# Patient Record
Sex: MALE | Race: WHITE | NOT HISPANIC OR LATINO | Employment: PART TIME | URBAN - METROPOLITAN AREA
[De-identification: names, ages, dates, MRNs, and addresses within clinical notes are randomized per-mention and may not be internally consistent; named-entity substitution may affect disease eponyms.]

---

## 2017-01-05 ENCOUNTER — RADIANT APPOINTMENT (OUTPATIENT)
Dept: GENERAL RADIOLOGY | Facility: CLINIC | Age: 37
End: 2017-01-05
Attending: PODIATRIST
Payer: COMMERCIAL

## 2017-01-05 ENCOUNTER — OFFICE VISIT (OUTPATIENT)
Dept: PODIATRY | Facility: CLINIC | Age: 37
End: 2017-01-05
Payer: COMMERCIAL

## 2017-01-05 VITALS — RESPIRATION RATE: 16 BRPM | WEIGHT: 184 LBS | HEIGHT: 68 IN | BODY MASS INDEX: 27.89 KG/M2

## 2017-01-05 DIAGNOSIS — E11.9 TYPE 2 DIABETES MELLITUS WITHOUT COMPLICATION, WITH LONG-TERM CURRENT USE OF INSULIN (H): ICD-10-CM

## 2017-01-05 DIAGNOSIS — G60.9 HEREDITARY AND IDIOPATHIC PERIPHERAL NEUROPATHY: ICD-10-CM

## 2017-01-05 DIAGNOSIS — Z79.4 TYPE 2 DIABETES MELLITUS WITHOUT COMPLICATION, WITH LONG-TERM CURRENT USE OF INSULIN (H): ICD-10-CM

## 2017-01-05 DIAGNOSIS — S99.921A INJURY OF RIGHT TOE, INITIAL ENCOUNTER: Primary | ICD-10-CM

## 2017-01-05 PROCEDURE — 73630 X-RAY EXAM OF FOOT: CPT | Mod: RT

## 2017-01-05 PROCEDURE — 99213 OFFICE O/P EST LOW 20 MIN: CPT | Performed by: PODIATRIST

## 2017-01-05 NOTE — NURSING NOTE
"Chief Complaint   Patient presents with     Foot Problems     Right hallux injury x 2 weeks, bruised, DM foot check       Initial Ht 5' 8\" (1.727 m)  Wt 184 lb (83.462 kg)  BMI 27.98 kg/m2 Estimated body mass index is 27.98 kg/(m^2) as calculated from the following:    Height as of this encounter: 5' 8\" (1.727 m).    Weight as of this encounter: 184 lb (83.462 kg).    Nancy Miller CMA ([facility])  Podiatry/Foot & Ankle Surgery      "

## 2017-01-05 NOTE — PROGRESS NOTES
ASSESSMENT/PLAN:    Encounter Diagnoses   Name Primary?     Injury of right toe, initial encounter Yes     Type 2 diabetes mellitus without complication, with long-term current use of insulin (H)      Hereditary and idiopathic peripheral neuropathy      We reviewed the x-rays together.      Stiff soled, support shoes recommended.    If the right hallux nail loosens or he has ongoing pain, he is to return.  Nail avulsion would be an option.    Pt is instructed to inspect feet daily, wear a supportive shoe at all times, and avoid walking barefoot.  He is instructed to call if any sores develop.   Importance of good blood sugar control emphasized.   Pt verbalized understanding.      Body mass index is 27.98 kg/(m^2).    Weight management plan: Patient was referred to their PCP to discuss a diet and exercise plan.      Shelton Smallwood DPM, FACFAS, MS    [facility] Department of Podiatry/Foot & Ankle Surgery      ____________________________________________________________________    HPI:         Chief Complaint: heavy ge ran over his right great toe 14 days ago. painful  Onset of problem: 14 days  Pain/ discomfort is described as:  Pressure pain  Ratin/10   Frequency:  daily    The pain is made worse with contact, walking, pressure  Previous treatment: no    He also presented for a foot exam.  He has diabetes.  Last Hgb A1C = 7.2. He reports that the previous interdigital tinea pedis resolved.    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      Liver disease      Seizures (H)      Pain disorder 2015   *  *  Past Surgical History   Procedure Laterality Date     Orthopedic surgery     *  *  Current Outpatient Prescriptions   Medication Sig Dispense Refill     liraglutide (VICTOZA) 18 MG/3ML soln Inject 1.2 mg Subcutaneous daily 6 mL 0     nortriptyline  (PAMELOR) 25 MG capsule Take 2 capsules (50 mg) by mouth At Bedtime 60 capsule 2     gabapentin (NEURONTIN) 600 MG tablet Take 2 tablets, 3x daily 180 tablet 2     insulin glargine (LANTUS SOLOSTAR) 100 UNIT/ML PEN 10 units at bedtime 3 Month 1     metFORMIN (GLUCOPHAGE-XR) 500 MG 24 hr tablet Take 2 tablets (1,000 mg) by mouth 2 times daily (with meals) 120 tablet 5     Buprenorphine HCl-Naloxone HCl (SUBOXONE) 4-1 MG film Place 1 Film under the tongue 3 times daily       insulin lispro (HUMALOG KWIKPEN) 100 UNIT/ML soln To use with scale 3 times per day with meals while on steroids.  Use the scale when your meal BG is > 250  with the pain causing high BG. 500 mL 6     insulin pen needle 31G X 6 MM Use 2 daily or as directed. 100 each 11     blood glucose monitoring (ACCU-CHEK ADARSH PLUS) meter device kit Use to test blood sugar 4-6 times daily or as directed. 1 kit 0     EPINEPHrine (EPIPEN) 0.3 MG/0.3ML injection Inject 0.3 mLs (0.3 mg) into the muscle once as needed for anaphylaxis 1 each 2     ciclopirox 0.77 % GEL Apply to affected area twice daily 30 g 1     losartan (COZAAR) 100 MG tablet Take 1 tablet (100 mg) by mouth daily 90 tablet 3     blood glucose monitoring (ACCU-CHEK ADARSH) test strip Use to test blood sugars 4 times daily or as directed. When on prednisone check 6 times per day. 4 Box 12     sildenafil (VIAGRA) 100 MG tablet Take 0.5-1 tablets ( mg) by mouth daily as needed for erectile dysfunction Take 30 min to 4 hours before intercourse.  Never use with nitroglycerin, terazosin or doxazosin. 5 tablet 5     blood glucose (ACCU-CHEK SMARTVIEW) test strip Use to test blood sugar 2 times daily or as directed. 3 Month 3     diazepam (VALIUM) 5 MG tablet Take 1 tablet (5 mg) 3 times daily as needed. 60 tablet      venlafaxine (EFFEXOR-ER) 150 MG TB24 Take 1 tablet by mouth daily (with breakfast). 30 each 0     ibuprofen (ADVIL,MOTRIN) 200 MG tablet Take 1-2 tablets by mouth every 6 hours as  "needed.       DiphenhydrAMINE HCl (BENADRYL PO) Take 50 mg by mouth At Bedtime.         ROS:    A 10-point review of systems was performed.  It is positive for that noted in the HPI and as seen below.  All other systems found to be negative.     Numbness in feet?  yes   Calf pain with walking? no  Recent foot/ankle injury? See HPI  Weight change  over past 12 months? 10# gain  Self perception as overweight? no  Recent flu-like symptoms? no  Joint pain other than feet ? no    EXAM:    Vitals: Ht 5' 8\" (1.727 m)  Wt 184 lb (83.462 kg)  BMI 27.98 kg/m2  BMI: Body mass index is 27.98 kg/(m^2).  Height: 5' 8\"    Constitutional/ general:  Pt is in no apparent distress, appears well-nourished.  Cooperative with history and physical exam.     Vascular:  Pedal pulses are palpable bilaterally for both the DP and PT arteries.  CFT < 3 sec.  No edema.  Pedal hair growth noted.     Neuro:  Alert and oriented x 3. Coordinated gait.  Light touch sensation is intact to the L4, L5, S1 distributions. No obvious deficits.  No evidence of neurological-based weakness, spasticity, or contracture in the lower extremities.     Protective sensation is intact per [place] Monofilament testing.    Derm: Normal texture and turgor.  No erythema, ecchymosis, or cyanosis.  No open lesions.  Thickening of bilateral hallux nail with darker pigmentation of the right hallux nail plate    Musculoskeletal:    Lower extremity muscle strength is normal.  Patient is ambulatory without an assistive device or brace .  No gross deformities.  Pain on palpation: with pressure of the right hallux nail plate.  No pain with interphalangeal joint ROM or palpation over the proximal phalanx.      Radiographic Exam:  X-Ray Findings:  I personally reviewed the films.  No fractures seen in the right hallux.      Shelton Smallwood DPM, ARACELI, MS [place] Department of Podiatry/Foot & Ankle Surgery              "

## 2017-01-09 DIAGNOSIS — I10 HYPERTENSION GOAL BP (BLOOD PRESSURE) < 140/90: ICD-10-CM

## 2017-01-09 NOTE — TELEPHONE ENCOUNTER
losartan      Last Written Prescription Date: 12/22/15  Last Fill Quantity: 90, # refills: 3  [place] [facility] prescribing provider: 12/12/16       POTASSIUM   Date Value Ref Range Status   07/18/2016 3.9 3.4 - 5.3 mmol/L Final     CREATININE   Date Value Ref Range Status   07/18/2016 0.97 0.66 - 1.25 mg/dL Final     BP Readings from Last 3 Encounters:   12/12/16 110/64   11/28/16 138/90   10/03/16 126/92

## 2017-01-10 DIAGNOSIS — Z79.4 TYPE 2 DIABETES MELLITUS WITHOUT COMPLICATION, WITH LONG-TERM CURRENT USE OF INSULIN (H): Primary | ICD-10-CM

## 2017-01-10 DIAGNOSIS — E11.9 TYPE 2 DIABETES MELLITUS WITHOUT COMPLICATION, WITH LONG-TERM CURRENT USE OF INSULIN (H): Primary | ICD-10-CM

## 2017-01-10 RX ORDER — LOSARTAN POTASSIUM 100 MG/1
100 TABLET ORAL DAILY
Qty: 90 TABLET | Refills: 1 | Status: SHIPPED | OUTPATIENT
Start: 2017-01-10 | End: 2017-06-02

## 2017-01-10 NOTE — TELEPHONE ENCOUNTER
rodneytoza      Last Written Prescription Date:  12/16/16  Last Fill Quantity: 6,   # refills: 0  Last Office Visit with [facility] or [facility] prescribing provider: 12/12/16  Future Office visit:       Routing refill request to provider for review/approval because:  Drug not on the [facility] or [facility] refill protocol or controlled substance

## 2017-01-11 ENCOUNTER — CARE COORDINATION (OUTPATIENT)
Dept: CARE COORDINATION | Facility: CLINIC | Age: 37
End: 2017-01-11

## 2017-01-11 NOTE — PROGRESS NOTES
Clinic Care Coordination Contact  Pt called leaving a VM stating that he is doing well and feeling improved with new medication for pain  RN CC notes that in LOV pt reported that his wife is 7 wks gestation  Pt to f/u with PCP in 3 months  Erna Pineda RN Clinic Care Coordinator  [facility] [phone]

## 2017-01-12 DIAGNOSIS — E11.9 TYPE 2 DIABETES MELLITUS WITHOUT COMPLICATION (H): Primary | ICD-10-CM

## 2017-01-12 NOTE — TELEPHONE ENCOUNTER
Routing refill request to provider for review/approval because:  Lana given x1 and patient did not follow up, please advise

## 2017-01-12 NOTE — TELEPHONE ENCOUNTER
kedar plus strips      Last Written Prescription Date: 12/22/15  Last Fill Quantity: 4,  # refills: 12   Last Office Visit with [facility], [facility] or [facility] prescribing provider: 12/12/16

## 2017-01-13 RX ORDER — LIRAGLUTIDE 6 MG/ML
1.2 INJECTION SUBCUTANEOUS DAILY
Qty: 6 ML | Refills: 0 | Status: SHIPPED | OUTPATIENT
Start: 2017-01-13 | End: 2017-05-03

## 2017-01-16 DIAGNOSIS — G54.5 NEURALGIC AMYOTROPHY: Primary | ICD-10-CM

## 2017-01-16 RX ORDER — GABAPENTIN 600 MG/1
TABLET ORAL
Qty: 180 TABLET | Refills: 2 | Status: SHIPPED | OUTPATIENT
Start: 2017-01-16 | End: 2017-06-24

## 2017-01-22 ENCOUNTER — OFFICE VISIT (OUTPATIENT)
Dept: URGENT CARE | Facility: URGENT CARE | Age: 37
End: 2017-01-22
Payer: COMMERCIAL

## 2017-01-22 VITALS
HEIGHT: 69 IN | OXYGEN SATURATION: 98 % | BODY MASS INDEX: 26.66 KG/M2 | WEIGHT: 180 LBS | SYSTOLIC BLOOD PRESSURE: 122 MMHG | HEART RATE: 91 BPM | TEMPERATURE: 97.4 F | DIASTOLIC BLOOD PRESSURE: 79 MMHG

## 2017-01-22 DIAGNOSIS — R19.7 DIARRHEA, UNSPECIFIED TYPE: Primary | ICD-10-CM

## 2017-01-22 LAB
BASOPHILS # BLD AUTO: 0 10E9/L (ref 0–0.2)
BASOPHILS NFR BLD AUTO: 0.1 %
DIFFERENTIAL METHOD BLD: NORMAL
EOSINOPHIL # BLD AUTO: 0.4 10E9/L (ref 0–0.7)
EOSINOPHIL NFR BLD AUTO: 4.6 %
ERYTHROCYTE [DISTWIDTH] IN BLOOD BY AUTOMATED COUNT: 12.3 % (ref 10–15)
HCT VFR BLD AUTO: 41.5 % (ref 40–53)
HGB BLD-MCNC: 13.9 G/DL (ref 13.3–17.7)
LYMPHOCYTES # BLD AUTO: 1.8 10E9/L (ref 0.8–5.3)
LYMPHOCYTES NFR BLD AUTO: 22.3 %
MCH RBC QN AUTO: 29.5 PG (ref 26.5–33)
MCHC RBC AUTO-ENTMCNC: 33.5 G/DL (ref 31.5–36.5)
MCV RBC AUTO: 88 FL (ref 78–100)
MONOCYTES # BLD AUTO: 0.6 10E9/L (ref 0–1.3)
MONOCYTES NFR BLD AUTO: 6.9 %
NEUTROPHILS # BLD AUTO: 5.3 10E9/L (ref 1.6–8.3)
NEUTROPHILS NFR BLD AUTO: 66.1 %
PLATELET # BLD AUTO: 320 10E9/L (ref 150–450)
RBC # BLD AUTO: 4.71 10E12/L (ref 4.4–5.9)
WBC # BLD AUTO: 8 10E9/L (ref 4–11)

## 2017-01-22 PROCEDURE — 99213 OFFICE O/P EST LOW 20 MIN: CPT | Performed by: FAMILY MEDICINE

## 2017-01-22 PROCEDURE — 85025 COMPLETE CBC W/AUTO DIFF WBC: CPT | Performed by: FAMILY MEDICINE

## 2017-01-22 PROCEDURE — 36415 COLL VENOUS BLD VENIPUNCTURE: CPT | Performed by: FAMILY MEDICINE

## 2017-01-22 PROCEDURE — 80053 COMPREHEN METABOLIC PANEL: CPT | Performed by: FAMILY MEDICINE

## 2017-01-22 NOTE — NURSING NOTE
"Chief Complaint   Patient presents with     Urgent Care     Diarrhea     c/o diarrhea for 3 days       Initial /79 mmHg  Pulse 91  Temp(Src) 97.4  F (36.3  C) (Tympanic)  Ht 5' 9\" (1.753 m)  Wt 180 lb (81.647 kg)  BMI 26.57 kg/m2  SpO2 98% Estimated body mass index is 26.57 kg/(m^2) as calculated from the following:    Height as of this encounter: 5' 9\" (1.753 m).    Weight as of this encounter: 180 lb (81.647 kg).  BP completed using cuff size: regular  Yojana Chatman MA    "

## 2017-01-22 NOTE — MR AVS SNAPSHOT
After Visit Summary   1/22/2017    Phillip Rueda    MRN: [id number]           Patient Information     Date Of Birth          1980        Visit Information        Provider Department      1/22/2017 11:15 AM Jermaine Bellamy MD [facility]        Today's Diagnoses     Diarrhea, unspecified type    -  1       Care Instructions    Okay for imodium to help with diarrhea, caution as too much will cause constipation.  BRAT diet - bananas, rice, applesauce, toast      Viral Gastroenteritis (Adult)    Gastroenteritis is commonly called the stomach flu. It is most often caused by a virus that affects the stomach and intestinal tract and usually lasts from 2 to 7 days. Common viruses causing gastroenteritis include norovirus, rotavirus, and hepatitis A. Non-viral causes of gastroenteritis include bacteria, parasites, and toxins.  The danger from repeated vomiting or diarrhea is dehydration. This is the loss of too much fluid from the body. When this occurs, body fluids must be replaced. Antibiotics do not help with this illness because it is usually viral.Simple home treatment will be helpful.  Symptoms of viral gastroenteritis may include:    Watery, loose stools    Stomach pain or abdominal cramps    Fever and chills    Nausea and vomiting    Loss of bowel control    Headache  Home care  Gastroenteritis is transmitted by contact with the stool or vomit of an infected person. This can occur from person to person or from contact with a contaminated surface.  Follow these guidelines when caring for yourself at home:    If symptoms are severe, rest at home for the next 24 hours or until you are feeling better.    Wash your hands with soap and water or use alcohol-based  to prevent the spread of infection. Wash your hands after touching anyone who is sick.    Wash your hands or use alcohol-based  after using the toilet and before meals. Clean the toilet after each  use.  Remember these tips when preparing food:    People with diarrhea should not prepare or serve food to others. When preparing foods, wash your hands before and after.    Wash your hands after using cutting boards, countertops, knives, or utensils that have been in contact with raw food.    Keep uncooked meats away from cooked and ready-to-eat foods.  Medicine  You may use acetaminophen or NSAID medicines like ibuprofen or naproxen to control fever unless another medicine was given. If you have chronic liver or kidney disease, talk with your healthcare provider before using these medicines. Also talk with your provider if you've had a stomach ulcer or gastrointestinal bleeding. Don't give aspirin to anyone under 18 years of age who is ill with a fever. It may cause severe liver damage. Don't use NSAIDS is you are already taking one for another condition (like arthritis) or are on aspirin (such as for heart disease or after a stroke).  If medicine for vomiting or diarrhea are prescribed, take these only as directed. Do not take over-the-counter medicines for vomiting or diarrhea unless instructed by your healthcare provider.  Diet  Follow these guidelines for food:    Water and liquids are important so you don't get dehydrated. Drink a small amount at a time or suck on ice chips if you are vomiting.    If you eat, avoid fatty, greasy, spicy, or fried foods.    Don't eat dairy if you have diarrhea. This can make diarrhea worse.    Avoid tobacco, alcohol, and caffeine which may worsen symptoms.  During the first 24 hours (the first full day), follow the diet below:    Beverages. Sports drinks, soft drinks without caffeine, ginger ale, mineral water (plain or flavored), decaffeinated tea and coffee. If you are very dehydrated, sports drinks aren't a good choice. They have too much sugar and not enough electrolytes. In this case, commercially available products called oral rehydration solutions, are best.    Soups.  Eat clear broth, consommé, and bouillon.    Desserts. Eat gelatin, popsicles, and fruit juice bars.  During the next 24 hours (the second day), you may add the following to the above:    Hot cereal, plain toast, bread, rolls, and crackers    Plain noodles, rice, mashed potatoes, chicken noodle or rice soup    Unsweetened canned fruit (avoid pineapple), bananas    Limit fat intake to less than 15 grams per day. Do this by avoiding margarine, butter, oils, mayonnaise, sauces, gravies, fried foods, peanut butter, meat, poultry, and fish.    Limit fiber and avoid raw or cooked vegetables, fresh fruits (except bananas), and bran cereals.    Limit caffeine and chocolate. Don't use spices or seasonings other than salt.    Limit dairy products.    Avoid alcohol.  During the next 24 hours:    Gradually resume a normal diet as you feel better and your symptoms improve.    If at any time it starts getting worse again, go back to clear liquids until you feel better.  Follow-up care  Follow up with your healthcare provider, or as advised. Call your provider if you don't get better within 24 hours or if diarrhea lasts more than a week. Also follow up if you are unable to keep down liquids and get dehydrated. If a stool (diarrhea) sample was taken, call as directed for the results.  Call 911  Call 911 if any of these occur:    Trouble breathing    Chest pain    Confused    Severe drowsiness or trouble awakening    Fainting or loss of consciousness    Rapid heart rate    Seizure    Stiff neck  When to seek medical advice  Call your healthcare provider right away if any of these occur:    Abdominal pain that gets worse    Continued vomiting (unable to keep liquids down)    Frequent diarrhea (more than 5 times a day)    Blood in vomit or stool (black or red color)    Dark urine, reduced urine output, or extreme thirst    Weakness or dizziness    Drowsiness    Fever of 100.4 F (38 C) oral or higher that does not get better with fever  "medicine    [facility]    7998-5231 [employer]. [address] [place] [address], [place] [address]. All rights reserved. This information is not intended as a substitute for professional medical care. Always follow your healthcare professional's instructions.              Follow-ups after your visit        Who to contact     If you have questions or need follow up information about today's clinic visit or your schedule please contact [facility] directly at [phone].  Normal or non-critical lab and imaging results will be communicated to you by [place], letter or phone within 4 business days after the clinic has received the results. If you do not hear from us within 7 days, please contact the clinic through Trendlrt or phone. If you have a critical or abnormal lab result, we will notify you by phone as soon as possible.  Submit refill requests through CloudOn or call your pharmacy and they will forward the refill request to us. Please allow 3 business days for your refill to be completed.          Additional Information About Your Visit        CloudOn Information     CloudOn gives you secure access to your electronic health record. If you see a primary care provider, you can also send messages to your care team and make appointments. If you have questions, please call your primary care clinic.  If you do not have a primary care provider, please call [phone] and they will assist you.        Care EveryWhere ID     This is your Care EveryWhere ID. This could be used by other organizations to access your [facility] medical records  [id number]        Your Vitals Were     Pulse Temperature Height BMI (Body Mass Index) Pulse Oximetry       91 97.4  F (36.3  C) (Tympanic) 5' 9\" (1.753 m) 26.57 kg/m2 98%        Blood Pressure from Last 3 Encounters:   01/22/17 122/79   12/12/16 110/64   11/28/16 138/90    Weight from Last 3 Encounters:   01/22/17 180 lb (81.647 kg)   01/05/17 184 lb " (83.462 kg)   12/12/16 189 lb (85.73 kg)              We Performed the Following     CBC with platelets and differential     Comprehensive metabolic panel (BMP + Alb, Alk Phos, ALT, AST, Total. Bili, TP)        Primary Care Provider Office Phone # Fax #    Km Michelle Dos Santos [phone]       [address] [address]        Goals        Patient-Stated    Functional: Patient will revise his diabetic diet to maximize his diabetes with the goal of decreasing his daily blood sugars and improving his HGB A1c     Goal Comments - Note created  3/24/2016 11:25 AM by Erna Pineda RN    As of today's date 3/24/2016 goal is met at 0 - 25%.   Goal Status:  Active        Medical     Goal Comments - Note created  8/25/2015 12:49 PM by Erna Pineda RN    Patient states that he is wanting a diabetes education referral so that he can fine tune his blood sugars or when he is on steroids  Patient states that he will contact RN Clinic Care Coordinator when needing assistance with coordinating cares or has complications with his disease management and or cares      Medical Patient will bring 3 days of blood sugar readings to office visit with Dr Dos Santos .     Goal Comments - Note created  3/24/2016 11:23 AM by Erna Pineda RN    As of today's date 3/24/2016 goal is met at 0 - 25%.   Goal Status:  Active        Medical: Patient will set up podiatry and eye exam for diabetici chiin 6 month.     Goal Comments - Note created  3/24/2016 11:21 AM by Erna Pineda RN    As of today's date 3/24/2016 goal is met at 0 - 25%.   Goal Status:  Active        Medical: Pt will continue to utilize his training in [employer] along with [employer] for pain and life management     Goal Comments - Note created  3/24/2016 11:28 AM by Erna Pineda RN    As of today's date 3/24/2016 goal is met at 51 - 75%.   Goal Status:  Active          Thank you!     Thank you for  choosing [facility]  for your care. Our goal is always to provide you with excellent care. Hearing back from our patients is one way we can continue to improve our services. Please take a few minutes to complete the written survey that you may receive in the mail after your visit with us. Thank you!             Your Updated Medication List - Protect others around you: Learn how to safely use, store and throw away your medicines at www.disposemymeds.org.          This list is accurate as of: 1/22/17 12:52 PM.  Always use your most recent med list.                   Brand Name Dispense Instructions for use    BENADRYL PO      Take 50 mg by mouth At Bedtime.       blood glucose monitoring meter device kit     1 kit    Use to test blood sugar 4-6 times daily or as directed.       * blood glucose monitoring test strip    ACCU-CHEK ADARSH    4 Box    Use to test blood sugars 4 times daily or as directed. When on prednisone check 6 times per day.       * blood glucose monitoring test strip    ACCU-CHEK SMARTVIEW    200 strip    Use to test blood sugar 2 times daily or as directed.       buprenorphine HCl-naloxone HCl 4-1 MG per film    SUBOXONE     Place 1 Film under the tongue 3 times daily       ciclopirox 0.77 % Gel     30 g    Apply to affected area twice daily       EPINEPHrine 0.3 MG/0.3ML injection     1 each    Inject 0.3 mLs (0.3 mg) into the muscle once as needed for anaphylaxis       gabapentin 600 MG tablet    NEURONTIN    180 tablet    Take 2 tablets, 3x daily       ibuprofen 200 MG tablet    ADVIL/MOTRIN     Take 1-2 tablets by mouth every 6 hours as needed.       insulin glargine 100 UNIT/ML injection    LANTUS SOLOSTAR    3 Month    10 units at bedtime       insulin lispro 100 UNIT/ML injection    HumaLOG KWIKpen    500 mL    To use with scale 3 times per day with meals while on steroids.  Use the scale when your meal BG is > 250  with the pain causing high BG.       insulin pen  needle 31G X 6 MM     100 each    Use 2 daily or as directed.       liraglutide 18 MG/3ML soln    VICTOZA    6 mL    Inject 1.2 mg Subcutaneous daily       losartan 100 MG tablet    COZAAR    90 tablet    Take 1 tablet (100 mg) by mouth daily       metFORMIN 500 MG 24 hr tablet    GLUCOPHAGE-XR    120 tablet    Take 2 tablets (1,000 mg) by mouth 2 times daily (with meals)       nortriptyline 25 MG capsule    PAMELOR    60 capsule    Take 2 capsules (50 mg) by mouth At Bedtime       sildenafil 100 MG cap/tab    VIAGRA    5 tablet    Take 0.5-1 tablets ( mg) by mouth daily as needed for erectile dysfunction Take 30 min to 4 hours before intercourse.  Never use with nitroglycerin, terazosin or doxazosin.       VALIUM 5 MG tablet   Generic drug:  diazepam     60 tablet    Take 1 tablet (5 mg) 3 times daily as needed.       venlafaxine 150 MG Tb24 24 hr tablet    EFFEXOR-ER    30 each    Take 1 tablet by mouth daily (with breakfast).       * Notice:  This list has 2 medication(s) that are the same as other medications prescribed for you. Read the directions carefully, and ask your doctor or other care provider to review them with you.

## 2017-01-22 NOTE — PATIENT INSTRUCTIONS
Okay for imodium to help with diarrhea, caution as too much will cause constipation.  BRAT diet - bananas, rice, applesauce, toast      Viral Gastroenteritis (Adult)    Gastroenteritis is commonly called the stomach flu. It is most often caused by a virus that affects the stomach and intestinal tract and usually lasts from 2 to 7 days. Common viruses causing gastroenteritis include norovirus, rotavirus, and hepatitis A. Non-viral causes of gastroenteritis include bacteria, parasites, and toxins.  The danger from repeated vomiting or diarrhea is dehydration. This is the loss of too much fluid from the body. When this occurs, body fluids must be replaced. Antibiotics do not help with this illness because it is usually viral.Simple home treatment will be helpful.  Symptoms of viral gastroenteritis may include:    Watery, loose stools    Stomach pain or abdominal cramps    Fever and chills    Nausea and vomiting    Loss of bowel control    Headache  Home care  Gastroenteritis is transmitted by contact with the stool or vomit of an infected person. This can occur from person to person or from contact with a contaminated surface.  Follow these guidelines when caring for yourself at home:    If symptoms are severe, rest at home for the next 24 hours or until you are feeling better.    Wash your hands with soap and water or use alcohol-based  to prevent the spread of infection. Wash your hands after touching anyone who is sick.    Wash your hands or use alcohol-based  after using the toilet and before meals. Clean the toilet after each use.  Remember these tips when preparing food:    People with diarrhea should not prepare or serve food to others. When preparing foods, wash your hands before and after.    Wash your hands after using cutting boards, countertops, knives, or utensils that have been in contact with raw food.    Keep uncooked meats away from cooked and ready-to-eat foods.  Medicine  You may  use acetaminophen or NSAID medicines like ibuprofen or naproxen to control fever unless another medicine was given. If you have chronic liver or kidney disease, talk with your healthcare provider before using these medicines. Also talk with your provider if you've had a stomach ulcer or gastrointestinal bleeding. Don't give aspirin to anyone under 18 years of age who is ill with a fever. It may cause severe liver damage. Don't use NSAIDS is you are already taking one for another condition (like arthritis) or are on aspirin (such as for heart disease or after a stroke).  If medicine for vomiting or diarrhea are prescribed, take these only as directed. Do not take over-the-counter medicines for vomiting or diarrhea unless instructed by your healthcare provider.  Diet  Follow these guidelines for food:    Water and liquids are important so you don't get dehydrated. Drink a small amount at a time or suck on ice chips if you are vomiting.    If you eat, avoid fatty, greasy, spicy, or fried foods.    Don't eat dairy if you have diarrhea. This can make diarrhea worse.    Avoid tobacco, alcohol, and caffeine which may worsen symptoms.  During the first 24 hours (the first full day), follow the diet below:    Beverages. Sports drinks, soft drinks without caffeine, ginger ale, mineral water (plain or flavored), decaffeinated tea and coffee. If you are very dehydrated, sports drinks aren't a good choice. They have too much sugar and not enough electrolytes. In this case, commercially available products called oral rehydration solutions, are best.    Soups. Eat clear broth, consommé, and bouillon.    Desserts. Eat gelatin, popsicles, and fruit juice bars.  During the next 24 hours (the second day), you may add the following to the above:    Hot cereal, plain toast, bread, rolls, and crackers    Plain noodles, rice, mashed potatoes, chicken noodle or rice soup    Unsweetened canned fruit (avoid pineapple), bananas    Limit fat  intake to less than 15 grams per day. Do this by avoiding margarine, butter, oils, mayonnaise, sauces, gravies, fried foods, peanut butter, meat, poultry, and fish.    Limit fiber and avoid raw or cooked vegetables, fresh fruits (except bananas), and bran cereals.    Limit caffeine and chocolate. Don't use spices or seasonings other than salt.    Limit dairy products.    Avoid alcohol.  During the next 24 hours:    Gradually resume a normal diet as you feel better and your symptoms improve.    If at any time it starts getting worse again, go back to clear liquids until you feel better.  Follow-up care  Follow up with your healthcare provider, or as advised. Call your provider if you don't get better within 24 hours or if diarrhea lasts more than a week. Also follow up if you are unable to keep down liquids and get dehydrated. If a stool (diarrhea) sample was taken, call as directed for the results.  Call 911  Call 911 if any of these occur:    Trouble breathing    Chest pain    Confused    Severe drowsiness or trouble awakening    Fainting or loss of consciousness    Rapid heart rate    Seizure    Stiff neck  When to seek medical advice  Call your healthcare provider right away if any of these occur:    Abdominal pain that gets worse    Continued vomiting (unable to keep liquids down)    Frequent diarrhea (more than 5 times a day)    Blood in vomit or stool (black or red color)    Dark urine, reduced urine output, or extreme thirst    Weakness or dizziness    Drowsiness    Fever of 100.4 F (38 C) oral or higher that does not get better with fever medicine    New rash    8594-7696 [employer]. [address] [place] [address], [place] [address]. All rights reserved. This information is not intended as a substitute for professional medical care. Always follow your healthcare professional's instructions.

## 2017-01-22 NOTE — PROGRESS NOTES
SUBJECTIVE:  Chief Complaint   Patient presents with     Urgent Care     Diarrhea     c/o diarrhea for 3 days     Phillip Rueda is a 36 year old male whose symptoms began 3 days ago and include diarrhea.  No new foods, no blood in stools, no fever.  No N/V, keeping up with fluids.  Symptoms are sudden onset and persistent and moderate.    Aggravating factors: nothing.    Alleviating factors:nothing  Associated symptoms:  Pain:No, had gas pain  Fever: no noted fevers  Diarrhea:  consists of over 10 stools/day and is slight improvement  Stools: loose  Appetite: decreased  Risk factors: none    Past Medical History   Diagnosis Date     Diabetes mellitus (H)      Hypertension      Depressive disorder      Anxiety      Neuralgic amyotrophy      Staph infection      Parsonage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      Liver disease      Seizures (H)      Pain disorder 12/8/2015   .  Current Outpatient Prescriptions   Medication Sig Dispense Refill     gabapentin (NEURONTIN) 600 MG tablet Take 2 tablets, 3x daily 180 tablet 2     losartan (COZAAR) 100 MG tablet Take 1 tablet (100 mg) by mouth daily 90 tablet 1     metFORMIN (GLUCOPHAGE-XR) 500 MG 24 hr tablet Take 2 tablets (1,000 mg) by mouth 2 times daily (with meals) 120 tablet 5     venlafaxine (EFFEXOR-ER) 150 MG TB24 Take 1 tablet by mouth daily (with breakfast). 30 each 0     liraglutide (VICTOZA) 18 MG/3ML soln Inject 1.2 mg Subcutaneous daily 6 mL 0     blood glucose monitoring (ACCU-CHEK SMARTVIEW) test strip Use to test blood sugar 2 times daily or as directed. 200 strip 3     nortriptyline (PAMELOR) 25 MG capsule Take 2 capsules (50 mg) by mouth At Bedtime 60 capsule 2     insulin glargine (LANTUS SOLOSTAR) 100 UNIT/ML PEN 10 units at bedtime 3 Month 1     Buprenorphine HCl-Naloxone HCl (SUBOXONE) 4-1 MG film Place 1 Film under the tongue 3 times daily       insulin lispro (HUMALOG KWIKPEN) 100 UNIT/ML soln To use with scale 3 times per day with meals while on steroids.  Use the  "scale when your meal BG is > 250  with the pain causing high BG. 500 mL 6     insulin pen needle 31G X 6 MM Use 2 daily or as directed. 100 each 11     blood glucose monitoring (ACCU-CHEK ADARSH PLUS) meter device kit Use to test blood sugar 4-6 times daily or as directed. 1 kit 0     EPINEPHrine (EPIPEN) 0.3 MG/0.3ML injection Inject 0.3 mLs (0.3 mg) into the muscle once as needed for anaphylaxis 1 each 2     ciclopirox 0.77 % GEL Apply to affected area twice daily 30 g 1     blood glucose monitoring (ACCU-CHEK ADARSH) test strip Use to test blood sugars 4 times daily or as directed. When on prednisone check 6 times per day. 4 Box 12     sildenafil (VIAGRA) 100 MG tablet Take 0.5-1 tablets ( mg) by mouth daily as needed for erectile dysfunction Take 30 min to 4 hours before intercourse.  Never use with nitroglycerin, terazosin or doxazosin. 5 tablet 5     diazepam (VALIUM) 5 MG tablet Take 1 tablet (5 mg) 3 times daily as needed. 60 tablet      ibuprofen (ADVIL,MOTRIN) 200 MG tablet Take 1-2 tablets by mouth every 6 hours as needed.       DiphenhydrAMINE HCl (BENADRYL PO) Take 50 mg by mouth At Bedtime.       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  Drink 1.75 every 2 days       ROS:  CONSTITUTIONAL:NEGATIVE for fever, chills, change in weight and POSITIVE  for fatigue  ENT/MOUTH: NEGATIVE for ear, mouth and throat problems  RESP:NEGATIVE for significant cough or SOB  CV: NEGATIVE for chest pain, palpitations or peripheral edema  GI: POSITIVE for diarrhea and gas or bloating    OBJECTIVE:  /79 mmHg  Pulse 91  Temp(Src) 97.4  F (36.3  C) (Tympanic)  Ht 5' 9\" (1.753 m)  Wt 180 lb (81.647 kg)  BMI 26.57 kg/m2  SpO2 98%  GENERAL APPEARANCE: " healthy, alert and no distress  EYES: EOMI,  PERRL, conjunctiva clear  HENT: ear canals and TM's normal.  Nose and mouth without ulcers, erythema or lesions  NECK: supple, nontender, no lymphadenopathy  RESP: lungs clear to auscultation - no rales, rhonchi or wheezes  CV: regular rates and rhythm, normal S1 S2, no murmur noted  ABDOMEN:  soft, nontender, no HSM or masses and bowel sounds normal.  No rebound, no guarding  NEURO: Normal strength and tone, sensory exam grossly normal,  normal speech and mentation  SKIN: no suspicious lesions or rashes  PSYCH: mentation appears normal and affect normal/bright    Results for orders placed or performed in visit on 01/22/17   CBC with platelets and differential   Result Value Ref Range    WBC 8.0 4.0 - 11.0 10e9/L    RBC Count 4.71 4.4 - 5.9 10e12/L    Hemoglobin 13.9 13.3 - 17.7 g/dL    Hematocrit 41.5 40.0 - 53.0 %    MCV 88 78 - 100 fl    MCH 29.5 26.5 - 33.0 pg    MCHC 33.5 31.5 - 36.5 g/dL    RDW 12.3 10.0 - 15.0 %    Platelet Count 320 150 - 450 10e9/L    Diff Method Automated Method     % Neutrophils 66.1 %    % Lymphocytes 22.3 %    % Monocytes 6.9 %    % Eosinophils 4.6 %    % Basophils 0.1 %    Absolute Neutrophil 5.3 1.6 - 8.3 10e9/L    Absolute Lymphocytes 1.8 0.8 - 5.3 10e9/L    Absolute Monocytes 0.6 0.0 - 1.3 10e9/L    Absolute Eosinophils 0.4 0.0 - 0.7 10e9/L    Absolute Basophils 0.0 0.0 - 0.2 10e9/L       ASSESSMENT/PLAN:  (R19.7) Diarrhea, unspecified type  (primary encounter diagnosis)  Comment: viral  Plan: CBC with platelets and differential,         Comprehensive metabolic panel (BMP + Alb, Alk         Phos, ALT, AST, Total. Bili, TP)              Diet: BRAT diet, Immodium OTC prn diarrhea and small amounts clear fluids frequently,soups,juices,water,advance diet as tolerated.  Work excuse note given today.    Follow-up with PCP if not improving  Jermaine Bellamy MD  January 22, 2017 1:00 PM

## 2017-01-22 NOTE — Clinical Note
[facility]  [address] [facility] [address] [facility] [address]  [phone]  Dept: [phone]      1/22/2017    Re: Phillip SEWELL Rueda      TO WHOM IT MAY CONCERN:    Phillip Rueda  was seen on 1/22/2017.  Please excuse him from work 1/22 due to illness.      Javi Bellamy MD  [facility]

## 2017-01-23 LAB
ALBUMIN SERPL-MCNC: 3.8 G/DL (ref 3.4–5)
ALP SERPL-CCNC: 127 U/L (ref 40–150)
ALT SERPL W P-5'-P-CCNC: 18 U/L (ref 0–70)
ANION GAP SERPL CALCULATED.3IONS-SCNC: 9 MMOL/L (ref 3–14)
AST SERPL W P-5'-P-CCNC: 19 U/L (ref 0–45)
BILIRUB SERPL-MCNC: 0.3 MG/DL (ref 0.2–1.3)
BUN SERPL-MCNC: 14 MG/DL (ref 7–30)
CALCIUM SERPL-MCNC: 9.3 MG/DL (ref 8.5–10.1)
CHLORIDE SERPL-SCNC: 103 MMOL/L (ref 94–109)
CO2 SERPL-SCNC: 27 MMOL/L (ref 20–32)
CREAT SERPL-MCNC: 0.92 MG/DL (ref 0.66–1.25)
GFR SERPL CREATININE-BSD FRML MDRD: NORMAL ML/MIN/1.7M2
GLUCOSE SERPL-MCNC: 91 MG/DL (ref 70–99)
POTASSIUM SERPL-SCNC: 4.4 MMOL/L (ref 3.4–5.3)
PROT SERPL-MCNC: 6.9 G/DL (ref 6.8–8.8)
SODIUM SERPL-SCNC: 139 MMOL/L (ref 133–144)

## 2017-02-07 ENCOUNTER — TELEPHONE (OUTPATIENT)
Dept: FAMILY MEDICINE | Facility: CLINIC | Age: 37
End: 2017-02-07

## 2017-02-07 NOTE — TELEPHONE ENCOUNTER
Patient states he would like to get a message to the diabetic educator and Dr Dos Santos  1) Patient is starting a course of sterids for a nerve condition and knows this tends to afect his blood sugar  2) Patient's glucometer - the Accu chek kedar plus - is no longer working.  Stopped yesterday so he replaced the batteries and it worked for a little while but is now completely dead again  Would like to speak to the diabetic educator  Will route to provider and Diabetes Educator  Vivien Gifford RN

## 2017-02-07 NOTE — TELEPHONE ENCOUNTER
Phillip will stop by this afternoon and  an Accuchek Connect meter.  He feels confident he knows what to do to keep blood sugar controlled with steroids.  He has a sliding scale he uses with Humalog.  He has been through several steroid bursts.  Advised to call if he has any questions or problems. Brittney Mascorro RN,CDE

## 2017-02-08 ENCOUNTER — TELEPHONE (OUTPATIENT)
Dept: NEUROLOGY | Facility: CLINIC | Age: 37
End: 2017-02-08

## 2017-02-08 NOTE — TELEPHONE ENCOUNTER
Received the following message:    Patient states that he is starting to have a flare up, and would like to discuss a steroid with Dr. Guerrero. Patient would like a call back at: [phone], and it is okay to leave a message.     Per discussion with Dr. Guerrero, he will not prescribe steroids for this patient. See also most recent clinic visit note. Dr. Guerrero has discussed this with patient at length on several occasions. Patient should seek advice from his pain clinic doctor. Left message with patient with this information, and encouraged him to call back with any further questions.

## 2017-02-23 ENCOUNTER — TRANSFERRED RECORDS (OUTPATIENT)
Dept: HEALTH INFORMATION MANAGEMENT | Facility: CLINIC | Age: 37
End: 2017-02-23

## 2017-03-13 ENCOUNTER — TELEPHONE (OUTPATIENT)
Dept: FAMILY MEDICINE | Facility: CLINIC | Age: 37
End: 2017-03-13

## 2017-03-13 DIAGNOSIS — E11.9 TYPE 2 DIABETES MELLITUS WITHOUT COMPLICATION, WITHOUT LONG-TERM CURRENT USE OF INSULIN (H): Primary | ICD-10-CM

## 2017-03-13 NOTE — TELEPHONE ENCOUNTER
Pt called to schedule diabetes education- he is requesting a lab visit for A1C check. Please call him to advise on orders. He would like to get this all done at the same time tomorrow. Please call him at today  [phone] so he knows this can be done. OK to LM. Thank You.    Becky Farris, [facility]  Diabetes and Nutrition Scheduling

## 2017-03-13 NOTE — TELEPHONE ENCOUNTER
A1C and microalbumin pended per health maintenance.    Routing to PCP.    Dr. Dos Santos-Please review and sign if agree.    Thank you!  PARAG Jolly, MARTYN, RN

## 2017-03-14 ENCOUNTER — ALLIED HEALTH/NURSE VISIT (OUTPATIENT)
Dept: EDUCATION SERVICES | Facility: CLINIC | Age: 37
End: 2017-03-14
Payer: COMMERCIAL

## 2017-03-14 DIAGNOSIS — E11.9 TYPE 2 DIABETES MELLITUS WITHOUT COMPLICATION, WITHOUT LONG-TERM CURRENT USE OF INSULIN (H): ICD-10-CM

## 2017-03-14 PROCEDURE — G0108 DIAB MANAGE TRN  PER INDIV: HCPCS

## 2017-03-14 PROCEDURE — 99207 ZZC NO CHARGE LOS: CPT

## 2017-03-14 NOTE — PROGRESS NOTES
Phillip was sent to the lab for blood work.  He had an episode of low blood sugar and had to eat.  He will come in tomorrow around 8am to get his labs done fasting.  Brittney Mascorro RN,CDE

## 2017-03-14 NOTE — MR AVS SNAPSHOT
After Visit Summary   3/14/2017    Phillip Rueda    MRN: [id number]           Patient Information     Date Of Birth          1980        Visit Information        Provider Department      3/14/2017 9:00 AM  DIABETIC ED RESOURCE [facility] Instructions    My Diabetes Care Goals:    Healthy Eating: Eat three meals per day.  Try to at least have a snack midday.      Being Active: Keep walking and hiking.      Monitoring: Keep checking your blood sugar frequently.     Taking Medication: Drop your Tresiba to 12 units daily.    Problem Solving: Get your a1c today.  Keep using your strategies for keeping sweets to a minimum.  Don't bring them in the house.    Follow up:  Follow-up as needed     Bring blood glucose meter and logbook with you to all doctor and follow-up appointments.     [facility] for the [facility]:  For Your Diabetes Education and Nutrition Appointments Call:  [phone]   For Diabetes Education or Nutrition Related Questions:   Phone: [phone]  E-mail: [email or web address]  Fax: [phone]   If you need a medication refill please contact your pharmacy. Please allow 3 business days for your refills to be completed.    Instructions for emailing the Diabetes Educators    If you need to communicate a non-urgent message to a Diabetes Educator via email, please send to [email or web address].    Please follow the following email guidelines:    Subject line: Secure: your clinic name (example: Secure: [facility])  In the email please include: First name, middle initial, last name and date of birth.    We will be in touch with you within one (1) business day.               Follow-ups after your visit        Future tests that were ordered for you today     Open Future Orders        Priority Expected Expires Ordered    Hemoglobin A1c Routine 3/13/2017 3/13/2018 3/13/2017    Albumin Random Urine Quantitative  Routine 3/13/2017 3/13/2018 3/13/2017    Lipid panel reflex to direct LDL Routine  3/13/2018 3/13/2017            Who to contact     If you have questions or need follow up information about today's clinic visit or your schedule please contact [facility] directly at [phone].  Normal or non-critical lab and imaging results will be communicated to you by MyChart, letter or phone within 4 business days after the clinic has received the results. If you do not hear from us within 7 days, please contact the clinic through [facility] or phone. If you have a critical or abnormal lab result, we will notify you by phone as soon as possible.  Submit refill requests through [facility] or call your pharmacy and they will forward the refill request to us. Please allow 3 business days for your refill to be completed.          Additional Information About Your Visit        [employer] Information     [facility] gives you secure access to your electronic health record. If you see a primary care provider, you can also send messages to your care team and make appointments. If you have questions, please call your primary care clinic.  If you do not have a primary care provider, please call [phone] and they will assist you.        Care EveryWhere ID     This is your Care EveryWhere ID. This could be used by other organizations to access your [facility] medical records  [id number]         Blood Pressure from Last 3 Encounters:   01/22/17 122/79   12/12/16 110/64   11/28/16 138/90    Weight from Last 3 Encounters:   01/22/17 81.6 kg (180 lb)   01/05/17 83.5 kg (184 lb)   12/12/16 85.7 kg (189 lb)              Today, you had the following     No orders found for display       Primary Care Provider Office Phone # Fax #    Km Dos Santos [phone]       [address] [address]        Goals        General    Functional: Patient will revise his diabetic diet to  maximize his diabetes with the goal of decreasing his daily blood sugars and improving his HGB A1c (pt-stated)     Notes - Note created  3/24/2016 11:25 AM by Erna Pineda RN    As of today's date 3/24/2016 goal is met at 0 - 25%.   Goal Status:  Active        Medical (pt-stated)     Notes - Note created  8/25/2015 12:49 PM by Erna Pineda RN    Patient states that he is wanting a diabetes education referral so that he can fine tune his blood sugars or when he is on steroids  Patient states that he will contact RN Clinic Care Coordinator when needing assistance with coordinating cares or has complications with his disease management and or cares      Medical Patient will bring 3 days of blood sugar readings to office visit with Dr Dos Santos . (pt-stated)     Notes - Note created  3/24/2016 11:23 AM by Erna Pineda RN    As of today's date 3/24/2016 goal is met at 0 - 25%.   Goal Status:  Active        Medical: Patient will set up podiatry and eye exam for dario ambriz 6 month. (pt-stated)     Notes - Note created  3/24/2016 11:21 AM by Erna Pineda RN    As of today's date 3/24/2016 goal is met at 0 - 25%.   Goal Status:  Active        Medical: Pt will continue to utilize his training in [employer] along with [employer] for pain and life management (pt-stated)     Notes - Note created  3/24/2016 11:28 AM by Erna Pineda RN    As of today's date 3/24/2016 goal is met at 51 - 75%.   Goal Status:  Active          Thank you!     Thank you for choosing [facility]  for your care. Our goal is always to provide you with excellent care. Hearing back from our patients is one way we can continue to improve our services. Please take a few minutes to complete the written survey that you may receive in the mail after your visit with us. Thank you!             Your Updated Medication List - Protect others around you: Learn how to safely use, store and throw away your medicines at  www.disposemymeds.org.          This list is accurate as of: 3/14/17  9:43 AM.  Always use your most recent med list.                   Brand Name Dispense Instructions for use    BENADRYL PO      Take 50 mg by mouth At Bedtime.       blood glucose monitoring meter device kit     1 kit    Use to test blood sugar 4-6 times daily or as directed.       * blood glucose monitoring test strip    ACCU-CHEK ADARSH    4 Box    Use to test blood sugars 4 times daily or as directed. When on prednisone check 6 times per day.       * blood glucose monitoring test strip    ACCU-CHEK SMARTVIEW    200 strip    Use to test blood sugar 2 times daily or as directed.       buprenorphine HCl-naloxone HCl 4-1 MG per film    SUBOXONE     Place 1 Film under the tongue 3 times daily       ciclopirox 0.77 % Gel     30 g    Apply to affected area twice daily       EPINEPHrine 0.3 MG/0.3ML injection     1 each    Inject 0.3 mLs (0.3 mg) into the muscle once as needed for anaphylaxis       gabapentin 600 MG tablet    NEURONTIN    180 tablet    Take 2 tablets, 3x daily       ibuprofen 200 MG tablet    ADVIL/MOTRIN     Take 1-2 tablets by mouth every 6 hours as needed.       insulin degludec 100 UNIT/ML pen    TRESIBA    15 mL    Use 14 units daily       insulin glargine 100 UNIT/ML injection    LANTUS SOLOSTAR    3 Month    10 units at bedtime       insulin lispro 100 UNIT/ML injection    HumaLOG KWIKpen    500 mL    To use with scale 3 times per day with meals while on steroids.  Use the scale when your meal BG is > 250  with the pain causing high BG.       insulin pen needle 31G X 6 MM     100 each    Use 2 daily or as directed.       liraglutide 18 MG/3ML soln    VICTOZA    6 mL    Inject 1.2 mg Subcutaneous daily       losartan 100 MG tablet    COZAAR    90 tablet    Take 1 tablet (100 mg) by mouth daily       metFORMIN 500 MG 24 hr tablet    GLUCOPHAGE-XR    120 tablet    Take 2 tablets (1,000 mg) by mouth 2 times daily (with meals)        nortriptyline 25 MG capsule    PAMELOR    60 capsule    Take 2 capsules (50 mg) by mouth At Bedtime       sildenafil 100 MG cap/tab    VIAGRA    5 tablet    Take 0.5-1 tablets ( mg) by mouth daily as needed for erectile dysfunction Take 30 min to 4 hours before intercourse.  Never use with nitroglycerin, terazosin or doxazosin.       VALIUM 5 MG tablet   Generic drug:  diazepam     60 tablet    Take 1 tablet (5 mg) 3 times daily as needed.       venlafaxine 150 MG Tb24 24 hr tablet    EFFEXOR-ER    30 each    Take 1 tablet by mouth daily (with breakfast).       * Notice:  This list has 2 medication(s) that are the same as other medications prescribed for you. Read the directions carefully, and ask your doctor or other care provider to review them with you.

## 2017-03-14 NOTE — PATIENT INSTRUCTIONS
My Diabetes Care Goals:    Healthy Eating: Eat three meals per day.  Try to at least have a snack midday.      Being Active: Keep walking and hiking.      Monitoring: Keep checking your blood sugar frequently.     Taking Medication: Drop your Tresiba to 12 units daily.    Problem Solving: Get your a1c today.  Keep using your strategies for keeping sweets to a minimum.  Don't bring them in the house.    Follow up:  Follow-up as needed     Bring blood glucose meter and logbook with you to all doctor and follow-up appointments.     [facility] for the [facility]:  For Your Diabetes Education and Nutrition Appointments Call:  [phone]   For Diabetes Education or Nutrition Related Questions:   Phone: [phone]  E-mail: [email or web address]  Fax: [phone]   If you need a medication refill please contact your pharmacy. Please allow 3 business days for your refills to be completed.    Instructions for emailing the Diabetes Educators    If you need to communicate a non-urgent message to a Diabetes Educator via email, please send to [email or web address].    Please follow the following email guidelines:    Subject line: Secure: your clinic name (example: Secure: [facility])  In the email please include: First name, middle initial, last name and date of birth.    We will be in touch with you within one (1) business day.

## 2017-03-14 NOTE — PROGRESS NOTES
Diabetes Self Management Training: Follow-up Visit    Phillip Rueda presents today for education related to Type 2 diabetes.    He is accompanied by self    Patient's diabetes management related comments/concerns: due for an a1c, trying to stay away from sugar    Patient would like this visit to be focused around the following diabetes-related behaviors and goals: Taking Medication    ASSESSMENT:  Patient Problem List reviewed for relevant medical history and current medical status.    Current Diabetes Management per Patient:  Taking diabetes medications?   yes:     Diabetes Medication(s)     Biguanides Sig    metFORMIN (GLUCOPHAGE-XR) 500 MG 24 hr tablet Take 2 tablets (1,000 mg) by mouth 2 times daily (with meals)    Insulin Sig    insulin glargine (LANTUS SOLOSTAR) 100 UNIT/ML PEN 10 units at bedtime    insulin lispro (HUMALOG KWIKPEN) 100 UNIT/ML soln To use with scale 3 times per day with meals while on steroids.  Use the scale when your meal BG is > 250  with the pain causing high BG.    Incretin Mimetic Agents (GLP-1 Receptor Agonists) Sig    liraglutide (VICTOZA) 18 MG/3ML soln Inject 1.2 mg Subcutaneous daily        Patient glucose self monitoring as follows: two to four times a day  BG meter: Accu-Chek Lucía meter  BG results: see blood glucose log attached to this encounter 30 day meter average is 138 mg/dl        BG values are: in and out of goal  Patient's most recent   Lab Results   Component Value Date    A1C 7.2 09/07/2016    is meeting goal of <7.0    Nutrition:  Patient eats 3 meals per day, snacks frequently throughout the day, counts carbohydrates in choices and has an inconsistent intake of carbohydrates, states he has sugar addiction but wife is an RD which helps him keep this controlled    Breakfast - smoothie (spinach, banana, blueberry yogurt) instant oatmeal with peanut butter  Lunch - leftovers, Alex bar, salads with black beans,nuts maltballs or baked good (ice cream cookie)  Dinner -  "vegetable, protein  Snacks - yogurt and frozen berries    Beverages: Coffee several/day and Water 8/day    Cultural/Jainism diet restrictions: No     Biggest Challenge to Healthy Eating: staying away from sugar, mindful eating    Physical Activity:    Type: walking dog 2 miles, hike  Frequency: everyday/week    Diabetes Complications:  Acute Complications: At risk for hypoglycemia? yes  Symptoms of low blood sugar? sweating, shaking and weakness  Frequency of hypoglycemia: daily  Patient carries a carbohydrate source with them regularly: Yes   Type of carbohydrate: Alex bar or juice box or dried fruit or trail mix  Symptoms of hyperglycemia? none    Vitals:    Estimated body mass index is 26.58 kg/(m^2) as calculated from the following:    Height as of 1/22/17: 1.753 m (5' 9\").    Weight as of 1/22/17: 81.6 kg (180 lb).   Last 3 BP:   BP Readings from Last 3 Encounters:   01/22/17 122/79   12/12/16 110/64   11/28/16 138/90       History   Smoking Status     Former Smoker     Packs/day: 0.25     Years: 15.00     Types: Cigarettes, Dip, chew, snus or snuff     Start date: 7/1/1996     Quit date: 7/1/2013   Smokeless Tobacco     Current User       Labs:  Lab Results   Component Value Date    A1C 7.2 09/07/2016     Lab Results   Component Value Date    GLC 91 01/22/2017     Lab Results   Component Value Date    LDL 76 02/11/2016     HDL Cholesterol   Date Value Ref Range Status   02/11/2016 86 >39 mg/dL Final   ]  GFR Estimate   Date Value Ref Range Status   01/22/2017 >90  Non  GFR Calc   >60 mL/min/1.7m2 Final     GFR Estimate If Black   Date Value Ref Range Status   01/22/2017 >90   GFR Calc   >60 mL/min/1.7m2 Final     Lab Results   Component Value Date    CR 0.92 01/22/2017     No results found for: MICROALBUMIN    Health Beliefs and Attitudes:   Patient Activation Measure Survey Score:  No flowsheet data found.    Stage of Change: MAINTENANCE (Working to maintain change, with " risk of relapse)      Diabetes knowledge and skills assessment:     Patient is knowledgeable in diabetes management concepts related to: Healthy Eating, Activity, Taking medication,Healthy Coping    Patient needs further education on the following diabetes management concepts: Problem Solving    Barriers to Learning Assessment: No Barriers identified    Based on learning assessment above, most appropriate setting for further diabetes education would be: Individual setting.    INTERVENTION:    Discussed his current medication regimen.  He has a supply of Tresiba he got from a rep.  He will continue on that until he runs out and then will have to start Lantus due to cost.      Will lower the Tresiba by 2 units today due to almost daily hypoglycemia.      Education provided today on:  AADE Self-Care Behaviors:  Taking Medication: action of prescribed medication and side effects of prescribed medications  Problem Solving: high blood glucose - causes, signs/symptoms, treatment and prevention, low blood glucose - causes, signs/symptoms, treatment and prevention, carrying a carbohydrate source at all times and when to call health care provider  Healthy Coping: benefits of making appropriate lifestyle changes and utilize support systems    Opportunities for ongoing education and support in diabetes-self management were discussed.    Pt verbalized understanding of concepts discussed and recommendations provided today.       Education Materials Provided:  No new materials provided today    PLAN:  See Patient Instructions for co-developed, patient-stated behavior change goals.  Meal Plan Recommendation: eat 3 meals a day and have small snacks between meals, if needed  Exercise / activity plan: Walk the dog.  Hike in park.  Check blood sugars before meals and 2 hours after the start of meals ( as needed)  Recommend decrease to insulin - Tresiba 12 units daily  AVS printed and provided to patient today.    FOLLOW-UP:  Follow-up  as needed.   Chart routed to referring provider.    Ongoing plan for education and support: Follow-up with primary care provider      Time Spent: 30 minutes  Encounter Type: Individual    Any diabetes medication dose changes were made via the CDE Protocol and Collaborative Practice Agreement with the patient's referring provider. A copy of this encounter was shared with the provider.

## 2017-03-15 ENCOUNTER — TELEPHONE (OUTPATIENT)
Dept: NURSING | Facility: CLINIC | Age: 37
End: 2017-03-15

## 2017-03-15 DIAGNOSIS — E11.9 TYPE 2 DIABETES MELLITUS WITHOUT COMPLICATION, WITHOUT LONG-TERM CURRENT USE OF INSULIN (H): ICD-10-CM

## 2017-03-15 LAB
CHOLEST SERPL-MCNC: 151 MG/DL
CREAT UR-MCNC: 61 MG/DL
HBA1C MFR BLD: 6.6 % (ref 4.3–6)
HDLC SERPL-MCNC: 89 MG/DL
LDLC SERPL CALC-MCNC: 55 MG/DL
MICROALBUMIN UR-MCNC: <5 MG/L
MICROALBUMIN/CREAT UR: NORMAL MG/G CR (ref 0–17)
NONHDLC SERPL-MCNC: 62 MG/DL
TRIGL SERPL-MCNC: 37 MG/DL

## 2017-03-15 PROCEDURE — 36415 COLL VENOUS BLD VENIPUNCTURE: CPT | Performed by: FAMILY MEDICINE

## 2017-03-15 PROCEDURE — 83036 HEMOGLOBIN GLYCOSYLATED A1C: CPT | Performed by: FAMILY MEDICINE

## 2017-03-15 PROCEDURE — 82043 UR ALBUMIN QUANTITATIVE: CPT | Performed by: FAMILY MEDICINE

## 2017-03-15 PROCEDURE — 80061 LIPID PANEL: CPT | Performed by: FAMILY MEDICINE

## 2017-03-15 NOTE — TELEPHONE ENCOUNTER
Call Type: Triage Call    Presenting Problem: Patient calling requesting A1C lab results from  today. Reviewed Notes Recorded by Km Dos Santos MD on  3/15/2017 at 8:34 AM  Your A1c is stable which is reassuring. Lab  value 6.6. Patient denies further questions.  Triage Note:  Guideline Title: Information Only Call; No Symptom Triage (Adult)  Recommended Disposition: Provide Information or Advice Only  Original Inclination: Did not know what to do  Override Disposition:  Intended Action: Follow advice given  Physician Contacted: No  Follow-up call to recent contact; no triage required. Information provided from  past call documentation, approved references or experience. ?  YES  Requesting regular office appointment ? NO  Sign(s) or symptom(s) associated with a diagnosed condition or with a new illness  ? NO  Requesting information about provider, services or community resources ? NO  Call back to complete assessment/clarification of information from prior caller to  complete triage ? NO  Requesting information and provider is best resource; no triage required. ? NO  Caller not with patient and is unable to provide clinical information about  patient to facilitate triage. ? NO  Requesting provider information for recently scheduled test, procedure; no triage  required. Needed information not available per approved resources or clinical  experience. ? NO  Requesting information not available per approved reference or clinical  experience; no triage required. ? NO  Requesting information regarding scheduled exam, test or procedure; no triage  required. Information provided from approved resources or clinical experience. ?  NO  General information question; no triage required. Information provided from  approved references or knowledge of organization. ? NO  Health information question; person denies any symptoms, no triage required.  Information provided from approved references or clinical experience. ?  NO  Physician Instructions:  Care Advice:

## 2017-04-16 ENCOUNTER — HOSPITAL ENCOUNTER (EMERGENCY)
Facility: CLINIC | Age: 37
Discharge: HOME | End: 2017-04-16
Attending: INTERNAL MEDICINE | Admitting: INTERNAL MEDICINE
Payer: COMMERCIAL

## 2017-04-16 VITALS
OXYGEN SATURATION: 96 % | WEIGHT: 192.4 LBS | TEMPERATURE: 97.4 F | DIASTOLIC BLOOD PRESSURE: 87 MMHG | RESPIRATION RATE: 18 BRPM | BODY MASS INDEX: 28.41 KG/M2 | SYSTOLIC BLOOD PRESSURE: 126 MMHG

## 2017-04-16 DIAGNOSIS — G60.9 HEREDITARY AND IDIOPATHIC PERIPHERAL NEUROPATHY: ICD-10-CM

## 2017-04-16 DIAGNOSIS — M79.2 NEUROPATHIC PAIN OF UPPER EXTREMITY: ICD-10-CM

## 2017-04-16 LAB
ALBUMIN SERPL-MCNC: 3.5 G/DL (ref 3.4–5)
ALP SERPL-CCNC: 131 U/L (ref 40–150)
ALT SERPL W P-5'-P-CCNC: 23 U/L (ref 0–70)
ANION GAP SERPL CALCULATED.3IONS-SCNC: 8 MMOL/L (ref 3–14)
AST SERPL W P-5'-P-CCNC: 18 U/L (ref 0–45)
BASOPHILS # BLD AUTO: 0 10E9/L (ref 0–0.2)
BASOPHILS NFR BLD AUTO: 0.3 %
BILIRUB SERPL-MCNC: 0.3 MG/DL (ref 0.2–1.3)
BUN SERPL-MCNC: 19 MG/DL (ref 7–30)
CALCIUM SERPL-MCNC: 8.4 MG/DL (ref 8.5–10.1)
CHLORIDE SERPL-SCNC: 101 MMOL/L (ref 94–109)
CO2 SERPL-SCNC: 27 MMOL/L (ref 20–32)
CREAT SERPL-MCNC: 0.98 MG/DL (ref 0.66–1.25)
CRP SERPL-MCNC: <2.9 MG/L (ref 0–8)
DIFFERENTIAL METHOD BLD: ABNORMAL
EOSINOPHIL # BLD AUTO: 0.3 10E9/L (ref 0–0.7)
EOSINOPHIL NFR BLD AUTO: 4.6 %
ERYTHROCYTE [DISTWIDTH] IN BLOOD BY AUTOMATED COUNT: 12.7 % (ref 10–15)
ERYTHROCYTE [SEDIMENTATION RATE] IN BLOOD BY WESTERGREN METHOD: 6 MM/H (ref 0–15)
GFR SERPL CREATININE-BSD FRML MDRD: 85 ML/MIN/1.7M2
GLUCOSE BLDC GLUCOMTR-MCNC: 198 MG/DL (ref 70–99)
GLUCOSE SERPL-MCNC: 258 MG/DL (ref 70–99)
HCT VFR BLD AUTO: 39.4 % (ref 40–53)
HGB BLD-MCNC: 13 G/DL (ref 13.3–17.7)
IMM GRANULOCYTES # BLD: 0 10E9/L (ref 0–0.4)
IMM GRANULOCYTES NFR BLD: 0 %
LYMPHOCYTES # BLD AUTO: 2 10E9/L (ref 0.8–5.3)
LYMPHOCYTES NFR BLD AUTO: 29.2 %
MCH RBC QN AUTO: 28.7 PG (ref 26.5–33)
MCHC RBC AUTO-ENTMCNC: 33 G/DL (ref 31.5–36.5)
MCV RBC AUTO: 87 FL (ref 78–100)
MONOCYTES # BLD AUTO: 0.4 10E9/L (ref 0–1.3)
MONOCYTES NFR BLD AUTO: 5.5 %
NEUTROPHILS # BLD AUTO: 4.2 10E9/L (ref 1.6–8.3)
NEUTROPHILS NFR BLD AUTO: 60.4 %
NRBC # BLD AUTO: 0 10*3/UL
NRBC BLD AUTO-RTO: 0 /100
PLATELET # BLD AUTO: 261 10E9/L (ref 150–450)
POTASSIUM SERPL-SCNC: 4.1 MMOL/L (ref 3.4–5.3)
PROT SERPL-MCNC: 7 G/DL (ref 6.8–8.8)
RBC # BLD AUTO: 4.53 10E12/L (ref 4.4–5.9)
SODIUM SERPL-SCNC: 136 MMOL/L (ref 133–144)
WBC # BLD AUTO: 6.9 10E9/L (ref 4–11)

## 2017-04-16 PROCEDURE — 25000125 ZZHC RX 250: Performed by: INTERNAL MEDICINE

## 2017-04-16 PROCEDURE — 00000146 ZZHCL STATISTIC GLUCOSE BY METER IP

## 2017-04-16 PROCEDURE — 80053 COMPREHEN METABOLIC PANEL: CPT | Performed by: INTERNAL MEDICINE

## 2017-04-16 PROCEDURE — 96374 THER/PROPH/DIAG INJ IV PUSH: CPT | Performed by: INTERNAL MEDICINE

## 2017-04-16 PROCEDURE — 85025 COMPLETE CBC W/AUTO DIFF WBC: CPT | Performed by: INTERNAL MEDICINE

## 2017-04-16 PROCEDURE — 96375 TX/PRO/DX INJ NEW DRUG ADDON: CPT | Performed by: INTERNAL MEDICINE

## 2017-04-16 PROCEDURE — 25000128 H RX IP 250 OP 636: Performed by: INTERNAL MEDICINE

## 2017-04-16 PROCEDURE — 86140 C-REACTIVE PROTEIN: CPT | Performed by: INTERNAL MEDICINE

## 2017-04-16 PROCEDURE — 96376 TX/PRO/DX INJ SAME DRUG ADON: CPT | Performed by: INTERNAL MEDICINE

## 2017-04-16 PROCEDURE — 99285 EMERGENCY DEPT VISIT HI MDM: CPT | Mod: Z6 | Performed by: INTERNAL MEDICINE

## 2017-04-16 PROCEDURE — 99285 EMERGENCY DEPT VISIT HI MDM: CPT | Mod: 25 | Performed by: INTERNAL MEDICINE

## 2017-04-16 PROCEDURE — 85652 RBC SED RATE AUTOMATED: CPT | Performed by: INTERNAL MEDICINE

## 2017-04-16 RX ORDER — KETAMINE HYDROCHLORIDE 10 MG/ML
10 INJECTION, SOLUTION INTRAMUSCULAR; INTRAVENOUS ONCE
Status: COMPLETED | OUTPATIENT
Start: 2017-04-16 | End: 2017-04-16

## 2017-04-16 RX ORDER — METHYLPREDNISOLONE 4 MG
TABLET, DOSE PACK ORAL
Qty: 21 TABLET | Refills: 0 | Status: SHIPPED | OUTPATIENT
Start: 2017-04-16 | End: 2017-06-02

## 2017-04-16 RX ORDER — METHYLPREDNISOLONE SODIUM SUCCINATE 125 MG/2ML
40 INJECTION, POWDER, LYOPHILIZED, FOR SOLUTION INTRAMUSCULAR; INTRAVENOUS ONCE
Status: COMPLETED | OUTPATIENT
Start: 2017-04-16 | End: 2017-04-16

## 2017-04-16 RX ADMIN — Medication 10 MG: at 19:04

## 2017-04-16 RX ADMIN — Medication 10 MG: at 21:24

## 2017-04-16 RX ADMIN — Medication 10 MG: at 20:09

## 2017-04-16 RX ADMIN — METHYLPREDNISOLONE SODIUM SUCCINATE 37.5 MG: 125 INJECTION, POWDER, FOR SOLUTION INTRAMUSCULAR; INTRAVENOUS at 20:45

## 2017-04-16 ASSESSMENT — ENCOUNTER SYMPTOMS
DIFFICULTY URINATING: 0
SHORTNESS OF BREATH: 0
CONFUSION: 0
HEADACHES: 0
COLOR CHANGE: 0
FEVER: 0
NECK STIFFNESS: 0
EYE REDNESS: 0
ARTHRALGIAS: 0
ABDOMINAL PAIN: 0

## 2017-04-16 NOTE — ED PROVIDER NOTES
History     Chief Complaint   Patient presents with     Shoulder Pain     HPI  Phillip Rueda is a 36 year old male with a history of type 2 diabetes mellitus, hypertension, Parsonage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, liver disease, and chronic right shoulder pain who presents for evaluation of shoulder pain. Patient reports he has been having intermittent, quick, stabbing pains in his left upper extremity for the past two weeks, but complains of an acute flare up of severe, constant, left shoulder and left arm pain today around 5 PM. Patient is on suboxone 2 mg daily. Patient does have an appointment scheduled at the [facility] tomorrow morning. He reports his blood sugars have been well controlled last measured around noon at which time it was 124, and his last A1C was 6.6. He denies chest pain, cough, shortness of breath, or other respiratory symptoms. He has been nauseous secondary to his pain, but denies vomiting. He has been compliant with prescribed gabapentin.     I have reviewed the Medications, Allergies, Past Medical and Surgical History, and Social History in the [employer] system.  Past Medical History:   Diagnosis Date     Anxiety      Depressive disorder      Diabetes mellitus (H)      Hypertension      Liver disease      Neuralgic amyotrophy      Pain disorder 12/8/2015     Parsonage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      Seizures (H)      Staph infection        Past Surgical History:   Procedure Laterality Date     ORTHOPEDIC SURGERY         Family History   Problem Relation Age of Onset     Depression Mother      Anxiety Disorder Mother      Substance Abuse Maternal Grandfather      Anxiety Disorder Brother      Glaucoma No family hx of      Macular Degeneration No family hx of      Anxiety Disorder Brother        Social History   Substance Use Topics     Smoking status: Former Smoker     Packs/day: 0.25     Years: 15.00     Types: Cigarettes, Dip, chew, snus or snuff     Start date: 7/1/1996     Quit date: 7/1/2013     Smokeless  tobacco: Current User     Alcohol use No      Comment: 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  Drink 1.75 every 2 days     No current facility-administered medications for this encounter.      Current Outpatient Prescriptions   Medication     methylPREDNISolone (MEDROL DOSEPAK) 4 MG tablet     metFORMIN (GLUCOPHAGE-XR) 500 MG 24 hr tablet     insulin degludec (TRESIBA) 100 UNIT/ML pen     gabapentin (NEURONTIN) 600 MG tablet     liraglutide (VICTOZA) 18 MG/3ML soln     blood glucose monitoring (ACCU-CHEK SMARTVIEW) test strip     losartan (COZAAR) 100 MG tablet     nortriptyline (PAMELOR) 25 MG capsule     insulin glargine (LANTUS SOLOSTAR) 100 UNIT/ML PEN     Buprenorphine HCl-Naloxone HCl (SUBOXONE) 4-1 MG film     insulin lispro (HUMALOG KWIKPEN) 100 UNIT/ML soln     insulin pen needle 31G X 6 MM     blood glucose monitoring (ACCU-CHEK ADARSH PLUS) meter device kit     EPINEPHrine (EPIPEN) 0.3 MG/0.3ML injection     ciclopirox 0.77 % GEL     blood glucose monitoring (ACCU-CHEK ADARSH) test strip     sildenafil (VIAGRA) 100 MG tablet     diazepam (VALIUM) 5 MG tablet     venlafaxine (EFFEXOR-ER) 150 MG TB24     ibuprofen (ADVIL,MOTRIN) 200 MG tablet     DiphenhydrAMINE HCl (BENADRYL PO)        Allergies   Allergen Reactions     Bees Swelling     Bupropion Hcl Other (See Comments)     seizure     Lisinopril Cough     Penicillins Other (See Comments)     Unable to close mouth       Review of Systems   Constitutional: Negative for fever.   HENT: Negative for congestion.    Eyes: Negative for redness.   Respiratory: Negative for shortness of breath.    Cardiovascular: Negative for chest pain.   Gastrointestinal: Negative for abdominal pain.   Genitourinary: Negative for difficulty urinating.   Musculoskeletal: Negative for arthralgias and neck stiffness.        Positive for left upper extremity pain   Skin: Negative for color change.   Neurological: Negative for headaches.   Psychiatric/Behavioral: Negative for confusion.        Physical Exam   BP: (!) 143/92  Heart Rate: 97.4  Temp: 97.4  F (36.3  C)  Resp: 18  Weight: 87.3 kg (192 lb 6.4 oz)  SpO2: 98 %  Physical Exam   Constitutional: He is oriented to person, place, and time. He appears well-developed and well-nourished. He appears distressed.   HENT:   Head: Normocephalic and atraumatic.   Right Ear: External ear normal.   Left Ear: External ear normal.   Nose: Nose normal.   Mouth/Throat: Oropharynx is clear and moist.   Eyes: EOM are normal. Pupils are equal, round, and reactive to light. No scleral icterus.   Neck: Normal range of motion. Neck supple. No JVD present.   Cardiovascular: Normal rate, regular rhythm and normal heart sounds.  Exam reveals no friction rub.    No murmur heard.  Pulmonary/Chest: Effort normal and breath sounds normal. He has no wheezes. He has no rales.   Abdominal: Soft. Bowel sounds are normal. There is no tenderness. There is no rebound and no guarding.   Musculoskeletal: He exhibits tenderness.        Left shoulder: He exhibits decreased range of motion and pain. He exhibits no tenderness, no swelling, no spasm and normal pulse.        Left elbow: Normal.        Left wrist: Normal.        Left upper arm: He exhibits tenderness.        Left forearm: He exhibits tenderness.        Left hand: He exhibits normal range of motion, no tenderness, normal two-point discrimination and normal capillary refill. Normal sensation noted. Normal strength noted.   Lymphadenopathy:     He has no cervical adenopathy.   Neurological: He is alert and oriented to person, place, and time. No cranial nerve deficit. Coordination normal.   Skin: Skin is warm and dry.   Psychiatric: He has a normal mood and affect. His behavior is normal.   Nursing note and vitals reviewed.      ED Course     ED Course     Procedures       6:35 PM  The patient was seen and examined by Dr. Abreu in Room 14.     Labs/Imaging    Results for orders placed or performed during the hospital  encounter of 04/16/17 (from the past 24 hour(s))   CBC with platelets differential   Result Value Ref Range    WBC 6.9 4.0 - 11.0 10e9/L    RBC Count 4.53 4.4 - 5.9 10e12/L    Hemoglobin 13.0 (L) 13.3 - 17.7 g/dL    Hematocrit 39.4 (L) 40.0 - 53.0 %    MCV 87 78 - 100 fl    MCH 28.7 26.5 - 33.0 pg    MCHC 33.0 31.5 - 36.5 g/dL    RDW 12.7 10.0 - 15.0 %    Platelet Count 261 150 - 450 10e9/L    Diff Method Automated Method     % Neutrophils 60.4 %    % Lymphocytes 29.2 %    % Monocytes 5.5 %    % Eosinophils 4.6 %    % Basophils 0.3 %    % Immature Granulocytes 0.0 %    Nucleated RBCs 0 0 /100    Absolute Neutrophil 4.2 1.6 - 8.3 10e9/L    Absolute Lymphocytes 2.0 0.8 - 5.3 10e9/L    Absolute Monocytes 0.4 0.0 - 1.3 10e9/L    Absolute Eosinophils 0.3 0.0 - 0.7 10e9/L    Absolute Basophils 0.0 0.0 - 0.2 10e9/L    Abs Immature Granulocytes 0.0 0 - 0.4 10e9/L    Absolute Nucleated RBC 0.0    Comprehensive metabolic panel   Result Value Ref Range    Sodium 136 133 - 144 mmol/L    Potassium 4.1 3.4 - 5.3 mmol/L    Chloride 101 94 - 109 mmol/L    Carbon Dioxide 27 20 - 32 mmol/L    Anion Gap 8 3 - 14 mmol/L    Glucose 258 (H) 70 - 99 mg/dL    Urea Nitrogen 19 7 - 30 mg/dL    Creatinine 0.98 0.66 - 1.25 mg/dL    GFR Estimate 85 >60 mL/min/1.7m2    GFR Estimate If Black >90   GFR Calc   >60 mL/min/1.7m2    Calcium 8.4 (L) 8.5 - 10.1 mg/dL    Bilirubin Total 0.3 0.2 - 1.3 mg/dL    Albumin 3.5 3.4 - 5.0 g/dL    Protein Total 7.0 6.8 - 8.8 g/dL    Alkaline Phosphatase 131 40 - 150 U/L    ALT 23 0 - 70 U/L    AST 18 0 - 45 U/L   CRP inflammation   Result Value Ref Range    CRP Inflammation <2.9 0.0 - 8.0 mg/L   Erythrocyte sedimentation rate auto   Result Value Ref Range    Sed Rate 6 0 - 15 mm/h   Glucose by meter   Result Value Ref Range    Glucose 198 (H) 70 - 99 mg/dL         Assessments & Plan (with Medical Decision Making)   Impression:  Young male with a chronic, 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 He is followed by a pain clinic at the [facility]. In the past his pain was in the right shoulder and upper extremity. This pain has remitted. In the past he was on high doses of opiates with pain flares. He currently is on suboxone and has tapered gradually to 1 film daily. Over the past 4 months he has started to have episodic pain in the left shoulder and arm. A pain flare on the left did respond well to a short course of steroids earlier this year.. He has a flare of severe pain in the left arm for the past couple of days with severe pain starting at noon today. He is quite uncomfortable. The suboxone complicates management with opiate agonists. He does have an appointment in the morning with his pain clinic. I will try to achieve some pain control with ketamine in the ED and will give a single dose of solumedrol. I will have him hold off on filling any additional steroid taper until he sees his pain clinic.    I have reviewed the nursing notes.    I have reviewed the findings, diagnosis, plan and need for follow up with the patient.    New Prescriptions    METHYLPREDNISOLONE (MEDROL DOSEPAK) 4 MG TABLET    Follow package instructions       Final diagnoses:   Neuropathic pain of upper extremity   I, Yessenia Zhou, am serving as a trained medical scribe to document services personally performed by Blayne Abreu MD, based on the provider's statements to me.   I, Blayne Abreu MD, was physically present and have reviewed and verified the accuracy of this note documented by Yessenia Zhou.      4/16/2017   [facility], [place], EMERGENCY DEPARTMENT     Blayne Abreu MD  04/16/17 [id number]

## 2017-04-16 NOTE — ED AVS SNAPSHOT
[facility], Emergency Department    [address] [address]    Phone:  [phone]                                       Phillip Rueda   MRN: [id number]    Department:  [facility], Emergency Department   Date of Visit:  4/16/2017           Patient Information     Date Of Birth          1980        Your diagnoses for this visit were:     Neuropathic pain of upper extremity        You were seen by Blayne Abreu MD.      Follow-up Information     Follow up with Km Dos Santos MD.    Specialty:  Family Practice    Contact information:    [place] [id number] [place] [id number] [phone]          Discharge Instructions       See your pain doctor in the morning.  Medrol dosepack as directed. (Wait to fill the prescription until you talk with your pain doctor)  Adjust your insulin as needed with the steroids.    24 Hour Appointment Hotline       To make an appointment at [facility], call [phone] ([phone]). If you don't have a family doctor or clinic, we will help you find one. [facility] are conveniently located to serve the needs of you and your family.             Review of your medicines      START taking        Dose / Directions Last dose taken    methylPREDNISolone 4 MG tablet   Commonly known as:  MEDROL DOSEPAK   Quantity:  21 tablet        Follow package instructions   Refills:  0          Our records show that you are taking the medicines listed below. If these are incorrect, please call your family doctor or clinic.        Dose / Directions Last dose taken    BENADRYL PO   Dose:  50 mg        Take 50 mg by mouth At Bedtime.   Refills:  0        blood glucose monitoring meter device kit   Quantity:  1 kit        Use to test blood sugar 4-6 times daily or as directed.   Refills:  0        * blood glucose monitoring test strip   Commonly known as:  ACCU-CHEK ADARSH   Quantity:  4 Box        Use to test blood  sugars 4 times daily or as directed. When on prednisone check 6 times per day.   Refills:  12        * blood glucose monitoring test strip   Commonly known as:  ACCU-CHEK SMARTVIEW   Quantity:  200 strip        Use to test blood sugar 2 times daily or as directed.   Refills:  3        buprenorphine HCl-naloxone HCl 4-1 MG per film   Commonly known as:  SUBOXONE   Dose:  1 Film        Place 1 Film under the tongue 3 times daily   Refills:  0        ciclopirox 0.77 % Gel   Quantity:  30 g        Apply to affected area twice daily   Refills:  1        EPINEPHrine 0.3 MG/0.3ML injection   Dose:  0.3 mg   Quantity:  1 each        Inject 0.3 mLs (0.3 mg) into the muscle once as needed for anaphylaxis   Refills:  2        gabapentin 600 MG tablet   Commonly known as:  NEURONTIN   Quantity:  180 tablet        Take 2 tablets, 3x daily   Refills:  2        ibuprofen 200 MG tablet   Commonly known as:  ADVIL/MOTRIN   Dose:  1-2 tablet        Take 1-2 tablets by mouth every 6 hours as needed.   Refills:  0        insulin degludec 100 UNIT/ML pen   Commonly known as:  TRESIBA   Quantity:  15 mL        Use 12 units daily (will use up current supply and switch back to Lantus)   Refills:  3        insulin glargine 100 UNIT/ML injection   Commonly known as:  LANTUS SOLOSTAR   Quantity:  3 Month        10 units at bedtime   Refills:  1        insulin lispro 100 UNIT/ML injection   Commonly known as:  HumaLOG KWIKpen   Quantity:  500 mL        To use with scale 3 times per day with meals while on steroids.  Use the scale when your meal BG is > 250  with the pain causing high BG.   Refills:  6        insulin pen needle 31G X 6 MM   Quantity:  100 each        Use 2 daily or as directed.   Refills:  11        liraglutide 18 MG/3ML soln   Commonly known as:  VICTOZA   Dose:  1.2 mg   Quantity:  6 mL        Inject 1.2 mg Subcutaneous daily   Refills:  0        losartan 100 MG tablet   Commonly known as:  COZAAR   Dose:  100 mg   Quantity:   90 tablet        Take 1 tablet (100 mg) by mouth daily   Refills:  1        metFORMIN 500 MG 24 hr tablet   Commonly known as:  GLUCOPHAGE-XR   Quantity:  120 tablet        TAKE 2 TABLETS(1000 MG) BY MOUTH TWICE DAILY WITH MEALS   Refills:  0        nortriptyline 25 MG capsule   Commonly known as:  PAMELOR   Dose:  50 mg   Quantity:  60 capsule        Take 2 capsules (50 mg) by mouth At Bedtime   Refills:  2        sildenafil 100 MG cap/tab   Commonly known as:  VIAGRA   Dose:   mg   Quantity:  5 tablet        Take 0.5-1 tablets ( mg) by mouth daily as needed for erectile dysfunction Take 30 min to 4 hours before intercourse.  Never use with nitroglycerin, terazosin or doxazosin.   Refills:  5        VALIUM 5 MG tablet   Quantity:  60 tablet   Generic drug:  diazepam        Take 1 tablet (5 mg) 3 times daily as needed.   Refills:  0        venlafaxine 150 MG Tb24 24 hr tablet   Commonly known as:  EFFEXOR-ER   Dose:  150 mg   Quantity:  30 each        Take 1 tablet by mouth daily (with breakfast).   Refills:  0        * Notice:  This list has 2 medication(s) that are the same as other medications prescribed for you. Read the directions carefully, and ask your doctor or other care provider to review them with you.            Prescriptions were sent or printed at these locations (1 Prescription)                   Other Prescriptions                Printed at Department/Unit printer (1 of 1)         methylPREDNISolone (MEDROL DOSEPAK) 4 MG tablet                Procedures and tests performed during your visit     CBC with platelets differential    CRP inflammation    Comprehensive metabolic panel    Erythrocyte sedimentation rate auto    Glucose by meter      Orders Needing Specimen Collection     None      Pending Results     No orders found from 4/14/2017 to 4/17/2017.            Pending Culture Results     No orders found from 4/14/2017 to 4/17/2017.            Thank you for choosing [facility]       Thank  you for choosing [facility] for your care. Our goal is always to provide you with excellent care. Hearing back from our patients is one way we can continue to improve our services. Please take a few minutes to complete the written survey that you may receive in the mail after you visit with us. Thank you!        [facility] Information     [facility] gives you secure access to your electronic health record. If you see a primary care provider, you can also send messages to your care team and make appointments. If you have questions, please call your primary care clinic.  If you do not have a primary care provider, please call [phone] and they will assist you.        Care EveryWhere ID     This is your Care EveryWhere ID. This could be used by other organizations to access your [facility] medical records  [id number]        After Visit Summary       This is your record. Keep this with you and show to your community pharmacist(s) and doctor(s) at your next visit.

## 2017-04-16 NOTE — ED AVS SNAPSHOT
[facility], [place], Emergency Department    [address] [place] [address]    Phone:  [phone]                                       Phillip Rueda   MRN: [id number]    Department:  [facility], Emergency Department   Date of Visit:  4/16/2017           After Visit Summary Signature Page     I have received my discharge instructions, and my questions have been answered. I have discussed any challenges I see with this plan with the nurse or doctor.    ..........................................................................................................................................  Patient/Patient Representative Signature      ..........................................................................................................................................  Patient Representative Print Name and Relationship to Patient    ..................................................               ................................................  Date                                            Time    ..........................................................................................................................................  Reviewed by Signature/Title    ...................................................              ..............................................  Date                                                            Time

## 2017-04-17 NOTE — DISCHARGE INSTRUCTIONS
See your pain doctor in the morning.  Medrol dosepack as directed. (Wait to fill the prescription until you talk with your pain doctor)  Adjust your insulin as needed with the steroids.

## 2017-04-19 ENCOUNTER — TELEPHONE (OUTPATIENT)
Dept: FAMILY MEDICINE | Facility: CLINIC | Age: 37
End: 2017-04-19

## 2017-04-19 NOTE — TELEPHONE ENCOUNTER
I have attempted to contact this patient by phone: left message to return my call on answering machine.    Please have Phillip Rueda set an appointment with their provider to follow up on diabetes.    Panel management    Tello Gusman MA

## 2017-05-03 DIAGNOSIS — Z79.4 TYPE 2 DIABETES MELLITUS WITHOUT COMPLICATION, WITH LONG-TERM CURRENT USE OF INSULIN (H): ICD-10-CM

## 2017-05-03 DIAGNOSIS — E11.9 TYPE 2 DIABETES MELLITUS WITHOUT COMPLICATION, WITH LONG-TERM CURRENT USE OF INSULIN (H): ICD-10-CM

## 2017-05-03 NOTE — TELEPHONE ENCOUNTER
Medication Detail      Disp Refills Start End NIK   liraglutide (VICTOZA) 18 MG/3ML soln 6 mL 0 1/13/2017  No   Sig: Inject 1.2 mg Subcutaneous daily       Last Office Visit with G, P or [facility] prescribing provider:  12/12/16        BP Readings from Last 3 Encounters:   04/16/17 126/87   01/22/17 122/79   12/12/16 110/64     Lab Results   Component Value Date    MICROL <5 03/15/2017     Lab Results   Component Value Date    UMALCR Unable to calculate due to low value 03/15/2017     Creatinine   Date Value Ref Range Status   04/16/2017 0.98 0.66 - 1.25 mg/dL Final   ]  GFR Estimate   Date Value Ref Range Status   04/16/2017 85 >60 mL/min/1.7m2 Final     Comment:     Non  GFR Calc   01/22/2017 >90  Non  GFR Calc   >60 mL/min/1.7m2 Final   07/18/2016 87 >60 mL/min/1.7m2 Final     Comment:     Non  GFR Calc     GFR Estimate If Black   Date Value Ref Range Status   04/16/2017 >90   GFR Calc   >60 mL/min/1.7m2 Final   01/22/2017 >90   GFR Calc   >60 mL/min/1.7m2 Final   07/18/2016 >90   GFR Calc   >60 mL/min/1.7m2 Final     Lab Results   Component Value Date    CHOL 151 03/15/2017     Lab Results   Component Value Date    HDL 89 03/15/2017     Lab Results   Component Value Date    LDL 55 03/15/2017     Lab Results   Component Value Date    TRIG 37 03/15/2017     Lab Results   Component Value Date    CHOLHDLRATIO 1.8 02/11/2015     Lab Results   Component Value Date    AST 18 04/16/2017     Lab Results   Component Value Date    ALT 23 04/16/2017     Lab Results   Component Value Date    A1C 6.6 03/15/2017    A1C 7.2 09/07/2016    A1C 7.3 06/14/2016    A1C 7.4 02/11/2016    A1C 7.5 12/09/2015     Potassium   Date Value Ref Range Status   04/16/2017 4.1 3.4 - 5.3 mmol/L Final

## 2017-05-04 RX ORDER — LIRAGLUTIDE 6 MG/ML
INJECTION SUBCUTANEOUS
Qty: 6 ML | Refills: 0 | Status: SHIPPED | OUTPATIENT
Start: 2017-05-04 | End: 2017-06-02

## 2017-05-04 NOTE — TELEPHONE ENCOUNTER
"Routing refill request to provider for review/approval because:  --Last order sent 1/13/17 was dewey refill with reminder note.  --Plan in last office visit 12/12/16 - \"Follow up in 3 months for diabetes. \"  --He is scheduled for diabetes education on 5/9/17.  --He did have an A1c on 3/15/17.  --Looking at medication history there seems to be a break.    Lab Results   Component Value Date    CHOL 151 03/15/2017     Lab Results   Component Value Date    HDL 89 03/15/2017     Lab Results   Component Value Date    LDL 55 03/15/2017     Lab Results   Component Value Date    TRIG 37 03/15/2017     Lab Results   Component Value Date    CHOLHDLRATIO 1.8 02/11/2015       Lab Results   Component Value Date    UMALCR Unable to calculate due to low value 03/15/2017         BP Readings from Last 1 Encounters:   04/16/17 126/87       Lab Results   Component Value Date    A1C 6.6 03/15/2017    A1C 7.2 09/07/2016    A1C 7.3 06/14/2016    A1C 7.4 02/11/2016    A1C 7.5 12/09/2015       "

## 2017-05-18 ENCOUNTER — ALLIED HEALTH/NURSE VISIT (OUTPATIENT)
Dept: EDUCATION SERVICES | Facility: CLINIC | Age: 37
End: 2017-05-18
Payer: COMMERCIAL

## 2017-05-18 DIAGNOSIS — E11.9 TYPE 2 DIABETES MELLITUS WITHOUT COMPLICATION, WITHOUT LONG-TERM CURRENT USE OF INSULIN (H): ICD-10-CM

## 2017-05-18 PROCEDURE — G0108 DIAB MANAGE TRN  PER INDIV: HCPCS

## 2017-05-24 NOTE — PROGRESS NOTES
Diabetes Self Management Training: Follow-up Visit    Phillip Rueda presents today for education Type 2 diabetes.    He is accompanied by self    Patient's diabetes management related comments/concerns: Patient has noticed that since switching back to Lantus from Tresiba his numbers have risen. He is asking that the current order for Tresiba be sent to the pharmacy as he would like to switch back.     Patient would like this visit to be focused around the following diabetes-related behaviors and goals: Patient has no other concerns today other than the above    ASSESSMENT:  Patient Problem List reviewed for relevant medical history and current medical status.    Current Diabetes Management per Patient:  Taking diabetes medications?   yes:     Diabetes Medication(s)     Biguanides Sig    metFORMIN (GLUCOPHAGE-XR) 500 MG 24 hr tablet TAKE 2 TABLETS(1000 MG) BY MOUTH TWICE DAILY WITH MEALS    Insulin Sig    insulin degludec (TRESIBA) 100 UNIT/ML pen Use 12 units daily    insulin glargine (LANTUS SOLOSTAR) 100 UNIT/ML PEN 10 units at bedtime    insulin lispro (HUMALOG KWIKPEN) 100 UNIT/ML soln To use with scale 3 times per day with meals while on steroids.  Use the scale when your meal BG is > 250  with the pain causing high BG.    Incretin Mimetic Agents (GLP-1 Receptor Agonists) Sig    VICTOZA PEN 18 MG/3ML soln INJECT 1.2 MG SUBCUTANEOUS EVERY DAY           *Abbreviated insulin dose documentation key: Insulin trade name (gpmtyyshq-wsfiu-omhjdi-bedtime) - i.e. Humalog 5-5-5-0 (Humalog 5 units at breakfast, 5 units at lunch, and 5 units at dinner).    Patient glucose self monitoring as follows: three times daily.   BG results:   Fasting glucose 103-195  After breakfast 217, 249  After lunch 182-315  After dinner 131-245     BG values are: Not in goal  Patient's most recent   Lab Results   Component Value Date    A1C 6.6 03/15/2017    is meeting goal of <7.0    Nutrition:  Patient watches carbohydrate closely and  "uses portion control.  States his wife is a RD and is very helpful.     Vitals:  There were no vitals taken for this visit.  Estimated body mass index is 28.41 kg/(m^2) as calculated from the following:    Height as of 1/22/17: 1.753 m (5' 9\").    Weight as of 4/16/17: 87.3 kg (192 lb 6.4 oz).   Last 3 BP:   BP Readings from Last 3 Encounters:   04/16/17 126/87   01/22/17 122/79   12/12/16 110/64       History   Smoking Status     Former Smoker     Packs/day: 0.25     Years: 15.00     Types: Cigarettes, Dip, chew, snus or snuff     Start date: 7/1/1996     Quit date: 7/1/2013   Smokeless Tobacco     Current User       Labs:  Lab Results   Component Value Date    A1C 6.6 03/15/2017     Lab Results   Component Value Date     04/16/2017     Lab Results   Component Value Date    LDL 55 03/15/2017     HDL Cholesterol   Date Value Ref Range Status   03/15/2017 89 >39 mg/dL Final   ]  GFR Estimate   Date Value Ref Range Status   04/16/2017 85 >60 mL/min/1.7m2 Final     Comment:     Non  GFR Calc     GFR Estimate If Black   Date Value Ref Range Status   04/16/2017 >90   GFR Calc   >60 mL/min/1.7m2 Final     Lab Results   Component Value Date    CR 0.98 04/16/2017     No results found for: MICROALBUMIN    Health Beliefs and Attitudes:   Patient Activation Measure Survey Score:  No flowsheet data found.    Stage of Change: ACTION (Actively working towards change)    Diabetes knowledge and skills assessment:     Barriers to Learning Assessment: No Barriers identified    Based on learning assessment above, most appropriate setting for further diabetes education would be: Individual setting.    INTERVENTION:    Re-ordered Tresiba for patient and did discuss other options as well, should his glucose not improve, such as increasing insulin dose, adding an additional medication such as a GLP-1 or mealtime insulin. Patient is hopeful that switching back to tresiba will get his glucose back to " goal as it was before.    Opportunities for ongoing education and support in diabetes-self management were discussed.    Pt verbalized understanding of concepts discussed and recommendations provided today.       PLAN:  See Patient Instructions for co-developed, patient-stated behavior change goals.  AVS printed and provided to patient today.    FOLLOW-UP:  Follow-up as needed.   Chart routed to referring provider.  Time Spent: 30 minutes  Encounter Type: Individual

## 2017-05-28 DIAGNOSIS — E11.9 TYPE 2 DIABETES MELLITUS WITHOUT COMPLICATION, WITH LONG-TERM CURRENT USE OF INSULIN (H): ICD-10-CM

## 2017-05-28 DIAGNOSIS — Z79.4 TYPE 2 DIABETES MELLITUS WITHOUT COMPLICATION, WITH LONG-TERM CURRENT USE OF INSULIN (H): ICD-10-CM

## 2017-05-31 RX ORDER — LIRAGLUTIDE 6 MG/ML
INJECTION SUBCUTANEOUS
Qty: 6 ML | Refills: 0 | Status: SHIPPED | OUTPATIENT
Start: 2017-05-31 | End: 2017-06-02

## 2017-05-31 NOTE — TELEPHONE ENCOUNTER
VICTOZA PEN 18 MG/3ML soln  Dispensed: 6ml on  Last written: 5/4/ 2017  Refill: 0    Routing refill request to provider for review/approval because:  Patient needs to be seen because:  Needs diabetic follow up visit with PCP.    Thanks! Nina Douglas RN

## 2017-06-02 ENCOUNTER — OFFICE VISIT (OUTPATIENT)
Dept: FAMILY MEDICINE | Facility: CLINIC | Age: 37
End: 2017-06-02
Payer: COMMERCIAL

## 2017-06-02 VITALS
TEMPERATURE: 98 F | HEART RATE: 84 BPM | SYSTOLIC BLOOD PRESSURE: 132 MMHG | BODY MASS INDEX: 27.51 KG/M2 | DIASTOLIC BLOOD PRESSURE: 80 MMHG | WEIGHT: 185.75 LBS | HEIGHT: 69 IN | OXYGEN SATURATION: 97 %

## 2017-06-02 DIAGNOSIS — I10 HYPERTENSION GOAL BP (BLOOD PRESSURE) < 140/90: ICD-10-CM

## 2017-06-02 DIAGNOSIS — Z87.891 FORMER SMOKER: ICD-10-CM

## 2017-06-02 DIAGNOSIS — G60.9 HEREDITARY AND IDIOPATHIC PERIPHERAL NEUROPATHY: ICD-10-CM

## 2017-06-02 DIAGNOSIS — F19.21 DRUG DEPENDENCE, IN REMISSION (H): ICD-10-CM

## 2017-06-02 DIAGNOSIS — E11.9 TYPE 2 DIABETES MELLITUS WITHOUT COMPLICATION, WITH LONG-TERM CURRENT USE OF INSULIN (H): Primary | ICD-10-CM

## 2017-06-02 DIAGNOSIS — Z79.4 TYPE 2 DIABETES MELLITUS WITHOUT COMPLICATION, WITH LONG-TERM CURRENT USE OF INSULIN (H): Primary | ICD-10-CM

## 2017-06-02 LAB — HBA1C MFR BLD: 6.6 % (ref 4.3–6)

## 2017-06-02 PROCEDURE — 99214 OFFICE O/P EST MOD 30 MIN: CPT | Performed by: FAMILY MEDICINE

## 2017-06-02 PROCEDURE — 36415 COLL VENOUS BLD VENIPUNCTURE: CPT | Performed by: FAMILY MEDICINE

## 2017-06-02 PROCEDURE — 83036 HEMOGLOBIN GLYCOSYLATED A1C: CPT | Performed by: FAMILY MEDICINE

## 2017-06-02 RX ORDER — LOSARTAN POTASSIUM 100 MG/1
100 TABLET ORAL DAILY
Qty: 30 TABLET | Refills: 5 | Status: SHIPPED | OUTPATIENT
Start: 2017-06-02 | End: 2017-11-21

## 2017-06-02 RX ORDER — LIRAGLUTIDE 6 MG/ML
INJECTION SUBCUTANEOUS
Qty: 6 ML | Refills: 5 | Status: SHIPPED | OUTPATIENT
Start: 2017-06-02 | End: 2017-11-21

## 2017-06-02 RX ORDER — METFORMIN HCL 500 MG
TABLET, EXTENDED RELEASE 24 HR ORAL
Qty: 120 TABLET | Refills: 5 | Status: SHIPPED | OUTPATIENT
Start: 2017-06-02 | End: 2017-11-21

## 2017-06-02 NOTE — PROGRESS NOTES
SUBJECTIVE:                                                    Phillip Rueda is a 37 year old male who presents to clinic today for the following health issues:      Diabetes Follow-up    Patient is checking blood sugars: twice daily.    Blood sugar testing frequency justification: Patient modifying lifestyle changes (diet, exercise) with blood sugars  Results are as follows:         am -          suppertime - 90    Diabetic concerns: None     Symptoms of hypoglycemia (low blood sugar): shaky, dizzy, sweating      Paresthesias (numbness or burning in feet) or sores: No     Date of last diabetic eye exam: last year or so and would like one      Hypertension Follow-up      Outpatient blood pressures are not being checked.    Low Salt Diet: not monitoring salt       Amount of exercise or physical activity: 6-7 days/week for an average of 2-3 miles     Problems taking medications regularly: No    Medication side effects: none    Diet: regular (no restrictions)    Tooth pulled yesterday.     Back on tresiba. Working well.   victoza - stable dose.  Very infrequent low blood sugar in the past. None for a while.     When has flare from steroid then need to use humalog but not on regular basis.     Quit smoking around 1.5 months ago.   July 2015 started.     Going to be father in August.     Seeing pain specialist for chronic pain.    Seeing neurologist for hereditary peripheral neuropathy    Problem list and histories reviewed & adjusted, as indicated.  Additional history: as documented    Labs reviewed in EPIC    Reviewed and updated as needed this visit by clinical staff    Reviewed and updated as needed this visit by Provider      Social History     Social History     Marital status:      Spouse name: N/A     Number of children: N/A     Years of education: N/A     Social History Main Topics     Smoking status: Former Smoker     Packs/day: 0.25     Years: 15.00     Types: Cigarettes, Dip, chew, snus or  "snuff     Start date: 1996     Quit date: 2013     Smokeless tobacco: Current User     Alcohol use No      Comment: 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  Drink 1.75 every 2 days     Drug use: No     Sexual activity: Yes     Partners: Female     Birth control/ protection: None      Comment: Trying to get my wife pregnant     Other Topics Concern     Parent/Sibling W/ Cabg, Mi Or Angioplasty Before 65f 55m? Yes     My dads dad  of heart issues in his 40's     Social History Narrative     Allergies   Allergen Reactions     Bees Swelling     Bupropion Hcl Other (See Comments)     seizure     Lisinopril Cough     Penicillins Other (See Comments)     Unable to close mouth     Patient Active Problem List   Diagnosis     Neuralgic amyotrophy     Hereditary and idiopathic peripheral neuropathy     Drug dependence, in remission (H)     Type 2 diabetes mellitus without complication (H)     Hypertension goal BP (blood pressure) < 140/90     Anemia     CARDIOVASCULAR SCREENING; LDL GOAL LESS THAN 100     Right shoulder pain     ED (erectile dysfunction)     Pain disorder     Health Care Home     Former smoker     Reviewed medications, social history and  past medical and surgical history.    Review of system: for general, respiratory, CVS, GI and psychiatry negative except for noted above.     EXAM:  /80 (Cuff Size: Adult Large)  Pulse 84  Temp 98  F (36.7  C) (Oral)  Ht 5' 9\" (1.753 m)  Wt 185 lb 12 oz (84.3 kg)  SpO2 97%  BMI 27.43 kg/m2  Constitutional: healthy, alert and no distress   Psychiatric: mentation appears normal and affect normal/bright     ASSESSMENT / PLAN:   (E11.9,  Z79.4) Type 2 diabetes mellitus without complication, with long-term current use of insulin (H)  (primary encounter diagnosis)  Comment: doing well. Patient is tolerating current medication without any major side effects of concerns and current dose seems reasonable too.  Current medication regime is effective. Continue current " treatment without any changes.   Plan: Hemoglobin A1c, metFORMIN (GLUCOPHAGE-XR) 500         MG 24 hr tablet, liraglutide (VICTOZA PEN) 18         MG/3ML soln, insulin lispro (HUMALOG KWIKPEN)         100 UNIT/ML injection             (Z87.891) Former smoker  Comment:  Using nicotine replacement. OK to continue.   Plan: nicotine polacrilex (EQ NICOTINE) 4 MG lozenge             (I10) Hypertension goal BP (blood pressure) < 140/90  Comment:  Patient is tolerating current medication without any major side effects of concerns and current dose seems reasonable too.  Current medication regime is effective. Continue current treatment without any changes.   Plan: losartan (COZAAR) 100 MG tablet             (F19.21) Drug dependence, in remission (H)  Comment:    Plan:  In remission. Seeing pain specialist. Getting suboxone.     (G60.9) Hereditary and idiopathic peripheral neuropathy  Comment:    Plan: seeing neurologist - rx as per them.     Follow up with me in 6 months.

## 2017-06-02 NOTE — NURSING NOTE
"Chief Complaint   Patient presents with     Hypertension     Diabetes       Initial /80 (Cuff Size: Adult Large)  Pulse 84  Temp 98  F (36.7  C) (Oral)  Ht 5' 9\" (1.753 m)  Wt 185 lb 12 oz (84.3 kg)  SpO2 97%  BMI 27.43 kg/m2 Estimated body mass index is 27.43 kg/(m^2) as calculated from the following:    Height as of this encounter: 5' 9\" (1.753 m).    Weight as of this encounter: 185 lb 12 oz (84.3 kg).  Medication Reconciliation: complete     Tea Rojas, CMA      "

## 2017-06-02 NOTE — MR AVS SNAPSHOT
After Visit Summary   6/2/2017    Phillip Rueda    MRN: [id number]           Patient Information     Date Of Birth          1980        Visit Information        Provider Department      6/2/2017 7:40 Km Jimenez MD [facility]        Today's Diagnoses     Type 2 diabetes mellitus without complication, with long-term current use of insulin (H)    -  1    Hypertension goal BP (blood pressure) < 140/90        Drug dependence, in remission (H)        Hereditary and idiopathic peripheral neuropathy        Former smoker           Follow-ups after your visit        Who to contact     If you have questions or need follow up information about today's clinic visit or your schedule please contact [facility] directly at [phone].  Normal or non-critical lab and imaging results will be communicated to you by ASSIAhart, letter or phone within 4 business days after the clinic has received the results. If you do not hear from us within 7 days, please contact the clinic through ASSIAhart or phone. If you have a critical or abnormal lab result, we will notify you by phone as soon as possible.  Submit refill requests through [employer] or call your pharmacy and they will forward the refill request to us. Please allow 3 business days for your refill to be completed.          Additional Information About Your Visit        MyChart Information     [employer] gives you secure access to your electronic health record. If you see a primary care provider, you can also send messages to your care team and make appointments. If you have questions, please call your primary care clinic.  If you do not have a primary care provider, please call [phone] and they will assist you.        Care EveryWhere ID     This is your Care EveryWhere ID. This could be used by other organizations to access your [place] medical records  [id number]        Your Vitals Were     Pulse Temperature Height  "Pulse Oximetry BMI (Body Mass Index)       84 98  F (36.7  C) (Oral) 5' 9\" (1.753 m) 97% 27.43 kg/m2        Blood Pressure from Last 3 Encounters:   06/02/17 132/80   04/16/17 126/87   01/22/17 122/79    Weight from Last 3 Encounters:   06/02/17 185 lb 12 oz (84.3 kg)   04/16/17 192 lb 6.4 oz (87.3 kg)   01/22/17 180 lb (81.6 kg)              We Performed the Following     Hemoglobin A1c          Today's Medication Changes          These changes are accurate as of: 6/2/17 11:19 AM.  If you have any questions, ask your nurse or doctor.               Start taking these medicines.        Dose/Directions    nicotine polacrilex 4 MG lozenge   Commonly known as:  EQ NICOTINE   Used for:  Former smoker   Started by:  Km Dos Santos MD        Dose:  4 mg   Place 1 lozenge (4 mg) inside cheek as needed for smoking cessation Store brand is fine.   Quantity:  360 tablet   Refills:  5         These medicines have changed or have updated prescriptions.        Dose/Directions    liraglutide 18 MG/3ML soln   Commonly known as:  VICTOZA PEN   This may have changed:  See the new instructions.   Used for:  Type 2 diabetes mellitus without complication, with long-term current use of insulin (H)   Changed by:  Km Dos Santos MD        INJECT 1.2 MG SUBCUTANEOUS EVERY DAY   Quantity:  6 mL   Refills:  5       metFORMIN 500 MG 24 hr tablet   Commonly known as:  GLUCOPHAGE-XR   This may have changed:  See the new instructions.   Used for:  Type 2 diabetes mellitus without complication, with long-term current use of insulin (H)   Changed by:  Km Dos Santos MD        TAKE 2 TABLETS(1000 MG) BY MOUTH TWICE DAILY WITH MEALS   Quantity:  120 tablet   Refills:  5         Stop taking these medicines if you haven't already. Please contact your care team if you have questions.     insulin glargine 100 UNIT/ML injection   Commonly known as:  LANTUS SOLOSTAR   Stopped by:  Km Dos Santos MD           " methylPREDNISolone 4 MG tablet   Commonly known as:  MEDROL DOSEPAK   Stopped by:  Km Dos Santos MD                Where to get your medicines      These medications were sent to [facility] [id number] [place] [address] AT [facility] & [address] [place] [id number]    Hours:  24-hours Phone:  [phone]     liraglutide 18 MG/3ML soln    losartan 100 MG tablet    metFORMIN 500 MG 24 hr tablet    nicotine polacrilex 4 MG lozenge                Primary Care Provider Office Phone # Fax #    Km Dos Santos [phone]       [address] [address]        Goals        General    Functional: Patient will revise his diabetic diet to maximize his diabetes with the goal of decreasing his daily blood sugars and improving his HGB A1c (pt-stated)     Notes - Note created  3/24/2016 11:25 AM by Erna Pineda RN    As of today's date 3/24/2016 goal is met at 0 - 25%.   Goal Status:  Active        Medical (pt-stated)     Notes - Note created  8/25/2015 12:49 PM by Erna Pineda RN    Patient states that he is wanting a diabetes education referral so that he can fine tune his blood sugars or when he is on steroids  Patient states that he will contact RN Clinic Care Coordinator when needing assistance with coordinating cares or has complications with his disease management and or cares      Medical Patient will bring 3 days of blood sugar readings to office visit with Dr Dos Santos . (pt-stated)     Notes - Note created  3/24/2016 11:23 AM by Erna Pineda RN    As of today's date 3/24/2016 goal is met at 0 - 25%.   Goal Status:  Active        Medical: Patient will set up podiatry and eye exam for diabetici wtihin 6 month. (pt-stated)     Notes - Note created  3/24/2016 11:21 AM by Erna Pineda RN    As of today's date 3/24/2016 goal is met at 0 - 25%.   Goal Status:  Active         Medical: Pt will continue to utilize his training in [employer] along with Western Medicine for pain and life management (pt-stated)     Notes - Note created  3/24/2016 11:28 AM by Erna Pineda RN    As of today's date 3/24/2016 goal is met at 51 - 75%.   Goal Status:  Active          Thank you!     Thank you for choosing [facility]  for your care. Our goal is always to provide you with excellent care. Hearing back from our patients is one way we can continue to improve our services. Please take a few minutes to complete the written survey that you may receive in the mail after your visit with us. Thank you!             Your Updated Medication List - Protect others around you: Learn how to safely use, store and throw away your medicines at www.disposemymeds.org.          This list is accurate as of: 6/2/17 11:19 AM.  Always use your most recent med list.                   Brand Name Dispense Instructions for use    BENADRYL PO      Take 50 mg by mouth At Bedtime.       blood glucose monitoring meter device kit     1 kit    Use to test blood sugar 4-6 times daily or as directed.       * blood glucose monitoring test strip    ACCU-CHEK ADARSH    4 Box    Use to test blood sugars 4 times daily or as directed. When on prednisone check 6 times per day.       * blood glucose monitoring test strip    ACCU-CHEK SMARTVIEW    200 strip    Use to test blood sugar 2 times daily or as directed.       buprenorphine HCl-naloxone HCl 4-1 MG per film    SUBOXONE     Place 1 Film under the tongue 3 times daily       ciclopirox 0.77 % Gel     30 g    Apply to affected area twice daily       EPINEPHrine 0.3 MG/0.3ML injection     1 each    Inject 0.3 mLs (0.3 mg) into the muscle once as needed for anaphylaxis       gabapentin 600 MG tablet    NEURONTIN    180 tablet    Take 2 tablets, 3x daily       HumaLOG KWIKpen 100 UNIT/ML injection   Generic drug:  insulin lispro      To use with scale 3 times per day with  meals while on steroids.  Use the scale when your meal BG is > 250  with the pain causing high BG.       ibuprofen 200 MG tablet    ADVIL/MOTRIN     Take 1-2 tablets by mouth every 6 hours as needed.       insulin degludec 100 UNIT/ML pen    TRESIBA    15 mL    Use 12 units daily       insulin pen needle 31G X 6 MM     100 each    Use 2 daily or as directed.       liraglutide 18 MG/3ML soln    VICTOZA PEN    6 mL    INJECT 1.2 MG SUBCUTANEOUS EVERY DAY       losartan 100 MG tablet    COZAAR    30 tablet    Take 1 tablet (100 mg) by mouth daily       metFORMIN 500 MG 24 hr tablet    GLUCOPHAGE-XR    120 tablet    TAKE 2 TABLETS(1000 MG) BY MOUTH TWICE DAILY WITH MEALS       nicotine polacrilex 4 MG lozenge    EQ NICOTINE    360 tablet    Place 1 lozenge (4 mg) inside cheek as needed for smoking cessation Store brand is fine.       nortriptyline 25 MG capsule    PAMELOR    60 capsule    Take 2 capsules (50 mg) by mouth At Bedtime       sildenafil 100 MG cap/tab    VIAGRA    5 tablet    Take 0.5-1 tablets ( mg) by mouth daily as needed for erectile dysfunction Take 30 min to 4 hours before intercourse.  Never use with nitroglycerin, terazosin or doxazosin.       VALIUM 5 MG tablet   Generic drug:  diazepam     60 tablet    Take 1 tablet (5 mg) 3 times daily as needed.       venlafaxine 150 MG Tb24 24 hr tablet    EFFEXOR-ER    30 each    Take 1 tablet by mouth daily (with breakfast).       * Notice:  This list has 2 medication(s) that are the same as other medications prescribed for you. Read the directions carefully, and ask your doctor or other care provider to review them with you.

## 2017-06-24 DIAGNOSIS — G54.5 NEURALGIC AMYOTROPHY: ICD-10-CM

## 2017-06-26 RX ORDER — GABAPENTIN 600 MG/1
TABLET ORAL
Qty: 180 TABLET | Refills: 0 | Status: SHIPPED | OUTPATIENT
Start: 2017-06-26 | End: 2018-08-29

## 2017-06-29 ENCOUNTER — OFFICE VISIT (OUTPATIENT)
Dept: FAMILY MEDICINE | Facility: CLINIC | Age: 37
End: 2017-06-29
Payer: COMMERCIAL

## 2017-06-29 ENCOUNTER — TELEPHONE (OUTPATIENT)
Dept: FAMILY MEDICINE | Facility: CLINIC | Age: 37
End: 2017-06-29

## 2017-06-29 VITALS
TEMPERATURE: 98 F | HEART RATE: 88 BPM | SYSTOLIC BLOOD PRESSURE: 118 MMHG | OXYGEN SATURATION: 90 % | RESPIRATION RATE: 16 BRPM | HEIGHT: 69 IN | BODY MASS INDEX: 28.18 KG/M2 | WEIGHT: 190.25 LBS | DIASTOLIC BLOOD PRESSURE: 78 MMHG

## 2017-06-29 DIAGNOSIS — R19.5 LOOSE STOOLS: Primary | ICD-10-CM

## 2017-06-29 DIAGNOSIS — R19.7 DIARRHEA, UNSPECIFIED TYPE: ICD-10-CM

## 2017-06-29 DIAGNOSIS — R19.7 DIARRHEA, UNSPECIFIED TYPE: Primary | ICD-10-CM

## 2017-06-29 DIAGNOSIS — E11.9 TYPE 2 DIABETES MELLITUS WITHOUT COMPLICATION, WITH LONG-TERM CURRENT USE OF INSULIN (H): ICD-10-CM

## 2017-06-29 DIAGNOSIS — Z79.4 TYPE 2 DIABETES MELLITUS WITHOUT COMPLICATION, WITH LONG-TERM CURRENT USE OF INSULIN (H): ICD-10-CM

## 2017-06-29 LAB
BASOPHILS # BLD AUTO: 0 10E9/L (ref 0–0.2)
BASOPHILS NFR BLD AUTO: 0.1 %
C DIFF TOX B STL QL: NORMAL
DIFFERENTIAL METHOD BLD: ABNORMAL
EOSINOPHIL # BLD AUTO: 0.3 10E9/L (ref 0–0.7)
EOSINOPHIL NFR BLD AUTO: 4.2 %
ERYTHROCYTE [DISTWIDTH] IN BLOOD BY AUTOMATED COUNT: 12.8 % (ref 10–15)
HCT VFR BLD AUTO: 37.7 % (ref 40–53)
HGB BLD-MCNC: 12.7 G/DL (ref 13.3–17.7)
LYMPHOCYTES # BLD AUTO: 3 10E9/L (ref 0.8–5.3)
LYMPHOCYTES NFR BLD AUTO: 39.5 %
MCH RBC QN AUTO: 29.3 PG (ref 26.5–33)
MCHC RBC AUTO-ENTMCNC: 33.7 G/DL (ref 31.5–36.5)
MCV RBC AUTO: 87 FL (ref 78–100)
MONOCYTES # BLD AUTO: 0.7 10E9/L (ref 0–1.3)
MONOCYTES NFR BLD AUTO: 9.1 %
NEUTROPHILS # BLD AUTO: 3.6 10E9/L (ref 1.6–8.3)
NEUTROPHILS NFR BLD AUTO: 47.1 %
PLATELET # BLD AUTO: 268 10E9/L (ref 150–450)
RBC # BLD AUTO: 4.34 10E12/L (ref 4.4–5.9)
SPECIMEN SOURCE: NORMAL
WBC # BLD AUTO: 7.6 10E9/L (ref 4–11)

## 2017-06-29 PROCEDURE — 87506 IADNA-DNA/RNA PROBE TQ 6-11: CPT | Performed by: FAMILY MEDICINE

## 2017-06-29 PROCEDURE — 36415 COLL VENOUS BLD VENIPUNCTURE: CPT | Performed by: FAMILY MEDICINE

## 2017-06-29 PROCEDURE — 99214 OFFICE O/P EST MOD 30 MIN: CPT | Performed by: FAMILY MEDICINE

## 2017-06-29 PROCEDURE — 85025 COMPLETE CBC W/AUTO DIFF WBC: CPT | Performed by: FAMILY MEDICINE

## 2017-06-29 PROCEDURE — 87493 C DIFF AMPLIFIED PROBE: CPT | Mod: 59 | Performed by: FAMILY MEDICINE

## 2017-06-29 PROCEDURE — 80053 COMPREHEN METABOLIC PANEL: CPT | Performed by: FAMILY MEDICINE

## 2017-06-29 NOTE — MR AVS SNAPSHOT
After Visit Summary   6/29/2017    Phillip Rueda    MRN: [id number]           Patient Information     Date Of Birth          1980        Visit Information        Provider Department      6/29/2017 11:40 AM Manju Kirkpatrick MD [facility]        Today's Diagnoses     Loose stools    -  1      Care Instructions    Labs today   Will wait for stools   Will call once get results           Follow-ups after your visit        Your next 10 appointments already scheduled     Jul 14, 2017  9:00 AM CDT   New Sleep Patient with WALESKA Wheeler CNP   [facility] [place], Sleep Study ([facility])    [address] [place] [id number] [phone]              Future tests that were ordered for you today     Open Future Orders        Priority Expected Expires Ordered    Clostridium difficile Toxin B PCR Routine  7/29/2017 6/29/2017    Enteric Bacteria and Virus Panel by CHARLA Stool Routine  6/29/2018 6/29/2017            Who to contact     If you have questions or need follow up information about today's clinic visit or your schedule please contact [facility] directly at [phone].  Normal or non-critical lab and imaging results will be communicated to you by [employer], letter or phone within 4 business days after the clinic has received the results. If you do not hear from us within 7 days, please contact the clinic through [employer] or phone. If you have a critical or abnormal lab result, we will notify you by phone as soon as possible.  Submit refill requests through [employer] or call your pharmacy and they will forward the refill request to us. Please allow 3 business days for your refill to be completed.          Additional Information About Your Visit        MyChart Information     [employer] gives you secure access to your electronic health record. If you see a primary care provider, you can also send messages to your care  "team and make appointments. If you have questions, please call your primary care clinic.  If you do not have a primary care provider, please call [phone] and they will assist you.        Care EveryWhere ID     This is your Care EveryWhere ID. This could be used by other organizations to access your [place] medical records  [id number]        Your Vitals Were     Pulse Temperature Respirations Height Pulse Oximetry BMI (Body Mass Index)    88 98  F (36.7  C) (Oral) 16 5' 9\" (1.753 m) 90% 28.1 kg/m2       Blood Pressure from Last 3 Encounters:   06/29/17 118/78   06/02/17 132/80   04/16/17 126/87    Weight from Last 3 Encounters:   06/29/17 190 lb 4 oz (86.3 kg)   06/02/17 185 lb 12 oz (84.3 kg)   04/16/17 192 lb 6.4 oz (87.3 kg)              We Performed the Following     CBC with platelets differential     Comprehensive metabolic panel        Primary Care Provider Office Phone # Fax #    Km Michelle Dos Santos [phone]       [address] [address]        Goals        General    Functional: Patient will revise his diabetic diet to maximize his diabetes with the goal of decreasing his daily blood sugars and improving his HGB A1c (pt-stated)     Notes - Note created  3/24/2016 11:25 AM by Erna Pineda RN    As of today's date 3/24/2016 goal is met at 0 - 25%.   Goal Status:  Active        Medical (pt-stated)     Notes - Note created  8/25/2015 12:49 PM by Erna Pineda RN    Patient states that he is wanting a diabetes education referral so that he can fine tune his blood sugars or when he is on steroids  Patient states that he will contact RN Clinic Care Coordinator when needing assistance with coordinating cares or has complications with his disease management and or cares      Medical Patient will bring 3 days of blood sugar readings to office visit with Dr Dos Santos . (pt-stated)     Notes - Note created  3/24/2016 11:23 AM by Erna Pineda " DOMINGA RN    As of today's date 3/24/2016 goal is met at 0 - 25%.   Goal Status:  Active        Medical: Patient will set up podiatry and eye exam for dario ambriz 6 month. (pt-stated)     Notes - Note created  3/24/2016 11:21 AM by Erna Pineda RN    As of today's date 3/24/2016 goal is met at 0 - 25%.   Goal Status:  Active        Medical: Pt will continue to utilize his training in [employer] along with Western Medicine for pain and life management (pt-stated)     Notes - Note created  3/24/2016 11:28 AM by Erna Pineda RN    As of today's date 3/24/2016 goal is met at 51 - 75%.   Goal Status:  Active          Equal Access to Services     [facility]: Hadii nadege canseco hadasho Soomaali, waaxda luqadaha, qaybta kaalmada adeegyada, caroline burton . So [facility] [phone].    ATENCIÓN: Si habla español, tiene a robins disposición servicios gratuitos de asistencia lingüística. Llame al [phone].    We comply with applicable federal civil rights laws and [place] laws. We do not discriminate on the basis of race, color, national origin, age, disability sex, sexual orientation or gender identity.            Thank you!     Thank you for choosing [facility]  for your care. Our goal is always to provide you with excellent care. Hearing back from our patients is one way we can continue to improve our services. Please take a few minutes to complete the written survey that you may receive in the mail after your visit with us. Thank you!             Your Updated Medication List - Protect others around you: Learn how to safely use, store and throw away your medicines at www.disposemymeds.org.          This list is accurate as of: 6/29/17 12:16 PM.  Always use your most recent med list.                   Brand Name Dispense Instructions for use Diagnosis    BENADRYL PO      Take 50 mg by mouth At Bedtime.        blood glucose monitoring meter device kit     1 kit    Use to  test blood sugar 4-6 times daily or as directed.    Type 2 diabetes mellitus without complication (H)       * blood glucose monitoring test strip    ACCU-CHEK ADARSH    4 Box    Use to test blood sugars 4 times daily or as directed. When on prednisone check 6 times per day.    Type 2 diabetes, HbA1c goal < 7% (H)       * blood glucose monitoring test strip    ACCU-CHEK SMARTVIEW    200 strip    Use to test blood sugar 2 times daily or as directed.    Type 2 diabetes mellitus without complication (H)       buprenorphine HCl-naloxone HCl 4-1 MG per film    SUBOXONE     Place 1 Film under the tongue 3 times daily        ciclopirox 0.77 % Gel     30 g    Apply to affected area twice daily    Tinea pedis of both feet       EPINEPHrine 0.3 MG/0.3ML injection     1 each    Inject 0.3 mLs (0.3 mg) into the muscle once as needed for anaphylaxis    Anaphylactic reaction, sequela       gabapentin 600 MG tablet    NEURONTIN    180 tablet    TAKE 2 TABLETS BY MOUTH THREE TIMES DAILY    Neuralgic amyotrophy       HumaLOG KWIKpen 100 UNIT/ML injection   Generic drug:  insulin lispro      To use with scale 3 times per day with meals while on steroids.  Use the scale when your meal BG is > 250  with the pain causing high BG.    Type 2 diabetes mellitus without complication, with long-term current use of insulin (H)       ibuprofen 200 MG tablet    ADVIL/MOTRIN     Take 1-2 tablets by mouth every 6 hours as needed.        insulin degludec 100 UNIT/ML pen    TRESIBA    15 mL    Use 12 units daily    Type 2 diabetes mellitus without complication, without long-term current use of insulin (H)       insulin pen needle 31G X 6 MM     100 each    Use 2 daily or as directed.    Type 2 diabetes mellitus without complication (H)       liraglutide 18 MG/3ML soln    VICTOZA PEN    6 mL    INJECT 1.2 MG SUBCUTANEOUS EVERY DAY    Type 2 diabetes mellitus without complication, with long-term current use of insulin (H)       losartan 100 MG tablet     COZAAR    30 tablet    Take 1 tablet (100 mg) by mouth daily    Hypertension goal BP (blood pressure) < 140/90       metFORMIN 500 MG 24 hr tablet    GLUCOPHAGE-XR    120 tablet    TAKE 2 TABLETS(1000 MG) BY MOUTH TWICE DAILY WITH MEALS    Type 2 diabetes mellitus without complication, with long-term current use of insulin (H)       nicotine polacrilex 4 MG lozenge    EQ NICOTINE    360 tablet    Place 1 lozenge (4 mg) inside cheek as needed for smoking cessation Store brand is fine.    Former smoker       nortriptyline 25 MG capsule    PAMELOR    60 capsule    Take 2 capsules (50 mg) by mouth At Bedtime    Neuralgic amyotrophy       sildenafil 100 MG cap/tab    VIAGRA    5 tablet    Take 0.5-1 tablets ( mg) by mouth daily as needed for erectile dysfunction Take 30 min to 4 hours before intercourse.  Never use with nitroglycerin, terazosin or doxazosin.    ED (erectile dysfunction)       VALIUM 5 MG tablet   Generic drug:  diazepam     60 tablet    Take 1 tablet (5 mg) 3 times daily as needed.        venlafaxine 150 MG Tb24 24 hr tablet    EFFEXOR-ER    30 each    Take 1 tablet by mouth daily (with breakfast).    Anxiety       * Notice:  This list has 2 medication(s) that are the same as other medications prescribed for you. Read the directions carefully, and ask your doctor or other care provider to review them with you.

## 2017-06-29 NOTE — TELEPHONE ENCOUNTER
PATIENT COMPLAINS OF :  --Watery diarrhea since 6/25 or 6/26.  --Diarrheal stools 10 per day.  --This morning woke up and was incontinent of stool.  --Patient states he recently took clindamycin for about 6 days for a tooth extraction.  --Urine output slightly decreased.  --Mouth is slightly more dry than usual.  --No abdominal pain.  --No blood or black in stool.  --No vomiting or nausea.  --Patient is concerned that he has c-diff.    PLAN :   --Drink clear liquids ( not red)- broth flat ginger ale or 7 up, chicken broth for first 12- 24 hrs.  --Then BRAT diet.  --Appointment today.  --Discussed with  and he will come in for container for stool sample before his appointment.    Protocol used - Diarrhea, Adult.  Lori Ellison Telephone Triage Protocols For Nurses 5th Edition guideline.    Margareth Barlow RN

## 2017-06-29 NOTE — PROGRESS NOTES
SUBJECTIVE:                                                    Phillip Rueda is a 37 year old male who presents to clinic today for the following health issues:    Diarrhea      Duration:  X Sunday or Monday     Description:       Consistency of stool: explosive ; not like water but not formed   Patient states he has had worse episodes but this has not been formed        Blood in stool: no        Number of loose stools past 24 hours: 12 within the last day or so       Accompanying signs and symptoms:       Fever: no        Nausea/vomiting: no        Abdominal pain: no        Weight loss: no     History (recent antibiotics or travel/ill contacts/med changes/testing done): recent antibiotic ; patient has dry mouth but always has dry mouth because the medication he is taking    Patient's wife is 8 months pregnant     Precipitating or alleviating factors: wife had C.diff 6 months ago or maybe later  ; stated it was from antibiotic use    Patient was on a antibiotic     Wife registered dietician     Woke up and saw had soiled self at night  Called triage   Unable to go since there .     Currently no abdominal pain or dizziness  no nausea not throwing up   No black or blood stools  No fever   Some foul smelling     Was on clinda recently . Completed 2 to 3 weeks ago  Drinking a lot O water , keeping up water .    Sugars good  No change in med's  Checks blood sugars at 2 to 3 times a day     Therapies tried and outcome: Imodium Alesia Dhruvan; last night ; not effective     Diabetes Follow-up    Patient is checking blood sugars: three times daily.   Blood sugar testing frequency justification: Risk of hypoglycemia with medication(s)  Results are as follows:    Diabetic concerns: None     Symptoms of hypoglycemia (low blood sugar): none     Paresthesias (numbness or burning in feet) or sores: No     Date of last diabetic eye exam: ?      Problem list and histories reviewed & adjusted, as indicated.  Additional history:  as documented    Patient Active Problem List   Diagnosis     Neuralgic amyotrophy     Hereditary and idiopathic peripheral neuropathy     Drug dependence, in remission (H)     Type 2 diabetes mellitus without complication (H)     Hypertension goal BP (blood pressure) < 140/90     Anemia     CARDIOVASCULAR SCREENING; LDL GOAL LESS THAN 100     Right shoulder pain     ED (erectile dysfunction)     Pain disorder     Health Care Home     Former smoker     Past Surgical History:   Procedure Laterality Date     ORTHOPEDIC SURGERY         Social History   Substance Use Topics     Smoking status: Former Smoker     Packs/day: 0.25     Years: 15.00     Types: Cigarettes, Dip, chew, snus or snuff     Start date: 7/1/1996     Quit date: 7/1/2013     Smokeless tobacco: Current User     Alcohol use No      Comment: 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  Drink 1.75 every 2 days     Family History   Problem Relation Age of Onset     Depression Mother      Anxiety Disorder Mother      Substance Abuse Maternal Grandfather      Anxiety Disorder Brother      Glaucoma No family hx of      Macular Degeneration No family hx of          Current Outpatient Prescriptions   Medication Sig Dispense Refill     gabapentin (NEURONTIN) 600 MG tablet TAKE 2 TABLETS BY MOUTH THREE TIMES DAILY 180 tablet 0     metFORMIN (GLUCOPHAGE-XR) 500 MG 24 hr tablet TAKE 2 TABLETS(1000 MG) BY MOUTH TWICE DAILY WITH MEALS 120 tablet 5     liraglutide (VICTOZA PEN) 18 MG/3ML soln INJECT 1.2 MG SUBCUTANEOUS EVERY DAY 6 mL 5     nicotine polacrilex (EQ NICOTINE) 4 MG lozenge Place 1 lozenge (4 mg) inside cheek as needed for smoking cessation Store brand is fine. 360 tablet 5     losartan (COZAAR) 100 MG tablet Take 1 tablet (100 mg) by mouth daily 30 tablet 5     insulin lispro (HUMALOG KWIKPEN) 100 UNIT/ML injection To use with scale 3 times per day with meals while on steroids.  Use the scale when your meal BG is > 250  with the pain causing high BG.       insulin  degludec (TRESIBA) 100 UNIT/ML pen Use 12 units daily 15 mL 6     blood glucose monitoring (ACCU-CHEK SMARTVIEW) test strip Use to test blood sugar 2 times daily or as directed. 200 strip 3     nortriptyline (PAMELOR) 25 MG capsule Take 2 capsules (50 mg) by mouth At Bedtime 60 capsule 2     Buprenorphine HCl-Naloxone HCl (SUBOXONE) 4-1 MG film Place 1 Film under the tongue 3 times daily       insulin pen needle 31G X 6 MM Use 2 daily or as directed. 100 each 11     blood glucose monitoring (ACCU-CHEK ADARSH PLUS) meter device kit Use to test blood sugar 4-6 times daily or as directed. 1 kit 0     EPINEPHrine (EPIPEN) 0.3 MG/0.3ML injection Inject 0.3 mLs (0.3 mg) into the muscle once as needed for anaphylaxis 1 each 2     ciclopirox 0.77 % GEL Apply to affected area twice daily 30 g 1     blood glucose monitoring (ACCU-CHEK ADARSH) test strip Use to test blood sugars 4 times daily or as directed. When on prednisone check 6 times per day. 4 Box 12     sildenafil (VIAGRA) 100 MG tablet Take 0.5-1 tablets ( mg) by mouth daily as needed for erectile dysfunction Take 30 min to 4 hours before intercourse.  Never use with nitroglycerin, terazosin or doxazosin. 5 tablet 5     diazepam (VALIUM) 5 MG tablet Take 1 tablet (5 mg) 3 times daily as needed. 60 tablet      venlafaxine (EFFEXOR-ER) 150 MG TB24 Take 1 tablet by mouth daily (with breakfast). 30 each 0     ibuprofen (ADVIL,MOTRIN) 200 MG tablet Take 1-2 tablets by mouth every 6 hours as needed.       DiphenhydrAMINE HCl (BENADRYL PO) Take 50 mg by mouth At Bedtime.       Allergies   Allergen Reactions     Bees Swelling     Bupropion Hcl Other (See Comments)     seizure     Lisinopril Cough     Penicillins Other (See Comments)     Unable to close mouth     Recent Labs   Lab Test  06/02/17   0741  04/16/17   1859  03/15/17   0752  01/22/17   1257  09/07/16   0842   05/31/16   1102   02/11/16   0815  01/22/16   1713   02/11/15   0854   A1C  6.6*   --   6.6*   --    "7.2*   < >   --    --   7.4*   --    < >   --    LDL   --    --   55   --    --    --    --    --   76   --    --   46   HDL   --    --   89   --    --    --    --    --   86   --    --   73   TRIG   --    --   37   --    --    --    --    --   46   --    --   48   ALT   --   23   --   18   --    --   22   < >  19  18   < >  24   CR   --   0.98   --   0.92   --    < >  0.94   < >   --   0.96   < >  0.98   GFRESTIMATED   --   85   --   >90  Non  GFR Calc     --    < >  89   < >   --   87   < >  86   GFRESTBLACK   --   >90   GFR Calc     --   >90   GFR Calc     --    < >  >90   GFR Calc     < >   --   >90   GFR Calc     < >  >90   GFR Calc     POTASSIUM   --   4.1   --   4.4   --    < >  4.2   < >   --   4.5   < >  4.2   TSH   --    --    --    --    --    --    --    --   1.74  0.33*   < >  0.39*    < > = values in this interval not displayed.      BP Readings from Last 3 Encounters:   06/29/17 118/78   06/02/17 132/80   04/16/17 126/87    Wt Readings from Last 3 Encounters:   06/29/17 190 lb 4 oz (86.3 kg)   06/02/17 185 lb 12 oz (84.3 kg)   04/16/17 192 lb 6.4 oz (87.3 kg)                  Labs reviewed in EPIC    Reviewed and updated as needed this visit by clinical staff  Tobacco  Allergies  Meds  Problems  Med Hx  Surg Hx  Fam Hx  Soc Hx        Reviewed and updated as needed this visit by Provider  Tobacco  Allergies  Meds  Problems  Med Hx  Surg Hx  Fam Hx  Soc Hx          ROS:  Constitutional, HEENT, cardiovascular, pulmonary, GI, , musculoskeletal, neuro, skin, endocrine and psych systems are negative, except as otherwise noted.    OBJECTIVE:     /78 (BP Location: Left arm, Patient Position: Chair, Cuff Size: Adult Large)  Pulse 88  Temp 98  F (36.7  C) (Oral)  Resp 16  Ht 5' 9\" (1.753 m)  Wt 190 lb 4 oz (86.3 kg)  SpO2 90%  BMI 28.1 kg/m2  Body mass index is 28.1 kg/(m^2).  GENERAL: " healthy, alert and no distress  EYES: Eyes grossly normal to inspection, PERRL and conjunctivae and sclerae normal  HENT: ear canals and TM's normal, nose and mouth without ulcers or lesions  NECK: no adenopathy, no asymmetry, masses, or scars and thyroid normal to palpation  RESP: lungs clear to auscultation - no rales, rhonchi or wheezes  CV: regular rate and rhythm, normal S1 S2, no S3 or S4, no murmur, click or rub, no peripheral edema and peripheral pulses strong  ABDOMEN: soft, nontender, without hepatosplenomegaly or masses, no bruits heard, no scars, striae, dilated veins, rashes, or lesions and hyperactive gut sounds   MS: no gross musculoskeletal defects noted, no edema  SKIN: no suspicious lesions or rashes  NEURO: Normal strength and tone, mentation intact and speech normal  PSYCH: mentation appears normal, affect normal/bright    Diagnostic Test Results:  No results found for this or any previous visit (from the past 24 hour(s)).    ASSESSMENT/PLAN:     1. 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 vitals and exam normal benign , no acute abdomen. Not septic.  Soiled himself overnight which is what triggered visit today and duration and worry about possible C diff. Did come in early and picked up kit for stool & C diff but has not had a BM yet so unable to get a sample but lives close by and will bring in later today. Will get labs to make sure kidney function ok. Continue keeping up with fluids, Gatorade, bland foods, until get results no imodium or lomotil. Clean everything with bleach especially toilet seat and faucet and handles after use. If should have high fever, abdominal pain, low or very high blood sugars, bloody stool or vomiting to go to the er. Continue care with PCP dr wegener, endocrine, pain & neuro as before.   - CBC with platelets differential  - Comprehensive metabolic panel  -C diff, stool culture ordered earlier this am not done yet .    2. Diabetes; so far stable despite loose stools. keeping up with fluids and checking sugars  3 to 4 times and has had no lows     See Patient Instructions  Patient Instructions   Labs today   Will wait for stools   Will call once get results       Manju Kirkpatrick MD  [facility] [place][note 3]

## 2017-06-29 NOTE — TELEPHONE ENCOUNTER
Patient called and spoke with writer.    Per patient:  1. Picked up stool collection container  2. Does he need to have stool specimen prior to appt today?    Writer informed patient:  1. Collect stool sample when able  2. Keep appt today as provider should assess symptoms    Patient verbalized understanding and in agreement with plan.  MARTY MarreroN, RN

## 2017-06-29 NOTE — PROGRESS NOTES
Ashley Mr. Rueda,  Some of Your results came back showing mild anemia. White cells normal . If you have any further concerns please do not hesitate to contact us by message, phone or making an appointment.  Have a good day   Dr Casimiro MURCIA

## 2017-06-30 ENCOUNTER — TELEPHONE (OUTPATIENT)
Dept: FAMILY MEDICINE | Facility: CLINIC | Age: 37
End: 2017-06-30

## 2017-06-30 LAB
ALBUMIN SERPL-MCNC: 4 G/DL (ref 3.4–5)
ALP SERPL-CCNC: 134 U/L (ref 40–150)
ALT SERPL W P-5'-P-CCNC: 25 U/L (ref 0–70)
ANION GAP SERPL CALCULATED.3IONS-SCNC: 9 MMOL/L (ref 3–14)
AST SERPL W P-5'-P-CCNC: 35 U/L (ref 0–45)
BILIRUB SERPL-MCNC: 0.3 MG/DL (ref 0.2–1.3)
BUN SERPL-MCNC: 19 MG/DL (ref 7–30)
CALCIUM SERPL-MCNC: 8.8 MG/DL (ref 8.5–10.1)
CAMPYLOBACTER GROUP BY NAT: NOT DETECTED
CHLORIDE SERPL-SCNC: 104 MMOL/L (ref 94–109)
CO2 SERPL-SCNC: 25 MMOL/L (ref 20–32)
CREAT SERPL-MCNC: 1.06 MG/DL (ref 0.66–1.25)
ENTERIC PATHOGEN COMMENT: NORMAL
GFR SERPL CREATININE-BSD FRML MDRD: 78 ML/MIN/1.7M2
GLUCOSE SERPL-MCNC: 84 MG/DL (ref 70–99)
NOROVIRUS I AND II BY NAT: NOT DETECTED
POTASSIUM SERPL-SCNC: 3.5 MMOL/L (ref 3.4–5.3)
PROT SERPL-MCNC: 6.7 G/DL (ref 6.8–8.8)
ROTAVIRUS A BY NAT: NOT DETECTED
SALMONELLA SPECIES BY NAT: NOT DETECTED
SHIGA TOXIN 1 GENE BY NAT: NOT DETECTED
SHIGA TOXIN 2 GENE BY NAT: NOT DETECTED
SHIGELLA SP+EIEC IPAH STL QL NAA+PROBE: NOT DETECTED
SODIUM SERPL-SCNC: 138 MMOL/L (ref 133–144)
VIBRIO GROUP BY NAT: NOT DETECTED
YERSINIA ENTEROCOLITICA BY NAT: NOT DETECTED

## 2017-06-30 NOTE — PROGRESS NOTES
Ashley Mr. Rueda,  Your results came back and are within acceptable limits. -Liver and gallbladder tests are normal. (ALT,AST, Alk phos, bilirubin), kidney function is normal (Cr, GFR), Sodium is normal, Potassium is normal, Calcium is normal, Glucose is normal (diabetes screening test). . If you have any further concerns please do not hesitate to contact us by message, phone or making an appointment.  Have a good day   Dr Casimiro MURCIA

## 2017-06-30 NOTE — PROGRESS NOTES
Ashley Mr. Rueda,  Your stool culture also negative for bacteria or virus. If symptoms persist consider GI consult.  If you have any further concerns please do not hesitate to contact us by message, phone or making an appointment.  Have a good day   Dr Casimiro MURCIA

## 2017-06-30 NOTE — TELEPHONE ENCOUNTER
Return call from patient to review lab results - advised negative at this time - no further lab orders - patient states symptoms continue 6/30/2017 no change - denies worsening or new symptoms, fever, abdominal pain, dehydration, blood/black stool,     Blood sugar low overnight but states due to not eating well - reviewed BRAT/low fiber diet, replacing electrolytes, fluid intake    Reviewed provider plan - encourage to try imodium over weekend if worsening to UC or call back 7/3/2017 if symptoms persist - patient verbalized understanding - no further questions at this time    Closing encounter - no further actions needed at this time    Bro Conde RN

## 2017-06-30 NOTE — PROGRESS NOTES
Ashley Mr. Rueda,  Good news !Your results came back negative for c diff. Can use imodium 4 mg x1 then 2 mg after each loose stool but do not exceed more than 8 total 2 mg tabs of imodium in 24 hrs. rest of stool culture and cmp not back yet. . If you have any further concerns please do not hesitate to contact us by message, phone or making an appointment.  Have a good day   Dr Casimiro MURCIA

## 2017-07-01 ENCOUNTER — OFFICE VISIT (OUTPATIENT)
Dept: URGENT CARE | Facility: URGENT CARE | Age: 37
End: 2017-07-01
Payer: COMMERCIAL

## 2017-07-01 VITALS
SYSTOLIC BLOOD PRESSURE: 108 MMHG | WEIGHT: 188 LBS | HEIGHT: 69 IN | TEMPERATURE: 98.3 F | BODY MASS INDEX: 27.85 KG/M2 | OXYGEN SATURATION: 97 % | HEART RATE: 89 BPM | DIASTOLIC BLOOD PRESSURE: 70 MMHG

## 2017-07-01 DIAGNOSIS — R19.7 DIARRHEA, UNSPECIFIED TYPE: Primary | ICD-10-CM

## 2017-07-01 PROCEDURE — 87209 SMEAR COMPLEX STAIN: CPT | Performed by: FAMILY MEDICINE

## 2017-07-01 PROCEDURE — 99214 OFFICE O/P EST MOD 30 MIN: CPT | Performed by: FAMILY MEDICINE

## 2017-07-01 PROCEDURE — 87177 OVA AND PARASITES SMEARS: CPT | Performed by: FAMILY MEDICINE

## 2017-07-01 PROCEDURE — 87329 GIARDIA AG IA: CPT | Performed by: FAMILY MEDICINE

## 2017-07-01 NOTE — PATIENT INSTRUCTIONS
Uncertain Causes of Diarrhea (Adult)    Diarrhea is when stools are loose and watery. This can be caused by:    Viral infections    Bacterial infections    Food poisoning    Parasites    Irritable bowel syndrome (IBS)    Inflammatory bowel diseases such as ulcerative colitis, Crohn's disease, and celiac disease    Food intolerance, such as to lactose, the sugar found in milk and milk products    Reaction to medicines like antibiotics, laxatives, cancer drugs, and antacids  Along with diarrhea, you may also have:    Abdominal pain and cramping    Nausea and vomiting    Loss of bowel control    Fever and chills    Bloody stools  In some cases, antibiotics may help to treat diarrhea. You may have a stool sample test. This is done to see what is causing your diarrhea, and if antibiotics will help treat it. The results of a stool sample test may take up to 2 days. The healthcare provider may not give you antibiotics until he or she has the stool test results.  Diarrhea can cause dehydration. This is the loss of too much water and other fluids from the body. When this occurs, body fluid must be replaced. This can be done with oral rehydration solutions. Oral rehydration solutions are available at drugstores and grocery stores without a prescription.  Home care  Follow all instructions given by your healthcare provider. Rest at home for the next 24 hours, or until you feel better. Avoid caffeine, tobacco, and alcohol. These can make diarrhea, cramping, and pain worse.  If taking medicines:    Don t take over-the-counter diarrhea or nausea medicines unless your healthcare provider tells you to.    You may use acetaminophen or NSAID medicines like ibuprofen or naproxen to reduce pain and fever. Don t use these if you have chronic liver or kidney disease, or ever had a stomach ulcer or gastrointestinal bleeding. Don't use NSAID medicines if you are already taking one for another condition (like arthritis) or are on daily  aspirin therapy (such as for heart disease or after a stroke). Talk with your healthcare provider first.    If antibiotics were prescribed, be sure you take them until they are finished. Don t stop taking them even when you feel better. Antibiotics must be taken as a full course.  To prevent the spread of illness:    Remember that washing with soap and water and using alcohol-based  is the best way to prevent the spread of infection.    Clean the toilet after each use.    Wash your hands before eating.    Wash your hands before and after preparing food. Keep in mind that people with diarrhea or vomiting should not prepare food for others.    Wash your hands after using cutting boards, countertops, and knives that have been in contact with raw foods.    Wash and then peel fruits and vegetables.    Keep uncooked meats away from cooked and ready-to-eat foods.    Use a food thermometer when cooking. Cook poultry to at least 165 F (74 C). Cook ground meat (beef, veal, pork, lamb) to at least 160 F (71 C). Cook fresh beef, veal, lamb, and pork to at least 145 F (63 C).    Don t eat raw or undercooked eggs (poached or valeria side up), poultry, meat, or unpasteurized milk and juices.  Food and drinks  The main goal while treating vomiting or diarrhea is to prevent dehydration. This is done by taking small amounts of liquids often.    Keep in mind that liquids are more important than food right now.    Drink only small amounts of liquids at a time.    Don t force yourself to eat, especially if you are having cramping, vomiting, or diarrhea. Don t eat large amounts at a time, even if you are hungry.    If you eat, avoid fatty, greasy, spicy, or fried foods.    Don t eat dairy foods or drink milk if you have diarrhea. These can make diarrhea worse.  During the first 24 hours you can try:    Oral rehydration solutions. Do not use sports drinks. They have too much sugar and not enough electrolytes.    Soft drinks without  caffeine    Ginger ale    Water (plain or flavored)    Decaf tea or coffee    Clear broth, consommé, or bouillon    Gelatin, popsicles, or frozen fruit juice bars  The second 24 hours, if you are feeling better, you can add:    Hot cereal, plain toast, bread, rolls, or crackers    Plain noodles, rice, mashed potatoes, chicken noodle soup, or rice soup    Unsweetened canned fruit (no pineapple)    Bananas  As you recover:    Limit fat intake to less than 15 grams per day. Don t eat margarine, butter, oils, mayonnaise, sauces, gravies, fried foods, peanut butter, meat, poultry, or fish.    Limit fiber. Don t eat raw or cooked vegetables, fresh fruits except bananas, or bran cereals.    Limit caffeine and chocolate.    Limit dairy.    Don t use spices or seasonings except salt.    Go back to your normal diet over time, as you feel better and your symptoms improve.    If the symptoms come back, go back to a simple diet or clear liquids.  Follow-up care  Follow up with your healthcare provider, or as advised. If a stool sample was taken or cultures were done, call the healthcare provider for the results as instructed.  Call 911  Call 911 if you have any of these symptoms:    Trouble breathing    Confusion    Extreme drowsiness or trouble walking    Loss of consciousness    Rapid heart rate    Chest pain    Stiff neck    Seizure  When to seek medical advice  Call your healthcare provider right away if any of these occur:    Abdominal pain that gets worse    Constant lower right abdominal pain    Continued vomiting and inability to keep liquids down    Diarrhea more than 5 times a day    Blood in vomit or stool    Dark urine or no urine for 8 hours, dry mouth and tongue, tiredness, weakness, or dizziness    Drowsiness    New rash    You don t get better in 2 to 3 days    Fever of 100.4 F (38 C) or higher that doesn t get lower with medicine  Date Last Reviewed: 1/3/2016    9269-0988 [employer]. [address] [place] [address]  [place] [address]. All rights reserved. This information is not intended as a substitute for professional medical care. Always follow your healthcare professional's instructions.        Treating Diarrhea    Diarrhea happens when you have loose, watery, or frequent bowel movements. It is a common problem with many causes. Most cases of diarrhea clear up on their own. But certain cases may need treatment. Be sure to see your healthcare provider if your symptoms do not improve within a few days.  Getting relief  Treatment of diarrhea depends on its cause. Diarrhea caused by bacterial or parasite infection is often treated with antibiotics. Diarrhea caused by other factors, such as a stomach virus, often improves with simple home treatment. The tips below may also help relieve your symptoms.    Drink plenty of fluids. This helps prevent too much fluid loss (dehydration). Water, clear soups, and electrolyte solutions are good choices. Avoid alcohol, coffee, tea, and milk. These can irritate your intestines and make symptoms worse.    Suck on ice chips if drinking makes you queasy.    Return to your normal diet slowly. You may want to eat bland foods at first, such as rice and toast. Also, you may need to avoid certain foods for a while, such as dairy products. These can make symptoms worse. Ask your healthcare provider if there are any other foods you should avoid.    If you were prescribed antibiotics, take them as directed.    Do not take anti-diarrhea medicines without asking your healthcare provider first.  Call your healthcare provider   Call your healthcare provider if you have any of the following:     A fever of 100.4 F (38.0 C) or higher, or as directed by your healthcare provider    Severe pain    Worsening diarrhea or diarrhea for more than 2 days    Bloody vomit or stool    Signs of dehydration (dizziness, dry mouth and tongue, rapid pulse, dark urine)  Date Last Reviewed: 7/1/2016 2000-2017 [employer]  [employer]. [address] [place] [address], [place] [address]. All rights reserved. This information is not intended as a substitute for professional medical care. Always follow your healthcare professional's instructions.

## 2017-07-01 NOTE — PROGRESS NOTES
"(S) Phillip Rueda is a 37 year old male with complaint of continued gastrointestinal symptoms of diarrhea for 6 days. No blood in stool.  No nausea or vomiting.  No out of country travels.  Woke up feeling better today but then, had yogurt today and then, the diarrhea restarted.  Previous to starting Imodium had been having more than 20 episodes of diarrhea per day.  Started the Imodium yesterday and has gone 4 times so far. He is also taking probiotics.  All of this started with taking Clindamycin.  Had recent negative C.diff studies and stool cultures.  No fevers.  Denies alcohol use.  Does take Metformin but has not had diarrhea with it in the past.  No h/o abdominal surgeries.  No exposure to lake or river water but does take his dog to the dog park an is concerned about giardia.      (O) /70  Pulse 89  Temp 98.3  F (36.8  C) (Oral)  Ht 5' 9\" (1.753 m)  Wt 188 lb (85.3 kg)  SpO2 97%  BMI 27.76 kg/m2   Physical exam reveals the patient appears well.    HEENT: Hydration status: mildly dehydrated.  Neck: supple, no adenopathy  Lungs: CTA B  CV: RRR, normal S1, S2, no murmurs  Abdomen: +BS, abdomen is soft without significant tenderness, masses, organomegaly or guarding, no rebound tenderness, no guarding or rigidity, no CVA tenderness.    (A)  Diarrhea    (P) Giardia and O x P studies collected. Recommend avoid dairy products for the next 2 weeks.  I have recommended small amounts clear fluids frequently, soups, juices, water, BRAT diet, advance diet as tolerated and Imodium or Pepto-Bismol OTC prn diarrhea. Return office visit if symptoms persist or worsen; I have alerted the patient to call if high fever, dehydration, marked weakness, fainting, increased abdominal pain, blood in stool or vomit.  Olya Khanna MD   "

## 2017-07-01 NOTE — LETTER
[facility]   [address] [place] [address]  [id number]  [phone]      July 1, 2017        To Whom It May Concern,    Phillip Rueda was seen today at [facility].  He is not able to attend work from 7/1/2017 through 7/3/2017 secondary to a medical illness.  Thanks.     Sincerely,        Olya Khanna MD

## 2017-07-01 NOTE — MR AVS SNAPSHOT
After Visit Summary   7/1/2017    Phillip Rueda    MRN: [id number]           Patient Information     Date Of Birth          1980        Visit Information        Provider Department      7/1/2017 3:40 PM Olya Bansal MD [facility]        Today's Diagnoses     Diarrhea, unspecified type    -  1      Care Instructions      Uncertain Causes of Diarrhea (Adult)    Diarrhea is when stools are loose and watery. This can be caused by:    Viral infections    Bacterial infections    Food poisoning    Parasites    Irritable bowel syndrome (IBS)    Inflammatory bowel diseases such as ulcerative colitis, Crohn's disease, and celiac disease    Food intolerance, such as to lactose, the sugar found in milk and milk products    Reaction to medicines like antibiotics, laxatives, cancer drugs, and antacids  Along with diarrhea, you may also have:    Abdominal pain and cramping    Nausea and vomiting    Loss of bowel control    Fever and chills    Bloody stools  In some cases, antibiotics may help to treat diarrhea. You may have a stool sample test. This is done to see what is causing your diarrhea, and if antibiotics will help treat it. The results of a stool sample test may take up to 2 days. The healthcare provider may not give you antibiotics until he or she has the stool test results.  Diarrhea can cause dehydration. This is the loss of too much water and other fluids from the body. When this occurs, body fluid must be replaced. This can be done with oral rehydration solutions. Oral rehydration solutions are available at drugstores and grocery stores without a prescription.  Home care  Follow all instructions given by your healthcare provider. Rest at home for the next 24 hours, or until you feel better. Avoid caffeine, tobacco, and alcohol. These can make diarrhea, cramping, and pain worse.  If taking medicines:    Don t take over-the-counter diarrhea or nausea  medicines unless your healthcare provider tells you to.    You may use acetaminophen or NSAID medicines like ibuprofen or naproxen to reduce pain and fever. Don t use these if you have chronic liver or kidney disease, or ever had a stomach ulcer or gastrointestinal bleeding. Don't use NSAID medicines if you are already taking one for another condition (like arthritis) or are on daily aspirin therapy (such as for heart disease or after a stroke). Talk with your healthcare provider first.    If antibiotics were prescribed, be sure you take them until they are finished. Don t stop taking them even when you feel better. Antibiotics must be taken as a full course.  To prevent the spread of illness:    Remember that washing with soap and water and using alcohol-based  is the best way to prevent the spread of infection.    Clean the toilet after each use.    Wash your hands before eating.    Wash your hands before and after preparing food. Keep in mind that people with diarrhea or vomiting should not prepare food for others.    Wash your hands after using cutting boards, countertops, and knives that have been in contact with raw foods.    Wash and then peel fruits and vegetables.    Keep uncooked meats away from cooked and ready-to-eat foods.    Use a food thermometer when cooking. Cook poultry to at least 165 F (74 C). Cook ground meat (beef, veal, pork, lamb) to at least 160 F (71 C). Cook fresh beef, veal, lamb, and pork to at least 145 F (63 C).    Don t eat raw or undercooked eggs (poached or valeria side up), poultry, meat, or unpasteurized milk and juices.  Food and drinks  The main goal while treating vomiting or diarrhea is to prevent dehydration. This is done by taking small amounts of liquids often.    Keep in mind that liquids are more important than food right now.    Drink only small amounts of liquids at a time.    Don t force yourself to eat, especially if you are having cramping, vomiting, or  diarrhea. Don t eat large amounts at a time, even if you are hungry.    If you eat, avoid fatty, greasy, spicy, or fried foods.    Don t eat dairy foods or drink milk if you have diarrhea. These can make diarrhea worse.  During the first 24 hours you can try:    Oral rehydration solutions. Do not use sports drinks. They have too much sugar and not enough electrolytes.    Soft drinks without caffeine    Ginger ale    Water (plain or flavored)    Decaf tea or coffee    Clear broth, consommé, or bouillon    Gelatin, popsicles, or frozen fruit juice bars  The second 24 hours, if you are feeling better, you can add:    Hot cereal, plain toast, bread, rolls, or crackers    Plain noodles, rice, mashed potatoes, chicken noodle soup, or rice soup    Unsweetened canned fruit (no pineapple)    Bananas  As you recover:    Limit fat intake to less than 15 grams per day. Don t eat margarine, butter, oils, mayonnaise, sauces, gravies, fried foods, peanut butter, meat, poultry, or fish.    Limit fiber. Don t eat raw or cooked vegetables, fresh fruits except bananas, or bran cereals.    Limit caffeine and chocolate.    Limit dairy.    Don t use spices or seasonings except salt.    Go back to your normal diet over time, as you feel better and your symptoms improve.    If the symptoms come back, go back to a simple diet or clear liquids.  Follow-up care  Follow up with your healthcare provider, or as advised. If a stool sample was taken or cultures were done, call the healthcare provider for the results as instructed.  Call 911  Call 911 if you have any of these symptoms:    Trouble breathing    Confusion    Extreme drowsiness or trouble walking    Loss of consciousness    Rapid heart rate    Chest pain    Stiff neck    Seizure  When to seek medical advice  Call your healthcare provider right away if any of these occur:    Abdominal pain that gets worse    Constant lower right abdominal pain    Continued vomiting and inability to keep  liquids down    Diarrhea more than 5 times a day    Blood in vomit or stool    Dark urine or no urine for 8 hours, dry mouth and tongue, tiredness, weakness, or dizziness    Drowsiness    New rash    You don t get better in 2 to 3 days    Fever of 100.4 F (38 C) or higher that doesn t get lower with medicine  Date Last Reviewed: 1/3/2016    3743-4085 [employer]. [address] [place] [address], [place] [address]. All rights reserved. This information is not intended as a substitute for professional medical care. Always follow your healthcare professional's instructions.        Treating Diarrhea    Diarrhea happens when you have loose, watery, or frequent bowel movements. It is a common problem with many causes. Most cases of diarrhea clear up on their own. But certain cases may need treatment. Be sure to see your healthcare provider if your symptoms do not improve within a few days.  Getting relief  Treatment of diarrhea depends on its cause. Diarrhea caused by bacterial or parasite infection is often treated with antibiotics. Diarrhea caused by other factors, such as a stomach virus, often improves with simple home treatment. The tips below may also help relieve your symptoms.    Drink plenty of fluids. This helps prevent too much fluid loss (dehydration). Water, clear soups, and electrolyte solutions are good choices. Avoid alcohol, coffee, tea, and milk. These can irritate your intestines and make symptoms worse.    Suck on ice chips if drinking makes you queasy.    Return to your normal diet slowly. You may want to eat bland foods at first, such as rice and toast. Also, you may need to avoid certain foods for a while, such as dairy products. These can make symptoms worse. Ask your healthcare provider if there are any other foods you should avoid.    If you were prescribed antibiotics, take them as directed.    Do not take anti-diarrhea medicines without asking your healthcare provider first.  Call your  healthcare provider   Call your healthcare provider if you have any of the following:     A fever of 100.4 F (38.0 C) or higher, or as directed by your healthcare provider    Severe pain    Worsening diarrhea or diarrhea for more than 2 days    Bloody vomit or stool    Signs of dehydration (dizziness, dry mouth and tongue, rapid pulse, dark urine)  Date Last Reviewed: 7/1/2016 2000-2017 [employer]. [address] [place] [address], [place] [address]. All rights reserved. This information is not intended as a substitute for professional medical care. Always follow your healthcare professional's instructions.                Follow-ups after your visit        Follow-up notes from your care team     Return in about 5 days (around 7/6/2017).      Your next 10 appointments already scheduled     Jul 14, 2017  9:00 AM CDT   New Sleep Patient with WALESKA Wheeler CNP   [facility], [place], Sleep Study ([facility])    [address] [place] [id number] [phone]              Future tests that were ordered for you today     Open Future Orders        Priority Expected Expires Ordered    Giardia antigen Routine  7/1/2018 7/1/2017    Ova and Parasite Exam Routine Routine  7/1/2018 7/1/2017            Who to contact     If you have questions or need follow up information about today's clinic visit or your schedule please contact [facility] directly at [phone].  Normal or non-critical lab and imaging results will be communicated to you by MyChart, letter or phone within 4 business days after the clinic has received the results. If you do not hear from us within 7 days, please contact the clinic through MyChart or phone. If you have a critical or abnormal lab result, we will notify you by phone as soon as possible.  Submit refill requests through [employer] or call your pharmacy and they will forward the refill request to us. Please  "allow 3 business days for your refill to be completed.          Additional Information About Your Visit        MyChart Information     [employer] gives you secure access to your electronic health record. If you see a primary care provider, you can also send messages to your care team and make appointments. If you have questions, please call your primary care clinic.  If you do not have a primary care provider, please call [phone] and they will assist you.        Care EveryWhere ID     This is your Care EveryWhere ID. This could be used by other organizations to access your [place] medical records  [id number]        Your Vitals Were     Pulse Temperature Height Pulse Oximetry BMI (Body Mass Index)       89 98.3  F (36.8  C) (Oral) 5' 9\" (1.753 m) 97% 27.76 kg/m2        Blood Pressure from Last 3 Encounters:   07/01/17 108/70   06/29/17 118/78   06/02/17 132/80    Weight from Last 3 Encounters:   07/01/17 188 lb (85.3 kg)   06/29/17 190 lb 4 oz (86.3 kg)   06/02/17 185 lb 12 oz (84.3 kg)               Primary Care Provider Office Phone # Fax #    Km Michelle Dos Santos [phone]       [address] [address]        Goals        General    Functional: Patient will revise his diabetic diet to maximize his diabetes with the goal of decreasing his daily blood sugars and improving his HGB A1c (pt-stated)     Notes - Note created  3/24/2016 11:25 AM by Erna Pineda RN    As of today's date 3/24/2016 goal is met at 0 - 25%.   Goal Status:  Active        Medical (pt-stated)     Notes - Note created  8/25/2015 12:49 PM by Erna Pineda RN    Patient states that he is wanting a diabetes education referral so that he can fine tune his blood sugars or when he is on steroids  Patient states that he will contact RN Clinic Care Coordinator when needing assistance with coordinating cares or has complications with his disease management and or cares      Medical " Patient will bring 3 days of blood sugar readings to office visit with Dr Dos Santos . (pt-stated)     Notes - Note created  3/24/2016 11:23 AM by Erna Pineda RN    As of today's date 3/24/2016 goal is met at 0 - 25%.   Goal Status:  Active        Medical: Patient will set up podiatry and eye exam for diabetici chiihin 6 month. (pt-stated)     Notes - Note created  3/24/2016 11:21 AM by Erna Pineda RN    As of today's date 3/24/2016 goal is met at 0 - 25%.   Goal Status:  Active        Medical: Pt will continue to utilize his training in [employer] along with [employer] Medicine for pain and life management (pt-stated)     Notes - Note created  3/24/2016 11:28 AM by Erna Pineda RN    As of today's date 3/24/2016 goal is met at 51 - 75%.   Goal Status:  Active          Equal Access to Services     [facility] AH: Hadii nadege canseco hadasho Soomaali, waaxda luqadaha, qaybta kaalmada adeegyada, caroline bonner hayisabel burton . So [facility] [phone].    ATENCIÓN: Si chnela sloan, tiene a robins disposición servicios gratuitos de asistencia lingüística. Llame al [phone].    We comply with applicable federal civil rights laws and [place] laws. We do not discriminate on the basis of race, color, national origin, age, disability sex, sexual orientation or gender identity.            Thank you!     Thank you for choosing [facility]  for your care. Our goal is always to provide you with excellent care. Hearing back from our patients is one way we can continue to improve our services. Please take a few minutes to complete the written survey that you may receive in the mail after your visit with us. Thank you!             Your Updated Medication List - Protect others around you: Learn how to safely use, store and throw away your medicines at www.disposemymeds.org.          This list is accurate as of: 7/1/17  4:30 PM.  Always use your most recent med list.                   Brand Name  Dispense Instructions for use Diagnosis    BENADRYL PO      Take 50 mg by mouth At Bedtime.        blood glucose monitoring meter device kit     1 kit    Use to test blood sugar 4-6 times daily or as directed.    Type 2 diabetes mellitus without complication (H)       * blood glucose monitoring test strip    ACCU-CHEK ADARSH    4 Box    Use to test blood sugars 4 times daily or as directed. When on prednisone check 6 times per day.    Type 2 diabetes, HbA1c goal < 7% (H)       * blood glucose monitoring test strip    ACCU-CHEK SMARTVIEW    200 strip    Use to test blood sugar 2 times daily or as directed.    Type 2 diabetes mellitus without complication (H)       buprenorphine HCl-naloxone HCl 4-1 MG per film    SUBOXONE     Place 1 Film under the tongue 3 times daily        ciclopirox 0.77 % Gel     30 g    Apply to affected area twice daily    Tinea pedis of both feet       EPINEPHrine 0.3 MG/0.3ML injection     1 each    Inject 0.3 mLs (0.3 mg) into the muscle once as needed for anaphylaxis    Anaphylactic reaction, sequela       gabapentin 600 MG tablet    NEURONTIN    180 tablet    TAKE 2 TABLETS BY MOUTH THREE TIMES DAILY    Neuralgic amyotrophy       HumaLOG KWIKpen 100 UNIT/ML injection   Generic drug:  insulin lispro      To use with scale 3 times per day with meals while on steroids.  Use the scale when your meal BG is > 250  with the pain causing high BG.    Type 2 diabetes mellitus without complication, with long-term current use of insulin (H)       ibuprofen 200 MG tablet    ADVIL/MOTRIN     Take 1-2 tablets by mouth every 6 hours as needed.        insulin degludec 100 UNIT/ML pen    TRESIBA    15 mL    Use 12 units daily    Type 2 diabetes mellitus without complication, without long-term current use of insulin (H)       insulin pen needle 31G X 6 MM     100 each    Use 2 daily or as directed.    Type 2 diabetes mellitus without complication (H)       liraglutide 18 MG/3ML soln    VICTOZA PEN    6 mL     INJECT 1.2 MG SUBCUTANEOUS EVERY DAY    Type 2 diabetes mellitus without complication, with long-term current use of insulin (H)       losartan 100 MG tablet    COZAAR    30 tablet    Take 1 tablet (100 mg) by mouth daily    Hypertension goal BP (blood pressure) < 140/90       metFORMIN 500 MG 24 hr tablet    GLUCOPHAGE-XR    120 tablet    TAKE 2 TABLETS(1000 MG) BY MOUTH TWICE DAILY WITH MEALS    Type 2 diabetes mellitus without complication, with long-term current use of insulin (H)       nicotine polacrilex 4 MG lozenge    EQ NICOTINE    360 tablet    Place 1 lozenge (4 mg) inside cheek as needed for smoking cessation Store brand is fine.    Former smoker       nortriptyline 25 MG capsule    PAMELOR    60 capsule    Take 2 capsules (50 mg) by mouth At Bedtime    Neuralgic amyotrophy       sildenafil 100 MG cap/tab    VIAGRA    5 tablet    Take 0.5-1 tablets ( mg) by mouth daily as needed for erectile dysfunction Take 30 min to 4 hours before intercourse.  Never use with nitroglycerin, terazosin or doxazosin.    ED (erectile dysfunction)       VALIUM 5 MG tablet   Generic drug:  diazepam     60 tablet    Take 1 tablet (5 mg) 3 times daily as needed.        venlafaxine 150 MG Tb24 24 hr tablet    EFFEXOR-ER    30 each    Take 1 tablet by mouth daily (with breakfast).    Anxiety       * Notice:  This list has 2 medication(s) that are the same as other medications prescribed for you. Read the directions carefully, and ask your doctor or other care provider to review them with you.

## 2017-07-03 LAB
G LAMBLIA AG STL QL IA: NORMAL
MICRO REPORT STATUS: NORMAL
MICRO REPORT STATUS: NORMAL
O+P STL MICRO: NORMAL
SPECIMEN SOURCE: NORMAL
SPECIMEN SOURCE: NORMAL

## 2017-07-10 ENCOUNTER — OFFICE VISIT (OUTPATIENT)
Dept: FAMILY MEDICINE | Facility: CLINIC | Age: 37
End: 2017-07-10
Payer: COMMERCIAL

## 2017-07-10 VITALS
TEMPERATURE: 98.4 F | HEART RATE: 84 BPM | HEIGHT: 69 IN | RESPIRATION RATE: 20 BRPM | WEIGHT: 181.5 LBS | BODY MASS INDEX: 26.88 KG/M2 | SYSTOLIC BLOOD PRESSURE: 114 MMHG | DIASTOLIC BLOOD PRESSURE: 84 MMHG

## 2017-07-10 DIAGNOSIS — R19.7 INTERMITTENT DIARRHEA: Primary | ICD-10-CM

## 2017-07-10 PROCEDURE — 99213 OFFICE O/P EST LOW 20 MIN: CPT | Performed by: FAMILY MEDICINE

## 2017-07-10 NOTE — MR AVS SNAPSHOT
After Visit Summary   7/10/2017    Phillip Rueda    MRN: [id number]           Patient Information     Date Of Birth          1980        Visit Information        Provider Department      7/10/2017 8:00 AM Km Dos Santos MD [facility]        Today's Diagnoses     Intermittent diarrhea    -  1       Follow-ups after your visit        Additional Services     GASTROENTEROLOGY ADULT REF CONSULT ONLY       Preferred Location: [facility] [phone]  [facility]: [phone]   [facility] [phone]      Please be aware that coverage of these services is subject to the terms and limitations of your health insurance plan.  Call member services at your health plan with any benefit or coverage questions.  Any procedures must be performed at a [facility] facility OR coordinated by your clinic's referral office.    Please bring the following with you to your appointment:    (1) Any X-Rays, CTs or MRIs which have been performed.  Contact the facility where they were done to arrange for  prior to your scheduled appointment.    (2) List of current medications   (3) This referral request   (4) Any documents/labs given to you for this referral                  Your next 10 appointments already scheduled     Jul 14, 2017  9:00 AM CDT   New Sleep Patient with WALESKA Wheeler CNP   [facility] ([facility])    [address] [place] [id number] [phone]              Who to contact     If you have questions or need follow up information about today's clinic visit or your schedule please contact [facility] directly at [phone].  Normal or non-critical lab and imaging results will be communicated to you by MyChart, letter or phone within 4 business days after the clinic has received the results. If you do not hear from us within 7 days, please contact the  "clinic through [employer] or phone. If you have a critical or abnormal lab result, we will notify you by phone as soon as possible.  Submit refill requests through [employer] or call your pharmacy and they will forward the refill request to us. Please allow 3 business days for your refill to be completed.          Additional Information About Your Visit        [employer] Information     [employer] gives you secure access to your electronic health record. If you see a primary care provider, you can also send messages to your care team and make appointments. If you have questions, please call your primary care clinic.  If you do not have a primary care provider, please call [phone] and they will assist you.        Care EveryWhere ID     This is your Care EveryWhere ID. This could be used by other organizations to access your [facility] medical records  [id number]        Your Vitals Were     Pulse Temperature Respirations Height BMI (Body Mass Index)       84 98.4  F (36.9  C) (Oral) 20 5' 9\" (1.753 m) 26.8 kg/m2        Blood Pressure from Last 3 Encounters:   07/10/17 114/84   07/01/17 108/70   06/29/17 118/78    Weight from Last 3 Encounters:   07/10/17 181 lb 8 oz (82.3 kg)   07/01/17 188 lb (85.3 kg)   06/29/17 190 lb 4 oz (86.3 kg)              We Performed the Following     GASTROENTEROLOGY ADULT REF CONSULT ONLY        Primary Care Provider Office Phone # Fax #    Km Michelle Dos Santos [phone]       [address] [address]        Goals        General    Functional: Patient will revise his diabetic diet to maximize his diabetes with the goal of decreasing his daily blood sugars and improving his HGB A1c (pt-stated)     Notes - Note created  3/24/2016 11:25 AM by Erna Pineda RN    As of today's date 3/24/2016 goal is met at 0 - 25%.   Goal Status:  Active        Medical (pt-stated)     Notes - Note created  8/25/2015 12:49 PM by Erna Pineda RN    " Patient states that he is wanting a diabetes education referral so that he can fine tune his blood sugars or when he is on steroids  Patient states that he will contact RN Clinic Care Coordinator when needing assistance with coordinating cares or has complications with his disease management and or cares      Medical Patient will bring 3 days of blood sugar readings to office visit with Dr Dos Santos . (pt-stated)     Notes - Note created  3/24/2016 11:23 AM by Erna Pineda RN    As of today's date 3/24/2016 goal is met at 0 - 25%.   Goal Status:  Active        Medical: Patient will set up podiatry and eye exam for dario ambriz 6 month. (pt-stated)     Notes - Note created  3/24/2016 11:21 AM by Erna Pineda RN    As of today's date 3/24/2016 goal is met at 0 - 25%.   Goal Status:  Active        Medical: Pt will continue to utilize his training in [employer] along with [employer] for pain and life management (pt-stated)     Notes - Note created  3/24/2016 11:28 AM by Erna Pineda RN    As of today's date 3/24/2016 goal is met at 51 - 75%.   Goal Status:  Active          Equal Access to Services     [facility]: Hadii aad ku hadasho Soomaali, waaxda luqadaha, qaybta kaalmada adeegyada, waxay rocin haytonyan delroy burton . So [facility] [place] [facility] [phone].    ATENCIÓN: Si habla español, tiene a robins disposición servicios gratuitos de asistencia lingüística. Llame al [phone].    We comply with applicable federal civil rights laws and [place] laws. We do not discriminate on the basis of race, color, national origin, age, disability sex, sexual orientation or gender identity.            Thank you!     Thank you for choosing [facility]  for your care. Our goal is always to provide you with excellent care. Hearing back from our patients is one way we can continue to improve our services. Please take a few minutes to complete the written survey that you may receive in the mail after  your visit with us. Thank you!             Your Updated Medication List - Protect others around you: Learn how to safely use, store and throw away your medicines at www.disposemymeds.org.          This list is accurate as of: 7/10/17  8:23 AM.  Always use your most recent med list.                   Brand Name Dispense Instructions for use Diagnosis    BENADRYL PO      Take 50 mg by mouth At Bedtime.        blood glucose monitoring meter device kit     1 kit    Use to test blood sugar 4-6 times daily or as directed.    Type 2 diabetes mellitus without complication (H)       * blood glucose monitoring test strip    ACCU-CHEK ADARSH    4 Box    Use to test blood sugars 4 times daily or as directed. When on prednisone check 6 times per day.    Type 2 diabetes, HbA1c goal < 7% (H)       * blood glucose monitoring test strip    ACCU-CHEK SMARTVIEW    200 strip    Use to test blood sugar 2 times daily or as directed.    Type 2 diabetes mellitus without complication (H)       buprenorphine HCl-naloxone HCl 4-1 MG per film    SUBOXONE     Place 1 Film under the tongue 3 times daily        ciclopirox 0.77 % Gel     30 g    Apply to affected area twice daily    Tinea pedis of both feet       EPINEPHrine 0.3 MG/0.3ML injection     1 each    Inject 0.3 mLs (0.3 mg) into the muscle once as needed for anaphylaxis    Anaphylactic reaction, sequela       gabapentin 600 MG tablet    NEURONTIN    180 tablet    TAKE 2 TABLETS BY MOUTH THREE TIMES DAILY    Neuralgic amyotrophy       HumaLOG KWIKpen 100 UNIT/ML injection   Generic drug:  insulin lispro      To use with scale 3 times per day with meals while on steroids.  Use the scale when your meal BG is > 250  with the pain causing high BG.    Type 2 diabetes mellitus without complication, with long-term current use of insulin (H)       ibuprofen 200 MG tablet    ADVIL/MOTRIN     Take 1-2 tablets by mouth every 6 hours as needed.        insulin degludec 100 UNIT/ML pen    TRESIBA    15  mL    Use 12 units daily    Type 2 diabetes mellitus without complication, without long-term current use of insulin (H)       insulin pen needle 31G X 6 MM     100 each    Use 2 daily or as directed.    Type 2 diabetes mellitus without complication (H)       liraglutide 18 MG/3ML soln    VICTOZA PEN    6 mL    INJECT 1.2 MG SUBCUTANEOUS EVERY DAY    Type 2 diabetes mellitus without complication, with long-term current use of insulin (H)       losartan 100 MG tablet    COZAAR    30 tablet    Take 1 tablet (100 mg) by mouth daily    Hypertension goal BP (blood pressure) < 140/90       metFORMIN 500 MG 24 hr tablet    GLUCOPHAGE-XR    120 tablet    TAKE 2 TABLETS(1000 MG) BY MOUTH TWICE DAILY WITH MEALS    Type 2 diabetes mellitus without complication, with long-term current use of insulin (H)       nicotine polacrilex 4 MG lozenge    EQ NICOTINE    360 tablet    Place 1 lozenge (4 mg) inside cheek as needed for smoking cessation Store brand is fine.    Former smoker       nortriptyline 25 MG capsule    PAMELOR    60 capsule    Take 2 capsules (50 mg) by mouth At Bedtime    Neuralgic amyotrophy       sildenafil 100 MG cap/tab    VIAGRA    5 tablet    Take 0.5-1 tablets ( mg) by mouth daily as needed for erectile dysfunction Take 30 min to 4 hours before intercourse.  Never use with nitroglycerin, terazosin or doxazosin.    ED (erectile dysfunction)       VALIUM 5 MG tablet   Generic drug:  diazepam     60 tablet    Take 1 tablet (5 mg) 3 times daily as needed.        venlafaxine 150 MG Tb24 24 hr tablet    EFFEXOR-ER    30 each    Take 1 tablet by mouth daily (with breakfast).    Anxiety       * Notice:  This list has 2 medication(s) that are the same as other medications prescribed for you. Read the directions carefully, and ask your doctor or other care provider to review them with you.

## 2017-07-10 NOTE — NURSING NOTE
"Chief Complaint   Patient presents with     Gastrointestinal Problem       Initial /84 (Cuff Size: Adult Regular)  Pulse 84  Temp 98.4  F (36.9  C) (Oral)  Resp 20  Ht 5' 9\" (1.753 m)  Wt 181 lb 8 oz (82.3 kg)  BMI 26.8 kg/m2 Estimated body mass index is 26.8 kg/(m^2) as calculated from the following:    Height as of this encounter: 5' 9\" (1.753 m).    Weight as of this encounter: 181 lb 8 oz (82.3 kg).  Medication Reconciliation: complete     Tea Rojas, TRICIA      "

## 2017-07-10 NOTE — PROGRESS NOTES
SUBJECTIVE:                                                    Phillip Rueda is a 37 year old male who presents to clinic today for the following health issues:      Diarrhea      Duration: couple weeks ago lasting 1 week     Description:       Consistency of stool: watery and loose       Blood in stool: no        Number of loose stools past 24 hours: 0    Intensity:  mild    Accompanying signs and symptoms:       Fever: no        Nausea/vomitting: no        Abdominal pain: no        Weight loss: YES- 6.5lbs     History (recent antibiotics or travel/ill contacts/med changes/testing done): was taking antibiotics from dentist 1 month ago    Precipitating or alleviating factors: None    Therapies tried and outcome: none  Additional: pt had tests done and everything was negative he stated. Had 35 loose stools when had diarrhea. Would like referral for GI     A week long severe loose stool. Some accidents in bed. Never before symptoms. Lasted around a week.   Normal bowel movement for last 3-4 days.   No change in meds.   Now symptoms are improved completely but wishes to see GI as every couple of months similar but less intense diarrhea.     Wife is due - less than a month.     Problem list and histories reviewed & adjusted, as indicated.  Additional history: as documented    Labs reviewed in EPIC    Reviewed and updated as needed this visit by clinical staff       Reviewed and updated as needed this visit by Provider           Social History     Social History     Marital status:      Spouse name: N/A     Number of children: N/A     Years of education: N/A     Social History Main Topics     Smoking status: Former Smoker     Packs/day: 0.25     Years: 15.00     Types: Cigarettes, Dip, chew, snus or snuff     Start date: 7/1/1996     Quit date: 7/1/2013     Smokeless tobacco: Current User     Alcohol use No      Comment: 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  Drink 1.75 every 2 days     Drug use: No     Sexual  "activity: Yes     Partners: Female     Birth control/ protection: None      Comment: Trying to get my wife pregnant     Other Topics Concern     Parent/Sibling W/ Cabg, Mi Or Angioplasty Before 65f 55m? Yes     My dads dad  of heart issues in his 40's     Social History Narrative     Allergies   Allergen Reactions     Bees Swelling     Bupropion Hcl Other (See Comments)     seizure     Lisinopril Cough     Penicillins Other (See Comments)     Unable to close mouth     Patient Active Problem List   Diagnosis     Neuralgic amyotrophy     Hereditary and idiopathic peripheral neuropathy     Drug dependence, in remission (H)     Type 2 diabetes mellitus without complication (H)     Hypertension goal BP (blood pressure) < 140/90     Anemia     CARDIOVASCULAR SCREENING; LDL GOAL LESS THAN 100     Right shoulder pain     ED (erectile dysfunction)     Pain disorder     Health Care Home     Former smoker     Reviewed medications, social history and  past medical and surgical history.    Review of system: for general, respiratory, CVS, GI and psychiatry negative except for noted above.     EXAM:  /84 (Cuff Size: Adult Regular)  Pulse 84  Temp 98.4  F (36.9  C) (Oral)  Resp 20  Ht 5' 9\" (1.753 m)  Wt 181 lb 8 oz (82.3 kg)  BMI 26.8 kg/m2  Constitutional: healthy, alert and no distress   Psychiatric: mentation appears normal and affect normal/bright  Abdomen: Abdomen soft, non-tender. BS normal. No masses, organomegaly  : Deferred       ASSESSMENT / PLAN:  (R19.7) Intermittent diarrhea  (primary encounter diagnosis)  Comment: now resolved but would like to have GI referral handy. Signed.   Plan: GASTROENTEROLOGY ADULT REF CONSULT ONLY                    "

## 2017-07-14 ENCOUNTER — OFFICE VISIT (OUTPATIENT)
Dept: SLEEP MEDICINE | Facility: CLINIC | Age: 37
End: 2017-07-14
Attending: NURSE PRACTITIONER
Payer: COMMERCIAL

## 2017-07-14 VITALS
RESPIRATION RATE: 16 BRPM | WEIGHT: 186 LBS | DIASTOLIC BLOOD PRESSURE: 84 MMHG | SYSTOLIC BLOOD PRESSURE: 134 MMHG | TEMPERATURE: 98.6 F | BODY MASS INDEX: 27.55 KG/M2 | OXYGEN SATURATION: 100 % | HEIGHT: 69 IN | HEART RATE: 90 BPM

## 2017-07-14 DIAGNOSIS — E66.3 OVERWEIGHT (BMI 25.0-29.9): ICD-10-CM

## 2017-07-14 DIAGNOSIS — G47.21 CIRCADIAN RHYTHM SLEEP DISORDER, DELAYED SLEEP PHASE TYPE: ICD-10-CM

## 2017-07-14 DIAGNOSIS — R52 PAIN DISORDER: ICD-10-CM

## 2017-07-14 DIAGNOSIS — R06.83 SNORING: Primary | ICD-10-CM

## 2017-07-14 PROCEDURE — 99211 OFF/OP EST MAY X REQ PHY/QHP: CPT | Mod: ZF

## 2017-07-14 RX ORDER — ZOLPIDEM TARTRATE 10 MG/1
TABLET ORAL
Qty: 1 TABLET | Refills: 0 | Status: SHIPPED | OUTPATIENT
Start: 2017-07-14 | End: 2017-11-09

## 2017-07-14 NOTE — PATIENT INSTRUCTIONS
"MY TREATMENT INFORMATION FOR SLEEP APNEA-  Phillip Rueda    NP : Shelli Marshall [place]  SLEEP CENTER :    [place]  MY CONTACT NUMBER: [phone]  If you need to cancel your sleep study, please call as soon as possible.  If you were prescribed a sleep aid, bring that to the sleep lab.  Please remember: do not drive if sleepy  AVOID NAP DAY OF SLEEP STUDY      Follow up with me after study    Am I having a sleep study at a sleep center?  Make sure you have an appointment for the study before you leave!    Am I having a home sleep study?  Watch this video:  [email or web address]    Frequently asked questions:  1. What is Obstructive Sleep Apnea (UZAIR)? UZAIR is the most common type of sleep apnea. Apnea means, \"without breath.\"  Apnea is most often caused by narrowing or collapse of the upper airway as muscles relax during sleep.   Almost everyone has occasional apneas. Most people with sleep apnea have had brief interruptions at night frequently for many years.  The severity of sleep apnea is related to how frequent and severe the events are.   2. What are the consequences of UZAIR? Symptoms include: feeling sleepy during the day, snoring loudly, gasping or stopping of breathing, trouble sleeping, and occasionally morning headaches or heartburn at night.  Sleepiness can be serious and even increase the risk of falling asleep while driving. Other health consequences may include development of high blood pressure and other cardiovascular disease in persons who are susceptible. Untreated UZAIR  can contribute to heart disease, stroke and diabetes.   3. What are the treatment options? In most situations, sleep apnea is a lifelong disease that must be managed with daily therapy. Medications are not effective for sleep apnea and surgery is generally not considered until other therapies have been tried. Your treatment is your choice . Continuous Positive Airway " (CPAP) works right away and is the therapy that is effective in nearly everyone. An oral device to hold your jaw forward is usually the next most reliable option. Other options include postioning devices (to keep you off your back), weight loss, and surgery including a tongue pacing device. There is more detail about some of these options below.    Important tips for using CPAP and similar devices   Know your equipment:  CPAP is continuous positive airway pressure that prevents obstructive sleep apnea by keeping the throat from collapsing while you are sleeping. In most cases, the device is  smart  and can slowly self-adjusts if your throat collapses and keeps a record every day of how well you are treated-this information is available to you and your care team.  BPAP is bilevel positive airway pressure that keeps your throat open and also assists each breath with a pressure boost to maintain adequate breathing.  Special kinds of BPAP are used in patients who have inadequate breathing from lung or heart disease. In most cases, the device is  smart  and can slowly self-adjusts to assist breathing. Like CPAP, the device keeps a record of how well you are treated.  Your mask is your connection to the device. You get to choose what feels most comfortable and the staff will help to make sure if fits. Here: are some examples of the different masks that are available:       Key points to remember on your journey with sleep apnea:  1. Sleep study.  PAP devices often need to be adjusted during a sleep study to show that they are effective and adjusted right.  2. Good tips to remember: Try wearing just the mask during a quiet time during the day so your body adapts to wearing it. A humidifier is recommended for comfort in most cases to prevent drying of your nose and throat. Allergy medication from your provider may help you if you are having nasal congestion.  3. Getting settled-in. It takes more than one night for most of  us to get used to wearing a mask. Try wearing just the mask during a quiet time during the day so your body adapts to wearing it. A humidifier is recommended for comfort in most cases. Our team will work with you carefully on the first day and will be in contact within 4 days and again at 2 and 4 weeks for advice and remote device adjustments. Your therapy is evaluated by the device each day.   4. Use it every night. The more you are able to sleep naturally for 7-8 hours, the more likely you will have good sleep and to prevent health risks or symptoms from sleep apnea. Even if you use it 4 hours it helps. Occasionally all of us are unable to use a medical therapy, in sleep apnea, it is not dangerous to miss one night.   5. Communicate. Call our skilled team on the number provided on the first day if your visit for problems that make it difficult to wear the device. Over 2 out of 3 patients can learn to wear the device long-term with help from our team. Remember to call our team or your sleep providers if you are unable to wear the device as we may have other solutions for those who cannot adapt to mask CPAP therapy. It is recommended that you sleep your sleep provider within the first 3 months and yearly after that if you are not having problems.   Take care of your equipment. Make sure you clean your mask and tubing using directions every day and that your filter and mask are replaced as recommended or if they are not working.     BESIDES CPAP, WHAT OTHER THERAPIES ARE THERE?    Positioning Device  Positioning devices are generally used when sleep apnea is mild and only occurs on your back.This example shows a pillow that straps around the waist. It may be appropriate for those whose sleep study shows milder sleep apnea that occurs primarily when lying flat on one's back. Preliminary studies have shown benefit but effectiveness at home may need to be verified by a home sleep test. These devices are generally not  covered by medical insurance.    Oral Appliance  What is oral appliance therapy?  An oral appliance device fits on your teeth at night like a retainer used after having braces. The device is made by a specialized dentist and requires several visits over 1-2 months before a manufactured device is made to fit your teeth and is adjusted to prevent your sleep apnea. Once an oral device is working properly, snoring should be improved. A home sleep test may be recommended at that time if to determine whether the sleep apnea is adequately treated.       Some things to remember:  -Oral devices are often, but not always, covered by your medical insurance. Be sure to check with your insurance provider.   -If you are referred for oral therapy, you will be given a list of specialized dentists to consider or you may choose to visit the Web site of the American Academy of Dental Sleep Medicine  -Oral devices are less likely to work if you have severe sleep apnea or are extremely overweight.     More detailed information  An oral appliance is a small acrylic device that fits over the upper and lower teeth  (similar to a retainer or a mouth guard). This device slightly moves jaw forward, which moves the base of the tongue forward, opens the airway, improves breathing for effective treat snoring and obstructive sleep apnea in perhaps 7 out of 10 people .  The best working devices are custom-made by a dental device  after a mold is made of the teeth 1, 2, 3.  When is an oral appliance indicated?  Oral appliance therapy is recommended as a first-line treatment for patients with primary snoring, mild sleep apnea, and for patients with moderate sleep apnea who prefer appliance therapy to use of CPAP4, 5. Severity of sleep apnea is determined by sleep testing and is based on the number of respiratory events per hour of sleep.   How successful is oral appliance therapy?  The success rate of oral appliance therapy in patients  with mild sleep apnea is 75-80% while in patients with moderate sleep apnea it is 50-70%. The chance of success in patients with severe sleep apnea is 40-50%. The research also shows that oral appliances have a beneficial effect on the cardiovascular health of UZAIR patients at the same magnitude as CPAP therapy7.  Oral appliances should be a second-line treatment in cases of severe sleep apnea, but if not completely successful then a combination therapy utilizing CPAP plus oral appliance therapy may be effective. Oral appliances tend to be effective in a broad range of patients although studies show that the patients who have the highest success are females, younger patients, those with milder disease, and less severe obesity. 3, 6.   Finding a dentist that practices dental sleep medicine  Specific training is available through the American Academy of Dental Sleep Medicine for dentists interested in working in the field of sleep. To find a dentist who is educated in the field of sleep and the use of oral appliances, near you, visit the Web site of the American Academy of Dental Sleep Medicine.    References  1. Mehrdad et al. Objectively measured vs self-reported compliance during oral appliance therapy for sleep-disordered breathing. Chest 2013; 144(5): 6011-6549.  2. Willem, et al. Objective measurement of compliance during oral appliance therapy for sleep-disordered breathing. Thorax 2013; 68(1): 91-96.  3. Ruth et al. Mandibular advancement devices in 620 men and women with UZAIR and snoring: tolerability and predictors of treatment success. Chest 2004; 125: 2216-4215.  4. Cisco, et al. Oral appliances for snoring and UZAIR: a review. Sleep 2006; 29: 244-262.  5. Gris et al. Oral appliance treatment for UZAIR: an update. J Clin Sleep Med 2014; 10(2): 215-227.  6. Joann, et al. Predictors of OSAH treatment outcome. J Dent Res 2007; 86: 6753-4041.      Weight Loss:    Weight loss is a long-term  strategy that may improve sleep apnea in some patients.    Weight management is a personal decision.  If you are interested in exploring weight loss strategies, the following discussion covers the impact on weight loss on sleep apnea and the approaches that may be successful.    Weight loss decreases severity of sleep apnea in most people with obesity. For those with mild obesity who have developed snoring with weight gain, even 15-30 pound weight loss can improve and occasionally eliminate sleep apnea.  Structured and life-long dietary and health habits are necessary to lose weight and keep healthier weight levels.     Though there may be significant health benefits from weight loss, long-term weight loss is very difficult to achieve- studies show success with dietary management in less than 10% of people. In addition, substantial weight loss may require years of dietary control and may be difficult if patients have severe obesity. In these cases, surgical management may be considered.  Finally, older individuals who have tolerated obesity without health complications may be less likely to benefit from weight loss strategies.      [unfilled]    Surgery:    Surgery for obstructive sleep apnea is considered generally only when other therapies fail to work. Surgery may be discussed with you if you are having a difficult time tolerating CPAP and or when there is an abnormal structure that requires surgical correction.  Nose and throat surgeries often enlarge the airway to prevent collapse.  Most of these surgeries create pain for 1-2 weeks and up to half of the most common surgeries are not effective throughout life.  You should carefully discuss the benefits and drawbacks to surgery with your sleep provider and surgeon to determine if it is the best solution for you.   More information  Surgery for UZAIR is directed at areas that are responsible for narrowing or complete obstruction of the airway during sleep.  There  are a wide range of procedures available to enlarge and/or stabilize the airway to prevent blockage of breathing in the three major areas where it can occur: the palate, tongue, and nasal regions.  Successful surgical treatment depends on the accurate identification of the factors responsible for obstructive sleep apnea in each person.  A personalized approach is required because there is no single treatment that works well for everyone.  Because of anatomic variation, consultation with an examination by a sleep surgeon is a critical first step in determining what surgical options are best for each patient.  In some cases, examination during sedation may be recommended in order to guide the selection of procedures.  Patients will be counseled about risks and benefits as well as the typical recovery course after surgery. Surgery is typically not a cure for a person s UZAIR.  However, surgery will often significantly improve one s UZAIR severity (termed  success rate ).  Even in the absence of a cure, surgery will decrease the cardiovascular risk associated with OSA7; improve overall quality of life8 (sleepiness, functionality, sleep quality, etc).      Palate Procedures:  Patients with UZAIR often have narrowing of their airway in the region of their tonsils and uvula.  The goals of palate procedures are to widen the airway in this region as well as to help the tissues resist collapse.  Modern palate procedure techniques focus on tissue conservation and soft tissue rearrangement, rather than tissue removal.  Often the uvula is preserved in this procedure. Residual sleep apnea is common in patient after pharyngoplasty with an average reduction in sleep apnea events of 33%2.      Tongue Procedures:  ExamWhile patients are awake, the muscles that surround the throat are active and keep this region open for breathing. These muscles relax during sleep, allowing the tongue and other structures to collapse and block breathing.   There are several different tongue procedures available.  Selection of a tongue base procedure depends on characteristics seen on physical exam.  Generally, procedures are aimed at removing bulky tissues in this area or preventing the back of the tongue from falling back during sleep.  Success rates for tongue surgery range from 50-62%3.    Hypoglossal Nerve Stimulation:  Hypoglossal nerve stimulation has recently received approval from the [place] Food and Drug Administration for the treatment of obstructive sleep apnea.  This is based on research showing that the system was safe and effective in treating sleep apnea6.  Results showed that the median AHI score decreased 68%, from 29.3 to 9.0. This therapy uses an implant system that senses breathing patterns and delivers mild stimulation to airway muscles, which keeps the airway open during sleep.  The system consists of three fully implanted components: a small generator (similar in size to a pacemaker), a breathing sensor, and a stimulation lead.  Using a small handheld remote, a patient turns the therapy on before bed and off upon awakening.    Candidates for this device must be greater than 22 years of age, have moderate to severe UZAIR (AHI between 20-65), BMI less than 32, have tried CPAP/oral appliance without success, and have appropriate upper airway anatomy (determined by a sleep endoscopy performed by Dr. Blount).    Hypoglossal Nerve Stimulation Pathway:    The sleep surgeon s office will work with the patient through the insurance prior-authorization process (including communications and appeals).    Nasal Procedures:  Nasal obstruction can interfere with nasal breathing during the day and night.  Studies have shown that relief of nasal obstruction can improve the ability of some patients to tolerate positive airway pressure therapy for obstructive sleep apnea1.  Treatment options include medications such as nasal saline, topical corticosteroid and  antihistamine sprays, and oral medications such as antihistamines or decongestants. Non-surgical treatments can include external nasal dilators for selected patients. If these are not successful by themselves, surgery can improve the nasal airway either alone or in combination with these other options.      Combination Procedures:  Combination of surgical procedures and other treatments may be recommended, particularly if patients have more than one area of narrowing or persistent positional disease.  The success rate of combination surgery ranges from 66-80%2,3.    References  1. Herrera BRAY. The Role of the Nose in Snoring and Obstructive Sleep Apnoea: An Update.  Eur Arch Otorhinolaryngol. 2011; 268: 1365-73.  2.  Alexandre SM; Natasha JA; Tj JR; Pallanch JF; Huber MB; Stefanie SG; Maria Isabel ARMENDARIZ. Surgical modifications of the upper airway for obstructive sleep apnea in adults: a systematic review and meta-analysis. SLEEP 2010;33(10):3516-4521. Nicole OCONNOR. Hypopharyngeal surgery in obstructive sleep apnea: an evidence-based medicine review.  Arch Otolaryngol Head Neck Surg. 2006 Feb;132(2):206-13.  3. Kojo YH1, Brandon Y, Vamshi ANNA. The efficacy of anatomically based multilevel surgery for obstructive sleep apnea. Otolaryngol Head Neck Surg. 2003 Oct;129(4):327-35.  4. Kezirian E, Goldberg A. Hypopharyngeal Surgery in Obstructive Sleep Apnea: An Evidence-Based Medicine Review. Arch Otolaryngol Head Neck Surg. 2006 Feb;132(2):206-13.  5. Norman PAYNE et al. Upper-Airway Stimulation for Obstructive Sleep Apnea.  N Engl J Med. 2014 Jan 9;370(2):139-49.  6. John Y et al. Increased Incidence of Cardiovascular Disease in Middle-aged Men with Obstructive Sleep Apnea. Am J Respir Crit Care Med; 2002 166: 159-165  7. Dewayne CHILDERS et al. Studying Life Effects and Effectiveness of Palatopharyngoplasty (SLEEP) study: Subjective Outcomes of Isolated Uvulopalatopharyngoplasty. Otolaryngol Head Neck Surg. 2011; 144:  215-243.              Your BMI is Body mass index is 27.47 kg/(m^2).  Weight management is a personal decision.  If you are interested in exploring weight loss strategies, the following discussion covers the approaches that may be successful. Body mass index (BMI) is one way to tell whether you are at a healthy weight, overweight, or obese. It measures your weight in relation to your height.  A BMI of 18.5 to 24.9 is in the healthy range. A person with a BMI of 25 to 29.9 is considered overweight, and someone with a BMI of 30 or greater is considered obese. More than two-thirds of American adults are considered overweight or obese.  Being overweight or obese increases the risk for further weight gain. Excess weight may lead to heart disease and diabetes.  Creating and following plans for healthy eating and physical activity may help you improve your health.  Weight control is part of healthy lifestyle and includes exercise, emotional health, and healthy eating habits. Careful eating habits lifelong are the mainstay of weight control. Though there are significant health benefits from weight loss, long-term weight loss with diet alone may be very difficult to achieve- studies show long-term success with dietary management in less than 10% of people. Attaining a healthy weight may be especially difficult to achieve in those with severe obesity. In some cases, medications, devices and surgical management might be considered.  What can you do?  If you are overweight or obese and are interested in methods for weight loss, you should discuss this with your provider.     Consider reducing daily calorie intake by 500 calories.     Keep a food journal.     Avoiding skipping meals, consider cutting portions instead.    Diet combined with exercise helps maintain muscle while optimizing fat loss. Strength training is particularly important for building and maintaining muscle mass. Exercise helps reduce stress, increase energy,  and improves fitness. Increasing exercise without diet control, however, may not burn enough calories to loose weight.       Start walking three days a week 10-20 minutes at a time    Work towards walking thirty minutes five days a week     Eventually, increase the speed of your walking for 1-2 minutes at time    In addition, we recommend that you review healthy lifestyles and methods for weight loss available through the National Institutes of Health patient information sites:  http://win.niddk.nih.gov/publications/index.htm    And look into health and wellness programs that may be available through your health insurance provider, employer, local community center, or edith club.    Weight management plan: Patient was referred to their PCP to discuss a diet and exercise plan.

## 2017-07-14 NOTE — NURSING NOTE
"Chief Complaint   Patient presents with     Consult For     Extreme Snoring very very loudly        Initial /84  Pulse 90  Temp 98.6  F (37  C) (Tympanic)  Resp 16  Ht 1.753 m (5' 9\")  Wt 84.4 kg (186 lb)  SpO2 100%  BMI 27.47 kg/m2 Estimated body mass index is 27.47 kg/(m^2) as calculated from the following:    Height as of this encounter: 1.753 m (5' 9\").    Weight as of this encounter: 84.4 kg (186 lb).  Medication Reconciliation: complete     Neck circumference: 16.5 inches. 42cm          "

## 2017-07-14 NOTE — PROGRESS NOTES
Chief complaint: I have been asked to see Mr. Rueda in consultation by Dr. Dos Santos for difficulties with snoring and possible obstructive sleep apnea    History of present illness: Mr. Rueda is a 37-year-old male with a history of Parsonage Avendaño's syndrome resulting in frequent flares of pain and on occasion decrease movement of the scapula likely due to splinting possibly resulting in decrease in respiratory with flares.Is on Suboxone with these pain flares.  He reports a long history of extreme snoring which is exceptionally loud going on for about 15 years. He reports his snoring does occur 7 nights per week bed partner reports.  It's able to be heard outside the bedroom and he does awaken with a dry throat, may also have some teeth grinding at night. Notes that his snoring is worse if he is on his back; also sleeps laterally. He used to have gasping or snorting arousals with naps, these are not present currently that he can recall and bed partner's not seen any witnessed apneas or observed pausing in his breathing. No signs of orexin deficiency. Does report a rare nightmare, he has a history of sleepwalking as a candidate.    Sleep schedule into his bed somewhere around 945, 10 PM to as late as midnight on work days, and as late as 1 or 2 in the morning perhaps 2-3 out of 8 weekend nights. Does exit bed at around 7 AM on workdays, between 9:51 AM on weekends. Does admit to being a nightowl, also is a Musician. Lights out after watching TV or reading occurs usually somewhere between 10 PM and midnight. Sleep latency can be 2 minutes to an hour. Wakes up 1-2 times a night for nocturia or on a rare occasion to let the dog out. Total sleep time on work nights and weekends somewhere between 6-8 hours. Naps 2-4 times per week for 30 minutes to 90 minutes and can usually occur between the hours of 1 or 2 PM, or may occur as late as 4-5 PM.     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 Sleep environment is conducive to good sleep with exception of occasional wake up due to dog. Family history of snoring and sleepwalking, no one with a diagnosis of obstructive sleep apnea. Alcohol and substances that affect sleep: No alcohol or marijuana quit drinking as of 4/2013. Does have 3 or more servings of caffeine a day, can occur within 6 hours of bedtime probably half of the days of the week. Is using a nicotine lozenge 4 tobacco cessation, he has a history of 1 pack a day per 15 years as well as marijuana use for that period of time [place] sleepiness score today is 4/24 however this is augmented by caffeine use as well as daily napping. Denies sleepiness while employed or with driving. 2-3 days per week for the past month is tired and fatigued, 5 out of 30 days reports a slight awareness of continued difficulty with anxiety he is on Effexor for that.    14 point comprehensive review of systems is completed and negative with exception of the following: Cardiovascular on occasion blood pressure is elevated he does take losartan for that, circumstances are usually surrounding times of extreme pain. Pulmonary shortness of breath with activity does deny can do 2 flights of stairs without stopping digestive: Constipation endocrine: Diabetes mental health: Anxiety see past medical history from medical record: For pain he is on gabapentin ibuprofen p.r.n. Ativan for anxiety and occasionally will take Benadryl for allergies    Allergies:    Allergies   Allergen Reactions     Bees Swelling     Bupropion Hcl Other (See Comments)     seizure     Lisinopril Cough     Penicillins Other (See Comments)     Unable to close mouth       Medications:    Current Outpatient Prescriptions   Medication Sig Dispense Refill     zolpidem (AMBIEN) 10 MG tablet Take tablet by mouth 15 minutes prior to sleep, for Sleep Study 1 tablet 0      gabapentin (NEURONTIN) 600 MG tablet TAKE 2 TABLETS BY MOUTH THREE TIMES DAILY 180 tablet 0     metFORMIN (GLUCOPHAGE-XR) 500 MG 24 hr tablet TAKE 2 TABLETS(1000 MG) BY MOUTH TWICE DAILY WITH MEALS 120 tablet 5     liraglutide (VICTOZA PEN) 18 MG/3ML soln INJECT 1.2 MG SUBCUTANEOUS EVERY DAY 6 mL 5     nicotine polacrilex (EQ NICOTINE) 4 MG lozenge Place 1 lozenge (4 mg) inside cheek as needed for smoking cessation Store brand is fine. 360 tablet 5     losartan (COZAAR) 100 MG tablet Take 1 tablet (100 mg) by mouth daily 30 tablet 5     insulin degludec (TRESIBA) 100 UNIT/ML pen Use 12 units daily 15 mL 6     blood glucose monitoring (ACCU-CHEK SMARTVIEW) test strip Use to test blood sugar 2 times daily or as directed. 200 strip 3     nortriptyline (PAMELOR) 25 MG capsule Take 2 capsules (50 mg) by mouth At Bedtime 60 capsule 2     Buprenorphine HCl-Naloxone HCl (SUBOXONE) 4-1 MG film Place 1 Film under the tongue 3 times daily       insulin pen needle 31G X 6 MM Use 2 daily or as directed. 100 each 11     blood glucose monitoring (ACCU-CHEK ADARSH PLUS) meter device kit Use to test blood sugar 4-6 times daily or as directed. 1 kit 0     EPINEPHrine (EPIPEN) 0.3 MG/0.3ML injection Inject 0.3 mLs (0.3 mg) into the muscle once as needed for anaphylaxis 1 each 2     blood glucose monitoring (ACCU-CHEK ADARSH) test strip Use to test blood sugars 4 times daily or as directed. When on prednisone check 6 times per day. 4 Box 12     sildenafil (VIAGRA) 100 MG tablet Take 0.5-1 tablets ( mg) by mouth daily as needed for erectile dysfunction Take 30 min to 4 hours before intercourse.  Never use with nitroglycerin, terazosin or doxazosin. 5 tablet 5     diazepam (VALIUM) 5 MG tablet Take 1 tablet (5 mg) 3 times daily as needed. 60 tablet      venlafaxine (EFFEXOR-ER) 150 MG TB24 Take 1 tablet by mouth daily (with breakfast). 30 each 0     ibuprofen (ADVIL,MOTRIN) 200 MG tablet Take 1-2 tablets by mouth every 6 hours as  "needed.       DiphenhydrAMINE HCl (BENADRYL PO) Take 50 mg by mouth At Bedtime.         Problem List:  Patient Active Problem List    Diagnosis Date Noted     Former smoker 06/02/2017     Priority: Medium     Health Care Home 12/07/2016     Priority: Medium     Pain disorder 12/08/2015     Priority: Medium     ED (erectile dysfunction) 08/11/2015     Priority: Medium     CARDIOVASCULAR SCREENING; LDL GOAL LESS THAN 100 02/11/2015     Priority: Medium     Right shoulder pain 05/11/2015     Type 2 diabetes mellitus without complication (H) 02/04/2015     Hypertension goal BP (blood pressure) < 140/90 02/04/2015     Anemia 02/04/2015     Drug dependence, in remission (H) 04/08/2013     Problem list name updated by automated process. 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 Provider to review          Past Medical/Surgical History:  Past Medical History:   Diagnosis Date     Anxiety      Depressive disorder      Diabetes mellitus (H)      Hypertension      Liver disease      Neuralgic amyotrophy      Pain disorder 12/8/2015     Parsonage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      Seizures (H)      Staph infection      Past Surgical History:   Procedure Laterality Date     ORTHOPEDIC SURGERY             Physical Examination:  Vitals: /84  Pulse 90  Temp 98.6  F (37  C) (Tympanic)  Resp 16  Ht 1.753 m (5' 9\")  Wt 84.4 kg (186 lb)  SpO2 100%  BMI 27.47 kg/m2  BMI= Body mass index is 27.47 kg/(m^2).    Neck Cir (cm): 42 cm    [place] Total Score 7/14/2017   Total score - [place] 4     General appearance: alert, oriented, cooperative  Eyes: normal in appearance  HENT: oropharynx crowded, uvula elongated, tongue base enlarged, narrow nose with NARES WITH DECREASE FLOW ON R,   Pulmonary: without scapular flaring, lung sounds clear  CV: S1S2, without s3s4, RRR  Extremities are without clubbing, " edema  Musculoskeletal:Moves without difficulty  Mallampati Class: IV, fasiculations of tongue present  Tonsillar Stage: 1  hidden by pillars.  Dental: Dentition in good condition.  Good advancement of lower jaw without tmd  Skin: without facial rash    Assessment plan: It's my impression that Mr. Rueda who has a complicated medical history related to P[place] Avendaño's syndrome resulting primarily in episodes of pain, rarely in episodes of new onset weakness and is currently without scapular flaring, but is on Suboxone for pain control may be at increased risk for hypoventilation syndrome. With the stop bang score of 4-5/8 in the setting of delayed sleep phase disorder I have selected the in lab polysomnogram (with it's improved sensitivity) with TCM monitoring to improve diagnostic abilities in mild to moderate pretest probability for UZAIR.  ABGs will be done in the morning should he have a significant rise in CO2 levels throughout the night.     The following plan of care has been developed.  I'll see him back in clinic after his polysomnogram.  1. Snoring: with napping most days, and significant use of caffeine.  See above. TCM monitoring with ABGs in the AM if there is significant rise.  Pt maybe a candidate for bipap with tcm rise or significant hypoxemia or possibly ASV if there is significant centrals  #2: Overweight he does admit that he has approximately 15 pounds of weight to lose and this may be adding to his exceptionally loud snoring.   #3: Pain syndrome requiring treatment with Suboxone possibly enhancing risk for central sleep apnea.  #4:delayed sleep phase disorder I have provided him with Ambien for his night of his sleep study and recommended that he avoid a nap the day of his study. 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 We'll discuss at a later time.    Thank you for allowing me to participate in this kind man's  care time spent with patient 60 minutes of which greater than 50% was spent in counseling and education and coordination of care.

## 2017-07-14 NOTE — MR AVS SNAPSHOT
"              After Visit Summary   7/14/2017    Phillip Rueda    MRN: [id number]           Patient Information     Date Of Birth          1980        Visit Information        Provider Department      7/14/2017 9:00 AM Shelli Londono APRN [facility], [place], Sleep Study        Today's Diagnoses     Snoring    -  1    Pain disorder        Circadian rhythm sleep disorder, delayed sleep phase type        Overweight (BMI 25.0-29.9)          Care Instructions    MY TREATMENT INFORMATION FOR SLEEP APNEA-  Phillip MELODIE Rueda    NP : Shelli Londono  SLEEP CENTER :    [place]  MY CONTACT NUMBER: [phone]  If you need to cancel your sleep study, please call as soon as possible.  If you were prescribed a sleep aid, bring that to the sleep lab.  Please remember: do not drive if sleepy  AVOID NAP DAY OF SLEEP STUDY      Follow up with me after study    Am I having a sleep study at a sleep center?  Make sure you have an appointment for the study before you leave!    Am I having a home sleep study?  Watch this video:  [email or web address]/watch?v=CteI_GhyP9g&[email or web address]    Frequently asked questions:  1. What is Obstructive Sleep Apnea (UZAIR)? UZAIR is the most common type of sleep apnea. Apnea means, \"without breath.\"  Apnea is most often caused by narrowing or collapse of the upper airway as muscles relax during sleep.   Almost everyone has occasional apneas. Most people with sleep apnea have had brief interruptions at night frequently for many years.  The severity of sleep apnea is related to how frequent and severe the events are.   2. What are the consequences of UZAIR? Symptoms include: feeling sleepy during the day, snoring loudly, gasping or stopping of breathing, trouble sleeping, and occasionally morning headaches or heartburn at night.  Sleepiness can be serious and even increase the risk of falling asleep while driving. Other health consequences may include " development of high blood pressure and other cardiovascular disease in persons who are susceptible. Untreated UZAIR  can contribute to heart disease, stroke and diabetes.   3. What are the treatment options? In most situations, sleep apnea is a lifelong disease that must be managed with daily therapy. Medications are not effective for sleep apnea and surgery is generally not considered until other therapies have been tried. Your treatment is your choice . Continuous Positive Airway (CPAP) works right away and is the therapy that is effective in nearly everyone. An oral device to hold your jaw forward is usually the next most reliable option. Other options include postioning devices (to keep you off your back), weight loss, and surgery including a tongue pacing device. There is more detail about some of these options below.    Important tips for using CPAP and similar devices   Know your equipment:  CPAP is continuous positive airway pressure that prevents obstructive sleep apnea by keeping the throat from collapsing while you are sleeping. In most cases, the device is  smart  and can slowly self-adjusts if your throat collapses and keeps a record every day of how well you are treated-this information is available to you and your care team.  BPAP is bilevel positive airway pressure that keeps your throat open and also assists each breath with a pressure boost to maintain adequate breathing.  Special kinds of BPAP are used in patients who have inadequate breathing from lung or heart disease. In most cases, the device is  smart  and can slowly self-adjusts to assist breathing. Like CPAP, the device keeps a record of how well you are treated.  Your mask is your connection to the device. You get to choose what feels most comfortable and the staff will help to make sure if fits. Here: are some examples of the different masks that are available:       Key points to remember on your journey with sleep apnea:  1. Sleep study.   PAP devices often need to be adjusted during a sleep study to show that they are effective and adjusted right.  2. Good tips to remember: Try wearing just the mask during a quiet time during the day so your body adapts to wearing it. A humidifier is recommended for comfort in most cases to prevent drying of your nose and throat. Allergy medication from your provider may help you if you are having nasal congestion.  3. Getting settled-in. It takes more than one night for most of us to get used to wearing a mask. Try wearing just the mask during a quiet time during the day so your body adapts to wearing it. A humidifier is recommended for comfort in most cases. Our team will work with you carefully on the first day and will be in contact within 4 days and again at 2 and 4 weeks for advice and remote device adjustments. Your therapy is evaluated by the device each day.   4. Use it every night. The more you are able to sleep naturally for 7-8 hours, the more likely you will have good sleep and to prevent health risks or symptoms from sleep apnea. Even if you use it 4 hours it helps. Occasionally all of us are unable to use a medical therapy, in sleep apnea, it is not dangerous to miss one night.   5. Communicate. Call our skilled team on the number provided on the first day if your visit for problems that make it difficult to wear the device. Over 2 out of 3 patients can learn to wear the device long-term with help from our team. Remember to call our team or your sleep providers if you are unable to wear the device as we may have other solutions for those who cannot adapt to mask CPAP therapy. It is recommended that you sleep your sleep provider within the first 3 months and yearly after that if you are not having problems.   Take care of your equipment. Make sure you clean your mask and tubing using directions every day and that your filter and mask are replaced as recommended or if they are not working.     BESIDES  CPAP, WHAT OTHER THERAPIES ARE THERE?    Positioning Device  Positioning devices are generally used when sleep apnea is mild and only occurs on your back.This example shows a pillow that straps around the waist. It may be appropriate for those whose sleep study shows milder sleep apnea that occurs primarily when lying flat on one's back. Preliminary studies have shown benefit but effectiveness at home may need to be verified by a home sleep test. These devices are generally not covered by medical insurance.    Oral Appliance  What is oral appliance therapy?  An oral appliance device fits on your teeth at night like a retainer used after having braces. The device is made by a specialized dentist and requires several visits over 1-2 months before a manufactured device is made to fit your teeth and is adjusted to prevent your sleep apnea. Once an oral device is working properly, snoring should be improved. A home sleep test may be recommended at that time if to determine whether the sleep apnea is adequately treated.       Some things to remember:  -Oral devices are often, but not always, covered by your medical insurance. Be sure to check with your insurance provider.   -If you are referred for oral therapy, you will be given a list of specialized dentists to consider or you may choose to visit the Web site of the American Academy of Dental Sleep Medicine  -Oral devices are less likely to work if you have severe sleep apnea or are extremely overweight.     More detailed information  An oral appliance is a small acrylic device that fits over the upper and lower teeth  (similar to a retainer or a mouth guard). This device slightly moves jaw forward, which moves the base of the tongue forward, opens the airway, improves breathing for effective treat snoring and obstructive sleep apnea in perhaps 7 out of 10 people .  The best working devices are custom-made by a dental device  after a mold is made of the  teeth 1, 2, 3.  When is an oral appliance indicated?  Oral appliance therapy is recommended as a first-line treatment for patients with primary snoring, mild sleep apnea, and for patients with moderate sleep apnea who prefer appliance therapy to use of CPAP4, 5. Severity of sleep apnea is determined by sleep testing and is based on the number of respiratory events per hour of sleep.   How successful is oral appliance therapy?  The success rate of oral appliance therapy in patients with mild sleep apnea is 75-80% while in patients with moderate sleep apnea it is 50-70%. The chance of success in patients with severe sleep apnea is 40-50%. The research also shows that oral appliances have a beneficial effect on the cardiovascular health of UZAIR patients at the same magnitude as CPAP therapy7.  Oral appliances should be a second-line treatment in cases of severe sleep apnea, but if not completely successful then a combination therapy utilizing CPAP plus oral appliance therapy may be effective. Oral appliances tend to be effective in a broad range of patients although studies show that the patients who have the highest success are females, younger patients, those with milder disease, and less severe obesity. 3, 6.   Finding a dentist that practices dental sleep medicine  Specific training is available through the American Academy of Dental Sleep Medicine for dentists interested in working in the field of sleep. To find a dentist who is educated in the field of sleep and the use of oral appliances, near you, visit the Web site of the American Academy of Dental Sleep Medicine.    References  1. Mehrdad, et al. Objectively measured vs self-reported compliance during oral appliance therapy for sleep-disordered breathing. Chest 2013; 144(5): 3560-3314.  2. Willem et al. Objective measurement of compliance during oral appliance therapy for sleep-disordered breathing. Thorax 2013; 68(1): 91-96.  3. Ruth et al.  Mandibular advancement devices in 620 men and women with UZAIR and snoring: tolerability and predictors of treatment success. Chest 2004; 125: 0960-9839.  4. Cisco et al. Oral appliances for snoring and UZAIR: a review. Sleep 2006; 29: 244-262.  5. Gris et al. Oral appliance treatment for UZAIR: an update. J Clin Sleep Med 2014; 10(2): 215-227.  6. Joann et al. Predictors of OSAH treatment outcome. J Dent Res 2007; 86: 5879-0360.      Weight Loss:    Weight loss is a long-term strategy that may improve sleep apnea in some patients.    Weight management is a personal decision.  If you are interested in exploring weight loss strategies, the following discussion covers the impact on weight loss on sleep apnea and the approaches that may be successful.    Weight loss decreases severity of sleep apnea in most people with obesity. For those with mild obesity who have developed snoring with weight gain, even 15-30 pound weight loss can improve and occasionally eliminate sleep apnea.  Structured and life-long dietary and health habits are necessary to lose weight and keep healthier weight levels.     Though there may be significant health benefits from weight loss, long-term weight loss is very difficult to achieve- studies show success with dietary management in less than 10% of people. In addition, substantial weight loss may require years of dietary control and may be difficult if patients have severe obesity. In these cases, surgical management may be considered.  Finally, older individuals who have tolerated obesity without health complications may be less likely to benefit from weight loss strategies.      [unfilled]    Surgery:    Surgery for obstructive sleep apnea is considered generally only when other therapies fail to work. Surgery may be discussed with you if you are having a difficult time tolerating CPAP and or when there is an abnormal structure that requires surgical correction.  Nose and throat  surgeries often enlarge the airway to prevent collapse.  Most of these surgeries create pain for 1-2 weeks and up to half of the most common surgeries are not effective throughout life.  You should carefully discuss the benefits and drawbacks to surgery with your sleep provider and surgeon to determine if it is the best solution for you.   More information  Surgery for UZAIR is directed at areas that are responsible for narrowing or complete obstruction of the airway during sleep.  There are a wide range of procedures available to enlarge and/or stabilize the airway to prevent blockage of breathing in the three major areas where it can occur: the palate, tongue, and nasal regions.  Successful surgical treatment depends on the accurate identification of the factors responsible for obstructive sleep apnea in each person.  A personalized approach is required because there is no single treatment that works well for everyone.  Because of anatomic variation, consultation with an examination by a sleep surgeon is a critical first step in determining what surgical options are best for each patient.  In some cases, examination during sedation may be recommended in order to guide the selection of procedures.  Patients will be counseled about risks and benefits as well as the typical recovery course after surgery. Surgery is typically not a cure for a person s UZAIR.  However, surgery will often significantly improve one s UZAIR severity (termed  success rate ).  Even in the absence of a cure, surgery will decrease the cardiovascular risk associated with OSA7; improve overall quality of life8 (sleepiness, functionality, sleep quality, etc).      Palate Procedures:  Patients with UZAIR often have narrowing of their airway in the region of their tonsils and uvula.  The goals of palate procedures are to widen the airway in this region as well as to help the tissues resist collapse.  Modern palate procedure techniques focus on tissue  conservation and soft tissue rearrangement, rather than tissue removal.  Often the uvula is preserved in this procedure. Residual sleep apnea is common in patient after pharyngoplasty with an average reduction in sleep apnea events of 33%2.      Tongue Procedures:  ExamWhile patients are awake, the muscles that surround the throat are active and keep this region open for breathing. These muscles relax during sleep, allowing the tongue and other structures to collapse and block breathing.  There are several different tongue procedures available.  Selection of a tongue base procedure depends on characteristics seen on physical exam.  Generally, procedures are aimed at removing bulky tissues in this area or preventing the back of the tongue from falling back during sleep.  Success rates for tongue surgery range from 50-62%3.    Hypoglossal Nerve Stimulation:  Hypoglossal nerve stimulation has recently received approval from the [place] Food and Drug Administration for the treatment of obstructive sleep apnea.  This is based on research showing that the system was safe and effective in treating sleep apnea6.  Results showed that the median AHI score decreased 68%, from 29.3 to 9.0. This therapy uses an implant system that senses breathing patterns and delivers mild stimulation to airway muscles, which keeps the airway open during sleep.  The system consists of three fully implanted components: a small generator (similar in size to a pacemaker), a breathing sensor, and a stimulation lead.  Using a small handheld remote, a patient turns the therapy on before bed and off upon awakening.    Candidates for this device must be greater than 22 years of age, have moderate to severe UZAIR (AHI between 20-65), BMI less than 32, have tried CPAP/oral appliance without success, and have appropriate upper airway anatomy (determined by a sleep endoscopy performed by Dr. Blount).    Hypoglossal Nerve Stimulation Pathway:    The sleep  surgeon s office will work with the patient through the insurance prior-authorization process (including communications and appeals).    Nasal Procedures:  Nasal obstruction can interfere with nasal breathing during the day and night.  Studies have shown that relief of nasal obstruction can improve the ability of some patients to tolerate positive airway pressure therapy for obstructive sleep apnea1.  Treatment options include medications such as nasal saline, topical corticosteroid and antihistamine sprays, and oral medications such as antihistamines or decongestants. Non-surgical treatments can include external nasal dilators for selected patients. If these are not successful by themselves, surgery can improve the nasal airway either alone or in combination with these other options.      Combination Procedures:  Combination of surgical procedures and other treatments may be recommended, particularly if patients have more than one area of narrowing or persistent positional disease.  The success rate of combination surgery ranges from 66-80%2,3.    References  1. Herrera BRAY. The Role of the Nose in Snoring and Obstructive Sleep Apnoea: An Update.  Eur Arch Otorhinolaryngol. 2011; 268: 1365-73.  2.  Alexandre SM; Natasha JA; Tj JR; Pallanch JF; Huber MB; Stefanie SG; Maria Isabel ARMENDARIZ. Surgical modifications of the upper airway for obstructive sleep apnea in adults: a systematic review and meta-analysis. SLEEP 2010;33(10):7932-8147. Nicole OCONNOR. Hypopharyngeal surgery in obstructive sleep apnea: an evidence-based medicine review.  Arch Otolaryngol Head Neck Surg. 2006 Feb;132(2):206-13.  3. Kojo YH1, Brandon Y, Vamshi ANNA. The efficacy of anatomically based multilevel surgery for obstructive sleep apnea. Otolaryngol Head Neck Surg. 2003 Oct;129(4):327-35.  4. Nicole OCONNOR, Goldberg A. Hypopharyngeal Surgery in Obstructive Sleep Apnea: An Evidence-Based Medicine Review. Arch Otolaryngol Head Neck Surg. 2006  Feb;132(2):206-13.  5. Norman PAYNE et al. Upper-Airway Stimulation for Obstructive Sleep Apnea.  N Engl J Med. 2014 Jan 9;370(2):139-49.  6. John Y et al. Increased Incidence of Cardiovascular Disease in Middle-aged Men with Obstructive Sleep Apnea. Am J Respir Crit Care Med; 2002 166: 159-165  7. Dewayne EM et al. Studying Life Effects and Effectiveness of Palatopharyngoplasty (SLEEP) study: Subjective Outcomes of Isolated Uvulopalatopharyngoplasty. Otolaryngol Head Neck Surg. 2011; 144: 623-631.              Your BMI is Body mass index is 27.47 kg/(m^2).  Weight management is a personal decision.  If you are interested in exploring weight loss strategies, the following discussion covers the approaches that may be successful. Body mass index (BMI) is one way to tell whether you are at a healthy weight, overweight, or obese. It measures your weight in relation to your height.  A BMI of 18.5 to 24.9 is in the healthy range. A person with a BMI of 25 to 29.9 is considered overweight, and someone with a BMI of 30 or greater is considered obese. More than two-thirds of American adults are considered overweight or obese.  Being overweight or obese increases the risk for further weight gain. Excess weight may lead to heart disease and diabetes.  Creating and following plans for healthy eating and physical activity may help you improve your health.  Weight control is part of healthy lifestyle and includes exercise, emotional health, and healthy eating habits. Careful eating habits lifelong are the mainstay of weight control. Though there are significant health benefits from weight loss, long-term weight loss with diet alone may be very difficult to achieve- studies show long-term success with dietary management in less than 10% of people. Attaining a healthy weight may be especially difficult to achieve in those with severe obesity. In some cases, medications, devices and surgical management might be considered.  What can  you do?  If you are overweight or obese and are interested in methods for weight loss, you should discuss this with your provider.     Consider reducing daily calorie intake by 500 calories.     Keep a food journal.     Avoiding skipping meals, consider cutting portions instead.    Diet combined with exercise helps maintain muscle while optimizing fat loss. Strength training is particularly important for building and maintaining muscle mass. Exercise helps reduce stress, increase energy, and improves fitness. Increasing exercise without diet control, however, may not burn enough calories to loose weight.       Start walking three days a week 10-20 minutes at a time    Work towards walking thirty minutes five days a week     Eventually, increase the speed of your walking for 1-2 minutes at time    In addition, we recommend that you review healthy lifestyles and methods for weight loss available through the National Institutes of Health patient information sites:  http://win.niddk.nih.gov/publications/index.htm    And look into health and wellness programs that may be available through your health insurance provider, employer, local community center, or edith club.    Weight management plan: Patient was referred to their PCP to discuss a diet and exercise plan.              Follow-ups after your visit        Follow-up notes from your care team     Return for , psg-with tcm monitoring, 11 PM SLEEP START .      Your next 10 appointments already scheduled     Nov 01, 2017  8:30 AM CDT   (Arrive by 8:15 AM)   New Patient Visit with Nehemiah Fry PA-C   [facility] ([facility])    [address] [place] [id number] [phone]              Future tests that were ordered for you today     Open Future Orders        Priority Expected Expires Ordered    Comprehensive Sleep Study Routine  1/10/2018 7/14/2017    ABG-Blood Gas Arterial  "([facility]) Routine  9/12/2017 7/14/2017            Who to contact     If you have questions or need follow up information about today's clinic visit or your schedule please [facility] directly at [phone].  Normal or non-critical lab and imaging results will be communicated to you by MyChart, letter or phone within 4 business days after the clinic has received the results. If you do not hear from us within 7 days, please contact the clinic through [facility] or phone. If you have a critical or abnormal lab result, we will notify you by phone as soon as possible.  Submit refill requests through [facility] or call your pharmacy and they will forward the refill request to us. Please allow 3 business days for your refill to be completed.          Additional Information About Your Visit        MyChart Information     [facility] gives you secure access to your electronic health record. If you see a primary care provider, you can also send messages to your care team and make appointments. If you have questions, please call your primary care clinic.  If you do not have a primary care provider, please call [phone] and they will assist you.        Care EveryWhere ID     This is your Care EveryWhere ID. This could be used by other organizations to access your [facility] medical records  [id number]        Your Vitals Were     Pulse Temperature Respirations Height Pulse Oximetry BMI (Body Mass Index)    90 98.6  F (37  C) (Tympanic) 16 1.753 m (5' 9\") 100% 27.47 kg/m2       Blood Pressure from Last 3 Encounters:   07/14/17 134/84   07/10/17 114/84   07/01/17 108/70    Weight from Last 3 Encounters:   07/14/17 84.4 kg (186 lb)   07/10/17 82.3 kg (181 lb 8 oz)   07/01/17 85.3 kg (188 lb)                 Today's Medication Changes          These changes are accurate as of: 7/14/17 11:12 AM.  If you have any questions, ask your nurse or doctor.               Start taking " these medicines.        Dose/Directions    zolpidem 10 MG tablet   Commonly known as:  AMBIEN   Used for:  Pain disorder, Snoring        Take tablet by mouth 15 minutes prior to sleep, for Sleep Study   Quantity:  1 tablet   Refills:  0            Where to get your medicines      Some of these will need a paper prescription and others can be bought over the counter.  Ask your nurse if you have questions.     Bring a paper prescription for each of these medications     zolpidem 10 MG tablet                Primary Care Provider Office Phone # Fax #    Km Michelle Dos Santos [phone]       [address] [address]        Goals        General    Functional: Patient will revise his diabetic diet to maximize his diabetes with the goal of decreasing his daily blood sugars and improving his HGB A1c (pt-stated)     Notes - Note created  3/24/2016 11:25 AM by Erna Pineda RN    As of today's date 3/24/2016 goal is met at 0 - 25%.   Goal Status:  Active        Medical (pt-stated)     Notes - Note created  8/25/2015 12:49 PM by Erna Pineda RN    Patient states that he is wanting a diabetes education referral so that he can fine tune his blood sugars or when he is on steroids  Patient states that he will contact RN Clinic Care Coordinator when needing assistance with coordinating cares or has complications with his disease management and or cares      Medical Patient will bring 3 days of blood sugar readings to office visit with Dr Dos Santos . (pt-stated)     Notes - Note created  3/24/2016 11:23 AM by Erna Pineda RN    As of today's date 3/24/2016 goal is met at 0 - 25%.   Goal Status:  Active        Medical: Patient will set up podiatry and eye exam for diabetici wtihin 6 month. (pt-stated)     Notes - Note created  3/24/2016 11:21 AM by Erna Pineda RN    As of today's date 3/24/2016 goal is met at 0 - 25%.   Goal Status:  Active        Medical: Pt  will continue to utilize his training in [employer] The[employer]ies along with Western Medicine for pain and life management (pt-stated)     Notes - Note created  3/24/2016 11:28 AM by Erna Pineda RN    As of today's date 3/24/2016 goal is met at 51 - 75%.   Goal Status:  Active          Equal Access to Services     JAMARCUS DELANEY : Hadii aad ku hadasho Soomaali, waaxda luqadaha, qaybta kaalmada adeegyada, waxay ha garimarico cooktracey becerra lajoserico . So [facility] [phone].    ATENCIÓN: Si habla español, tiene a robins disposición servicios gratuitos de asistencia lingüística. Llame al [phone].    We comply with applicable federal civil rights laws and [place] laws. We do not discriminate on the basis of race, color, national origin, age, disability sex, sexual orientation or gender identity.            Thank you!     Thank you for choosing [facility], SLEEP STUDY  for your care. Our goal is always to provide you with excellent care. Hearing back from our patients is one way we can continue to improve our services. Please take a few minutes to complete the written survey that you may receive in the mail after your visit with us. Thank you!             Your Updated Medication List - Protect others around you: Learn how to safely use, store and throw away your medicines at www.disposemymeds.org.          This list is accurate as of: 7/14/17 11:12 AM.  Always use your most recent med list.                   Brand Name Dispense Instructions for use Diagnosis    BENADRYL PO      Take 50 mg by mouth At Bedtime.        blood glucose monitoring meter device kit     1 kit    Use to test blood sugar 4-6 times daily or as directed.    Type 2 diabetes mellitus without complication (H)       * blood glucose monitoring test strip    ACCU-CHEK ADARSH    4 Box    Use to test blood sugars 4 times daily or as directed. When on prednisone check 6 times per day.    Type 2 diabetes, HbA1c goal < 7% (H)       * blood glucose monitoring test  strip    ACCU-CHEK SMARTVIEW    200 strip    Use to test blood sugar 2 times daily or as directed.    Type 2 diabetes mellitus without complication (H)       buprenorphine HCl-naloxone HCl 4-1 MG per film    SUBOXONE     Place 1 Film under the tongue 3 times daily        EPINEPHrine 0.3 MG/0.3ML injection 2-pack    EPIPEN/ADRENACLICK/or ANY BX GENERIC EQUIV    1 each    Inject 0.3 mLs (0.3 mg) into the muscle once as needed for anaphylaxis    Anaphylactic reaction, sequela       gabapentin 600 MG tablet    NEURONTIN    180 tablet    TAKE 2 TABLETS BY MOUTH THREE TIMES DAILY    Neuralgic amyotrophy       ibuprofen 200 MG tablet    ADVIL/MOTRIN     Take 1-2 tablets by mouth every 6 hours as needed.        insulin degludec 100 UNIT/ML pen    TRESIBA    15 mL    Use 12 units daily    Type 2 diabetes mellitus without complication, without long-term current use of insulin (H)       insulin pen needle 31G X 6 MM     100 each    Use 2 daily or as directed.    Type 2 diabetes mellitus without complication (H)       liraglutide 18 MG/3ML soln    VICTOZA PEN    6 mL    INJECT 1.2 MG SUBCUTANEOUS EVERY DAY    Type 2 diabetes mellitus without complication, with long-term current use of insulin (H)       losartan 100 MG tablet    COZAAR    30 tablet    Take 1 tablet (100 mg) by mouth daily    Hypertension goal BP (blood pressure) < 140/90       metFORMIN 500 MG 24 hr tablet    GLUCOPHAGE-XR    120 tablet    TAKE 2 TABLETS(1000 MG) BY MOUTH TWICE DAILY WITH MEALS    Type 2 diabetes mellitus without complication, with long-term current use of insulin (H)       nicotine polacrilex 4 MG lozenge    EQ NICOTINE    360 tablet    Place 1 lozenge (4 mg) inside cheek as needed for smoking cessation Store brand is fine.    Former smoker       nortriptyline 25 MG capsule    PAMELOR    60 capsule    Take 2 capsules (50 mg) by mouth At Bedtime    Neuralgic amyotrophy       sildenafil 100 MG cap/tab    VIAGRA    5 tablet    Take 0.5-1 tablets  ( mg) by mouth daily as needed for erectile dysfunction Take 30 min to 4 hours before intercourse.  Never use with nitroglycerin, terazosin or doxazosin.    ED (erectile dysfunction)       VALIUM 5 MG tablet   Generic drug:  diazepam     60 tablet    Take 1 tablet (5 mg) 3 times daily as needed.        venlafaxine 150 MG Tb24 24 hr tablet    EFFEXOR-ER    30 each    Take 1 tablet by mouth daily (with breakfast).    Anxiety       zolpidem 10 MG tablet    AMBIEN    1 tablet    Take tablet by mouth 15 minutes prior to sleep, for Sleep Study    Pain disorder, Snoring       * Notice:  This list has 2 medication(s) that are the same as other medications prescribed for you. Read the directions carefully, and ask your doctor or other care provider to review them with you.

## 2017-07-19 DIAGNOSIS — G54.5 NEURALGIC AMYOTROPHY: ICD-10-CM

## 2017-07-20 RX ORDER — GABAPENTIN 600 MG/1
TABLET ORAL
Qty: 180 TABLET | Refills: 2 | Status: SHIPPED | OUTPATIENT
Start: 2017-07-20 | End: 2018-08-29

## 2017-07-29 NOTE — PROGRESS NOTES
Clinic Care Coordination Contact      RN CC reviewed EMR and Care Coordination is no longer needed at this time  Pt closed to RN CC at this time  Erna Pineda RN Clinic Care Coordinator  [facility] [phone]

## 2017-08-07 DIAGNOSIS — G54.5 NEURALGIC AMYOTROPHY: ICD-10-CM

## 2017-08-07 RX ORDER — NORTRIPTYLINE HCL 25 MG
50 CAPSULE ORAL AT BEDTIME
Qty: 60 CAPSULE | Refills: 2 | Status: SHIPPED | OUTPATIENT
Start: 2017-08-07 | End: 2017-12-29

## 2017-08-23 ENCOUNTER — TRANSFERRED RECORDS (OUTPATIENT)
Dept: HEALTH INFORMATION MANAGEMENT | Facility: CLINIC | Age: 37
End: 2017-08-23

## 2017-09-15 ENCOUNTER — TELEPHONE (OUTPATIENT)
Dept: SLEEP MEDICINE | Facility: CLINIC | Age: 37
End: 2017-09-15

## 2017-09-20 ENCOUNTER — TELEPHONE (OUTPATIENT)
Dept: SLEEP MEDICINE | Facility: CLINIC | Age: 37
End: 2017-09-20

## 2017-09-20 NOTE — TELEPHONE ENCOUNTER
Left message to call on 9/20/2017  at 4:12 PM and informed pt to call back to  [phone] and ask for Nina with [facility] in [place].

## 2017-10-02 ENCOUNTER — TELEPHONE (OUTPATIENT)
Dept: FAMILY MEDICINE | Facility: CLINIC | Age: 37
End: 2017-10-02

## 2017-10-02 NOTE — TELEPHONE ENCOUNTER
Patient left voicemail on triage line statin. Colonoscopy scheduled on 10/5/17  2. Has Type II DM   A. Instructed to go on clear liquid diet a day before procedure  3. What should he/can he take, so sugars do not drop?    Dr. Dos Santos-Please advise.    Thank you!  PARAG Jolly, MARTYN, RN

## 2017-10-02 NOTE — TELEPHONE ENCOUNTER
Left detailed message on identified voicemail relaying message per below from Dr. Dos Santos.    PARAG Jolly, BSN, RN

## 2017-10-02 NOTE — TELEPHONE ENCOUNTER
Called patient and reviewed message per below from Dr. Dos Santos.    Patient verbalized understanding and in agreement with plan.    Discussed clear liquid diet:  1. Jello  2. Broths  3. Sprite or 7UP or similar    Patient stated:  1. Plans to take Tresiba 7 units day before procedure  2. Will not take Tresiba nor Victoza morning of, will resume after home from procedure    Dr. Dos Santos-Patient is wondering if he needs to adjust Victoza dose day prior to procedure?    Thank you!  PARAG Jolly, MARTYN, RN

## 2017-10-05 ENCOUNTER — TRANSFERRED RECORDS (OUTPATIENT)
Dept: HEALTH INFORMATION MANAGEMENT | Facility: CLINIC | Age: 37
End: 2017-10-05

## 2017-10-12 DIAGNOSIS — Z87.891 FORMER SMOKER: ICD-10-CM

## 2017-10-12 NOTE — TELEPHONE ENCOUNTER
Nicotine lozenger      Last Written Prescription Date: 6/2/17  Last Fill Quantity: 360,  # refills: 5   Last Office Visit with G, [facility] or [facility] prescribing provider: 7/10/17  nilay lazaro

## 2017-10-19 ENCOUNTER — TELEPHONE (OUTPATIENT)
Dept: SLEEP MEDICINE | Facility: CLINIC | Age: 37
End: 2017-10-19

## 2017-10-20 NOTE — TELEPHONE ENCOUNTER
Patient was given a hard copy of the ambien prescription back in July. He was told by the nurse not to lose it. He and his wife just had a baby in August. He lost the script and can't find it anywhere in his house. Is it possible for him to get another copy of the script? He can pick it up in clinic. Please call to discuss. His sleep study is on 10/25/17.    Gianna HOBSON  Central Scheduler

## 2017-10-20 NOTE — TELEPHONE ENCOUNTER
verified RX has not been filled yet. Called patient to let him know we can call this into his pharmacy so he does not need to come to the clinic. VM left informing him of this. Stated in VM the RX will be called intot he [facility] off [address].     Pharmacy was called and given verbal ok to fill prescription.     KENNETH Granger  Sleep Clinic-Specialist,    Registered Medical Assistant   [facility]

## 2017-10-25 ENCOUNTER — THERAPY VISIT (OUTPATIENT)
Dept: SLEEP MEDICINE | Facility: CLINIC | Age: 37
End: 2017-10-25
Attending: NURSE PRACTITIONER
Payer: COMMERCIAL

## 2017-10-25 DIAGNOSIS — R52 PAIN DISORDER: ICD-10-CM

## 2017-10-25 DIAGNOSIS — R06.83 SNORING: ICD-10-CM

## 2017-10-25 PROCEDURE — 95810 POLYSOM 6/> YRS 4/> PARAM: CPT | Mod: ZF | Performed by: NURSE PRACTITIONER

## 2017-10-25 NOTE — MR AVS SNAPSHOT
After Visit Summary   10/25/2017    Phillip Rueda    MRN: [id number]           Patient Information     Date Of Birth          1980        Visit Information        Provider Department      10/25/2017 8:00 PM SLEEP STUDY [facility]        Today's Diagnoses     Pain disorder        Snoring          Care Instructions    [facility] [place]    1. Your sleep study will be reviewed by a sleep physician within the next few days.     2. Please follow up in the sleep clinic as scheduled, or, make an appointment with your sleep provider to be seen within two weeks to discuss the results of the sleep study.    3. If you have any questions or problems with your treatment plan, please contact your sleep clinic provider at [phone] to further manage your condition.    4. Please review your attached medication list, and, at your follow-up appointment advise your sleep clinic provider about any changes.    5. Go to [email or web address] for more information about your sleep problems.    MERLIN Crowder  October 25, 2017                Follow-ups after your visit        Your next 10 appointments already scheduled     Nov 01, 2017  8:30 AM CDT   (Arrive by 8:15 AM)   New Patient Visit with Nehemiah Fry PA-C   [facility] ([facility])    [address] [address] [phone]            Nov 09, 2017  2:30 PM CST   Return Sleep Patient with WALESKA Wheeler CNP   [facility] ([facility])    [address] [place] [address] [phone]              Who to contact     If you have questions or need follow up information about today's clinic visit or your schedule please contact [facility] directly at [phone].  Normal or non-critical lab and imaging results will be  communicated to you by OwlTing ???hart, letter or phone within 4 business days after the clinic has received the results. If you do not hear from us within 7 days, please contact the clinic through [employer] or phone. If you have a critical or abnormal lab result, we will notify you by phone as soon as possible.  Submit refill requests through [employer] or call your pharmacy and they will forward the refill request to us. Please allow 3 business days for your refill to be completed.          Additional Information About Your Visit        [employer] Information     [employer] gives you secure access to your electronic health record. If you see a primary care provider, you can also send messages to your care team and make appointments. If you have questions, please call your primary care clinic.  If you do not have a primary care provider, please call [phone] and they will assist you.        Care EveryWhere ID     This is your Care EveryWhere ID. This could be used by other organizations to access your [place] medical records  [id number]         Blood Pressure from Last 3 Encounters:   07/14/17 134/84   07/10/17 114/84   07/01/17 108/70    Weight from Last 3 Encounters:   07/14/17 84.4 kg (186 lb)   07/10/17 82.3 kg (181 lb 8 oz)   07/01/17 85.3 kg (188 lb)              We Performed the Following     Comprehensive Sleep Study        Primary Care Provider Office Phone # Fax #    Km Michelle Dos Santos [phone]       [facility] [address] [place] [id number]        Goals        General    Functional: Patient will revise his diabetic diet to maximize his diabetes with the goal of decreasing his daily blood sugars and improving his HGB A1c (pt-stated)     Notes - Note created  3/24/2016 11:25 AM by Erna Pineda, RN    As of today's date 3/24/2016 goal is met at 0 - 25%.   Goal Status:  Active        Medical (pt-stated)     Notes - Note created  8/25/2015 12:49 PM by Erna Pineda, RN    Patient states that  he is wanting a diabetes education referral so that he can fine tune his blood sugars or when he is on steroids  Patient states that he will contact RN Clinic Care Coordinator when needing assistance with coordinating cares or has complications with his disease management and or cares      Medical Patient will bring 3 days of blood sugar readings to office visit with Dr Dos Santos . (pt-stated)     Notes - Note created  3/24/2016 11:23 AM by Erna Pineda RN    As of today's date 3/24/2016 goal is met at 0 - 25%.   Goal Status:  Active        Medical: Patient will set up podiatry and eye exam for dario ambriz 6 month. (pt-stated)     Notes - Note created  3/24/2016 11:21 AM by Erna Pineda RN    As of today's date 3/24/2016 goal is met at 0 - 25%.   Goal Status:  Active        Medical: Pt will continue to utilize his training in [employer] Therapies along with Western Medicine for pain and life management (pt-stated)     Notes - Note created  3/24/2016 11:28 AM by Erna Pineda RN    As of today's date 3/24/2016 goal is met at 51 - 75%.   Goal Status:  Active          Equal Access to Services     [facility]: Hadii aad ku hadasho Soomaali, waaxda luqadaha, qaybta kaalmada adeegyada, waxay idiin haytonyan delroy burton . So [facility] [phone].    ATENCIÓN: Si habla español, tiene a robins disposición servicios gratuitos de asistencia lingüística. Llame al [phone].    We comply with applicable federal civil rights laws and [place] laws. We do not discriminate on the basis of race, color, national origin, age, disability, sex, sexual orientation, or gender identity.            Thank you!     Thank you for choosing [facility], SLEEP STUDY  for your care. Our goal is always to provide you with excellent care. Hearing back from our patients is one way we can continue to improve our services. Please take a few minutes to complete the written survey that you may receive in the mail after your visit with  us. Thank you!             Your Updated Medication List - Protect others around you: Learn how to safely use, store and throw away your medicines at www.disposemymeds.org.          This list is accurate as of: 10/25/17  8:31 PM.  Always use your most recent med list.                   Brand Name Dispense Instructions for use Diagnosis    BENADRYL PO      Take 50 mg by mouth At Bedtime.        blood glucose monitoring meter device kit     1 kit    Use to test blood sugar 4-6 times daily or as directed.    Type 2 diabetes mellitus without complication (H)       * blood glucose monitoring test strip    ACCU-CHEK ADARSH    4 Box    Use to test blood sugars 4 times daily or as directed. When on prednisone check 6 times per day.    Type 2 diabetes, HbA1c goal < 7% (H)       * blood glucose monitoring test strip    ACCU-CHEK SMARTVIEW    200 strip    Use to test blood sugar 2 times daily or as directed.    Type 2 diabetes mellitus without complication (H)       buprenorphine HCl-naloxone HCl 4-1 MG per film    SUBOXONE     Place 1 Film under the tongue 3 times daily        EPINEPHrine 0.3 MG/0.3ML injection 2-pack    EPIPEN/ADRENACLICK/or ANY BX GENERIC EQUIV    1 each    Inject 0.3 mLs (0.3 mg) into the muscle once as needed for anaphylaxis    Anaphylactic reaction, sequela       * gabapentin 600 MG tablet    NEURONTIN    180 tablet    TAKE 2 TABLETS BY MOUTH THREE TIMES DAILY    Neuralgic amyotrophy       * gabapentin 600 MG tablet    NEURONTIN    180 tablet    TAKE 2 TABLETS BY MOUTH THREE TIMES DAILY    Neuralgic amyotrophy       ibuprofen 200 MG tablet    ADVIL/MOTRIN     Take 1-2 tablets by mouth every 6 hours as needed.        insulin degludec 100 UNIT/ML pen    TRESIBA    15 mL    Use 12 units daily    Type 2 diabetes mellitus without complication, without long-term current use of insulin (H)       insulin pen needle 31G X 6 MM     100 each    Use 2 daily or as directed.    Type 2 diabetes mellitus without  complication (H)       liraglutide 18 MG/3ML soln    VICTOZA PEN    6 mL    INJECT 1.2 MG SUBCUTANEOUS EVERY DAY    Type 2 diabetes mellitus without complication, with long-term current use of insulin (H)       losartan 100 MG tablet    COZAAR    30 tablet    Take 1 tablet (100 mg) by mouth daily    Hypertension goal BP (blood pressure) < 140/90       metFORMIN 500 MG 24 hr tablet    GLUCOPHAGE-XR    120 tablet    TAKE 2 TABLETS(1000 MG) BY MOUTH TWICE DAILY WITH MEALS    Type 2 diabetes mellitus without complication, with long-term current use of insulin (H)       nicotine polacrilex 4 MG lozenge     108 lozenge    PLACE 1 LOZENGE INSIDE CHEEK AS NEEDED FOR SMOKING CESSATION.    Former smoker       nortriptyline 25 MG capsule    PAMELOR    60 capsule    Take 2 capsules (50 mg) by mouth At Bedtime    Neuralgic amyotrophy       sildenafil 100 MG tablet    VIAGRA    5 tablet    Take 0.5-1 tablets ( mg) by mouth daily as needed for erectile dysfunction Take 30 min to 4 hours before intercourse.  Never use with nitroglycerin, terazosin or doxazosin.    ED (erectile dysfunction)       VALIUM 5 MG tablet   Generic drug:  diazepam     60 tablet    Take 1 tablet (5 mg) 3 times daily as needed.        venlafaxine 150 MG Tb24 24 hr tablet    EFFEXOR-ER    30 each    Take 1 tablet by mouth daily (with breakfast).    Anxiety       zolpidem 10 MG tablet    AMBIEN    1 tablet    Take tablet by mouth 15 minutes prior to sleep, for Sleep Study    Pain disorder, Snoring       * Notice:  This list has 4 medication(s) that are the same as other medications prescribed for you. Read the directions carefully, and ask your doctor or other care provider to review them with you.

## 2017-10-26 NOTE — PATIENT INSTRUCTIONS
[facility]    1. Your sleep study will be reviewed by a sleep physician within the next few days.     2. Please follow up in the sleep clinic as scheduled, or, make an appointment with your sleep provider to be seen within two weeks to discuss the results of the sleep study.    3. If you have any questions or problems with your treatment plan, please contact your sleep clinic provider at [phone] to further manage your condition.    4. Please review your attached medication list, and, at your follow-up appointment advise your sleep clinic provider about any changes.    5. Go to [email or web address] for more information about your sleep problems.    Jeannette Teague, RPSGT  October 25, 2017

## 2017-10-31 NOTE — PROCEDURES
" SLEEP STUDY INTERPRETATION  POLYSOMNOGRAPHY REPORT      Patient: Phillip Rueda  YOB: 1980  Study Date: 10/25/2017  MRN: [id number]  Referring Provider: Dr Dos Santos  Ordering Provider: WALESKA Eastman CNP    Indications for Polysomnography: The patient is a 37 y old Male who is 5' 9\" and weighs 186.0 lbs.  His BMI is 27.5, [place] sleepiness scale 4.0 and neck size is 42.0.  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 A diagnostic polysomnogram was performed to evaluate for sleep apnea.    Polysomnogram Data:  A full night polysomnogram recorded the standard physiologic parameters including EEG, EOG, EMG, ECG, nasal and oral airflow.  Respiratory parameters of chest and abdominal movements were recorded with respiratory inductance plethysmography.  Oxygen saturation was recorded by pulse oximetry.      Sleep Architecture: Normal sleep efficiency, Stage N3 not seen.   The total recording time of the polysomnogram was 416.3 minutes.  The total sleep time was 364.5 minutes.  Sleep latency was increased at 16.8 minutes with the use of a sleep aid.  REM latency was 164.5 minutes.  Arousal index was normal at 6.3 arousals per hour.  Sleep efficiency was normal at 87.6%.  Wake after sleep onset was 34.0 minutes.  The patient spent 11.7% of total sleep time in Stage N1, 74.9% in Stage N2, 0.0% in Stages N3, and 13.4% in REM.  Time in REM supine was 10.0 minutes.    Respiration: No obstructive sleep apnea, moderately loud snoring noted continuously throughout the study.    Events - The polysomnogram revealed a presence of 1 obstructive, 19 central, and 0 mixed apneas resulting in an apnea index of 3.3 events per hour.  There were 1 hypopneas resulting in a hypopnea index of 0.2 events per hour.  The combined apnea/hypopnea index was 3.5 events per hour.  The REM AHI was 6.1 events per hour.  The supine AHI was 3.8 events per hour.  The RERA index was 0.2 events per hour.   The RDI was 3.7 " events per hour.    Snoring - was reported as moderately loud and present throughout the study.    Respiratory rate and pattern - was notable for normal respiratory rate and pattern.    Sustained Sleep Associated Hypoventilation - Transcutaneous carbon dioxide monitoring was used, however significant hypoventilation was not present with a maximum change of 6 mmHg. ABG was not performed.     Sleep Associated Hypoxemia - (Greater than 5 minutes O2 sat below 89%) was not present.  Baseline oxygen saturation was 94.5%. Lowest oxygen saturation was 86.2%.  Time spent less than or equal to 88% was 0.1 minutes.  Time spent less than or equal to 89% was 0.5 minutes.  3.7 0.2 3.5     Movement Activity: Excessive transient muscle activity in most REM epochs due to persistent limb movements noted. Chin EMG was appropriately suppressed. No dream enactment behavior was noted.     Periodic Limb Activity - There were 57 PLMs during the entire study. The PLM index was 9.4 movements per hour.  The PLM Arousal Index was 0.2 per hour.    REM EMG Activity - Excessive transient muscle activity was present with presence of limb movements. Chin EMG was suppressed.    Nocturnal Behavior - Abnormal sleep related behaviors were not noted during / arising out of NREM / REM sleep.      Bruxism - None apparent.    Cardiac Summary: No cardiac abnormalities noted.   The average pulse rate was 72.2 bpm.  The minimum pulse rate was 56.9 bpm while the maximum pulse rate was 88.5 bpm. The rhythm is normal sinus. Arrhythmias were not noted.      Assessment:     No obstructive sleep apnea noted.    Moderately loud snoring noted throughout the study.     No sleep associated hypoxemia or hypoventilation noted.     Excessive transient muscle activity in most REM epochs due to persistent limb movements noted. Chin EMG was appropriately suppressed. No dream enactment behavior was noted.    No cardiac abnormalities noted.     Sleep architecture was overall  normal with minimal sleep disruption but no stage N3 seen.    Recommendations:    Patient may be a candidate for dental appliance through referral to Sleep Dentistry for the treatment of socially disruptive snoring.    Advise regarding the risks of drowsy driving.    Suggest optimizing sleep schedule and avoiding sleep deprivation.    Pharmacologic therapy should be used for management of restless legs syndrome only if present and clinically indicated and not based on the presence of periodic limb movements alone.    Diagnostic Codes:     Unspecified Sleep Disturbance G47.9             Electronically signed by   Mark Wagner MD (10/31/17)             Range(%) Time in range (min) Time in range (%) Time in or below range (min) Time in or below range (%)   0.0 - 89.0 0.5 0.1% 0.5 0.1%   0.0 - 88.0 0.1 0.0% 0.1 0.0%      3.5 3.8 6.1

## 2017-11-01 ENCOUNTER — PRE VISIT (OUTPATIENT)
Dept: GASTROENTEROLOGY | Facility: CLINIC | Age: 37
End: 2017-11-01

## 2017-11-09 ENCOUNTER — OFFICE VISIT (OUTPATIENT)
Dept: SLEEP MEDICINE | Facility: CLINIC | Age: 37
End: 2017-11-09
Attending: NURSE PRACTITIONER
Payer: COMMERCIAL

## 2017-11-09 VITALS
DIASTOLIC BLOOD PRESSURE: 78 MMHG | BODY MASS INDEX: 27.85 KG/M2 | RESPIRATION RATE: 16 BRPM | OXYGEN SATURATION: 98 % | WEIGHT: 188 LBS | SYSTOLIC BLOOD PRESSURE: 135 MMHG | HEART RATE: 101 BPM | HEIGHT: 69 IN

## 2017-11-09 DIAGNOSIS — E66.3 OVERWEIGHT (BMI 25.0-29.9): ICD-10-CM

## 2017-11-09 DIAGNOSIS — R06.83 SNORING: Primary | ICD-10-CM

## 2017-11-09 DIAGNOSIS — G47.69 SLEEP-RELATED MOVEMENT DISORDER: ICD-10-CM

## 2017-11-09 NOTE — MR AVS SNAPSHOT
After Visit Summary   11/9/2017    Phillip Rueda    MRN: [id number]           Patient Information     Date Of Birth          1980        Visit Information        Provider Department      11/9/2017 2:30 PM Shelli Londono APRN CNP [facility] [place], Sleep Study        Care Instructions    For: snoring    Raymundo Mateuscallie Richard-    Use saline spray product (ocean complete or arm and hammer are easy to use) in shower where nose naturally opens up. Use another time of day with continue congestion over the sink.    Breathe rites    Positioning Device  Positioning devices are generally used when sleep apnea is mild and only occurs on your back.This example shows a pillow that straps around the waist. It may be appropriate for those whose sleep study shows milder sleep apnea that occurs primarily when lying flat on one's back. Preliminary studies have shown benefit but effectiveness at home may need to be verified by a home sleep test. These devices are generally not covered by medical insurance.    amazon  Or  ebay snore bumper pillow; or backpack w/ chest strap stuffed w/ pillow:  or t shirt 2 / pockets sew in tennis balls    Keep weight the same    Watch for release quietly or soundly.     Over the counter: dental appliance    With movement: if there is dream enactment and you hit someone, fall out of bed or stand on the bed-let me know.          Follow-ups after your visit        Who to contact     If you have questions or need follow up information about today's clinic visit or your schedule please contact [facility] directly at [phone].  Normal or non-critical lab and imaging results will be communicated to you by MyChart, letter or phone within 4 business days after the clinic has received the results. If you do not hear from us within 7 days, please contact the clinic through MyChart or phone. If you have a critical or abnormal lab result, we will notify you by  "phone as soon as possible.  Submit refill requests through [employer] or call your pharmacy and they will forward the refill request to us. Please allow 3 business days for your refill to be completed.          Additional Information About Your Visit        [employer] Information     [employer] gives you secure access to your electronic health record. If you see a primary care provider, you can also send messages to your care team and make appointments. If you have questions, please call your primary care clinic.  If you do not have a primary care provider, please call [phone] and they will assist you.        Care EveryWhere ID     This is your Care EveryWhere ID. This could be used by other organizations to access your [facility] medical records  [id number]        Your Vitals Were     Pulse Respirations Height Pulse Oximetry BMI (Body Mass Index)       101 16 1.753 m (5' 9\") 98% 27.76 kg/m2        Blood Pressure from Last 3 Encounters:   11/09/17 135/78   07/14/17 134/84   07/10/17 114/84    Weight from Last 3 Encounters:   11/09/17 85.3 kg (188 lb)   07/14/17 84.4 kg (186 lb)   07/10/17 82.3 kg (181 lb 8 oz)              Today, you had the following     No orders found for display         Today's Medication Changes          These changes are accurate as of: 11/9/17  3:12 PM.  If you have any questions, ask your nurse or doctor.               Stop taking these medicines if you haven't already. Please contact your care team if you have questions.     zolpidem 10 MG tablet   Commonly known as:  AMBIEN                    Primary Care Provider Office Phone # Fax #    Km Michelle Dos Santos [phone] [address] [place] [id number]        Goals        General    Functional: Patient will revise his diabetic diet to maximize his diabetes with the goal of decreasing his daily blood sugars and improving his HGB A1c (pt-stated)     Notes - Note created  3/24/2016 11:25 AM by Erna Pineda RN "    As of today's date 3/24/2016 goal is met at 0 - 25%.   Goal Status:  Active        Medical (pt-stated)     Notes - Note created  8/25/2015 12:49 PM by Erna Pineda RN    Patient states that he is wanting a diabetes education referral so that he can fine tune his blood sugars or when he is on steroids  Patient states that he will contact RN Clinic Care Coordinator when needing assistance with coordinating cares or has complications with his disease management and or cares      Medical Patient will bring 3 days of blood sugar readings to office visit with Dr Dos Santos . (pt-stated)     Notes - Note created  3/24/2016 11:23 AM by Erna Pineda RN    As of today's date 3/24/2016 goal is met at 0 - 25%.   Goal Status:  Active        Medical: Patient will set up podiatry and eye exam for diabetici wtihin 6 month. (pt-stated)     Notes - Note created  3/24/2016 11:21 AM by Erna Pineda RN    As of today's date 3/24/2016 goal is met at 0 - 25%.   Goal Status:  Active        Medical: Pt will continue to utilize his training in [employer] along with [facility] for pain and life management (pt-stated)     Notes - Note created  3/24/2016 11:28 AM by Erna Pineda RN    As of today's date 3/24/2016 goal is met at 51 - 75%.   Goal Status:  Active          Equal Access to Services     [facility]: Hadii aad ku hadasho Soaliciaali, waaxda luqadaha, qaybta kaalmada adeegyada, caroline markhamisabel . So [facility] [phone].    ATENCIÓN: Si habla español, tiene a robins disposición servicios gratuitos de asistencia lingüística. Llame al [phone].    We comply with applicable federal civil rights laws and [place] laws. We do not discriminate on the basis of race, color, national origin, age, disability, sex, sexual orientation, or gender identity.            Thank you!     Thank you for choosing [facility] [place], SLEEP STUDY  for your care. Our goal is always to provide you with excellent  care. Hearing back from our patients is one way we can continue to improve our services. Please take a few minutes to complete the written survey that you may receive in the mail after your visit with us. Thank you!             Your Updated Medication List - Protect others around you: Learn how to safely use, store and throw away your medicines at www.disposemymeds.org.          This list is accurate as of: 11/9/17  3:12 PM.  Always use your most recent med list.                   Brand Name Dispense Instructions for use Diagnosis    BENADRYL PO      Take 50 mg by mouth At Bedtime.        blood glucose monitoring meter device kit     1 kit    Use to test blood sugar 4-6 times daily or as directed.    Type 2 diabetes mellitus without complication (H)       * blood glucose monitoring test strip    ACCU-CHEK ADARSH    4 Box    Use to test blood sugars 4 times daily or as directed. When on prednisone check 6 times per day.    Type 2 diabetes, HbA1c goal < 7% (H)       * blood glucose monitoring test strip    ACCU-CHEK SMARTVIEW    200 strip    Use to test blood sugar 2 times daily or as directed.    Type 2 diabetes mellitus without complication (H)       buprenorphine HCl-naloxone HCl 4-1 MG per film    SUBOXONE     Place 1 Film under the tongue 3 times daily        EPINEPHrine 0.3 MG/0.3ML injection 2-pack    EPIPEN/ADRENACLICK/or ANY BX GENERIC EQUIV    1 each    Inject 0.3 mLs (0.3 mg) into the muscle once as needed for anaphylaxis    Anaphylactic reaction, sequela       * gabapentin 600 MG tablet    NEURONTIN    180 tablet    TAKE 2 TABLETS BY MOUTH THREE TIMES DAILY    Neuralgic amyotrophy       * gabapentin 600 MG tablet    NEURONTIN    180 tablet    TAKE 2 TABLETS BY MOUTH THREE TIMES DAILY    Neuralgic amyotrophy       ibuprofen 200 MG tablet    ADVIL/MOTRIN     Take 1-2 tablets by mouth every 6 hours as needed.        insulin degludec 100 UNIT/ML pen    TRESIBA    15 mL    Use 12 units daily    Type 2 diabetes  mellitus without complication, without long-term current use of insulin (H)       insulin pen needle 31G X 6 MM     100 each    Use 2 daily or as directed.    Type 2 diabetes mellitus without complication (H)       liraglutide 18 MG/3ML soln    VICTOZA PEN    6 mL    INJECT 1.2 MG SUBCUTANEOUS EVERY DAY    Type 2 diabetes mellitus without complication, with long-term current use of insulin (H)       losartan 100 MG tablet    COZAAR    30 tablet    Take 1 tablet (100 mg) by mouth daily    Hypertension goal BP (blood pressure) < 140/90       metFORMIN 500 MG 24 hr tablet    GLUCOPHAGE-XR    120 tablet    TAKE 2 TABLETS(1000 MG) BY MOUTH TWICE DAILY WITH MEALS    Type 2 diabetes mellitus without complication, with long-term current use of insulin (H)       nicotine polacrilex 4 MG lozenge     108 lozenge    PLACE 1 LOZENGE INSIDE CHEEK AS NEEDED FOR SMOKING CESSATION.    Former smoker       nortriptyline 25 MG capsule    PAMELOR    60 capsule    Take 2 capsules (50 mg) by mouth At Bedtime    Neuralgic amyotrophy       sildenafil 100 MG tablet    VIAGRA    5 tablet    Take 0.5-1 tablets ( mg) by mouth daily as needed for erectile dysfunction Take 30 min to 4 hours before intercourse.  Never use with nitroglycerin, terazosin or doxazosin.    ED (erectile dysfunction)       VALIUM 5 MG tablet   Generic drug:  diazepam     60 tablet    Take 1 tablet (5 mg) 3 times daily as needed.        venlafaxine 150 MG Tb24 24 hr tablet    EFFEXOR-ER    30 each    Take 1 tablet by mouth daily (with breakfast).    Anxiety       * Notice:  This list has 4 medication(s) that are the same as other medications prescribed for you. Read the directions carefully, and ask your doctor or other care provider to review them with you.

## 2017-11-09 NOTE — PROGRESS NOTES
CC:Returns to review results of his PSG    HPI: Mr. Rueda is a 37-year-old male with a history of Parsonage Avendaño's syndrome resulting in frequent flares of pain and on occasion decrease movement of the scapula likely due to splinting possibly resulting in decrease in respiratory excursion.  Suboxone use with pain flares.  History of extreme snoring, irregular sleep schedule (DSPD)  with his profession as a musician  BMI 27.5.    10/25/17 PSG revealed an AHI 3.5, REM AHI 6.1, with lowest 02 sat of 86.2 % without hypoxemia. Excessive transient muscle activity was present with presence of limb movements, chin EMG was suppressed, and no abnormal sleep related behaviors were present. (Effexor related likely)  PLM I 9.4, PLM AI 0.2.    Discussed results of study, especially options for snoring reduction.  Pt will pursue some of these meatures.    Allergies:    Allergies   Allergen Reactions     Bees Swelling     Bupropion Hcl Other (See Comments)     seizure     Lisinopril Cough     Penicillins Other (See Comments)     Unable to close mouth       Medications:    Current Outpatient Prescriptions   Medication Sig Dispense Refill     nicotine polacrilex 4 MG lozenge PLACE 1 LOZENGE INSIDE CHEEK AS NEEDED FOR SMOKING CESSATION. 108 lozenge 0     nortriptyline (PAMELOR) 25 MG capsule Take 2 capsules (50 mg) by mouth At Bedtime 60 capsule 2     gabapentin (NEURONTIN) 600 MG tablet TAKE 2 TABLETS BY MOUTH THREE TIMES DAILY 180 tablet 2     gabapentin (NEURONTIN) 600 MG tablet TAKE 2 TABLETS BY MOUTH THREE TIMES DAILY 180 tablet 0     metFORMIN (GLUCOPHAGE-XR) 500 MG 24 hr tablet TAKE 2 TABLETS(1000 MG) BY MOUTH TWICE DAILY WITH MEALS 120 tablet 5     liraglutide (VICTOZA PEN) 18 MG/3ML soln INJECT 1.2 MG SUBCUTANEOUS EVERY DAY 6 mL 5     losartan (COZAAR) 100 MG tablet Take 1 tablet (100 mg) by mouth daily 30 tablet 5     insulin degludec (TRESIBA) 100 UNIT/ML pen Use 12 units daily 15 mL 6     blood glucose monitoring  (ACCU-CHEK SMARTVIEW) test strip Use to test blood sugar 2 times daily or as directed. 200 strip 3     Buprenorphine HCl-Naloxone HCl (SUBOXONE) 4-1 MG film Place 1 Film under the tongue 3 times daily       insulin pen needle 31G X 6 MM Use 2 daily or as directed. 100 each 11     blood glucose monitoring (ACCU-CHEK ADARSH PLUS) meter device kit Use to test blood sugar 4-6 times daily or as directed. 1 kit 0     EPINEPHrine (EPIPEN) 0.3 MG/0.3ML injection Inject 0.3 mLs (0.3 mg) into the muscle once as needed for anaphylaxis 1 each 2     blood glucose monitoring (ACCU-CHEK ADARSH) test strip Use to test blood sugars 4 times daily or as directed. When on prednisone check 6 times per day. 4 Box 12     sildenafil (VIAGRA) 100 MG tablet Take 0.5-1 tablets ( mg) by mouth daily as needed for erectile dysfunction Take 30 min to 4 hours before intercourse.  Never use with nitroglycerin, terazosin or doxazosin. 5 tablet 5     diazepam (VALIUM) 5 MG tablet Take 1 tablet (5 mg) 3 times daily as needed. 60 tablet      venlafaxine (EFFEXOR-ER) 150 MG TB24 Take 1 tablet by mouth daily (with breakfast). 30 each 0     ibuprofen (ADVIL,MOTRIN) 200 MG tablet Take 1-2 tablets by mouth every 6 hours as needed.       DiphenhydrAMINE HCl (BENADRYL PO) Take 50 mg by mouth At Bedtime.         Problem List:  Patient Active Problem List    Diagnosis Date Noted     Former smoker 06/02/2017     Priority: Medium     Health Care Home 12/07/2016     Priority: Medium     Pain disorder 12/08/2015     Priority: Medium     ED (erectile dysfunction) 08/11/2015     Priority: Medium     Right shoulder pain 05/11/2015     Priority: Medium     CARDIOVASCULAR SCREENING; LDL GOAL LESS THAN 100 02/11/2015     Priority: Medium     Type 2 diabetes mellitus without complication (H) 02/04/2015     Priority: Medium     Hypertension goal BP (blood pressure) < 140/90 02/04/2015     Priority: Medium     Anemia 02/04/2015     Priority: Medium     Drug dependence,  "in remission (H) 04/08/2013     Priority: Medium     Problem list name updated by automated process. 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 Provider to review          Past Medical/Surgical History:  Past Medical History:   Diagnosis Date     Anxiety      Depressive disorder      Diabetes mellitus (H)      Hypertension      Liver disease      Neuralgic amyotrophy      Pain disorder 12/8/2015     Parsonage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      Seizures (H)      Staph infection      Past Surgical History:   Procedure Laterality Date     ORTHOPEDIC SURGERY             Physical Examination:  Vitals: /78  Pulse 101  Resp 16  Ht 1.753 m (5' 9\")  Wt 85.3 kg (188 lb)  SpO2 98%  BMI 27.76 kg/m2  BMI= Body mass index is 27.76 kg/(m^2).         [place] Total Score 11/9/2017   Total score - [place] 2       GENERAL APPEARANCE: healthy, alert and no distress    Assessment/Plan:    1. Snoring, socially disruptive: will loose weight, treat mild rhinitis, consider snoring reduction such as digeridoo or other vocal exercises.   2. Overweight: will make effort at weight loss.  3. Movement at night: (likely effexor related) will return if there is dream enactment.    Time spent with patient is 25 minutes, of which >50% was spent in counseling, education and coordination of care.                                      "

## 2017-11-09 NOTE — PATIENT INSTRUCTIONS
For: snoring    Mateus Fonseca-    Use saline spray product (ocean complete or arm and hammer are easy to use) in shower where nose naturally opens up. Use another time of day with continue congestion over the sink.    Breathe rites    Positioning Device  Positioning devices are generally used when sleep apnea is mild and only occurs on your back.This example shows a pillow that straps around the waist. It may be appropriate for those whose sleep study shows milder sleep apnea that occurs primarily when lying flat on one's back. Preliminary studies have shown benefit but effectiveness at home may need to be verified by a home sleep test. These devices are generally not covered by medical insurance.    amazon  Or  [employer] snore bumper pillow; or backpack w/ chest strap stuffed w/ pillow:  or t shirt 2 / pockets sew in tennis balls    Keep weight the same    Watch for release quietly or soundly.     Over the counter: dental appliance    With movement: if there is dream enactment and you hit someone, fall out of bed or stand on the bed-let me know.

## 2017-11-12 DIAGNOSIS — Z87.891 FORMER SMOKER: ICD-10-CM

## 2017-11-14 NOTE — TELEPHONE ENCOUNTER
Last office visit: 7/10/17.    Prescription approved per [facility] Refill Protocol.  PARAG Jolly, MARTYN, RN

## 2017-11-21 ENCOUNTER — OFFICE VISIT (OUTPATIENT)
Dept: FAMILY MEDICINE | Facility: CLINIC | Age: 37
End: 2017-11-21
Payer: COMMERCIAL

## 2017-11-21 VITALS
DIASTOLIC BLOOD PRESSURE: 83 MMHG | RESPIRATION RATE: 22 BRPM | WEIGHT: 195 LBS | TEMPERATURE: 97.9 F | HEART RATE: 101 BPM | BODY MASS INDEX: 28.8 KG/M2 | SYSTOLIC BLOOD PRESSURE: 126 MMHG | OXYGEN SATURATION: 97 %

## 2017-11-21 DIAGNOSIS — Z87.891 FORMER SMOKER: ICD-10-CM

## 2017-11-21 DIAGNOSIS — E11.9 TYPE 2 DIABETES MELLITUS WITHOUT COMPLICATION, WITH LONG-TERM CURRENT USE OF INSULIN (H): Primary | ICD-10-CM

## 2017-11-21 DIAGNOSIS — I10 HYPERTENSION GOAL BP (BLOOD PRESSURE) < 140/90: ICD-10-CM

## 2017-11-21 DIAGNOSIS — Z79.4 TYPE 2 DIABETES MELLITUS WITHOUT COMPLICATION, WITH LONG-TERM CURRENT USE OF INSULIN (H): Primary | ICD-10-CM

## 2017-11-21 LAB — HBA1C MFR BLD: 6.4 % (ref 4.3–6)

## 2017-11-21 PROCEDURE — 36415 COLL VENOUS BLD VENIPUNCTURE: CPT | Performed by: FAMILY MEDICINE

## 2017-11-21 PROCEDURE — 83036 HEMOGLOBIN GLYCOSYLATED A1C: CPT | Performed by: FAMILY MEDICINE

## 2017-11-21 PROCEDURE — 99214 OFFICE O/P EST MOD 30 MIN: CPT | Performed by: FAMILY MEDICINE

## 2017-11-21 RX ORDER — LIRAGLUTIDE 6 MG/ML
INJECTION SUBCUTANEOUS
Qty: 6 ML | Refills: 5 | Status: SHIPPED | OUTPATIENT
Start: 2017-11-21 | End: 2019-01-09

## 2017-11-21 RX ORDER — LOSARTAN POTASSIUM 100 MG/1
100 TABLET ORAL DAILY
Qty: 30 TABLET | Refills: 5 | Status: SHIPPED | OUTPATIENT
Start: 2017-11-21 | End: 2018-06-10

## 2017-11-21 RX ORDER — METFORMIN HCL 500 MG
TABLET, EXTENDED RELEASE 24 HR ORAL
Qty: 120 TABLET | Refills: 5 | Status: SHIPPED | OUTPATIENT
Start: 2017-11-21 | End: 2018-06-10

## 2017-11-21 NOTE — PROGRESS NOTES
SUBJECTIVE:   Phillip Rueda is a 37 year old male who presents to clinic today for the following health issues:      Diabetes Follow-up    Patient is checking blood sugars: twice daily.    Blood sugar testing frequency justification: none  Results are as follows:       am - 140        Diabetic concerns: None     Symptoms of hypoglycemia (low blood sugar): shaky     Paresthesias (numbness or burning in feet) or sores: No   Date of last diabetic eye exam: 2015      Amount of exercise or physical activity: 4-5 days/week for an average of 45-60 minutes    Problems taking medications regularly: No    Medication side effects: none    Diet: regular (no restrictions)    Sometime uses food as comfort.     Using victoza 1.2 units per day. Using it once per day.   tresiba 12 units per day.  No low blood sugars.     Wife is dietician. Overall doing well.     Still no appt with eye doctor and hoping to schedule an appointment.     Using nortriptyline for his nerve related to pain.   Seeing pain specialist and getting suboxone and it is helping him for pain.   Not needing to go to ER.     Works couple times per week. 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 Has 3 months old child.     Does not smoke. Uses nicotine lozenges.     Problem list and histories reviewed & adjusted, as indicated.  Additional history: as documented    Labs reviewed in EPIC    Reviewed and updated as needed this visit by clinical staff     Reviewed and updated as needed this visit by Provider      Social History     Social History     Marital status:      Spouse name: N/A     Number of children: N/A     Years of education: N/A     Social History Main Topics     Smoking status: Former Smoker     Packs/day: 0.25     Years: 15.00     Types: Cigarettes, Dip, chew, snus or snuff     Start date: 7/1/1996     Quit date: 7/1/2013     Smokeless tobacco: Current User     Alcohol use No      Comment: 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  Drink 1.75 every 2 days     Drug use: No      Sexual activity: Yes     Partners: Female     Birth control/ protection: None      Comment: Trying to get my wife pregnant     Other Topics Concern     Parent/Sibling W/ Cabg, Mi Or Angioplasty Before 65f 55m? Yes     My dads dad  of heart issues in his 40's     Social History Narrative     Allergies   Allergen Reactions     Bees Swelling     Bupropion Hcl Other (See Comments)     seizure     Lisinopril Cough     Penicillins Other (See Comments)     Unable to close mouth     Patient Active Problem List   Diagnosis     Neuralgic amyotrophy     Hereditary and idiopathic peripheral neuropathy     Drug dependence, in remission (H)     Type 2 diabetes mellitus without complication (H)     Hypertension goal BP (blood pressure) < 140/90     Anemia     CARDIOVASCULAR SCREENING; LDL GOAL LESS THAN 100     Right shoulder pain     ED (erectile dysfunction)     Pain disorder     Health Care Home     Former smoker     Reviewed medications, social history and  past medical and surgical history.    Review of system: for general, respiratory, CVS, GI and psychiatry negative except for noted above.     EXAM:  /83  Pulse 101  Temp 97.9  F (36.6  C) (Oral)  Resp 22  Wt 195 lb (88.5 kg)  SpO2 97%  BMI 28.8 kg/m2  Constitutional: healthy, alert and no distress   Psychiatric: mentation appears normal and affect normal/bright  Cardiovascular: RRR. No murmurs,  Respiratory: negative, Lungs clear. No crackles or wheezing. No tachypnea.        ASSESSMENT / PLAN:  (E11.9,  Z79.4) Type 2 diabetes mellitus without complication, with long-term current use of insulin (H)  (primary encounter diagnosis)  Comment: doing well. Patient is tolerating current medication without any major side effects of concerns and current dose seems reasonable too.  Current medication regime is effective. Continue current treatment without any changes.   - we talked about statin but he is not too excited right now. He has family history of premature  cardiac death and wondering about preventative services. Offered preventative cardiology referral. He will think about it and will ask for referral on his next appt.   Plan: Hemoglobin A1c, metFORMIN (GLUCOPHAGE-XR) 500         MG 24 hr tablet, liraglutide (VICTOZA PEN) 18         MG/3ML soln, insulin degludec (TRESIBA) 100         UNIT/ML pen             (Z87.891) Former smoker  Comment:  Uses lozenges as needed.   Plan: nicotine polacrilex 4 MG lozenge              (I10) Hypertension goal BP (blood pressure) < 140/90  Comment: Patient is tolerating current medication without any major side effects of concerns and current dose seems reasonable too.  Current medication regime is effective. Continue current treatment without any changes.   Plan: losartan (COZAAR) 100 MG tablet                   Patient Instructions   Consider statin medications to protect your heart from diabetes as per new heart guidelines.    Call me if you chose to do that.    Follow up in Feb or march 2018     We will do preventative cardiology referral at that time.

## 2017-11-21 NOTE — PATIENT INSTRUCTIONS
Consider statin medications to protect your heart from diabetes as per new heart guidelines.    Call me if you chose to do that.    Follow up in Feb or march 2018     We will do preventative cardiology referral at that time.

## 2017-11-21 NOTE — MR AVS SNAPSHOT
After Visit Summary   11/21/2017    Phillip Rueda    MRN: [id number]           Patient Information     Date Of Birth          1980        Visit Information        Provider Department      11/21/2017 2:40 PM Km Dos Santos MD [facility]        Today's Diagnoses     Type 2 diabetes mellitus without complication, with long-term current use of insulin (H)    -  1    Former smoker        Hypertension goal BP (blood pressure) < 140/90        Type 2 diabetes mellitus without complication, without long-term current use of insulin (H)          Care Instructions    Consider statin medications to protect your heart from diabetes as per new heart guidelines.    Call me if you chose to do that.    Follow up in Feb or march 2018     We will do preventative cardiology referral at that time.           Follow-ups after your visit        Who to contact     If you have questions or need follow up information about today's clinic visit or your schedule please contact [facility] directly at [phone].  Normal or non-critical lab and imaging results will be communicated to you by Digital Foliohart, letter or phone within 4 business days after the clinic has received the results. If you do not hear from us within 7 days, please contact the clinic through BioDermt or phone. If you have a critical or abnormal lab result, we will notify you by phone as soon as possible.  Submit refill requests through Mimetas or call your pharmacy and they will forward the refill request to us. Please allow 3 business days for your refill to be completed.          Additional Information About Your Visit        Digital Foliohart Information     Mimetas gives you secure access to your electronic health record. If you see a primary care provider, you can also send messages to your care team and make appointments. If you have questions, please call your primary care clinic.  If you do not have a primary care provider,  please call [phone] and they will assist you.        Care EveryWhere ID     This is your Care EveryWhere ID. This could be used by other organizations to access your [facility] medical records  [id number]        Your Vitals Were     Pulse Temperature Respirations Pulse Oximetry BMI (Body Mass Index)       101 97.9  F (36.6  C) (Oral) 22 97% 28.8 kg/m2        Blood Pressure from Last 3 Encounters:   11/21/17 126/83   11/09/17 135/78   07/14/17 134/84    Weight from Last 3 Encounters:   11/21/17 195 lb (88.5 kg)   11/09/17 188 lb (85.3 kg)   07/14/17 186 lb (84.4 kg)              We Performed the Following     Hemoglobin A1c          Today's Medication Changes          These changes are accurate as of: 11/21/17  3:34 PM.  If you have any questions, ask your nurse or doctor.               These medicines have changed or have updated prescriptions.        Dose/Directions    nicotine polacrilex 4 MG lozenge   This may have changed:  See the new instructions.   Used for:  Former smoker   Changed by:  Km Dos Santos MD        PLACE 1 LOZENGE INSIDE CHEEK AS NEEDED FOR SMOKING CESSATION.   Quantity:  108 lozenge   Refills:  3            Where to get your medicines      These medications were sent to [facility] [address] [place] [address] AT [facility] & [address] [place] [id number]     Phone:  [phone]     insulin degludec 100 UNIT/ML pen    liraglutide 18 MG/3ML soln    losartan 100 MG tablet    metFORMIN 500 MG 24 hr tablet    nicotine polacrilex 4 MG lozenge                Primary Care Provider Office Phone # Fax #    Km Dos Santos [phone] [address] [place] [id number]        Goals        General    Functional: Patient will revise his diabetic diet to maximize his diabetes with the goal of decreasing his daily blood sugars and improving his HGB A1c (pt-stated)     Notes - Note created  3/24/2016  11:25 AM by Erna Pineda RN    As of today's date 3/24/2016 goal is met at 0 - 25%.   Goal Status:  Active        Medical (pt-stated)     Notes - Note created  8/25/2015 12:49 PM by Erna Pineda RN    Patient states that he is wanting a diabetes education referral so that he can fine tune his blood sugars or when he is on steroids  Patient states that he will contact RN Clinic Care Coordinator when needing assistance with coordinating cares or has complications with his disease management and or cares      Medical Patient will bring 3 days of blood sugar readings to office visit with Dr Dos Santos . (pt-stated)     Notes - Note created  3/24/2016 11:23 AM by Erna Pineda RN    As of today's date 3/24/2016 goal is met at 0 - 25%.   Goal Status:  Active        Medical: Patient will set up podiatry and eye exam for diabetici wtihin 6 month. (pt-stated)     Notes - Note created  3/24/2016 11:21 AM by Erna Pineda RN    As of today's date 3/24/2016 goal is met at 0 - 25%.   Goal Status:  Active        Medical: Pt will continue to utilize his training in [employer] along with [employer] for pain and life management (pt-stated)     Notes - Note created  3/24/2016 11:28 AM by Erna Pineda RN    As of today's date 3/24/2016 goal is met at 51 - 75%.   Goal Status:  Active          Equal Access to Services     [facility]: Hadii nadege ku hadasho Soaliciaali, waaxda luqadaha, qaybta kaalmada adeegyada, caroline burton . So [facility] [phone].    ATENCIÓN: Si habla español, tiene a robins disposición servicios gratuitos de asistencia lingüística. Llame al [phone].    We comply with applicable federal civil rights laws and [place] laws. We do not discriminate on the basis of race, color, national origin, age, disability, sex, sexual orientation, or gender identity.            Thank you!     Thank you for choosing [facility]  for your care. Our goal is always to  provide you with excellent care. Hearing back from our patients is one way we can continue to improve our services. Please take a few minutes to complete the written survey that you may receive in the mail after your visit with us. Thank you!             Your Updated Medication List - Protect others around you: Learn how to safely use, store and throw away your medicines at www.disposemymeds.org.          This list is accurate as of: 11/21/17  3:34 PM.  Always use your most recent med list.                   Brand Name Dispense Instructions for use Diagnosis    BENADRYL PO      Take 50 mg by mouth At Bedtime.        blood glucose monitoring meter device kit     1 kit    Use to test blood sugar 4-6 times daily or as directed.    Type 2 diabetes mellitus without complication (H)       * blood glucose monitoring test strip    ACCU-CHEK ADARSH    4 Box    Use to test blood sugars 4 times daily or as directed. When on prednisone check 6 times per day.    Type 2 diabetes, HbA1c goal < 7% (H)       * blood glucose monitoring test strip    ACCU-CHEK SMARTVIEW    200 strip    Use to test blood sugar 2 times daily or as directed.    Type 2 diabetes mellitus without complication (H)       buprenorphine HCl-naloxone HCl 4-1 MG per film    SUBOXONE     Place 1 Film under the tongue 3 times daily        EPINEPHrine 0.3 MG/0.3ML injection 2-pack    EPIPEN/ADRENACLICK/or ANY BX GENERIC EQUIV    1 each    Inject 0.3 mLs (0.3 mg) into the muscle once as needed for anaphylaxis    Anaphylactic reaction, sequela       * gabapentin 600 MG tablet    NEURONTIN    180 tablet    TAKE 2 TABLETS BY MOUTH THREE TIMES DAILY    Neuralgic amyotrophy       * gabapentin 600 MG tablet    NEURONTIN    180 tablet    TAKE 2 TABLETS BY MOUTH THREE TIMES DAILY    Neuralgic amyotrophy       ibuprofen 200 MG tablet    ADVIL/MOTRIN     Take 1-2 tablets by mouth every 6 hours as needed.        insulin degludec 100 UNIT/ML pen    TRESIBA    15 mL    Use 12  units daily    Type 2 diabetes mellitus without complication, without long-term current use of insulin (H)       insulin pen needle 31G X 6 MM     100 each    Use 2 daily or as directed.    Type 2 diabetes mellitus without complication (H)       liraglutide 18 MG/3ML soln    VICTOZA PEN    6 mL    INJECT 1.2 MG SUBCUTANEOUS EVERY DAY    Type 2 diabetes mellitus without complication, with long-term current use of insulin (H)       losartan 100 MG tablet    COZAAR    30 tablet    Take 1 tablet (100 mg) by mouth daily    Hypertension goal BP (blood pressure) < 140/90       metFORMIN 500 MG 24 hr tablet    GLUCOPHAGE-XR    120 tablet    TAKE 2 TABLETS(1000 MG) BY MOUTH TWICE DAILY WITH MEALS    Type 2 diabetes mellitus without complication, with long-term current use of insulin (H)       nicotine polacrilex 4 MG lozenge     108 lozenge    PLACE 1 LOZENGE INSIDE CHEEK AS NEEDED FOR SMOKING CESSATION.    Former smoker       nortriptyline 25 MG capsule    PAMELOR    60 capsule    Take 2 capsules (50 mg) by mouth At Bedtime    Neuralgic amyotrophy       sildenafil 100 MG tablet    VIAGRA    5 tablet    Take 0.5-1 tablets ( mg) by mouth daily as needed for erectile dysfunction Take 30 min to 4 hours before intercourse.  Never use with nitroglycerin, terazosin or doxazosin.    ED (erectile dysfunction)       VALIUM 5 MG tablet   Generic drug:  diazepam     60 tablet    Take 1 tablet (5 mg) 3 times daily as needed.        venlafaxine 150 MG Tb24 24 hr tablet    EFFEXOR-ER    30 each    Take 1 tablet by mouth daily (with breakfast).    Anxiety       * Notice:  This list has 4 medication(s) that are the same as other medications prescribed for you. Read the directions carefully, and ask your doctor or other care provider to review them with you.

## 2017-12-02 DIAGNOSIS — E11.9 TYPE 2 DIABETES MELLITUS WITHOUT COMPLICATION (H): ICD-10-CM

## 2017-12-04 RX ORDER — BLOOD SUGAR DIAGNOSTIC
STRIP MISCELLANEOUS
Qty: 200 STRIP | Refills: 1 | Status: SHIPPED | OUTPATIENT
Start: 2017-12-04 | End: 2018-11-16

## 2017-12-04 NOTE — TELEPHONE ENCOUNTER
Medication Detail      Disp Refills Start End NIK   blood glucose monitoring (ACCU-CHEK SMARTVIEW) test strip 200 strip 3 1/12/2017  No   Sig: Use to test blood sugar 2 times daily or as directed.     lov 11/21/17

## 2017-12-29 DIAGNOSIS — Z79.4 TYPE 2 DIABETES MELLITUS WITHOUT COMPLICATION, WITH LONG-TERM CURRENT USE OF INSULIN (H): ICD-10-CM

## 2017-12-29 DIAGNOSIS — E11.9 TYPE 2 DIABETES MELLITUS WITHOUT COMPLICATION, WITH LONG-TERM CURRENT USE OF INSULIN (H): ICD-10-CM

## 2017-12-29 DIAGNOSIS — G54.5 NEURALGIC AMYOTROPHY: ICD-10-CM

## 2017-12-29 RX ORDER — NORTRIPTYLINE HCL 25 MG
CAPSULE ORAL
Qty: 60 CAPSULE | Refills: 0 | OUTPATIENT
Start: 2017-12-29

## 2017-12-29 RX ORDER — LIRAGLUTIDE 6 MG/ML
INJECTION SUBCUTANEOUS
Qty: 6 ML | Refills: 0 | OUTPATIENT
Start: 2017-12-29

## 2017-12-29 NOTE — TELEPHONE ENCOUNTER
Requested Prescriptions   Pending Prescriptions Disp Refills     VICTOZA PEN 18 MG/3ML soln [Pharmacy Med Name: VICTOZA 18MG/3ML INJ PEN 2 X 3ML]  Last Written Prescription Date:  11/21/2017  Last Fill Quantity: 6 ml,  # refills: 5   Last Office Visit with G, P or [facility] prescribing provider:  11/21/2017   Future Office Visit:      6 mL 0     Sig: ADMINISTER 1.2 MG UNDER THE SKIN EVERY DAY    GLP-1 Agonists Protocol Passed    12/29/2017  2:30 PM       Passed - Blood pressure less than 140/90 in past 6 months    BP Readings from Last 3 Encounters:   11/21/17 126/83   11/09/17 135/78   07/14/17 134/84                Passed - LDL on file in past 12 months    Recent Labs   Lab Test  03/15/17   0752   LDL  55            Passed - Microalbumin on file in past 12 months    Recent Labs   Lab Test  03/15/17   0752   MICROL  <5   UMALCR  Unable to calculate due to low value            Passed - HgbA1C in past 3 or 6 months    Recent Labs   Lab Test  11/21/17   1456   A1C  6.4*            Passed - Patient is age 18 or older       Passed - A normal serum creatinine on file in past 12 months    Recent Labs   Lab Test  06/29/17   1338   CR  1.06            Passed - Recent visit with authorizing provider's specialty in past 6 months    IV to PO - Antibiotics     None

## 2017-12-29 NOTE — TELEPHONE ENCOUNTER
Over a year since last visit in Clinic. Can't refill without follow up appointment. If Phillip does not feel he needs to come to Clinic he can ask his primary care doctor or Pain Medicine provider to refill this drug. Thanks

## 2018-01-02 RX ORDER — NORTRIPTYLINE HCL 25 MG
CAPSULE ORAL
Qty: 60 CAPSULE | Refills: 0 | Status: SHIPPED | OUTPATIENT
Start: 2018-01-02 | End: 2018-01-29

## 2018-01-02 RX ORDER — GABAPENTIN 600 MG/1
TABLET ORAL
Qty: 180 TABLET | Refills: 0 | Status: SHIPPED | OUTPATIENT
Start: 2018-01-02 | End: 2018-03-05

## 2018-01-29 DIAGNOSIS — G54.5 NEURALGIC AMYOTROPHY: ICD-10-CM

## 2018-01-29 RX ORDER — NORTRIPTYLINE HCL 25 MG
CAPSULE ORAL
Qty: 60 CAPSULE | Refills: 1 | Status: ON HOLD | OUTPATIENT
Start: 2018-01-29 | End: 2022-10-10

## 2018-02-01 DIAGNOSIS — Z87.891 FORMER SMOKER: ICD-10-CM

## 2018-02-01 NOTE — TELEPHONE ENCOUNTER
"Requested Prescriptions   Pending Prescriptions Disp Refills     nicotine polacrilex 4 MG lozenge [Pharmacy Med Name: NICOTINE 4MG LOZENGES CHERRY 108'S]  Last Written Prescription Date:  12/29/17  Last Fill Quantity: 144,  # refills: 3   Last Office Visit: 11/21/2017   Future Office Visit:    108 lozenge 0     Sig: PLACE 1 LOZENGE INSIDE CHEEK AS NEEDED FOR SMOKING CESSATION.    Partial Cholinergic Nicotinic Agonist Agents Passed    2/1/2018  3:30 PM       Passed - Blood pressure less than 140/90    BP Readings from Last 3 Encounters:   11/21/17 126/83   11/09/17 135/78   07/14/17 134/84                Passed - Recent or future visit with authorizing provider's specialty    Patient had office visit in the last year or has a visit in the next 30 days with authorizing provider.  See \"Patient Info\" tab in inbasket, or \"Choose Columns\" in Meds & Orders section of the refill encounter.            Passed - Patient is 18 years of age or older        "

## 2018-02-06 NOTE — TELEPHONE ENCOUNTER
This med was discussed in ov dated 11/21/17.    Lab Results   Component Value Date    ALT 25 06/29/2017     ast =35 from 6/29/17.  Creatinine   Date Value Ref Range Status   06/29/2017 1.06 0.66 - 1.25 mg/dL Final   ]    Gst=712  Hbg=12.7    Prescription approved per [facility] Refill Protocol.

## 2018-03-01 ENCOUNTER — OFFICE VISIT (OUTPATIENT)
Dept: PODIATRY | Facility: CLINIC | Age: 38
End: 2018-03-01
Payer: COMMERCIAL

## 2018-03-01 VITALS — RESPIRATION RATE: 16 BRPM | HEIGHT: 69 IN | WEIGHT: 195 LBS | BODY MASS INDEX: 28.88 KG/M2

## 2018-03-01 DIAGNOSIS — M21.6X1 PRONATION OF BOTH FEET: ICD-10-CM

## 2018-03-01 DIAGNOSIS — Z79.4 TYPE 2 DIABETES MELLITUS WITHOUT COMPLICATION, WITH LONG-TERM CURRENT USE OF INSULIN (H): Primary | ICD-10-CM

## 2018-03-01 DIAGNOSIS — E11.9 TYPE 2 DIABETES MELLITUS WITHOUT COMPLICATION, WITH LONG-TERM CURRENT USE OF INSULIN (H): Primary | ICD-10-CM

## 2018-03-01 DIAGNOSIS — M21.6X2 PRONATION OF BOTH FEET: ICD-10-CM

## 2018-03-01 DIAGNOSIS — L60.8 NAIL DEFORMITY: ICD-10-CM

## 2018-03-01 PROCEDURE — 99213 OFFICE O/P EST LOW 20 MIN: CPT | Performed by: PODIATRIST

## 2018-03-01 NOTE — PROGRESS NOTES
ASSESSMENT/PLAN:  Encounter Diagnoses   Name Primary?     Type 2 diabetes mellitus without complication, with long-term current use of insulin (H) Yes     Pronation of both feet      Nail deformity      Pt is instructed to inspect feet daily, wear a supportive shoe at all times, and avoid walking barefoot.  He is instructed to call if any sores develop.   Importance of good blood sugar control emphasized.   Pt verbalized understanding.    I discussed custom orthoses and diabetic shoes, but his protective sensation is intact. He is fine continuing with current hiking-type shoes.    We discussed reasons for nail changes: repetitive trauma and/or fungal involvement.  The cause and nature of fungal nails was discussed wtih the patient.  I explained that there are no ideal treatment options. Oral therapy rarely results in a cure and often needs to be repeated every few years.   Topical preparations are even less successful.      I explained that for particularly deformed and/or painful toenails, permanent removal is an option.      Many people opt to simply keep the nails trimmed and filed, living with the problem.      His is to continue moisturizing and filing the callused skin, bilateral hallux.    Body mass index is 28.8 kg/(m^2).    Weight management plan: Patient was referred to their PCP to discuss a diet and exercise plan.      Shelton Smallwood DPM, FACFAS, MS    [place] [facility] of Podiatry/Foot & Ankle Surgery      ____________________________________________________________________    HPI:         Chief Complaint: yearly foot exam, diabetes  Onset of problem: years  Pain/ discomfort is described as:  Tingling, shooting  Ratin/10   Frequency:  daily    He is most concerned about changes in his toenails, bilateral hallux  Last Hgb A1C = 6.4%.    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 108 lozenge 2     nortriptyline (PAMELOR) 25 MG capsule TAKE 2 CAPSULES(50 MG) BY MOUTH AT BEDTIME 60 capsule 1     gabapentin (NEURONTIN) 600 MG tablet TAKE 2 TABLETS BY MOUTH THREE TIMES DAILY 180 tablet 0     nicotine polacrilex 4 MG lozenge PLACE 1 LOZENGE INSIDE THE CHEEK AS NEEDED FOR SMOKING CESSATION 144 lozenge 3     ACCU-CHEK ADARSH PLUS test strip USE AS DIRECTED TWICE DAILY 200 strip 1     metFORMIN (GLUCOPHAGE-XR) 500 MG 24 hr tablet TAKE 2 TABLETS(1000 MG) BY MOUTH TWICE DAILY WITH MEALS 120 tablet 5     liraglutide (VICTOZA PEN) 18 MG/3ML soln INJECT 1.2 MG SUBCUTANEOUS EVERY DAY 6 mL 5     losartan (COZAAR) 100 MG tablet Take 1 tablet (100 mg) by mouth daily 30 tablet 5     insulin degludec (TRESIBA) 100 UNIT/ML pen Use 12 units daily 15 mL 6     gabapentin (NEURONTIN) 600 MG tablet TAKE 2 TABLETS BY MOUTH THREE TIMES DAILY 180 tablet 2     gabapentin (NEURONTIN) 600 MG tablet TAKE 2 TABLETS BY MOUTH THREE TIMES DAILY 180 tablet 0     Buprenorphine HCl-Naloxone HCl (SUBOXONE) 4-1 MG film Place 1 Film under the tongue 3 times daily       insulin pen needle 31G X 6 MM Use 2 daily or as directed. 100 each 11     blood glucose monitoring (ACCU-CHEK ADARSH PLUS) meter device kit Use to test blood sugar 4-6 times daily or as directed. 1 kit 0     EPINEPHrine (EPIPEN) 0.3 MG/0.3ML injection Inject 0.3 mLs (0.3 mg) into the muscle once as needed for anaphylaxis 1 each 2     blood glucose monitoring (ACCU-CHEK ADARSH) test strip Use to test blood sugars 4 times daily or as directed. When on prednisone check 6 times per day. 4 Box 12     sildenafil  (VIAGRA) 100 MG tablet Take 0.5-1 tablets ( mg) by mouth daily as needed for erectile dysfunction Take 30 min to 4 hours before intercourse.  Never use with nitroglycerin, terazosin or doxazosin. 5 tablet 5     diazepam (VALIUM) 5 MG tablet Take 1 tablet (5 mg) 3 times daily as needed. 60 tablet      venlafaxine (EFFEXOR-ER) 150 MG TB24 Take 1 tablet by mouth daily (with breakfast). 30 each 0     ibuprofen (ADVIL,MOTRIN) 200 MG tablet Take 1-2 tablets by mouth every 6 hours as needed.       DiphenhydrAMINE HCl (BENADRYL PO) Take 50 mg by mouth At Bedtime.         ROS:    A 10-point review of systems was performed.  It is positive for that noted in the HPI and as seen below.  All other systems found to be negative.     Numbness in feet?  yes   Calf pain with walking? no  Recent foot/ankle injury? no  Weight change  over past 12 months? no  Self perception as overweight? no  Recent flu-like symptoms? no  Joint pain other than feet ? no    EXAM:    Vitals: There were no vitals taken for this visit.  BMI: There is no height or weight on file to calculate BMI.  Height: Data Unavailable    Constitutional/ general:  Pt is in no apparent distress, appears well-nourished.  Cooperative with history and physical exam.     Vascular:  Pedal pulses are palpable bilaterally for both the DP and PT arteries.  CFT < 3 sec.  No edema.  Pedal hair growth noted.     Neuro:  Alert and oriented x 3. Coordinated gait.  Light touch sensation is intact to the L4, L5, S1 distributions. No obvious deficits.  No evidence of neurological-based weakness, spasticity, or contracture in the lower extremities.     Protective sensation is intact biltaerally per [place] Monofilament testing.    Derm: Normal texture and turgor.  No erythema, ecchymosis, or cyanosis.  No open lesions.  Hyperkeratotic skin plantar and medial bilateral hallux. No evidence of epidermal breakdown.     Musculoskeletal:    Lower extremity muscle strength is  normal.  Patient is ambulatory without an assistive device or brace.  No gross deformities.  Borderline abnormal pronation. Mild digital contractures.    Diabetic foot exam: normal DP and PT pulses, no trophic changes or ulcerative lesions and normal sensory exam      Shelton Smallwood DPM, FACFAS, MS [place] [facility] of Podiatry/Foot & Ankle Surgery

## 2018-03-01 NOTE — LETTER
3/1/2018         RE: Phillip Rueda  [address]  [facility] [id number]        Dear Colleague,    Thank you for referring your patient, Phillip Rueda, to the [facility]. Please see a copy of my visit note below.    ASSESSMENT/PLAN:  Encounter Diagnoses   Name Primary?     Type 2 diabetes mellitus without complication, with long-term current use of insulin (H) Yes     Pronation of both feet      Nail deformity      Pt is instructed to inspect feet daily, wear a supportive shoe at all times, and avoid walking barefoot.  He is instructed to call if any sores develop.   Importance of good blood sugar control emphasized.   Pt verbalized understanding.    I discussed custom orthoses and diabetic shoes, but his protective sensation is intact. He is fine continuing with current hiking-type shoes.    We discussed reasons for nail changes: repetitive trauma and/or fungal involvement.  The cause and nature of fungal nails was discussed wtih the patient.  I explained that there are no ideal treatment options. Oral therapy rarely results in a cure and often needs to be repeated every few years.   Topical preparations are even less successful.      I explained that for particularly deformed and/or painful toenails, permanent removal is an option.      Many people opt to simply keep the nails trimmed and filed, living with the problem.      His is to continue moisturizing and filing the callused skin, bilateral hallux.    Body mass index is 28.8 kg/(m^2).    Weight management plan: Patient was referred to their PCP to discuss a diet and exercise plan.      Shelton Smallwood DPM, FACFAS, MS    [place] Department of Podiatry/Foot & Ankle Surgery      ____________________________________________________________________    HPI:         Chief Complaint: yearly foot exam, diabetes  Onset of problem: years  Pain/ discomfort is described as:  Tingling, shooting  Ratin/10   Frequency:  daily    He is most  concerned about changes in his toenails, bilateral hallux  Last Hgb A1C = 6.4%.    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 108 lozenge 2     nortriptyline (PAMELOR) 25 MG capsule TAKE 2 CAPSULES(50 MG) BY MOUTH AT BEDTIME 60 capsule 1     gabapentin (NEURONTIN) 600 MG tablet TAKE 2 TABLETS BY MOUTH THREE TIMES DAILY 180 tablet 0     nicotine polacrilex 4 MG lozenge PLACE 1 LOZENGE INSIDE THE CHEEK AS NEEDED FOR SMOKING CESSATION 144 lozenge 3     ACCU-CHEK ADARSH PLUS test strip USE AS DIRECTED TWICE DAILY 200 strip 1     metFORMIN (GLUCOPHAGE-XR) 500 MG 24 hr tablet TAKE 2 TABLETS(1000 MG) BY MOUTH TWICE DAILY WITH MEALS 120 tablet 5     liraglutide (VICTOZA PEN) 18 MG/3ML soln INJECT 1.2 MG SUBCUTANEOUS EVERY DAY 6 mL 5     losartan (COZAAR) 100 MG tablet Take 1 tablet (100 mg) by mouth daily 30 tablet 5     insulin degludec (TRESIBA) 100 UNIT/ML pen Use 12 units daily 15 mL 6     gabapentin (NEURONTIN) 600 MG tablet TAKE 2 TABLETS BY MOUTH THREE TIMES DAILY 180 tablet 2     gabapentin (NEURONTIN) 600 MG tablet TAKE 2 TABLETS BY MOUTH THREE TIMES DAILY 180 tablet 0     Buprenorphine HCl-Naloxone HCl (SUBOXONE) 4-1 MG film Place 1 Film under the tongue 3 times daily       insulin pen needle 31G X 6 MM Use 2 daily or as directed. 100 each 11     blood glucose monitoring (ACCU-CHEK ADARSH PLUS) meter device kit Use to test blood sugar 4-6 times daily or as directed. 1 kit 0     EPINEPHrine (EPIPEN) 0.3 MG/0.3ML injection Inject 0.3 mLs (0.3  mg) into the muscle once as needed for anaphylaxis 1 each 2     blood glucose monitoring (ACCU-CHEK ADARSH) test strip Use to test blood sugars 4 times daily or as directed. When on prednisone check 6 times per day. 4 Box 12     sildenafil (VIAGRA) 100 MG tablet Take 0.5-1 tablets ( mg) by mouth daily as needed for erectile dysfunction Take 30 min to 4 hours before intercourse.  Never use with nitroglycerin, terazosin or doxazosin. 5 tablet 5     diazepam (VALIUM) 5 MG tablet Take 1 tablet (5 mg) 3 times daily as needed. 60 tablet      venlafaxine (EFFEXOR-ER) 150 MG TB24 Take 1 tablet by mouth daily (with breakfast). 30 each 0     ibuprofen (ADVIL,MOTRIN) 200 MG tablet Take 1-2 tablets by mouth every 6 hours as needed.       DiphenhydrAMINE HCl (BENADRYL PO) Take 50 mg by mouth At Bedtime.         ROS:    A 10-point review of systems was performed.  It is positive for that noted in the HPI and as seen below.  All other systems found to be negative.     Numbness in feet?  yes   Calf pain with walking? no  Recent foot/ankle injury? no  Weight change  over past 12 months? no  Self perception as overweight? no  Recent flu-like symptoms? no  Joint pain other than feet ? no    EXAM:    Vitals: There were no vitals taken for this visit.  BMI: There is no height or weight on file to calculate BMI.  Height: Data Unavailable    Constitutional/ general:  Pt is in no apparent distress, appears well-nourished.  Cooperative with history and physical exam.     Vascular:  Pedal pulses are palpable bilaterally for both the DP and PT arteries.  CFT < 3 sec.  No edema.  Pedal hair growth noted.     Neuro:  Alert and oriented x 3. Coordinated gait.  Light touch sensation is intact to the L4, L5, S1 distributions. No obvious deficits.  No evidence of neurological-based weakness, spasticity, or contracture in the lower extremities.     Protective sensation is intact biltaerally per [place] Monofilament testing.    Derm:  Normal texture and turgor.  No erythema, ecchymosis, or cyanosis.  No open lesions.  Hyperkeratotic skin plantar and medial bilateral hallux. No evidence of epidermal breakdown.     Musculoskeletal:    Lower extremity muscle strength is normal.  Patient is ambulatory without an assistive device or brace.  No gross deformities.  Borderline abnormal pronation. Mild digital contractures.    Diabetic foot exam: normal DP and PT pulses, no trophic changes or ulcerative lesions and normal sensory exam      Shelton Smallwood DPM, FACFAS, MS [place] Department of Podiatry/Foot & Ankle Surgery              Again, thank you for allowing me to participate in the care of your patient.        Sincerely,        Shelton Smallwood DPM

## 2018-03-01 NOTE — NURSING NOTE
"Chief Complaint   Patient presents with     RECHECK     DM foot check up       Initial Resp 16  Ht 5' 9\" (1.753 m)  Wt 195 lb (88.5 kg)  BMI 28.8 kg/m2 Estimated body mass index is 28.8 kg/(m^2) as calculated from the following:    Height as of this encounter: 5' 9\" (1.753 m).    Weight as of this encounter: 195 lb (88.5 kg).  Medication Reconciliation: complete    Nancy Garcia CMA (AAMA)  Podiatry / Foot & Ankle Surgery  [facility]    "

## 2018-03-01 NOTE — PATIENT INSTRUCTIONS
Thank you for choosing [facility]!    DR. [facility] LOCATIONS     MONDAY - [place] WEDNESDAY - [place]   [address] [place] [id number] [address] [address]   [phone] / [phone] / [phone]       THURSDAY - [place] SCHEDULE SURGERY: [phone]   [address] APPOINTMENTS: [phone]   [place] [address] BILLING QUESTIONS: [phone] / [phone]       NAIL FUNGUS / ONYCHOMYCOSIS   Nail fungus is not a hygiene problem and will not likely lead to significant medical   problems. The nails may get thick causing pain and possibly local skin infection.   Treatments include debridement (trimming), oral antifungals, topical antifungals and complete removal of the nail. Most fungal nails are not treated.   Topicals such as tea tree oil can be helpful for surface fungus and may, at best, limit   progression. Over the counter creams (such as Lamisil) can also be used however, their effectiveness is also quite low.  Topical treatment with Penlac is expensive and often not covered by insurance. Penlac has an approximate 8% success rate. Topical therapy recommendations is to apply twice a day for at least 3-4 months as it takes 9 months for new nail to grow out. Antifungal cream/powder (Zeasorb) can be used as well which is applied daily to feet and shoes x 2 months.     Experts suggest soaking your feet for 15 to 20 minutes in a mixture of 1 cup vinegar to 4 cups warm water. Be sure to rinse well and pat your feet dry when you're done. You can soak your feet like this daily. But if your skin becomes irritated, try soaking only two to three times a week. Vicks VapoRub as with vinegar, there have been no controlled clinical trials to assess the effectiveness of Vicks VapoRub on nail fungus, but there have been numerous anecdotal reports that it works. There's no consensus on how often to apply this  product, so check with your doctor before using it on your nails.     Clean your shoes with Lysol or in a washing machine every few weeks. Rotate shoe gear and give them 24 hours to dry out between days wearing them. Use a clean pair of socks every morning and change them in the afternoon. Wear shower shoes in public showers/pools.    Oral therapies are expensive and not very effective. Side effects such as liver disease are the main concern. Oral therapies include Sporanox and Lamisil. Returning of fungus is common even if the treatment worked.           CALLUS / CORN / IPK CARE  When there is excessive friction or pressure on the skin, the body responds by making the skin thicker to protect the deeper structures from becoming exposed. While this works well to protect the deeper structures, the thickened skin can cause increased pressure and pain.    Callus: flat, diffuse thickened areas are simple calluses and they are usually caused by friction. Often these are the result of rubbing on a shoe or from going barefoot.    Corns: calluses with a central core on or between the toes are called corns. These result from prominent joints on toes rubbing together. Theses are a symptom of bone malalignment or illfitting shoes and will always recur unless the underlying bones are addressed surgically.    IPK: calluses with a central core on the ball of the foot are usually IPKs (intractable plantar keratosis). These are caused by excessive pressure from the metatarsals, the bones that make up the ball of the foot. Often one of these bones is too long or too prominent. Again, these will always recur unless the underlying bone issue is addressed. There is no cure for these. They will either go away by themselves, recur, or more could develop.    TREATMENT RECOMENDATIONS  - File: Trim them down with a pumice stone or callus file a couple times a week to remove callus tissue that builds up. An electric callus removing device.  "Amope Pedi Perfect Electronic Pedicure Foot File and Callus Remover can be a good option.   - Moisturize: Lotion can be applied to soften the callus. A lactic acid or urea based cream such as Carmol, Kersal or Vanicream or thicker cream with shea butter are good options.   - Foot Gear: Good supportive shoes and minimizing barefoot walking can slow down callus formation and can decrease pain levels. Gel inserts can also provide padding to the bottom of the foot to prevent pain and slow recurrence. Toe spacers, toe covers, can custom orthotic inserts can be beneficial as well.  - Surgery: If there is a surgical pathology noted, such as a prominent bone, often this needs to be addressed surgically to minimize recurrence. However, sometimes the lesion simply migrates to another spot after surgery, so it is not a guaranteed cure.     DIABETES AND YOUR FEET  Diabetes can result in several problems in the feet including ulcers (open sores) and amputations. Two of the most important reasons why people develop foot problems when they have diabetes is : 1. Neuropathy (loss of feeling)  2. Vascular disease (loss or decrease of blood flow).    Neuropathy is a term used to describe a loss of nerve function.  Patients with diabetes are at risk of developing neuropathy if their sugars continue to run high and are above the normal value. One theory for neuropathy is that the \"extra\" sugar in the body enters the nerves and is broken down. These by-products build up in the nerve causing it to swell and impairing nerve function. Often times, this can be prevented by controlling your sugars, dieting and exercise.    When a person develops neuropathy, they usually begin to feel numbness or tingling in their feet and sometime in their legs.  Other symptoms may include painful burning or hot feet, tingling or feeling like insects or ants are crawling on your feet or legs.  If the diabetes is sever and the sugars run high for long periods " of time, neuropathy can also occur in the hands.    Vascular disease  is a term used to describe a loss or decrease in circulation (blood flow). There is a problem in getting blood and oxygen to areas that need it. Similar to neuropathy, sugars can build up in the walls of the arteries (blood vessels) and cause them to become swollen, thickened and hardened. This decreases the amount of blood that can go to an area that needs it. Though this is common in the legs of diabetic patients, it can also affect other arteries (blood vessels) in the body such as in the heart and eyes.    In the legs, vascular disease usually results in cramping. Patients who develop leg cramps after walking the same distance every time (i.e. One block, half a mile, ect.) need to let their doctors know so that their circulation may be checked. Cramps causing severe pain in the feet and/or legs while sleeping and the cramps go away when you stand or hang your legs off the side of the bed, may also be a sign of poor blood circulation.  Occasional cramping in cold weather or on rare occasions with activity may not be due to poor circulation, but you should inform your doctor.    PREVENTION OF THESE DISEASES  The key to prevention is good blood sugar control. Poor blood sugar control is a big reason many of these problems start. Physical activity (exercise) is a very good way to help decrease your blood sugars. Exercise can lower your blood sugar, blood pressure, and cholesterol. It also reduces your risk for heart disease and stroke, relieves stress, and strengthens your heart, muscles and bones.  In addition, regular activity helps insulin work better, improves your blood circulation, and keeps your joints flexible. If you're trying to lose weight, a combination of exercise and wise food choices can help you reach your target weight and maintain it.      PAIN MANAGEMENT  1.Blood Sugar Control - Most important  2. Medications such as:   Amytriptylline, duloxetine, gabapentin, lyrica, tramadol  3. Nutritional therapy:  Vitamin B6 (100mg daily), Vitamin B12 (75mcg daily), Vitamin D 2000 IU daily), Alpha-Lipoic Acid (600-1800mg daily), Acetyl-L-Carnitine (500-1000mg TID, L-methyl folate (1500mcg daily)    ** Metformin can block Vitamin B6 and B12 so it is important to supplement**    FOOT CARE RECOMMENDATIONS   1. Wash your feet with lukewarm water and a mild soap and then dry them thoroughly, especially between the toes.     2. Examine your feet daily looking for cuts, corns, blisters, cracks, ect, especially after wearing new shoes. Make sure to look between your toes. If you cannot see the bottom of your feet, set a mirror on the floor and hold your foot over it, or ask a spouse, friend or family member to examine your feet for you. Contact your doctor immediately if new problems are noted or if sores are not healing.     3. Immediately apply moisturizer to the tops and bottoms of your feet, avoiding areas between the toes. Hand lotion (Intesive Care, Tiara, Eucerin, Neutrogena, Curel, ect) is sufficient unless your doctor prescribes a medicated lotion. Apply sunscreen to your feet when going swimming outside.     4. Use clean comfortable shoes, wear white socks (if you have any bleeding or drainage, you will see it on white socks). Socks should not have thick seams or cut off the circulation around the leg. Break in new shoes slowly and rotate with older shoes until broken in. Check the inside of your shoes with your hand to look for areas of irritation or objects that may have fallen into your shoes.       5. Keep slippers by the side of your bed for use during the night.     6.  Shoes should be fitted by a professional and should not cause areas of irritation.  Check your feet regularly when wearing a new pair of shoes and replace them as needed.     7.  Talk to your doctor about proper exercise. Exercise and stretching stimulate blood flow to  your feet and maintain proper glucose levels.     8.  Monitor your blood glucose level as instructed by your doctor. Notify your doctor immediately if your blood sugar is abnormally high or low.    9. Cut your nails straight across, but then gently round any sharp edges with a cardboard nail file. If you have neuropathy, peripheral vascular disease or cannot see that well to trim your own toenails contact [facility] ([phone]) or [facility] ([phone]).      THINGS TO AVOID DOING   1.  Do not soak your feet if you have an open sore. Use only lukewarm water and always check the temperature with your hand as hot water can easily burn your feet.       2.  Never use a hot water bottle or heating pad on your feet. Also do not apply cold compresses to your feet. With decreased sensation, you could burn or freeze your feet.       3.  Do not apply any of these to your feet:    -  Over the counter medicine for corns or warts    -  Harsh chemicals like boric acid    -  Do not self-treat corns, cuts, blisters or infections. Always consult your doctor.       4.  Do not wear sandals, slippers or walk barefoot, especially on hot sand or concrete or other harsh surfaces.     5.  If you smoke, stop!!!              BODY MASS INDEX (BMI)  Many things can cause foot and ankle problems. Foot structure, activity level, foot mechanics and injuries are common causes of pain.  One very important issue that often goes unmentioned, is body weight.  Extra weight can cause increased stress on muscles, ligaments, bones and tendons.  Sometimes just a few extra pounds is all it takes to put one over her/his threshold. Without reducing that stress, it can be difficult to alleviate pain. Some people are uncomfortable addressing this issue, but we feel it is important for you to think about it. As [facility], our job is addressing the lower extremity problem and possible causes. Regarding extra body weight, we encourage  patients to discuss diet and weight management plans with their primary care doctors. It is this team approach that gives you the best opportunity for pain relief and getting you back on your feet.

## 2018-03-01 NOTE — MR AVS SNAPSHOT
After Visit Summary   3/1/2018    Phillip Rueda    MRN: [id number]           Patient Information     Date Of Birth          1980        Visit Information        Provider Department      3/1/2018 8:30 AM Shelton Gavin DPM [facility]        Care Instructions    Thank you for choosing [facility]!    DR. GAVIN'S CLINIC LOCATIONS     MONDAY - [place] WEDNESDAY - [place]   [address] [address] [place] [address]   [phone] / [phone] / [phone]       [facility] [phone]   [address] APPOINTMENTS: [phone]   [place] [address] BILLING QUESTIONS: [phone] / [phone]       NAIL FUNGUS / ONYCHOMYCOSIS   Nail fungus is not a hygiene problem and will not likely lead to significant medical   problems. The nails may get thick causing pain and possibly local skin infection.   Treatments include debridement (trimming), oral antifungals, topical antifungals and complete removal of the nail. Most fungal nails are not treated.   Topicals such as tea tree oil can be helpful for surface fungus and may, at best, limit   progression. Over the counter creams (such as Lamisil) can also be used however, their effectiveness is also quite low.  Topical treatment with Penlac is expensive and often not covered by insurance. Penlac has an approximate 8% success rate. Topical therapy recommendations is to apply twice a day for at least 3-4 months as it takes 9 months for new nail to grow out. Antifungal cream/powder (Zeasorb) can be used as well which is applied daily to feet and shoes x 2 months.     Experts suggest soaking your feet for 15 to 20 minutes in a mixture of 1 cup vinegar to 4 cups warm water. Be sure to rinse well and pat your feet dry when you're done. You can soak your feet like this daily. But if your skin becomes  irritated, try soaking only two to three times a week. Vicks VapoRub as with vinegar, there have been no controlled clinical trials to assess the effectiveness of Vicks VapoRub on nail fungus, but there have been numerous anecdotal reports that it works. There's no consensus on how often to apply this product, so check with your doctor before using it on your nails.     Clean your shoes with Lysol or in a washing machine every few weeks. Rotate shoe gear and give them 24 hours to dry out between days wearing them. Use a clean pair of socks every morning and change them in the afternoon. Wear shower shoes in public showers/pools.    Oral therapies are expensive and not very effective. Side effects such as liver disease are the main concern. Oral therapies include Sporanox and Lamisil. Returning of fungus is common even if the treatment worked.           CALLUS / CORN / IPK CARE  When there is excessive friction or pressure on the skin, the body responds by making the skin thicker to protect the deeper structures from becoming exposed. While this works well to protect the deeper structures, the thickened skin can cause increased pressure and pain.    Callus: flat, diffuse thickened areas are simple calluses and they are usually caused by friction. Often these are the result of rubbing on a shoe or from going barefoot.    Corns: calluses with a central core on or between the toes are called corns. These result from prominent joints on toes rubbing together. Theses are a symptom of bone malalignment or illfitting shoes and will always recur unless the underlying bones are addressed surgically.    IPK: calluses with a central core on the ball of the foot are usually IPKs (intractable plantar keratosis). These are caused by excessive pressure from the metatarsals, the bones that make up the ball of the foot. Often one of these bones is too long or too prominent. Again, these will always recur unless the underlying bone  "issue is addressed. There is no cure for these. They will either go away by themselves, recur, or more could develop.    TREATMENT RECOMENDATIONS  - File: Trim them down with a pumice stone or callus file a couple times a week to remove callus tissue that builds up. An electric callus removing device. Amope Pedi Perfect Electronic Pedicure Foot File and Callus Remover can be a good option.   - Moisturize: Lotion can be applied to soften the callus. A lactic acid or urea based cream such as Carmol, Kersal or Vanicream or thicker cream with shea butter are good options.   - Foot Gear: Good supportive shoes and minimizing barefoot walking can slow down callus formation and can decrease pain levels. Gel inserts can also provide padding to the bottom of the foot to prevent pain and slow recurrence. Toe spacers, toe covers, can custom orthotic inserts can be beneficial as well.  - Surgery: If there is a surgical pathology noted, such as a prominent bone, often this needs to be addressed surgically to minimize recurrence. However, sometimes the lesion simply migrates to another spot after surgery, so it is not a guaranteed cure.     DIABETES AND YOUR FEET  Diabetes can result in several problems in the feet including ulcers (open sores) and amputations. Two of the most important reasons why people develop foot problems when they have diabetes is : 1. Neuropathy (loss of feeling)  2. Vascular disease (loss or decrease of blood flow).    Neuropathy is a term used to describe a loss of nerve function.  Patients with diabetes are at risk of developing neuropathy if their sugars continue to run high and are above the normal value. One theory for neuropathy is that the \"extra\" sugar in the body enters the nerves and is broken down. These by-products build up in the nerve causing it to swell and impairing nerve function. Often times, this can be prevented by controlling your sugars, dieting and exercise.    When a person develops " neuropathy, they usually begin to feel numbness or tingling in their feet and sometime in their legs.  Other symptoms may include painful burning or hot feet, tingling or feeling like insects or ants are crawling on your feet or legs.  If the diabetes is sever and the sugars run high for long periods of time, neuropathy can also occur in the hands.    Vascular disease  is a term used to describe a loss or decrease in circulation (blood flow). There is a problem in getting blood and oxygen to areas that need it. Similar to neuropathy, sugars can build up in the walls of the arteries (blood vessels) and cause them to become swollen, thickened and hardened. This decreases the amount of blood that can go to an area that needs it. Though this is common in the legs of diabetic patients, it can also affect other arteries (blood vessels) in the body such as in the heart and eyes.    In the legs, vascular disease usually results in cramping. Patients who develop leg cramps after walking the same distance every time (i.e. One block, half a mile, ect.) need to let their doctors know so that their circulation may be checked. Cramps causing severe pain in the feet and/or legs while sleeping and the cramps go away when you stand or hang your legs off the side of the bed, may also be a sign of poor blood circulation.  Occasional cramping in cold weather or on rare occasions with activity may not be due to poor circulation, but you should inform your doctor.    PREVENTION OF THESE DISEASES  The key to prevention is good blood sugar control. Poor blood sugar control is a big reason many of these problems start. Physical activity (exercise) is a very good way to help decrease your blood sugars. Exercise can lower your blood sugar, blood pressure, and cholesterol. It also reduces your risk for heart disease and stroke, relieves stress, and strengthens your heart, muscles and bones.  In addition, regular activity helps insulin work  better, improves your blood circulation, and keeps your joints flexible. If you're trying to lose weight, a combination of exercise and wise food choices can help you reach your target weight and maintain it.      PAIN MANAGEMENT  1.Blood Sugar Control - Most important  2. Medications such as:  Amytriptylline, duloxetine, gabapentin, lyrica, tramadol  3. Nutritional therapy:  Vitamin B6 (100mg daily), Vitamin B12 (75mcg daily), Vitamin D 2000 IU daily), Alpha-Lipoic Acid (600-1800mg daily), Acetyl-L-Carnitine (500-1000mg TID, L-methyl folate (1500mcg daily)    ** Metformin can block Vitamin B6 and B12 so it is important to supplement**    FOOT CARE RECOMMENDATIONS   1. Wash your feet with lukewarm water and a mild soap and then dry them thoroughly, especially between the toes.     2. Examine your feet daily looking for cuts, corns, blisters, cracks, ect, especially after wearing new shoes. Make sure to look between your toes. If you cannot see the bottom of your feet, set a mirror on the floor and hold your foot over it, or ask a spouse, friend or family member to examine your feet for you. Contact your doctor immediately if new problems are noted or if sores are not healing.     3. Immediately apply moisturizer to the tops and bottoms of your feet, avoiding areas between the toes. Hand lotion (Intesive Care, Tiara, Eucerin, Neutrogena, Curel, ect) is sufficient unless your doctor prescribes a medicated lotion. Apply sunscreen to your feet when going swimming outside.     4. Use clean comfortable shoes, wear white socks (if you have any bleeding or drainage, you will see it on white socks). Socks should not have thick seams or cut off the circulation around the leg. Break in new shoes slowly and rotate with older shoes until broken in. Check the inside of your shoes with your hand to look for areas of irritation or objects that may have fallen into your shoes.       5. Keep slippers by the side of your bed for use  during the night.     6.  Shoes should be fitted by a professional and should not cause areas of irritation.  Check your feet regularly when wearing a new pair of shoes and replace them as needed.     7.  Talk to your doctor about proper exercise. Exercise and stretching stimulate blood flow to your feet and maintain proper glucose levels.     8.  Monitor your blood glucose level as instructed by your doctor. Notify your doctor immediately if your blood sugar is abnormally high or low.    9. Cut your nails straight across, but then gently round any sharp edges with a cardboard nail file. If you have neuropathy, peripheral vascular disease or cannot see that well to trim your own toenails contact [facility] ([phone]) or [facility] [employer] ([phone]).      THINGS TO AVOID DOING   1.  Do not soak your feet if you have an open sore. Use only lukewarm water and always check the temperature with your hand as hot water can easily burn your feet.       2.  Never use a hot water bottle or heating pad on your feet. Also do not apply cold compresses to your feet. With decreased sensation, you could burn or freeze your feet.       3.  Do not apply any of these to your feet:    -  Over the counter medicine for corns or warts    -  Harsh chemicals like boric acid    -  Do not self-treat corns, cuts, blisters or infections. Always consult your doctor.       4.  Do not wear sandals, slippers or walk barefoot, especially on hot sand or concrete or other harsh surfaces.     5.  If you smoke, stop!!!              BODY MASS INDEX (BMI)  Many things can cause foot and ankle problems. Foot structure, activity level, foot mechanics and injuries are common causes of pain.  One very important issue that often goes unmentioned, is body weight.  Extra weight can cause increased stress on muscles, ligaments, bones and tendons.  Sometimes just a few extra pounds is all it takes to put one over her/his threshold. Without reducing that stress,  it can be difficult to alleviate pain. Some people are uncomfortable addressing this issue, but we feel it is important for you to think about it. As Foot &  Ankle specialists, our job is addressing the lower extremity problem and possible causes. Regarding extra body weight, we encourage patients to discuss diet and weight management plans with their primary care doctors. It is this team approach that gives you the best opportunity for pain relief and getting you back on your feet.                Follow-ups after your visit        Your next 10 appointments already scheduled     Mar 15, 2018  8:00 AM CDT   RETURN GENERAL with Kelvin Hickman MD   Eye Clinic ([facility])    [place] [address]  [address] Clin [address]  [facility] [address] [phone]              Who to contact     If you have questions or need follow up information about today's clinic visit or your schedule please contact [facility] directly at [phone].  Normal or non-critical lab and imaging results will be communicated to you by [employer], letter or phone within 4 business days after the clinic has received the results. If you do not hear from us within 7 days, please contact the clinic through [employer] or phone. If you have a critical or abnormal lab result, we will notify you by phone as soon as possible.  Submit refill requests through [employer] or call your pharmacy and they will forward the refill request to us. Please allow 3 business days for your refill to be completed.          Additional Information About Your Visit        [employer] Information     [employer] gives you secure access to your electronic health record. If you see a primary care provider, you can also send messages to your care team and make appointments. If you have questions, please call your primary care clinic.  If you do not have a primary care provider, please call [phone] and they will assist you.        Care  "EveryWhere ID     This is your Care EveryWhere ID. This could be used by other organizations to access your [facility] medical records  [id number]        Your Vitals Were     Respirations Height BMI (Body Mass Index)             16 5' 9\" (1.753 m) 28.8 kg/m2          Blood Pressure from Last 3 Encounters:   11/21/17 126/83   11/09/17 135/78   07/14/17 134/84    Weight from Last 3 Encounters:   03/01/18 195 lb (88.5 kg)   11/21/17 195 lb (88.5 kg)   11/09/17 188 lb (85.3 kg)              Today, you had the following     No orders found for display       Primary Care Provider Office Phone # Fax #    Km Michelle Dos Santos [phone] [address] [place] [address]        Goals        General    Functional: Patient will revise his diabetic diet to maximize his diabetes with the goal of decreasing his daily blood sugars and improving his HGB A1c (pt-stated)     Notes - Note created  3/24/2016 11:25 AM by Erna Pineda RN    As of today's date 3/24/2016 goal is met at 0 - 25%.   Goal Status:  Active        Medical (pt-stated)     Notes - Note created  8/25/2015 12:49 PM by Erna Pineda RN    Patient states that he is wanting a diabetes education referral so that he can fine tune his blood sugars or when he is on steroids  Patient states that he will contact RN Clinic Care Coordinator when needing assistance with coordinating cares or has complications with his disease management and or cares      Medical Patient will bring 3 days of blood sugar readings to office visit with Dr Dos Santos . (pt-stated)     Notes - Note created  3/24/2016 11:23 AM by Erna Pineda RN    As of today's date 3/24/2016 goal is met at 0 - 25%.   Goal Status:  Active        Medical: Patient will set up podiatry and eye exam for diabetici wtihin 6 month. (pt-stated)     Notes - Note created  3/24/2016 11:21 AM by Erna Pineda RN    As of today's date 3/24/2016 goal is met at 0 - 25%.   Goal Status:  " Active        Medical: Pt will continue to utilize his training in [employer] along with [employer] [facility] for pain and life management (pt-stated)     Notes - Note created  3/24/2016 11:28 AM by Erna Pineda RN    As of today's date 3/24/2016 goal is met at 51 - 75%.   Goal Status:  Active          Equal Access to Services     : Hadii aad ku hadasho Soomaali, waaxda luqadaha, qaybta kaalmada adeegyada, waxay idiin haytonyan adetracey khkatiesh laSidneyaan . So [facility] [phone].    ATENCIÓN: Si habla español, tiene a robins disposición servicios gratuitos de asistencia lingüística. Llame al [phone].    We comply with applicable federal civil rights laws and [place] laws. We do not discriminate on the basis of race, color, national origin, age, disability, sex, sexual orientation, or gender identity.            Thank you!     Thank you for choosing [facility]  for your care. Our goal is always to provide you with excellent care. Hearing back from our patients is one way we can continue to improve our services. Please take a few minutes to complete the written survey that you may receive in the mail after your visit with us. Thank you!             Your Updated Medication List - Protect others around you: Learn how to safely use, store and throw away your medicines at www.disposemymeds.org.          This list is accurate as of 3/1/18  8:35 AM.  Always use your most recent med list.                   Brand Name Dispense Instructions for use Diagnosis    BENADRYL PO      Take 50 mg by mouth At Bedtime.        blood glucose monitoring meter device kit     1 kit    Use to test blood sugar 4-6 times daily or as directed.    Type 2 diabetes mellitus without complication (H)       * blood glucose monitoring test strip    ACCU-CHEK ADARSH    4 Box    Use to test blood sugars 4 times daily or as directed. When on prednisone check 6 times per day.    Type 2 diabetes, HbA1c goal < 7% (H)       *  ACCU-CHEK ADARSH PLUS test strip   Generic drug:  blood glucose monitoring     200 strip    USE AS DIRECTED TWICE DAILY    Type 2 diabetes mellitus without complication (H)       buprenorphine HCl-naloxone HCl 4-1 MG per film    SUBOXONE     Place 1 Film under the tongue 3 times daily        EPINEPHrine 0.3 MG/0.3ML injection 2-pack    EPIPEN/ADRENACLICK/or ANY BX GENERIC EQUIV    1 each    Inject 0.3 mLs (0.3 mg) into the muscle once as needed for anaphylaxis    Anaphylactic reaction, sequela       * gabapentin 600 MG tablet    NEURONTIN    180 tablet    TAKE 2 TABLETS BY MOUTH THREE TIMES DAILY    Neuralgic amyotrophy       * gabapentin 600 MG tablet    NEURONTIN    180 tablet    TAKE 2 TABLETS BY MOUTH THREE TIMES DAILY    Neuralgic amyotrophy       * gabapentin 600 MG tablet    NEURONTIN    180 tablet    TAKE 2 TABLETS BY MOUTH THREE TIMES DAILY    Neuralgic amyotrophy       ibuprofen 200 MG tablet    ADVIL/MOTRIN     Take 1-2 tablets by mouth every 6 hours as needed.        insulin degludec 100 UNIT/ML pen    TRESIBA    15 mL    Use 12 units daily    Type 2 diabetes mellitus without complication, with long-term current use of insulin (H)       insulin pen needle 31G X 6 MM     100 each    Use 2 daily or as directed.    Type 2 diabetes mellitus without complication (H)       liraglutide 18 MG/3ML soln    VICTOZA PEN    6 mL    INJECT 1.2 MG SUBCUTANEOUS EVERY DAY    Type 2 diabetes mellitus without complication, with long-term current use of insulin (H)       losartan 100 MG tablet    COZAAR    30 tablet    Take 1 tablet (100 mg) by mouth daily    Hypertension goal BP (blood pressure) < 140/90       metFORMIN 500 MG 24 hr tablet    GLUCOPHAGE-XR    120 tablet    TAKE 2 TABLETS(1000 MG) BY MOUTH TWICE DAILY WITH MEALS    Type 2 diabetes mellitus without complication, with long-term current use of insulin (H)       * nicotine polacrilex 4 MG lozenge     144 lozenge    PLACE 1 LOZENGE INSIDE THE CHEEK AS NEEDED FOR  SMOKING CESSATION    Former smoker       * nicotine polacrilex 4 MG lozenge     108 lozenge    PLACE 1 LOZENGE INSIDE CHEEK AS NEEDED FOR SMOKING CESSATION.    Former smoker       nortriptyline 25 MG capsule    PAMELOR    60 capsule    TAKE 2 CAPSULES(50 MG) BY MOUTH AT BEDTIME    Neuralgic amyotrophy       sildenafil 100 MG tablet    VIAGRA    5 tablet    Take 0.5-1 tablets ( mg) by mouth daily as needed for erectile dysfunction Take 30 min to 4 hours before intercourse.  Never use with nitroglycerin, terazosin or doxazosin.    ED (erectile dysfunction)       VALIUM 5 MG tablet   Generic drug:  diazepam     60 tablet    Take 1 tablet (5 mg) 3 times daily as needed.        venlafaxine 150 MG Tb24 24 hr tablet    EFFEXOR-ER    30 each    Take 1 tablet by mouth daily (with breakfast).    Anxiety       * Notice:  This list has 7 medication(s) that are the same as other medications prescribed for you. Read the directions carefully, and ask your doctor or other care provider to review them with you.

## 2018-03-05 ENCOUNTER — OFFICE VISIT (OUTPATIENT)
Dept: NEUROLOGY | Facility: CLINIC | Age: 38
End: 2018-03-05
Payer: MEDICARE

## 2018-03-05 VITALS
DIASTOLIC BLOOD PRESSURE: 84 MMHG | WEIGHT: 190.8 LBS | SYSTOLIC BLOOD PRESSURE: 141 MMHG | HEIGHT: 69 IN | HEART RATE: 108 BPM | BODY MASS INDEX: 28.26 KG/M2

## 2018-03-05 DIAGNOSIS — E11.42 DIABETIC POLYNEUROPATHY ASSOCIATED WITH TYPE 2 DIABETES MELLITUS (H): ICD-10-CM

## 2018-03-05 DIAGNOSIS — G54.5 NEURALGIC AMYOTROPHY: Primary | ICD-10-CM

## 2018-03-05 NOTE — LETTER
3/5/2018       RE: Phillip Rueda  [address]  [facility] [address]     Dear Colleague,    Thank you for referring your patient, Phillip Rueda, to the [facility]UR[facility] at [facility]. Please see a copy of my visit note below.    Service Date: 2018        Km Dos Santos MD   [facility]    [address] [place] [address]      RE: Phillip Rueda   MRN: [id number]     : 1980              Dear Dr. Dos Santos:      I had the pleasure to see Phillip in followup at the [facility] today.  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  He has had a number of attacks of excruciating pain in the right upper extremity in the previous years.  It is not clear that any of those pain flareups was a true recurrence of neuralgic amyotrophy as he really has not developed any new weakness since .  Treatment of the pain was challenging.He is on Suboxone 4 mg t.i.d, nortriptyline 50 mg in the evening, and gabapentin 600 mg 3 times a day.  We have been prescribing the gabapentin and nortriptyline, but it is mainly [facility] (Dr Ulisses Jordan) managing the problem at this point.  He has not received any steroids lately and has not presented to an ED or been  admitted for any of those flares in the past year.  He continues to have weakness of the right index finger flexion, which is unchanged since .  He also reports burning pain in his feet, which he relates to diabetic neuropathy, and is well controlled with nortriptyline.  He was asking me what the optimal dose of his pain medications would be.        His blood pressure is 141/84, pulse 108 and regular, weight 86.5 kg, height is 175, BMI 28.18.  On neuro exam, a brief examination of the upper extremities shows 5 out of 5 strength of bilateral deltoids, biceps and triceps.  Right external rotation of the shoulder is 4+, left  is 4.  Wrist extension and flexion are bilaterally 5/5, as are finger extension, abduction and APB.  His flexor pollicis longus and flexor digitorum profundus to digits 3-5 are bilaterally normal.  Flexor digitorum profundus digit to 2 is 5/5 on the left and 3 to 4-/5 on the right, unchanged from prior.      In summary, Mr. Prajapati has not had any new attack of neuralgic amyotrophy recently although his pain at the right upper extremity continues to wax and wane for very brief (seconds to minutes) periods of time. The situation is much better than a few years ago.  I think at this point the key issue is pain management and since our clinic cannot prescribe Suboxone, I would feel more comfortable if Dr. Jordan's office would take over the prescriptions of nortriptyline and gabapentin, too. I am not sure I have anything more to contribute to Char' care.  I will see him in followup p.r.n.  Of course, he should call me if he believes he has a new flare and I would be happy to see him in that case.       D: 2018   T: 2018   MT: tb      Name:     CHAR PRAJAPATI   MRN:      [id number]        Account:      [id number]   :      1980           Service Date: 2018      Document: [id number]        Again, thank you for allowing me to participate in the care of your patient.      Sincerely,    Ky Guerrero MD

## 2018-03-05 NOTE — MR AVS SNAPSHOT
After Visit Summary   3/5/2018    Phillip Rueda    MRN: [id number]           Patient Information     Date Of Birth          1980        Visit Information        Provider Department      3/5/2018 3:20 PM Ky Guerrero MD [facility] Neurology        Today's Diagnoses     Neuralgic amyotrophy    -  1    Diabetic polyneuropathy associated with type 2 diabetes mellitus (H)           Follow-ups after your visit        Your next 10 appointments already scheduled     Mar 08, 2018  7:45 AM CST   Return Visit with Shelton Smallwood DPM   [facility] ([facility])    [address] [place] [id number] [phone]            Mar 15, 2018  8:00 AM CDT   RETURN GENERAL with Kelvin Hickman MD   [facility] ([facility])    [address] [address] [phone]              Who to contact     Please call your clinic at [phone] to:    Ask questions about your health    Make or cancel appointments    Discuss your medicines    Learn about your test results    Speak to your doctor            Additional Information About Your Visit        MyChart Information     [employer] gives you secure access to your electronic health record. If you see a primary care provider, you can also send messages to your care team and make appointments. If you have questions, please call your primary care clinic.  If you do not have a primary care provider, please call [phone] and they will assist you.      [employer] is an electronic gateway that provides easy, online access to your medical records. With [employer], you can request a clinic appointment, read your test results, renew a prescription or communicate with your care team.     To access your existing account, please contact your [facility] or call [phone] for assistance.        Care EveryWhere ID      "This is your Care EveryWhere ID. This could be used by other organizations to access your [place] medical records  [id number]        Your Vitals Were     Pulse Height BMI (Body Mass Index)             108 1.753 m (5' 9\") 28.18 kg/m2          Blood Pressure from Last 3 Encounters:   03/05/18 141/84   11/21/17 126/83   11/09/17 135/78    Weight from Last 3 Encounters:   03/05/18 86.5 kg (190 lb 12.8 oz)   03/01/18 88.5 kg (195 lb)   11/21/17 88.5 kg (195 lb)              Today, you had the following     No orders found for display       Primary Care Provider Office Phone # Fax #    Km Michelle Dos Santos [phone] [address] [place] [id number]        Goals        General    Functional: Patient will revise his diabetic diet to maximize his diabetes with the goal of decreasing his daily blood sugars and improving his HGB A1c (pt-stated)     Notes - Note created  3/24/2016 11:25 AM by Erna Pineda RN    As of today's date 3/24/2016 goal is met at 0 - 25%.   Goal Status:  Active        Medical (pt-stated)     Notes - Note created  8/25/2015 12:49 PM by Erna Pineda RN    Patient states that he is wanting a diabetes education referral so that he can fine tune his blood sugars or when he is on steroids  Patient states that he will contact RN Clinic Care Coordinator when needing assistance with coordinating cares or has complications with his disease management and or cares      Medical Patient will bring 3 days of blood sugar readings to office visit with Dr Dos Santos . (pt-stated)     Notes - Note created  3/24/2016 11:23 AM by Erna Pineda RN    As of today's date 3/24/2016 goal is met at 0 - 25%.   Goal Status:  Active        Medical: Patient will set up podiatry and eye exam for diabetici wtihin 6 month. (pt-stated)     Notes - Note created  3/24/2016 11:21 AM by Erna Pineda RN    As of today's date 3/24/2016 goal is met at 0 - 25%.   Goal Status:  Active        " Medical: Pt will continue to utilize his training in [employer] along with [employer] for pain and life management (pt-stated)     Notes - Note created  3/24/2016 11:28 AM by Erna Pineda RN    As of today's date 3/24/2016 goal is met at 51 - 75%.   Goal Status:  Active          Equal Access to Services     [facility]: Hadii aad ku hadasho Soomaali, waaxda luqadaha, qaybta kaalmada adeegyada, waxwillam rocin gariman adetracey becerra laSidneytonyan . So [facility] [phone].    ATENCIÓN: Si habla español, tiene a robins disposición servicios gratuitos de asistencia lingüística. Llame al [phone].    We comply with applicable federal civil rights laws and [place] laws. We do not discriminate on the basis of race, color, national origin, age, disability, sex, sexual orientation, or gender identity.            Thank you!     Thank you for choosing [facility]UROLOG[facility]  for your care. Our goal is always to provide you with excellent care. Hearing back from our patients is one way we can continue to improve our services. Please take a few minutes to complete the written survey that you may receive in the mail after your visit with us. Thank you!             Your Updated Medication List - Protect others around you: Learn how to safely use, store and throw away your medicines at www.disposemymeds.org.          This list is accurate as of 3/5/18 11:59 PM.  Always use your most recent med list.                   Brand Name Dispense Instructions for use Diagnosis    BENADRYL PO      Take 50 mg by mouth At Bedtime.        blood glucose monitoring meter device kit     1 kit    Use to test blood sugar 4-6 times daily or as directed.    Type 2 diabetes mellitus without complication (H)       * blood glucose monitoring test strip    ACCU-CHEK ADARSH    4 Box    Use to test blood sugars 4 times daily or as directed. When on prednisone check 6 times per day.    Type 2 diabetes, HbA1c goal < 7% (H)       * ACCU-CHEK ADARSH PLUS test  strip   Generic drug:  blood glucose monitoring     200 strip    USE AS DIRECTED TWICE DAILY    Type 2 diabetes mellitus without complication (H)       buprenorphine HCl-naloxone HCl 4-1 MG per film    SUBOXONE     Place 1 Film under the tongue 3 times daily        EPINEPHrine 0.3 MG/0.3ML injection 2-pack    EPIPEN/ADRENACLICK/or ANY BX GENERIC EQUIV    1 each    Inject 0.3 mLs (0.3 mg) into the muscle once as needed for anaphylaxis    Anaphylactic reaction, sequela       * gabapentin 600 MG tablet    NEURONTIN    180 tablet    TAKE 2 TABLETS BY MOUTH THREE TIMES DAILY    Neuralgic amyotrophy       * gabapentin 600 MG tablet    NEURONTIN    180 tablet    TAKE 2 TABLETS BY MOUTH THREE TIMES DAILY    Neuralgic amyotrophy       ibuprofen 200 MG tablet    ADVIL/MOTRIN     Take 1-2 tablets by mouth every 6 hours as needed.        insulin degludec 100 UNIT/ML pen    TRESIBA    15 mL    Use 12 units daily    Type 2 diabetes mellitus without complication, with long-term current use of insulin (H)       insulin pen needle 31G X 6 MM     100 each    Use 2 daily or as directed.    Type 2 diabetes mellitus without complication (H)       liraglutide 18 MG/3ML soln    VICTOZA PEN    6 mL    INJECT 1.2 MG SUBCUTANEOUS EVERY DAY    Type 2 diabetes mellitus without complication, with long-term current use of insulin (H)       losartan 100 MG tablet    COZAAR    30 tablet    Take 1 tablet (100 mg) by mouth daily    Hypertension goal BP (blood pressure) < 140/90       metFORMIN 500 MG 24 hr tablet    GLUCOPHAGE-XR    120 tablet    TAKE 2 TABLETS(1000 MG) BY MOUTH TWICE DAILY WITH MEALS    Type 2 diabetes mellitus without complication, with long-term current use of insulin (H)       * nicotine polacrilex 4 MG lozenge     144 lozenge    PLACE 1 LOZENGE INSIDE THE CHEEK AS NEEDED FOR SMOKING CESSATION    Former smoker       * nicotine polacrilex 4 MG lozenge     108 lozenge    PLACE 1 LOZENGE INSIDE CHEEK AS NEEDED FOR SMOKING  CESSATION.    Former smoker       nortriptyline 25 MG capsule    PAMELOR    60 capsule    TAKE 2 CAPSULES(50 MG) BY MOUTH AT BEDTIME    Neuralgic amyotrophy       sildenafil 100 MG tablet    VIAGRA    5 tablet    Take 0.5-1 tablets ( mg) by mouth daily as needed for erectile dysfunction Take 30 min to 4 hours before intercourse.  Never use with nitroglycerin, terazosin or doxazosin.    ED (erectile dysfunction)       VALIUM 5 MG tablet   Generic drug:  diazepam     60 tablet    Take 1 tablet (5 mg) 3 times daily as needed.        venlafaxine 150 MG Tb24 24 hr tablet    EFFEXOR-ER    30 each    Take 1 tablet by mouth daily (with breakfast).    Anxiety       * Notice:  This list has 6 medication(s) that are the same as other medications prescribed for you. Read the directions carefully, and ask your doctor or other care provider to review them with you.

## 2018-03-06 NOTE — PROGRESS NOTES
Service Date: 2018        Km Dos Santos MD   [facility]    [address] [place] [address]      RE: Phillip Rueda   MRN: [id number]     : 1980              Dear Dr. Dos Santos:      I had the pleasure to see Phillip in followup at the [facility] today.  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  He has had a number of attacks of excruciating pain in the right upper extremity in the previous years.  It is not clear that any of those pain flareups was a true recurrence of neuralgic amyotrophy as he really has not developed any new weakness since .  Treatment of the pain was challenging.He is on Suboxone 4 mg t.i.d, nortriptyline 50 mg in the evening, and gabapentin 600 mg 3 times a day.  We have been prescribing the gabapentin and nortriptyline, but it is mainly [facility] (Dr Ulisses Jordan) managing the problem at this point.  He has not received any steroids lately and has not presented to an ED or been  admitted for any of those flares in the past year.  He continues to have weakness of the right index finger flexion, which is unchanged since .  He also reports burning pain in his feet, which he relates to diabetic neuropathy, and is well controlled with nortriptyline.  He was asking me what the optimal dose of his pain medications would be.        His blood pressure is 141/84, pulse 108 and regular, weight 86.5 kg, height is 175, BMI 28.18.  On neuro exam, a brief examination of the upper extremities shows 5 out of 5 strength of bilateral deltoids, biceps and triceps.  Right external rotation of the shoulder is 4+, left is 4.  Wrist extension and flexion are bilaterally 5/5, as are finger extension, abduction and APB.  His flexor pollicis longus and flexor digitorum profundus to digits 3-5 are bilaterally normal.  Flexor digitorum profundus digit to 2 is 5/5 on the left and 3 to 4-/5 on the  right, unchanged from prior.      In summary, Mr. Prajapati has not had any new attack of neuralgic amyotrophy recently although his pain at the right upper extremity continues to wax and wane for very brief (seconds to minutes) periods of time. The situation is much better than a few years ago.  I think at this point the key issue is pain management and since our clinic cannot prescribe Suboxone, I would feel more comfortable if Dr. Jordan's office would take over the prescriptions of nortriptyline and gabapentin, too. I am not sure I have anything more to contribute to Char' care.  I will see him in followup p.r.n.  Of course, he should call me if he believes he has a new flare and I would be happy to see him in that case.        Sincerely,        MD ALVARADO Tripathi MD             D: 2018   T: 2018   MT: tb      Name:     CHAR PRAJAPATI   MRN:      [id number]        Account:      [id number]   :      1980           Service Date: 2018      Document: [id number]

## 2018-03-08 ENCOUNTER — OFFICE VISIT (OUTPATIENT)
Dept: PODIATRY | Facility: CLINIC | Age: 38
End: 2018-03-08
Payer: COMMERCIAL

## 2018-03-08 VITALS — HEART RATE: 94 BPM | HEIGHT: 69 IN | BODY MASS INDEX: 28.14 KG/M2 | WEIGHT: 190 LBS

## 2018-03-08 DIAGNOSIS — L60.8 NAIL DEFORMITY: ICD-10-CM

## 2018-03-08 DIAGNOSIS — S99.921A INJURY OF TOE ON RIGHT FOOT, INITIAL ENCOUNTER: Primary | ICD-10-CM

## 2018-03-08 DIAGNOSIS — S91.109A OPEN WOUND OF TOE, INITIAL ENCOUNTER: ICD-10-CM

## 2018-03-08 PROCEDURE — 99213 OFFICE O/P EST LOW 20 MIN: CPT | Performed by: PODIATRIST

## 2018-03-08 ASSESSMENT — PAIN SCALES - GENERAL: PAINLEVEL: SEVERE PAIN (7)

## 2018-03-08 NOTE — MR AVS SNAPSHOT
After Visit Summary   3/8/2018    Phillip Rueda    MRN: [id number]           Patient Information     Date Of Birth          1980        Visit Information        Provider Department      3/8/2018 7:45 AM Shelton Gavin DPM [facility]        Care Instructions    Thank you for choosing [facility]!    DR. GAVIN'S CLINIC LOCATIONS     MONDAY - [place] WEDNES[place] - [place]   [address] [place] [address] [place] [address]   [phone] / [phone] / [phone]       [facility] [phone]   [address] APPOINTMENTS: [phone]   [place] [address] BILLING QUESTIONS: [phone] / [phone]         Follow up in 2 weeks    Thank you for choosing [facility]!    DR. GAVIN'S CLINIC LOCATIONS     MONDAY - [place] WEDN[place] [facility]   [address] [place] [address] [place] [address]   [phone] / [phone] / [phone]       THURSDAY - [place] SCHEDULE SURGERY: [phone]   [address] APPOINTMENTS: [phone]   [place] [address] BILLING QUESTIONS: [phone] / [phone]       [facility]  1. Keep bandage on until that evening or the day after your procedure. If the bandage falls off, start the soaking process.    2. Some bleeding is normal. If bleeding seems excessive to you, place ice on top of your foot for 15-20 minutes and elevate your foot above heart level.    3. Over the counter pain medication, elevating your foot and ice application is all you will need for pain control.    4. If the bandage feels too tight and your toe is throbbing it is ok to remove the bandage and start soaking.     5. For two weeks, soak your foot twice a day in mild skin friendly soap (dish or hand soap) and  warm water for 15 minutes. It is ok to soak your foot for a few minutes to loosen the dressing applied in the clinic. After soaking, blot dry and apply a regular band aid.    6. It is normal to experience some discomfort and redness around the nail for several days following the procedure. Drainage will likely appear a red- yellow. This is normal. If your toe is still draining a red-yellow fluid after 2 weeks continue to soak foot.    7. Initial discomfort might last for 2-3 days. You may resume with regular activity as soon as you are comfortable, as long as you keep the wound clean and dry and follow the soaking instruction. It is recommended that you do not enter public swimming pools/hot tubs while your toe is draining.    8. If you are experiencing worsening pain and redness or notice pus after 2-3 days please contact the clinic. If it is after hours call the clinic number and follow the voice prompter. This will direct you to an on call doctor.      Body Mass Index (BMI)  Many things can cause foot and ankle problems. Foot structure, activity level, foot mechanics and injuries are common causes of pain.  One very important issue that often goes unmentioned, is body weight.  Extra weight can cause increased stress on muscles, ligaments, bones and tendons.  Sometimes just a few extra pounds is all it takes to put one over her/his threshold. Without reducing that stress, it can be difficult to alleviate pain. Some people are uncomfortable addressing this issue, but we feel it is important for you to think about it. As Foot &  Ankle specialists, our job is addressing the lower extremity problem and possible causes. Regarding extra body weight, we encourage patients to discuss diet and weight management plans with their primary care doctors. It is this team approach that gives you the best opportunity for pain relief and getting you back on your feet.        BODY MASS INDEX (BMI)  Many things can cause foot and  ankle problems. Foot structure, activity level, foot mechanics and injuries are common causes of pain.  One very important issue that often goes unmentioned, is body weight.  Extra weight can cause increased stress on muscles, ligaments, bones and tendons.  Sometimes just a few extra pounds is all it takes to put one over her/his threshold. Without reducing that stress, it can be difficult to alleviate pain. Some people are uncomfortable addressing this issue, but we feel it is important for you to think about it. As Foot &  Ankle specialists, our job is addressing the lower extremity problem and possible causes. Regarding extra body weight, we encourage patients to discuss diet and weight management plans with their primary care doctors. It is this team approach that gives you the best opportunity for pain relief and getting you back on your feet.                Follow-ups after your visit        Your next 10 appointments already scheduled     Mar 15, 2018  8:00 AM CDT   RETURN GENERAL with Kelvin Hickman MD   Eye Clinic ([facility])    [address] [id number] [phone]              Who to contact     If you have questions or need follow up information about today's clinic visit or your schedule please contact [facility] directly at [phone].  Normal or non-critical lab and imaging results will be communicated to you by MyChart, letter or phone within 4 business days after the clinic has received the results. If you do not hear from us within 7 days, please contact the clinic through MyChart or phone. If you have a critical or abnormal lab result, we will notify you by phone as soon as possible.  Submit refill requests through "[employer] or call your pharmacy and they will forward the refill request to us. Please allow 3 business days for your refill to be completed.          Additional Information About Your  "Visit        MyChart Information     [employer] gives you secure access to your electronic health record. If you see a primary care provider, you can also send messages to your care team and make appointments. If you have questions, please call your primary care clinic.  If you do not have a primary care provider, please call [phone] and they will assist you.        Care EveryWhere ID     This is your Care EveryWhere ID. This could be used by other organizations to access your [facility] medical records  [id number]        Your Vitals Were     Pulse Height BMI (Body Mass Index)             94 5' 9\" (1.753 m) 28.06 kg/m2          Blood Pressure from Last 3 Encounters:   03/05/18 141/84   11/21/17 126/83   11/09/17 135/78    Weight from Last 3 Encounters:   03/08/18 190 lb (86.2 kg)   03/05/18 190 lb 12.8 oz (86.5 kg)   03/01/18 195 lb (88.5 kg)              Today, you had the following     No orders found for display       Primary Care Provider Office Phone # Fax #    Km Michelle Dos Santos [phone]       [facility] [address] [place] [address]        Goals        General    Functional: Patient will revise his diabetic diet to maximize his diabetes with the goal of decreasing his daily blood sugars and improving his HGB A1c (pt-stated)     Notes - Note created  3/24/2016 11:25 AM by Erna Pineda RN    As of today's date 3/24/2016 goal is met at 0 - 25%.   Goal Status:  Active        Medical (pt-stated)     Notes - Note created  8/25/2015 12:49 PM by Erna Pineda, RN    Patient states that he is wanting a diabetes education referral so that he can fine tune his blood sugars or when he is on steroids  Patient states that he will contact RN Clinic Care Coordinator when needing assistance with coordinating cares or has complications with his disease management and or cares      Medical Patient will bring 3 days of blood sugar readings to office visit with Dr Dos Santos . (pt-stated)     Notes - " Note created  3/24/2016 11:23 AM by Erna Pineda RN    As of today's date 3/24/2016 goal is met at 0 - 25%.   Goal Status:  Active        Medical: Patient will set up podiatry and eye exam for diabetici pb 6 month. (pt-stated)     Notes - Note created  3/24/2016 11:21 AM by Erna Pineda RN    As of today's date 3/24/2016 goal is met at 0 - 25%.   Goal Status:  Active        Medical: Pt will continue to utilize his training in [employer] along with Western Medicine for pain and life management (pt-stated)     Notes - Note created  3/24/2016 11:28 AM by Erna Pineda RN    As of today's date 3/24/2016 goal is met at 51 - 75%.   Goal Status:  Active          Equal Access to Services     : Hadii aad harleen hadasho Soomaali, waaxda luqadaha, qaybta kaalmada adeegyada, caroline burton . So [facility] [phone].    ATENCIÓN: Si habla español, tiene a robins disposición servicios gratuitos de asistencia lingüística. Llame al [phone].    We comply with applicable federal civil rights laws and [place] laws. We do not discriminate on the basis of race, color, national origin, age, disability, sex, sexual orientation, or gender identity.            Thank you!     Thank you for choosing [facility]  for your care. Our goal is always to provide you with excellent care. Hearing back from our patients is one way we can continue to improve our services. Please take a few minutes to complete the written survey that you may receive in the mail after your visit with us. Thank you!             Your Updated Medication List - Protect others around you: Learn how to safely use, store and throw away your medicines at www.disposemymeds.org.          This list is accurate as of 3/8/18  8:05 AM.  Always use your most recent med list.                   Brand Name Dispense Instructions for use Diagnosis    BENADRYL PO      Take 50 mg by mouth At Bedtime.        blood glucose  monitoring meter device kit     1 kit    Use to test blood sugar 4-6 times daily or as directed.    Type 2 diabetes mellitus without complication (H)       * blood glucose monitoring test strip    ACCU-CHEK ADARSH    4 Box    Use to test blood sugars 4 times daily or as directed. When on prednisone check 6 times per day.    Type 2 diabetes, HbA1c goal < 7% (H)       * ACCU-CHEK ADARSH PLUS test strip   Generic drug:  blood glucose monitoring     200 strip    USE AS DIRECTED TWICE DAILY    Type 2 diabetes mellitus without complication (H)       buprenorphine HCl-naloxone HCl 4-1 MG per film    SUBOXONE     Place 1 Film under the tongue 3 times daily        EPINEPHrine 0.3 MG/0.3ML injection 2-pack    EPIPEN/ADRENACLICK/or ANY BX GENERIC EQUIV    1 each    Inject 0.3 mLs (0.3 mg) into the muscle once as needed for anaphylaxis    Anaphylactic reaction, sequela       * gabapentin 600 MG tablet    NEURONTIN    180 tablet    TAKE 2 TABLETS BY MOUTH THREE TIMES DAILY    Neuralgic amyotrophy       * gabapentin 600 MG tablet    NEURONTIN    180 tablet    TAKE 2 TABLETS BY MOUTH THREE TIMES DAILY    Neuralgic amyotrophy       ibuprofen 200 MG tablet    ADVIL/MOTRIN     Take 1-2 tablets by mouth every 6 hours as needed.        insulin degludec 100 UNIT/ML pen    TRESIBA    15 mL    Use 12 units daily    Type 2 diabetes mellitus without complication, with long-term current use of insulin (H)       insulin pen needle 31G X 6 MM     100 each    Use 2 daily or as directed.    Type 2 diabetes mellitus without complication (H)       liraglutide 18 MG/3ML soln    VICTOZA PEN    6 mL    INJECT 1.2 MG SUBCUTANEOUS EVERY DAY    Type 2 diabetes mellitus without complication, with long-term current use of insulin (H)       losartan 100 MG tablet    COZAAR    30 tablet    Take 1 tablet (100 mg) by mouth daily    Hypertension goal BP (blood pressure) < 140/90       metFORMIN 500 MG 24 hr tablet    GLUCOPHAGE-XR    120 tablet    TAKE 2  TABLETS(1000 MG) BY MOUTH TWICE DAILY WITH MEALS    Type 2 diabetes mellitus without complication, with long-term current use of insulin (H)       * nicotine polacrilex 4 MG lozenge     144 lozenge    PLACE 1 LOZENGE INSIDE THE CHEEK AS NEEDED FOR SMOKING CESSATION    Former smoker       * nicotine polacrilex 4 MG lozenge     108 lozenge    PLACE 1 LOZENGE INSIDE CHEEK AS NEEDED FOR SMOKING CESSATION.    Former smoker       nortriptyline 25 MG capsule    PAMELOR    60 capsule    TAKE 2 CAPSULES(50 MG) BY MOUTH AT BEDTIME    Neuralgic amyotrophy       sildenafil 100 MG tablet    VIAGRA    5 tablet    Take 0.5-1 tablets ( mg) by mouth daily as needed for erectile dysfunction Take 30 min to 4 hours before intercourse.  Never use with nitroglycerin, terazosin or doxazosin.    ED (erectile dysfunction)       VALIUM 5 MG tablet   Generic drug:  diazepam     60 tablet    Take 1 tablet (5 mg) 3 times daily as needed.        venlafaxine 150 MG Tb24 24 hr tablet    EFFEXOR-ER    30 each    Take 1 tablet by mouth daily (with breakfast).    Anxiety       * Notice:  This list has 6 medication(s) that are the same as other medications prescribed for you. Read the directions carefully, and ask your doctor or other care provider to review them with you.

## 2018-03-08 NOTE — PATIENT INSTRUCTIONS
Thank you for choosing [facility]!    DR. GAVIN'S CLINIC LOCATIONS     MONDAY - [place] WEDNESDAY - [place]   [address] [place] [address] [place] [address]   [phone]       THURSDAY - [place] SCHEDULE SURGERY: [phone]   [address] APPOINTMENTS: [phone]   [place] [address] BILLING QUESTIONS: [phone] / [phone]         Follow up in 2 weeks    Thank you for choosing [facility]!    DR. GAVIN'S CLINIC LOCATIONS     MONDAY - [place] WEDN[place] - [place]   [address] [place] [address] [place] [address]   [phone] / [phone]       THURSDAY - [place] SCHEDULE SURGERY: [phone]   [address] APPOINTMENTS: [phone]   [place] [address] BILLING QUESTIONS: [phone]       [facility]  1. Keep bandage on until that evening or the day after your procedure. If the bandage falls off, start the soaking process.    2. Some bleeding is normal. If bleeding seems excessive to you, place ice on top of your foot for 15-20 minutes and elevate your foot above heart level.    3. Over the counter pain medication, elevating your foot and ice application is all you will need for pain control.    4. If the bandage feels too tight and your toe is throbbing it is ok to remove the bandage and start soaking.     5. For two weeks, soak your foot twice a day in mild skin friendly soap (dish or hand soap) and warm water for 15 minutes. It is ok to soak your foot for a few minutes to loosen the dressing applied in the clinic. After soaking, blot dry and apply a regular band aid.    6. It is normal to experience some discomfort and redness around the nail for several days following the procedure. Drainage will  likely appear a red- yellow. This is normal. If your toe is still draining a red-yellow fluid after 2 weeks continue to soak foot.    7. Initial discomfort might last for 2-3 days. You may resume with regular activity as soon as you are comfortable, as long as you keep the wound clean and dry and follow the soaking instruction. It is recommended that you do not enter public swimming pools/hot tubs while your toe is draining.    8. If you are experiencing worsening pain and redness or notice pus after 2-3 days please contact the clinic. If it is after hours call the clinic number and follow the voice prompter. This will direct you to an on call doctor.      Body Mass Index (BMI)  Many things can cause foot and ankle problems. Foot structure, activity level, foot mechanics and injuries are common causes of pain.  One very important issue that often goes unmentioned, is body weight.  Extra weight can cause increased stress on muscles, ligaments, bones and tendons.  Sometimes just a few extra pounds is all it takes to put one over her/his threshold. Without reducing that stress, it can be difficult to alleviate pain. Some people are uncomfortable addressing this issue, but we feel it is important for you to think about it. As Foot &  Ankle specialists, our job is addressing the lower extremity problem and possible causes. Regarding extra body weight, we encourage patients to discuss diet and weight management plans with their primary care doctors. It is this team approach that gives you the best opportunity for pain relief and getting you back on your feet.        BODY MASS INDEX (BMI)  Many things can cause foot and ankle problems. Foot structure, activity level, foot mechanics and injuries are common causes of pain.  One very important issue that often goes unmentioned, is body weight.  Extra weight can cause increased stress on muscles, ligaments, bones and tendons.  Sometimes just a few extra pounds is all it takes  to put one over her/his threshold. Without reducing that stress, it can be difficult to alleviate pain. Some people are uncomfortable addressing this issue, but we feel it is important for you to think about it. As Foot &  Ankle specialists, our job is addressing the lower extremity problem and possible causes. Regarding extra body weight, we encourage patients to discuss diet and weight management plans with their primary care doctors. It is this team approach that gives you the best opportunity for pain relief and getting you back on your feet.

## 2018-03-08 NOTE — PROGRESS NOTES
"ASSESSMENT/PLAN:    Encounter Diagnoses   Name Primary?     Injury of toe on right foot, initial encounter Yes     Nail deformity      Open wound of toe, initial encounter      No clinical signs of infection.     II recommended close monitoring for signs of infection. These were reviewed with him:  Increasing redness, increasing pain, increasing swelling, increasing drainage    He is to soak his foot in luke-warm soap water for 20 min BID    Topical antibiotic and band-aid to be applied.    Follow up for a check in one week.    I think that future permanent of both deformed hallux nails is a reasonable option.  I discussed the procedure and post-procedure course.  I told him that I want the right hallux situation resolved, prior to any nail procedure. The exception would be if infection develops. Then nail avulsion will be indicated.     Body mass index is 28.06 kg/(m^2).      Shelton Smallwood DPM, FACFAS, MS    [facility]      ____________________________________________________________________    HPI:         Chief Complaint: \"bad right big toe.\"  He said that he trimmed the nail too short  Onset of problem: 2 days  Pain/ discomfort is described as:  Sharp, burning, aching  Ratin/10   Frequency:  daily    The pain is made worse with walking, contact, pressure    He also inquires about permanent removal of both big toenails, what it involves.  He was just in for a diabetic foot exam, 3/1/18.      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 108 lozenge 2     nortriptyline (PAMELOR) 25 MG capsule TAKE 2 CAPSULES(50 MG) BY MOUTH AT BEDTIME 60 capsule 1     nicotine polacrilex 4 MG lozenge PLACE 1 LOZENGE INSIDE THE CHEEK AS NEEDED FOR SMOKING CESSATION 144 lozenge 3     ACCU-CHEK ADARSH PLUS test strip USE AS DIRECTED TWICE DAILY 200 strip 1     metFORMIN (GLUCOPHAGE-XR) 500 MG 24 hr tablet TAKE 2 TABLETS(1000 MG) BY MOUTH TWICE DAILY WITH MEALS 120 tablet 5     liraglutide (VICTOZA PEN) 18 MG/3ML soln INJECT 1.2 MG SUBCUTANEOUS EVERY DAY 6 mL 5     losartan (COZAAR) 100 MG tablet Take 1 tablet (100 mg) by mouth daily 30 tablet 5     insulin degludec (TRESIBA) 100 UNIT/ML pen Use 12 units daily 15 mL 6     gabapentin (NEURONTIN) 600 MG tablet TAKE 2 TABLETS BY MOUTH THREE TIMES DAILY 180 tablet 2     gabapentin (NEURONTIN) 600 MG tablet TAKE 2 TABLETS BY MOUTH THREE TIMES DAILY 180 tablet 0     Buprenorphine HCl-Naloxone HCl (SUBOXONE) 4-1 MG film Place 1 Film under the tongue 3 times daily       insulin pen needle 31G X 6 MM Use 2 daily or as directed. 100 each 11     blood glucose monitoring (ACCU-CHEK ADARSH PLUS) meter device kit Use to test blood sugar 4-6 times daily or as directed. 1 kit 0     EPINEPHrine (EPIPEN) 0.3 MG/0.3ML injection Inject 0.3 mLs (0.3 mg) into the muscle once as needed for anaphylaxis 1 each 2     blood glucose monitoring (ACCU-CHEK ADARSH) test strip Use to test blood sugars 4 times daily or as directed. When on prednisone check 6 times per day. 4 Box 12     sildenafil (VIAGRA) 100 MG tablet Take 0.5-1 tablets ( mg) by mouth daily as needed for erectile dysfunction Take 30 min to 4 hours before intercourse.  Never use with nitroglycerin, terazosin or doxazosin. 5 tablet 5     diazepam (VALIUM) 5 MG tablet Take 1 tablet (5 mg) 3 times daily as needed. 60 tablet      venlafaxine (EFFEXOR-ER) 150 MG TB24 Take 1 tablet by mouth daily (with breakfast). 30 each 0     ibuprofen  "(ADVIL,MOTRIN) 200 MG tablet Take 1-2 tablets by mouth every 6 hours as needed.       DiphenhydrAMINE HCl (BENADRYL PO) Take 50 mg by mouth At Bedtime.         ROS:    A 10-point review of systems was performed.  It is positive for that noted in the HPI and as seen below.  All other systems found to be negative.     Numbness in feet?  yes   Calf pain with walking? no  Recent foot/ankle injury? no  Weight change  over past 12 months? 15# loss  Self perception as overweight? no  Recent flu-like symptoms? no  Joint pain other than feet ? Chronic diabetic nerve pain    EXAM:    Vitals: Pulse 94  Ht 5' 9\" (1.753 m)  Wt 190 lb (86.2 kg)  BMI 28.06 kg/m2  BMI: Body mass index is 28.06 kg/(m^2).  Height: 5' 9\"    Constitutional/ general:  Pt is in no apparent distress, appears well-nourished.  Cooperative with history and physical exam.     Vascular:  Pedal pulses are palpable bilaterally for both the DP and PT arteries.  CFT < 3 sec.  No edema.  Pedal hair growth noted.     Neuro:  Alert and oriented x 3. Coordinated gait.  Light touch sensation is intact to the L4, L5, S1 distributions. No obvious deficits.  No evidence of neurological-based weakness, spasticity, or contracture in the lower extremities.     Derm: right hallux nail has been trimmed back 50% of length of nail bed.  The nail is thick, deformed, and incurvated. The nail bed has some blood on it. There is a soft tissue injury disatlaly - superficial.  The toe is tender.  No malodor, pus or erythema to suggest infection at this time.     Musculoskeletal:    Lower extremity muscle strength is normal.  Patient is ambulatory without an assistive device or brace .  No gross deformities.       Shelton Smallwood, HUNG, FACFAS, MS    [facility]            "

## 2018-03-08 NOTE — NURSING NOTE
"Chief Complaint   Patient presents with     Toenail     right hallux / DM2       Initial Pulse 94  Ht 5' 9\" (1.753 m)  Wt 190 lb (86.2 kg)  BMI 28.06 kg/m2 Estimated body mass index is 28.06 kg/(m^2) as calculated from the following:    Height as of this encounter: 5' 9\" (1.753 m).    Weight as of this encounter: 190 lb (86.2 kg).  Medication Reconciliation: complete    "

## 2018-03-08 NOTE — LETTER
"    3/8/2018         RE: Phillip Rueda  [address]  [facility] [address]        Dear Colleague,    Thank you for referring your patient, Phillip Rueda, to the [facility]. Please see a copy of my visit note below.    ASSESSMENT/PLAN:    Encounter Diagnoses   Name Primary?     Injury of toe on right foot, initial encounter Yes     Nail deformity      Open wound of toe, initial encounter      No clinical signs of infection.     II recommended close monitoring for signs of infection. These were reviewed with him:  Increasing redness, increasing pain, increasing swelling, increasing drainage    He is to soak his foot in luke-warm soap water for 20 min BID    Topical antibiotic and band-aid to be applied.    Follow up for a check in one week.    I think that future permanent of both deformed hallux nails is a reasonable option.  I discussed the procedure and post-procedure course.  I told him that I want the right hallux situation resolved, prior to any nail procedure. The exception would be if infection develops. Then nail avulsion will be indicated.     Body mass index is 28.06 kg/(m^2).      Shelton Smallwood, HUNG, FACFAS, MS    [place] [facility]      ____________________________________________________________________    HPI:         Chief Complaint: \"bad right big toe.\"  He said that he trimmed the nail too short  Onset of problem: 2 days  Pain/ discomfort is described as:  Sharp, burning, aching  Ratin/10   Frequency:  daily    The pain is made worse with walking, contact, pressure    He also inquires about permanent removal of both big toenails, what it involves.  He was just in for a diabetic foot exam, 3/1/18.      Past Medical History:   Diagnosis Date     Anxiety      Depressive disorder      Diabetes mellitus (H)      Hypertension      Liver disease      Neuralgic amyotrophy      Pain disorder 2015     [place]-Avendaoñ syndrome      Seizures " (H)      Staph infection    *  *  Past Surgical History:   Procedure Laterality Date     ORTHOPEDIC SURGERY     *  *  Current Outpatient Prescriptions   Medication Sig Dispense Refill     nicotine polacrilex 4 MG lozenge PLACE 1 LOZENGE INSIDE CHEEK AS NEEDED FOR SMOKING CESSATION. 108 lozenge 2     nortriptyline (PAMELOR) 25 MG capsule TAKE 2 CAPSULES(50 MG) BY MOUTH AT BEDTIME 60 capsule 1     nicotine polacrilex 4 MG lozenge PLACE 1 LOZENGE INSIDE THE CHEEK AS NEEDED FOR SMOKING CESSATION 144 lozenge 3     ACCU-CHEK ADARSH PLUS test strip USE AS DIRECTED TWICE DAILY 200 strip 1     metFORMIN (GLUCOPHAGE-XR) 500 MG 24 hr tablet TAKE 2 TABLETS(1000 MG) BY MOUTH TWICE DAILY WITH MEALS 120 tablet 5     liraglutide (VICTOZA PEN) 18 MG/3ML soln INJECT 1.2 MG SUBCUTANEOUS EVERY DAY 6 mL 5     losartan (COZAAR) 100 MG tablet Take 1 tablet (100 mg) by mouth daily 30 tablet 5     insulin degludec (TRESIBA) 100 UNIT/ML pen Use 12 units daily 15 mL 6     gabapentin (NEURONTIN) 600 MG tablet TAKE 2 TABLETS BY MOUTH THREE TIMES DAILY 180 tablet 2     gabapentin (NEURONTIN) 600 MG tablet TAKE 2 TABLETS BY MOUTH THREE TIMES DAILY 180 tablet 0     Buprenorphine HCl-Naloxone HCl (SUBOXONE) 4-1 MG film Place 1 Film under the tongue 3 times daily       insulin pen needle 31G X 6 MM Use 2 daily or as directed. 100 each 11     blood glucose monitoring (ACCU-CHEK ADARSH PLUS) meter device kit Use to test blood sugar 4-6 times daily or as directed. 1 kit 0     EPINEPHrine (EPIPEN) 0.3 MG/0.3ML injection Inject 0.3 mLs (0.3 mg) into the muscle once as needed for anaphylaxis 1 each 2     blood glucose monitoring (ACCU-CHEK ADARSH) test strip Use to test blood sugars 4 times daily or as directed. When on prednisone check 6 times per day. 4 Box 12     sildenafil (VIAGRA) 100 MG tablet Take 0.5-1 tablets ( mg) by mouth daily as needed for erectile dysfunction Take 30 min to 4 hours before intercourse.  Never use with nitroglycerin,  "terazosin or doxazosin. 5 tablet 5     diazepam (VALIUM) 5 MG tablet Take 1 tablet (5 mg) 3 times daily as needed. 60 tablet      venlafaxine (EFFEXOR-ER) 150 MG TB24 Take 1 tablet by mouth daily (with breakfast). 30 each 0     ibuprofen (ADVIL,MOTRIN) 200 MG tablet Take 1-2 tablets by mouth every 6 hours as needed.       DiphenhydrAMINE HCl (BENADRYL PO) Take 50 mg by mouth At Bedtime.         ROS:    A 10-point review of systems was performed.  It is positive for that noted in the HPI and as seen below.  All other systems found to be negative.     Numbness in feet?  yes   Calf pain with walking? no  Recent foot/ankle injury? no  Weight change  over past 12 months? 15# loss  Self perception as overweight? no  Recent flu-like symptoms? no  Joint pain other than feet ? Chronic diabetic nerve pain    EXAM:    Vitals: Pulse 94  Ht 5' 9\" (1.753 m)  Wt 190 lb (86.2 kg)  BMI 28.06 kg/m2  BMI: Body mass index is 28.06 kg/(m^2).  Height: 5' 9\"    Constitutional/ general:  Pt is in no apparent distress, appears well-nourished.  Cooperative with history and physical exam.     Vascular:  Pedal pulses are palpable bilaterally for both the DP and PT arteries.  CFT < 3 sec.  No edema.  Pedal hair growth noted.     Neuro:  Alert and oriented x 3. Coordinated gait.  Light touch sensation is intact to the L4, L5, S1 distributions. No obvious deficits.  No evidence of neurological-based weakness, spasticity, or contracture in the lower extremities.     Derm: right hallux nail has been trimmed back 50% of length of nail bed.  The nail is thick, deformed, and incurvated. The nail bed has some blood on it. There is a soft tissue injury disatlaly - superficial.  The toe is tender.  No malodor, pus or erythema to suggest infection at this time.     Musculoskeletal:    Lower extremity muscle strength is normal.  Patient is ambulatory without an assistive device or brace .  No gross deformities.       Shelton Smallwood, HUNG, FACFAS, " MS [place] [facility]le [facility]              Again, thank you for allowing me to participate in the care of your patient.        Sincerely,        Shelton Smallwood DPM

## 2018-07-15 DIAGNOSIS — E11.9 TYPE 2 DIABETES MELLITUS WITHOUT COMPLICATION, WITH LONG-TERM CURRENT USE OF INSULIN (H): ICD-10-CM

## 2018-07-15 DIAGNOSIS — I10 HYPERTENSION GOAL BP (BLOOD PRESSURE) < 140/90: ICD-10-CM

## 2018-07-15 DIAGNOSIS — Z79.4 TYPE 2 DIABETES MELLITUS WITHOUT COMPLICATION, WITH LONG-TERM CURRENT USE OF INSULIN (H): ICD-10-CM

## 2018-07-16 NOTE — TELEPHONE ENCOUNTER
"Requested Prescriptions   Pending Prescriptions Disp Refills     losartan (COZAAR) 100 MG tablet [Pharmacy Med Name: LOSARTAN 100MG TABLETS]  Last Written Prescription Date:  6/13/18  Last Fill Quantity: 30 tablet,  # refills: 0   Last Office Visit: 11/21/2017   Future Office Visit:      30 tablet 0     Sig: TAKE 1 TABLET BY MOUTH DAILY    Angiotensin-II Receptors Failed    7/15/2018  9:27 PM       Failed - Blood pressure under 140/90 in past 12 months    BP Readings from Last 3 Encounters:   03/05/18 141/84   11/21/17 126/83   11/09/17 135/78          Failed - Normal serum creatinine on file in past 12 months    Recent Labs   Lab Test  06/29/17   1338   CR  1.06            Failed - Normal serum potassium on file in past 12 months    Recent Labs   Lab Test  06/29/17   1338   POTASSIUM  3.5             Passed - Recent (12 mo) or future (30 days) visit within the authorizing provider's specialty    Patient had office visit in the last 12 months or has a visit in the next 30 days with authorizing provider or within the authorizing provider's specialty.  See \"Patient Info\" tab in inbasket, or \"Choose Columns\" in Meds & Orders section of the refill encounter.           Passed - Patient is age 18 or older   _________________________________________________________________________________________________________________________       metFORMIN (GLUCOPHAGE-XR) 500 MG 24 hr tablet [Pharmacy Med Name: METFORMIN ER 500MG 24HR TABS]  Last Written Prescription Date:  6/13/18  Last Fill Quantity: 120 tablet,  # refills: 0   Last Office Visit: 11/21/2017   Future Office Visit:      120 tablet 0     Sig: TAKE 2 TABLETS BY MOUTH TWICE DAILY WITH MEALS    Biguanide Agents Failed    7/15/2018  9:27 PM       Failed - Blood pressure less than 140/90 in past 6 months    BP Readings from Last 3 Encounters:   03/05/18 141/84   11/21/17 126/83   11/09/17 135/78            Failed - Patient has documented LDL within the past 12 mos.    Recent " "Labs   Lab Test  03/15/17   0752   LDL  55            Failed - Patient has had a Microalbumin in the past 12 mos.    Recent Labs   Lab Test  03/15/17   0752   MICROL  <5   UMALCR  Unable to calculate due to low value            Failed - Patient has documented A1c within the specified period of time.    If HgbA1C is 8 or greater, it needs to be on file within the past 3 months.  If less than 8, must be on file within the past 6 months.     Recent Labs   Lab Test  11/21/17   1456   A1C  6.4*            Failed - Recent (6 mo) or future (30 days) visit within the authorizing provider's specialty    Patient had office visit in the last 6 months or has a visit in the next 30 days with authorizing provider or within the authorizing provider's specialty.  See \"Patient Info\" tab in inbasket, or \"Choose Columns\" in Meds & Orders section of the refill encounter.           Passed - Patient is age 10 or older       Passed - Patient's CR is NOT>1.4 OR Patient's EGFR is NOT<45 within past 12 mos.    Recent Labs   Lab Test  06/29/17   1338   GFRESTIMATED  78   GFRESTBLACK  >90   GFR Calc         Recent Labs   Lab Test  06/29/17   1338   CR  1.06            Passed - Patient does NOT have a diagnosis of CHF.          "

## 2018-07-19 DIAGNOSIS — I10 HYPERTENSION GOAL BP (BLOOD PRESSURE) < 140/90: ICD-10-CM

## 2018-07-19 RX ORDER — METFORMIN HCL 500 MG
TABLET, EXTENDED RELEASE 24 HR ORAL
Qty: 120 TABLET | Refills: 0 | Status: SHIPPED | OUTPATIENT
Start: 2018-07-19 | End: 2019-01-23

## 2018-07-19 RX ORDER — LOSARTAN POTASSIUM 100 MG/1
TABLET ORAL
Qty: 30 TABLET | Refills: 0 | Status: SHIPPED | OUTPATIENT
Start: 2018-07-19 | End: 2018-08-29

## 2018-07-19 NOTE — TELEPHONE ENCOUNTER
"Requested Prescriptions   Pending Prescriptions Disp Refills     losartan (COZAAR) 100 MG tablet [Pharmacy Med Name: LOSARTAN 100MG TABLETS]  PATIENT NEEDS AN OFFICE VISIT.  Last Written Prescription Date:  6/13/18  Last Fill Quantity: 30 TABLET,  # refills: 0   Last office visit: 11/21/2017 with prescribing provider:  ADIS   Future Office Visit:   Next 5 appointments (look out 90 days)     Aug 20, 2018  4:00 PM CDT   Office Visit with Km Dos Santos MD   [facility] ([facility])    [address] [place] [id number] [phone]                  30 tablet 0     Sig: TAKE 1 TABLET BY MOUTH DAILY    Angiotensin-II Receptors Failed    7/19/2018  9:33 AM       Failed - Blood pressure under 140/90 in past 12 months    BP Readings from Last 3 Encounters:   03/05/18 141/84   11/21/17 126/83   11/09/17 135/78                Failed - Normal serum creatinine on file in past 12 months    Recent Labs   Lab Test  06/29/17   1338   CR  1.06            Failed - Normal serum potassium on file in past 12 months    Recent Labs   Lab Test  06/29/17   1338   POTASSIUM  3.5                   Passed - Recent (12 mo) or future (30 days) visit within the authorizing provider's specialty    Patient had office visit in the last 12 months or has a visit in the next 30 days with authorizing provider or within the authorizing provider's specialty.  See \"Patient Info\" tab in inbasket, or \"Choose Columns\" in Meds & Orders section of the refill encounter.           Passed - Patient is age 18 or older          "

## 2018-07-24 RX ORDER — LOSARTAN POTASSIUM 100 MG/1
TABLET ORAL
Qty: 30 TABLET | Refills: 0 | Status: SHIPPED | OUTPATIENT
Start: 2018-07-24 | End: 2018-08-29

## 2018-07-24 NOTE — TELEPHONE ENCOUNTER
Routing refill request to provider for review/approval because:  Blood pressure above threshold.  Patient does have upcoming appointment scheduled.  1 month pended.  Vivien Gifford RN

## 2018-08-17 DIAGNOSIS — Z79.4 TYPE 2 DIABETES MELLITUS WITHOUT COMPLICATION, WITH LONG-TERM CURRENT USE OF INSULIN (H): ICD-10-CM

## 2018-08-17 DIAGNOSIS — I10 HYPERTENSION GOAL BP (BLOOD PRESSURE) < 140/90: ICD-10-CM

## 2018-08-17 DIAGNOSIS — E11.9 TYPE 2 DIABETES MELLITUS WITHOUT COMPLICATION, WITH LONG-TERM CURRENT USE OF INSULIN (H): ICD-10-CM

## 2018-08-20 NOTE — TELEPHONE ENCOUNTER
"Requested Prescriptions   Pending Prescriptions Disp Refills     losartan (COZAAR) 100 MG tablet [Pharmacy Med Name: LOSARTAN 100MG TABLETS]  Last Written Prescription Date:  7/24/2018  Last Fill Quantity: 30 tablet,  # refills: 0   Last Office Visit: 11/21/2017   Future Office Visit:    Next 5 appointments (look out 90 days)     Aug 20, 2018  4:00 PM CDT   Office Visit with Km Dos Santos MD   [facility] ([facility])    [address] [place] [id number] [phone]                30 tablet 0     Sig: TAKE 1 TABLET BY MOUTH DAILY    Angiotensin-II Receptors Failed    8/17/2018  9:20 PM       Failed - Blood pressure under 140/90 in past 12 months    BP Readings from Last 3 Encounters:   03/05/18 141/84   11/21/17 126/83   11/09/17 135/78          Failed - Normal serum creatinine on file in past 12 months    Recent Labs   Lab Test  06/29/17   1338   CR  1.06          Failed - Normal serum potassium on file in past 12 months    Recent Labs   Lab Test  06/29/17   1338   POTASSIUM  3.5           Passed - Recent (12 mo) or future (30 days) visit within the authorizing provider's specialty    Patient had office visit in the last 12 months or has a visit in the next 30 days with authorizing provider or within the authorizing provider's specialty.  See \"Patient Info\" tab in inbasket, or \"Choose Columns\" in Meds & Orders section of the refill encounter.           Passed - Patient is age 18 or older     _________________________________________________________________________________________________________________________       metFORMIN (GLUCOPHAGE-XR) 500 MG 24 hr tablet [Pharmacy Med Name: METFORMIN ER 500MG 24HR TABS]  Last Written Prescription Date:  7/19/2018  Last Fill Quantity: 120 tablet,  # refills: 0   Last Office Visit: 11/21/2017   Future Office Visit:    Next 5 appointments (look out 90 days)     Aug 20, 2018  4:00 PM CDT   Office Visit with Km" "Michelle Dos Santos MD   [facility] ([facility])    [address] [place] [id number] [phone]                120 tablet 0     Sig: TAKE 2 TABLETS BY MOUTH TWICE DAILY WITH MEALS    Biguanide Agents Failed    8/17/2018  9:20 PM       Failed - Blood pressure less than 140/90 in past 6 months    BP Readings from Last 3 Encounters:   03/05/18 141/84   11/21/17 126/83   11/09/17 135/78          Failed - Patient has documented LDL within the past 12 mos.    Recent Labs   Lab Test  03/15/17   0752   LDL  55          Failed - Patient has had a Microalbumin in the past 12 mos.    Recent Labs   Lab Test  03/15/17   0752   MICROL  <5   UMALCR  Unable to calculate due to low value          Failed - Patient has documented A1c within the specified period of time.    If HgbA1C is 8 or greater, it needs to be on file within the past 3 months.  If less than 8, must be on file within the past 6 months.     Recent Labs   Lab Test  11/21/17   1456   A1C  6.4*          Passed - Patient is age 10 or older       Passed - Patient's CR is NOT>1.4 OR Patient's EGFR is NOT<45 within past 12 mos.    Recent Labs   Lab Test  06/29/17   1338   GFRESTIMATED  78   GFRESTBLACK  >90   GFR Calc       Recent Labs   Lab Test  06/29/17   1338   CR  1.06          Passed - Patient does NOT have a diagnosis of CHF.       Passed - Recent (6 mo) or future (30 days) visit within the authorizing provider's specialty    Patient had office visit in the last 6 months or has a visit in the next 30 days with authorizing provider or within the authorizing provider's specialty.  See \"Patient Info\" tab in inbasket, or \"Choose Columns\" in Meds & Orders section of the refill encounter.              "

## 2018-08-22 DIAGNOSIS — Z79.4 TYPE 2 DIABETES MELLITUS WITHOUT COMPLICATION, WITH LONG-TERM CURRENT USE OF INSULIN (H): ICD-10-CM

## 2018-08-22 DIAGNOSIS — E11.9 TYPE 2 DIABETES MELLITUS WITHOUT COMPLICATION, WITH LONG-TERM CURRENT USE OF INSULIN (H): ICD-10-CM

## 2018-08-22 RX ORDER — LOSARTAN POTASSIUM 100 MG/1
TABLET ORAL
Qty: 30 TABLET | Refills: 0 | OUTPATIENT
Start: 2018-08-22

## 2018-08-22 RX ORDER — METFORMIN HCL 500 MG
TABLET, EXTENDED RELEASE 24 HR ORAL
Qty: 120 TABLET | Refills: 0 | OUTPATIENT
Start: 2018-08-22

## 2018-08-22 NOTE — TELEPHONE ENCOUNTER
Received dewey and no showed to appt.    -  -Please call to schedule appt. Refills will not be given until appt.     Thanks!  Samira Fox, RN, BSN

## 2018-08-22 NOTE — TELEPHONE ENCOUNTER
"Requested Prescriptions   Pending Prescriptions Disp Refills     metFORMIN (GLUCOPHAGE-XR) 500 MG 24 hr tablet  Last Written Prescription Date:  7/19/2018  Last Fill Quantity: 120 tablet,  # refills: 0   Last Office Visit: 8/20/2018   Future Office Visit:      120 tablet 0    Biguanide Agents Failed    8/22/2018  9:30 AM       Failed - Blood pressure less than 140/90 in past 6 months    BP Readings from Last 3 Encounters:   03/05/18 141/84   11/21/17 126/83   11/09/17 135/78          Failed - Patient has documented LDL within the past 12 mos.    Recent Labs   Lab Test  03/15/17   0752   LDL  55          Failed - Patient has had a Microalbumin in the past 12 mos.    Recent Labs   Lab Test  03/15/17   0752   MICROL  <5   UMALCR  Unable to calculate due to low value          Failed - Patient has documented A1c within the specified period of time.    If HgbA1C is 8 or greater, it needs to be on file within the past 3 months.  If less than 8, must be on file within the past 6 months.     Recent Labs   Lab Test  11/21/17   1456   A1C  6.4*          Failed - Recent (6 mo) or future (30 days) visit within the authorizing provider's specialty    Patient had office visit in the last 6 months or has a visit in the next 30 days with authorizing provider or within the authorizing provider's specialty.  See \"Patient Info\" tab in inbasket, or \"Choose Columns\" in Meds & Orders section of the refill encounter.           Passed - Patient is age 10 or older       Passed - Patient's CR is NOT>1.4 OR Patient's EGFR is NOT<45 within past 12 mos.    Recent Labs   Lab Test  06/29/17   1338   GFRESTIMATED  78   GFRESTBLACK  >90   GFR Calc       Recent Labs   Lab Test  06/29/17   1338   CR  1.06          Passed - Patient does NOT have a diagnosis of CHF.          "

## 2018-08-23 NOTE — TELEPHONE ENCOUNTER
"Requested Prescriptions   Pending Prescriptions Disp Refills     metFORMIN (GLUCOPHAGE-XR) 500 MG 24 hr tablet [Pharmacy Med Name: METFORMIN ER 500MG 24HR TABS]  Last Written Prescription Date:  7/19/2018  Last Fill Quantity: 120 tablet,  # refills: 0   Last Office Visit: 8/20/2018  Future Office Visit:      120 tablet 0     Sig: TAKE 2 TABLETS(1000 MG) BY MOUTH TWICE DAILY WITH MEALS    Biguanide Agents Failed    8/22/2018  9:29 PM       Failed - Blood pressure less than 140/90 in past 6 months    BP Readings from Last 3 Encounters:   03/05/18 141/84   11/21/17 126/83   11/09/17 135/78          Failed - Patient has documented LDL within the past 12 mos.    Recent Labs   Lab Test  03/15/17   0752   LDL  55          Failed - Patient has had a Microalbumin in the past 12 mos.    Recent Labs   Lab Test  03/15/17   0752   MICROL  <5   UMALCR  Unable to calculate due to low value          Failed - Patient has documented A1c within the specified period of time.    If HgbA1C is 8 or greater, it needs to be on file within the past 3 months.  If less than 8, must be on file within the past 6 months.     Recent Labs   Lab Test  11/21/17   1456   A1C  6.4*          Failed - Recent (6 mo) or future (30 days) visit within the authorizing provider's specialty    Patient had office visit in the last 6 months or has a visit in the next 30 days with authorizing provider or within the authorizing provider's specialty.  See \"Patient Info\" tab in inbasket, or \"Choose Columns\" in Meds & Orders section of the refill encounter.           Passed - Patient is age 10 or older       Passed - Patient's CR is NOT>1.4 OR Patient's EGFR is NOT<45 within past 12 mos.    Recent Labs   Lab Test  06/29/17   1338   GFRESTIMATED  78   GFRESTBLACK  >90   GFR Calc       Recent Labs   Lab Test  06/29/17   1338   CR  1.06          Passed - Patient does NOT have a diagnosis of CHF.          "

## 2018-08-24 DIAGNOSIS — I10 HYPERTENSION GOAL BP (BLOOD PRESSURE) < 140/90: ICD-10-CM

## 2018-08-24 RX ORDER — METFORMIN HCL 500 MG
TABLET, EXTENDED RELEASE 24 HR ORAL
Qty: 60 TABLET | Refills: 0 | Status: SHIPPED | OUTPATIENT
Start: 2018-08-24 | End: 2018-08-29

## 2018-08-24 RX ORDER — METFORMIN HCL 500 MG
TABLET, EXTENDED RELEASE 24 HR ORAL
Qty: 120 TABLET | Refills: 0 | OUTPATIENT
Start: 2018-08-24

## 2018-08-24 NOTE — TELEPHONE ENCOUNTER
Routing refill request to provider for review/approval because:  Lana given x1 and patient did not follow up, please advise  Labs not current:  LDL, microalbumin, A1C, CR and GFR    Patient was a no show on 8/20/18 for appointment  Rescheduled to 8/29/18.  Last OV was 11/21/17  1 month supply pended.  Vivien Gifford RN

## 2018-08-24 NOTE — TELEPHONE ENCOUNTER
Patient called stating he is currently out and did reschedule his appt for 8/29/18     Patient will check with pharmacy later today

## 2018-08-26 NOTE — TELEPHONE ENCOUNTER
"Requested Prescriptions   Pending Prescriptions Disp Refills     losartan (COZAAR) 100 MG tablet [Pharmacy Med Name: LOSARTAN 100MG TABLETS]  Last Written Prescription Date:  7/24/18  Last Fill Quantity: 30,  # refills: 0   Last office visit: 11/21/2017 with prescribing provider:     Future Office Visit:   Next 5 appointments (look out 90 days)     Aug 29, 2018  4:40 PM CDT   Office Visit with Km Dos Santos MD   [facility] ([facility])    [address] [place] [id number] [phone]                30 tablet 0     Sig: TAKE 1 TABLET(100 MG) BY MOUTH DAILY    Angiotensin-II Receptors Failed    8/24/2018  9:32 PM       Failed - Blood pressure under 140/90 in past 12 months    BP Readings from Last 3 Encounters:   03/05/18 141/84   11/21/17 126/83   11/09/17 135/78          Failed - Normal serum creatinine on file in past 12 months    Recent Labs   Lab Test  06/29/17   1338   CR  1.06          Failed - Normal serum potassium on file in past 12 months    Recent Labs   Lab Test  06/29/17   1338   POTASSIUM  3.5           Passed - Recent (12 mo) or future (30 days) visit within the authorizing provider's specialty    Patient had office visit in the last 12 months or has a visit in the next 30 days with authorizing provider or within the authorizing provider's specialty.  See \"Patient Info\" tab in inbasket, or \"Choose Columns\" in Meds & Orders section of the refill encounter.         Passed - Patient is age 18 or older          "

## 2018-08-27 NOTE — TELEPHONE ENCOUNTER
Routing refill request to provider for review/approval because:  Lana given x1 and patient did not follow up, please advise - he was given duplicate refills in July 2 months total a couple of weeks apart - he no showed appointment - he shouldn't need refill      Routing to  Reception - please encourage patient to schedule office visit and message can be sent to provider to discuss bridge

## 2018-08-28 RX ORDER — LOSARTAN POTASSIUM 100 MG/1
TABLET ORAL
Qty: 15 TABLET | Refills: 0 | Status: SHIPPED | OUTPATIENT
Start: 2018-08-28 | End: 2018-08-29

## 2018-08-29 ENCOUNTER — OFFICE VISIT (OUTPATIENT)
Dept: FAMILY MEDICINE | Facility: CLINIC | Age: 38
End: 2018-08-29
Payer: COMMERCIAL

## 2018-08-29 VITALS
RESPIRATION RATE: 16 BRPM | OXYGEN SATURATION: 99 % | BODY MASS INDEX: 27.32 KG/M2 | WEIGHT: 185 LBS | TEMPERATURE: 98.7 F | DIASTOLIC BLOOD PRESSURE: 78 MMHG | SYSTOLIC BLOOD PRESSURE: 121 MMHG | HEART RATE: 72 BPM

## 2018-08-29 DIAGNOSIS — F19.21 DRUG DEPENDENCE, IN REMISSION (H): ICD-10-CM

## 2018-08-29 DIAGNOSIS — Z11.4 SCREENING FOR HIV (HUMAN IMMUNODEFICIENCY VIRUS): ICD-10-CM

## 2018-08-29 DIAGNOSIS — G54.5 NEURALGIC AMYOTROPHY: ICD-10-CM

## 2018-08-29 DIAGNOSIS — Z79.4 TYPE 2 DIABETES MELLITUS WITHOUT COMPLICATION, WITH LONG-TERM CURRENT USE OF INSULIN (H): Primary | ICD-10-CM

## 2018-08-29 DIAGNOSIS — E11.9 TYPE 2 DIABETES MELLITUS WITHOUT COMPLICATION, WITH LONG-TERM CURRENT USE OF INSULIN (H): Primary | ICD-10-CM

## 2018-08-29 DIAGNOSIS — I10 HYPERTENSION GOAL BP (BLOOD PRESSURE) < 140/90: ICD-10-CM

## 2018-08-29 DIAGNOSIS — Z23 NEED FOR PROPHYLACTIC VACCINATION WITH TETANUS-DIPHTHERIA (TD): ICD-10-CM

## 2018-08-29 LAB — HBA1C MFR BLD: 6.5 % (ref 0–5.6)

## 2018-08-29 PROCEDURE — 82043 UR ALBUMIN QUANTITATIVE: CPT | Performed by: FAMILY MEDICINE

## 2018-08-29 PROCEDURE — 87389 HIV-1 AG W/HIV-1&-2 AB AG IA: CPT | Performed by: FAMILY MEDICINE

## 2018-08-29 PROCEDURE — 99214 OFFICE O/P EST MOD 30 MIN: CPT | Mod: 25 | Performed by: FAMILY MEDICINE

## 2018-08-29 PROCEDURE — 83036 HEMOGLOBIN GLYCOSYLATED A1C: CPT | Performed by: FAMILY MEDICINE

## 2018-08-29 PROCEDURE — 82565 ASSAY OF CREATININE: CPT | Mod: 59 | Performed by: FAMILY MEDICINE

## 2018-08-29 PROCEDURE — 80061 LIPID PANEL: CPT | Performed by: FAMILY MEDICINE

## 2018-08-29 PROCEDURE — 80053 COMPREHEN METABOLIC PANEL: CPT | Performed by: FAMILY MEDICINE

## 2018-08-29 PROCEDURE — 36415 COLL VENOUS BLD VENIPUNCTURE: CPT | Performed by: FAMILY MEDICINE

## 2018-08-29 PROCEDURE — 84443 ASSAY THYROID STIM HORMONE: CPT | Performed by: FAMILY MEDICINE

## 2018-08-29 PROCEDURE — 90715 TDAP VACCINE 7 YRS/> IM: CPT | Performed by: FAMILY MEDICINE

## 2018-08-29 PROCEDURE — 90471 IMMUNIZATION ADMIN: CPT | Performed by: FAMILY MEDICINE

## 2018-08-29 RX ORDER — LOSARTAN POTASSIUM 100 MG/1
TABLET ORAL
Qty: 15 TABLET | Refills: 0 | Status: CANCELLED | OUTPATIENT
Start: 2018-08-29

## 2018-08-29 RX ORDER — METFORMIN HCL 500 MG
TABLET, EXTENDED RELEASE 24 HR ORAL
Qty: 360 TABLET | Refills: 1 | Status: SHIPPED | OUTPATIENT
Start: 2018-08-29 | End: 2019-01-23

## 2018-08-29 RX ORDER — GABAPENTIN 600 MG/1
TABLET ORAL
COMMUNITY
Start: 2018-08-29 | End: 2020-06-16

## 2018-08-29 NOTE — MR AVS SNAPSHOT
After Visit Summary   8/29/2018    Phillip Rueda    MRN: [id number]           Patient Information     Date Of Birth          1980        Visit Information        Provider Department      8/29/2018 4:40 PM Km Dos [facility]        Today's Diagnoses     Type 2 diabetes mellitus without complication, with long-term current use of insulin (H)    -  1    Hypertension goal BP (blood pressure) < 140/90        Drug dependence, in remission (H)        Screening for HIV (human immunodeficiency virus)        Need for prophylactic vaccination with tetanus-diphtheria (TD)        Neuralgic amyotrophy          Care Instructions    BP goal - below 139/89 - both numbers needs to be below the goal.           Follow-ups after your visit        Who to contact     If you have questions or need follow up information about today's clinic visit or your schedule please contact [facility] directly at [phone].  Normal or non-critical lab and imaging results will be communicated to you by MyChart, letter or phone within 4 business days after the clinic has received the results. If you do not hear from us within 7 days, please contact the clinic through [facility] or phone. If you have a critical or abnormal lab result, we will notify you by phone as soon as possible.  Submit refill requests through [facility] or call your pharmacy and they will forward the refill request to us. Please allow 3 business days for your refill to be completed.          Additional Information About Your Visit        MyChart Information     [facility] gives you secure access to your electronic health record. If you see a primary care provider, you can also send messages to your care team and make appointments. If you have questions, please call your primary care clinic.  If you do not have a primary care provider, please call [phone] and they will assist you.        Care EveryWhere ID     This  is your Care EveryWhere ID. This could be used by other organizations to access your [facility] medical records  [id number]        Your Vitals Were     Pulse Temperature Respirations Pulse Oximetry BMI (Body Mass Index)       72 98.7  F (37.1  C) (Oral) 16 99% 27.32 kg/m2        Blood Pressure from Last 3 Encounters:   08/29/18 121/78   03/05/18 141/84   11/21/17 126/83    Weight from Last 3 Encounters:   08/29/18 185 lb (83.9 kg)   03/08/18 190 lb (86.2 kg)   03/05/18 190 lb 12.8 oz (86.5 kg)              We Performed the Following          ADMIN VACCINE, FIRST     Albumin Random Urine Quantitative with Creat Ratio     Comprehensive metabolic panel     CREATININE     HEMOGLOBIN A1C     HIV Screening     Lipid panel reflex to direct LDL Fasting     TDAP VACCINE (ADACEL)     TSH WITH FREE T4 REFLEX          Today's Medication Changes          These changes are accurate as of 8/29/18  5:22 PM.  If you have any questions, ask your nurse or doctor.               These medicines have changed or have updated prescriptions.        Dose/Directions    gabapentin 600 MG tablet   Commonly known as:  NEURONTIN   This may have changed:    - See the new instructions.  - Another medication with the same name was removed. Continue taking this medication, and follow the directions you see here.   Used for:  Neuralgic amyotrophy   Changed by:  Km Dos Snatos MD        TAKE 2 TABLETS BY MOUTH two TIMES DAILY   Refills:  0         Stop taking these medicines if you haven't already. Please contact your care team if you have questions.     losartan 100 MG tablet   Commonly known as:  COZAAR   Stopped by:  Km Dos Santos MD                Where to get your medicines      These medications were sent to [facility] [address] [address] AT [address] [address]    Hours:  24-hours Phone:  [phone]     metFORMIN 500 MG 24 hr tablet                 Primary Care Provider Office Phone # Fax #    Km Michelle Dos Santos [phone] [address] [place] [id number]        Goals        General    Functional: Patient will revise his diabetic diet to maximize his diabetes with the goal of decreasing his daily blood sugars and improving his HGB A1c (pt-stated)     Notes - Note created  3/24/2016 11:25 AM by Erna Pineda RN    As of today's date 3/24/2016 goal is met at 0 - 25%.   Goal Status:  Active        Medical (pt-stated)     Notes - Note created  8/25/2015 12:49 PM by Erna Pineda RN    Patient states that he is wanting a diabetes education referral so that he can fine tune his blood sugars or when he is on steroids  Patient states that he will contact RN Clinic Care Coordinator when needing assistance with coordinating cares or has complications with his disease management and or cares      Medical Patient will bring 3 days of blood sugar readings to office visit with Dr Dos Santos . (pt-stated)     Notes - Note created  3/24/2016 11:23 AM by Erna Pineda RN    As of today's date 3/24/2016 goal is met at 0 - 25%.   Goal Status:  Active        Medical: Patient will set up podiatry and eye exam for diabetic chiin 6 month. (pt-stated)     Notes - Note created  3/24/2016 11:21 AM by Erna Pineda RN    As of today's date 3/24/2016 goal is met at 0 - 25%.   Goal Status:  Active        Medical: Pt will continue to utilize his training in [employer] along with [employer] for pain and life management (pt-stated)     Notes - Note created  3/24/2016 11:28 AM by Erna Pineda RN    As of today's date 3/24/2016 goal is met at 51 - 75%.   Goal Status:  Active          Equal Access to Services     OLEG DELANEY : Hung romero Sonickie, waaxda luqadaha, qaybta kaalmada delroyyasabrina, caroline esquivel. [facility] [phone].    ATENCIÓN: Si habla español, tiene a robins disposición  servicios gratuitos de asistencia lingüística. [facility] [phone].    We comply with applicable federal civil rights laws and [place] laws. We do not discriminate on the basis of race, color, national origin, age, disability, sex, sexual orientation, or gender identity.            Thank you!     Thank you for choosing [facility]  for your care. Our goal is always to provide you with excellent care. Hearing back from our patients is one way we can continue to improve our services. Please take a few minutes to complete the written survey that you may receive in the mail after your visit with us. Thank you!             Your Updated Medication List - Protect others around you: Learn how to safely use, store and throw away your medicines at www.disposemymeds.org.          This list is accurate as of 8/29/18  5:22 PM.  Always use your most recent med list.                   Brand Name Dispense Instructions for use Diagnosis    BENADRYL PO      Take 50 mg by mouth At Bedtime.        blood glucose monitoring meter device kit     1 kit    Use to test blood sugar 4-6 times daily or as directed.    Type 2 diabetes mellitus without complication (H)       * blood glucose monitoring test strip    ACCU-CHEK ADARSH    4 Box    Use to test blood sugars 4 times daily or as directed. When on prednisone check 6 times per day.    Type 2 diabetes, HbA1c goal < 7% (H)       * ACCU-CHEK ADARSH PLUS test strip   Generic drug:  blood glucose monitoring     200 strip    USE AS DIRECTED TWICE DAILY    Type 2 diabetes mellitus without complication (H)       buprenorphine HCl-naloxone HCl 4-1 MG per film    SUBOXONE     Place 1 Film under the tongue 3 times daily        EPINEPHrine 0.3 MG/0.3ML injection 2-pack    EPIPEN/ADRENACLICK/or ANY BX GENERIC EQUIV    1 each    Inject 0.3 mLs (0.3 mg) into the muscle once as needed for anaphylaxis    Anaphylactic reaction, sequela       gabapentin 600 MG tablet    NEURONTIN     TAKE 2  TABLETS BY MOUTH two TIMES DAILY    Neuralgic amyotrophy       ibuprofen 200 MG tablet    ADVIL/MOTRIN     Take 1-2 tablets by mouth every 6 hours as needed.        insulin degludec 100 UNIT/ML pen    TRESIBA    15 mL    Use 12 units daily    Type 2 diabetes mellitus without complication, with long-term current use of insulin (H)       insulin pen needle 31G X 6 MM     100 each    Use 2 daily or as directed.    Type 2 diabetes mellitus without complication (H)       liraglutide 18 MG/3ML soln    VICTOZA PEN    6 mL    INJECT 1.2 MG SUBCUTANEOUS EVERY DAY    Type 2 diabetes mellitus without complication, with long-term current use of insulin (H)       * metFORMIN 500 MG 24 hr tablet    GLUCOPHAGE-XR    120 tablet    TAKE 2 TABLETS BY MOUTH TWICE DAILY WITH MEALS    Type 2 diabetes mellitus without complication, with long-term current use of insulin (H)       * metFORMIN 500 MG 24 hr tablet    GLUCOPHAGE-XR    360 tablet    TAKE 2 TABLETS(1000 MG) BY MOUTH TWICE DAILY WITH MEALS    Type 2 diabetes mellitus without complication, with long-term current use of insulin (H)       * nicotine polacrilex 4 MG lozenge     144 lozenge    PLACE 1 LOZENGE INSIDE THE CHEEK AS NEEDED FOR SMOKING CESSATION    Former smoker       * nicotine polacrilex 4 MG lozenge     108 lozenge    PLACE 1 LOZENGE INSIDE CHEEK AS NEEDED FOR SMOKING CESSATION.    Former smoker       nortriptyline 25 MG capsule    PAMELOR    60 capsule    TAKE 2 CAPSULES(50 MG) BY MOUTH AT BEDTIME    Neuralgic amyotrophy       order for DME     1 Device    Equipment being ordered: surgical shoe    Injury of toe on right foot, initial encounter, Nail deformity, Open wound of toe, initial encounter       sildenafil 100 MG tablet    VIAGRA    5 tablet    Take 0.5-1 tablets ( mg) by mouth daily as needed for erectile dysfunction Take 30 min to 4 hours before intercourse.  Never use with nitroglycerin, terazosin or doxazosin.    ED (erectile dysfunction)       VALIUM  5 MG tablet   Generic drug:  diazepam     60 tablet    Take 1 tablet (5 mg) 3 times daily as needed.        venlafaxine 150 MG Tb24 24 hr tablet    EFFEXOR-ER    30 each    Take 1 tablet by mouth daily (with breakfast).    Anxiety       * Notice:  This list has 6 medication(s) that are the same as other medications prescribed for you. Read the directions carefully, and ask your doctor or other care provider to review them with you.

## 2018-08-29 NOTE — PROGRESS NOTES
SUBJECTIVE:   Phillip Rueda is a 38 year old male who presents to clinic today for the following health issues:      Diabetes Follow-up    Patient is checking blood sugars: three times daily.  107 in AM  Blood sugar testing frequency justification: controlled  Results are as follows:         am - 107    Diabetic concerns: None     Symptoms of hypoglycemia (low blood sugar): none     Paresthesias (numbness or burning in feet) or sores: Yes not more then usual     Date of last diabetic eye exam: Due for one    BP Readings from Last 2 Encounters:   03/05/18 141/84   11/21/17 126/83     Hemoglobin A1C (%)   Date Value   11/21/2017 6.4 (H)   06/02/2017 6.6 (H)     LDL Cholesterol Calculated (mg/dL)   Date Value   03/15/2017 55   02/11/2016 76       Diabetes Management Resources  Hypertension Follow-up      Outpatient blood pressures are being checked at store.  Results are 127/80.    Low Salt Diet: low salt      Amount of exercise or physical activity: 2-3 days/week for an average of 30-45 minutes    Problems taking medications regularly: No    Medication side effects: none    Diet: regular (no restrictions) and low salt        Pain clinic in [facility]. No change in health.   Child going to turn 1 soon.   Using tresiba - 12 units.     Effexor, valium - psycihatrist.     Pain specialist - nortriptyline, suboxone, gabapentin     Problem list and histories reviewed & adjusted, as indicated.  Additional history: as documented    Labs reviewed in EPIC    Reviewed and updated as needed this visit by clinical staff       Reviewed and updated as needed this visit by Provider             Social History     Social History     Marital status:      Spouse name: N/A     Number of children: N/A     Years of education: N/A     Occupational History     Not on file.     Social History Main Topics     Smoking status: Former Smoker     Packs/day: 0.25     Years: 15.00     Types: Cigarettes, Dip, chew, snus or snuff     Start date:  1996     Quit date: 2013     Smokeless tobacco: Current User     Alcohol use No      Comment: 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  Drink 1.75 every 2 days     Drug use: No     Sexual activity: Yes     Partners: Female     Birth control/ protection: None      Comment: Trying to get my wife pregnant     Other Topics Concern     Parent/Sibling W/ Cabg, Mi Or Angioplasty Before 65f 55m? Yes     My dads dad  of heart issues in his 40's     Social History Narrative     Allergies   Allergen Reactions     Bees Swelling     Bupropion Hcl Other (See Comments)     seizure     Lisinopril Cough     Penicillins Other (See Comments)     Unable to close mouth     Patient Active Problem List   Diagnosis     Neuralgic amyotrophy     Hereditary and idiopathic peripheral neuropathy     Drug dependence, in remission (H)     Type 2 diabetes mellitus without complication (H)     Hypertension goal BP (blood pressure) < 140/90     Anemia     CARDIOVASCULAR SCREENING; LDL GOAL LESS THAN 100     Right shoulder pain     ED (erectile dysfunction)     Pain disorder     Health Care Home     Former smoker     Reviewed medications, social history and  past medical and surgical history.    Review of system: for general, respiratory, CVS, GI and psychiatry negative except for noted above.     EXAM:  /78 (BP Location: Right arm, Patient Position: Sitting, Cuff Size: Adult Regular)  Pulse 72  Temp 98.7  F (37.1  C) (Oral)  Resp 16  Wt 185 lb (83.9 kg)  SpO2 99%  BMI 27.32 kg/m2  Constitutional: healthy, alert and no distress   Psychiatric: mentation appears normal and affect normal/bright  Cardiovascular: RRR. No murmurs,  Respiratory: negative, Lungs clear. No crackles or wheezing. No tachypnea.        ASSESSMENT / PLAN:  (E11.9,  Z79.4) Type 2 diabetes mellitus without complication, with long-term current use of insulin (H)  (primary encounter diagnosis)  Comment: Patient is tolerating current medication without any major side  effects of concerns and current dose seems reasonable too.  Current medication regime is effective. Continue current treatment without any changes.   Plan: CREATININE, HEMOGLOBIN A1C, Lipid panel reflex         to direct LDL Fasting, Albumin Random Urine         Quantitative with Creat Ratio, metFORMIN         (GLUCOPHAGE-XR) 500 MG 24 hr tablet,         Comprehensive metabolic panel             (I10) Hypertension goal BP (blood pressure) < 140/90  Comment: His blood pressure is running fine and he has been out of losartan for a while.  He is wondering about stopping the medication which seems appropriate.  He is going to check his blood pressure outside and okay to resume it if needed.  We discussed about nephro protection in diabetes and using a low-dose of ACE/ARB medication may be helpful but we agreed to hold off as per his request.  Plan: Lipid panel reflex to direct LDL Fasting, TSH         WITH FREE T4 REFLEX             (F19.21) Drug dependence, in remission (H)  Comment:    Plan:  Seeing pain specialist. Stable.     (Z11.4) Screening for HIV (human immunodeficiency virus)  Comment:    Plan: HIV Screening             (Z23) Need for prophylactic vaccination with tetanus-diphtheria (TD)  Comment:    Plan: TDAP VACCINE (ADACEL),      ADMIN VACCINE,         FIRST             (G54.5) Neuralgic amyotrophy  Comment:   Seeing pain specialist. rx as per them.   Plan: gabapentin (NEURONTIN) 600 MG tablet             Follow up:  6 months.     Patient Instructions   BP goal - below 139/89 - both numbers needs to be below the goal.

## 2018-08-29 NOTE — NURSING NOTE
Screening Questionnaire for Adult Immunization    Are you sick today?   No   Do you have allergies to medications, food, a vaccine component or latex?   No   Have you ever had a serious reaction after receiving a vaccination?   No   Do you have a long-term health problem with heart disease, lung disease, asthma, kidney disease, metabolic disease (e.g. diabetes), anemia, or other blood disorder?   No   Do you have cancer, leukemia, HIV/AIDS, or any other immune system problem?   No   In the past 3 months, have you taken medications that affect  your immune system, such as prednisone, other steroids, or anticancer drugs; drugs for the treatment of rheumatoid arthritis, Crohn s disease, or psoriasis; or have you had radiation treatments?   No   Have you had a seizure, or a brain or other nervous system problem?   No   During the past year, have you received a transfusion of blood or blood     products, or been given immune (gamma) globulin or antiviral drug?   No   For women: Are you pregnant or is there a chance you could become        pregnant during the next month?   No   Have you received any vaccinations in the past 4 weeks?   No     Immunization questionnaire answers were all negative.        Per orders of Dr. Dos Santos, injection of Tdap given by Amber Gusman. Patient instructed to remain in clinic for 15 minutes afterwards, and to report any adverse reaction to me immediately.    Due to injection administration, patient instructed to remain in clinic for 15 minutes  afterwards, and to report any adverse reaction to me immediately.         Screening performed by Amber Gusman on 8/29/2018 at 5:29 PM.

## 2018-08-30 LAB
ALBUMIN SERPL-MCNC: 3.9 G/DL (ref 3.4–5)
ALP SERPL-CCNC: 118 U/L (ref 40–150)
ALT SERPL W P-5'-P-CCNC: 43 U/L (ref 0–70)
ANION GAP SERPL CALCULATED.3IONS-SCNC: 5 MMOL/L (ref 3–14)
AST SERPL W P-5'-P-CCNC: 32 U/L (ref 0–45)
BILIRUB SERPL-MCNC: 0.4 MG/DL (ref 0.2–1.3)
BUN SERPL-MCNC: 15 MG/DL (ref 7–30)
CALCIUM SERPL-MCNC: 8.7 MG/DL (ref 8.5–10.1)
CHLORIDE SERPL-SCNC: 104 MMOL/L (ref 94–109)
CHOLEST SERPL-MCNC: 165 MG/DL
CO2 SERPL-SCNC: 31 MMOL/L (ref 20–32)
CREAT SERPL-MCNC: 0.9 MG/DL (ref 0.66–1.25)
CREAT SERPL-MCNC: 0.95 MG/DL (ref 0.66–1.25)
GFR SERPL CREATININE-BSD FRML MDRD: 88 ML/MIN/1.7M2
GFR SERPL CREATININE-BSD FRML MDRD: >90 ML/MIN/1.7M2
GLUCOSE SERPL-MCNC: 90 MG/DL (ref 70–99)
HDLC SERPL-MCNC: 62 MG/DL
HIV 1+2 AB+HIV1 P24 AG SERPL QL IA: NONREACTIVE
LDLC SERPL CALC-MCNC: 92 MG/DL
NONHDLC SERPL-MCNC: 103 MG/DL
POTASSIUM SERPL-SCNC: 4.5 MMOL/L (ref 3.4–5.3)
PROT SERPL-MCNC: 6.9 G/DL (ref 6.8–8.8)
SODIUM SERPL-SCNC: 140 MMOL/L (ref 133–144)
TRIGL SERPL-MCNC: 56 MG/DL
TSH SERPL DL<=0.005 MIU/L-ACNC: 1.46 MU/L (ref 0.4–4)

## 2018-09-02 LAB
CREAT UR-MCNC: 22 MG/DL
MICROALBUMIN UR-MCNC: <5 MG/L
MICROALBUMIN/CREAT UR: NORMAL MG/G CR (ref 0–17)

## 2018-11-09 DIAGNOSIS — Z87.891 FORMER SMOKER: ICD-10-CM

## 2018-11-09 NOTE — TELEPHONE ENCOUNTER
"Requested Prescriptions   Pending Prescriptions Disp Refills     nicotine polacrilex 4 MG lozenge [Pharmacy Med Name: NICOTINE 4MG LOZENGES CHERRY 72'S]  Last Written Prescription Date:  12/29/2017  Last Fill Quantity: 144 lozenges,  # refills: 3   Last office visit: 8/29/2018 with prescribing provider:  Raya   Future Office Visit:     144 lozenge 0     Sig: PLACE 1 LOZENGE INSIDE THE CHEEK AS NEEDED FOR SMOKING CESSATION    Partial Cholinergic Nicotinic Agonist Agents Passed    11/9/2018  8:22 AM       Passed - Blood pressure under 140/90 in past 12 months    BP Readings from Last 3 Encounters:   08/29/18 121/78   03/05/18 141/84   11/21/17 126/83                Passed - Recent (12 mo) or future (30 days) visit within the authorizing provider's specialty    Patient had office visit in the last 12 months or has a visit in the next 30 days with authorizing provider or within the authorizing provider's specialty.  See \"Patient Info\" tab in inbasket, or \"Choose Columns\" in Meds & Orders section of the refill encounter.             Passed - Patient is 18 years of age or older          "

## 2018-11-12 NOTE — TELEPHONE ENCOUNTER
Prescription approved per [facility] Refill Protocol.  Margareth aBrlow RN

## 2018-11-16 DIAGNOSIS — E11.9 TYPE 2 DIABETES MELLITUS WITHOUT COMPLICATION (H): ICD-10-CM

## 2018-11-19 RX ORDER — BLOOD SUGAR DIAGNOSTIC
STRIP MISCELLANEOUS
Qty: 200 STRIP | Refills: 0 | Status: SHIPPED | OUTPATIENT
Start: 2018-11-19 | End: 2019-03-18

## 2018-12-05 DIAGNOSIS — Z87.891 FORMER SMOKER: ICD-10-CM

## 2018-12-06 DIAGNOSIS — E11.9 TYPE 2 DIABETES MELLITUS WITHOUT COMPLICATION, WITH LONG-TERM CURRENT USE OF INSULIN (H): ICD-10-CM

## 2018-12-06 DIAGNOSIS — Z79.4 TYPE 2 DIABETES MELLITUS WITHOUT COMPLICATION, WITH LONG-TERM CURRENT USE OF INSULIN (H): ICD-10-CM

## 2018-12-06 NOTE — TELEPHONE ENCOUNTER
"Requested Prescriptions   Pending Prescriptions Disp Refills     TRESIBA FLEXTOUCH 100 UNIT/ML pen [Pharmacy Med Name: TRESIBA FLEXTOUCH PEN (U-100)INJ3ML]  This medication is discontinued    Last Written Prescription Date:  5/18/2017  Last Fill Quantity: 15 ml,  # refills: 6   Last office visit: 8/29/2018 with prescribing provider:  Raya   Future Office Visit:      0     Sig: INJECT 12 UNITS UNDER THE SKIN DAILY    Long Acting Insulin Protocol Passed    12/6/2018 12:34 PM       Passed - Blood pressure less than 140/90 in past 6 months    BP Readings from Last 3 Encounters:   08/29/18 121/78   03/05/18 141/84   11/21/17 126/83                Passed - LDL on file in past 12 months    Recent Labs   Lab Test  08/29/18   1644   LDL  92            Passed - Microalbumin on file in past 12 months    Recent Labs   Lab Test  08/29/18   1645   MICROL  <5   UMALCR  Unable to calculate due to low value            Passed - Serum creatinine on file in past 12 months    Recent Labs   Lab Test  08/29/18   1742   CR  0.90            Passed - HgbA1C in past 3 or 6 months    If HgbA1C is 8 or greater, it needs to be on file within the past 3 months.  If less than 8, must be on file within the past 6 months.     Recent Labs   Lab Test  08/29/18   1644   A1C  6.5*            Passed - Patient is age 18 or older       Passed - Recent (6 mo) or future (30 days) visit within the authorizing provider's specialty    Patient had office visit in the last 6 months or has a visit in the next 30 days with authorizing provider or within the authorizing provider's specialty.  See \"Patient Info\" tab in inbasket, or \"Choose Columns\" in Meds & Orders section of the refill encounter.              "

## 2018-12-06 NOTE — TELEPHONE ENCOUNTER
"Requested Prescriptions   Pending Prescriptions Disp Refills     nicotine (NICORETTE) 4 MG lozenge [Pharmacy Med Name: NICOTINE 4MG LOZENGES CHERRY 108'S]  Last Written Prescription Date:  11/12/2018  Last Fill Quantity: 144 lozenge,  # refills: 1   Last Office Visit: 8/29/2018   Future Office Visit:      108 lozenge 0     Sig: PLACE 1 LOZENGE INSIDE CHEEK AS NEEDED FOR SMOKING CESSATION.    Partial Cholinergic Nicotinic Agonist Agents Passed    12/5/2018  6:37 PM       Passed - Blood pressure under 140/90 in past 12 months    BP Readings from Last 3 Encounters:   08/29/18 121/78   03/05/18 141/84   11/21/17 126/83                Passed - Recent (12 mo) or future (30 days) visit within the authorizing provider's specialty    Patient had office visit in the last 12 months or has a visit in the next 30 days with authorizing provider or within the authorizing provider's specialty.  See \"Patient Info\" tab in inbasket, or \"Choose Columns\" in Meds & Orders section of the refill encounter.             Passed - Patient is 18 years of age or older          "

## 2018-12-07 RX ORDER — INSULIN DEGLUDEC 100 U/ML
INJECTION, SOLUTION SUBCUTANEOUS
Qty: 3 ML | Refills: 1 | Status: SHIPPED | OUTPATIENT
Start: 2018-12-07 | End: 2019-02-14

## 2018-12-21 ENCOUNTER — ALLIED HEALTH/NURSE VISIT (OUTPATIENT)
Dept: NURSING | Facility: CLINIC | Age: 38
End: 2018-12-21
Payer: MEDICARE

## 2018-12-21 DIAGNOSIS — Z23 NEED FOR PROPHYLACTIC VACCINATION AND INOCULATION AGAINST INFLUENZA: Primary | ICD-10-CM

## 2018-12-21 PROCEDURE — G0008 ADMIN INFLUENZA VIRUS VAC: HCPCS

## 2018-12-21 PROCEDURE — 90686 IIV4 VACC NO PRSV 0.5 ML IM: CPT

## 2018-12-21 NOTE — PROGRESS NOTES

## 2019-01-09 DIAGNOSIS — E11.9 TYPE 2 DIABETES MELLITUS WITHOUT COMPLICATION, WITH LONG-TERM CURRENT USE OF INSULIN (H): ICD-10-CM

## 2019-01-09 DIAGNOSIS — Z79.4 TYPE 2 DIABETES MELLITUS WITHOUT COMPLICATION, WITH LONG-TERM CURRENT USE OF INSULIN (H): ICD-10-CM

## 2019-01-09 RX ORDER — LIRAGLUTIDE 6 MG/ML
INJECTION SUBCUTANEOUS
Qty: 6 ML | Refills: 0 | Status: SHIPPED | OUTPATIENT
Start: 2019-01-09 | End: 2019-02-18

## 2019-01-09 NOTE — TELEPHONE ENCOUNTER
"Requested Prescriptions   Pending Prescriptions Disp Refills     liraglutide (VICTOZA PEN) 18 MG/3ML solution [Pharmacy Med Name: VICTOZA 18MG/3ML INJ PEN 3ML]  Last Written Prescription Date:  11/21/2017  Last Fill Quantity: 6ml,  # refills: 5   Last Office Visit: 8/29/2018   Future Office Visit:      6 mL 0     Sig: ADMINISTER 1.2 MG UNDER THE SKIN EVERY DAY    GLP-1 Agonists Protocol Passed - 1/9/2019 10:26 AM       Passed - Blood pressure less than 140/90 in past 6 months    BP Readings from Last 3 Encounters:   08/29/18 121/78   03/05/18 141/84   11/21/17 126/83          Passed - LDL on file in past 12 months    Recent Labs   Lab Test 08/29/18  1644   LDL 92          Passed - Microalbumin on file in past 12 months    Recent Labs   Lab Test 08/29/18  1645   MICROL <5   UMALCR Unable to calculate due to low value          Passed - HgbA1C in past 3 or 6 months    If HgbA1C is 8 or greater, it needs to be on file within the past 3 months.  If less than 8, must be on file within the past 6 months.     Recent Labs   Lab Test 08/29/18  1644   A1C 6.5*          Passed - Medication is active on med list       Passed - Patient is age 18 or older       Passed - Normal serum creatinine on file in past 12 months    Recent Labs   Lab Test 08/29/18  1742   CR 0.90          Passed - Recent (6 mo) or future (30 days) visit within the authorizing provider's specialty    Patient had office visit in the last 6 months or has a visit in the next 30 days with authorizing provider.  See \"Patient Info\" tab in inbasket, or \"Choose Columns\" in Meds & Orders section of the refill encounter.              "

## 2019-01-09 NOTE — TELEPHONE ENCOUNTER
Prescription approved per [facility] Refill Protocol.  PARAG Soliman, BSN, RN

## 2019-01-15 DIAGNOSIS — Z87.891 FORMER SMOKER: ICD-10-CM

## 2019-01-16 NOTE — TELEPHONE ENCOUNTER
"Requested Prescriptions   Pending Prescriptions Disp Refills     nicotine (NICORETTE) 4 MG lozenge [Pharmacy Med Name: NICOTINE 4MG LOZENGES CHERRY 72'S]  Last Written Prescription Date:  12/6/2018  Last Fill Quantity: 108,  # refills: 1   Last Office Visit: 8/29/2018   Future Office Visit:      144 lozenge 0     Sig: PLACE 1 LOZENGE INSIDE THE CHEEK AS NEEDED FOR SMOKING CESSATION    Partial Cholinergic Nicotinic Agonist Agents Passed - 1/15/2019  4:34 PM       Passed - Blood pressure under 140/90 in past 12 months    BP Readings from Last 3 Encounters:   08/29/18 121/78   03/05/18 141/84   11/21/17 126/83          Passed - Recent (12 mo) or future (30 days) visit within the authorizing provider's specialty    Patient had office visit in the last 12 months or has a visit in the next 30 days with authorizing provider or within the authorizing provider's specialty.  See \"Patient Info\" tab in inbasket, or \"Choose Columns\" in Meds & Orders section of the refill encounter.           Passed - Medication is active on med list       Passed - Patient is 18 years of age or older          "

## 2019-01-18 NOTE — TELEPHONE ENCOUNTER
Prescription approved per [facility] Refill Protocol.  PARAG Soliman, BSN, RN

## 2019-01-23 ENCOUNTER — TELEPHONE (OUTPATIENT)
Dept: FAMILY MEDICINE | Facility: CLINIC | Age: 39
End: 2019-01-23

## 2019-01-23 ENCOUNTER — OFFICE VISIT (OUTPATIENT)
Dept: FAMILY MEDICINE | Facility: CLINIC | Age: 39
End: 2019-01-23
Payer: COMMERCIAL

## 2019-01-23 VITALS
SYSTOLIC BLOOD PRESSURE: 116 MMHG | DIASTOLIC BLOOD PRESSURE: 88 MMHG | BODY MASS INDEX: 26.73 KG/M2 | HEART RATE: 86 BPM | OXYGEN SATURATION: 98 % | TEMPERATURE: 98.6 F | WEIGHT: 180.5 LBS | HEIGHT: 69 IN | RESPIRATION RATE: 16 BRPM

## 2019-01-23 DIAGNOSIS — Z79.4 TYPE 2 DIABETES MELLITUS WITHOUT COMPLICATION, WITH LONG-TERM CURRENT USE OF INSULIN (H): Primary | ICD-10-CM

## 2019-01-23 DIAGNOSIS — Z87.891 FORMER SMOKER: ICD-10-CM

## 2019-01-23 DIAGNOSIS — E11.9 TYPE 2 DIABETES MELLITUS WITHOUT COMPLICATION, WITH LONG-TERM CURRENT USE OF INSULIN (H): Primary | ICD-10-CM

## 2019-01-23 DIAGNOSIS — M70.61 TROCHANTERIC BURSITIS OF RIGHT HIP: ICD-10-CM

## 2019-01-23 LAB — HBA1C MFR BLD: 6.3 % (ref 0–5.6)

## 2019-01-23 PROCEDURE — 99214 OFFICE O/P EST MOD 30 MIN: CPT | Performed by: FAMILY MEDICINE

## 2019-01-23 PROCEDURE — 83036 HEMOGLOBIN GLYCOSYLATED A1C: CPT | Performed by: FAMILY MEDICINE

## 2019-01-23 PROCEDURE — 36415 COLL VENOUS BLD VENIPUNCTURE: CPT | Performed by: FAMILY MEDICINE

## 2019-01-23 RX ORDER — NORTRIPTYLINE HCL 25 MG
CAPSULE ORAL
Qty: 60 CAPSULE | Refills: 1 | Status: CANCELLED | OUTPATIENT
Start: 2019-01-23

## 2019-01-23 RX ORDER — METFORMIN HCL 500 MG
TABLET, EXTENDED RELEASE 24 HR ORAL
Qty: 360 TABLET | Refills: 1 | Status: SHIPPED | OUTPATIENT
Start: 2019-01-23 | End: 2019-08-28

## 2019-01-23 RX ORDER — ATORVASTATIN CALCIUM 20 MG/1
20 TABLET, FILM COATED ORAL DAILY
Qty: 90 TABLET | Refills: 3 | Status: SHIPPED | OUTPATIENT
Start: 2019-01-23 | End: 2020-03-06

## 2019-01-23 ASSESSMENT — MIFFLIN-ST. JEOR: SCORE: 1719.12

## 2019-01-23 NOTE — TELEPHONE ENCOUNTER
Patient was seen by me this morning  But I forget to order and give him cardiology referral.  He needed to see preventative cardiologist.  Referral done.  Please notify him and provide him following information.      [facility] CVD Prevention - [place] [phone] [email or web address]

## 2019-01-23 NOTE — TELEPHONE ENCOUNTER
Patient called back and was informed of message per Dr. Dos Santos.    Patient verbalized understanding and in agreement with plan.  MARTY TiwariN, RN

## 2019-01-23 NOTE — TELEPHONE ENCOUNTER
Left message to call back and ask to speak with an available triage nurse.  PARAG Soliman, MARTYN, RN

## 2019-01-23 NOTE — PATIENT INSTRUCTIONS
Patient Education     Understanding Trochanteric Bursitis    A bursa is a thin, slippery, sac-like film. It contains a small amount of fluid. This structure is found between bones and soft tissues in and around joints. A bursa cushions and protects a joint. It keeps parts of a joint from rubbing against each other. If a bursa becomes inflamed and irritated, it is known as bursitis.  The trochanteric bursa is found on the hip joint. It lies on top of the bump at the top of the thighbone called the greater trochanter. Inflammation of this bursa is called trochanteric bursitis.     How to say it  [id number]   Causes of trochanteric bursitis  Causes may include:    Overuse of the hip during running or other sports, dance, or work    Falling on or irritation to the side of the hip  This condition may occur along with other problems, such as osteoarthritis of the hip or knee, or low back problems. In rare cases, it may occur after hip surgery.  Symptoms of trochanteric bursitis    Pain or aching on the side of the hip. The pain may travel down the leg.    Swelling, tenderness, or warmth on the side of the hip at the bony bump at the top of the thigh  Treatment for trochanteric bursitis  These may include:    Resting the hip. This allows the bursa to heal.    Prescription or over-the-counter pain medicines. These help reduce inflammation, swelling, and pain.    Cold packs and heat packs. These help reduce pain and swelling.    Stretching and strengthening exercises. These improve flexibility and strength around the hip.    Physical therapy. This includes exercises or other treatments.    Injections of medicine into the bursa. This may help reduce inflammation and relieve symptoms.  Possible complications  If you don t give your hip time to heal, the problem may not go away, may return, or may get worse. Rest and treat your hip as directed.     When to call your healthcare provider  Call your healthcare provider  right away if you have any of these:    Fever of 100.4 F (38 C) or higher, or as directed    Redness, swelling, or warmth that gets worse    Symptoms that don t get better with prescribed medicines, or get worse    New symptoms   Date Last Reviewed: 3/29/2016    8382-8198 [employer]. [address] [place] [address], [place] [id number]. All rights reserved. This information is not intended as a substitute for professional medical care. Always follow your healthcare professional's instructions.

## 2019-01-23 NOTE — PROGRESS NOTES
SUBJECTIVE:   Phillip Rueda is a 38 year old male who presents to clinic today for the following health issues:      Diabetes Follow-up    Patient is checking blood sugars: three daily.    Blood sugar testing frequency justification: Patient modifying lifestyle changes (diet, exercise) with blood sugars  Results are as follows:         am - 80              postprandial after lunch- 81         bedtime - 70-90    Diabetic concerns: low blood sugar several less than 70 in the past few weeks past few weeks in 60     Symptoms of hypoglycemia (low blood sugar): shaky, dizzy, weak, lethargy     Paresthesias (numbness or burning in feet) or sores: No     Date of last diabetic eye exam: over a year ago     BP Readings from Last 2 Encounters:   08/29/18 121/78   03/05/18 141/84     Hemoglobin A1C (%)   Date Value   01/23/2019 6.3 (H)   08/29/2018 6.5 (H)     LDL Cholesterol Calculated (mg/dL)   Date Value   08/29/2018 92   03/15/2017 55       Diabetes Management Resources  Hyperlipidemia Follow-Up      Rate your low fat/cholesterol diet?: good    Taking statin?  No    Other lipid medications/supplements?:  none      Amount of exercise or physical activity: None    Problems taking medications regularly: No but has cut down on some medications tresiba using 6units instead of 12 units    Medication side effects: none    Diet: regular (no restrictions)    Right hip pain - probably from his nerves issues.  History of left hip symptoms for last 6 yrs due to nerve issue.   For last 3 weeks - right hip pain and worse for last 1 week better.   Did steroid dose pack from pain clinic.     Currently on 6 units of tresiba. Blood sugars are running low.     Wondering about seeing a dietician.     Wife is a dietician and diabetic educator - wondering about statin.     Cough with lisinopril.     Problem list and histories reviewed & adjusted, as indicated.  Additional history: as documented    Labs reviewed in EPIC    Reviewed and updated  as needed this visit by clinical staff  Tobacco  Allergies  Meds  Med Hx  Surg Hx  Fam Hx  Soc Hx      Reviewed and updated as needed this visit by Provider      Social History     Socioeconomic History     Marital status:      Spouse name: Not on file     Number of children: Not on file     Years of education: Not on file     Highest education level: Not on file   Social Needs     Financial resource strain: Not on file     Food insecurity - worry: Not on file     Food insecurity - inability: Not on file     Transportation needs - medical: Not on file     Transportation needs - non-medical: Not on file   Occupational History     Not on file   Tobacco Use     Smoking status: Former Smoker     Packs/day: 0.25     Years: 15.00     Pack years: 3.75     Types: Cigarettes, Dip, chew, snus or snuff     Start date: 1996     Last attempt to quit: 2013     Years since quittin.5     Smokeless tobacco: Current User   Substance and Sexual Activity     Alcohol use: No     Alcohol/week: 0.0 oz     Comment: 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  Drink 1.75 every 2 days     Drug use: No     Sexual activity: Yes     Partners: Female     Birth control/protection: None     Comment: Trying to get my wife pregnant   Other Topics Concern     Parent/sibling w/ CABG, MI or angioplasty before 65F 55M? Yes     Comment: My dads dad  of heart issues in his 40's   Social History Narrative     Not on file     Allergies   Allergen Reactions     Bees Swelling     Bupropion Hcl Other (See Comments)     seizure     Lisinopril Cough     Penicillins Other (See Comments)     Unable to close mouth     Patient Active Problem List   Diagnosis     Neuralgic amyotrophy     Hereditary and idiopathic peripheral neuropathy     Drug dependence, in remission (H)     Type 2 diabetes mellitus without complication (H)     Hypertension goal BP (blood pressure) < 140/90     Anemia     CARDIOVASCULAR SCREENING; LDL GOAL LESS THAN 100     Right  "shoulder pain     ED (erectile dysfunction)     Pain disorder     Health Care Home     Former smoker     Reviewed medications, social history and  past medical and surgical history.    Review of system: for general, respiratory, CVS, GI and psychiatry negative except for noted above.     EXAM:  /88 (BP Location: Right arm, Patient Position: Sitting, Cuff Size: Adult Regular)   Pulse 86   Temp 98.6  F (37  C) (Oral)   Resp 16   Ht 1.753 m (5' 9\")   Wt 81.9 kg (180 lb 8 oz)   SpO2 98%   BMI 26.66 kg/m    Constitutional: healthy, alert and no distress   Psychiatric: mentation appears normal and affect normal/bright  Cardiovascular: RRR. No murmurs,  Respiratory: negative, Lungs clear. No crackles or wheezing. No tachypnea.   Right hip examined trochanteric bursa tenderness.  Hip range of motion is not restricted.    ASSESSMENT / PLAN:  (E11.9,  Z79.4) Type 2 diabetes mellitus without complication, with long-term current use of insulin (H)  (primary encounter diagnosis)  Comment: He is doing excellent with his diabetes.  He is cutting down his insulin.  Once he reaches below 4 units we may need to start thinking about stop it completely.  Plan: Hemoglobin A1c, metFORMIN (GLUCOPHAGE-XR) 500         MG 24 hr tablet, atorvastatin (LIPITOR) 20 MG         tablet             (Z87.891) Former smoker  Comment: Still needing to use including supplement as needed.  Plan: nicotine (NICORETTE) 4 MG lozenge     (M70.61) Trochanteric bursitis of right hip  Comment:    Plan: Most likely.  He has a history of amyotrophic and his symptoms could be related to that but at this point most likely musculoskeletal in nature.  He will start with home exercises and if it fails will be for him to sports medicine.    Follow up: 6 months if doing well    The above note was dictated using voice recognition. Although reviewed after completion, some word and grammatical error may remain .      Patient Instructions       Patient Education "     Understanding Trochanteric Bursitis    A bursa is a thin, slippery, sac-like film. It contains a small amount of fluid. This structure is found between bones and soft tissues in and around joints. A bursa cushions and protects a joint. It keeps parts of a joint from rubbing against each other. If a bursa becomes inflamed and irritated, it is known as bursitis.  The trochanteric bursa is found on the hip joint. It lies on top of the bump at the top of the thighbone called the greater trochanter. Inflammation of this bursa is called trochanteric bursitis.     How to say it  [id number]   Causes of trochanteric bursitis  Causes may include:    Overuse of the hip during running or other sports, dance, or work    Falling on or irritation to the side of the hip  This condition may occur along with other problems, such as osteoarthritis of the hip or knee, or low back problems. In rare cases, it may occur after hip surgery.  Symptoms of trochanteric bursitis    Pain or aching on the side of the hip. The pain may travel down the leg.    Swelling, tenderness, or warmth on the side of the hip at the bony bump at the top of the thigh  Treatment for trochanteric bursitis  These may include:    Resting the hip. This allows the bursa to heal.    Prescription or over-the-counter pain medicines. These help reduce inflammation, swelling, and pain.    Cold packs and heat packs. These help reduce pain and swelling.    Stretching and strengthening exercises. These improve flexibility and strength around the hip.    Physical therapy. This includes exercises or other treatments.    Injections of medicine into the bursa. This may help reduce inflammation and relieve symptoms.  Possible complications  If you don t give your hip time to heal, the problem may not go away, may return, or may get worse. Rest and treat your hip as directed.     When to call your healthcare provider  Call your healthcare provider right away if you have  any of these:    Fever of 100.4 F (38 C) or higher, or as directed    Redness, swelling, or warmth that gets worse    Symptoms that don t get better with prescribed medicines, or get worse    New symptoms   Date Last Reviewed: 3/29/2016    [id number] [employer]. [address] [place] [address]. All rights reserved. This information is not intended as a substitute for professional medical care. Always follow your healthcare professional's instructions.

## 2019-02-12 DIAGNOSIS — Z87.891 FORMER SMOKER: ICD-10-CM

## 2019-02-12 NOTE — TELEPHONE ENCOUNTER
"Requested Prescriptions   Pending Prescriptions Disp Refills     nicotine (NICORETTE) 4 MG lozenge [Pharmacy Med Name: NICOTINE 4MG LOZENGES CHERRY 72'S]  Last Written Prescription Date:  1/23/2019  Last Fill Quantity: 144 lozenge,  # refills: 0   Last Office Visit: 1/23/2019   Future Office Visit:      144 lozenge 0     Sig: PLACE 1 LOZENGE INSIDE THE CHEEK AS NEEDED FOR SMOKING CESSATION    Partial Cholinergic Nicotinic Agonist Agents Passed - 2/12/2019  9:20 AM       Passed - Blood pressure under 140/90 in past 12 months    BP Readings from Last 3 Encounters:   01/23/19 116/88   08/29/18 121/78   03/05/18 141/84          Passed - Recent (12 mo) or future (30 days) visit within the authorizing provider's specialty    Patient had office visit in the last 12 months or has a visit in the next 30 days with authorizing provider or within the authorizing provider's specialty.  See \"Patient Info\" tab in inbasket, or \"Choose Columns\" in Meds & Orders section of the refill encounter.           Passed - Medication is active on med list       Passed - Patient is 18 years of age or older          "

## 2019-02-14 NOTE — TELEPHONE ENCOUNTER
Prescription approved per [facility] [place].  Margareth Barlow RN

## 2019-02-18 DIAGNOSIS — E11.9 TYPE 2 DIABETES MELLITUS WITHOUT COMPLICATION, WITH LONG-TERM CURRENT USE OF INSULIN (H): ICD-10-CM

## 2019-02-18 DIAGNOSIS — Z79.4 TYPE 2 DIABETES MELLITUS WITHOUT COMPLICATION, WITH LONG-TERM CURRENT USE OF INSULIN (H): ICD-10-CM

## 2019-02-20 NOTE — TELEPHONE ENCOUNTER
"Requested Prescriptions   Pending Prescriptions Disp Refills     liraglutide (VICTOZA PEN) 18 MG/3ML solution [Pharmacy Med Name: VICTOZA 18MG/3ML INJ PEN 3ML] 6 mL 0     Sig: ADMINISTER 1.2MG UNDER THE SKIN EVERY DAY    GLP-1 Agonists Protocol Passed - 2/18/2019  4:49 PM       Passed - Blood pressure less than 140/90 in past 6 months    BP Readings from Last 3 Encounters:   01/23/19 116/88   08/29/18 121/78   03/05/18 141/84                Passed - LDL on file in past 12 months    Recent Labs   Lab Test 08/29/18  1644   LDL 92            Passed - Microalbumin on file in past 12 months    Recent Labs   Lab Test 08/29/18  1645   MICROL <5   UMALCR Unable to calculate due to low value            Passed - HgbA1C in past 3 or 6 months    If HgbA1C is 8 or greater, it needs to be on file within the past 3 months.  If less than 8, must be on file within the past 6 months.     Recent Labs   Lab Test 01/23/19  0746   A1C 6.3*            Passed - Medication is active on med list       Passed - Patient is age 18 or older       Passed - Normal serum creatinine on file in past 12 months    Recent Labs   Lab Test 08/29/18  1742   CR 0.90            Passed - Recent (6 mo) or future (30 days) visit within the authorizing provider's specialty    Patient had office visit in the last 6 months or has a visit in the next 30 days with authorizing provider.  See \"Patient Info\" tab in inbasket, or \"Choose Columns\" in Meds & Orders section of the refill encounter.            liraglutide (VICTOZA PEN) 18 MG/3ML solution 6 mL 0 1/9/2019       Last Written Prescription Date:  1/9/2019  Last Fill Quantity: 6,  # refills: 0   Last office visit: 1/23/2019 with prescribing provider:  Dr. Dos Santos   Future Office Visit:  Unknown     "

## 2019-02-21 RX ORDER — LIRAGLUTIDE 6 MG/ML
INJECTION SUBCUTANEOUS
Qty: 6 ML | Refills: 1 | Status: SHIPPED | OUTPATIENT
Start: 2019-02-21 | End: 2019-05-08

## 2019-02-26 DIAGNOSIS — E11.9 TYPE 2 DIABETES MELLITUS WITHOUT COMPLICATION, WITH LONG-TERM CURRENT USE OF INSULIN (H): ICD-10-CM

## 2019-02-26 DIAGNOSIS — Z79.4 TYPE 2 DIABETES MELLITUS WITHOUT COMPLICATION, WITH LONG-TERM CURRENT USE OF INSULIN (H): ICD-10-CM

## 2019-02-26 NOTE — TELEPHONE ENCOUNTER
"Requested Prescriptions   Pending Prescriptions Disp Refills     metFORMIN (GLUCOPHAGE-XR) 500 MG 24 hr tablet [Pharmacy Med Name: METFORMIN ER 500MG 24HR TABS]  Last Written Prescription Date:  1/23/2019  Last Fill Quantity: 360 tablet,  # refills: 1   Last Office Visit: 1/23/2019   Future Office Visit:      360 tablet 0     Sig: TAKE 2 TABLETS(1000 MG) BY MOUTH TWICE DAILY WITH MEALS    Biguanide Agents Passed - 2/26/2019 12:19 PM       Passed - Blood pressure less than 140/90 in past 6 months    BP Readings from Last 3 Encounters:   01/23/19 116/88   08/29/18 121/78   03/05/18 141/84          Passed - Patient has documented LDL within the past 12 mos.    Recent Labs   Lab Test 08/29/18  1644   LDL 92          Passed - Patient has had a Microalbumin in the past 15 mos.    Recent Labs   Lab Test 08/29/18  1645   MICROL <5   UMALCR Unable to calculate due to low value          Passed - Patient is age 10 or older       Passed - Patient has documented A1c within the specified period of time.    If HgbA1C is 8 or greater, it needs to be on file within the past 3 months.  If less than 8, must be on file within the past 6 months.     Recent Labs   Lab Test 01/23/19  0746   A1C 6.3*          Passed - Patient's CR is NOT>1.4 OR Patient's EGFR is NOT<45 within past 12 mos.    Recent Labs   Lab Test 08/29/18  1742   GFRESTIMATED >90   GFRESTBLACK >90     Recent Labs   Lab Test 08/29/18  1742   CR 0.90          Passed - Patient does NOT have a diagnosis of CHF.       Passed - Medication is active on med list       Passed - Recent (6 mo) or future (30 days) visit within the authorizing provider's specialty    Patient had office visit in the last 6 months or has a visit in the next 30 days with authorizing provider or within the authorizing provider's specialty.  See \"Patient Info\" tab in inbasket, or \"Choose Columns\" in Meds & Orders section of the refill encounter.              "

## 2019-02-27 RX ORDER — METFORMIN HCL 500 MG
TABLET, EXTENDED RELEASE 24 HR ORAL
Qty: 360 TABLET | Refills: 0 | Status: SHIPPED | OUTPATIENT
Start: 2019-02-27 | End: 2019-06-06

## 2019-02-27 NOTE — TELEPHONE ENCOUNTER
Refilled per [facility] protocol     Patient advised 6 month f/u with pcp July 2019     Pending Prescriptions:                       Disp   Refills    metFORMIN (GLUCOPHAGE-XR) 500 MG 24 hr ta*360 ta*0            Sig: TAKE 2 TABLETS(1000 MG) BY MOUTH TWICE DAILY WITH           MEALS    Samara Joseph, Registered Nurse   [facility]

## 2019-03-18 DIAGNOSIS — E11.9 TYPE 2 DIABETES MELLITUS WITHOUT COMPLICATION (H): ICD-10-CM

## 2019-03-18 NOTE — TELEPHONE ENCOUNTER
"Requested Prescriptions   Pending Prescriptions Disp Refills     ACCU-CHEK ADARSH PLUS test strip [Pharmacy Med Name: ACCU-CHEK ADARSH PLUS STRIPS 100'S]  Last Written Prescription Date:  12/22/2015  Last Fill Quantity: 4,  # refills: 12   Last Office Visit: 1/23/2019   Future Office Visit:      200 strip 0     Sig: USE AS DIRECTED TWICE DAILY    Diabetic Supplies Protocol Passed - 3/18/2019  9:42 AM       Passed - Medication is active on med list       Passed - Patient is 18 years of age or older       Passed - Recent (6 mo) or future (30 days) visit within the authorizing provider's specialty    Patient had office visit in the last 6 months or has a visit in the next 30 days with authorizing provider.  See \"Patient Info\" tab in inbasket, or \"Choose Columns\" in Meds & Orders section of the refill encounter.              "

## 2019-03-19 RX ORDER — BLOOD SUGAR DIAGNOSTIC
STRIP MISCELLANEOUS
Qty: 200 STRIP | Refills: 0 | Status: SHIPPED | OUTPATIENT
Start: 2019-03-19 | End: 2019-08-31

## 2019-03-19 NOTE — TELEPHONE ENCOUNTER
Prescription approved per [facility] [place] Refill Protocol.  LOV: 01/23/2019    Tere Pollard, RN  Triage Nurse

## 2019-04-22 DIAGNOSIS — Z79.4 TYPE 2 DIABETES MELLITUS WITHOUT COMPLICATION, WITH LONG-TERM CURRENT USE OF INSULIN (H): ICD-10-CM

## 2019-04-22 DIAGNOSIS — E11.9 TYPE 2 DIABETES MELLITUS WITHOUT COMPLICATION, WITH LONG-TERM CURRENT USE OF INSULIN (H): ICD-10-CM

## 2019-04-22 NOTE — TELEPHONE ENCOUNTER
"Requested Prescriptions   Pending Prescriptions Disp Refills     TRESIBA FLEXTOUCH 100 UNIT/ML pen [Pharmacy Med Name: TRESIBA FLEXTOUCH PEN (U-100)INJ3ML]  0     Sig: INJECT 12 UNITS UNDER THE SKIN DAILY  Last Written Prescription Date:  2/18/2019  Last Fill Quantity: 3ml,  # refills: 1   Last Office Visit: 1/23/2019   Future Office Visit:            Long Acting Insulin Protocol Passed - 4/22/2019  9:34 AM        Passed - Blood pressure less than 140/90 in past 6 months     BP Readings from Last 3 Encounters:   01/23/19 116/88   08/29/18 121/78   03/05/18 141/84           Passed - LDL on file in past 12 months     Recent Labs   Lab Test 08/29/18  1644   LDL 92           Passed - Microalbumin on file in past 12 months     Recent Labs   Lab Test 08/29/18  1645   MICROL <5   UMALCR Unable to calculate due to low value           Passed - Serum creatinine on file in past 12 months     Recent Labs   Lab Test 08/29/18  1742   CR 0.90           Passed - HgbA1C in past 3 or 6 months     If HgbA1C is 8 or greater, it needs to be on file within the past 3 months.  If less than 8, must be on file within the past 6 months.     Recent Labs   Lab Test 01/23/19  0746   A1C 6.3*             Passed - Medication is active on med list        Passed - Patient is age 18 or older        Passed - Recent (6 mo) or future (30 days) visit within the authorizing provider's specialty     Patient had office visit in the last 6 months or has a visit in the next 30 days with authorizing provider or within the authorizing provider's specialty.  See \"Patient Info\" tab in inbasket, or \"Choose Columns\" in Meds & Orders section of the refill encounter.              "

## 2019-04-23 RX ORDER — INSULIN DEGLUDEC 100 U/ML
INJECTION, SOLUTION SUBCUTANEOUS
Qty: 3 ML | Refills: 0 | Status: SHIPPED | OUTPATIENT
Start: 2019-04-23 | End: 2019-05-18

## 2019-04-23 NOTE — TELEPHONE ENCOUNTER
Prescription approved per [facility] [place] [place] Refill Protocol.  PARAG Soliman, BSN, RN

## 2019-05-08 DIAGNOSIS — Z79.4 TYPE 2 DIABETES MELLITUS WITHOUT COMPLICATION, WITH LONG-TERM CURRENT USE OF INSULIN (H): ICD-10-CM

## 2019-05-08 DIAGNOSIS — E11.9 TYPE 2 DIABETES MELLITUS WITHOUT COMPLICATION, WITH LONG-TERM CURRENT USE OF INSULIN (H): ICD-10-CM

## 2019-05-09 RX ORDER — LIRAGLUTIDE 6 MG/ML
INJECTION SUBCUTANEOUS
Qty: 6 ML | Refills: 1 | Status: SHIPPED | OUTPATIENT
Start: 2019-05-09 | End: 2019-07-12

## 2019-05-09 NOTE — TELEPHONE ENCOUNTER
"Requested Prescriptions   Pending Prescriptions Disp Refills     liraglutide (VICTOZA PEN) 18 MG/3ML solution [Pharmacy Med Name: VICTOZA 18MG/3ML INJ PEN 3ML] 6 mL 0     Sig: ADMINISTER 1.2 MG UNDER THE SKIN EVERY DAY  Last Written Prescription Date:  2/21/2019  Last Fill Quantity: 6ml,  # refills: 1   Last Office Visit: 1/23/2019   Future Office Visit:            GLP-1 Agonists Protocol Passed - 5/8/2019  8:51 PM        Passed - Blood pressure less than 140/90 in past 6 months     BP Readings from Last 3 Encounters:   01/23/19 116/88   08/29/18 121/78   03/05/18 141/84           Passed - LDL on file in past 12 months     Recent Labs   Lab Test 08/29/18  1644   LDL 92           Passed - Microalbumin on file in past 12 months     Recent Labs   Lab Test 08/29/18  1645   MICROL <5   UMALCR Unable to calculate due to low value           Passed - HgbA1C in past 3 or 6 months     If HgbA1C is 8 or greater, it needs to be on file within the past 3 months.  If less than 8, must be on file within the past 6 months.     Recent Labs   Lab Test 01/23/19  0746   A1C 6.3*           Passed - Medication is active on med list        Passed - Patient is age 18 or older        Passed - Normal serum creatinine on file in past 12 months     Recent Labs   Lab Test 08/29/18  1742   CR 0.90           Passed - Recent (6 mo) or future (30 days) visit within the authorizing provider's specialty     Patient had office visit in the last 6 months or has a visit in the next 30 days with authorizing provider.  See \"Patient Info\" tab in inbasket, or \"Choose Columns\" in Meds & Orders section of the refill encounter.              "

## 2019-05-12 ENCOUNTER — VIRTUAL VISIT (OUTPATIENT)
Dept: FAMILY MEDICINE | Facility: OTHER | Age: 39
End: 2019-05-12

## 2019-05-12 NOTE — PROGRESS NOTES
"Date:   Clinician: Daniela Palomo  Clinician NPI: [id number]  Patient: Phillip Rueda  Patient : 1980  Patient Address: [address] [place] [id number]  Patient Phone: [phone]  Visit Protocol: General skin conditions  Patient Summary:  Phillip is a 38 year old ( : 1980 ) male who initiated a Visit for evaluation of an unspecified skin condition. When asked the question \"Please sign me up to receive news, health information and promotions from [employer].\", Phillip responded \"No\".    Images of his skin condition were uploaded.  His symptoms started 3-4 weeks ago and affect the left side of his body. The skin condition is located on his hands. The skin condition is red and yellow in color.   The affected area has drainage, crusts, dry/flaky skin, scabs, and sores. It feels warm to touch, painful, and tender to touch. The symptoms do not interfere with his sleep.   Symptom details     Redness: The redness has not rapidly increased in size.    Drainage: The color of the drainage is yellow and red.    Pain: The pain is severe (between 7-9 on a 10 point pain scale).     The skin condition has changed since the symptoms started. Description of changes as reported by the patient (free text): It has gone through stages of swelling and pain and tenderness. It seems to be an infection that won't go away. It has been aggravated by work.   Denied symptoms include itchiness, burning, numbness, pimples, hives, and blisters. Phillip does not feel feverish. He does not have a rash in the shape of a bull's-eye.   Treatments or home remedies used to relieve the symptoms as reported by the patient (free text): Soaking my finger in hot and warm water with soap and also at times with epsom salt   Precipitating events   Phillip did not come in contact with any irritants prior to the onset of his symptoms and has not been in close contact with anyone that has similar symptoms. He also did not spend time in a " wooded area, swim, travel, or spend excess time in the sun just before his symptoms started. Phillip did not get bitten or stung by an insect.   Pertinent medical history  Phillip has not experienced this skin condition before.   Phillip has had chickenpox, but has not had shingles in the past.   He has ongoing medical conditions. Ongoing medical conditions as reported by the patient (free text): Diabetes   Phillip does not have a history or a family history of atopia.   Weight: 176 lbs   Phillip does not smoke or use smokeless tobacco.   MEDICATIONS: Victoza 2-Jose subcutaneous, Tresiba FlexTouch U-100 subcutaneous, gabapentin oral, metformin oral, ALLERGIES: Penicillins  Clinician Response:  Dear Phillip,   Medication information  Unless you are allergic to the over-the-counter medication(s) below, I recommend using:     Acetaminophen (Tylenol or store brand) oral tablet. Take 1-2 tablets by mouth every 4-6 hours to control pain.   Over-the-counter medications do not require a prescription. Ask the pharmacist if you have any questions.  you have infection of the margins of the fingernails.  Antibiotic doxycycline has been prescribed for the infection.  no sun tanning while taking doxycycline  also use moisturizing ointment like Vaseline on your fingers so they are not so dry and cracked- because the cracks have allowed infection to enter  Do warm water soaks of your fingers 2 x per day until resolved   Diagnosis: Cellulitis of left finger  Diagnosis ICD: L03.012  Prescription: doxycycline hyclate (Vibramycin) 100 mg oral capsule 20 capsule, 10 days supply. Take 1 capsule by mouth 2 times per day for 10 days. Refills: 0, Refill as needed: no, Allow substitutions: yes  [id number] - [phone] - [address] [place] [address]

## 2019-05-18 DIAGNOSIS — E11.9 TYPE 2 DIABETES MELLITUS WITHOUT COMPLICATION, WITH LONG-TERM CURRENT USE OF INSULIN (H): ICD-10-CM

## 2019-05-18 DIAGNOSIS — Z79.4 TYPE 2 DIABETES MELLITUS WITHOUT COMPLICATION, WITH LONG-TERM CURRENT USE OF INSULIN (H): ICD-10-CM

## 2019-05-20 NOTE — TELEPHONE ENCOUNTER
"Requested Prescriptions   Pending Prescriptions Disp Refills     TRESIBA FLEXTOUCH 100 UNIT/ML pen [Pharmacy Med Name: TRESIBA FLEXTOUCH PEN (U-100)INJ3ML]  0     Sig: INJECT 12 UNITS UNDER THE SKIN EVERY DAY.  Last Written Prescription Date:  4/23/2019  Last Fill Quantity: 3ml,  # refills: 0   Last Office Visit: 1/23/2019   Future Office Visit:            Long Acting Insulin Protocol Passed - 5/18/2019  9:59 AM        Passed - Blood pressure less than 140/90 in past 6 months     BP Readings from Last 3 Encounters:   01/23/19 116/88   08/29/18 121/78   03/05/18 141/84           Passed - LDL on file in past 12 months     Recent Labs   Lab Test 08/29/18  1644   LDL 92           Passed - Microalbumin on file in past 12 months     Recent Labs   Lab Test 08/29/18  1645   MICROL <5   UMALCR Unable to calculate due to low value           Passed - Serum creatinine on file in past 12 months     Recent Labs   Lab Test 08/29/18  1742   CR 0.90           Passed - HgbA1C in past 3 or 6 months     If HgbA1C is 8 or greater, it needs to be on file within the past 3 months.  If less than 8, must be on file within the past 6 months.     Recent Labs   Lab Test 01/23/19  0746   A1C 6.3*             Passed - Medication is active on med list        Passed - Patient is age 18 or older        Passed - Recent (6 mo) or future (30 days) visit within the authorizing provider's specialty     Patient had office visit in the last 6 months or has a visit in the next 30 days with authorizing provider or within the authorizing provider's specialty.  See \"Patient Info\" tab in inbasket, or \"Choose Columns\" in Meds & Orders section of the refill encounter.              "

## 2019-05-21 RX ORDER — INSULIN DEGLUDEC 100 U/ML
INJECTION, SOLUTION SUBCUTANEOUS
Qty: 3 ML | Refills: 0 | Status: SHIPPED | OUTPATIENT
Start: 2019-05-21 | End: 2019-06-06

## 2019-05-22 NOTE — TELEPHONE ENCOUNTER
Prescription approved per [facility] Refill Protocol.  Maritza Chris RN

## 2019-06-06 DIAGNOSIS — Z79.4 TYPE 2 DIABETES MELLITUS WITHOUT COMPLICATION, WITH LONG-TERM CURRENT USE OF INSULIN (H): ICD-10-CM

## 2019-06-06 DIAGNOSIS — E11.9 TYPE 2 DIABETES MELLITUS WITHOUT COMPLICATION, WITH LONG-TERM CURRENT USE OF INSULIN (H): ICD-10-CM

## 2019-06-06 NOTE — TELEPHONE ENCOUNTER
"Requested Prescriptions   Pending Prescriptions Disp Refills     TRESIBA FLEXTOUCH 100 UNIT/ML pen [Pharmacy Med Name: TRESIBA FLEXTOUCH PEN (U-100)INJ3ML]  0     Sig: INJECT 12 UNITS UNDER THE SKIN EVERY DAY.  Last Written Prescription Date:  5/21/2019  Last Fill Quantity: 3ml,  # refills: 0   Last Office Visit: 1/23/2019   Future Office Visit:            Long Acting Insulin Protocol Passed - 6/6/2019 10:53 AM        Passed - Blood pressure less than 140/90 in past 6 months     BP Readings from Last 3 Encounters:   01/23/19 116/88   08/29/18 121/78   03/05/18 141/84           Passed - LDL on file in past 12 months     Recent Labs   Lab Test 08/29/18  1644   LDL 92           Passed - Microalbumin on file in past 12 months     Recent Labs   Lab Test 08/29/18  1645   MICROL <5   UMALCR Unable to calculate due to low value           Passed - Serum creatinine on file in past 12 months     Recent Labs   Lab Test 08/29/18  1742   CR 0.90           Passed - HgbA1C in past 3 or 6 months     If HgbA1C is 8 or greater, it needs to be on file within the past 3 months.  If less than 8, must be on file within the past 6 months.     Recent Labs   Lab Test 01/23/19  0746   A1C 6.3*           Passed - Medication is active on med list        Passed - Patient is age 18 or older        Passed - Recent (6 mo) or future (30 days) visit within the authorizing provider's specialty     Patient had office visit in the last 6 months or has a visit in the next 30 days with authorizing provider or within the authorizing provider's specialty.  See \"Patient Info\" tab in inbasket, or \"Choose Columns\" in Meds & Orders section of the refill encounter.         _________________________________________________________________       metFORMIN (GLUCOPHAGE-XR) 500 MG 24 hr tablet [Pharmacy Med Name: METFORMIN ER 500MG 24HR TABS] 360 tablet 0     Sig: TAKE 2 TABLETS(1000 MG) BY MOUTH TWICE DAILY WITH MEALS  Last Written Prescription Date:  " "2/27/2019  Last Fill Quantity: 360 tablet,  # refills: 0   Last Office Visit: 1/23/2019   Future Office Visit:            Biguanide Agents Passed - 6/6/2019 10:53 AM        Passed - Blood pressure less than 140/90 in past 6 months     BP Readings from Last 3 Encounters:   01/23/19 116/88   08/29/18 121/78   03/05/18 141/84           Passed - Patient has documented LDL within the past 12 mos.     Recent Labs   Lab Test 08/29/18  1644   LDL 92           Passed - Patient has had a Microalbumin in the past 15 mos.     Recent Labs   Lab Test 08/29/18  1645   MICROL <5   UMALCR Unable to calculate due to low value           Passed - Patient is age 10 or older        Passed - Patient has documented A1c within the specified period of time.     If HgbA1C is 8 or greater, it needs to be on file within the past 3 months.  If less than 8, must be on file within the past 6 months.     Recent Labs   Lab Test 01/23/19  0746   A1C 6.3*           Passed - Patient's CR is NOT>1.4 OR Patient's EGFR is NOT<45 within past 12 mos.     Recent Labs   Lab Test 08/29/18  1742   GFRESTIMATED >90   GFRESTBLACK >90     Recent Labs   Lab Test 08/29/18  1742   CR 0.90           Passed - Patient does NOT have a diagnosis of CHF.        Passed - Medication is active on med list        Passed - Recent (6 mo) or future (30 days) visit within the authorizing provider's specialty     Patient had office visit in the last 6 months or has a visit in the next 30 days with authorizing provider or within the authorizing provider's specialty.  See \"Patient Info\" tab in inbasket, or \"Choose Columns\" in Meds & Orders section of the refill encounter.              "

## 2019-06-10 RX ORDER — INSULIN DEGLUDEC 100 U/ML
INJECTION, SOLUTION SUBCUTANEOUS
Qty: 3 ML | Refills: 0 | Status: SHIPPED | OUTPATIENT
Start: 2019-06-10 | End: 2019-08-14

## 2019-06-10 RX ORDER — METFORMIN HCL 500 MG
TABLET, EXTENDED RELEASE 24 HR ORAL
Qty: 360 TABLET | Refills: 0 | Status: SHIPPED | OUTPATIENT
Start: 2019-06-10 | End: 2020-06-16

## 2019-06-10 NOTE — TELEPHONE ENCOUNTER
Prescription approved per [facility] [place] Refill Protocol.  Margareth Barlow RN

## 2019-06-24 ENCOUNTER — OFFICE VISIT (OUTPATIENT)
Dept: URGENT CARE | Facility: URGENT CARE | Age: 39
End: 2019-06-24
Payer: COMMERCIAL

## 2019-06-24 VITALS
TEMPERATURE: 98.1 F | DIASTOLIC BLOOD PRESSURE: 83 MMHG | BODY MASS INDEX: 26.58 KG/M2 | OXYGEN SATURATION: 99 % | SYSTOLIC BLOOD PRESSURE: 137 MMHG | WEIGHT: 180 LBS | HEART RATE: 100 BPM

## 2019-06-24 DIAGNOSIS — R09.89 ABNORMAL LUNG SOUNDS: ICD-10-CM

## 2019-06-24 DIAGNOSIS — R07.0 THROAT PAIN: ICD-10-CM

## 2019-06-24 DIAGNOSIS — J22 LOWER RESPIRATORY INFECTION: Primary | ICD-10-CM

## 2019-06-24 LAB
DEPRECATED S PYO AG THROAT QL EIA: NORMAL
SPECIMEN SOURCE: NORMAL

## 2019-06-24 PROCEDURE — 87081 CULTURE SCREEN ONLY: CPT | Performed by: PHYSICIAN ASSISTANT

## 2019-06-24 PROCEDURE — 87880 STREP A ASSAY W/OPTIC: CPT | Performed by: INTERNAL MEDICINE

## 2019-06-24 PROCEDURE — 99213 OFFICE O/P EST LOW 20 MIN: CPT | Performed by: PHYSICIAN ASSISTANT

## 2019-06-24 RX ORDER — PREDNISONE 20 MG/1
20 TABLET ORAL 2 TIMES DAILY
Qty: 10 TABLET | Refills: 0 | Status: SHIPPED | OUTPATIENT
Start: 2019-06-24 | End: 2019-06-29

## 2019-06-24 RX ORDER — AZITHROMYCIN 250 MG/1
TABLET, FILM COATED ORAL
Qty: 6 TABLET | Refills: 0 | Status: SHIPPED | OUTPATIENT
Start: 2019-06-24 | End: 2019-08-28

## 2019-06-25 LAB
BACTERIA SPEC CULT: NORMAL
SPECIMEN SOURCE: NORMAL

## 2019-06-25 NOTE — PROGRESS NOTES
HPI:  Phillip is a 38 yo male who presents for sore throat x 2 days.  He also has had a cough.  Reports wheezing, and SOB sometimes while coughing but not SOB when not coughing.  Denies fever.  Also reports right ear pain.  No hx of asthma.    ROS:  See HPI      PE:  Vitals & nursing notes reviewed. B/P: 137/83, T: 98.1, P: 100, R: Data Unavailable  Constitutional:  Alert, well nourished, well-developed, persistent cough during exam, NAD.  Head:  Atraumatic, normocephalic  Eyes:  Perrla, EOMI, conjunctiva:  Pink   Sclera:  Anicteric  Ears:  Canals cerumen BL, TM - mild erythema BL  Throat:  (+) erythema, No exudates, or edema to postoropharynx  Neck:  Supple, no cervical LAD  Lungs:  (+) expiratory wheezes all lung fields, (+) rhonchi - upper lobes, or rales, persistent harsh cough throughout exam. Cough induced with inspiration.  CV:  RRR,  no murmur appreciated      ASSESSMENT:  (J22) Lower respiratory infection    Comment: (+) Rhonchi upper lobes, (+) wheezing.  Plan: azithromycin (ZITHROMAX) 250 MG tablet,         predniSONE (DELTASONE) 20 MG tablet       Rest.  Push fluids.  Cool mist humdifier.  Warm liquids and/or honey for cough spasms and suppression.  Tylenol or advil for pain, HA, muscles aches, & fever PRN.   F/U with PCP if sx persist or worsen.    (R07.0) Throat pain  Comment: RST negative  Plan: Warm saline gargle BID-TID. Tylenol or advil for pain & fever PRN.  F/U with PCP in 3-5 days if sx persist or worsen.

## 2019-07-12 DIAGNOSIS — E11.9 TYPE 2 DIABETES MELLITUS WITHOUT COMPLICATION, WITH LONG-TERM CURRENT USE OF INSULIN (H): ICD-10-CM

## 2019-07-12 DIAGNOSIS — Z79.4 TYPE 2 DIABETES MELLITUS WITHOUT COMPLICATION, WITH LONG-TERM CURRENT USE OF INSULIN (H): ICD-10-CM

## 2019-07-12 NOTE — TELEPHONE ENCOUNTER
"Requested Prescriptions   Pending Prescriptions Disp Refills     liraglutide (VICTOZA PEN) 18 MG/3ML solution [Pharmacy Med Name: VICTOZA 18MG/3ML INJ PEN 3ML]  Last Written Prescription Date:  5/9/2019  Last Fill Quantity: 6 ml,  # refills: 1   Last office visit: 1/23/2019 with prescribing provider:  Raya   Future Office Visit:     6 mL 0     Sig: ADMINISTER 1.2 MG UNDER THE SKIN EVERY DAY       GLP-1 Agonists Protocol Passed - 7/12/2019  4:39 PM        Passed - Blood pressure less than 140/90 in past 6 months     BP Readings from Last 3 Encounters:   06/24/19 137/83   01/23/19 116/88   08/29/18 121/78                 Passed - LDL on file in past 12 months     Recent Labs   Lab Test 08/29/18  1644   LDL 92             Passed - Microalbumin on file in past 12 months     Recent Labs   Lab Test 08/29/18  1645   MICROL <5   UMALCR Unable to calculate due to low value             Passed - HgbA1C in past 3 or 6 months     If HgbA1C is 8 or greater, it needs to be on file within the past 3 months.  If less than 8, must be on file within the past 6 months.     Recent Labs   Lab Test 01/23/19  0746   A1C 6.3*             Passed - Medication is active on med list        Passed - Patient is age 18 or older        Passed - Normal serum creatinine on file in past 12 months     Recent Labs   Lab Test 08/29/18  1742   CR 0.90             Passed - Recent (6 mo) or future (30 days) visit within the authorizing provider's specialty     Patient had office visit in the last 6 months or has a visit in the next 30 days with authorizing provider.  See \"Patient Info\" tab in inbasket, or \"Choose Columns\" in Meds & Orders section of the refill encounter.              "

## 2019-07-12 NOTE — LETTER
July 16, 2019      Phillip Rueda  [address]  [facility] [address]        Dear Phillip,     In order to ensure we are providing the best quality care, we have reviewed your chart and see that you are due for:  Diabetes follow up     Please call the clinic at your earliest convenience to schedule an appointment.    We greatly appreciate the opportunity to serve you.  Thank you for trusting us with your health care.    Your care team at [facility]        Sincerely,        Km Dos Santos MD, MD

## 2019-07-15 RX ORDER — LIRAGLUTIDE 6 MG/ML
INJECTION SUBCUTANEOUS
Qty: 6 ML | Refills: 0 | Status: SHIPPED | OUTPATIENT
Start: 2019-07-15 | End: 2019-08-12

## 2019-07-15 NOTE — TELEPHONE ENCOUNTER
Medication is being filled for 1 time refill only due to:  Patient needs to be seen because due for diabetic recheck this month.   Please call to schedule.  Maritza Chris RN

## 2019-08-07 ENCOUNTER — TELEPHONE (OUTPATIENT)
Dept: FAMILY MEDICINE | Facility: CLINIC | Age: 39
End: 2019-08-07

## 2019-08-07 DIAGNOSIS — G54.5 NEURALGIC AMYOTROPHY: ICD-10-CM

## 2019-08-07 NOTE — TELEPHONE ENCOUNTER
Writer notes last Rx sent 01/29/2018 was sent by neurology (Ky Guerrero MD) for #60/1 refill    Called patient, left voicemail to return call to clinic. How has patient been taking this medication? Is he still following up with neurology?    Thank You!  Tere Pollard, CHRISTINA  Triage Nurse

## 2019-08-12 DIAGNOSIS — Z79.4 TYPE 2 DIABETES MELLITUS WITHOUT COMPLICATION, WITH LONG-TERM CURRENT USE OF INSULIN (H): ICD-10-CM

## 2019-08-12 DIAGNOSIS — E11.9 TYPE 2 DIABETES MELLITUS WITHOUT COMPLICATION, WITH LONG-TERM CURRENT USE OF INSULIN (H): ICD-10-CM

## 2019-08-12 RX ORDER — LIRAGLUTIDE 6 MG/ML
INJECTION SUBCUTANEOUS
Qty: 3 ML | Refills: 0 | Status: SHIPPED | OUTPATIENT
Start: 2019-08-12 | End: 2019-08-28

## 2019-08-12 NOTE — TELEPHONE ENCOUNTER
"Requested Prescriptions   Pending Prescriptions Disp Refills     liraglutide (VICTOZA PEN) 18 MG/3ML solution 6 mL 0     Sig: ADMINISTER 1.2 MG UNDER THE SKIN EVERY DAY  Last Written Prescription Date:  7/15/2019  Last Fill Quantity: 6ml,  # refills: 0   Last Office Visit: 1/23/2019   Future Office Visit:            GLP-1 Agonists Protocol Failed - 8/12/2019  9:27 AM        Failed - HgbA1C in past 3 or 6 months     If HgbA1C is 8 or greater, it needs to be on file within the past 3 months.  If less than 8, must be on file within the past 6 months.     Recent Labs   Lab Test 01/23/19  0746   A1C 6.3*           Passed - Blood pressure less than 140/90 in past 6 months     BP Readings from Last 3 Encounters:   06/24/19 137/83   01/23/19 116/88   08/29/18 121/78           Passed - LDL on file in past 12 months     Recent Labs   Lab Test 08/29/18  1644   LDL 92           Passed - Microalbumin on file in past 12 months     Recent Labs   Lab Test 08/29/18  1645   MICROL <5   UMALCR Unable to calculate due to low value           Passed - Medication is active on med list        Passed - Patient is age 18 or older        Passed - Normal serum creatinine on file in past 12 months     Recent Labs   Lab Test 08/29/18  1742   CR 0.90           Passed - Recent (6 mo) or future (30 days) visit within the authorizing provider's specialty     Patient had office visit in the last 6 months or has a visit in the next 30 days with authorizing provider.  See \"Patient Info\" tab in inbasket, or \"Choose Columns\" in Meds & Orders section of the refill encounter.              "

## 2019-08-12 NOTE — TELEPHONE ENCOUNTER
Dr. Dos Santos,  Routing refill request to provider for review/approval because:  Lana given x1 and patient did not follow up, please advise    HW Reception: Please contact patient, he is due for diabetes follow up visit, refill request has been sent to provider for review    Thank You!  Tere Pollard, RN  Triage Nurse

## 2019-08-12 NOTE — LETTER
August 12, 2019      Phillip Rueda  [address] [facility] [address]        Dear Phillip,     In order to ensure we are providing the best quality care, we have reviewed your chart and see   that you are due for :     Diabetes follow up         Please call the clinic at your earliest convenience to schedule an appointment.    We greatly appreciate the opportunity to serve you and thank you for trusting us with your health care.      Your health care team at [facility]         Sincerely,        Km Dos Santos MD, MD

## 2019-08-13 NOTE — TELEPHONE ENCOUNTER
Patient reports he still takes medication - prescribed through outside neurology - says refill request was in error - we may cancel

## 2019-08-14 DIAGNOSIS — E11.9 TYPE 2 DIABETES MELLITUS WITHOUT COMPLICATION, WITH LONG-TERM CURRENT USE OF INSULIN (H): ICD-10-CM

## 2019-08-14 DIAGNOSIS — Z79.4 TYPE 2 DIABETES MELLITUS WITHOUT COMPLICATION, WITH LONG-TERM CURRENT USE OF INSULIN (H): ICD-10-CM

## 2019-08-14 NOTE — TELEPHONE ENCOUNTER
"Requested Prescriptions   Pending Prescriptions Disp Refills     TRESIBA FLEXTOUCH 100 UNIT/ML pen [Pharmacy Med Name: TRESIBA FLEXTOUCH PEN (U-100)INJ3ML]  0     Sig: INJECT 12 UNITS UNDER THE SKIN EVERY DAY  Last Written Prescription Date:  6/10/2019  Last Fill Quantity: 3ml,  # refills: 0   Last Office Visit: 1/23/2019   Future Office Visit:            Long Acting Insulin Protocol Failed - 8/14/2019  9:08 AM        Failed - HgbA1C in past 3 or 6 months     If HgbA1C is 8 or greater, it needs to be on file within the past 3 months.  If less than 8, must be on file within the past 6 months.     Recent Labs   Lab Test 01/23/19  0746   A1C 6.3*           Passed - Blood pressure less than 140/90 in past 6 months     BP Readings from Last 3 Encounters:   06/24/19 137/83   01/23/19 116/88   08/29/18 121/78           Passed - LDL on file in past 12 months     Recent Labs   Lab Test 08/29/18  1644   LDL 92           Passed - Microalbumin on file in past 12 months     Recent Labs   Lab Test 08/29/18  1645   MICROL <5   UMALCR Unable to calculate due to low value           Passed - Serum creatinine on file in past 12 months     Recent Labs   Lab Test 08/29/18  1742   CR 0.90           Passed - Medication is active on med list        Passed - Patient is age 18 or older        Passed - Recent (6 mo) or future (30 days) visit within the authorizing provider's specialty     Patient had office visit in the last 6 months or has a visit in the next 30 days with authorizing provider or within the authorizing provider's specialty.  See \"Patient Info\" tab in inbasket, or \"Choose Columns\" in Meds & Orders section of the refill encounter.              "

## 2019-08-14 NOTE — LETTER
August 15, 2019      Phillip Rueda  [address] [facility] [address]        Dear Phillip,     In order to ensure we are providing the best quality care, we have reviewed your chart and see   that you are due for :      Diabetes follow up      Please call the clinic at your earliest convenience to schedule an appointment.    We greatly appreciate the opportunity to serve you and thank you for trusting us with your health care.      Your health care team at [facility]           Sincerely,        Km Dos Santos MD, MD

## 2019-08-15 RX ORDER — INSULIN DEGLUDEC 100 U/ML
INJECTION, SOLUTION SUBCUTANEOUS
Qty: 3 ML | Refills: 0 | Status: SHIPPED | OUTPATIENT
Start: 2019-08-15 | End: 2019-08-26

## 2019-08-15 NOTE — TELEPHONE ENCOUNTER
LOV: 8/14/2019    TC- please call patient for follow upon diabetes.    Will refill medication 1 x.     Thanks! Nina Douglas RN

## 2019-08-23 DIAGNOSIS — Z79.4 TYPE 2 DIABETES MELLITUS WITHOUT COMPLICATION, WITH LONG-TERM CURRENT USE OF INSULIN (H): ICD-10-CM

## 2019-08-23 DIAGNOSIS — E11.9 TYPE 2 DIABETES MELLITUS WITHOUT COMPLICATION, WITH LONG-TERM CURRENT USE OF INSULIN (H): ICD-10-CM

## 2019-08-23 RX ORDER — LIRAGLUTIDE 6 MG/ML
INJECTION SUBCUTANEOUS
Qty: 2 ML | Refills: 0 | Status: SHIPPED | OUTPATIENT
Start: 2019-08-23 | End: 2020-06-16 | Stop reason: DRUGHIGH

## 2019-08-23 NOTE — TELEPHONE ENCOUNTER
Dr. Dos Santos:   Routing refill request to provider for review/approval because:  Lana given x1 and patient did not follow up, please advise    HW Reception: please call patient, he is due for diabetes follow up visit - refill request has been sent to provider    Thank You!  Tere Pollard, RN  Triage Nurse

## 2019-08-23 NOTE — TELEPHONE ENCOUNTER
"Requested Prescriptions   Pending Prescriptions Disp Refills     liraglutide (VICTOZA PEN) 18 MG/3ML solution [Pharmacy Med Name: VICTOZA 18MG/3ML INJ PEN 3ML]  Last Written Prescription Date:  8/12/2019  Last Fill Quantity: 3ml,  # refills: 0   Last office visit: 1/23/2019 with prescribing provider:  Raya   Future Office Visit:     6 mL 0     Sig: ADMINISTER 1.2 MG UNDER THE SKIN EVERY DAY       GLP-1 Agonists Protocol Failed - 8/23/2019  9:14 AM        Failed - HgbA1C in past 3 or 6 months     If HgbA1C is 8 or greater, it needs to be on file within the past 3 months.  If less than 8, must be on file within the past 6 months.     Recent Labs   Lab Test 01/23/19  0746   A1C 6.3*             Passed - Blood pressure less than 140/90 in past 6 months     BP Readings from Last 3 Encounters:   06/24/19 137/83   01/23/19 116/88   08/29/18 121/78                 Passed - LDL on file in past 12 months     Recent Labs   Lab Test 08/29/18  1644   LDL 92             Passed - Microalbumin on file in past 12 months     Recent Labs   Lab Test 08/29/18  1645   MICROL <5   UMALCR Unable to calculate due to low value             Passed - Medication is active on med list        Passed - Patient is age 18 or older        Passed - Normal serum creatinine on file in past 12 months     Recent Labs   Lab Test 08/29/18  1742   CR 0.90             Passed - Recent (6 mo) or future (30 days) visit within the authorizing provider's specialty     Patient had office visit in the last 6 months or has a visit in the next 30 days with authorizing provider.  See \"Patient Info\" tab in inbasket, or \"Choose Columns\" in Meds & Orders section of the refill encounter.              "

## 2019-08-26 ENCOUNTER — TELEPHONE (OUTPATIENT)
Dept: FAMILY MEDICINE | Facility: CLINIC | Age: 39
End: 2019-08-26

## 2019-08-26 DIAGNOSIS — E11.9 TYPE 2 DIABETES MELLITUS WITHOUT COMPLICATION, WITH LONG-TERM CURRENT USE OF INSULIN (H): ICD-10-CM

## 2019-08-26 DIAGNOSIS — Z79.4 TYPE 2 DIABETES MELLITUS WITHOUT COMPLICATION, WITH LONG-TERM CURRENT USE OF INSULIN (H): ICD-10-CM

## 2019-08-26 RX ORDER — INSULIN DEGLUDEC 100 U/ML
INJECTION, SOLUTION SUBCUTANEOUS
Refills: 0 | OUTPATIENT
Start: 2019-08-26

## 2019-08-26 NOTE — TELEPHONE ENCOUNTER
Refilled rx. Not sure if his insurance pays for the lost rx or not. If not - he needs to pay out of pocket.

## 2019-08-26 NOTE — TELEPHONE ENCOUNTER
Left message on machine to call back  Ask to speak to an RN, let them know it's a return call.    Leave a number and time that you can be reached.   Vivien Gifford RN

## 2019-08-26 NOTE — TELEPHONE ENCOUNTER
Patient called stating he filled Trsiba about 1 week ago but lost it and pharmacy told him to call pcp    Patient is requesting a refill and did schedule a diabetes follow up appt as well

## 2019-08-26 NOTE — TELEPHONE ENCOUNTER
Routing to provider Addison Dos Santos - please review and advise as appropriate    What do we do if insurance denies refill?  Patient has to pay out of pocket?

## 2019-08-28 ENCOUNTER — OFFICE VISIT (OUTPATIENT)
Dept: FAMILY MEDICINE | Facility: CLINIC | Age: 39
End: 2019-08-28
Payer: COMMERCIAL

## 2019-08-28 VITALS
TEMPERATURE: 98 F | RESPIRATION RATE: 16 BRPM | OXYGEN SATURATION: 98 % | HEART RATE: 84 BPM | DIASTOLIC BLOOD PRESSURE: 82 MMHG | HEIGHT: 69 IN | WEIGHT: 177.75 LBS | BODY MASS INDEX: 26.33 KG/M2 | SYSTOLIC BLOOD PRESSURE: 126 MMHG

## 2019-08-28 DIAGNOSIS — I10 HYPERTENSION GOAL BP (BLOOD PRESSURE) < 140/90: ICD-10-CM

## 2019-08-28 DIAGNOSIS — E11.9 TYPE 2 DIABETES MELLITUS WITHOUT COMPLICATION, WITH LONG-TERM CURRENT USE OF INSULIN (H): Primary | ICD-10-CM

## 2019-08-28 DIAGNOSIS — Z79.4 TYPE 2 DIABETES MELLITUS WITHOUT COMPLICATION, WITH LONG-TERM CURRENT USE OF INSULIN (H): Primary | ICD-10-CM

## 2019-08-28 LAB — HBA1C MFR BLD: 6.3 % (ref 0–5.6)

## 2019-08-28 PROCEDURE — 36415 COLL VENOUS BLD VENIPUNCTURE: CPT | Performed by: FAMILY MEDICINE

## 2019-08-28 PROCEDURE — 83036 HEMOGLOBIN GLYCOSYLATED A1C: CPT | Performed by: FAMILY MEDICINE

## 2019-08-28 PROCEDURE — 80053 COMPREHEN METABOLIC PANEL: CPT | Performed by: FAMILY MEDICINE

## 2019-08-28 PROCEDURE — 82043 UR ALBUMIN QUANTITATIVE: CPT | Performed by: FAMILY MEDICINE

## 2019-08-28 PROCEDURE — 80061 LIPID PANEL: CPT | Performed by: FAMILY MEDICINE

## 2019-08-28 PROCEDURE — 99214 OFFICE O/P EST MOD 30 MIN: CPT | Performed by: FAMILY MEDICINE

## 2019-08-28 RX ORDER — LIRAGLUTIDE 6 MG/ML
1.8 INJECTION SUBCUTANEOUS DAILY
Qty: 27 ML | Refills: 0 | Status: SHIPPED | OUTPATIENT
Start: 2019-08-28 | End: 2020-01-20

## 2019-08-28 RX ORDER — FLASH GLUCOSE SCANNING READER
1 EACH MISCELLANEOUS DAILY
Qty: 1 DEVICE | Refills: 0 | Status: SHIPPED | OUTPATIENT
Start: 2019-08-28 | End: 2020-06-16

## 2019-08-28 RX ORDER — FLASH GLUCOSE SENSOR
KIT MISCELLANEOUS
Qty: 3 EACH | Refills: 3 | Status: SHIPPED | OUTPATIENT
Start: 2019-08-28 | End: 2020-06-16

## 2019-08-28 RX ORDER — METFORMIN HCL 500 MG
TABLET, EXTENDED RELEASE 24 HR ORAL
Qty: 360 TABLET | Refills: 1 | Status: SHIPPED | OUTPATIENT
Start: 2019-08-28 | End: 2020-03-24

## 2019-08-28 ASSESSMENT — MIFFLIN-ST. JEOR: SCORE: 1701.65

## 2019-08-28 NOTE — PROGRESS NOTES
Subjective     Phillip Rueda is a 39 year old male who presents to clinic today for the following health issues:    HPI   Diabetes Follow-up      How often are you checking your blood sugar? Two times daily    What time of day are you checking your blood sugars (select all that apply)?  Before meals and At bedtime    Have you had any blood sugars above 200?  No    Have you had any blood sugars below 70?  Yes within last 6 months     What symptoms do you notice when your blood sugar is low?  Shaky and Dizzy    What concerns do you have today about your diabetes? None     Do you have any of these symptoms? (Select all that apply)  No numbness or tingling in feet.  No redness, sores or blisters on feet.  No complaints of excessive thirst.  No reports of blurry vision.  No significant changes to weight. but feet hurt     Have you had a diabetic eye exam in the last 12 months? No    BP Readings from Last 2 Encounters:   06/24/19 137/83   01/23/19 116/88     Hemoglobin A1C (%)   Date Value   08/28/2019 6.3 (H)   01/23/2019 6.3 (H)     LDL Cholesterol Calculated (mg/dL)   Date Value   08/29/2018 92   03/15/2017 55       Diabetes Management Resources  Hypertension Follow-up      Do you check your blood pressure regularly outside of the clinic? No     Are you following a low salt diet? No    Are your blood pressures ever more than 140 on the top number (systolic) OR more   than 90 on the bottom number (diastolic), for example 140/90? No      How many servings of fruits and vegetables do you eat daily?  2-3    On average, how many sweetened beverages do you drink each day (soda, juice, sweet tea, etc)?   0    How many days per week do you miss taking your medication? 0    Additional: pt states loss tresiba    Filled tresiba and accidentally lost it.     havent been taking tresiba due to that. Was using around 8 units when he was using it. He was planning to stop it anyhow.     Taking victoza 1.2 units    Metformin 1000mg  "twice per day.    Wife is diabetic educator.       Social History     Occupational History     Not on file   Tobacco Use     Smoking status: Former Smoker     Packs/day: 0.25     Years: 15.00     Pack years: 3.75     Types: Cigarettes, Dip, chew, snus or snuff     Start date: 1996     Last attempt to quit: 2013     Years since quittin.1     Smokeless tobacco: Former User   Substance and Sexual Activity     Alcohol use: No     Alcohol/week: 0.0 oz     Comment: 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  Drink 1.75 every 2 days     Drug use: No     Sexual activity: Yes     Partners: Female     Birth control/protection: None     Comment: Trying to get my wife pregnant     Allergies   Allergen Reactions     Bees Swelling     Bupropion Hcl Other (See Comments)     seizure     Lisinopril Cough     Penicillins Other (See Comments)     Unable to close mouth     Patient Active Problem List   Diagnosis     Neuralgic amyotrophy     Hereditary and idiopathic peripheral neuropathy     Drug dependence, in remission (H)     Type 2 diabetes mellitus without complication (H)     Hypertension goal BP (blood pressure) < 140/90     Anemia     CARDIOVASCULAR SCREENING; LDL GOAL LESS THAN 100     Right shoulder pain     ED (erectile dysfunction)     Pain disorder     Health Care Home     Former smoker     Reviewed medications, social history and  past medical and surgical history.    Review of system: for general, respiratory, CVS, GI and psychiatry negative except for noted above.     EXAM:  /82 (BP Location: Right arm, Patient Position: Sitting, Cuff Size: Adult Regular)   Pulse 84   Temp 98  F (36.7  C) (Oral)   Resp 16   Ht 1.753 m (5' 9\")   Wt 80.6 kg (177 lb 12 oz)   SpO2 98%   BMI 26.25 kg/m    Constitutional: healthy, alert and no distress   Psychiatric: mentation appears normal and affect normal/bright  Cardiovascular: RRR. No murmurs,  Respiratory: negative, Lungs clear. No crackles or wheezing. No tachypnea.    "     ASSESSMENT / PLAN:  (E11.9,  Z79.4) Type 2 diabetes mellitus without complication, with long-term current use of insulin (H)  (primary encounter diagnosis)  Comment: He is doing very well.  He lost his Tresiba prescription.  Not sure if his insurance will pay for replacement.  His blood sugars are running on lower side and he has some symptoms of hypoglycemia symptoms infrequently.  He is on a very low dose of Tresiba (8 units) and may be argued if he should continue to receive at this point.  He agreed to stop it completely and to compensate we will go up on the dose of Victoza.  We will see if this solitary change would be enough or we need to add another agent.  He agreed.  He is a reliable patient and keeps an eye on his blood sugar and if needed he will let me know and we can go back on Tresiba at that time.  Plan: Hemoglobin A1c, Comprehensive metabolic panel,         Lipid panel reflex to direct LDL Fasting,         Albumin Random Urine Quantitative with Creat         Ratio, insulin degludec (TRESIBA FLEXTOUCH) 100        UNIT/ML pen, liraglutide (VICTOZA PEN) 18         MG/3ML solution, metFORMIN (GLUCOPHAGE-XR) 500         MG 24 hr tablet, Continuous Blood Gluc         (FREESTYLE JHON 14 DAY READER) JESSI,         continuous blood glucose monitoring (FREESTYLE         JHON) sensor             (I10) Hypertension goal BP (blood pressure) < 140/90  Comment:    Plan:  Patient is tolerating current medication without any major side effects of concerns and current dose seems reasonable too.  Current medication regime is effective. Continue current treatment without any changes.     Follow-up in 6 months       The above note was dictated using voice recognition. Although reviewed after completion, some word and grammatical error may remain .

## 2019-08-29 LAB
ALBUMIN SERPL-MCNC: 4 G/DL (ref 3.4–5)
ALP SERPL-CCNC: 113 U/L (ref 40–150)
ALT SERPL W P-5'-P-CCNC: 39 U/L (ref 0–70)
ANION GAP SERPL CALCULATED.3IONS-SCNC: 9 MMOL/L (ref 3–14)
AST SERPL W P-5'-P-CCNC: 28 U/L (ref 0–45)
BILIRUB SERPL-MCNC: 0.5 MG/DL (ref 0.2–1.3)
BUN SERPL-MCNC: 14 MG/DL (ref 7–30)
CALCIUM SERPL-MCNC: 9 MG/DL (ref 8.5–10.1)
CHLORIDE SERPL-SCNC: 106 MMOL/L (ref 94–109)
CHOLEST SERPL-MCNC: 141 MG/DL
CO2 SERPL-SCNC: 27 MMOL/L (ref 20–32)
CREAT SERPL-MCNC: 0.98 MG/DL (ref 0.66–1.25)
CREAT UR-MCNC: 71 MG/DL
GFR SERPL CREATININE-BSD FRML MDRD: >90 ML/MIN/{1.73_M2}
GLUCOSE SERPL-MCNC: 88 MG/DL (ref 70–99)
HDLC SERPL-MCNC: 73 MG/DL
LDLC SERPL CALC-MCNC: 58 MG/DL
MICROALBUMIN UR-MCNC: <5 MG/L
MICROALBUMIN/CREAT UR: NORMAL MG/G CR (ref 0–17)
NONHDLC SERPL-MCNC: 68 MG/DL
POTASSIUM SERPL-SCNC: 4.2 MMOL/L (ref 3.4–5.3)
PROT SERPL-MCNC: 7.1 G/DL (ref 6.8–8.8)
SODIUM SERPL-SCNC: 142 MMOL/L (ref 133–144)
TRIGL SERPL-MCNC: 49 MG/DL

## 2019-08-31 DIAGNOSIS — E11.9 TYPE 2 DIABETES MELLITUS WITHOUT COMPLICATION (H): ICD-10-CM

## 2019-09-01 NOTE — TELEPHONE ENCOUNTER
"Requested Prescriptions   Pending Prescriptions Disp Refills     ACCU-CHEK ADARSH PLUS test strip [Pharmacy Med Name: ACCU-CHEK ADARSH  Last Written Prescription Date:  12/22/15  Last Fill Quantity: 4box,  # refills: 12   Last office visit: 8/28/2019 with prescribing provider:     Future Office Visit:     PLUS STRIPS 100'S] 200 strip 0     Sig: USE AS DIRECTED TWICE DAILY       Diabetic Supplies Protocol Passed - 8/31/2019  7:59 PM        Passed - Medication is active on med list        Passed - Patient is 18 years of age or older        Passed - Recent (6 mo) or future (30 days) visit within the authorizing provider's specialty     Patient had office visit in the last 6 months or has a visit in the next 30 days with authorizing provider.  See \"Patient Info\" tab in inbasket, or \"Choose Columns\" in Meds & Orders section of the refill encounter.              "

## 2019-09-04 RX ORDER — BLOOD SUGAR DIAGNOSTIC
STRIP MISCELLANEOUS
Qty: 200 STRIP | Refills: 3 | Status: SHIPPED | OUTPATIENT
Start: 2019-09-04 | End: 2020-04-02

## 2019-09-25 ENCOUNTER — TELEPHONE (OUTPATIENT)
Dept: FAMILY MEDICINE | Facility: CLINIC | Age: 39
End: 2019-09-25

## 2019-09-25 RX ORDER — FLASH GLUCOSE SENSOR
KIT MISCELLANEOUS
Status: CANCELLED | OUTPATIENT
Start: 2019-09-25

## 2019-09-25 RX ORDER — FLASH GLUCOSE SCANNING READER
EACH MISCELLANEOUS
Status: CANCELLED | OUTPATIENT
Start: 2019-09-25

## 2019-09-25 NOTE — TELEPHONE ENCOUNTER
Patient called back and stated he would like a prior authorization for for Freestyle Darron.    Patient had to pay out of pocket for this device and insurance does not cover Freestyle Darron.    Writer informed patient typically clinic does not perform prior authorizations but writer will attempt prior auth for Freestyle Darron.    Writer was transferred twice to get to Utilization Management, to initiate prior authorization over the phone.    Writer spoke with Iva who stated:  1. Prior authorization not possible under patient's pharmacy benefits  2. Prior authorization can be submitted for medical review-fax order/chart notes/informatin supporting prescription to [phone], Attn: Iva    8/28/19 office visit notes and order for Freestyle Darron 14 Day [place] faxed.    Patient updated regarding information received from Iva.    Patient informed he will be contacted once response received from insurance.    Patient verbalized understanding and in agreement with plan.    PARAG Soliman, MARTYN, RN

## 2019-09-25 NOTE — TELEPHONE ENCOUNTER
Reason for call:  Other   Patient called regarding (reason for call): call back  Additional comments: pt called saying he wants a referral for a lenadira freestyle please advise thank you    Phone number to reach patient:  Home number on file [phone] (home)    Best Time:  any    Can we leave a detailed message on this number?  YES

## 2019-09-25 NOTE — TELEPHONE ENCOUNTER
8/28/19 was last OV with pcp.  This rx was sent to [facility] on 8/28/19.    I left message for pt. What kind of referral are you needing?  The actual rx was escribed on 8/28/19.  Are you needing a diab educ referral or MTM pharmacy referral?    CHRISTINA Lopez

## 2019-09-30 ENCOUNTER — HEALTH MAINTENANCE LETTER (OUTPATIENT)
Age: 39
End: 2019-09-30

## 2019-10-07 NOTE — TELEPHONE ENCOUNTER
Fax received from [employer] stating Freestyle Darron would first need to be purchased through a Durable Medical provider and then a prior authorization submitted through Medical Benefits.    It was writer's understanding prior authorization was supposed to be submitted through Medical Benefits by Iva.    Left message for patient to call back and ask to speak with writer.    PARAG Soliman, MARTYN, RN

## 2019-10-07 NOTE — TELEPHONE ENCOUNTER
Patient called back and writer reviewed faxed response from [employer].    Patient asked writer to also explain fax received with his spouse, Rebecca.    Information from fax reviewed with Rebecca who verbalized understanding and stated she will contact patient's insurance company regarding specifically what is needed for prior authorization submission.    Faxes located at writer's desk.    BRENT Tiwari, RN

## 2019-11-15 ENCOUNTER — TELEPHONE (OUTPATIENT)
Dept: FAMILY MEDICINE | Facility: CLINIC | Age: 39
End: 2019-11-15

## 2019-11-15 DIAGNOSIS — Z79.4 TYPE 2 DIABETES MELLITUS WITHOUT COMPLICATION, WITH LONG-TERM CURRENT USE OF INSULIN (H): ICD-10-CM

## 2019-11-15 DIAGNOSIS — E11.9 TYPE 2 DIABETES MELLITUS WITHOUT COMPLICATION, WITH LONG-TERM CURRENT USE OF INSULIN (H): ICD-10-CM

## 2019-11-15 NOTE — TELEPHONE ENCOUNTER
Reason for Call:  Medication or medication refill:    Do you use a [facility]?  Name of the pharmacy and phone number for the current request:  [facility] [phone]    Name of the medication requested: Tresiba    Other request: patient is calling because at his last office visit it was discussed that he would try not taking his Tresiba and see what happens, patient saids his blood sugars have been pretty high and would like to get back on the insulin Tresiba again    Can we leave a detailed message on this number? YES    Phone number patient can be reached at: Home number on file [phone] (home)    Best Time: any    Call taken on 11/15/2019 at 9:09 AM by Joanna Mar

## 2019-11-15 NOTE — TELEPHONE ENCOUNTER
Dr. Dos Santos-Please review and sign if agree.    Medication listed as historical, triage RN unable to authorize refill.    Thank you!  PARAG Soliman BSN, RN

## 2019-12-06 DIAGNOSIS — Z87.891 FORMER SMOKER: ICD-10-CM

## 2019-12-09 NOTE — TELEPHONE ENCOUNTER
"Requested Prescriptions   Pending Prescriptions Disp Refills     nicotine (NICORETTE) 4 MG lozenge 144 lozenge 0     Sig: PLACE 1 LOZENGE INSIDE THE CHEEK AS NEEDED FOR SMOKING CESSATION  Last Written Prescription Date:  2/14/2019  Last Fill Quantity: 144,  # refills: 0   Last Office Visit: 8/28/2019   Future Office Visit:            Partial Cholinergic Nicotinic Agonist Agents Passed - 12/6/2019 10:56 AM        Passed - Blood pressure under 140/90 in past 12 months     BP Readings from Last 3 Encounters:   08/28/19 126/82   06/24/19 137/83   01/23/19 116/88           Passed - Recent (12 mo) or future (30 days) visit within the authorizing provider's specialty     Patient has had an office visit with the authorizing provider or a provider within the authorizing providers department within the previous 12 mos or has a future within next 30 days. See \"Patient Info\" tab in inbasket, or \"Choose Columns\" in Meds & Orders section of the refill encounter.            Passed - Medication is active on med list        Passed - Patient is 18 years of age or older          "

## 2019-12-10 ENCOUNTER — TELEPHONE (OUTPATIENT)
Dept: FAMILY MEDICINE | Facility: CLINIC | Age: 39
End: 2019-12-10

## 2019-12-10 NOTE — TELEPHONE ENCOUNTER
Prior Authorization Retail Medication Request    Medication/Dose: nicotine (NICORETTE) 4 MG lozenge  ICD code (if different than what is on RX):  Previously Tried and Failed:  Rationale:    Insurance Name: unknown  Insurance ID: unknown    Pharmacy Information (if different than what is on RX)  Name: [facility]  Phone: [phone]    Please include previous medications tried and failed.  Please ask insurance for medications on formulary.

## 2019-12-10 NOTE — TELEPHONE ENCOUNTER
PA Initiation    Medication: nicotine (NICORETTE) 4 MG lozenge  Insurance Company: [employer] - Phone [phone] Fax [phone]  Pharmacy Filling the Rx: [id number] [place], [place] - [facility] AT [address]  Filling Pharmacy Phone: [phone]  Filling Pharmacy Fax:    Start Date: 12/10/2019    Central Prior Authorization Team   Phone: [phone]     Tracking Number is [id number]

## 2019-12-13 NOTE — TELEPHONE ENCOUNTER
PRIOR AUTHORIZATION DENIED    Medication: nicotine (NICORETTE) 4 MG lozenge    Denial Date: 12/12/2019    Denial Rational: Patient's plan only allows up to 180 days per year of smoking cessation medications         Appeal Information: [employer] - Phone [phone] Fax [phone]

## 2019-12-15 ENCOUNTER — HEALTH MAINTENANCE LETTER (OUTPATIENT)
Age: 39
End: 2019-12-15

## 2019-12-17 ENCOUNTER — IMMUNIZATION (OUTPATIENT)
Dept: NURSING | Facility: CLINIC | Age: 39
End: 2019-12-17
Payer: COMMERCIAL

## 2019-12-17 PROCEDURE — 90471 IMMUNIZATION ADMIN: CPT

## 2019-12-17 PROCEDURE — 90686 IIV4 VACC NO PRSV 0.5 ML IM: CPT

## 2020-01-16 DIAGNOSIS — Z79.4 TYPE 2 DIABETES MELLITUS WITHOUT COMPLICATION, WITH LONG-TERM CURRENT USE OF INSULIN (H): ICD-10-CM

## 2020-01-16 DIAGNOSIS — E11.9 TYPE 2 DIABETES MELLITUS WITHOUT COMPLICATION, WITH LONG-TERM CURRENT USE OF INSULIN (H): ICD-10-CM

## 2020-01-16 NOTE — TELEPHONE ENCOUNTER
"Requested Prescriptions   Pending Prescriptions Disp Refills     VICTOZA PEN 18 MG/3ML soln [Pharmacy Med Name: VICTOZA 18MG/3ML SOPN] 27 mL 0     Sig: INJCT 1.8MG UNDER THE SKIN ONCE DAILY  Last Written Prescription Date:  8/28/2019  Last Fill Quantity: 27ml,  # refills: 0   Last Office Visit: 8/28/2019   Future Office Visit:            GLP-1 Agonists Protocol Passed - 1/16/2020 12:48 PM        Passed - Blood pressure less than 140/90 in past 6 months     BP Readings from Last 3 Encounters:   08/28/19 126/82   06/24/19 137/83   01/23/19 116/88           Passed - LDL on file in past 12 months     Recent Labs   Lab Test 08/28/19  1606   LDL 58           Passed - Microalbumin on file in past 12 months     Recent Labs   Lab Test 08/28/19  1606   MICROL <5   UMALCR Unable to calculate due to low value           Passed - HgbA1C in past 3 or 6 months     If HgbA1C is 8 or greater, it needs to be on file within the past 3 months.  If less than 8, must be on file within the past 6 months.     Recent Labs   Lab Test 08/28/19  1606   A1C 6.3*           Passed - Medication is active on med list        Passed - Patient is age 18 or older        Passed - Normal serum creatinine on file in past 12 months     Recent Labs   Lab Test 08/28/19  1606   CR 0.98             Passed - Recent (6 mo) or future (30 days) visit within the authorizing provider's specialty     Patient had office visit in the last 6 months or has a visit in the next 30 days with authorizing provider.  See \"Patient Info\" tab in inbasket, or \"Choose Columns\" in Meds & Orders section of the refill encounter.              "

## 2020-01-20 RX ORDER — LIRAGLUTIDE 6 MG/ML
INJECTION SUBCUTANEOUS
Qty: 27 ML | Refills: 0 | Status: SHIPPED | OUTPATIENT
Start: 2020-01-20 | End: 2020-04-23

## 2020-01-20 NOTE — TELEPHONE ENCOUNTER
Prescription approved per [facility] Refill Protocol.  Margareth Barlow RN

## 2020-03-06 DIAGNOSIS — E11.9 TYPE 2 DIABETES MELLITUS WITHOUT COMPLICATION, WITH LONG-TERM CURRENT USE OF INSULIN (H): ICD-10-CM

## 2020-03-06 DIAGNOSIS — Z79.4 TYPE 2 DIABETES MELLITUS WITHOUT COMPLICATION, WITH LONG-TERM CURRENT USE OF INSULIN (H): ICD-10-CM

## 2020-03-06 RX ORDER — ATORVASTATIN CALCIUM 20 MG/1
TABLET, FILM COATED ORAL
Qty: 90 TABLET | Refills: 1 | Status: SHIPPED | OUTPATIENT
Start: 2020-03-06 | End: 2020-09-25

## 2020-03-06 NOTE — TELEPHONE ENCOUNTER
Signed Prescriptions:                        Disp   Refills    atorvastatin (LIPITOR) 20 MG tablet        90 tab*1        Sig: TAKE 1 TABLET BY MOUTH ONCE DAILY  Authorizing Provider: JC ARCEO  Ordering User: CARLOS SHERIFF

## 2020-03-06 NOTE — TELEPHONE ENCOUNTER
"Requested Prescriptions   Pending Prescriptions Disp Refills     atorvastatin (LIPITOR) 20 MG tablet [Pharmacy Med Name: ATORVASTATIN CALCIUM 20MG TABS]  Last Written Prescription Date:  1/23/2019  Last Fill Quantity: 90 tabs,  # refills: 3   Last office visit: 8/28/2019 with prescribing provider:  Raya   Future Office Visit:   90 tablet 0     Sig: TAKE 1 TABLET BY MOUTH ONCE DAILY       Statins Protocol Passed - 3/6/2020 12:11 PM        Passed - LDL on file in past 12 months     Recent Labs   Lab Test 08/28/19  1606   LDL 58             Passed - No abnormal creatine kinase in past 12 months     Recent Labs   Lab Test 07/18/16  2153   CKT 85                Passed - Recent (12 mo) or future (30 days) visit within the authorizing provider's specialty     Patient has had an office visit with the authorizing provider or a provider within the authorizing providers department within the previous 12 mos or has a future within next 30 days. See \"Patient Info\" tab in inbasket, or \"Choose Columns\" in Meds & Orders section of the refill encounter.              Passed - Medication is active on med list        Passed - Patient is age 18 or older         "

## 2020-03-22 ENCOUNTER — HEALTH MAINTENANCE LETTER (OUTPATIENT)
Age: 40
End: 2020-03-22

## 2020-03-24 DIAGNOSIS — E11.9 TYPE 2 DIABETES MELLITUS WITHOUT COMPLICATION, WITH LONG-TERM CURRENT USE OF INSULIN (H): ICD-10-CM

## 2020-03-24 DIAGNOSIS — E11.9 TYPE 2 DIABETES MELLITUS WITHOUT COMPLICATION (H): ICD-10-CM

## 2020-03-24 DIAGNOSIS — Z79.4 TYPE 2 DIABETES MELLITUS WITHOUT COMPLICATION, WITH LONG-TERM CURRENT USE OF INSULIN (H): ICD-10-CM

## 2020-03-24 RX ORDER — BLOOD SUGAR DIAGNOSTIC
STRIP MISCELLANEOUS
Qty: 0.1 STRIP | Refills: 0 | OUTPATIENT
Start: 2020-03-24

## 2020-03-24 RX ORDER — METFORMIN HCL 500 MG
TABLET, EXTENDED RELEASE 24 HR ORAL
Qty: 360 TABLET | Refills: 0 | Status: SHIPPED | OUTPATIENT
Start: 2020-03-24 | End: 2020-06-16

## 2020-03-24 NOTE — TELEPHONE ENCOUNTER
"Requested Prescriptions   Pending Prescriptions Disp Refills     metFORMIN (GLUCOPHAGE-XR) 500 MG 24 hr tablet [Pharmacy Med Name: METFORMIN HCL ER 500MG TB24] 360 tablet 1     Sig: TAKE TWO TABLETS BY MOUTH TWICE A DAY WITH MEALS  Last Written Prescription Date:  8/28/2019  Last Fill Quantity: 360 tablet,  # refills: 1   Last Office Visit: 8/28/2019   Future Office Visit:            Biguanide Agents Failed - 3/24/2020 10:41 AM        Failed - Blood pressure less than 140/90 in past 6 months     BP Readings from Last 3 Encounters:   08/28/19 126/82   06/24/19 137/83   01/23/19 116/88           Failed - Patient has documented A1c within the specified period of time.     If HgbA1C is 8 or greater, it needs to be on file within the past 3 months.  If less than 8, must be on file within the past 6 months.     Recent Labs   Lab Test 08/28/19  1606   A1C 6.3*           Failed - Recent (6 mo) or future (30 days) visit within the authorizing provider's specialty     Patient had office visit in the last 6 months or has a visit in the next 30 days with authorizing provider or within the authorizing provider's specialty.  See \"Patient Info\" tab in inbasket, or \"Choose Columns\" in Meds & Orders section of the refill encounter.            Passed - Patient has documented LDL within the past 12 mos.     Recent Labs   Lab Test 08/28/19  1606   LDL 58           Passed - Patient has had a Microalbumin in the past 15 mos.     Recent Labs   Lab Test 08/28/19  1606   MICROL <5   UMALCR Unable to calculate due to low value           Passed - Patient is age 10 or older        Passed - Patient's CR is NOT>1.4 OR Patient's EGFR is NOT<45 within past 12 mos.     Recent Labs   Lab Test 08/28/19  1606   GFRESTIMATED >90   GFRESTBLACK >90       Recent Labs   Lab Test 08/28/19  1606   CR 0.98           Passed - Patient does NOT have a diagnosis of CHF.        Passed - Medication is active on med list             "

## 2020-03-24 NOTE — TELEPHONE ENCOUNTER
HW REception Medication is being filled for 1 time refill only due to:  Patient needs to be seen because due for diabetes f/u.     Signed Prescriptions:                        Disp   Refills    metFORMIN (GLUCOPHAGE-XR) 500 MG 24 hr tab*360 ta*0        Sig: TAKE TWO TABLETS BY MOUTH TWICE A DAY WITH MEALS  Authorizing Provider: JC ARCEO  Ordering User: CARLOS SHERIFF

## 2020-03-24 NOTE — TELEPHONE ENCOUNTER
"Requested Prescriptions   Pending Prescriptions Disp Refills     blood glucose (ACCU-CHEK ADARSH PLUS) test strip 200 strip 3     Sig: Use to test blood sugar  times daily or as directed.       Diabetic Supplies Protocol Failed - 3/24/2020 12:47 PM        Failed - Recent (6 mo) or future (30 days) visit within the authorizing provider's specialty     Patient had office visit in the last 6 months or has a visit in the next 30 days with authorizing provider.  See \"Patient Info\" tab in inbasket, or \"Choose Columns\" in Meds & Orders section of the refill encounter.            Passed - Medication is active on med list        Passed - Patient is 18 years of age or older           Transfer request - sent location of refills to requesting pharmacy    Refused Prescriptions:                       Disp   Refills    blood glucose (ACCU-CHEK ADARSH PLUS) test *0.1 st*0        Sig: USE AS DIRECTED TWICE DAILY  Refused By: CARLOS SHERIFF  Reason for Refusal: Duplicate      "

## 2020-06-16 ENCOUNTER — VIRTUAL VISIT (OUTPATIENT)
Dept: FAMILY MEDICINE | Facility: CLINIC | Age: 40
End: 2020-06-16
Payer: COMMERCIAL

## 2020-06-16 VITALS — WEIGHT: 171 LBS | HEIGHT: 69 IN | BODY MASS INDEX: 25.33 KG/M2

## 2020-06-16 DIAGNOSIS — G54.5 NEURALGIC AMYOTROPHY: ICD-10-CM

## 2020-06-16 DIAGNOSIS — N52.9 ERECTILE DYSFUNCTION, UNSPECIFIED ERECTILE DYSFUNCTION TYPE: ICD-10-CM

## 2020-06-16 DIAGNOSIS — E11.9 TYPE 2 DIABETES MELLITUS WITHOUT COMPLICATION, WITH LONG-TERM CURRENT USE OF INSULIN (H): Primary | ICD-10-CM

## 2020-06-16 DIAGNOSIS — Z79.4 TYPE 2 DIABETES MELLITUS WITHOUT COMPLICATION, WITH LONG-TERM CURRENT USE OF INSULIN (H): Primary | ICD-10-CM

## 2020-06-16 PROCEDURE — 99214 OFFICE O/P EST MOD 30 MIN: CPT | Mod: 95 | Performed by: FAMILY MEDICINE

## 2020-06-16 RX ORDER — GABAPENTIN 600 MG/1
600 TABLET ORAL 2 TIMES DAILY
Status: ON HOLD | COMMUNITY
Start: 2020-06-16 | End: 2021-12-15

## 2020-06-16 RX ORDER — METFORMIN HCL 500 MG
TABLET, EXTENDED RELEASE 24 HR ORAL
Qty: 360 TABLET | Refills: 1 | Status: SHIPPED | OUTPATIENT
Start: 2020-06-16 | End: 2020-12-29

## 2020-06-16 RX ORDER — SILDENAFIL 100 MG/1
TABLET, FILM COATED ORAL
Qty: 5 TABLET | Refills: 5 | Status: SHIPPED | OUTPATIENT
Start: 2020-06-16 | End: 2022-03-15

## 2020-06-16 ASSESSMENT — MIFFLIN-ST. JEOR: SCORE: 1666.03

## 2020-06-16 NOTE — PROGRESS NOTES
"Phillip Rueda is a 40 year old male who is being evaluated via a billable video visit.      The patient has been notified of following:     \"This video visit will be conducted via a call between you and your physician/provider. We have found that certain health care needs can be provided without the need for an in-person physical exam.  This service lets us provide the care you need with a video conversation.  If a prescription is necessary we can send it directly to your pharmacy.  If lab work is needed we can place an order for that and you can then stop by our lab to have the test done at a later time.    Video visits are billed at different rates depending on your insurance coverage.  Please reach out to your insurance provider with any questions.    If during the course of the call the physician/provider feels a video visit is not appropriate, you will not be charged for this service.\"    Patient has given verbal consent for Video visit? Yes    Will anyone else be joining your video visit? No     Subjective     Phillip Rueda is a 40 year old male who presents today via video visit for the following health issues:    HPI  Diabetes Follow-up    How often are you checking your blood sugar? Two times daily  Blood sugar testing frequency justification:  Patient modifying lifestyle changes (diet, exercise) with blood sugars  What time of day are you checking your blood sugars (select all that apply)?  Before meals and At bedtime  Have you had any blood sugars above 200?  No  Have you had any blood sugars below 70?  Yes 64 once last week     What symptoms do you notice when your blood sugar is low?  None    What concerns do you have today about your diabetes? None     Do you have any of these symptoms? (Select all that apply)  Numbness in feet    Have you had a diabetic eye exam in the last 12 months? No    BP Readings from Last 2 Encounters:   08/28/19 126/82   06/24/19 137/83     Hemoglobin A1C (%)   Date Value " "  08/28/2019 6.3 (H)   01/23/2019 6.3 (H)     LDL Cholesterol Calculated (mg/dL)   Date Value   08/28/2019 58   08/29/2018 92          Hypertension Follow-up      Do you check your blood pressure regularly outside of the clinic? No     Are you following a low salt diet? No    Are your blood pressures ever more than 140 on the top number (systolic) OR more   than 90 on the bottom number (diastolic), for example 140/90? No      How many servings of fruits and vegetables do you eat daily?  2-3    On average, how many sweetened beverages do you drink each day (Examples: soda, juice, sweet tea, etc.  Do NOT count diet or artificially sweetened beverages)?   0    How many days per week do you exercise enough to make your heart beat faster? 3 or less    How many minutes a day do you exercise enough to make your heart beat faster? 9 or less    How many days per week do you miss taking your medication? 0      Video Start Time: 8:51 AM    25 days old boy at home! 3 yo another boy.   Staying home.   Doing virtual visit with therapist.     Needs to have A1c checked.   One reading in 60s. Other than that - feels fine. No low blood sugar.   alsways checking blood sugar in the morning .   Sometime checking more than once and sometime not.     Not been using tresiba since August 2019. Using victoza 1.8 units daily.   metforin 1000mg bid.     Checking feet at home.       Reviewed and updated as needed this visit by Provider    Constitutional, HEENT, cardiovascular, pulmonary, gi and gu systems are negative, except as otherwise noted.      Objective    Ht 1.753 m (5' 9\")   Wt 77.6 kg (171 lb)   BMI 25.25 kg/m    Estimated body mass index is 25.25 kg/m  as calculated from the following:    Height as of this encounter: 1.753 m (5' 9\").    Weight as of this encounter: 77.6 kg (171 lb).  Physical Exam     GENERAL: Healthy, alert and no distress  EYES: Eyes grossly normal to inspection.  No discharge or erythema, or obvious " scleral/conjunctival abnormalities.  RESP: No audible wheeze, cough, or visible cyanosis.  No visible retractions or increased work of breathing.    SKIN: Visible skin clear. No significant rash, abnormal pigmentation or lesions.  NEURO: Cranial nerves grossly intact.  Mentation and speech appropriate for age.  PSYCH: Mentation appears normal, affect normal/bright, judgement and insight intact, normal speech and appearance well-groomed.        Assessment & Plan     (E11.9,  Z79.4) Type 2 diabetes mellitus without complication, with long-term current use of insulin (H)  (primary encounter diagnosis)  Comment: He is off Tresiba.  Still using metformin and Victoza.  He gives a good log of his blood sugars and I do not suspect any major surprises in his A1c.  He is going to come back for lab only appointment.  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  Cut down the dose of gabapentin to 300 mg twice daily.  Updated his chart.  Plan: gabapentin (NEURONTIN) 600 MG tablet             (N52.9) Erectile dysfunction, unspecified erectile dysfunction type  Comment: Infrequent use.  We will try to get a refill.  Plan: sildenafil (VIAGRA) 100 MG tablet             Follow-up in 6 months if doing well.       Km Dos Santos MD, MD  [facility]      Video-Visit Details    Type of service:  Video Visit    Video End Time:9:05 AM    Originating Location (pt. Location): Home    Distant Location (provider location):  home.    Platform used for Video Visit: David Dos Santos MD, MD

## 2020-08-11 ENCOUNTER — MYC MEDICAL ADVICE (OUTPATIENT)
Dept: FAMILY MEDICINE | Facility: CLINIC | Age: 40
End: 2020-08-11

## 2020-08-11 DIAGNOSIS — Z30.09 SCREENING AND EVALUATION FOR VASECTOMY: Primary | ICD-10-CM

## 2020-08-11 NOTE — TELEPHONE ENCOUNTER
Per Yaw message below, Pt requesting a referral for a vasectomy.    MELODIE [facility] Referral Rep

## 2020-08-12 DIAGNOSIS — E11.9 TYPE 2 DIABETES MELLITUS WITHOUT COMPLICATION, WITH LONG-TERM CURRENT USE OF INSULIN (H): ICD-10-CM

## 2020-08-12 DIAGNOSIS — Z79.4 TYPE 2 DIABETES MELLITUS WITHOUT COMPLICATION, WITH LONG-TERM CURRENT USE OF INSULIN (H): ICD-10-CM

## 2020-08-12 LAB
ALBUMIN SERPL-MCNC: 4 G/DL (ref 3.4–5)
ALP SERPL-CCNC: 123 U/L (ref 40–150)
ALT SERPL W P-5'-P-CCNC: 43 U/L (ref 0–70)
ANION GAP SERPL CALCULATED.3IONS-SCNC: 4 MMOL/L (ref 3–14)
AST SERPL W P-5'-P-CCNC: 34 U/L (ref 0–45)
BILIRUB SERPL-MCNC: 0.4 MG/DL (ref 0.2–1.3)
BUN SERPL-MCNC: 8 MG/DL (ref 7–30)
CALCIUM SERPL-MCNC: 9.3 MG/DL (ref 8.5–10.1)
CHLORIDE SERPL-SCNC: 103 MMOL/L (ref 94–109)
CHOLEST SERPL-MCNC: 145 MG/DL
CO2 SERPL-SCNC: 30 MMOL/L (ref 20–32)
CREAT SERPL-MCNC: 0.93 MG/DL (ref 0.66–1.25)
CREAT UR-MCNC: 198 MG/DL
GFR SERPL CREATININE-BSD FRML MDRD: >90 ML/MIN/{1.73_M2}
GLUCOSE SERPL-MCNC: 120 MG/DL (ref 70–99)
HBA1C MFR BLD: 6.9 % (ref 0–5.6)
HDLC SERPL-MCNC: 78 MG/DL
LDLC SERPL CALC-MCNC: 54 MG/DL
MICROALBUMIN UR-MCNC: 25 MG/L
MICROALBUMIN/CREAT UR: 12.53 MG/G CR (ref 0–17)
NONHDLC SERPL-MCNC: 67 MG/DL
POTASSIUM SERPL-SCNC: 4 MMOL/L (ref 3.4–5.3)
PROT SERPL-MCNC: 7.4 G/DL (ref 6.8–8.8)
SODIUM SERPL-SCNC: 137 MMOL/L (ref 133–144)
TRIGL SERPL-MCNC: 63 MG/DL

## 2020-08-12 PROCEDURE — 36415 COLL VENOUS BLD VENIPUNCTURE: CPT | Performed by: FAMILY MEDICINE

## 2020-08-12 PROCEDURE — 80053 COMPREHEN METABOLIC PANEL: CPT | Performed by: FAMILY MEDICINE

## 2020-08-12 PROCEDURE — 82043 UR ALBUMIN QUANTITATIVE: CPT | Performed by: FAMILY MEDICINE

## 2020-08-12 PROCEDURE — 80061 LIPID PANEL: CPT | Performed by: FAMILY MEDICINE

## 2020-08-12 PROCEDURE — 83036 HEMOGLOBIN GLYCOSYLATED A1C: CPT | Performed by: FAMILY MEDICINE

## 2020-10-08 ENCOUNTER — OFFICE VISIT (OUTPATIENT)
Dept: UROLOGY | Facility: CLINIC | Age: 40
End: 2020-10-08
Attending: FAMILY MEDICINE
Payer: MEDICARE

## 2020-10-08 VITALS
HEART RATE: 70 BPM | WEIGHT: 170 LBS | DIASTOLIC BLOOD PRESSURE: 80 MMHG | SYSTOLIC BLOOD PRESSURE: 132 MMHG | BODY MASS INDEX: 25.18 KG/M2 | OXYGEN SATURATION: 100 % | HEIGHT: 69 IN

## 2020-10-08 DIAGNOSIS — Z30.2 ENCOUNTER FOR STERILIZATION: Primary | ICD-10-CM

## 2020-10-08 PROCEDURE — 99203 OFFICE O/P NEW LOW 30 MIN: CPT | Performed by: UROLOGY

## 2020-10-08 ASSESSMENT — PAIN SCALES - GENERAL: PAINLEVEL: NO PAIN (0)

## 2020-10-08 ASSESSMENT — MIFFLIN-ST. JEOR: SCORE: 1661.49

## 2020-10-08 NOTE — LETTER
10/8/2020       RE: Phillip Rueda  [address]  [facility] [address]     Dear Colleague,    Thank you for referring your patient, Phillip Rueda, to the [facility]OLOG[facility]IC [place] at [facility]. Please see a copy of my visit note below.    VASECTOMY CONSULTATION NOTE  [facility]  [phone]  DATE OF VISIT: 10/8/2020    PATIENT NAME: Phillip Rueda    YOB: 1980      REASON FOR CONSULTATION: Mr. Phillip Rueda is a 40 year old year old gentleman who came to the urology clinic today requesting a vasectomy. He has 2 children and he wishes to have a vasectomy for birth control. He has no prior urologic history and has had no prior surgery on the testicles. He has no symptoms in the testicles.    PAST MEDICAL HISTORY:   Past Medical History:   Diagnosis Date     Anxiety      Depressive disorder      Diabetes mellitus (H)      Hypertension      Liver disease      Neuralgic amyotrophy      Pain disorder 12/8/2015     Parsonage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      Prostate infection      Seizures (H)      Staph infection        PAST SURGICAL HISTORY:   Past Surgical History:   Procedure Laterality Date     ORTHOPEDIC SURGERY         MEDICATIONS:   Current Outpatient Medications:      atorvastatin (LIPITOR) 20 MG tablet, TAKE ONE TABLET BY MOUTH ONCE DAILY, Disp: 90 tablet, Rfl: 3     blood glucose (NO BRAND SPECIFIED) lancets standard, Use to test blood sugar 2 times daily or as directed., Disp: 100 each, Rfl: 1     blood glucose (NO BRAND SPECIFIED) test strip, Use to test blood sugar 2 times daily or as directed., Disp: 100 each, Rfl: 1     blood glucose monitoring (NO BRAND SPECIFIED) meter device kit, Use to test blood sugar 2 times daily or as directed., Disp: 1 kit, Rfl: 0     Buprenorphine HCl-Naloxone HCl (SUBOXONE) 4-1 MG film, Place 1 Film under the tongue 3 times daily Taking 2-3 times a day and weening off, Disp: , Rfl:      diazepam  (VALIUM) 5 MG tablet, Take 1 tablet (5 mg) 3 times daily as needed., Disp: 60 tablet, Rfl:      EPINEPHrine (EPIPEN) 0.3 MG/0.3ML injection, Inject 0.3 mLs (0.3 mg) into the muscle once as needed for anaphylaxis, Disp: 1 each, Rfl: 2     gabapentin (NEURONTIN) 600 MG tablet, Take 0.5 tablets (300 mg) by mouth 2 times daily, Disp: , Rfl:      ibuprofen (ADVIL,MOTRIN) 200 MG tablet, Take 1-2 tablets by mouth every 6 hours as needed., Disp: , Rfl:      insulin pen needle 31G X 6 MM, Use 2 daily or as directed., Disp: 100 each, Rfl: 11     metFORMIN (GLUCOPHAGE-XR) 500 MG 24 hr tablet, TAKE TWO TABLETS BY MOUTH TWICE A DAY WITH MEALS, Disp: 360 tablet, Rfl: 1     nicotine (NICORETTE) 4 MG lozenge, PLACE 1 LOZENGE INSIDE THE CHEEK AS NEEDED FOR SMOKING CESSATION, Disp: 144 lozenge, Rfl: 0     nortriptyline (PAMELOR) 25 MG capsule, TAKE 2 CAPSULES(50 MG) BY MOUTH AT BEDTIME, Disp: 60 capsule, Rfl: 1     sildenafil (VIAGRA) 100 MG tablet, Take 1/2 to 1 pill as needed for erectile dysfunction., Disp: 5 tablet, Rfl: 5     venlafaxine (EFFEXOR-ER) 150 MG TB24, Take 1 tablet by mouth daily (with breakfast)., Disp: 30 each, Rfl: 0     VICTOZA PEN 18 MG/3ML soln, INJCT 1.8MG UNDER THE SKIN ONCE DAILY, Disp: 27 mL, Rfl: 0    ALLERGIES:   Allergies   Allergen Reactions     Bees Swelling     Bupropion Hcl Other (See Comments)     seizure     Lisinopril Cough     Penicillins Other (See Comments)     Unable to close mouth       FAMILY HISTORY:   Family History   Problem Relation Age of Onset     Depression Mother      Anxiety Disorder Mother      Substance Abuse Maternal Grandfather      Anxiety Disorder Brother      Glaucoma No family hx of      Macular Degeneration No family hx of        SOCIAL HISTORY:   Social History     Socioeconomic History     Marital status:      Spouse name: Not on file     Number of children: Not on file     Years of education: Not on file     Highest education level: Not on file   Occupational  History     Not on file   Social Needs     Financial resource strain: Not on file     Food insecurity     Worry: Not on file     Inability: Not on file     Transportation needs     Medical: Not on file     Non-medical: Not on file   Tobacco Use     Smoking status: Former Smoker     Packs/day: 0.25     Years: 15.00     Pack years: 3.75     Types: Cigarettes, Dip, chew, snus or snuff     Start date: 1996     Quit date: 2013     Years since quittin.2     Smokeless tobacco: Former User   Substance and Sexual Activity     Alcohol use: No     Alcohol/week: 0.0 standard drinks     Comment: 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  Drink 1.75 every 2 days     Drug use: No     Sexual activity: Yes     Partners: Female     Birth control/protection: None     Comment: Trying to get my wife pregnant   Lifestyle     Physical activity     Days per week: Not on file     Minutes per session: Not on file     Stress: Not on file   Relationships     Social connections     Talks on phone: Not on file     Gets together: Not on file     Attends Mosque service: Not on file     Active member of club or organization: Not on file     Attends meetings of clubs or organizations: Not on file     Relationship status: Not on file     Intimate partner violence     Fear of current or ex partner: Not on file     Emotionally abused: Not on file     Physically abused: Not on file     Forced sexual activity: Not on file   Other Topics Concern     Parent/sibling w/ CABG, MI or angioplasty before 65F 55M? Yes     Comment: My dads dad  of heart issues in his 40's   Social History Narrative     Not on file       REVIEW OF SYSTEMS:  Skin: No rash, pruritis, or skin pigmentation  Eyes: No changes in vision  Ears/Nose/Throat: No changes in hearing, no nosebleeds  Respiratory: No shortness of breath, dyspnea on exertion, cough, or hemoptysis  Cardiovascular: No chest pain or palpitations  Gastrointestinal: No diarrhea or constipation. No abdominal  "pain. No hematochezia  Genitourinary: see HPI  Musculoskeletal: No pain or swelling of joints, normal range of motion  Neurologic: No weakness or tremors  Psychiatric: No recent changes in memory or mood  Hematologic/Lymphatic/Immunologic: No easy bruising or enlarged lymph nodes  Endocrine: No weight gain or loss      HEIGHT: 5' 9\"[per pt[     WEIGHT: 170 lbs 0 oz   BP: 132/80    PULSE: 70    EXAM: He is alert and oriented and well-appearing.  Examination of the scrotum reveals normal scrotal skin.  The testicles are normal to palpation bilaterally with no intratesticular lesions.  He has normally palpable vasa bilaterally.    DIAGNOSIS: Request for sterilization    PLAN: The risks of the procedure as well as expectations for recovery and outcomes were splint in detail to him.  He was counseled on the risks for bleeding infection and pain after the procedure.  He was instructed to continue to use contraception until he had proven azoospermia on a semen specimen.  This would normally be collected at least 3 months after the procedure.  He was instructed to hold all anticoagulants medications for one week prior to the procedure.  He was also instructed to shave the scrotum prior to procedure.  It was recommended that he have someone else drive him home after his vasectomy.  In light of these risks and expectations he would like to proceed.  We are scheduling a vasectomy in the office in the near future.    Mc Daugherty M.D.      "

## 2020-10-08 NOTE — NURSING NOTE
Chief Complaint   Patient presents with     Vasectomy Cosult     Patient here today Vasectomy Consult with Dr Dat Dukes, A

## 2020-10-08 NOTE — PROGRESS NOTES
VASECTOMY CONSULTATION NOTE  [facility]  [phone]  DATE OF VISIT: 10/8/2020    PATIENT NAME: Phillip Rueda    YOB: 1980      REASON FOR CONSULTATION: Mr. Phillip Rueda is a 40 year old year old gentleman who came to the urology clinic today requesting a vasectomy. He has 2 children and he wishes to have a vasectomy for birth control. He has no prior urologic history and has had no prior surgery on the testicles. He has no symptoms in the testicles.    PAST MEDICAL HISTORY:   Past Medical History:   Diagnosis Date     Anxiety      Depressive disorder      Diabetes mellitus (H)      Hypertension      Liver disease      Neuralgic amyotrophy      Pain disorder 12/8/2015     Parsonage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      Prostate infection      Seizures (H)      Staph infection        PAST SURGICAL HISTORY:   Past Surgical History:   Procedure Laterality Date     ORTHOPEDIC SURGERY         MEDICATIONS:   Current Outpatient Medications:      atorvastatin (LIPITOR) 20 MG tablet, TAKE ONE TABLET BY MOUTH ONCE DAILY, Disp: 90 tablet, Rfl: 3     blood glucose (NO BRAND SPECIFIED) lancets standard, Use to test blood sugar 2 times daily or as directed., Disp: 100 each, Rfl: 1     blood glucose (NO BRAND SPECIFIED) test strip, Use to test blood sugar 2 times daily or as directed., Disp: 100 each, Rfl: 1     blood glucose monitoring (NO BRAND SPECIFIED) meter device kit, Use to test blood sugar 2 times daily or as directed., Disp: 1 kit, Rfl: 0     Buprenorphine HCl-Naloxone HCl (SUBOXONE) 4-1 MG film, Place 1 Film under the tongue 3 times daily Taking 2-3 times a day and weening off, Disp: , Rfl:      diazepam (VALIUM) 5 MG tablet, Take 1 tablet (5 mg) 3 times daily as needed., Disp: 60 tablet, Rfl:      EPINEPHrine (EPIPEN) 0.3 MG/0.3ML injection, Inject 0.3 mLs (0.3 mg) into the muscle once as needed for anaphylaxis, Disp: 1 each, Rfl: 2     gabapentin (NEURONTIN) 600 MG tablet, Take 0.5 tablets (300 mg) by  mouth 2 times daily, Disp: , Rfl:      ibuprofen (ADVIL,MOTRIN) 200 MG tablet, Take 1-2 tablets by mouth every 6 hours as needed., Disp: , Rfl:      insulin pen needle 31G X 6 MM, Use 2 daily or as directed., Disp: 100 each, Rfl: 11     metFORMIN (GLUCOPHAGE-XR) 500 MG 24 hr tablet, TAKE TWO TABLETS BY MOUTH TWICE A DAY WITH MEALS, Disp: 360 tablet, Rfl: 1     nicotine (NICORETTE) 4 MG lozenge, PLACE 1 LOZENGE INSIDE THE CHEEK AS NEEDED FOR SMOKING CESSATION, Disp: 144 lozenge, Rfl: 0     nortriptyline (PAMELOR) 25 MG capsule, TAKE 2 CAPSULES(50 MG) BY MOUTH AT BEDTIME, Disp: 60 capsule, Rfl: 1     sildenafil (VIAGRA) 100 MG tablet, Take 1/2 to 1 pill as needed for erectile dysfunction., Disp: 5 tablet, Rfl: 5     venlafaxine (EFFEXOR-ER) 150 MG TB24, Take 1 tablet by mouth daily (with breakfast)., Disp: 30 each, Rfl: 0     VICTOZA PEN 18 MG/3ML soln, INJCT 1.8MG UNDER THE SKIN ONCE DAILY, Disp: 27 mL, Rfl: 0    ALLERGIES:   Allergies   Allergen Reactions     Bees Swelling     Bupropion Hcl Other (See Comments)     seizure     Lisinopril Cough     Penicillins Other (See Comments)     Unable to close mouth       FAMILY HISTORY:   Family History   Problem Relation Age of Onset     Depression Mother      Anxiety Disorder Mother      Substance Abuse Maternal Grandfather      Anxiety Disorder Brother      Glaucoma No family hx of      Macular Degeneration No family hx of        SOCIAL HISTORY:   Social History     Socioeconomic History     Marital status:      Spouse name: Not on file     Number of children: Not on file     Years of education: Not on file     Highest education level: Not on file   Occupational History     Not on file   Social Needs     Financial resource strain: Not on file     Food insecurity     Worry: Not on file     Inability: Not on file     Transportation needs     Medical: Not on file     Non-medical: Not on file   Tobacco Use     Smoking status: Former Smoker     Packs/day: 0.25     Years:  15.00     Pack years: 3.75     Types: Cigarettes, Dip, chew, snus or snuff     Start date: 1996     Quit date: 2013     Years since quittin.2     Smokeless tobacco: Former User   Substance and Sexual Activity     Alcohol use: No     Alcohol/week: 0.0 standard drinks     Comment: 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  Drink 1.75 every 2 days     Drug use: No     Sexual activity: Yes     Partners: Female     Birth control/protection: None     Comment: Trying to get my wife pregnant   Lifestyle     Physical activity     Days per week: Not on file     Minutes per session: Not on file     Stress: Not on file   Relationships     Social connections     Talks on phone: Not on file     Gets together: Not on file     Attends Jain service: Not on file     Active member of club or organization: Not on file     Attends meetings of clubs or organizations: Not on file     Relationship status: Not on file     Intimate partner violence     Fear of current or ex partner: Not on file     Emotionally abused: Not on file     Physically abused: Not on file     Forced sexual activity: Not on file   Other Topics Concern     Parent/sibling w/ CABG, MI or angioplasty before 65F 55M? Yes     Comment: My dads dad  of heart issues in his 40's   Social History Narrative     Not on file       REVIEW OF SYSTEMS:  Skin: No rash, pruritis, or skin pigmentation  Eyes: No changes in vision  Ears/Nose/Throat: No changes in hearing, no nosebleeds  Respiratory: No shortness of breath, dyspnea on exertion, cough, or hemoptysis  Cardiovascular: No chest pain or palpitations  Gastrointestinal: No diarrhea or constipation. No abdominal pain. No hematochezia  Genitourinary: see HPI  Musculoskeletal: No pain or swelling of joints, normal range of motion  Neurologic: No weakness or tremors  Psychiatric: No recent changes in memory or mood  Hematologic/Lymphatic/Immunologic: No easy bruising or enlarged lymph nodes  Endocrine: No weight gain or  "loss      HEIGHT: 5' 9\"[per pt[     WEIGHT: 170 lbs 0 oz   BP: 132/80    PULSE: 70    EXAM: He is alert and oriented and well-appearing.  Examination of the scrotum reveals normal scrotal skin.  The testicles are normal to palpation bilaterally with no intratesticular lesions.  He has normally palpable vasa bilaterally.    DIAGNOSIS: Request for sterilization    PLAN: The risks of the procedure as well as expectations for recovery and outcomes were splint in detail to him.  He was counseled on the risks for bleeding infection and pain after the procedure.  He was instructed to continue to use contraception until he had proven azoospermia on a semen specimen.  This would normally be collected at least 3 months after the procedure.  He was instructed to hold all anticoagulants medications for one week prior to the procedure.  He was also instructed to shave the scrotum prior to procedure.  It was recommended that he have someone else drive him home after his vasectomy.  In light of these risks and expectations he would like to proceed.  We are scheduling a vasectomy in the office in the near future.    Mc Daugherty M.D.    "

## 2020-10-08 NOTE — PATIENT INSTRUCTIONS
"[facility] UROLOGY  Vasectomy Information  [phone]    Preoperative Instructions:    __x___ You may have breakfast on the morning of your procedure.  If your procedure is          in the afternoon, you may have lunch as well.    __x___ You must have someone drive you home after the procedure if you have been    prescribed an oral sedative (valium).                   _x____ Do not take any aspirin, blood-thinning or anti-inflammatory medication for at    least 7-10 days before the procedure (this includes but is not limited to baby aspirin, aspirin,  Ibuprofen (Advil,Aleve, Motrin),Celebrex, Exedrin,Ecotrin and Bufferin).  If you have ANY  Questions about this, please call.    _x____ Please shave your scrotum (see diagram) the morning of your procedure.  Use              Hibiclens (available at your local drugstore) antibacterial cleanser.        Postoperative Instructions Follow Vasectomy    Under routine circumstances, please note the following:    -No heavy lifting (over 15 lbs) for 48 hour.  -You may shower after 24 hours.  You may have a tub bath or use a swimming pool after one week postoperatively.  Your doctor will advise you if he feels it is helpful to soak in a bathtub postoperatively.  -Do not engage in intercourse for at least ten days and then proceed when comfortable.  -Wear an athletic supporter for 48 hours postoperatively or until any discomfort ceases.  -Your physician will instruct you regarding the use of ice in the recovery room or at home after the procedure, as necessary.  -Please remember as you resume your activity that you may experience some discomfor and/or swelling for the one to two weeks following the procedure.  If this occurs decrease activity and slightly elevate the scrotum (athletic supporter).  -As your stitches dissolve it may appear that the incision is \"gaping.\"  This is normal.    Necessary follow-up:  You do not need a follow up appointment unless you have problems after " your procedure.  Please call our office at [phone] if you do.    At the time of your procedure you will be given the supplies for your post procedure follow up.  The test should be done AT LEAST THREE MONTHS AFTER your vasecomy.  During this time, be certain to maintain birth control measure!    Between the time of your procedure and the time that you have your first sperm count it is very important that you have a minimum of 30 ejaculations.  If you have any questions about this, please consult your physician.    If the sperm count that is done at three months is negative, you will be considered sterile.  Until, you are told that you are free to discontinue birth control you are considered fertile.    If your sperm counts reveal the presence of sperm, you will be advised to repeat the test until a negative result is obtained.     NOTE:  Please call our office one week after dropping off your sample for the result.  Test results will only be given to the patient.

## 2020-10-12 ENCOUNTER — TELEPHONE (OUTPATIENT)
Dept: PODIATRY | Facility: CLINIC | Age: 40
End: 2020-10-12

## 2020-10-14 NOTE — TELEPHONE ENCOUNTER
[employer]      Last Written Prescription Date:NOT ON CURRENT MED LIST.   Last Fill Quantity: 0,   # refills: 0  Last Office Visit: 6-16-20  Future Office visit:    Next 5 appointments (look out 90 days)    Nov 05, 2020  2:00 PM  Vasectomy Procedure with Mc Daugherty MD,  CAUT EQ,  [facility] [address]  [address]  [facility] [address] [phone]           Routing refill request to provider for review/approval because:  Drug not active on patient's medication list

## 2020-10-16 DIAGNOSIS — Z79.4 TYPE 2 DIABETES MELLITUS WITHOUT COMPLICATION, WITH LONG-TERM CURRENT USE OF INSULIN (H): ICD-10-CM

## 2020-10-16 DIAGNOSIS — E11.9 TYPE 2 DIABETES MELLITUS WITHOUT COMPLICATION, WITH LONG-TERM CURRENT USE OF INSULIN (H): ICD-10-CM

## 2020-10-16 NOTE — TELEPHONE ENCOUNTER
"Please send new Rx for Freestyle [employer] Sensors   Rx must be written this way:    Freestyle [employer]ron 14 Day Sensors  Qty: 2  Sig \"CHANGE EVERY 14 DAYS\"    Please call [phone] and speak to one of our Durable Medical Equipment Team members if you have any questions.    "

## 2020-10-16 NOTE — TELEPHONE ENCOUNTER
Routing refill request to provider for review/approval because:  Drug not active on patient's medication list  I am not sure how to get the order in the chart.  Not locating just sensors.  Vivien Gifford RN

## 2020-10-16 NOTE — TELEPHONE ENCOUNTER
im not sure how to order just sensors either  Please have him contact dm ed or [facility] who may have set him up with it in [facility]

## 2020-10-19 RX ORDER — FLASH GLUCOSE SENSOR
KIT MISCELLANEOUS
Qty: 6 EACH | Refills: 3 | Status: SHIPPED | OUTPATIENT
Start: 2020-10-19 | End: 2023-05-31

## 2020-10-19 NOTE — TELEPHONE ENCOUNTER
Just sensor is now loaded.  Please sign if you agree.  System was prescribed by Dr Dos Santos per pharmacy records.  Vivien Gifford RN

## 2020-10-24 ENCOUNTER — HOSPITAL ENCOUNTER (EMERGENCY)
Facility: CLINIC | Age: 40
Discharge: HOME | End: 2020-10-24
Attending: INTERNAL MEDICINE | Admitting: INTERNAL MEDICINE
Payer: COMMERCIAL

## 2020-10-24 VITALS
SYSTOLIC BLOOD PRESSURE: 130 MMHG | WEIGHT: 170 LBS | OXYGEN SATURATION: 96 % | DIASTOLIC BLOOD PRESSURE: 80 MMHG | BODY MASS INDEX: 25.18 KG/M2 | HEIGHT: 69 IN | RESPIRATION RATE: 20 BRPM | TEMPERATURE: 97.6 F | HEART RATE: 78 BPM

## 2020-10-24 DIAGNOSIS — G54.5 NEURALGIC AMYOTROPHY: ICD-10-CM

## 2020-10-24 DIAGNOSIS — G89.4 CHRONIC PAIN SYNDROME: ICD-10-CM

## 2020-10-24 DIAGNOSIS — M79.621 PAIN OF RIGHT UPPER ARM: ICD-10-CM

## 2020-10-24 LAB
ALBUMIN SERPL-MCNC: 4 G/DL (ref 3.4–5)
ALP SERPL-CCNC: 103 U/L (ref 40–150)
ALT SERPL W P-5'-P-CCNC: 21 U/L (ref 0–70)
ANION GAP SERPL CALCULATED.3IONS-SCNC: 5 MMOL/L (ref 3–14)
AST SERPL W P-5'-P-CCNC: 15 U/L (ref 0–45)
BASOPHILS # BLD AUTO: 0 10E9/L (ref 0–0.2)
BASOPHILS NFR BLD AUTO: 0.4 %
BILIRUB SERPL-MCNC: 0.4 MG/DL (ref 0.2–1.3)
BUN SERPL-MCNC: 15 MG/DL (ref 7–30)
CALCIUM SERPL-MCNC: 8.9 MG/DL (ref 8.5–10.1)
CHLORIDE SERPL-SCNC: 105 MMOL/L (ref 94–109)
CO2 SERPL-SCNC: 29 MMOL/L (ref 20–32)
CREAT SERPL-MCNC: 0.87 MG/DL (ref 0.66–1.25)
CRP SERPL-MCNC: <2.9 MG/L (ref 0–8)
DIFFERENTIAL METHOD BLD: NORMAL
EOSINOPHIL # BLD AUTO: 0.2 10E9/L (ref 0–0.7)
EOSINOPHIL NFR BLD AUTO: 3.2 %
ERYTHROCYTE [DISTWIDTH] IN BLOOD BY AUTOMATED COUNT: 12.7 % (ref 10–15)
ERYTHROCYTE [SEDIMENTATION RATE] IN BLOOD BY WESTERGREN METHOD: 4 MM/H (ref 0–15)
GFR SERPL CREATININE-BSD FRML MDRD: >90 ML/MIN/{1.73_M2}
GLUCOSE SERPL-MCNC: 139 MG/DL (ref 70–99)
HCT VFR BLD AUTO: 40.3 % (ref 40–53)
HGB BLD-MCNC: 13.3 G/DL (ref 13.3–17.7)
IMM GRANULOCYTES # BLD: 0 10E9/L (ref 0–0.4)
IMM GRANULOCYTES NFR BLD: 0 %
LYMPHOCYTES # BLD AUTO: 1.6 10E9/L (ref 0.8–5.3)
LYMPHOCYTES NFR BLD AUTO: 29.3 %
MCH RBC QN AUTO: 29.3 PG (ref 26.5–33)
MCHC RBC AUTO-ENTMCNC: 33 G/DL (ref 31.5–36.5)
MCV RBC AUTO: 89 FL (ref 78–100)
MONOCYTES # BLD AUTO: 0.3 10E9/L (ref 0–1.3)
MONOCYTES NFR BLD AUTO: 5.1 %
NEUTROPHILS # BLD AUTO: 3.3 10E9/L (ref 1.6–8.3)
NEUTROPHILS NFR BLD AUTO: 62 %
NRBC # BLD AUTO: 0 10*3/UL
NRBC BLD AUTO-RTO: 0 /100
PLATELET # BLD AUTO: 204 10E9/L (ref 150–450)
POTASSIUM SERPL-SCNC: 4.2 MMOL/L (ref 3.4–5.3)
PROT SERPL-MCNC: 7 G/DL (ref 6.8–8.8)
RBC # BLD AUTO: 4.54 10E12/L (ref 4.4–5.9)
SODIUM SERPL-SCNC: 139 MMOL/L (ref 133–144)
WBC # BLD AUTO: 5.3 10E9/L (ref 4–11)

## 2020-10-24 PROCEDURE — 80053 COMPREHEN METABOLIC PANEL: CPT | Performed by: INTERNAL MEDICINE

## 2020-10-24 PROCEDURE — 85652 RBC SED RATE AUTOMATED: CPT | Performed by: INTERNAL MEDICINE

## 2020-10-24 PROCEDURE — 85025 COMPLETE CBC W/AUTO DIFF WBC: CPT | Performed by: INTERNAL MEDICINE

## 2020-10-24 PROCEDURE — 250N000009 HC RX 250: Performed by: INTERNAL MEDICINE

## 2020-10-24 PROCEDURE — 86140 C-REACTIVE PROTEIN: CPT | Performed by: INTERNAL MEDICINE

## 2020-10-24 PROCEDURE — 96374 THER/PROPH/DIAG INJ IV PUSH: CPT | Performed by: INTERNAL MEDICINE

## 2020-10-24 PROCEDURE — 99285 EMERGENCY DEPT VISIT HI MDM: CPT | Mod: 25 | Performed by: INTERNAL MEDICINE

## 2020-10-24 PROCEDURE — 250N000011 HC RX IP 250 OP 636: Performed by: INTERNAL MEDICINE

## 2020-10-24 PROCEDURE — 96376 TX/PRO/DX INJ SAME DRUG ADON: CPT | Performed by: INTERNAL MEDICINE

## 2020-10-24 PROCEDURE — 96375 TX/PRO/DX INJ NEW DRUG ADDON: CPT | Performed by: INTERNAL MEDICINE

## 2020-10-24 PROCEDURE — 99285 EMERGENCY DEPT VISIT HI MDM: CPT | Performed by: INTERNAL MEDICINE

## 2020-10-24 PROCEDURE — 250N000013 HC RX MED GY IP 250 OP 250 PS 637: Performed by: INTERNAL MEDICINE

## 2020-10-24 RX ORDER — CYCLOBENZAPRINE HCL 5 MG
10 TABLET ORAL ONCE
Status: COMPLETED | OUTPATIENT
Start: 2020-10-24 | End: 2020-10-24

## 2020-10-24 RX ORDER — CYCLOBENZAPRINE HCL 5 MG
10 TABLET ORAL 3 TIMES DAILY PRN
Qty: 20 TABLET | Refills: 0 | Status: SHIPPED | OUTPATIENT
Start: 2020-10-24 | End: 2022-01-12

## 2020-10-24 RX ORDER — DEXAMETHASONE SODIUM PHOSPHATE 10 MG/ML
10 INJECTION, SOLUTION INTRAMUSCULAR; INTRAVENOUS ONCE
Status: COMPLETED | OUTPATIENT
Start: 2020-10-24 | End: 2020-10-24

## 2020-10-24 RX ADMIN — CYCLOBENZAPRINE HYDROCHLORIDE 10 MG: 5 TABLET, FILM COATED ORAL at 10:52

## 2020-10-24 RX ADMIN — Medication 10 MG: at 10:05

## 2020-10-24 RX ADMIN — DEXAMETHASONE SODIUM PHOSPHATE 10 MG: 10 INJECTION, SOLUTION INTRAMUSCULAR; INTRAVENOUS at 10:59

## 2020-10-24 RX ADMIN — Medication 10 MG: at 09:41

## 2020-10-24 RX ADMIN — Medication 10 MG: at 10:53

## 2020-10-24 ASSESSMENT — ENCOUNTER SYMPTOMS
SHORTNESS OF BREATH: 0
EYE REDNESS: 0
ABDOMINAL PAIN: 0
NECK STIFFNESS: 0
ARTHRALGIAS: 0
CONFUSION: 0
DIFFICULTY URINATING: 0
COUGH: 0
COLOR CHANGE: 0
FEVER: 0
HEADACHES: 0

## 2020-10-24 ASSESSMENT — MIFFLIN-ST. JEOR: SCORE: 1661.49

## 2020-10-24 NOTE — DISCHARGE INSTRUCTIONS
Please continue to take medrol dose duke and make an appointment to follow up with Your Pain Doctor, Dr Lawler and Your Primary Care Provider as soon as possible even if entirely better.

## 2020-10-24 NOTE — ED AVS SNAPSHOT
[facility] Emergency Department  [address] [facility] [address]  Phone: [phone]                                    Phillip Rueda   MRN: [id number]    Department: [facility] Emergency Department   Date of Visit: 10/24/2020           After Visit Summary Signature Page    I have received my discharge instructions, and my questions have been answered. I have discussed any challenges I see with this plan with the nurse or doctor.    ..........................................................................................................................................  Patient/Patient Representative Signature      ..........................................................................................................................................  Patient Representative Print Name and Relationship to Patient    ..................................................               ................................................  Date                                   Time    ..........................................................................................................................................  Reviewed by Signature/Title    ...................................................              ..............................................  Date                                               Time          22EPIC Rev 08/18

## 2020-10-24 NOTE — ED PROVIDER NOTES
ED Provider Note  [facility]      History     Chief Complaint   Patient presents with     Arm Pain     into shoulder, neck     The history is provided by the patient and medical records.     Phillip Rueda is a 40 year old male with a medical history significant for Parsonage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, type 2 diabetes mellitus, hypertension, liver disease and chronic pain who presents to the Emergency Department for evaluation of right arm pain that goes into his shoulder and neck which feels like a Parsonage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 flare.  Patient reports that he started a Medrol dose pack this morning, but typically when he has a flare he will need to come to the Emergency Department to get stronger pain medications to get some pain relief and then he can manage the flare at home.  The patient reports that he is on Suboxone daily, which he has been on for years as he has chronic right shoulder pain, but typically it is not severe and can be managed at home.  Patient is also on gabapentin and nortriptyline.  The patient's wife notes that he also took ibuprofen this morning.  Patient denies any recent changes to his medications.    Patient reports that his last flare was a couple of years ago and states that he always comes to this hospital for his flares.  Per review of patient's chart, patient was seen in the Emergency Department here on 4/16/2017 for a flare.    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      Prostate infection      Seizures (H)      Staph infection      Past Surgical History:   Procedure Laterality Date     ORTHOPEDIC SURGERY            cyclobenzaprine (FLEXERIL) 5 MG tablet       insulin aspart (NOVOLOG PEN) 100 UNIT/ML pen       insulin aspart (NOVOLOG VIAL) 100 UNITS/ML vial        atorvastatin (LIPITOR) 20 MG tablet       blood glucose (NO BRAND SPECIFIED) lancets standard       blood glucose (NO BRAND SPECIFIED) test strip       blood glucose monitoring (NO BRAND SPECIFIED) meter device kit       Buprenorphine HCl-Naloxone HCl (SUBOXONE) 4-1 MG film       continuous blood glucose monitoring (FREESTYLE JHON) sensor       diazepam (VALIUM) 5 MG tablet       EPINEPHrine (EPIPEN) 0.3 MG/0.3ML injection       gabapentin (NEURONTIN) 600 MG tablet       ibuprofen (ADVIL,MOTRIN) 200 MG tablet       insulin pen needle 31G X 6 MM       metFORMIN (GLUCOPHAGE-XR) 500 MG 24 hr tablet       nicotine (NICORETTE) 4 MG lozenge       nortriptyline (PAMELOR) 25 MG capsule       sildenafil (VIAGRA) 100 MG tablet       venlafaxine (EFFEXOR-ER) 150 MG TB24       VICTOZA PEN 18 MG/3ML soln      Allergies   Allergen Reactions     Bees Swelling     Bupropion Hcl Other (See Comments)     seizure     Lisinopril Cough     Penicillins Other (See Comments)     Unable to close mouth     Family History  Family History   Problem Relation Age of Onset     Depression Mother      Anxiety Disorder Mother      Substance Abuse Maternal Grandfather      Anxiety Disorder Brother      Glaucoma No family hx of      Macular Degeneration No family hx of      Social History   Social History     Tobacco Use     Smoking status: Former Smoker     Packs/day: 0.25     Years: 15.00     Pack years: 3.75     Types: Cigarettes, Dip, chew, snus or snuff     Start date: 1996     Quit date: 2013     Years since quittin.3     Smokeless tobacco: Former User   Substance Use Topics     Alcohol use: No     Alcohol/week: 0.0 standard drinks     Comment: 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  Drink 1.75 every 2 days     Drug use: No      Past medical history, past surgical history, medications, allergies, family history, and social history were reviewed with the patient. No additional pertinent items.       Review of Systems   Constitutional:  "Negative for fever.   HENT: Negative for congestion.    Eyes: Negative for redness.   Respiratory: Negative for cough and shortness of breath.    Cardiovascular: Negative for chest pain.   Gastrointestinal: Negative for abdominal pain.   Genitourinary: Negative for difficulty urinating.   Musculoskeletal: Negative for arthralgias and neck stiffness.        Positive for right arm pain that radiates into the shoulder and neck   Skin: Negative for color change.   Neurological: Negative for headaches.   Psychiatric/Behavioral: Negative for confusion.   All other systems reviewed and are negative.      Physical Exam   BP: (!) 137/93  Pulse: 97  Temp: 97.6  F (36.4  C)  Resp: 20  Height: 175.3 cm (5' 9\")  Weight: 77.1 kg (170 lb)  SpO2: 100 %  Physical Exam  Constitutional:       General: He is not in acute distress.     Appearance: He is not diaphoretic.   HENT:      Head: Atraumatic.   Eyes:      General: No scleral icterus.     Pupils: Pupils are equal, round, and reactive to light.   Neck:      Musculoskeletal: Neck supple.   Cardiovascular:      Rate and Rhythm: Normal rate and regular rhythm.      Heart sounds: Normal heart sounds. No murmur. No friction rub. No gallop.    Pulmonary:      Effort: Pulmonary effort is normal. No respiratory distress.      Breath sounds: Normal breath sounds. No stridor. No wheezing, rhonchi or rales.   Chest:      Chest wall: No tenderness.   Abdominal:      General: Abdomen is flat. Bowel sounds are normal. There is no distension.      Palpations: Abdomen is soft. There is no mass.      Tenderness: There is no abdominal tenderness. There is no right CVA tenderness, left CVA tenderness, guarding or rebound.      Hernia: No hernia is present.   Musculoskeletal:         General: No tenderness.   Skin:     General: Skin is warm.      Findings: No rash.   Neurological:      General: No focal deficit present.         ED Course      Procedures     9:16 AM  The patient was seen and examined " by Heidi Manzo MD in Room ED11.                Results for orders placed or performed during the hospital encounter of 10/24/20   CBC with platelets differential     Status: None   Result Value Ref Range    WBC 5.3 4.0 - 11.0 10e9/L    RBC Count 4.54 4.4 - 5.9 10e12/L    Hemoglobin 13.3 13.3 - 17.7 g/dL    Hematocrit 40.3 40.0 - 53.0 %    MCV 89 78 - 100 fl    MCH 29.3 26.5 - 33.0 pg    MCHC 33.0 31.5 - 36.5 g/dL    RDW 12.7 10.0 - 15.0 %    Platelet Count 204 150 - 450 10e9/L    Diff Method Automated Method     % Neutrophils 62.0 %    % Lymphocytes 29.3 %    % Monocytes 5.1 %    % Eosinophils 3.2 %    % Basophils 0.4 %    % Immature Granulocytes 0.0 %    Nucleated RBCs 0 0 /100    Absolute Neutrophil 3.3 1.6 - 8.3 10e9/L    Absolute Lymphocytes 1.6 0.8 - 5.3 10e9/L    Absolute Monocytes 0.3 0.0 - 1.3 10e9/L    Absolute Eosinophils 0.2 0.0 - 0.7 10e9/L    Absolute Basophils 0.0 0.0 - 0.2 10e9/L    Abs Immature Granulocytes 0.0 0 - 0.4 10e9/L    Absolute Nucleated RBC 0.0    Comprehensive metabolic panel     Status: Abnormal   Result Value Ref Range    Sodium 139 133 - 144 mmol/L    Potassium 4.2 3.4 - 5.3 mmol/L    Chloride 105 94 - 109 mmol/L    Carbon Dioxide 29 20 - 32 mmol/L    Anion Gap 5 3 - 14 mmol/L    Glucose 139 (H) 70 - 99 mg/dL    Urea Nitrogen 15 7 - 30 mg/dL    Creatinine 0.87 0.66 - 1.25 mg/dL    GFR Estimate >90 >60 mL/min/[1.73_m2]    GFR Estimate If Black >90 >60 mL/min/[1.73_m2]    Calcium 8.9 8.5 - 10.1 mg/dL    Bilirubin Total 0.4 0.2 - 1.3 mg/dL    Albumin 4.0 3.4 - 5.0 g/dL    Protein Total 7.0 6.8 - 8.8 g/dL    Alkaline Phosphatase 103 40 - 150 U/L    ALT 21 0 - 70 U/L    AST 15 0 - 45 U/L   CRP inflammation     Status: None   Result Value Ref Range    CRP Inflammation <2.9 0.0 - 8.0 mg/L   Erythrocyte sedimentation rate auto     Status: None   Result Value Ref Range    Sed Rate 4 0 - 15 mm/h     Medications   ketamine (KETALAR) injection 10 mg (10 mg Intravenous Given 10/24/20 0941)    ketamine (KETALAR) injection 10 mg (10 mg Intravenous Given 10/24/20 1005)   ketamine (KETALAR) injection 10 mg (10 mg Intravenous Given 10/24/20 1053)   dexamethasone PF (DECADRON) injection 10 mg (10 mg Intravenous Given 10/24/20 1059)   cyclobenzaprine (FLEXERIL) tablet 10 mg (10 mg Oral Given 10/24/20 1052)        Assessments & Plan (with Medical Decision Making)  Acute on chronic right shoulder pain thought to be due to neuralgic amyotrophy, on chronic meds per Pain MD, just restarted medrol dose duke first dose without much relief, given ketamine 10 mg iv x 3 with some relief, decadron and flexeril with some sleep, D/W Neuro-discharge with fleceril prn in addition to current regimen, continue medrol dose duke, follow up with Pain MD, Neuro, PMD.       I have reviewed the nursing notes. I have reviewed the findings, diagnosis, plan and need for follow up with the patient.    Discharge Medication List as of 10/24/2020 12:36 PM      START taking these medications    Details   cyclobenzaprine (FLEXERIL) 5 MG tablet Take 2 tablets (10 mg) by mouth 3 times daily as needed for muscle spasms, Disp-20 tablet, R-0, Local Print      insulin aspart (NOVOLOG VIAL) 100 UNITS/ML vial One unit per glu 40 for every glu above 140, Disp-1 vial, R-0, Local Print             Final diagnoses:   Pain of right upper arm   Neuralgic amyotrophy   Chronic pain syndrome       --  I, Nitish New, am serving as a trained medical scribe to document services personally performed by Heidi Manzo MD, based on the provider's statements to me.     I, Heidi Manzo MD, was physically present and have reviewed and verified the accuracy of this note documented by Nitish New.    Heidi Manzo MD  [facility] EMERGENCY DEPARTMENT  10/24/2020     Heidi Manzo MD  10/24/20 [id number]

## 2020-10-28 ENCOUNTER — DOCUMENTATION ONLY (OUTPATIENT)
Dept: CARE COORDINATION | Facility: CLINIC | Age: 40
End: 2020-10-28

## 2020-11-02 ENCOUNTER — HOSPITAL ENCOUNTER (EMERGENCY)
Facility: CLINIC | Age: 40
Discharge: HOME | End: 2020-11-02
Attending: FAMILY MEDICINE | Admitting: FAMILY MEDICINE
Payer: COMMERCIAL

## 2020-11-02 ENCOUNTER — APPOINTMENT (OUTPATIENT)
Dept: CT IMAGING | Facility: CLINIC | Age: 40
End: 2020-11-02
Attending: FAMILY MEDICINE
Payer: COMMERCIAL

## 2020-11-02 ENCOUNTER — VIRTUAL VISIT (OUTPATIENT)
Dept: FAMILY MEDICINE | Facility: OTHER | Age: 40
End: 2020-11-02

## 2020-11-02 ENCOUNTER — APPOINTMENT (OUTPATIENT)
Dept: CARDIOLOGY | Facility: CLINIC | Age: 40
End: 2020-11-02
Attending: FAMILY MEDICINE
Payer: COMMERCIAL

## 2020-11-02 ENCOUNTER — APPOINTMENT (OUTPATIENT)
Dept: GENERAL RADIOLOGY | Facility: CLINIC | Age: 40
End: 2020-11-02
Attending: FAMILY MEDICINE
Payer: COMMERCIAL

## 2020-11-02 VITALS
HEART RATE: 82 BPM | SYSTOLIC BLOOD PRESSURE: 133 MMHG | DIASTOLIC BLOOD PRESSURE: 92 MMHG | TEMPERATURE: 98.2 F | HEIGHT: 69 IN | RESPIRATION RATE: 16 BRPM | BODY MASS INDEX: 25.1 KG/M2 | OXYGEN SATURATION: 98 %

## 2020-11-02 DIAGNOSIS — R55 NEAR SYNCOPE: ICD-10-CM

## 2020-11-02 DIAGNOSIS — Z20.828 EXPOSURE TO SARS-ASSOCIATED CORONAVIRUS: ICD-10-CM

## 2020-11-02 DIAGNOSIS — G54.0 BRACHIAL PLEXUS NEUROPATHY OF RIGHT UPPER EXTREMITY: ICD-10-CM

## 2020-11-02 LAB
ALBUMIN SERPL-MCNC: 3.4 G/DL (ref 3.4–5)
ALP SERPL-CCNC: 103 U/L (ref 40–150)
ALT SERPL W P-5'-P-CCNC: 37 U/L (ref 0–70)
ANION GAP SERPL CALCULATED.3IONS-SCNC: 6 MMOL/L (ref 3–14)
APTT PPP: 28 SEC (ref 22–37)
AST SERPL W P-5'-P-CCNC: 22 U/L (ref 0–45)
BASOPHILS # BLD AUTO: 0 10E9/L (ref 0–0.2)
BASOPHILS NFR BLD AUTO: 0.1 %
BILIRUB SERPL-MCNC: 0.3 MG/DL (ref 0.2–1.3)
BUN SERPL-MCNC: 13 MG/DL (ref 7–30)
CALCIUM SERPL-MCNC: 9 MG/DL (ref 8.5–10.1)
CHLORIDE SERPL-SCNC: 102 MMOL/L (ref 94–109)
CO2 SERPL-SCNC: 30 MMOL/L (ref 20–32)
CREAT SERPL-MCNC: 0.87 MG/DL (ref 0.66–1.25)
DIFFERENTIAL METHOD BLD: ABNORMAL
EOSINOPHIL # BLD AUTO: 0.2 10E9/L (ref 0–0.7)
EOSINOPHIL NFR BLD AUTO: 2.2 %
ERYTHROCYTE [DISTWIDTH] IN BLOOD BY AUTOMATED COUNT: 12.8 % (ref 10–15)
GFR SERPL CREATININE-BSD FRML MDRD: >90 ML/MIN/{1.73_M2}
GLUCOSE BLDC GLUCOMTR-MCNC: 154 MG/DL (ref 70–99)
GLUCOSE SERPL-MCNC: 222 MG/DL (ref 70–99)
HCT VFR BLD AUTO: 38 % (ref 40–53)
HGB BLD-MCNC: 12.4 G/DL (ref 13.3–17.7)
IMM GRANULOCYTES # BLD: 0 10E9/L (ref 0–0.4)
IMM GRANULOCYTES NFR BLD: 0.5 %
INR PPP: 0.88 (ref 0.86–1.14)
INTERPRETATION ECG - MUSE: NORMAL
LYMPHOCYTES # BLD AUTO: 2 10E9/L (ref 0.8–5.3)
LYMPHOCYTES NFR BLD AUTO: 24.5 %
MCH RBC QN AUTO: 29.7 PG (ref 26.5–33)
MCHC RBC AUTO-ENTMCNC: 32.6 G/DL (ref 31.5–36.5)
MCV RBC AUTO: 91 FL (ref 78–100)
MONOCYTES # BLD AUTO: 0.7 10E9/L (ref 0–1.3)
MONOCYTES NFR BLD AUTO: 8 %
NEUTROPHILS # BLD AUTO: 5.4 10E9/L (ref 1.6–8.3)
NEUTROPHILS NFR BLD AUTO: 64.7 %
NRBC # BLD AUTO: 0 10*3/UL
NRBC BLD AUTO-RTO: 0 /100
NT-PROBNP SERPL-MCNC: 68 PG/ML (ref 0–450)
PLATELET # BLD AUTO: 208 10E9/L (ref 150–450)
POTASSIUM SERPL-SCNC: 3.9 MMOL/L (ref 3.4–5.3)
PROT SERPL-MCNC: 6.9 G/DL (ref 6.8–8.8)
RBC # BLD AUTO: 4.18 10E12/L (ref 4.4–5.9)
SARS-COV-2 RNA SPEC QL NAA+PROBE: NORMAL
SODIUM SERPL-SCNC: 138 MMOL/L (ref 133–144)
SPECIMEN SOURCE: NORMAL
TROPONIN I SERPL-MCNC: <0.015 UG/L (ref 0–0.04)
TROPONIN I SERPL-MCNC: <0.015 UG/L (ref 0–0.04)
TSH SERPL DL<=0.005 MIU/L-ACNC: 1.1 MU/L (ref 0.4–4)
WBC # BLD AUTO: 8.3 10E9/L (ref 4–11)

## 2020-11-02 PROCEDURE — 99285 EMERGENCY DEPT VISIT HI MDM: CPT | Mod: 25 | Performed by: FAMILY MEDICINE

## 2020-11-02 PROCEDURE — 250N000013 HC RX MED GY IP 250 OP 250 PS 637: Performed by: FAMILY MEDICINE

## 2020-11-02 PROCEDURE — 93306 TTE W/DOPPLER COMPLETE: CPT

## 2020-11-02 PROCEDURE — U0003 INFECTIOUS AGENT DETECTION BY NUCLEIC ACID (DNA OR RNA); SEVERE ACUTE RESPIRATORY SYNDROME CORONAVIRUS 2 (SARS-COV-2) (CORONAVIRUS DISEASE [COVID-19]), AMPLIFIED PROBE TECHNIQUE, MAKING USE OF HIGH THROUGHPUT TECHNOLOGIES AS DESCRIBED BY CMS-2020-01-R: HCPCS | Performed by: FAMILY MEDICINE

## 2020-11-02 PROCEDURE — C9803 HOPD COVID-19 SPEC COLLECT: HCPCS | Performed by: FAMILY MEDICINE

## 2020-11-02 PROCEDURE — 84443 ASSAY THYROID STIM HORMONE: CPT | Performed by: FAMILY MEDICINE

## 2020-11-02 PROCEDURE — 85730 THROMBOPLASTIN TIME PARTIAL: CPT | Performed by: FAMILY MEDICINE

## 2020-11-02 PROCEDURE — 250N000011 HC RX IP 250 OP 636: Performed by: FAMILY MEDICINE

## 2020-11-02 PROCEDURE — 93005 ELECTROCARDIOGRAM TRACING: CPT | Performed by: FAMILY MEDICINE

## 2020-11-02 PROCEDURE — 70450 CT HEAD/BRAIN W/O DYE: CPT

## 2020-11-02 PROCEDURE — 70450 CT HEAD/BRAIN W/O DYE: CPT | Mod: 26 | Performed by: RADIOLOGY

## 2020-11-02 PROCEDURE — 999N001017 HC STATISTIC GLUCOSE BY METER IP

## 2020-11-02 PROCEDURE — 85610 PROTHROMBIN TIME: CPT | Performed by: FAMILY MEDICINE

## 2020-11-02 PROCEDURE — 80053 COMPREHEN METABOLIC PANEL: CPT | Performed by: FAMILY MEDICINE

## 2020-11-02 PROCEDURE — 85025 COMPLETE CBC W/AUTO DIFF WBC: CPT | Performed by: FAMILY MEDICINE

## 2020-11-02 PROCEDURE — 84484 ASSAY OF TROPONIN QUANT: CPT | Mod: 91 | Performed by: FAMILY MEDICINE

## 2020-11-02 PROCEDURE — 71045 X-RAY EXAM CHEST 1 VIEW: CPT | Mod: 26 | Performed by: RADIOLOGY

## 2020-11-02 PROCEDURE — 93010 ELECTROCARDIOGRAM REPORT: CPT | Performed by: FAMILY MEDICINE

## 2020-11-02 PROCEDURE — 71045 X-RAY EXAM CHEST 1 VIEW: CPT

## 2020-11-02 PROCEDURE — 258N000003 HC RX IP 258 OP 636: Performed by: FAMILY MEDICINE

## 2020-11-02 PROCEDURE — 93306 TTE W/DOPPLER COMPLETE: CPT | Mod: 26 | Performed by: STUDENT IN AN ORGANIZED HEALTH CARE EDUCATION/TRAINING PROGRAM

## 2020-11-02 PROCEDURE — 83880 ASSAY OF NATRIURETIC PEPTIDE: CPT | Performed by: FAMILY MEDICINE

## 2020-11-02 RX ORDER — OXYCODONE HYDROCHLORIDE 5 MG/1
5 TABLET ORAL EVERY 6 HOURS PRN
Qty: 6 TABLET | Refills: 0 | Status: ON HOLD | OUTPATIENT
Start: 2020-11-02 | End: 2021-12-13

## 2020-11-02 RX ORDER — OXYCODONE HYDROCHLORIDE 5 MG/1
10 TABLET ORAL ONCE
Status: COMPLETED | OUTPATIENT
Start: 2020-11-02 | End: 2020-11-02

## 2020-11-02 RX ORDER — HYDROMORPHONE HYDROCHLORIDE 1 MG/ML
0.5 INJECTION, SOLUTION INTRAMUSCULAR; INTRAVENOUS; SUBCUTANEOUS ONCE
Status: COMPLETED | OUTPATIENT
Start: 2020-11-02 | End: 2020-11-02

## 2020-11-02 RX ORDER — SODIUM CHLORIDE 9 MG/ML
INJECTION, SOLUTION INTRAVENOUS CONTINUOUS
Status: DISCONTINUED | OUTPATIENT
Start: 2020-11-02 | End: 2020-11-02 | Stop reason: HOSPADM

## 2020-11-02 RX ORDER — LIDOCAINE 40 MG/G
CREAM TOPICAL
Status: DISCONTINUED | OUTPATIENT
Start: 2020-11-02 | End: 2020-11-02 | Stop reason: HOSPADM

## 2020-11-02 RX ADMIN — OXYCODONE HYDROCHLORIDE 10 MG: 5 TABLET ORAL at 16:41

## 2020-11-02 RX ADMIN — HYDROMORPHONE HYDROCHLORIDE 0.5 MG: 1 INJECTION, SOLUTION INTRAMUSCULAR; INTRAVENOUS; SUBCUTANEOUS at 14:27

## 2020-11-02 RX ADMIN — SODIUM CHLORIDE 1000 ML: 9 INJECTION, SOLUTION INTRAVENOUS at 14:15

## 2020-11-02 ASSESSMENT — ENCOUNTER SYMPTOMS
COUGH: 1
HALLUCINATIONS: 0
ABDOMINAL PAIN: 0
VOICE CHANGE: 0
BRUISES/BLEEDS EASILY: 0
CONFUSION: 0
FATIGUE: 1
PALPITATIONS: 0
VOMITING: 0
SHORTNESS OF BREATH: 0
WEAKNESS: 1
DYSPHORIC MOOD: 0
ARTHRALGIAS: 1
HEADACHES: 1
JOINT SWELLING: 0
FEVER: 0
TROUBLE SWALLOWING: 0
NAUSEA: 0
ACTIVITY CHANGE: 1
WHEEZING: 0
DECREASED CONCENTRATION: 1
APPETITE CHANGE: 1

## 2020-11-02 NOTE — ED TRIAGE NOTES
Pt c/o cough and LOC this morning. LOC was very brief, less than 10 seconds per pt report. States he is wanting to get tested for covid. Pt feels fatigued. Pt reports that he has 2 small kids so he is not sure if he is tired from being awake with them.

## 2020-11-02 NOTE — ED PROVIDER NOTES
ED Provider Note  [facility]      History     Chief Complaint   Patient presents with     Cough     Loss of Consciousness     HPI  Phillip Rueda is a 40 year old male who presents emergency room with near syncopal episode today.  Patient has had a cough for the last several days.  Patient's wife works for [employer] I believe.  She instructed to get a Covid test yesterday he was standing for a brief period of time watching his 3-year-old and suddenly felt lightheaded without chest pain was not coughing with this episode.  Patient had a near syncopal episode but did not totally lose consciousness.  No reports of seizure etc. no palpitations.  Notes his cough is somewhat improved to been ongoing for last few days without wheezing.  Patient has a right shoulder plexopathy which he has been dealing with had steroids he does have diabetes his blood sugars have improved.  Patient has not drank as much today but did drink some Gatorade after this event.  Patient has noted headache over last several days without neurological focal complaints otherwise.  He denies fevers.  Denies being short of breath or true chest pain.  Now presents for evaluation of previous history.    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      Prostate infection      Seizures (H)      Staph infection      Past Surgical History:   Procedure Laterality Date     ORTHOPEDIC SURGERY            oxyCODONE (ROXICODONE) 5 MG tablet       atorvastatin (LIPITOR) 20 MG tablet       blood glucose (NO BRAND SPECIFIED) lancets standard       blood glucose (NO BRAND SPECIFIED) test strip       blood glucose monitoring (NO BRAND SPECIFIED) meter device kit       Buprenorphine HCl-Naloxone HCl (SUBOXONE) 4-1 MG film       continuous blood  glucose monitoring (FREESTYLE JHON) sensor       cyclobenzaprine (FLEXERIL) 5 MG tablet       diazepam (VALIUM) 5 MG tablet       EPINEPHrine (EPIPEN) 0.3 MG/0.3ML injection       gabapentin (NEURONTIN) 600 MG tablet       ibuprofen (ADVIL,MOTRIN) 200 MG tablet       insulin aspart (NOVOLOG PEN) 100 UNIT/ML pen       insulin aspart (NOVOLOG VIAL) 100 UNITS/ML vial       insulin pen needle 31G X 6 MM       metFORMIN (GLUCOPHAGE-XR) 500 MG 24 hr tablet       nicotine (NICORETTE) 4 MG lozenge       nortriptyline (PAMELOR) 25 MG capsule       sildenafil (VIAGRA) 100 MG tablet       venlafaxine (EFFEXOR-ER) 150 MG TB24       VICTOZA PEN 18 MG/3ML soln      Allergies   Allergen Reactions     Bees Swelling     Bupropion Hcl Other (See Comments)     seizure     Lisinopril Cough     Penicillins Other (See Comments)     Unable to close mouth     Family History  Family History   Problem Relation Age of Onset     Depression Mother      Anxiety Disorder Mother      Substance Abuse Maternal Grandfather      Anxiety Disorder Brother      Glaucoma No family hx of      Macular Degeneration No family hx of      Social History   Social History     Tobacco Use     Smoking status: Former Smoker     Packs/day: 0.25     Years: 15.00     Pack years: 3.75     Types: Cigarettes, Dip, chew, snus or snuff     Start date: 1996     Quit date: 2013     Years since quittin.3     Smokeless tobacco: Former User   Substance Use Topics     Alcohol use: No     Alcohol/week: 0.0 standard drinks     Comment: 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  Drink 1.75 every 2 days     Drug use: No      Past medical history, past surgical history, medications, allergies, family history, and social history were reviewed with the patient. No additional pertinent items.       Review of Systems   Constitutional: Positive for activity change, appetite change and fatigue. Negative for fever.   HENT: Negative for trouble swallowing and voice change.    Eyes: Negative  "for visual disturbance.   Respiratory: Positive for cough. Negative for shortness of breath and wheezing.    Cardiovascular: Negative for chest pain, palpitations and leg swelling.   Gastrointestinal: Negative for abdominal pain, nausea and vomiting.   Musculoskeletal: Positive for arthralgias (right shoulder brachial plexopathy chronic with sling and was treated with IV and po steroids recently. off for 3 days now). Negative for gait problem and joint swelling.   Skin: Negative for rash.   Allergic/Immunologic: Negative for immunocompromised state.   Neurological: Positive for syncope (near syncope today), weakness and headaches.   Hematological: Does not bruise/bleed easily.   Psychiatric/Behavioral: Positive for decreased concentration. Negative for confusion, dysphoric mood and hallucinations.   All other systems reviewed and are negative.    A complete review of systems was performed with pertinent positives and negatives noted in the HPI, and all other systems negative.    Physical Exam   BP: 125/74  Pulse: 105  Temp: 98.2  F (36.8  C)  Resp: 16  Height: 175.3 cm (5' 9\")  SpO2: 98 %  Physical Exam  Vitals signs and nursing note reviewed.   Constitutional:       General: He is in acute distress.      Appearance: He is well-developed. He is not toxic-appearing or diaphoretic.   HENT:      Head: Normocephalic and atraumatic.      Mouth/Throat:      Mouth: Mucous membranes are moist.   Eyes:      General: No scleral icterus.     Extraocular Movements: Extraocular movements intact.      Conjunctiva/sclera: Conjunctivae normal.      Pupils: Pupils are equal, round, and reactive to light.   Neck:      Musculoskeletal: Normal range of motion and neck supple. No neck rigidity.   Cardiovascular:      Rate and Rhythm: Normal rate and regular rhythm.   Pulmonary:      Effort: No respiratory distress.      Breath sounds: No stridor. No wheezing.   Abdominal:      General: There is no distension.      Tenderness: There is " no abdominal tenderness.   Musculoskeletal:         General: Tenderness (right shoulder plexopathy) present. No swelling.   Skin:     General: Skin is warm and dry.      Capillary Refill: Capillary refill takes less than 2 seconds.      Findings: No rash.   Neurological:      General: No focal deficit present.      Mental Status: He is alert and oriented to person, place, and time. Mental status is at baseline.   Psychiatric:      Comments: Flat but appropriate           ED Course       Patient valuate here in the ER.  EKG revealed inverted T waves start precordial leads which is new from 5 years ago.  No other changes noted.  IV established labs drawn.  Patient had Covid test done which is pending.  Chest x-ray done was normal because of the headache a head CT was done was negative also.  Labs including troponin times delta of 2 were normal.  EKG x2 revealed no changes otherwise noted.  Formal echo was done revealed normal without any wall motion or valvular changes.  Patient received IV fluids here in the ER orthostatic blood pressure stable.  Patient received 0.5 mg Dilaudid x1 for right shoulder plexopathy pain patient somewhat improved had recurrent symptoms did receive oxycodone also feeling better.    Discussed with patient this point patient comfortable going home unclear as far as near syncopal symptoms.  Patient was discharged with 6 oxycodone to take at night primarily he has an appointment this week to follow-up with his pain clinic and his physician etc. he has any recurrent symptoms should return etc.  Patient comfort this plan.    Procedures             EKG Interpretation:      Interpreted by Hilario Liz MD  Time reviewed: 1150  Symptoms at time of EKG: near syncope   Rhythm: sinus tach   Rate: 102  Axis: normal  Ectopy: none  Conduction: normal  ST Segments/ T Waves: t wave inversion throughout precordial lead  Q Waves: none  Comparison to prior: new from 2015    Clinical Impression: sinus  tach with t wave inversion                      Results for orders placed or performed during the hospital encounter of 11/02/20   XR Chest Port 1 View     Status: None    Narrative    Portable chest    INDICATION: Cough and syncope    COMPARISON: None    FINDINGS: Heart size and shape appear normal. Lungs and pulmonary  vascularity appear normal.      Impression    IMPRESSION: Negative    JACKSON ZHANG MD   CT Head w/o Contrast     Status: None    Narrative    CT HEAD W/O CONTRAST 11/2/2020 2:17 PM    Provided History: headache and near syncope  ICD-10: Headache    Comparison: Brain MRI 2/4/2016.    Technique: Using multidetector thin collimation helical acquisition  technique, axial, coronal and sagittal CT images from the skull base  to the vertex were obtained without intravenous contrast.     Findings:    No intracranial hemorrhage. No mass effect. No midline shift. No  extra-axial fluid collection. The gray to white matter differentiation  of the cerebral hemispheres is preserved. Ventricles are proportionate  to the sulci.. No sulcal effacement..  The basal cisterns are patent.    The visualized paranasal sinuses are clear. The mastoid air cells are  clear. Orbits appear unremarkable. No acute fracture.. Visualized soft  tissues are normal.      Impression    Impression: No acute intracranial pathology.    I have personally reviewed the examination and initial interpretation  and I agree with the findings.    ELICEO CASTRO MD   CBC with platelets differential     Status: Abnormal   Result Value Ref Range    WBC 8.3 4.0 - 11.0 10e9/L    RBC Count 4.18 (L) 4.4 - 5.9 10e12/L    Hemoglobin 12.4 (L) 13.3 - 17.7 g/dL    Hematocrit 38.0 (L) 40.0 - 53.0 %    MCV 91 78 - 100 fl    MCH 29.7 26.5 - 33.0 pg    MCHC 32.6 31.5 - 36.5 g/dL    RDW 12.8 10.0 - 15.0 %    Platelet Count 208 150 - 450 10e9/L    Diff Method Automated Method     % Neutrophils 64.7 %    % Lymphocytes 24.5 %    % Monocytes 8.0 %    %  Eosinophils 2.2 %    % Basophils 0.1 %    % Immature Granulocytes 0.5 %    Nucleated RBCs 0 0 /100    Absolute Neutrophil 5.4 1.6 - 8.3 10e9/L    Absolute Lymphocytes 2.0 0.8 - 5.3 10e9/L    Absolute Monocytes 0.7 0.0 - 1.3 10e9/L    Absolute Eosinophils 0.2 0.0 - 0.7 10e9/L    Absolute Basophils 0.0 0.0 - 0.2 10e9/L    Abs Immature Granulocytes 0.0 0 - 0.4 10e9/L    Absolute Nucleated RBC 0.0    Partial thromboplastin time     Status: None   Result Value Ref Range    PTT 28 22 - 37 sec   INR     Status: None   Result Value Ref Range    INR 0.88 0.86 - 1.14   Comprehensive metabolic panel     Status: Abnormal   Result Value Ref Range    Sodium 138 133 - 144 mmol/L    Potassium 3.9 3.4 - 5.3 mmol/L    Chloride 102 94 - 109 mmol/L    Carbon Dioxide 30 20 - 32 mmol/L    Anion Gap 6 3 - 14 mmol/L    Glucose 222 (H) 70 - 99 mg/dL    Urea Nitrogen 13 7 - 30 mg/dL    Creatinine 0.87 0.66 - 1.25 mg/dL    GFR Estimate >90 >60 mL/min/[1.73_m2]    GFR Estimate If Black >90 >60 mL/min/[1.73_m2]    Calcium 9.0 8.5 - 10.1 mg/dL    Bilirubin Total 0.3 0.2 - 1.3 mg/dL    Albumin 3.4 3.4 - 5.0 g/dL    Protein Total 6.9 6.8 - 8.8 g/dL    Alkaline Phosphatase 103 40 - 150 U/L    ALT 37 0 - 70 U/L    AST 22 0 - 45 U/L   Troponin I     Status: None   Result Value Ref Range    Troponin I ES <0.015 0.000 - 0.045 ug/L   Nt probnp inpatient (BNP)     Status: None   Result Value Ref Range    N-Terminal Pro BNP Inpatient 68 0 - 450 pg/mL   TSH     Status: None   Result Value Ref Range    TSH 1.10 0.40 - 4.00 mU/L   Symptomatic COVID-19 Virus (Coronavirus) by PCR     Status: None    Specimen: Nasopharyngeal   Result Value Ref Range    COVID-19 Virus PCR to [facility] - Source Swab     COVID-19 Virus PCR to [facility] - Result       Test received-See reflex to IDDL test SARS CoV2 (COVID-19) Virus RT-PCR   Glucose by meter     Status: Abnormal   Result Value Ref Range    Glucose 154 (H) 70 - 99 mg/dL   Troponin I     Status: None   Result Value Ref  Range    Troponin I ES <0.015 0.000 - 0.045 ug/L   EKG 12-lead, tracing only     Status: None   Result Value Ref Range    Interpretation ECG Click View Image link to view waveform and result    EKG 12 lead     Status: None (Preliminary result)   Result Value Ref Range    Interpretation ECG Click View Image link to view waveform and result    Echocardiogram Complete     Status: None    Narrative    [id number]  [id number]  [id number]  [id number]^BENITEZ^ANTONIO^SOLIS           [facility]  Echocardiography [facility]  [address] [place] [id number]     Name: CHAR PRAJAPATI  MRN: [id number]  : 1980  Study Date: 2020 04:40 PM  Age: 40 yrs  Gender: Male  Patient Location: [facility]  Reason For Study: Syncope  Ordering Physician: ANTONIO MARQUIS  Performed By: Parveen Haque RDCS     BSA: 1.9 m2  Height: 69 in  Weight: 170 lb  HR: 83  BP: 116/77 mmHg  _____________________________________________________________________________  __        Procedure  Complete Portable Echo Adult.  _____________________________________________________________________________  __        Interpretation Summary  No structural cause identified for syncope.     Global and regional left ventricular function is normal with an EF of 55-60%.  Global right ventricular function is normal. The right ventricle is normal  size.  No significant valvular abnormalities.  There is mild dilation at the level of the sinuses of Valsalva (4.0 cm,  indexed value 2.1 cm/m2).  There is no prior study for direct comparison.  _____________________________________________________________________________  __        Left Ventricle  Global and regional left ventricular function is normal with an EF of 55-60%.  Left ventricular size is normal. Relative wall thickness is increased  consistent with concentric remodeling. Left ventricular diastolic function is  normal.     Right Ventricle  Global right ventricular function is normal.  The right ventricle is normal  size.     Atria  Both atria appear normal.     Mitral Valve  The mitral valve is normal. Trace mitral insufficiency is present.        Aortic Valve  The aortic valve is tricuspid. On Doppler interrogation, there is no  significant stenosis or regurgitation.     Tricuspid Valve  The tricuspid valve is normal. Trace tricuspid insufficiency is present.  Pulmonary artery systolic pressure cannot be assessed.     Pulmonic Valve  The valve leaflets are not well visualized. Trace pulmonic insufficiency is  present.     Vessels  Sinuses of Valsalva 4.0 cm. Ascending aorta 3.0 cm. IVC diameter <2.1 cm  collapsing >50% with sniff suggests a normal RA pressure of 3 mmHg. IVC  diameter and respiratory changes fall into an intermediate range suggesting an  RA pressure of 8 mmHg.     Pericardium  No pericardial effusion is present.        Compared to Previous Study  There is no prior study for direct comparison.  _____________________________________________________________________________  __  MMode/2D Measurements & Calculations     IVSd: 1.2 cm  LVIDd: 4.3 cm  LVIDs: 2.8 cm  LVPWd: 0.99 cm  FS: 34.6 %  LV mass(C)d: 161.9 grams  LV mass(C)dI: 84.0 grams/m2  asc Aorta Diam: 3.0 cm  LVOT diam: 2.7 cm  LVOT area: 5.9 cm2  LA Volume Index (BP): 28.4 ml/m2  RWT: 0.46           Doppler Measurements & Calculations  MV E max ericka: 59.7 cm/sec  MV A max ericka: 55.0 cm/sec  MV E/A: 1.1  MV dec slope: 369.2 cm/sec2  MV dec time: 0.16 sec  PA acc time: 0.09 sec  E/E' av.0  Lateral E/e': 5.5  Medial E/e': 8.4     _____________________________________________________________________________  __           Report approved by: Isabelle Briseno 2020 04:14 PM        Medications   0.9% sodium chloride BOLUS (0 mLs Intravenous Stopped 20 1526)   HYDROmorphone (PF) (DILAUDID) injection 0.5 mg (0.5 mg Intravenous Given 20 1427)   oxyCODONE (ROXICODONE) tablet 10 mg (10 mg Oral Given 20 1641)         Assessments & Plan (with Medical Decision Making)  40-year-old male history of right brachial plexopathy with pain issues states he is had a cough the last several days.  His wife sent him in to get Covid testing.  He was watching his 3-year-old today while standing felt lightheaded had a near syncopal episode but no seizure.  No chest pain or palpitations.  Patient has a headache somewhat for the last few days without fever.  Patient valuated here in the ER EKG did show some inverted T waves throughout precordial leads which is new from 5 years ago repeat EKG later also unchanged.  Troponins x2 were negative.  Other labs stable.  Head CT was negative chest x-ray was negative also.  Patient received IV fluids received Dilaudid for pain control and then oxycodone patient feeling better at this point patient blood sugar was 222 has history of diabetes.  Other labs stable.  BNP troponin negative otherwise also.  Formal echo also done was negative.  Orthostatic unchanged.  Patient received a liter normal saline also.  Patient stable here in the ER is coming discharged with follow-up as noted           I have reviewed the nursing notes. I have reviewed the findings, diagnosis, plan and need for follow up with the patient.    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 EMERGENCY DEPARTMENT  11/2/2020    This note was created at least in part by the use of dragon voice dictation system. Inadvertent typographical errors may still exist.  Hilario Liz MD.    Patient evaluated in the emergency department during the COVID-19 pandemic period. Careful attention to patients  safety was addressed throughout the evaluation. Evaluation and treatment management was initiated with disposition made efficiently and appropriate as possible to minimize any risk of potential exposure to patient during this evaluation.       Hilario Liz MD  11/02/20 [id number]

## 2020-11-02 NOTE — PROGRESS NOTES
"Date: 2020 07:32:37  Clinician: Vernell David  Clinician NPI: [id number]  Patient: Phillip Rueda  Patient : 1980  Patient Address: [address] [place] [address]  Patient Phone: [phone]  Visit Protocol: URI  Patient Summary:  Phillip is a 40 year old ( : 1980 ) male who initiated a [facility] Visit for COVID-19 (Coronavirus) evaluation and screening. When asked the question \"Please sign me up to receive news, health information and promotions. \", Phillip responded \"No\".    Phillip states his symptoms started gradually 3-4 days ago.   His symptoms consist of a headache, wheezing, a cough, and malaise.   Symptom details     Cough: Phillip coughs every 5-10 minutes and his cough is more bothersome at night. Phlegm does not come into his throat when he coughs. He does not believe his cough is caused by post-nasal drip.     Wheezing: Phillip has not ever been diagnosed with asthma. Additional wheezing details as reported by the patient (free text): I wheez after I cough. And if I breath deeply like a full body inhalation I can make myself cough and wheez.    My cough doesn't really create phlegm I wish it did. But it's so deep. I feel like I may have shortness of breath or a hard time breathing if I have my coughing fits.       Headache: He states the headache is mild (1-3 on a 10 point pain scale).      Phillip denies having ear pain, fever, nasal congestion, nausea, facial pain or pressure, myalgias, chills, sore throat, teeth pain, ageusia, diarrhea, anosmia, vomiting, and rhinitis. He also denies having recent facial or sinus surgery in the past 60 days, double sickening (worsening symptoms after initial improvement), and taking antibiotic medication in the past month. He is not experiencing dyspnea.   Precipitating events  He has not recently been exposed to someone with influenza. Phillip has been in close contact with the following high risk individuals: children under the age of 5 and people " with asthma, heart disease or diabetes.   Pertinent COVID-19 (Coronavirus) information  Phillip does not work or volunteer as healthcare worker or a . In the past 14 days, Phillip has not worked or volunteered at a healthcare facility or group living setting.   In the past 14 days, he also has not lived in a congregate living setting.   Phillip has not had a close contact with a laboratory-confirmed COVID-19 patient within 14 days of symptom onset.    Since December 2019, Phillip has not been diagnosed with lab-confirmed COVID-19 test and has not had upper respiratory infection or influenza-like illness.   Pertinent medical history  Phillip does not need a return to work/school note.   Weight: 175 lbs   Phillip does not smoke or use smokeless tobacco.   Weight: 175 lbs  A synchronous phone visit was initiated by the provider for the following reason: wheezing    MEDICATIONS: Tresiba FlexTouch U-100 subcutaneous, Victoza 2-Jose subcutaneous, gabapentin oral, metformin oral, ALLERGIES: Penicillins  Clinician Response:  Dear Phillip,   Your symptoms show that you may have coronavirus (COVID-19). This illness can cause fever, cough and trouble breathing. Many people get a mild case and get better on their own. Some people can get very sick.  Based on the symptoms you have shared, I would like you to be re-checked in 2 to 3 days. Please call your family clinic to set up a video or phone visit.  Will I be tested for COVID-19?  We would like to test you for this virus.   Please call [phone] to schedule your visit. Explain that you were referred by [facility] to have a COVID-19 test. Be ready to share your [facility] visit ID number.    The following will serve as your written order for this COVID Test, ordered by me, for the indication of suspected COVID [Z20.828]: The test will be ordered in [employer], our electronic health record, after you are scheduled. It will show as ordered and authorized by Drake Bonds MD.  Order: COVID-19  "(Coronavirus) PCR for SYMPTOMATIC testing from [facility].   1.When it's time for your COVID test:   Stay at least 6 feet away from others. (If someone will drive you to your test, stay in the backseat, as far away from the  as you can.)   Cover your mouth and nose with a mask, tissue or washcloth.  Go straight to the testing site. Don't make any stops on the way there or back.      2.Starting now: Stay home and away from others (self-isolate) until:   You've had no fever---and no medicine that reduces fever---for one full day (24 hours). And...   Your other symptoms have gotten better. For example, your cough or breathing has improved. And...   At least 10 days have passed since your symptoms started.       During this time, don't leave the house except for testing or medical care.   Stay in your own room, even for meals. Use your own bathroom if you can.   Stay away from others in your home. No hugging, kissing or shaking hands. No visitors.  Don't go to work, school or anywhere else.    Clean \"high touch\" surfaces often (doorknobs, counters, handles, etc.). Use a household cleaning spray or wipes. You'll find a full list of  on the EPA website: www.epa.gov/pesticide-registration/list-n-disinfectants-use-against-sars-cov-2.   Cover your mouth and nose with a mask, tissue or washcloth to avoid spreading germs.  Wash your hands and face often. Use soap and water.  Caregivers in these groups are at risk for severe illness due to COVID-19:  o People 65 years and older  o People who live in a nursing home or long-term care facility  o People with chronic disease (lung, heart, cancer, diabetes, kidney, liver, immunologic)   o People who have a weakened immune system, including those who:   Are in cancer treatment  Take medicine that weakens the immune system, such as corticosteroids  Had a bone marrow or organ transplant  Have an immune deficiency  Have poorly controlled HIV or AIDS  Are obese (body mass index " of 40 or higher)  Smoke regularly   o Caregivers should wear gloves while washing dishes, handling laundry and cleaning bedrooms and bathrooms.  o Use caution when washing and drying laundry: Don't shake dirty laundry, and use the warmest water setting that you can.  o For more tips, go to www.cdc.gov/coronavirus/2019-ncov/downloads/10Things.pdf.      How can I take care of myself?   Get lots of rest. Drink extra fluids (unless a doctor has told you not to)   Take Tylenol (acetaminophen) for fever or pain. If you have liver or kidney problems, ask your family doctor if it's okay to take Tylenol.   Adults can take either:    650 mg (two 325 mg pills) every 4 to 6 hours, or...   1,000 mg (two 500 mg pills) every 8 hours as needed.    Note: Don't take more than 3,000 mg in one day. Acetaminophen is found in many medicines (both prescribed and over-the-counter medicines). Read all labels to be sure you don't take too much.   For children, check the Tylenol bottle for the right dose. The dose is based on the child's age or weight.    If you have other health problems (like cancer, heart failure, an organ transplant or severe kidney disease): Call your specialty clinic if you don't feel better in the next 2 days.       Know when to call 911. Emergency warning signs include:    Trouble breathing or shortness of breath Pain or pressure in the chest that doesn't go away Feeling confused like you haven't felt before, or not being able to wake up Bluish-colored lips or face  Where can I get more information?    [facility] -- About COVID-19: [email or web address]   CDC -- What to Do If You're Sick: www.cdc.gov/coronavirus/2019-ncov/about/steps-when-sick.html   CDC -- Ending Home Isolation: www.cdc.gov/coronavirus/2019-ncov/hcp/disposition-in-home-patients.html   CDC -- Caring for Someone: www.cdc.gov/coronavirus/2019-ncov/if-you-are-sick/care-for-someone.html   [facility] -- Interim Guidance for Hospital Discharge to  Home: www.health.[facility].[place]/diseases/coronavirus/hcp/hospdischarge.pdf   [facility] clinical trials (COVID-19 research studies): clinicalaffairs.[facility]/umn-clinical-trials    Below are the COVID-19 hotlines at the [place] Department of Health ([facility]). Interpreters are available.    For health questions: Call 751-434-0707 or 1-312.925.9464 (7 a.m. to 7 p.m.) For questions about schools and childcare: Call 766-095-4104 or 1-846.535.8540 (7 a.m. to 7 p.m.)       Diagnosis: Contact with and (suspected) exposure to other viral communicable diseases  Diagnosis ICD: Z20.828  Triage Notes: I reviewed the patient's history, verified their identity, and explained the [facility] Visit process.    Per patient shortness of breath is only with coughing and is mild and not worrisome  Patient is refusing in person evaluation. Risks discussed.  Patient feels comfortable watching symptoms at home at this time and prefers observation with close follow up as outpatient.  Patient plans to go to the ER if symptoms worsen or change.   No further questions at this time    Recent dental procedures  Synchronous Triage: phone, status: completed, duration: 368 seconds  Prescription: albuterol sulfate (Proventil HFA) 90 mcg/actuation inhalation HFA aerosol inhaler 1 200 inhalation aerosol with adapter (proventil hfa or equivalent), 0 days supply. Inhale 2 puffs every 4 hours as needed. Refills: 0, Refill as needed: no, Allow substitutions: yes

## 2020-11-02 NOTE — ED AVS SNAPSHOT
[facility]  [address] [address]  Phone: [phone]                                    Phillip Rueda   MRN: [id number]    Department: [facility]   Date of Visit: 11/2/2020           After Visit Summary Signature Page    I have received my discharge instructions, and my questions have been answered. I have discussed any challenges I see with this plan with the nurse or doctor.    ..........................................................................................................................................  Patient/Patient Representative Signature      ..........................................................................................................................................  Patient Representative Print Name and Relationship to Patient    ..................................................               ................................................  Date                                   Time    ..........................................................................................................................................  Reviewed by Signature/Title    ...................................................              ..............................................  Date                                               Time          22EPIC Rev 08/18

## 2020-11-02 NOTE — ED NOTES
Ortho Vitals  Lying: HR 88bpm             /76    Sitting: HR 96              /79    Standing: HR 95                 /77

## 2020-11-03 DIAGNOSIS — Z20.822 SUSPECTED 2019 NOVEL CORONAVIRUS INFECTION: Primary | ICD-10-CM

## 2020-11-03 LAB
INTERPRETATION ECG - MUSE: NORMAL
LABORATORY COMMENT REPORT: NORMAL
SARS-COV-2 RNA SPEC QL NAA+PROBE: NEGATIVE
SPECIMEN SOURCE: NORMAL

## 2020-11-03 NOTE — DISCHARGE INSTRUCTIONS
Home.  Your labs and ekg were done.  Xray did not show any infection.  CT normal of head also.  Normal heart echo also.  Push fluids.  Pain control.  Use the oxycodone only at night for sleep.  See MD this week as planned.  Return if any concerns.    Results for orders placed or performed during the hospital encounter of 11/02/20   XR Chest Port 1 View     Status: None    Narrative    Portable chest    INDICATION: Cough and syncope    COMPARISON: None    FINDINGS: Heart size and shape appear normal. Lungs and pulmonary  vascularity appear normal.      Impression    IMPRESSION: Negative    JACKSON ZHANG MD   CT Head w/o Contrast     Status: None    Narrative    CT HEAD W/O CONTRAST 11/2/2020 2:17 PM    Provided History: headache and near syncope  ICD-10: Headache    Comparison: Brain MRI 2/4/2016.    Technique: Using multidetector thin collimation helical acquisition  technique, axial, coronal and sagittal CT images from the skull base  to the vertex were obtained without intravenous contrast.     Findings:    No intracranial hemorrhage. No mass effect. No midline shift. No  extra-axial fluid collection. The gray to white matter differentiation  of the cerebral hemispheres is preserved. Ventricles are proportionate  to the sulci.. No sulcal effacement..  The basal cisterns are patent.    The visualized paranasal sinuses are clear. The mastoid air cells are  clear. Orbits appear unremarkable. No acute fracture.. Visualized soft  tissues are normal.      Impression    Impression: No acute intracranial pathology.    I have personally reviewed the examination and initial interpretation  and I agree with the findings.    ELICEO CASTRO MD   CBC with platelets differential     Status: Abnormal   Result Value Ref Range    WBC 8.3 4.0 - 11.0 10e9/L    RBC Count 4.18 (L) 4.4 - 5.9 10e12/L    Hemoglobin 12.4 (L) 13.3 - 17.7 g/dL    Hematocrit 38.0 (L) 40.0 - 53.0 %    MCV 91 78 - 100 fl    MCH 29.7 26.5 - 33.0 pg    MCHC  32.6 31.5 - 36.5 g/dL    RDW 12.8 10.0 - 15.0 %    Platelet Count 208 150 - 450 10e9/L    Diff Method Automated Method     % Neutrophils 64.7 %    % Lymphocytes 24.5 %    % Monocytes 8.0 %    % Eosinophils 2.2 %    % Basophils 0.1 %    % Immature Granulocytes 0.5 %    Nucleated RBCs 0 0 /100    Absolute Neutrophil 5.4 1.6 - 8.3 10e9/L    Absolute Lymphocytes 2.0 0.8 - 5.3 10e9/L    Absolute Monocytes 0.7 0.0 - 1.3 10e9/L    Absolute Eosinophils 0.2 0.0 - 0.7 10e9/L    Absolute Basophils 0.0 0.0 - 0.2 10e9/L    Abs Immature Granulocytes 0.0 0 - 0.4 10e9/L    Absolute Nucleated RBC 0.0    Partial thromboplastin time     Status: None   Result Value Ref Range    PTT 28 22 - 37 sec   INR     Status: None   Result Value Ref Range    INR 0.88 0.86 - 1.14   Comprehensive metabolic panel     Status: Abnormal   Result Value Ref Range    Sodium 138 133 - 144 mmol/L    Potassium 3.9 3.4 - 5.3 mmol/L    Chloride 102 94 - 109 mmol/L    Carbon Dioxide 30 20 - 32 mmol/L    Anion Gap 6 3 - 14 mmol/L    Glucose 222 (H) 70 - 99 mg/dL    Urea Nitrogen 13 7 - 30 mg/dL    Creatinine 0.87 0.66 - 1.25 mg/dL    GFR Estimate >90 >60 mL/min/[1.73_m2]    GFR Estimate If Black >90 >60 mL/min/[1.73_m2]    Calcium 9.0 8.5 - 10.1 mg/dL    Bilirubin Total 0.3 0.2 - 1.3 mg/dL    Albumin 3.4 3.4 - 5.0 g/dL    Protein Total 6.9 6.8 - 8.8 g/dL    Alkaline Phosphatase 103 40 - 150 U/L    ALT 37 0 - 70 U/L    AST 22 0 - 45 U/L   Troponin I     Status: None   Result Value Ref Range    Troponin I ES <0.015 0.000 - 0.045 ug/L   Nt probnp inpatient (BNP)     Status: None   Result Value Ref Range    N-Terminal Pro BNP Inpatient 68 0 - 450 pg/mL   TSH     Status: None   Result Value Ref Range    TSH 1.10 0.40 - 4.00 mU/L   Symptomatic COVID-19 Virus (Coronavirus) by PCR     Status: None    Specimen: Nasopharyngeal   Result Value Ref Range    COVID-19 Virus PCR to [facility] - Source Swab     COVID-19 Virus PCR to [facility] - Result       Test received-See reflex to  IDDL test SARS CoV2 (COVID-19) Virus RT-PCR   Glucose by meter     Status: Abnormal   Result Value Ref Range    Glucose 154 (H) 70 - 99 mg/dL   Troponin I     Status: None   Result Value Ref Range    Troponin I ES <0.015 0.000 - 0.045 ug/L   EKG 12-lead, tracing only     Status: None   Result Value Ref Range    Interpretation ECG Click View Image link to view waveform and result    EKG 12 lead     Status: None (Preliminary result)   Result Value Ref Range    Interpretation ECG Click View Image link to view waveform and result    Echocardiogram Complete     Status: None    Narrative    [id number]  [id number]  [id number]  [id number]^BENITEZ^ANTONIO^SOLIS           [facility]  Echocardiography Laboratory  [address] [id number]     Name: CHAR PRAJAPATI  MRN: [id number]  : 1980  Study Date: 2020 04:40 PM  Age: 40 yrs  Gender: Male  Patient Location: [facility]  Reason For Study: Syncope  Ordering Physician: ANTONIO MARQUIS  Performed By: Parveen Haque RDCS     BSA: 1.9 m2  Height: 69 in  Weight: 170 lb  HR: 83  BP: 116/77 mmHg  _____________________________________________________________________________  __        Procedure  Complete Portable Echo Adult.  _____________________________________________________________________________  __        Interpretation Summary  No structural cause identified for syncope.     Global and regional left ventricular function is normal with an EF of 55-60%.  Global right ventricular function is normal. The right ventricle is normal  size.  No significant valvular abnormalities.  There is mild dilation at the level of the sinuses of Valsalva (4.0 cm,  indexed value 2.1 cm/m2).  There is no prior study for direct comparison.  _____________________________________________________________________________  __        Left Ventricle  Global and regional left ventricular function is normal with an EF of 55-60%.  Left ventricular size is normal.  Relative wall thickness is increased  consistent with concentric remodeling. Left ventricular diastolic function is  normal.     Right Ventricle  Global right ventricular function is normal. The right ventricle is normal  size.     Atria  Both atria appear normal.     Mitral Valve  The mitral valve is normal. Trace mitral insufficiency is present.        Aortic Valve  The aortic valve is tricuspid. On Doppler interrogation, there is no  significant stenosis or regurgitation.     Tricuspid Valve  The tricuspid valve is normal. Trace tricuspid insufficiency is present.  Pulmonary artery systolic pressure cannot be assessed.     Pulmonic Valve  The valve leaflets are not well visualized. Trace pulmonic insufficiency is  present.     Vessels  Sinuses of Valsalva 4.0 cm. Ascending aorta 3.0 cm. IVC diameter <2.1 cm  collapsing >50% with sniff suggests a normal RA pressure of 3 mmHg. IVC  diameter and respiratory changes fall into an intermediate range suggesting an  RA pressure of 8 mmHg.     Pericardium  No pericardial effusion is present.        Compared to Previous Study  There is no prior study for direct comparison.  _____________________________________________________________________________  __  MMode/2D Measurements & Calculations     IVSd: 1.2 cm  LVIDd: 4.3 cm  LVIDs: 2.8 cm  LVPWd: 0.99 cm  FS: 34.6 %  LV mass(C)d: 161.9 grams  LV mass(C)dI: 84.0 grams/m2  asc Aorta Diam: 3.0 cm  LVOT diam: 2.7 cm  LVOT area: 5.9 cm2  LA Volume Index (BP): 28.4 ml/m2  RWT: 0.46           Doppler Measurements & Calculations  MV E max ericka: 59.7 cm/sec  MV A max ericka: 55.0 cm/sec  MV E/A: 1.1  MV dec slope: 369.2 cm/sec2  MV dec time: 0.16 sec  PA acc time: 0.09 sec  E/E' av.0  Lateral E/e': 5.5  Medial E/e': 8.4     _____________________________________________________________________________  __           Report approved by: Isabelle Briseno 2020 04:14 PM

## 2020-11-05 ENCOUNTER — OFFICE VISIT (OUTPATIENT)
Dept: UROLOGY | Facility: CLINIC | Age: 40
End: 2020-11-05
Payer: COMMERCIAL

## 2020-11-05 VITALS
HEART RATE: 80 BPM | HEIGHT: 69 IN | BODY MASS INDEX: 25.18 KG/M2 | WEIGHT: 170 LBS | DIASTOLIC BLOOD PRESSURE: 70 MMHG | SYSTOLIC BLOOD PRESSURE: 148 MMHG

## 2020-11-05 DIAGNOSIS — Z30.2 ENCOUNTER FOR STERILIZATION: Primary | ICD-10-CM

## 2020-11-05 PROCEDURE — 55250 REMOVAL OF SPERM DUCT(S): CPT | Performed by: UROLOGY

## 2020-11-05 PROCEDURE — 88302 TISSUE EXAM BY PATHOLOGIST: CPT | Performed by: PATHOLOGY

## 2020-11-05 RX ORDER — LIDOCAINE HYDROCHLORIDE 20 MG/ML
15 INJECTION, SOLUTION INFILTRATION; PERINEURAL ONCE
Status: COMPLETED | OUTPATIENT
Start: 2020-11-05 | End: 2020-11-05

## 2020-11-05 RX ADMIN — LIDOCAINE HYDROCHLORIDE 15 ML: 20 INJECTION, SOLUTION INFILTRATION; PERINEURAL at 14:10

## 2020-11-05 ASSESSMENT — PAIN SCALES - GENERAL: PAINLEVEL: SEVERE PAIN (6)

## 2020-11-05 ASSESSMENT — MIFFLIN-ST. JEOR: SCORE: 1661.49

## 2020-11-05 NOTE — NURSING NOTE
Chief Complaint   Patient presents with     Sterilization     Patient is here for Vasectomy      Prior to the start of the procedure and with procedural staff participation, I verbally confirmed the patient s identity using two indicators, relevant allergies, that the procedure was appropriate and matched the consent or emergent situation, and that the correct equipment/implants were available. Immediately prior to starting the procedure I conducted the Time Out with the procedural staff and re-confirmed the patient s name, procedure, and site/side. (The Joint Commission universal protocol was followed.)  Yes    Sedation (Moderate or Deep): None    Lashawn Weiss LPN

## 2020-11-05 NOTE — PATIENT INSTRUCTIONS
POST VASECTOMY INSTRUCTIONS    1.) If you have any concerns or questions, please contact our office at [phone].     2.) It is okay to take a shower, however, do not soak in water (bath,swimming, hot tub,etc....) until your incision is healed.    3.) You might notice some swelling, mild bruising, and discomfort for several days after your vasectomy. This is to be expected. For at least the next 24 hours, an ice pack should be applied for 20 minutes every hour that you are awake. Ice will help with discomfort and swelling. Do not place directly on the skin.    4.) No intercourse, strenuous activity or exercise for at least 7-10 days, even if you feel fine.    5.) You need to wear good scrotal support while you are healing. We strongly recommend an athletic supporter or a pair of regular briefs that are one size too small. Boxer briefs do not offer enough support.    6.) Tylenol as directed on the bottle is preferred for discomfort. Please avoid any blood thinning products such as ibuprofen and aspirin (Motrin, Advil, Excedrin, Aleve, ect..) for at least the next week.    7.) It is normal to have mild drainage from the incision area for several days. However, please contact our office if you notice: bright red blood that does not stop after three days, increased pain, heat at the incision, red streaks, foul smelling discharge, or if you start to run a fever.     8.) YOU MUST CONTINUE BIRTH CONTROL UNTIL WE CONFIRM YOUR STERILITY.  This process can take up to a year to complete (rare occurrence).     9.) You have been given a form with specimen cup and instructions for your follow up specimen. You will be cleared once we receive ONE negative specimen. If your specimen comes back positive (sperm seen) you will be asked to repeat the test. This does not mean that your vasectomy has failed.

## 2020-11-05 NOTE — LETTER
11/5/2020       RE: Phillip Rueda  [address]  [facility] [address]     Dear Colleague,    Thank you for referring your patient, Phillip Rueda, to the [facility]OLOG[facility] [place] at [facility]. Please see a copy of my visit note below.    OFFICE VASECTOMY OPERATIVE NOTE  [facility]  ([phone]    DATE: 11/05/20  PATIENT: Phillip Rueda    YOB: 1980    Phillip Rueda is a 40 year old male.  He has 2 children and he wishes a vasectomy for birth control.  He has read the brochure and he has shaved himself.  I reviewed the vasectomy procedure with him explaining that it would be done with a local anesthetic given just in the location where the vasectomy would be done.  It would be done through scalpel-less incisions with the removal of segments of the vasa, cauterization of the ends, and burying the ends separate with sutures.      Pt. Understands:  1/1000-1/3000 risk of future pregnancy even with perfectly done vasectomy  -vasectomy is a permanent procedure    -he may cryopreserve sperm if he wishes   -1-5% risk of post-vasectomy pain syndrome   -1-5% risk of complication, primarily infection or bleeding  - he needs to have a semen sample that shows no sperm before getting approval for unprotected intercourse.      Complications such as bleeding, infection, and damage to other tissues in the area were discussed.  I recommended that an ice bag be placed on the scrotum off and on tonight to help reduce pain and swelling.      He was reminded that he was not sterile immediately after the vasectomy that it would take at least 20 ejaculations to empty the vas of any remaining sperm.  He was not to provide a semen sample until after the 20th ejaculation and not before 12 weeks after the vas. He was  to fulfill both of those requirements.   He understands it is his responsibility to find out the results of the vas before proceeding with  intercourse without birth control protection.  Other items discussed were activity afterwards, returning to work, voluntary physical activity,  resuming sexual activity, clothing to wear, bathing, and care of the vas site and expected changes in the site as healing progresses.  After signing the permit, bilateral vasectomy was done as described through scalpel-less incisions.       ANESTHESIA: Local    DETAILS OF PROCEDURE: The risks of the procedure were explained in detail to the patient and informed consent was obtained. The patient was placed supine on the procedure table and the penis and scrotum were prepped and draped in the standard sterile fashion. The right vas deferens was isolated and brought up to the median raphe of the scrotum. 1% lidocaine local anesthesia was used to infiltrate the skin and the spermatic cord. A sharp hemostat was used to make a skin puncture. Adventitial tissues were swept away from the vas. A 1 cm segment of the vas was excised and sent for pathology. The proximal and distal lumina of the vas were cauterized and then each segment was tied off in a knuckling-fashion with a 3-0 chromic suture. Hemostasis was ensured and the segments were released back into the scrotum. Next the left vas was brought up to the same incision and a vasectomy was performed in the similar fashion. At the end of the procedure a single 3-0 chromic suture was placed in the skin.     COMPLICATIONS: None    DISMISSAL INSTRUCTIONS:  - Ice pack to scrotum 15 to 20 minutes each hour awake for 36 to 40 hours.  - No strenuous activity or ejaculation for 14 days.  - No unprotected sexual activity until proven azoospermia on semen samples at 3 months.  - Referred to patient handout for normal postop expectations and indications to contact nurse or physician.    M.D.: Mc Daugherty M.D.

## 2020-11-05 NOTE — PROGRESS NOTES
OFFICE VASECTOMY OPERATIVE NOTE  [facility]  ([phone]    DATE: 11/05/20  PATIENT: Phillip Rueda    YOB: 1980    Phillip Rueda is a 40 year old male.  He has 2 children and he wishes a vasectomy for birth control.  He has read the brochure and he has shaved himself.  I reviewed the vasectomy procedure with him explaining that it would be done with a local anesthetic given just in the location where the vasectomy would be done.  It would be done through scalpel-less incisions with the removal of segments of the vasa, cauterization of the ends, and burying the ends separate with sutures.      Pt. Understands:  1/1000-1/3000 risk of future pregnancy even with perfectly done vasectomy  -vasectomy is a permanent procedure    -he may cryopreserve sperm if he wishes   -1-5% risk of post-vasectomy pain syndrome   -1-5% risk of complication, primarily infection or bleeding  - he needs to have a semen sample that shows no sperm before getting approval for unprotected intercourse.      Complications such as bleeding, infection, and damage to other tissues in the area were discussed.  I recommended that an ice bag be placed on the scrotum off and on tonight to help reduce pain and swelling.      He was reminded that he was not sterile immediately after the vasectomy that it would take at least 20 ejaculations to empty the vas of any remaining sperm.  He was not to provide a semen sample until after the 20th ejaculation and not before 12 weeks after the vas. He was  to fulfill both of those requirements.   He understands it is his responsibility to find out the results of the vas before proceeding with intercourse without birth control protection.  Other items discussed were activity afterwards, returning to work, voluntary physical activity,  resuming sexual activity, clothing to wear, bathing, and care of the vas site and expected changes in the site as healing progresses.  After signing the  permit, bilateral vasectomy was done as described through scalpel-less incisions.       ANESTHESIA: Local    DETAILS OF PROCEDURE: The risks of the procedure were explained in detail to the patient and informed consent was obtained. The patient was placed supine on the procedure table and the penis and scrotum were prepped and draped in the standard sterile fashion. The right vas deferens was isolated and brought up to the median raphe of the scrotum. 1% lidocaine local anesthesia was used to infiltrate the skin and the spermatic cord. A sharp hemostat was used to make a skin puncture. Adventitial tissues were swept away from the vas. A 1 cm segment of the vas was excised and sent for pathology. The proximal and distal lumina of the vas were cauterized and then each segment was tied off in a knuckling-fashion with a 3-0 chromic suture. Hemostasis was ensured and the segments were released back into the scrotum. Next the left vas was brought up to the same incision and a vasectomy was performed in the similar fashion. At the end of the procedure a single 3-0 chromic suture was placed in the skin.     COMPLICATIONS: None    DISMISSAL INSTRUCTIONS:  - Ice pack to scrotum 15 to 20 minutes each hour awake for 36 to 40 hours.  - No strenuous activity or ejaculation for 14 days.  - No unprotected sexual activity until proven azoospermia on semen samples at 3 months.  - Referred to patient handout for normal postop expectations and indications to contact nurse or physician.    M.D.: Mc Daugherty M.D.

## 2020-11-05 NOTE — NURSING NOTE
The following medication was given:     MEDICATION:  Lidocaine 2% Soln  ROUTE: Local Infiltration   SITE: Scrotum  DOSE: 300mg/15ml  LOT #: [id number]  : Yadio  EXPIRATION DATE: 02/24  NDC#: 39996-307-00   Was there drug waste? Yes  Amount of drug waste (mL): 5.  Reason for waste:  As per MD  Multi-dose vial: Yes    Lashawn Weiss LPN  November 5, 2020

## 2020-11-06 ENCOUNTER — VIRTUAL VISIT (OUTPATIENT)
Dept: FAMILY MEDICINE | Facility: CLINIC | Age: 40
End: 2020-11-06
Payer: COMMERCIAL

## 2020-11-06 DIAGNOSIS — E11.42 DIABETIC POLYNEUROPATHY ASSOCIATED WITH TYPE 2 DIABETES MELLITUS (H): Primary | ICD-10-CM

## 2020-11-06 DIAGNOSIS — G54.5 NEURALGIC AMYOTROPHY: ICD-10-CM

## 2020-11-06 PROCEDURE — 99214 OFFICE O/P EST MOD 30 MIN: CPT | Mod: 95 | Performed by: FAMILY MEDICINE

## 2020-11-06 NOTE — PROGRESS NOTES
"Phillip Rueda is a 40 year old male who is being evaluated via a billable video visit.      The patient has been notified of following:     \"This video visit will be conducted via a call between you and your physician/provider. We have found that certain health care needs can be provided without the need for an in-person physical exam.  This service lets us provide the care you need with a video conversation.  If a prescription is necessary we can send it directly to your pharmacy.  If lab work is needed we can place an order for that and you can then stop by our lab to have the test done at a later time.    Video visits are billed at different rates depending on your insurance coverage.  Please reach out to your insurance provider with any questions.    If during the course of the call the physician/provider feels a video visit is not appropriate, you will not be charged for this service.\"    Patient has given verbal consent for Video visit? Yes  How would you like to obtain your AVS? MyChart  If you are dropped from the video visit, the video invite should be resent to: Text to cell phone: [phone]  Will anyone else be joining your video visit? No     Subjective     Phillip Rueda is a 40 year old male who presents today via video visit for the following health issues:    HPI     ED/UC Followup:    Facility:  [facility]  Date of visit: 11/2/2020  Reason for visit: right side neck   Current Status: neck radiating to right arm. Sharp, shooting, ache pain. Deep pain.      Video Start Time: 8:47 AM    13 days ago - sudden episode of right sided neck pain and goes to right arm. Wife brought him to hospital. Had steroid pack on hand. Started taking it and went to hospital. Was given another steroid in hospital and pain medication.   Since then tylenol, ibuprofen and muscle relaxar and it was barely manageable.   Developed cough and covid testing was done. It was negative.   Followed up with pain specialist - gabapentin, " "suboxone and nortriptyline. Was given 2 weeks of oxycodone and will go back on suboxone.     Yesterday vasectomy and it went well.     No flare up of neuropathic pain for a while. Seeing neurologist at [facility]  Dr Waite nohemi on and doing meal correction. Using victoza. Was not needing meal correction overall.     Review of Systems   Constitutional, HEENT, cardiovascular, pulmonary, gi and gu systems are negative, except as otherwise noted.      Objective    Vitals - Patient Reported  Weight (Patient Reported): 81.6 kg (180 lb)  Height (Patient Reported): 175.3 cm (5' 9\")  BMI (Based on Pt Reported Ht/Wt): 26.58        Physical Exam     GENERAL: Healthy, alert and no distress  EYES: Eyes grossly normal to inspection.  No discharge or erythema, or obvious scleral/conjunctival abnormalities.  RESP: No audible wheeze, cough, or visible cyanosis.  No visible retractions or increased work of breathing.    SKIN: Visible skin clear. No significant rash, abnormal pigmentation or lesions.  NEURO: Cranial nerves grossly intact.  Mentation and speech appropriate for age.  PSYCH: Mentation appears normal, affect normal/bright, judgement and insight intact, normal speech and appearance well-groomed.               Assessment & Plan     (E11.42) Diabetic polyneuropathy associated with type 2 diabetes mellitus (H)  (primary encounter diagnosis)  Comment: overall doing well without insulin. Needed to use meal correction with steroid burst. Recheck A1c in 3 months.   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 Seeing neuro for follow up. Seeing pain specialist and reviewed ER notes. Steroid can worsen above. He had no flare for a while and now having another flare.   Plan: rx as per neurology and pain clinic.     Km Dos Santos MD, MD  [facility]      Video-Visit Details    Type of service:  Video Visit    Video End Time:9:01 AM    Originating Location " (pt. Location): Home    Distant Location (provider location):  home    Platform used for Video Visit: Dequan

## 2020-11-09 LAB — COPATH REPORT: NORMAL

## 2020-11-16 ENCOUNTER — OFFICE VISIT (OUTPATIENT)
Dept: PODIATRY | Facility: CLINIC | Age: 40
End: 2020-11-16
Payer: COMMERCIAL

## 2020-11-16 VITALS
BODY MASS INDEX: 25.92 KG/M2 | DIASTOLIC BLOOD PRESSURE: 88 MMHG | WEIGHT: 175 LBS | SYSTOLIC BLOOD PRESSURE: 110 MMHG | HEIGHT: 69 IN

## 2020-11-16 DIAGNOSIS — E11.9 ENCOUNTER FOR DIABETIC FOOT EXAM (H): Primary | ICD-10-CM

## 2020-11-16 DIAGNOSIS — L60.3 ONYCHODYSTROPHY: ICD-10-CM

## 2020-11-16 DIAGNOSIS — B35.1 ONYCHOMYCOSIS: ICD-10-CM

## 2020-11-16 DIAGNOSIS — L85.3 XEROSIS OF SKIN: ICD-10-CM

## 2020-11-16 DIAGNOSIS — E11.9 TYPE 2 DIABETES MELLITUS WITHOUT COMPLICATION, WITHOUT LONG-TERM CURRENT USE OF INSULIN (H): ICD-10-CM

## 2020-11-16 DIAGNOSIS — B35.3 TINEA PEDIS OF BOTH FEET: ICD-10-CM

## 2020-11-16 PROCEDURE — 99213 OFFICE O/P EST LOW 20 MIN: CPT | Performed by: PODIATRIST

## 2020-11-16 RX ORDER — AMMONIUM LACTATE 12 G/100G
CREAM TOPICAL 2 TIMES DAILY PRN
Qty: 385 G | Refills: 3 | Status: SHIPPED | OUTPATIENT
Start: 2020-11-16 | End: 2022-01-12

## 2020-11-16 ASSESSMENT — MIFFLIN-ST. JEOR: SCORE: 1684.17

## 2020-11-16 NOTE — PATIENT INSTRUCTIONS
Thank you for choosing [facility]!    DR. BLUNT'S CLINIC LOCATIONS:     [place] SET UP SURGERY: [phone]   [address]   APPOINTMENTS: [phone]   [place] [address] BILLING Q's: [phone]  [phone] TRIAGE RN:  [phone]       [place]    [address]    [place], [place] [id number] [phone]  [phone]        Follow up: As needed  Diagnosis: Diabetes, Athlete's foot      Please read through the following handouts and if you have any questions, please feel free to call us or send a Fitsistant message!    ATHLETE'S FOOT (TINEA PEDIS)  It is a rash that is caused by a fungus (most commonly Dermatophytes) in the outer layer of skin on the foot or in the nails. It can occur between the toes or on the instep and heel areas of the feet. Fungus will grow in warm and moist environments. The fungus that causes athlete's foot is contagious and you can get it from touching someone who has it of walking around bare foot around swimming pools, gyms, saunas, communal baths and showers, fitness centers or locker rooms.     SYMPTOMS  The symptoms of athlete's foot are numerous and include; itching, burning or stinging between the toes or on the soles of the feet, blisters, cracked and peeling skin, excessive dryness or excessive scaling on the bottom or sides of the feet, redness and softness with breaking down of the skin, especially between the toes, foot odor and thick, crumbly nails. Older males or people who live in warm humid environments are more prone to get it but it can develop at any age.    TREATMENT OPTIONS  Typically it can be treated with antifungal creams, lotions, ointments, sprays, or gels.  Many are available over the counter, some are prescription. It is important that you use them long enough, typically 4 weeks, to clear the rash. If an infection is particularly bad, your provider may prescribe oral  medication. It is important that you follow the directions for any medication carefully to prevent recurrence of the rash.    HOME TREATMENT  1.  Keep feet clean and dry  2.  Dry between your toes any time your feet become wet  3.  Wear socks that are made of cotton, wool, or fibers designed to wick moisture away  from skin. Nylon, lycra, and spandex tend to trap moisture.  4.  If you have blistering, you may soak your feet in Burow's solution (aluminum acetate is active ingredient. Domeboro is a brand sold at [employer]). This is available over the counter.  5.  Treat shows with antifungal powder  6.  Tea Tree oil may help reduce symptoms but does not cure the rash.    PREVENTION  1.  Change socks or stockings regularly.  2.  Use talcum powder on your feet if they sweat a lot.  3.  Do not borrow shoes.  4.  Let shoes dry out for 24 hours before wearing them again.  5.  Treat shoes with fungal powder  6.  Wear shoes that are well ventilated such as leather shoes or sandals.  Avoid shoes  made of synthetic materials such as vinyl or rubber.  7.  Wear water proof sandals when you are in public areas such as locker rooms, pools, communal baths, showers, saunas, and fitness centers.    FYI: Call or seek medical attention IMMEDIATELY if:  - You develop a fever over 100.4F for more than 24 hrs.  - There is a discharge of pus from infected areas.  - Red streaks develop from the affected areas  - Increase in redness, warmth, pain, swelling, or tenderness in the affected areas.    ROUTINE FOOT CARE (NAIL TRIMMING / CALLUSES)    Go to afcna.org ([employer] Nurses Association) and search for providers near you.  Otherwise, this is a list we have gathered of  recommended locations/providers in [place].    [facility]   [phone]   [facility]  [phone]  [email or web address]   [facility]  [phone]  [place] + [place] [address] [place] [facility]  [phone]  michael.   Chaparrita Key, DPM  [address],  [place] [address]  [phone] Amador Matos, DPM  [address] [facility], [place] [address]   [phone]   [facility]  [phone] [address], [place] [address]  [facility]  Dr. Ethan Watkins  [phone]  [place], [place]   [facility]  [phone]  [place] [facility] [place]  (does not take BCBS) FYI:  *Some providers accept insurance while others are out of pocket. Please contact them for details*       NAIL FUNGUS / ONYCHOMYCOSIS   Nail fungus is not a hygiene problem and will likely not lead to significant medical problems. The nails may get thick causing pain and possibly local skin infection. Treatments include debridement (trimming), oral antifungals, topical antifungals and complete removal of the nail. Most fungal nails are not treated.     Topicals such as tea tree oil can be helpful for surface fungus and may, at best, limit progression. Over the counter creams (such as Lamisil) can also be used however, their effectiveness is also quite low.  Topical treatment with Pen lac is expensive and often not covered by insurance. Pen lac has an approximate 8% success rate. Topical therapy recommendations is to apply twice a day for at least 3-4 months as it takes 9 months for new nail to grow out.     Experts suggest soaking your feet for 15 to 20 minutes in a mixture of 1 cup vinegar to 4 cups warm water. Be sure to rinse well and pat your feet dry when you're done. You can soak your feet like this daily. But if your skin becomes irritated, try soaking only two to three times a week. Vicks VapoRub, as with vinegar, there have been no controlled clinical trials to assess the effectiveness of Vicks VapoRub on nail fungus, but there have been numerous anecdotal reports that it works. There's no consensus on how often to apply this product, so check with your doctor before using it on your nails.      Oral therapies include Sporanox and Lamisil. Oral  "therapies are also expensive and not very effective. Side effects such as liver disease are the main concern. Return of fungus is common even if the treatment worked.      Other Tips:  - Penlac nail medication apply daily x 4 months; remove old polish first day of each week  - Antifungal cream/powder (Zeasorb) - apply daily to feet and shoes x 2 months  - Clean shoes with Lysol or in washing machine every few weeks  - Rotate shoe gear; give them 24 hours to dry out between days wearing them  - Clean pair of socks in morning, clean pair in afternoon if your feet sweat  - Shower shoes used in public showers/pools             DIABETES AND YOUR FEET  Diabetes can result in several problems in the feet including ulcers (open sores) and amputations. Two of the most important reasons why people develop foot problems when they have diabetes is : 1. Neuropathy (loss of feeling)  2. Vascular disease (loss or decrease of blood flow).    Neuropathy is a term used to describe a loss of nerve function.  Patients with diabetes are at risk of developing neuropathy if their sugars continue to run high and are above the normal value. One theory for neuropathy is that the \"extra\" sugar in the body enters the nerves and is broken down. These by-products build up in the nerve causing it to swell and impairing nerve function. Often times, this can be prevented by controlling your sugars, dieting and exercise.    When a person develops neuropathy, they usually begin to feel numbness or tingling in their feet and sometime in their legs.  Other symptoms may include painful burning or hot feet, tingling or feeling like insects or ants are crawling on your feet or legs.  If the diabetes is sever and the sugars run high for long periods of time, neuropathy can also occur in the hands.    Vascular disease  is a term used to describe a loss or decrease in circulation (blood flow). There is a problem in getting blood and oxygen to areas that need " it. Similar to neuropathy, sugars can build up in the walls of the arteries (blood vessels) and cause them to become swollen, thickened and hardened. This decreases the amount of blood that can go to an area that needs it. Though this is common in the legs of diabetic patients, it can also affect other arteries (blood vessels) in the body such as in the heart and eyes.    In the legs, vascular disease usually results in cramping. Patients who develop leg cramps after walking the same distance every time (i.e. One block, half a mile, ect.) need to let their doctors know so that their circulation may be checked. Cramps causing severe pain in the feet and/or legs while sleeping and the cramps go away when you stand or hang your legs off the side of the bed, may also be a sign of poor blood circulation.  Occasional cramping in cold weather or on rare occasions with activity may not be due to poor circulation, but you should inform your doctor.    PREVENTION OF THESE DISEASES  The key to prevention is good blood sugar control. Poor blood sugar control is a big reason many of these problems start. Physical activity (exercise) is a very good way to help decrease your blood sugars. Exercise can lower your blood sugar, blood pressure, and cholesterol. It also reduces your risk for heart disease and stroke, relieves stress, and strengthens your heart, muscles and bones.  In addition, regular activity helps insulin work better, improves your blood circulation, and keeps your joints flexible. If you're trying to lose weight, a combination of exercise and wise food choices can help you reach your target weight and maintain it.      PAIN MANAGEMENT  1.Blood Sugar Control - Most important  2. Medications such as:  Amytriptylline, duloxetine, gabapentin, lyrica, tramadol  3. Nutritional therapy:  Vitamin B6 (100mg daily), Vitamin B12 (75mcg daily), Vitamin D 2000 IU daily), Alpha-Lipoic Acid (600-1800mg daily), Acetyl-L-Carnitine  (500-1000mg TID, L-methyl folate (1500mcg daily)    ** Metformin can block Vitamin B6 and B12 so it is important to supplement**    FOOT CARE RECOMMENDATIONS   1. Wash your feet with lukewarm water and a mild soap and then dry them thoroughly, especially between the toes.     2. Examine your feet daily looking for cuts, corns, blisters, cracks, ect, especially after wearing new shoes. Make sure to look between your toes. If you cannot see the bottom of your feet, set a mirror on the floor and hold your foot over it, or ask a spouse, friend or family member to examine your feet for you. Contact your doctor immediately if new problems are noted or if sores are not healing.     3. Immediately apply moisturizer to the tops and bottoms of your feet, avoiding areas between the toes. Hand lotion (Intesive Care, Tiara, Eucerin, Neutrogena, Curel, ect) is sufficient unless your doctor prescribes a medicated lotion. Apply sunscreen to your feet when going swimming outside.     4. Use clean comfortable shoes, wear white socks (if you have any bleeding or drainage, you will see it on white socks). Socks should not have thick seams or cut off the circulation around the leg. Break in new shoes slowly and rotate with older shoes until broken in. Check the inside of your shoes with your hand to look for areas of irritation or objects that may have fallen into your shoes.       5. Keep slippers by the side of your bed for use during the night.     6.  Shoes should be fitted by a professional and should not cause areas of irritation.  Check your feet regularly when wearing a new pair of shoes and replace them as needed.     7.  Talk to your doctor about proper exercise. Exercise and stretching stimulate blood flow to your feet and maintain proper glucose levels.     8.  Monitor your blood glucose level as instructed by your doctor. Notify your doctor immediately if your blood sugar is abnormally high or low.    9. Cut your nails  straight across, but then gently round any sharp edges with a cardboard nail file. If you have neuropathy, peripheral vascular disease or cannot see that well to trim your own toenails contact [facility] ([phone]) or [facility] ([phone]).      THINGS TO AVOID DOING   1.  Do not soak your feet if you have an open sore. Use only lukewarm water and always check the temperature with your hand as hot water can easily burn your feet.       2.  Never use a hot water bottle or heating pad on your feet. Also do not apply cold compresses to your feet. With decreased sensation, you could burn or freeze your feet.       3.  Do not apply any of these to your feet:    -  Over the counter medicine for corns or warts    -  Harsh chemicals like boric acid    -  Do not self-treat corns, cuts, blisters or infections. Always consult your doctor.       4.  Do not wear sandals, slippers or walk barefoot, especially on hot sand or concrete or other harsh surfaces.     5.  If you smoke, stop!!!

## 2020-11-16 NOTE — PROGRESS NOTES
PATIENT HISTORY:  Phillip Rueda is a 40 year old male who presents to clinic for DM foot check.  Issues since 2010.  No pain.  Some medial L arch area numbness per pt.  Hx of neuralgic amyotrophy.  Pt wondering about DM neuropathy.  Reports dry skin, thickened nails.  Reports prior nail injuries.  No recent treatments reported.      Review of Systems:  Patient denies fever, chills, rash, wound, stiffness, limping, weakness, heart burn, blood in stool, chest pain with activity, calf pain when walking, shortness of breath with activity, chronic cough, easy bleeding/bruising, swelling of ankles, excessive thirst, fatigue.  Patient admits to numbness, depression, anxiety.     PAST MEDICAL HISTORY:   Past Medical History:   Diagnosis Date     Anxiety      Depressive disorder      Diabetes mellitus (H)      Hypertension      Liver disease      Neuralgic amyotrophy      Pain disorder 12/8/2015     Parsonage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      Prostate infection      Seizures (H)      Staph infection         PAST SURGICAL HISTORY:   Past Surgical History:   Procedure Laterality Date     ORTHOPEDIC SURGERY          MEDICATIONS:   Current Outpatient Medications:      ammonium lactate (LAC-HYDRIN) 12 % external cream, Apply topically 2 times daily as needed for dry skin, Disp: 385 g, Rfl: 3     atorvastatin (LIPITOR) 20 MG tablet, TAKE ONE TABLET BY MOUTH ONCE DAILY, Disp: 90 tablet, Rfl: 3     blood glucose (NO BRAND SPECIFIED) lancets standard, Use to test blood sugar 2 times daily or as directed., Disp: 100 each, Rfl: 1     blood glucose (NO BRAND SPECIFIED) test strip, Use to test blood sugar 2 times daily or as directed., Disp: 100 each, Rfl: 1     blood glucose monitoring (NO BRAND SPECIFIED) meter device kit, Use to test blood sugar 2 times daily or as directed., Disp: 1 kit, Rfl: 0     Buprenorphine HCl-Naloxone HCl (SUBOXONE) 4-1 MG film, Place 1 Film under the tongue 3 times daily Taking 2-3 times a day and weening off, Disp: ,  Rfl:      continuous blood glucose monitoring (FREESTYLE JHON) sensor, For use with Freestyle Jhon Flash  for continuous monitioring of blood glucose levels. Replace sensor every 14 days., Disp: 6 each, Rfl: 3     cyclobenzaprine (FLEXERIL) 5 MG tablet, Take 2 tablets (10 mg) by mouth 3 times daily as needed for muscle spasms, Disp: 20 tablet, Rfl: 0     diazepam (VALIUM) 5 MG tablet, Take 1 tablet (5 mg) 3 times daily as needed., Disp: 60 tablet, Rfl:      EPINEPHrine (EPIPEN) 0.3 MG/0.3ML injection, Inject 0.3 mLs (0.3 mg) into the muscle once as needed for anaphylaxis, Disp: 1 each, Rfl: 2     gabapentin (NEURONTIN) 600 MG tablet, Take 0.5 tablets (300 mg) by mouth 2 times daily, Disp: , Rfl:      ibuprofen (ADVIL,MOTRIN) 200 MG tablet, Take 1-2 tablets by mouth every 6 hours as needed., Disp: , Rfl:      insulin aspart (NOVOLOG PEN) 100 UNIT/ML pen, 1 unit per every 40 glu above 140, Disp: 3 mL, Rfl: 0     insulin aspart (NOVOLOG VIAL) 100 UNITS/ML vial, One unit per glu 40 for every glu above 140, Disp: 1 vial, Rfl: 0     insulin pen needle 31G X 6 MM, Use 2 daily or as directed., Disp: 100 each, Rfl: 11     metFORMIN (GLUCOPHAGE-XR) 500 MG 24 hr tablet, TAKE TWO TABLETS BY MOUTH TWICE A DAY WITH MEALS, Disp: 360 tablet, Rfl: 1     nicotine (NICORETTE) 4 MG lozenge, PLACE 1 LOZENGE INSIDE THE CHEEK AS NEEDED FOR SMOKING CESSATION, Disp: 144 lozenge, Rfl: 0     nortriptyline (PAMELOR) 25 MG capsule, TAKE 2 CAPSULES(50 MG) BY MOUTH AT BEDTIME, Disp: 60 capsule, Rfl: 1     oxyCODONE (ROXICODONE) 5 MG tablet, Take 1 tablet (5 mg) by mouth every 6 hours as needed for severe pain (sleep), Disp: 6 tablet, Rfl: 0     sildenafil (VIAGRA) 100 MG tablet, Take 1/2 to 1 pill as needed for erectile dysfunction., Disp: 5 tablet, Rfl: 5     venlafaxine (EFFEXOR-ER) 150 MG TB24, Take 1 tablet by mouth daily (with breakfast)., Disp: 30 each, Rfl: 0     VICTOZA PEN 18 MG/3ML soln, INJCT 1.8MG UNDER THE SKIN ONCE DAILY,  Disp: 27 mL, Rfl: 0     ALLERGIES:    Allergies   Allergen Reactions     Bees Swelling     Bupropion Hcl Other (See Comments)     seizure     Lisinopril Cough     Penicillins Other (See Comments)     Unable to close mouth        SOCIAL HISTORY:   Social History     Socioeconomic History     Marital status:      Spouse name: Not on file     Number of children: Not on file     Years of education: Not on file     Highest education level: Not on file   Occupational History     Not on file   Social Needs     Financial resource strain: Not on file     Food insecurity     Worry: Not on file     Inability: Not on file     Transportation needs     Medical: Not on file     Non-medical: Not on file   Tobacco Use     Smoking status: Former Smoker     Packs/day: 0.25     Years: 15.00     Pack years: 3.75     Types: Cigarettes, Dip, chew, snus or snuff     Start date: 1996     Quit date: 2013     Years since quittin.3     Smokeless tobacco: Former User   Substance and Sexual Activity     Alcohol use: No     Alcohol/week: 0.0 standard drinks     Comment: 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  Drink 1.75 every 2 days     Drug use: No     Sexual activity: Yes     Partners: Female     Birth control/protection: None     Comment: Trying to get my wife pregnant   Lifestyle     Physical activity     Days per week: Not on file     Minutes per session: Not on file     Stress: Not on file   Relationships     Social connections     Talks on phone: Not on file     Gets together: Not on file     Attends Taoist service: Not on file     Active member of club or organization: Not on file     Attends meetings of clubs or organizations: Not on file     Relationship status: Not on file     Intimate partner violence     Fear of current or ex partner: Not on file     Emotionally abused: Not on file     Physically abused: Not on file     Forced sexual activity: Not on file   Other Topics Concern     Parent/sibling w/ CABG, MI or  "angioplasty before 65F 55M? Yes     Comment: My dads dad  of heart issues in his 40's   Social History Narrative     Not on file        FAMILY HISTORY:   Family History   Problem Relation Age of Onset     Depression Mother      Anxiety Disorder Mother      Substance Abuse Maternal Grandfather      Anxiety Disorder Brother      Glaucoma No family hx of      Macular Degeneration No family hx of         EXAM:Vitals: /88   Ht 1.753 m (5' 9\")   Wt 79.4 kg (175 lb)   BMI 25.84 kg/m    BMI= Body mass index is 25.84 kg/m .    General appearance: Patient is alert and fully cooperative with history & exam.  No sign of distress is noted during the visit.     Psychiatric: Affect is pleasant & appropriate.  Patient appears motivated to improve health.     Respiratory: Breathing is regular & unlabored while sitting.     HEENT: Hearing is intact to spoken word.  Speech is clear.  No gross evidence of visual impairment that would impact ambulation.     Dermatologic: Xerosis of skin of distal 1st and 2nd toes, plantar forefoot b/l.  B/l toenails are thickened, dystrophic, discolored to varying degrees, moreso hallux nails.  Mild maceration and skin irritation between 4th and 5th toes b/l.  No paronychia or evidence of bacterial soft tissue infection is noted.     Vascular: DP & PT pulses are intact & regular bilaterally.  No significant edema or varicosities noted.  CFT and skin temperature are normal to both lower extremities.     Neurologic: Lower extremity sensation is intact to monofilament exam.  No evidence of weakness or contracture in the lower extremities.      Musculoskeletal: Patient is ambulatory without assistive device or brace.  No gross ankle deformity noted.  No foot or ankle joint effusion is noted.     ASSESSMENT:   Encounter for DM foot exam  DM II  Tinea pedis  Onychodystrophy  Xerosis of skin     PLAN:  Reviewed patient's chart in epic.  Discussed condition and treatment options including pros and " cons.    Discussed condition.  Discussed diabetic foot care and prevention.   Discussed risk of ulceration, infection, amputation related to neuropathy.  I recommended a foot check at least once a year.  No barefoot walking.  Check feet daily.  I encouraged proper diabetes management including diet, blood sugar control.    Tinactin powder advised for tinea pedis.  Dry well between toes.  Monitor for wounds.    Discussed causes of nail fungus.  Discussed treatment options with patient and explained that there isn't one treatment that is 100% effective.  Debridement is an option.  Discussed oral lamisil which is the most effective at about 70% but can have liver effects.  Discussed over the counter antifungal creams.  Explained that these are less effective and need to be applied twice a day for about 6 months.  Also talked about prescription topicals, which have similar efficacy.  Also discussed that if there was damage to the nail and the nail is now dystrophic that none of the above is going to change the nail.  Discussed that if it becomes painful, we can consider nail removal.  Pt will monitor.    Lachydrin prescribed for xerosis of skin.  Advised not to put between toes.    Pt deferred DM shoes and inserts, which were offered.    F/u prn.    Ulisses Triana DPM, FACFAS         room air

## 2020-11-16 NOTE — LETTER
11/16/2020         RE: Phillip Rueda  [address]  [facility] [address]        Dear Colleague,    Thank you for referring your patient, Phillip Rueda, to the [facility]. Please see a copy of my visit note below.    PATIENT HISTORY:  Phillip Rueda is a 40 year old male who presents to clinic for DM foot check.  Issues since 2010.  No pain.  Some medial L arch area numbness per pt.  Hx of neuralgic amyotrophy.  Pt wondering about DM neuropathy.  Reports dry skin, thickened nails.  Reports prior nail injuries.  No recent treatments reported.      Review of Systems:  Patient denies fever, chills, rash, wound, stiffness, limping, weakness, heart burn, blood in stool, chest pain with activity, calf pain when walking, shortness of breath with activity, chronic cough, easy bleeding/bruising, swelling of ankles, excessive thirst, fatigue.  Patient admits to numbness, depression, anxiety.     PAST MEDICAL HISTORY:   Past Medical History:   Diagnosis Date     Anxiety      Depressive disorder      Diabetes mellitus (H)      Hypertension      Liver disease      Neuralgic amyotrophy      Pain disorder 12/8/2015     Parsonage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      Prostate infection      Seizures (H)      Staph infection         PAST SURGICAL HISTORY:   Past Surgical History:   Procedure Laterality Date     ORTHOPEDIC SURGERY          MEDICATIONS:   Current Outpatient Medications:      ammonium lactate (LAC-HYDRIN) 12 % external cream, Apply topically 2 times daily as needed for dry skin, Disp: 385 g, Rfl: 3     atorvastatin (LIPITOR) 20 MG tablet, TAKE ONE TABLET BY MOUTH ONCE DAILY, Disp: 90 tablet, Rfl: 3     blood glucose (NO BRAND SPECIFIED) lancets standard, Use to test blood sugar 2 times daily or as directed., Disp: 100 each, Rfl: 1     blood glucose (NO BRAND SPECIFIED) test strip, Use to test blood sugar 2 times daily or as directed., Disp: 100 each, Rfl: 1     blood glucose monitoring (NO BRAND  SPECIFIED) meter device kit, Use to test blood sugar 2 times daily or as directed., Disp: 1 kit, Rfl: 0     Buprenorphine HCl-Naloxone HCl (SUBOXONE) 4-1 MG film, Place 1 Film under the tongue 3 times daily Taking 2-3 times a day and weening off, Disp: , Rfl:      continuous blood glucose monitoring (FREESTYLE JHON) sensor, For use with Freestyle Jhon Flash  for continuous monitioring of blood glucose levels. Replace sensor every 14 days., Disp: 6 each, Rfl: 3     cyclobenzaprine (FLEXERIL) 5 MG tablet, Take 2 tablets (10 mg) by mouth 3 times daily as needed for muscle spasms, Disp: 20 tablet, Rfl: 0     diazepam (VALIUM) 5 MG tablet, Take 1 tablet (5 mg) 3 times daily as needed., Disp: 60 tablet, Rfl:      EPINEPHrine (EPIPEN) 0.3 MG/0.3ML injection, Inject 0.3 mLs (0.3 mg) into the muscle once as needed for anaphylaxis, Disp: 1 each, Rfl: 2     gabapentin (NEURONTIN) 600 MG tablet, Take 0.5 tablets (300 mg) by mouth 2 times daily, Disp: , Rfl:      ibuprofen (ADVIL,MOTRIN) 200 MG tablet, Take 1-2 tablets by mouth every 6 hours as needed., Disp: , Rfl:      insulin aspart (NOVOLOG PEN) 100 UNIT/ML pen, 1 unit per every 40 glu above 140, Disp: 3 mL, Rfl: 0     insulin aspart (NOVOLOG VIAL) 100 UNITS/ML vial, One unit per glu 40 for every glu above 140, Disp: 1 vial, Rfl: 0     insulin pen needle 31G X 6 MM, Use 2 daily or as directed., Disp: 100 each, Rfl: 11     metFORMIN (GLUCOPHAGE-XR) 500 MG 24 hr tablet, TAKE TWO TABLETS BY MOUTH TWICE A DAY WITH MEALS, Disp: 360 tablet, Rfl: 1     nicotine (NICORETTE) 4 MG lozenge, PLACE 1 LOZENGE INSIDE THE CHEEK AS NEEDED FOR SMOKING CESSATION, Disp: 144 lozenge, Rfl: 0     nortriptyline (PAMELOR) 25 MG capsule, TAKE 2 CAPSULES(50 MG) BY MOUTH AT BEDTIME, Disp: 60 capsule, Rfl: 1     oxyCODONE (ROXICODONE) 5 MG tablet, Take 1 tablet (5 mg) by mouth every 6 hours as needed for severe pain (sleep), Disp: 6 tablet, Rfl: 0     sildenafil (VIAGRA) 100 MG tablet, Take  1/2 to 1 pill as needed for erectile dysfunction., Disp: 5 tablet, Rfl: 5     venlafaxine (EFFEXOR-ER) 150 MG TB24, Take 1 tablet by mouth daily (with breakfast)., Disp: 30 each, Rfl: 0     VICTOZA PEN 18 MG/3ML soln, INJCT 1.8MG UNDER THE SKIN ONCE DAILY, Disp: 27 mL, Rfl: 0     ALLERGIES:    Allergies   Allergen Reactions     Bees Swelling     Bupropion Hcl Other (See Comments)     seizure     Lisinopril Cough     Penicillins Other (See Comments)     Unable to close mouth        SOCIAL HISTORY:   Social History     Socioeconomic History     Marital status:      Spouse name: Not on file     Number of children: Not on file     Years of education: Not on file     Highest education level: Not on file   Occupational History     Not on file   Social Needs     Financial resource strain: Not on file     Food insecurity     Worry: Not on file     Inability: Not on file     Transportation needs     Medical: Not on file     Non-medical: Not on file   Tobacco Use     Smoking status: Former Smoker     Packs/day: 0.25     Years: 15.00     Pack years: 3.75     Types: Cigarettes, Dip, chew, snus or snuff     Start date: 1996     Quit date: 2013     Years since quittin.3     Smokeless tobacco: Former User   Substance and Sexual Activity     Alcohol use: No     Alcohol/week: 0.0 standard drinks     Comment: 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  Drink 1.75 every 2 days     Drug use: No     Sexual activity: Yes     Partners: Female     Birth control/protection: None     Comment: Trying to get my wife pregnant   Lifestyle     Physical activity     Days per week: Not on file     Minutes per session: Not on file     Stress: Not on file   Relationships     Social connections     Talks on phone: Not on file     Gets together: Not on file     Attends Spiritism service: Not on file     Active member of club or organization: Not on file     Attends meetings of clubs or organizations: Not on file     Relationship status: Not on  "file     Intimate partner violence     Fear of current or ex partner: Not on file     Emotionally abused: Not on file     Physically abused: Not on file     Forced sexual activity: Not on file   Other Topics Concern     Parent/sibling w/ CABG, MI or angioplasty before 65F 55M? Yes     Comment: My dads dad  of heart issues in his 40's   Social History Narrative     Not on file        FAMILY HISTORY:   Family History   Problem Relation Age of Onset     Depression Mother      Anxiety Disorder Mother      Substance Abuse Maternal Grandfather      Anxiety Disorder Brother      Glaucoma No family hx of      Macular Degeneration No family hx of         EXAM:Vitals: /88   Ht 1.753 m (5' 9\")   Wt 79.4 kg (175 lb)   BMI 25.84 kg/m    BMI= Body mass index is 25.84 kg/m .    General appearance: Patient is alert and fully cooperative with history & exam.  No sign of distress is noted during the visit.     Psychiatric: Affect is pleasant & appropriate.  Patient appears motivated to improve health.     Respiratory: Breathing is regular & unlabored while sitting.     HEENT: Hearing is intact to spoken word.  Speech is clear.  No gross evidence of visual impairment that would impact ambulation.     Dermatologic: Xerosis of skin of distal 1st and 2nd toes, plantar forefoot b/l.  B/l toenails are thickened, dystrophic, discolored to varying degrees, moreso hallux nails.  Mild maceration and skin irritation between 4th and 5th toes b/l.  No paronychia or evidence of bacterial soft tissue infection is noted.     Vascular: DP & PT pulses are intact & regular bilaterally.  No significant edema or varicosities noted.  CFT and skin temperature are normal to both lower extremities.     Neurologic: Lower extremity sensation is intact to monofilament exam.  No evidence of weakness or contracture in the lower extremities.      Musculoskeletal: Patient is ambulatory without assistive device or brace.  No gross ankle deformity noted. "  No foot or ankle joint effusion is noted.     ASSESSMENT:   Encounter for DM foot exam  DM II  Tinea pedis  Onychodystrophy  Xerosis of skin     PLAN:  Reviewed patient's chart in epic.  Discussed condition and treatment options including pros and cons.    Discussed condition.  Discussed diabetic foot care and prevention.   Discussed risk of ulceration, infection, amputation related to neuropathy.  I recommended a foot check at least once a year.  No barefoot walking.  Check feet daily.  I encouraged proper diabetes management including diet, blood sugar control.    Tinactin powder advised for tinea pedis.  Dry well between toes.  Monitor for wounds.    Discussed causes of nail fungus.  Discussed treatment options with patient and explained that there isn't one treatment that is 100% effective.  Debridement is an option.  Discussed oral lamisil which is the most effective at about 70% but can have liver effects.  Discussed over the counter antifungal creams.  Explained that these are less effective and need to be applied twice a day for about 6 months.  Also talked about prescription topicals, which have similar efficacy.  Also discussed that if there was damage to the nail and the nail is now dystrophic that none of the above is going to change the nail.  Discussed that if it becomes painful, we can consider nail removal.  Pt will monitor.    Lachydrin prescribed for xerosis of skin.  Advised not to put between toes.    Pt deferred DM shoes and inserts, which were offered.    F/u prn.    Ulisses Triana DPM, FACFAS            Again, thank you for allowing me to participate in the care of your patient.        Sincerely,        Ulisses Triana DPM

## 2020-12-28 DIAGNOSIS — E11.9 TYPE 2 DIABETES MELLITUS WITHOUT COMPLICATION, WITH LONG-TERM CURRENT USE OF INSULIN (H): ICD-10-CM

## 2020-12-28 DIAGNOSIS — Z79.4 TYPE 2 DIABETES MELLITUS WITHOUT COMPLICATION, WITH LONG-TERM CURRENT USE OF INSULIN (H): ICD-10-CM

## 2020-12-29 ENCOUNTER — VIRTUAL VISIT (OUTPATIENT)
Dept: FAMILY MEDICINE | Facility: OTHER | Age: 40
End: 2020-12-29

## 2020-12-29 RX ORDER — METFORMIN HCL 500 MG
TABLET, EXTENDED RELEASE 24 HR ORAL
Qty: 120 TABLET | Refills: 0 | Status: SHIPPED | OUTPATIENT
Start: 2020-12-29 | End: 2021-02-18

## 2020-12-30 DIAGNOSIS — Z20.822 SUSPECTED COVID-19 VIRUS INFECTION: ICD-10-CM

## 2020-12-30 DIAGNOSIS — Z20.822 SUSPECTED COVID-19 VIRUS INFECTION: Primary | ICD-10-CM

## 2020-12-30 LAB
SARS-COV-2 RNA SPEC QL NAA+PROBE: NOT DETECTED
SPECIMEN SOURCE: NORMAL

## 2020-12-30 PROCEDURE — U0003 INFECTIOUS AGENT DETECTION BY NUCLEIC ACID (DNA OR RNA); SEVERE ACUTE RESPIRATORY SYNDROME CORONAVIRUS 2 (SARS-COV-2) (CORONAVIRUS DISEASE [COVID-19]), AMPLIFIED PROBE TECHNIQUE, MAKING USE OF HIGH THROUGHPUT TECHNOLOGIES AS DESCRIBED BY CMS-2020-01-R: HCPCS | Performed by: FAMILY MEDICINE

## 2020-12-30 NOTE — TELEPHONE ENCOUNTER
Spoke with pharmacy and left detailed message for Phillip that Victoza pen will be delivered tomorrow by 5PM.    Lili Quick RN

## 2020-12-30 NOTE — PROGRESS NOTES
"Date: 2020 20:44:29  Clinician: Jomar Martinez  Clinician NPI: [id number]  Patient: Phillip Rueda  Patient : 1980  Patient Address: [address] [place] [id number]  Patient Phone: [phone]  Visit Protocol: URI  Patient Summary:  Phillip is a 40 year old ( : 1980 ) male who initiated a [facility] Visit for COVID-19 (Coronavirus) evaluation and screening. When asked the question \"Please sign me up to receive news, health information and promotions. \", Phillip responded \"No\".    Phillip states his symptoms started today.   His symptoms consist of myalgia, nausea, malaise, and vomiting.   Phillip denies having diarrhea, facial pain or pressure, anosmia, ear pain, headache, wheezing, fever, cough, nasal congestion, chills, sore throat, teeth pain, ageusia, and rhinitis. He also denies having recent facial or sinus surgery in the past 60 days and taking antibiotic medication in the past month. He is not experiencing dyspnea.   Precipitating events  He has not recently been exposed to someone with influenza. Phillip has been in close contact with the following high risk individuals: children under the age of 5.   Pertinent COVID-19 (Coronavirus) information  Phillip does not work or volunteer as healthcare worker or a . In the past 14 days, Phillip has not worked or volunteered at a healthcare facility or group living setting.   In the past 14 days, he also has not lived in a congregate living setting.   Phillip has not had a close contact with a laboratory-confirmed COVID-19 patient within 14 days of symptom onset.    Phillip has been tested for COVID-19.      Date(s) of his COVID-19 test as reported by the patient (free text): Can't remember       Result of COVID-19 test as reported by the patient (free text): Negative       Type of test as reported by the patient (free text): Nasal        Pertinent medical history  Phillip has diabetes. He has been told by his provider that his diabetes is in " control.   He has not been told by his provider to avoid NSAIDs.   He denies having immunosuppressive conditions (e.g., chemotherapy, HIV, organ transplant, long-term use of steroids or other immunosuppressive medications, splenectomy). He denies having congestive heart failure and severe COPD. He does not have asthma.   Phillip does not need a return to work/school note.   Phillip does not smoke or use smokeless tobacco.   Weight: 175 lbs    MEDICATIONS: Tresiba FlexTouch U-100 subcutaneous, Victoza 2-Jose subcutaneous, gabapentin oral, metformin oral, ALLERGIES: Penicillins  Clinician Response:  Dear Phillip,   Your symptoms show that you may have coronavirus (COVID-19). This illness can cause fever, cough and trouble breathing. Many people get a mild case and get better on their own. Some people can get very sick.  What should I do?  We would like to test you for this virus.   1. Please call [phone] to schedule your visit. Explain that you were referred by [facility] to have a COVID-19 test. Be ready to [facility] number.  * If you need to schedule in [facility] please call [phone] or for [employer] employees please call [phone].  * If you need to schedule in the [place] area please call [phone]. [employer] employees call [phone].  The following will serve as your written order for this COVID Test, ordered by me, for the indication of suspected COVID [Z20.828]: The test will be ordered in [employer], our electronic health record, after you are scheduled. It will show as ordered and authorized by Drake Bonds MD.  Order: COVID-19 (Coronavirus) PCR for SYMPTOMATIC testing from [facility].   2. When it's time for your COVID test:  Stay at least 6 feet away from others. (If someone will drive you to your test, stay in the backseat, as far away from the  as you can.)   Cover your mouth and nose with a mask, tissue or washcloth.  Go straight to the testing site. Don't make any stops on the way  "there or back.      3.Starting now: Stay home and away from others (self-isolate) until:   You've had no fever---and no medicine that reduces fever---for one full day (24 hours). And...   Your other symptoms have gotten better. For example, your cough or breathing has improved. And...   At least 10 days have passed since your symptoms started.       During this time, don't leave the house except for testing or medical care.   Stay in your own room, even for meals. Use your own bathroom if you can.   Stay away from others in your home. No hugging, kissing or shaking hands. No visitors.  Don't go to work, school or anywhere else.    Clean \"high touch\" surfaces often (doorknobs, counters, handles, etc.). Use a household cleaning spray or wipes. You'll find a full list of  on the EPA website: www.epa.gov/pesticide-registration/list-n-disinfectants-use-against-sars-cov-2.   Cover your mouth and nose with a mask, tissue or washcloth to avoid spreading germs.  Wash your hands and face often. Use soap and water.  Caregivers in these groups are at risk for severe illness due to COVID-19:  o People 65 years and older  o People who live in a nursing home or long-term care facility  o People with chronic disease (lung, heart, cancer, diabetes, kidney, liver, immunologic)  o People who have a weakened immune system, including those who:   Are in cancer treatment  Take medicine that weakens the immune system, such as corticosteroids  Had a bone marrow or organ transplant  Have an immune deficiency  Have poorly controlled HIV or AIDS  Are obese (body mass index of 40 or higher)  Smoke regularly   o Caregivers should wear gloves while washing dishes, handling laundry and cleaning bedrooms and bathrooms.  o Use caution when washing and drying laundry: Don't shake dirty laundry, and use the warmest water setting that you can.  o For more tips, go to www.cdc.gov/coronavirus/2019-ncov/downloads/10Things.pdf.    4.Sign up for " [facility]. We know it's scary to hear that you might have COVID-19. We want to track your symptoms to make sure you're okay over the next 2 weeks. Please look for an email from [facility] Lau---this is a free, online program that we'll use to keep in touch. To sign up, follow the link in the email. Learn more at [email or web address]  How can I take care of myself?   Get lots of rest. Drink extra fluids (unless a doctor has told you not to).   Take Tylenol (acetaminophen) for fever or pain. If you have liver or kidney problems, ask your family doctor if it's okay to take Tylenol.   Adults can take either:    650 mg (two 325 mg pills) every 4 to 6 hours, or...   1,000 mg (two 500 mg pills) every 8 hours as needed.    Note: Don't take more than 3,000 mg in one day. Acetaminophen is found in many medicines (both prescribed and over-the-counter medicines). Read all labels to be sure you don't take too much.   For children, check the Tylenol bottle for the right dose. The dose is based on the child's age or weight.    If you have other health problems (like cancer, heart failure, an organ transplant or severe kidney disease): Call your specialty clinic if you don't feel better in the next 2 days.       Know when to call 911. Emergency warning signs include:    Trouble breathing or shortness of breath Pain or pressure in the chest that doesn't go away Feeling confused like you haven't felt before, or not being able to wake up Bluish-colored lips or face.  Where can I get more information?    [facility] -- About COVID-19: [email or web address]   CDC -- What to Do If You're Sick: www.cdc.gov/coronavirus/2019-ncov/about/steps-when-sick.html   CDC -- Ending Home Isolation: www.cdc.gov/coronavirus/2019-ncov/hcp/disposition-in-home-patients.html   CDC -- Caring for Someone: www.cdc.gov/coronavirus/2019-ncov/if-you-are-sick/care-for-someone.html   [facility] -- Interim Guidance for Hospital Discharge to  Home: www.health.[facility].[place]/diseases/coronavirus/hcp/hospdischarge.pdf   [facility] clinical trials (COVID-19 research studies): [email or web address].[email or web address]/umn-clinical-trials    Below are the COVID-19 hotlines at the [place] Department of Health ([facility]). Interpreters are available.    For health questions: Call 365-255-1086 or 1-552.682.7317 (7 a.m. to 7 p.m.) For questions about schools and childcare: Call 504-629-9332 or 1-388.169.3385 (7 a.m. to 7 p.m.)    Diagnosis: Contact with and (suspected) exposure to other viral communicable diseases  Diagnosis ICD: Z20.828  Additional Clinician Notes:  If your symptoms are not improving or worsen, please go to one of our urgent care locations for evaluation and possible lab work.

## 2021-01-02 ENCOUNTER — ANCILLARY PROCEDURE (OUTPATIENT)
Dept: GENERAL RADIOLOGY | Facility: CLINIC | Age: 41
End: 2021-01-02
Attending: STUDENT IN AN ORGANIZED HEALTH CARE EDUCATION/TRAINING PROGRAM
Payer: COMMERCIAL

## 2021-01-02 ENCOUNTER — VIRTUAL VISIT (OUTPATIENT)
Dept: FAMILY MEDICINE | Facility: OTHER | Age: 41
End: 2021-01-02

## 2021-01-02 ENCOUNTER — NURSE TRIAGE (OUTPATIENT)
Dept: NURSING | Facility: CLINIC | Age: 41
End: 2021-01-02

## 2021-01-02 ENCOUNTER — OFFICE VISIT (OUTPATIENT)
Dept: URGENT CARE | Facility: URGENT CARE | Age: 41
End: 2021-01-02
Payer: COMMERCIAL

## 2021-01-02 VITALS
WEIGHT: 185.6 LBS | OXYGEN SATURATION: 99 % | BODY MASS INDEX: 27.41 KG/M2 | TEMPERATURE: 97.9 F | DIASTOLIC BLOOD PRESSURE: 68 MMHG | HEART RATE: 98 BPM | RESPIRATION RATE: 14 BRPM | SYSTOLIC BLOOD PRESSURE: 125 MMHG

## 2021-01-02 DIAGNOSIS — M79.672 ACUTE FOOT PAIN, LEFT: ICD-10-CM

## 2021-01-02 DIAGNOSIS — M25.572 ACUTE LEFT ANKLE PAIN: Primary | ICD-10-CM

## 2021-01-02 DIAGNOSIS — S93.402A MILD SPRAIN OF LEFT ANKLE, INITIAL ENCOUNTER: ICD-10-CM

## 2021-01-02 DIAGNOSIS — Z20.822 SUSPECTED COVID-19 VIRUS INFECTION: Primary | ICD-10-CM

## 2021-01-02 PROCEDURE — 73610 X-RAY EXAM OF ANKLE: CPT | Mod: LT | Performed by: RADIOLOGY

## 2021-01-02 PROCEDURE — 99214 OFFICE O/P EST MOD 30 MIN: CPT | Performed by: STUDENT IN AN ORGANIZED HEALTH CARE EDUCATION/TRAINING PROGRAM

## 2021-01-02 PROCEDURE — 73630 X-RAY EXAM OF FOOT: CPT | Mod: LT | Performed by: RADIOLOGY

## 2021-01-02 NOTE — PROGRESS NOTES
"Date: 2021 08:27:24  Clinician: Michelle Chisholm  Clinician NPI: [id number]  Patient: Phillip Rueda  Patient : 1980  Patient Address: [address] [place] [id number]  Patient Phone: [phone]  Visit Protocol: URI  Patient Summary:  Phillip is a 40 year old ( : 1980 ) male who initiated a [facility] Visit for cold, sinus infection, or influenza. When asked the question \"Please sign me up to receive news, health information and promotions. \", Phillip responded \"Yes\".    Phillip states his symptoms started gradually 5-6 days ago.   His symptoms consist of myalgia, ear pain, nausea, malaise, and a sore throat.   Symptom details   Sore throat: Phillip reports having mild throat pain (1-3 on a 10 point pain scale), does not have exudate on his tonsils, and can swallow liquids. The lymph nodes in his neck are not enlarged. A rash has not appeared on the skin since the sore throat started.    Phillip denies having diarrhea, facial pain or pressure, anosmia, headache, wheezing, fever, enlarged lymph nodes, cough, nasal congestion, chills, teeth pain, ageusia, vomiting, and rhinitis. He also denies double sickening (worsening symptoms after initial improvement), having recent facial or sinus surgery in the past 60 days, and taking antibiotic medication in the past month. He is not experiencing dyspnea.   Precipitating events  Phillip is not sure if he has been exposed to someone with strep throat. He has not recently been exposed to someone with influenza. Phillip has been in close contact with the following high risk individuals: children under the age of 5 and people with asthma, heart disease or diabetes.   Pertinent COVID-19 (Coronavirus) information  Phillip does not work or volunteer as healthcare worker or a . In the past 14 days, Phillip has not worked or volunteered at a healthcare facility or group living setting.   In the past 14 days, he also has not lived in a congregate living setting.   " Phillip has not had a close contact with a laboratory-confirmed COVID-19 patient within 14 days of symptom onset.    Phillip has been tested for COVID-19.      Date(s) of his COVID-19 test as reported by the patient (free text): 11/03/2020,  12/30/2020       Result of COVID-19 test as reported by the patient (free text): Negative       Type of test as reported by the patient (free text): Nasal stick        Pertinent medical history  Phillip has diabetes. He has been told by his provider that his diabetes is in control.   He has not been told by his provider to avoid NSAIDs.   He denies having immunosuppressive conditions (e.g., chemotherapy, HIV, organ transplant, long-term use of steroids or other immunosuppressive medications, splenectomy). He denies having congestive heart failure and severe COPD. He does not have asthma.   Phillip does not need a return to work/school note.   Phillip does not smoke or use smokeless tobacco.   Additional information as reported by the patient (free text): I got tested for covid  3days ago because I threw up twice 4days ago and have been nauseous since. It was negative. Also my sore throat and ear ache are all on the left side.   Weight: 175 lbs    MEDICATIONS: Tresiba FlexTouch U-100 subcutaneous, Victoza 2-Jose subcutaneous, gabapentin oral, metformin oral, ALLERGIES: Penicillins  Clinician Response:  Dear Phillip,   Your symptoms show that you may have coronavirus (COVID-19). This illness can cause fever, cough and trouble breathing. Many people get a mild case and get better on their own. Some people can get very sick.  What should I do?  We would like to test you for this virus.   1. Please call [phone] to schedule your visit. Explain that you were referred by [facility] to have a COVID-19 test. Be ready to share your [facility] visit ID number.  * If you need to schedule in [facility] please call [phone] or for [employer] employees please call [phone].  * If you need to  "schedule in the [place] area please call [phone]. [facility] employees call [phone].  The following will serve as your written order for this COVID Test, ordered by me, for the indication of suspected COVID [Z20.828]: The test will be ordered in [email or web address], our electronic health record, after you are scheduled. It will show as ordered and authorized by Drake Bonds MD.  Order: COVID-19 (Coronavirus) PCR for SYMPTOMATIC testing from [facility].   2. When it's time for your COVID test:  Stay at least 6 feet away from others. (If someone will drive you to your test, stay in the backseat, as far away from the  as you can.)   Cover your mouth and nose with a mask, tissue or washcloth.  Go straight to the testing site. Don't make any stops on the way there or back.      3.Starting now: Stay home and away from others (self-isolate) until:   You've had no fever---and no medicine that reduces fever---for one full day (24 hours). And...   Your other symptoms have gotten better. For example, your cough or breathing has improved. And...   At least 10 days have passed since your symptoms started.       During this time, don't leave the house except for testing or medical care.   Stay in your own room, even for meals. Use your own bathroom if you can.   Stay away from others in your home. No hugging, kissing or shaking hands. No visitors.  Don't go to work, school or anywhere else.    Clean \"high touch\" surfaces often (doorknobs, counters, handles, etc.). Use a household cleaning spray or wipes. You'll find a full list of  on the EPA website: www.epa.gov/pesticide-registration/list-n-disinfectants-use-against-sars-cov-2.   Cover your mouth and nose with a mask, tissue or washcloth to avoid spreading germs.  Wash your hands and face often. Use soap and water.  Caregivers in these groups are at risk for severe illness due to COVID-19:  o People 65 years and older  o People who live in a nursing home or long-term care facility "  o People with chronic disease (lung, heart, cancer, diabetes, kidney, liver, immunologic)  o People who have a weakened immune system, including those who:   Are in cancer treatment  Take medicine that weakens the immune system, such as corticosteroids  Had a bone marrow or organ transplant  Have an immune deficiency  Have poorly controlled HIV or AIDS  Are obese (body mass index of 40 or higher)  Smoke regularly   o Caregivers should wear gloves while washing dishes, handling laundry and cleaning bedrooms and bathrooms.  o Use caution when washing and drying laundry: Don't shake dirty laundry, and use the warmest water setting that you can.  o For more tips, go to www.cdc.gov/coronavirus/2019-ncov/downloads/10Things.pdf.    4.Sign up for [employer]. We know it's scary to hear that you might have COVID-19. We want to track your symptoms to make sure you're okay over the next 2 weeks. Please look for an email from [employer]---this is a free, online program that we'll use to keep in touch. To sign up, follow the link in the email. Learn more at [email or web address]  How can I take care of myself?   Get lots of rest. Drink extra fluids (unless a doctor has told you not to).   Take Tylenol (acetaminophen) for fever or pain. If you have liver or kidney problems, ask your family doctor if it's okay to take Tylenol.   Adults can take either:    650 mg (two 325 mg pills) every 4 to 6 hours, or...   1,000 mg (two 500 mg pills) every 8 hours as needed.    Note: Don't take more than 3,000 mg in one day. Acetaminophen is found in many medicines (both prescribed and over-the-counter medicines). Read all labels to be sure you don't take too much.   For children, check the Tylenol bottle for the right dose. The dose is based on the child's age or weight.    If you have other health problems (like cancer, heart failure, an organ transplant or severe kidney disease): Call your specialty clinic if you don't feel  better in the next 2 days.       Know when to call 911. Emergency warning signs include:    Trouble breathing or shortness of breath Pain or pressure in the chest that doesn't go away Feeling confused like you haven't felt before, or not being able to wake up Bluish-colored lips or face.  Where can I get more information?   [facility] [place] -- About COVID-19: [email or web address]   CDC -- What to Do If You're Sick: www.cdc.gov/coronavirus/2019-ncov/about/steps-when-sick.html   CDC -- Ending Home Isolation: www.cdc.gov/coronavirus/2019-ncov/hcp/disposition-in-home-patients.html   CDC -- Caring for Someone: www.cdc.gov/coronavirus/2019-ncov/if-you-are-sick/care-for-someone.html   [facility] -- Interim Guidance for Hospital Discharge to Home: www.[facility].[place].us/diseases/coronavirus/hcp/hospdischarge.pdf   [facility] clinical trials (COVID-19 research studies): clinicalaffairs.[facility]-clinical-trials    Below are the COVID-19 hotlines at the [place] [facility] ([facility]). Interpreters are available.    For health questions: Call [phone] or [phone] (7 a.m. to 7 p.m.) For questions about schools and childcare: Call [phone] or [phone] (7 a.m. to 7 p.m.)    Diagnosis: Myalgia, unspecified site  Diagnosis ICD: M79.10

## 2021-01-03 DIAGNOSIS — Z20.822 SUSPECTED COVID-19 VIRUS INFECTION: ICD-10-CM

## 2021-01-03 PROCEDURE — U0005 INFEC AGEN DETEC AMPLI PROBE: HCPCS | Performed by: FAMILY MEDICINE

## 2021-01-03 PROCEDURE — U0003 INFECTIOUS AGENT DETECTION BY NUCLEIC ACID (DNA OR RNA); SEVERE ACUTE RESPIRATORY SYNDROME CORONAVIRUS 2 (SARS-COV-2) (CORONAVIRUS DISEASE [COVID-19]), AMPLIFIED PROBE TECHNIQUE, MAKING USE OF HIGH THROUGHPUT TECHNOLOGIES AS DESCRIBED BY CMS-2020-01-R: HCPCS | Performed by: FAMILY MEDICINE

## 2021-01-03 NOTE — PROGRESS NOTES
SUBJECTIVE:  Chief Complaint   Patient presents with     Urgent Care     Musculoskeletal Problem     swollen left ankle and foot for 2 weeks. Shooting pain from the ankle throughout foot. Pain is increase and can not put weight on left foot.     Phillip Rueda is a 40 year old male who presents with a chief complaint of left ankle and foot pain and swelling  Symptoms began 3 week(s) ago, are moderate and gradual onset  Context:  Injury:No.  Pain exacerbated by walking, weight-bearing and standing Relieved by rest and elevation.  He treated it initially with no therapy. This is the first time this type of injury has occurred to this patient.     His left medial foot is tender behind the malleolus. It hurts to twist his ankle inward. This has been going for a couple weeks, but he doesn't remember a specific injury. He denies drinking or using other drugs that would make it so he would not remember an injury. He has two small children. He runs around a lot with his children and thinks it's possible that he injured while playing with them. He had a high sprain when he was young so he had attributed it to this. It hurts to walk on it. He cannot take more than 3 steps without pain. He hasn't had any known injury. He is diabetic and had a foot exam a few weeks ago before this happened. He has neuropathy. He has a nerve syndrome that causes different types of pain.     Past Medical History:   Diagnosis Date     Anxiety      Depressive disorder      Diabetes mellitus (H)      Hypertension      Liver disease      Neuralgic amyotrophy      Pain disorder 12/8/2015     Parsonage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      Prostate infection      Seizures (H)      Staph infection      Current Outpatient Medications   Medication Sig Dispense Refill     ammonium lactate (LAC-HYDRIN) 12 % external cream Apply topically 2 times daily as needed for dry skin 385 g 3     atorvastatin (LIPITOR) 20 MG tablet TAKE ONE TABLET BY MOUTH ONCE DAILY 90 tablet 3      blood glucose (NO BRAND SPECIFIED) lancets standard Use to test blood sugar 2 times daily or as directed. 100 each 1     blood glucose (NO BRAND SPECIFIED) test strip Use to test blood sugar 2 times daily or as directed. 100 each 1     blood glucose monitoring (NO BRAND SPECIFIED) meter device kit Use to test blood sugar 2 times daily or as directed. 1 kit 0     Buprenorphine HCl-Naloxone HCl (SUBOXONE) 4-1 MG film Place 1 Film under the tongue 3 times daily Taking 2-3 times a day and weening off       continuous blood glucose monitoring (FREESTYLE JHON) sensor For use with Freestyle Jhon Flash  for continuous monitioring of blood glucose levels. Replace sensor every 14 days. 6 each 3     cyclobenzaprine (FLEXERIL) 5 MG tablet Take 2 tablets (10 mg) by mouth 3 times daily as needed for muscle spasms 20 tablet 0     diazepam (VALIUM) 5 MG tablet Take 1 tablet (5 mg) 3 times daily as needed. 60 tablet      EPINEPHrine (EPIPEN) 0.3 MG/0.3ML injection Inject 0.3 mLs (0.3 mg) into the muscle once as needed for anaphylaxis 1 each 2     gabapentin (NEURONTIN) 600 MG tablet Take 0.5 tablets (300 mg) by mouth 2 times daily       ibuprofen (ADVIL,MOTRIN) 200 MG tablet Take 1-2 tablets by mouth every 6 hours as needed.       insulin aspart (NOVOLOG PEN) 100 UNIT/ML pen 1 unit per every 40 glu above 140 3 mL 0     insulin aspart (NOVOLOG VIAL) 100 UNITS/ML vial One unit per glu 40 for every glu above 140 1 vial 0     insulin pen needle 31G X 6 MM Use 2 daily or as directed. 100 each 11     metFORMIN (GLUCOPHAGE-XR) 500 MG 24 hr tablet TAKE TWO TABLETS BY MOUTH TWICE A DAY WITH MEALS 120 tablet 0     nicotine (NICORETTE) 4 MG lozenge PLACE 1 LOZENGE INSIDE THE CHEEK AS NEEDED FOR SMOKING CESSATION 144 lozenge 0     nortriptyline (PAMELOR) 25 MG capsule TAKE 2 CAPSULES(50 MG) BY MOUTH AT BEDTIME 60 capsule 1     oxyCODONE (ROXICODONE) 5 MG tablet Take 1 tablet (5 mg) by mouth every 6 hours as needed for severe pain  (sleep) 6 tablet 0     sildenafil (VIAGRA) 100 MG tablet Take 1/2 to 1 pill as needed for erectile dysfunction. 5 tablet 5     venlafaxine (EFFEXOR-ER) 150 MG TB24 Take 1 tablet by mouth daily (with breakfast). 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  Drink 1.75 every 2 days       ROS:  Review of systems negative except as stated below    EXAM:   /68   Pulse 98   Temp 97.9  F (36.6  C) (Tympanic)   Resp 14   Wt 84.2 kg (185 lb 9.6 oz)   SpO2 99%   BMI 27.41 kg/m    M/S Exam:ankle and foot swelling, tenderness to palpation, and decreased ROM GENERAL APPEARANCE: healthy, alert and no distress  EXTREMITIES: peripheral pulses normal  SKIN: no suspicious lesions or rashes  NEURO: Normal strength and tone, sensory exam grossly normal, mentation intact and speech normal    X-RAY was done.  LEFT FOOT THREE OR MORE VIEWS, LEFT ANKLE THREE OR MORE VIEWS   2021 7:22 PM      HISTORY: Medial foot and ankle pain.     COMPARISON: None.                                                                      IMPRESSION: No evidence of acute fracture or subluxation in the ankle  or foot. Mortise and talar dome are intact.    ASSESSMENT/PLAN:  Phillip was seen today for urgent care and musculoskeletal problem.    Diagnoses and all orders for this visit:    Acute left ankle pain  -     XR Ankle Left G/E 3 Views    Acute foot pain, left  -     XR Foot Left G/E 3 Views    Mild sprain of left ankle, initial encounter: reviewed that his ankle/foot ankle does not have signs of  fracture on x-ray. Discussed that he likely has sprained it and should continue with icing, resting, compressing, and elevating as much as possible. He does not have an ankle brace so will fit him with one from urgent care. Discussed that he should follow up with his PCP and see if he needs either further imaging at that time or if it would be a good time to start PT. Discussed the benefits of PT in someone that has had multiple sprained ankles in his life to prevent recurrence. He was agreeable with the plan and all questions answered at the end of the visit.  -     Ankle/Foot Bracing Supplies Order for DME - ONLY FOR DME      Follow up in 1-2 weeks with PCP or sooner if develops new or worsening symptoms.    Abby Mckeon MD

## 2021-01-03 NOTE — PATIENT INSTRUCTIONS
Patient Education     Foot Sprain    A sprain is a stretching or tearing of the ligaments that hold a joint together. There are usually no broken bones. Sprains generally take from 3 to 6 weeks to heal. A sprain may be treated with a splint, walking cast, or special boot. Mild sprains may not need any additional support.  Home care  The following guidelines will help you care for your injury at home:    Keep your leg elevated when sitting or lying down. This is very important during the first 48 hours to reduce swelling. Stay off the injured foot as much as possible until you can walk on it without pain. If needed, you may use crutches during the first week for this purpose. Crutches can be rented at many pharmacies or surgical/orthopedic supply stores.    You may be given a cast shoe to wear to prevent movement in your foot. If not, you can use a sandal or any shoe that does not put pressure on the injured area until the swelling and pain go away. If using a sandal, be careful not to hit your foot against anything, since another injury could make the sprain worse.    Apply an ice pack over the injured area for 15 to 20 minutes every 3 to 6 hours. You should do this for the first 24 to 48 hours. You can make an ice pack by filling a plastic bag that seals at the top with ice cubes and then wrapping it with a thin towel. Continue to use ice packs for relief of pain and swelling as needed. As the ice melts, try not to get the wrap, splint, or cast wet. After 48 hours, apply heat from a warm shower or bath for 20 minutes several times daily. Alternating ice and heat may also be helpful.    You may use over-the-counter pain medicine to control pain, unless another medicine was prescribed. If you have chronic liver or kidney disease or ever had a stomach ulcer or gastrointestinal bleeding, talk with your healthcare provider before using these medicines.    If you were given a splint or cast, keep it dry. Bathe with  your splint or cast well out of the water, protected with 2 large plastic bags, sealed with tape or rubber-bands at the top end. If a fiberglass splint or cast gets wet, you can dry it with a hair dryer on cool setting.    You may return to sports after healing, when you can run without pain.  Follow-up care  Follow up with your healthcare provider as directed. Sometimes fractures don t show up on the first X-ray. Bruises and sprains can sometimes hurt as much as a fracture. These injuries can take time to heal completely. If your symptoms don t improve or they get worse, talk with your healthcare provider. You may need a repeat X-ray or other tests.  When to seek medical advice  Call your healthcare provider right away if any of these occur:    The plaster cast or splint gets wet or soft    The fiberglass cast or splint gets wet and does not dry for 24 hours    Pain or swelling increases, or redness appears    A bad odor comes from within the cast    Fever of 100.4 F (38 C) or above lasting for 24 to 48 hours, or as advised    Chills    Toes on the injured foot become cold, blue, numb, or tingly  [employer] last reviewed this educational content on 5/1/2018 2000-2020 [employer]. [address] [place] [address], [place] [address]. All rights reserved. This information is not intended as a substitute for professional medical care. Always follow your healthcare professional's instructions.           Patient Education     Treating Ankle Sprains  Treatment will depend on how bad your sprain is. For a severe sprain, healing may take 3 months or more.  Right after your injury: Use R.I.C.E.    BIG: Rest: At first, keep weight off the ankle as much as you can. You may be given crutches to help you walk without putting weight on the ankle.    BIG: Ice: Put an ice pack on the ankle for 20 minutes. Remove the pack and wait at least 30 minutes. Repeat for up to 3 days. This helps reduce swelling.    BIG: Compression: To  reduce swelling and keep the joint stable, you may need to wrap the ankle with an elastic bandage. For more severe sprains, you may need an ankle brace, a boot, or a cast.    BIG: Elevation: To reduce swelling, keep your ankle raised above your heart when you sit or lie down.  Medicine  Your healthcare provider may suggest oral nonsteroidal anti-inflammatory medicine (NSAIDs), such as ibuprofen. This relieves the pain and helps reduce swelling. Be sure to take your medicine as directed.  Exercises    After about 2 to 3 weeks, you may be given exercises to strengthen the ligaments and muscles in the ankle. Doing these exercises will help prevent another ankle sprain. Exercises may include standing on your toes and then on your heels and doing ankle curls.    Sit on the edge of a sturdy table or lie on your back.    Pull your toes toward you. Then point them away from you. Repeat for 2 to 3 minutes.  [employer] last reviewed this educational content on 1/1/2018 2000-2020 [employer]. [address] [place] [address], [place] [address]. All rights reserved. This information is not intended as a substitute for professional medical care. Always follow your healthcare professional's instructions.

## 2021-01-04 LAB
LABORATORY COMMENT REPORT: NORMAL
SARS-COV-2 RNA SPEC QL NAA+PROBE: NEGATIVE
SARS-COV-2 RNA SPEC QL NAA+PROBE: NORMAL
SPECIMEN SOURCE: NORMAL
SPECIMEN SOURCE: NORMAL

## 2021-01-06 ENCOUNTER — VIRTUAL VISIT (OUTPATIENT)
Dept: FAMILY MEDICINE | Facility: OTHER | Age: 41
End: 2021-01-06

## 2021-01-06 NOTE — PROGRESS NOTES
"Date: 2021 09:56:58  Clinician: Jomar Martinez  Clinician NPI: [id number]  Patient: Phillip Rueda  Patient : 1980  Patient Address: [address] [place] [id number]  Patient Phone: [phone]  Visit Protocol: URI  Patient Summary:  Phillip is a 40 year old ( : 1980 ) male who initiated a [facility] Visit for cold, sinus infection, or influenza. When asked the question \"Please sign me up to receive news, health information and promotions. \", Phillip responded \"Yes\".    Phillip states his symptoms started gradually 3-4 days ago.   His symptoms consist of a sore throat, malaise, enlarged lymph nodes, and ear pain.   Symptom details   Sore throat: Phillip reports having moderate throat pain (4-6 on a 10 point pain scale), does not have exudate on his tonsils, and can swallow liquids. The lymph nodes in his neck are enlarged. A rash has not appeared on the skin since the sore throat started.    Phillip denies having teeth pain, ageusia, diarrhea, wheezing, facial pain or pressure, myalgias, rhinitis, fever, cough, nasal congestion, nausea, anosmia, headache, chills, and vomiting. He also denies having recent facial or sinus surgery in the past 60 days, taking antibiotic medication in the past month, and double sickening (worsening symptoms after initial improvement). He is not experiencing dyspnea.   Precipitating events  Within the past week, Phillip has not been exposed to someone with strep throat.   Pertinent COVID-19 (Coronavirus) information  Phillip does not work or volunteer as healthcare worker or a . In the past 14 days, Phillip has not worked or volunteered at a healthcare facility or group living setting.   In the past 14 days, he also has not lived in a congregate living setting.   Phillip has not had a close contact with a laboratory-confirmed COVID-19 patient within 14 days of symptom onset.    Phillip has been tested for COVID-19.      Date(s) of his COVID-19 test as reported by the " patient (free text): 12/29/2020 and 1/3/2021       Result of COVID-19 test as reported by the patient (free text): Negative       Type of test as reported by the patient (free text): Nasal swab both times        Phillip has not received a COVID-19 vaccine.   Pertinent medical history  Phillip has diabetes. He has been told by his provider that his diabetes is in control.   He has not been told by his provider to avoid NSAIDs.   He denies having immunosuppressive conditions (e.g., chemotherapy, HIV, organ transplant, long-term use of steroids or other immunosuppressive medications, splenectomy). He denies having congestive heart failure and severe COPD. He does not have asthma.   Phillip does not need a return to work/school note.   Phillip does not smoke or use smokeless tobacco.   Additional information as reported by the patient (free text): My pain is more on the right side of my face. My right gland, throat,and into my ear. My voice is effected as well.   Weight: 175 lbs    MEDICATIONS: Effexor XR oral, Victoza 2-Jose subcutaneous, gabapentin oral, metformin oral, ALLERGIES: Penicillins  Clinician Response:  Dear Phillip,   Your symptoms show that you may have coronavirus (COVID-19). This illness can cause fever, cough and trouble breathing. Many people get a mild case and get better on their own. Some people can get very sick.  What should I do?  We would like to test you for this virus.   1. Please call [phone] to schedule your visit. Explain that you were referred by [facility] to have a COVID-19 test. Be ready to share your [facility] visit ID number.  * If you need to schedule in [facility] please call [phone] or for [employer] employees please call [phone].  * If you need to schedule in the [place] area please call [phone]. [employer] employees call [phone].  The following will serve as your written order for this COVID Test, ordered by me, for the indication of suspected COVID [Z20.828]: The test will  "be ordered in [facility], our electronic health record, after you are scheduled. It will show as ordered and authorized by Drake Bonds MD.  Order: COVID-19 (Coronavirus) PCR [facility]   2. When it's time for your COVID test:  Stay at least 6 feet away from others. (If someone will drive you to your test, stay in the backseat, as far away from the  as you can.)   Cover your mouth and nose with a mask, tissue or washcloth.  Go straight to the testing site. Don't make any stops on the way there or back.      3.Starting now: Stay home and away from others (self-isolate) until:   You've had no fever---and no medicine that reduces fever---for one full day (24 hours). And...   Your other symptoms have gotten better. For example, your cough or breathing has improved. And...   At least 10 days have passed since your symptoms started.       During this time, don't leave the house except for testing or medical care.   Stay in your own room, even for meals. Use your own bathroom if you can.   Stay away from others in your home. No hugging, kissing or shaking hands. No visitors.  Don't go to work, school or anywhere else.    Clean \"high touch\" surfaces often (doorknobs, counters, handles, etc.). Use a household cleaning spray or wipes. You'll find a full list of  on the EPA website: www.epa.gov/pesticide-registration/list-n-disinfectants-use-against-sars-cov-2.   Cover your mouth and nose with a mask, tissue or washcloth to avoid spreading germs.  Wash your hands and face often. Use soap and water.  Caregivers in these groups are at risk for severe illness due to COVID-19:  o People 65 years and older  o People who live in a nursing home or long-term care facility  o People with chronic disease (lung, heart, cancer, diabetes, kidney, liver, immunologic)  o People who have a weakened immune system, including those who:   Are in cancer treatment  Take medicine that weakens the immune system, such " as corticosteroids  Had a bone marrow or organ transplant  Have an immune deficiency  Have poorly controlled HIV or AIDS  Are obese (body mass index of 40 or higher)  Smoke regularly   o Caregivers should wear gloves while washing dishes, handling laundry and cleaning bedrooms and bathrooms.  o Use caution when washing and drying laundry: Don't shake dirty laundry, and use the warmest water setting that you can.  o For more tips, go to www.cdc.gov/coronavirus/2019-ncov/downloads/10Things.pdf.    4.Sign up for [employer] We know it's scary to hear that you might have COVID-19. We want to track your symptoms to make sure you're okay over the next 2 weeks. Please look for an email from [employer]---this is a free, online program that we'll use to keep in touch. To sign up, follow the link in the email. Learn more at [email or web address]  How can I take care of myself?   Get lots of rest. Drink extra fluids (unless a doctor has told you not to).   Take Tylenol (acetaminophen) for fever or pain. If you have liver or kidney problems, ask your family doctor if it's okay to take Tylenol.   Adults can take either:    650 mg (two 325 mg pills) every 4 to 6 hours, or...   1,000 mg (two 500 mg pills) every 8 hours as needed.    Note: Don't take more than 3,000 mg in one day. Acetaminophen is found in many medicines (both prescribed and over-the-counter medicines). Read all labels to be sure you don't take too much.   For children, check the Tylenol bottle for the right dose. The dose is based on the child's age or weight.    If you have other health problems (like cancer, heart failure, an organ transplant or severe kidney disease): Call your specialty clinic if you don't feel better in the next 2 days.       Know when to call 911. Emergency warning signs include:    Trouble breathing or shortness of breath Pain or pressure in the chest that doesn't go away Feeling confused like you haven't felt before, or not  being able to wake up Bluish-colored lips or face.  Where can I get more information?    [facility] [place] -- About COVID-19: [email or web address]   CDC -- What to Do If You're Sick: www.cdc.gov/coronavirus/2019-ncov/about/steps-when-sick.html   [facility] -- Ending Home Isolation: www.cdc.gov/coronavirus/2019-ncov/hcp/disposition-in-home-patients.html   [facility] -- Caring for Someone: www.cdc.gov/coronavirus/2019-ncov/if-you-are-sick/care-for-someone.html   [facility] -- Interim Guidance for Hospital Discharge to Home: www.health.[facility].mn./diseases/coronavirus/hcp/hospdischarge.pdf   [facility] clinical trials (COVID-19 research studies): clinicalaffairs.[facility]-clinical-trials    Below are the COVID-19 hotlines at the [facility] ([facility]). Interpreters are available.    For health questions: Call 199-365-8097 or 1-544.452.1986 (7 a.m. to 7 p.m.) For questions about schools and childcare: Call 625-280-2943 or 1-150.248.2181 (7 a.m. to 7 p.m.)    Diagnosis: Contact with and (suspected) exposure to other viral communicable diseases  Diagnosis ICD: Z20.828  Additional Clinician Notes:  Please go to one of our urgent care locations for further evaluation and possible strep testing.

## 2021-01-15 ENCOUNTER — HEALTH MAINTENANCE LETTER (OUTPATIENT)
Age: 41
End: 2021-01-15

## 2021-02-03 ENCOUNTER — DOCUMENTATION ONLY (OUTPATIENT)
Dept: CARE COORDINATION | Facility: CLINIC | Age: 41
End: 2021-02-03

## 2021-02-15 DIAGNOSIS — Z79.4 TYPE 2 DIABETES MELLITUS WITHOUT COMPLICATION, WITH LONG-TERM CURRENT USE OF INSULIN (H): ICD-10-CM

## 2021-02-15 DIAGNOSIS — E11.9 TYPE 2 DIABETES MELLITUS WITHOUT COMPLICATION, WITH LONG-TERM CURRENT USE OF INSULIN (H): ICD-10-CM

## 2021-02-18 RX ORDER — METFORMIN HCL 500 MG
TABLET, EXTENDED RELEASE 24 HR ORAL
Qty: 120 TABLET | Refills: 0 | Status: SHIPPED | OUTPATIENT
Start: 2021-02-18 | End: 2021-02-26

## 2021-02-24 ENCOUNTER — MYC MEDICAL ADVICE (OUTPATIENT)
Dept: FAMILY MEDICINE | Facility: CLINIC | Age: 41
End: 2021-02-24

## 2021-02-24 NOTE — TELEPHONE ENCOUNTER
Dr. Dos Santos-Patient is scheduled for a virtual visit with you on 2/26/21 to address ankle pain and diabetes follow up.  Do you recommend an in-clinic visit?    Patient will be informed any imaging studies would be addressed at the time of the visit.  Provider evaluation needs to occur, first.    Thank you!  MARTY TiwariN, RN  [facility]

## 2021-02-24 NOTE — TELEPHONE ENCOUNTER
He can do virtual and I can refer him to podiatry or he can come in and we can do in person evaluation and referral vs imaging pending his evaluation. I am ok with both options.

## 2021-02-26 ENCOUNTER — VIRTUAL VISIT (OUTPATIENT)
Dept: FAMILY MEDICINE | Facility: CLINIC | Age: 41
End: 2021-02-26
Payer: COMMERCIAL

## 2021-02-26 DIAGNOSIS — M25.472 LEFT ANKLE SWELLING: ICD-10-CM

## 2021-02-26 DIAGNOSIS — E11.9 TYPE 2 DIABETES MELLITUS WITHOUT COMPLICATION, WITHOUT LONG-TERM CURRENT USE OF INSULIN (H): Primary | ICD-10-CM

## 2021-02-26 PROCEDURE — 99214 OFFICE O/P EST MOD 30 MIN: CPT | Mod: 95 | Performed by: FAMILY MEDICINE

## 2021-02-26 RX ORDER — LIRAGLUTIDE 6 MG/ML
INJECTION SUBCUTANEOUS
Qty: 27 ML | Refills: 1 | Status: SHIPPED | OUTPATIENT
Start: 2021-02-26 | End: 2021-10-12

## 2021-02-26 RX ORDER — METFORMIN HCL 500 MG
TABLET, EXTENDED RELEASE 24 HR ORAL
Qty: 120 TABLET | Refills: 0 | Status: SHIPPED | OUTPATIENT
Start: 2021-05-04 | End: 2021-04-21

## 2021-02-26 NOTE — PROGRESS NOTES
Phillip is a 40 year old who is being evaluated via a billable video visit.      How would you like to obtain your AVS? MyChart  If the video visit is dropped, the invitation should be resent by: Text to cell phone: [phone]  Will anyone else be joining your video visit? No      Video Start Time: 8:19 AM    Assessment & Plan     Left ankle swelling  He has persistent swelling and pain.  Denies any injury.  Was seen by podiatrist and less likely diabetic neuropathy.  X-rays were negative.  He is frustrated with symptoms and hoping for further evaluation.  MRI ordered as per his request.  He is going to talk to his previous podiatrist regardless of the results.  Referral given.  - MR Ankle Left w/o Contrast; Future  - Orthopedic & Spine  Referral; Future    Type 2 diabetes mellitus without complication, without long-term current use of insulin (H)  Overall excellent control of his blood sugars.  Going to come in for lab only appointment as there is a mild worsening of his A1c after stopping insulin.  - Hemoglobin A1c; Future    If doing well follow-up in 6 months    Km Dos Santos MD, MD  [facility]    Venkatesh Fisher is a 40 year old who presents for the following health issues     HPI       Diabetes Follow-up    How often are you checking your blood sugar? Continuous glucose monitor  What time of day are you checking your blood sugars (select all that apply)?  Before and after meals  Have you had any blood sugars above 200?  Yes   Have you had any blood sugars below 70?  Yes     What symptoms do you notice when your blood sugar is low?  Shaky, Weak and sweats     What concerns do you have today about your diabetes? None     Do you have any of these symptoms? (Select all that apply)  No numbness or tingling in feet.  No redness, sores or blisters on feet.  No complaints of excessive thirst.  No reports of blurry vision.  No significant changes to weight.    Have you  had a diabetic eye exam in the last 12 months? No    BP Readings from Last 2 Encounters:   01/02/21 125/68   11/16/20 110/88     Hemoglobin A1C (%)   Date Value   08/12/2020 6.9 (H)   08/28/2019 6.3 (H)     LDL Cholesterol Calculated (mg/dL)   Date Value   08/12/2020 54   08/28/2019 58               Hypertension Follow-up      Do you check your blood pressure regularly outside of the clinic? No     Are you following a low salt diet? No    Are your blood pressures ever more than 140 on the top number (systolic) OR more   than 90 on the bottom number (diastolic), for example 140/90? No      How many servings of fruits and vegetables do you eat daily?  2-3    On average, how many sweetened beverages do you drink each day (Examples: soda, juice, sweet tea, etc.  Do NOT count diet or artificially sweetened beverages)?   0    How many days per week do you exercise enough to make your heart beat faster? 3 or less    How many minutes a day do you exercise enough to make your heart beat faster? 9 or less    How many days per week do you miss taking your medication? 0    Foot/Ankle Pain    Onset: 1.5 month(s) ago    Description:   Left ankle/foot  Location: Foot: inside of ankle and arch of foot                   Ankle inside   Character: Dull ache, Stabbing and Burning  Constant/intermittent:  intermittent  History of trauma: no                 Was there immediate pain after trauma: not applicable                 Able to bear weight yes                 Worse after activity or rest: YES    Accompany signs and symptoms:  Swelling: YES  Bruising (ecchymosis): no  Redness: no  Warmth: no  Weakness: YES        Numbness or tingling: no    History:  Any previous injury to ankle/foot: YES- sprained ankle when younger   Any previous ankle/foot surgery: no  Any previous tests/studies done: none    Therapies tried and outcome: NSAIDS, Tylenol, Rest, Elevation and splint with  no relief    Around urgent care visit - unable to walk well.  "No known injury.   Saw podiatrist for diabetic foot exam - pain in same foot on side of foot. Feels like burning sensation and pain. Podiatrist did not think it was diabetic nerve pain.   Been to urgent care - xrays were fine. Was given splint.   Sprained ankle in 20s but nothing this time.   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 Still swollen.   No history of gout.     Comes and goes.  Symptoms started before urgent care visit.     Feels has enough refills.     victoza 1.8  1000mg metformin bid  Insulin - no longer using it. Only takes it when takes steroid.     Review of Systems         Objective    Vitals - Patient Reported  Weight (Patient Reported): 79.4 kg (175 lb)  Height (Patient Reported): 175.3 cm (5' 9\")  BMI (Based on Pt Reported Ht/Wt): 25.84        Physical Exam   GENERAL: Healthy, alert and no distress  EYES: Eyes grossly normal to inspection.  No discharge or erythema, or obvious scleral/conjunctival abnormalities.  RESP: No audible wheeze, cough, or visible cyanosis.  No visible retractions or increased work of breathing.    SKIN: Visible skin clear. No significant rash, abnormal pigmentation or lesions.  NEURO: Cranial nerves grossly intact.  Mentation and speech appropriate for age.  PSYCH: Mentation appears normal, affect normal/bright, judgement and insight intact, normal speech and appearance well-groomed.  Left ankle on medial side mild diffuse swelling present.  Limited otherwise examination.              Video-Visit Details    Type of service:  Video Visit    Video End Time:8:42 AM    Originating Location (pt. Location): Home    Distant Location (provider location):  home    Platform used for Video Visit: Wilber    "

## 2021-02-26 NOTE — PATIENT INSTRUCTIONS
Please call RADIOLOGY to schedule an appointment for MRI:  [facility]:  [phone]           [facility]: [phone]    We are working hard to begin vaccinating more people against COVID-19. Currently, we are only vaccinating Phase 1a workers - healthcare workers who are unable to do their job remotely. Vaccine availability is very limited.      If you are a healthcare worker and you are unable to do your job remotely, please log in to [employer] using this link to see if we have openings and schedule an appointment. At your vaccine appointment, you will be asked to provide proof of employment as a health care worker. If you cannot, you will be turned away.     Vaccine appointments are being added as they become available. Please check your [employer] account frequently for availability.  If you have technical difficulty using [employer], call [phone] for assistance.     You can learn more about the state's phased approach to administering the vaccine, with details on each phase, here.      Phase 1b is the next group that will get vaccinated and includes frontline essential workers and adults 75 years of age and older. When we are able to start vaccinating this group, we will share that information on our website. Check this website to stay up to date on COVID-19 vaccination information.        Did you know?      You can schedule a video visit for follow-up appointments as well as future appointments for certain conditions.  Please see the below link.     [email or web address]    If you have not already done so,  I encourage you to sign up for [employer] ([email or web address]).  This will allow you to review your results, securely communicate with a provider, and schedule virtual visits as well.

## 2021-03-07 ENCOUNTER — ANCILLARY PROCEDURE (OUTPATIENT)
Dept: MRI IMAGING | Facility: CLINIC | Age: 41
End: 2021-03-07
Attending: FAMILY MEDICINE
Payer: COMMERCIAL

## 2021-03-07 DIAGNOSIS — M25.472 LEFT ANKLE SWELLING: ICD-10-CM

## 2021-03-07 PROCEDURE — 73721 MRI JNT OF LWR EXTRE W/O DYE: CPT | Mod: LT | Performed by: RADIOLOGY

## 2021-03-08 ENCOUNTER — MYC MEDICAL ADVICE (OUTPATIENT)
Dept: FAMILY MEDICINE | Facility: CLINIC | Age: 41
End: 2021-03-08

## 2021-03-10 DIAGNOSIS — E11.9 TYPE 2 DIABETES MELLITUS WITHOUT COMPLICATION, WITHOUT LONG-TERM CURRENT USE OF INSULIN (H): ICD-10-CM

## 2021-03-10 LAB — HBA1C MFR BLD: 7.1 % (ref 0–5.6)

## 2021-03-10 PROCEDURE — 36415 COLL VENOUS BLD VENIPUNCTURE: CPT | Performed by: FAMILY MEDICINE

## 2021-03-10 PROCEDURE — 83036 HEMOGLOBIN GLYCOSYLATED A1C: CPT | Performed by: FAMILY MEDICINE

## 2021-03-11 ENCOUNTER — OFFICE VISIT (OUTPATIENT)
Dept: PODIATRY | Facility: CLINIC | Age: 41
End: 2021-03-11
Payer: COMMERCIAL

## 2021-03-11 ENCOUNTER — MYC MEDICAL ADVICE (OUTPATIENT)
Dept: PODIATRY | Facility: CLINIC | Age: 41
End: 2021-03-11

## 2021-03-11 VITALS
BODY MASS INDEX: 25.92 KG/M2 | WEIGHT: 175 LBS | SYSTOLIC BLOOD PRESSURE: 138 MMHG | HEIGHT: 69 IN | DIASTOLIC BLOOD PRESSURE: 82 MMHG

## 2021-03-11 DIAGNOSIS — M76.822 POSTERIOR TIBIAL TENDON DYSFUNCTION (PTTD) OF LEFT LOWER EXTREMITY: ICD-10-CM

## 2021-03-11 DIAGNOSIS — M25.572 CHRONIC PAIN OF LEFT ANKLE: Primary | ICD-10-CM

## 2021-03-11 DIAGNOSIS — E11.42 DIABETIC POLYNEUROPATHY ASSOCIATED WITH TYPE 2 DIABETES MELLITUS (H): ICD-10-CM

## 2021-03-11 DIAGNOSIS — M21.42 FLAT FOOT, ACQUIRED, LEFT: ICD-10-CM

## 2021-03-11 DIAGNOSIS — E11.9 TYPE 2 DIABETES MELLITUS WITHOUT COMPLICATION, WITHOUT LONG-TERM CURRENT USE OF INSULIN (H): ICD-10-CM

## 2021-03-11 DIAGNOSIS — G89.29 CHRONIC PAIN OF LEFT ANKLE: Primary | ICD-10-CM

## 2021-03-11 PROCEDURE — 99214 OFFICE O/P EST MOD 30 MIN: CPT | Performed by: PODIATRIST

## 2021-03-11 ASSESSMENT — MIFFLIN-ST. JEOR: SCORE: 1684.17

## 2021-03-11 NOTE — LETTER
[facility]  [address]  [address]  [place] [address]  Phone: [phone]  Fax: [phone]      REPORT OF WORK ABILITY    Date: 3/11/2021                     Employee Name: Phillip Rueda         YOB: 1980      To Whom It May Concern:    Phillip Rueda was evaluated in clinic today for severe left ankle pain.  This pain is related to a chronic tendon injury.  All weightbearing work should be avoided for the next month.  Seated work only is acceptable.  If he is able to perform seated work, he should be allowed to wear the immobilizing boot.  I will reevaluate him in 1 month.    Thank you      Shelton Smallwood DPM, FACFAS, MS  [facility] Department of Podiatry/Foot & Ankle Surgery

## 2021-03-11 NOTE — PROGRESS NOTES
ASSESSMENT:  Encounter Diagnoses   Name Primary?     Chronic pain of left ankle Yes     Posterior tibial tendon dysfunction (PTTD) of left lower extremity      Flat foot, acquired, left      Type 2 diabetes mellitus without complication, without long-term current use of insulin (H)      Diabetic polyneuropathy associated with type 2 diabetes mellitus (H)        MEDICAL DECISION MAKING:  I evaluated the documentation by Dr. Dos Santos dated 2/26/2021.  I reviewed the x-ray and MRI results.  Given the MRI findings and clinical exam, the source of his pain is likely from fairly severe tendinopathy of the left posterior tibial tendon.  This is consistent with his foot structure.    To his degree of pain and limping, we will start with immobilization.  He is placed in a tall Aircast/CAM Walker.  He is to use this when weightbearing for 2 to 4 weeks.  He was warned about the low risk of DVT.  He is encouraged to remove the boot and do ankle range of motion exercises.  He is to notify us if he develops any calf pain and/or swelling.    I explained that the standard of care for this condition, if pain can be reduced,  Is   custom orthotic use.  He is referred for these.    I have asked him to follow-up in 1 month.  That time, if improving, we will consider transitioning him into a Tri-Lock ankle brace.  Thereafter, custom orthoses.    We discussed relative rest, ice, NSAIDs, Tylenol.    His job involves weightbearing as a  at  [employer].  He should not do weightbearing work at this time.  Letter for his employer was provided requesting no weightbearing work.  Seated work is acceptable and he should be allowed to wear the cam walker.    He inquired about surgery.  This is a possibility, if his pain cannot be reduced.  I explained that involves much more than repairing the tendon.  This would be a flatfoot reconstructive procedure.  Fully reviewed the various procedures that typically involve a calcaneal osteotomy,  "tendon repair, gastrocnemius recession,  as well as midfoot stabilization procedure.    Total time spent on date of service 3/11/2021 was 30 minutes.  This involved review of primary care documentation, imaging results, face-to-face examination, counseling coordination of care, and documentation of today's visit.    Disclaimer: This note consists of symbols derived from keyboarding, dictation and/or voice recognition software. As a result, there may be errors in the script that have gone undetected. Please consider this when interpreting information found in this chart.    Shelton Smallwood, HUNG, FACFAS, MS    [facility]      ____________________________________________________________________    HPI:       I was asked by Dr. Dos Santos to evaluate Phillip Rueda in consultation for left ankle swelling and pain.     Chief Complaint: \"pretty severe pain in left ankle/ foot and swelling\"  Onset of problem: 3 months  No change in activity or injury at onset  Pain/ discomfort is described as:  Burning, shooting, deep ache  Pain Ratin/10 at worst   Frequency:  daily    The pain is exacerbated by walking, weight bearing activities  Previous treatment: XR, MRI, referral to podiatry.  Type 2 DM  Last Hgb A1C = 7.1.  I last evaluated him 3/8/2018 for a toe injury and wound.    Past Medical History:   Diagnosis Date     Anxiety      Depressive disorder      Diabetes mellitus (H)      Hypertension      Liver disease      Neuralgic amyotrophy      Pain disorder 2015     Parsonage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      Prostate infection      Seizures (H)      Staph infection    *  *  Past Surgical History:   Procedure Laterality Date     ORTHOPEDIC SURGERY     *  *  Current Outpatient Medications   Medication Sig Dispense Refill     ammonium lactate (LAC-HYDRIN) 12 % external cream Apply topically 2 times daily as needed for dry skin 385 g 3     atorvastatin (LIPITOR) 20 MG tablet TAKE ONE TABLET BY " MOUTH ONCE DAILY 90 tablet 3     blood glucose (NO BRAND SPECIFIED) lancets standard Use to test blood sugar 2 times daily or as directed. 100 each 1     blood glucose (NO BRAND SPECIFIED) test strip Use to test blood sugar 2 times daily or as directed. 100 each 1     blood glucose monitoring (NO BRAND SPECIFIED) meter device kit Use to test blood sugar 2 times daily or as directed. 1 kit 0     Buprenorphine HCl-Naloxone HCl (SUBOXONE) 4-1 MG film Place 1 Film under the tongue 3 times daily Taking 2-3 times a day and weening off       continuous blood glucose monitoring (FREESTYLE JHON) sensor For use with Freestyle Jhon Flash  for continuous monitioring of blood glucose levels. Replace sensor every 14 days. 6 each 3     cyclobenzaprine (FLEXERIL) 5 MG tablet Take 2 tablets (10 mg) by mouth 3 times daily as needed for muscle spasms 20 tablet 0     diazepam (VALIUM) 5 MG tablet Take 1 tablet (5 mg) 3 times daily as needed. 60 tablet      EPINEPHrine (EPIPEN) 0.3 MG/0.3ML injection Inject 0.3 mLs (0.3 mg) into the muscle once as needed for anaphylaxis 1 each 2     gabapentin (NEURONTIN) 600 MG tablet Take 0.5 tablets (300 mg) by mouth 2 times daily       ibuprofen (ADVIL,MOTRIN) 200 MG tablet Take 1-2 tablets by mouth every 6 hours as needed.       insulin pen needle 31G X 6 MM Use 2 daily or as directed. 100 each 11     liraglutide (VICTOZA PEN) 18 MG/3ML solution INJCT 1.8MG UNDER THE SKIN ONCE DAILY 27 mL 1     [START ON 5/4/2021] metFORMIN (GLUCOPHAGE-XR) 500 MG 24 hr tablet TAKE TWO TABLETS BY MOUTH TWICE A DAY WITH MEALS. 120 tablet 0     nicotine (NICORETTE) 4 MG lozenge PLACE 1 LOZENGE INSIDE THE CHEEK AS NEEDED FOR SMOKING CESSATION 144 lozenge 0     nortriptyline (PAMELOR) 25 MG capsule TAKE 2 CAPSULES(50 MG) BY MOUTH AT BEDTIME 60 capsule 1     oxyCODONE (ROXICODONE) 5 MG tablet Take 1 tablet (5 mg) by mouth every 6 hours as needed for severe pain (sleep) 6 tablet 0     sildenafil (VIAGRA) 100 MG  "tablet Take 1/2 to 1 pill as needed for erectile dysfunction. 5 tablet 5     venlafaxine (EFFEXOR-ER) 150 MG TB24 Take 1 tablet by mouth daily (with breakfast). 30 each 0         EXAM:    Vitals: /82   Ht 1.753 m (5' 9\")   Wt 79.4 kg (175 lb)   BMI 25.84 kg/m    BMI: Body mass index is 25.84 kg/m .    Constitutional:  Phillip Rueda is in no apparent distress, appears well-nourished.  Cooperative with history and physical exam.    Vascular:  Pedal pulses are palpable for both the DP and PT arteries.  CFT < 3 sec.  No edema.      Neuro: Light touch sensation is intact to the L4, L5, S1 distributions  No evidence of weakness, spasticity, or contracture in the lower extremities.     Derm: Normal texture and turgor.  No erythema, ecchymosis, or cyanosis.  No open lesions.     Musculoskeletal:    Lower extremity muscle strength is normal.  Significant pes planus on the left, more so than right.  This involves forefoot abduction, decrease in medial longitudinal arch and rear foot eversion.  It is painful for him to move the ankle.  His pain is medial and he traces the course of the posterior tibial tendon.  He has pain with palpation over the tendon and with inversion of the foot against resistance.  Reports he cannot go up on his toes without pain.    MRI Left Ankle:  3/7/21                                                 Impression:  1. Highly abnormal tibialis posterior tendon extending from most  proximal within the field-of-view to just proximal to its attachment  side distally. High-grade interstitial split tear (that reconstitutes  itself just proximal to its distal attachment at the navicular, medial  cuneiform bone). Markedly increased diameter of the tendon due to  high-grade tendinopathy proximal and distal to the tear and high-grade  tenosynovitis with a large amount of fluid within the tendon sheath.  Small accessory tendon slip is noted far proximally. Reactive bone  marrow edema within the medial " malleolus. Bowed, but intact flexor  retinaculum, tendon remains situated in the posterior medial malleolar  groove.  2. Moderate grade tenosynovitis involving the flexor digitorum,  however tendon appears normal, normal flexor hallucis longus tendon  and significant fluid about the master knot of Ned.  3. Edema and fluid within the sinus Tarsi as can be seen in sinus  Tarsi syndrome, which may be associated with posterior tibialis tendon  pathology. Subcortical cystic changes in close proximity at the  calcaneal angle of Gissane.  3. Degenerative changes about the first tarsometatarsal joint.  4. Thickening of the deltoid and spring ligamentous complex, likely  coexisting in the context of the posterior tibialis tendon pathology.   However, the deltoid and spring ligamentous complex remains intact,  consistent with sequela of prior injury.     JUTTA ELLERMANN, MD      LEFT FOOT THREE OR MORE VIEWS, LEFT ANKLE THREE OR MORE VIEWS   1/2/2021 7:22 PM      HISTORY: Medial foot and ankle pain.     COMPARISON: None.                                                                      IMPRESSION: No evidence of acute fracture or subluxation in the ankle  or foot. Mortise and talar dome are intact.     VICTORIA DUFF MD

## 2021-03-11 NOTE — LETTER
3/11/2021         RE: Phillip Rueda  [address]  [facility] [address]        Dear Colleague,    Thank you for referring your patient, Phillip Rueda, to the [facility]. Please see a copy of my visit note below.    ASSESSMENT:  Encounter Diagnoses   Name Primary?     Chronic pain of left ankle Yes     Posterior tibial tendon dysfunction (PTTD) of left lower extremity      Flat foot, acquired, left      Type 2 diabetes mellitus without complication, without long-term current use of insulin (H)      Diabetic polyneuropathy associated with type 2 diabetes mellitus (H)        MEDICAL DECISION MAKING:  I evaluated the documentation by Dr. Dos Santos dated 2/26/2021.  I reviewed the x-ray and MRI results.  Given the MRI findings and clinical exam, the source of his pain is likely from fairly severe tendinopathy of the left posterior tibial tendon.  This is consistent with his foot structure.    To his degree of pain and limping, we will start with immobilization.  He is placed in a tall Aircast/CAM Walker.  He is to use this when weightbearing for 2 to 4 weeks.  He was warned about the low risk of DVT.  He is encouraged to remove the boot and do ankle range of motion exercises.  He is to notify us if he develops any calf pain and/or swelling.    I explained that the standard of care for this condition, if pain can be reduced,  Is   custom orthotic use.  He is referred for these.    I have asked him to follow-up in 1 month.  That time, if improving, we will consider transitioning him into a Tri-Lock ankle brace.  Thereafter, custom orthoses.    We discussed relative rest, ice, NSAIDs, Tylenol.    His job involves weightbearing as a  at  [employer].  He should not do weightbearing work at this time.  Letter for his employer was provided requesting no weightbearing work.  Seated work is acceptable and he should be allowed to wear the cam walker.    He inquired about  "surgery.  This is a possibility, if his pain cannot be reduced.  I explained that involves much more than repairing the tendon.  This would be a flatfoot reconstructive procedure.  Fully reviewed the various procedures that typically involve a calcaneal osteotomy, tendon repair, gastrocnemius recession,  as well as midfoot stabilization procedure.    Total time spent on date of service 3/11/2021 was 30 minutes.  This involved review of primary care documentation, imaging results, face-to-face examination, counseling coordination of care, and documentation of today's visit.    Disclaimer: This note consists of symbols derived from keyboarding, dictation and/or voice recognition software. As a result, there may be errors in the script that have gone undetected. Please consider this when interpreting information found in this chart.    Shelton Smallwood DPM, FACFAS, MS    [facility]le [facility]      ____________________________________________________________________    HPI:       I was asked by Dr. Dos Santos to evaluate Phillip Rueda in consultation for left ankle swelling and pain.     Chief Complaint: \"pretty severe pain in left ankle/ foot and swelling\"  Onset of problem: 3 months  No change in activity or injury at onset  Pain/ discomfort is described as:  Burning, shooting, deep ache  Pain Ratin/10 at worst   Frequency:  daily    The pain is exacerbated by walking, weight bearing activities  Previous treatment: XR, MRI, referral to podiatry.  Type 2 DM  Last Hgb A1C = 7.1.  I last evaluated him 3/8/2018 for a toe injury and wound.    Past Medical History:   Diagnosis Date     Anxiety      Depressive disorder      Diabetes mellitus (H)      Hypertension      Liver disease      Neuralgic amyotrophy      Pain disorder 2015     Parsonage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      Prostate infection      Seizures (H)      Staph infection    *  *  Past Surgical History:   Procedure Laterality Date     " ORTHOPEDIC SURGERY     *  *  Current Outpatient Medications   Medication Sig Dispense Refill     ammonium lactate (LAC-HYDRIN) 12 % external cream Apply topically 2 times daily as needed for dry skin 385 g 3     atorvastatin (LIPITOR) 20 MG tablet TAKE ONE TABLET BY MOUTH ONCE DAILY 90 tablet 3     blood glucose (NO BRAND SPECIFIED) lancets standard Use to test blood sugar 2 times daily or as directed. 100 each 1     blood glucose (NO BRAND SPECIFIED) test strip Use to test blood sugar 2 times daily or as directed. 100 each 1     blood glucose monitoring (NO BRAND SPECIFIED) meter device kit Use to test blood sugar 2 times daily or as directed. 1 kit 0     Buprenorphine HCl-Naloxone HCl (SUBOXONE) 4-1 MG film Place 1 Film under the tongue 3 times daily Taking 2-3 times a day and weening off       continuous blood glucose monitoring (FREESTYLE JHON) sensor For use with Freestyle Jhon Flash  for continuous monitioring of blood glucose levels. Replace sensor every 14 days. 6 each 3     cyclobenzaprine (FLEXERIL) 5 MG tablet Take 2 tablets (10 mg) by mouth 3 times daily as needed for muscle spasms 20 tablet 0     diazepam (VALIUM) 5 MG tablet Take 1 tablet (5 mg) 3 times daily as needed. 60 tablet      EPINEPHrine (EPIPEN) 0.3 MG/0.3ML injection Inject 0.3 mLs (0.3 mg) into the muscle once as needed for anaphylaxis 1 each 2     gabapentin (NEURONTIN) 600 MG tablet Take 0.5 tablets (300 mg) by mouth 2 times daily       ibuprofen (ADVIL,MOTRIN) 200 MG tablet Take 1-2 tablets by mouth every 6 hours as needed.       insulin pen needle 31G X 6 MM Use 2 daily or as directed. 100 each 11     liraglutide (VICTOZA PEN) 18 MG/3ML solution INJCT 1.8MG UNDER THE SKIN ONCE DAILY 27 mL 1     [START ON 5/4/2021] metFORMIN (GLUCOPHAGE-XR) 500 MG 24 hr tablet TAKE TWO TABLETS BY MOUTH TWICE A DAY WITH MEALS. 120 tablet 0     nicotine (NICORETTE) 4 MG lozenge PLACE 1 LOZENGE INSIDE THE CHEEK AS NEEDED FOR SMOKING CESSATION 144  "lozenge 0     nortriptyline (PAMELOR) 25 MG capsule TAKE 2 CAPSULES(50 MG) BY MOUTH AT BEDTIME 60 capsule 1     oxyCODONE (ROXICODONE) 5 MG tablet Take 1 tablet (5 mg) by mouth every 6 hours as needed for severe pain (sleep) 6 tablet 0     sildenafil (VIAGRA) 100 MG tablet Take 1/2 to 1 pill as needed for erectile dysfunction. 5 tablet 5     venlafaxine (EFFEXOR-ER) 150 MG TB24 Take 1 tablet by mouth daily (with breakfast). 30 each 0         EXAM:    Vitals: /82   Ht 1.753 m (5' 9\")   Wt 79.4 kg (175 lb)   BMI 25.84 kg/m    BMI: Body mass index is 25.84 kg/m .    Constitutional:  Phillip Rueda is in no apparent distress, appears well-nourished.  Cooperative with history and physical exam.    Vascular:  Pedal pulses are palpable for both the DP and PT arteries.  CFT < 3 sec.  No edema.      Neuro: Light touch sensation is intact to the L4, L5, S1 distributions  No evidence of weakness, spasticity, or contracture in the lower extremities.     Derm: Normal texture and turgor.  No erythema, ecchymosis, or cyanosis.  No open lesions.     Musculoskeletal:    Lower extremity muscle strength is normal.  Significant pes planus on the left, more so than right.  This involves forefoot abduction, decrease in medial longitudinal arch and rear foot eversion.  It is painful for him to move the ankle.  His pain is medial and he traces the course of the posterior tibial tendon.  He has pain with palpation over the tendon and with inversion of the foot against resistance.  Reports he cannot go up on his toes without pain.    MRI Left Ankle:  3/7/21                                                 Impression:  1. Highly abnormal tibialis posterior tendon extending from most  proximal within the field-of-view to just proximal to its attachment  side distally. High-grade interstitial split tear (that reconstitutes  itself just proximal to its distal attachment at the navicular, medial  cuneiform bone). Markedly increased " diameter of the tendon due to  high-grade tendinopathy proximal and distal to the tear and high-grade  tenosynovitis with a large amount of fluid within the tendon sheath.  Small accessory tendon slip is noted far proximally. Reactive bone  marrow edema within the medial malleolus. Bowed, but intact flexor  retinaculum, tendon remains situated in the posterior medial malleolar  groove.  2. Moderate grade tenosynovitis involving the flexor digitorum,  however tendon appears normal, normal flexor hallucis longus tendon  and significant fluid about the master knot of Ned.  3. Edema and fluid within the sinus Tarsi as can be seen in sinus  Tarsi syndrome, which may be associated with posterior tibialis tendon  pathology. Subcortical cystic changes in close proximity at the  calcaneal angle of Gissane.  3. Degenerative changes about the first tarsometatarsal joint.  4. Thickening of the deltoid and spring ligamentous complex, likely  coexisting in the context of the posterior tibialis tendon pathology.   However, the deltoid and spring ligamentous complex remains intact,  consistent with sequela of prior injury.     JUTTA ELLERMANN, MD      LEFT FOOT THREE OR MORE VIEWS, LEFT ANKLE THREE OR MORE VIEWS   1/2/2021 7:22 PM      HISTORY: Medial foot and ankle pain.     COMPARISON: None.                                                                      IMPRESSION: No evidence of acute fracture or subluxation in the ankle  or foot. Mortise and talar dome are intact.     VICTORIA DUFF MD          Again, thank you for allowing me to participate in the care of your patient.        Sincerely,        Shelton Smallwood DPM

## 2021-03-11 NOTE — PATIENT INSTRUCTIONS
FLAT FEET   Flatfoot is often a complex disorder, with diverse symptoms and varying degrees of deformity and disability. There are several types of flatfoot, all of which have one characteristic in common: partial or total collapse (loss) of the arch.  Other characteristics shared by most types of flatfoot include:   Toe drift,  in which the toes and front part of the foot point outward   The heel tilts toward the outside and the ankle appears to turn in   A tight Achilles tendon, which causes the heel to lift off the ground earlier when walking and may make the problem worse   Bunions and hammertoes may develop as a result of a flatfoot.   Flexible Flatfoot  Flexible flatfoot is one of the most common types of flatfoot. It typically begins in childhood or adolescence and continues into adulthood. It usually occurs in both feet and progresses in severity throughout the adult years. As the deformity worsens, the soft tissues (tendons and ligaments) of the arch may stretch or tear and can become inflamed.  The term  flexible  means that while the foot is flat when standing (weight-bearing), the arch returns when not standing.  SYMPTOMS  Pain in the heel, arch, ankle, or along the outside of the foot    Rolled-in  ankle (over-pronation)   Pain along the shin bone (shin splint)   General aching or fatigue in the foot or leg   Low back, hip or knee pain.   DIAGNOSIS  In diagnosing flatfoot, the foot and ankle surgeon examines the foot and observes how it looks when you stand and sit. X-rays are usually taken to determine the severity of the disorder. If you are diagnosed with flexible flatfoot but you don t have any symptoms, your surgeon will explain what you might expect in the future.  NON-SURGICAL TREATMENT  If you experience symptoms with flexible flatfoot, the surgeon may recommend non-surgical treatment options, including:  Activity modifications. Cut down on activities that bring you pain and avoid prolonged  walking and standing to give your arches a rest.   Weight loss. If you are overweight, try to lose weight. Putting too much weight on your arches may aggravate your symptoms.   Orthotic devices. Your foot and ankle surgeon can provide you with custom orthotic devices for your shoes to give more support to the arches.   Immobilization. In some cases, it may be necessary to use a walking cast or to completely avoid weight-bearing.   Medications. Nonsteroidal anti-inflammatory drugs (NSAIDs), such as ibuprofen, help reduce pain and inflammation.   Physical therapy. Ultrasound therapy or other physical therapy modalities may be used to provide temporary relief.   Shoe modifications. Wearing shoes that support the arches is important for anyone who has flatfoot.   SURGICAL TREATMENT  In some patients whose pain is not adequately relieved by other treatments, surgery may be considered. A variety of surgical techniques is available to correct flexible flatfoot, and one or a combination of procedures may be required to relieve the symptoms and improve foot function.  In selecting the procedure or combination of procedures for your particular case, the foot and ankle surgeon will take into consideration the extent of your deformity based on the x-ray findings, your age, your activity level, and other factors. The length of the recovery period will vary, depending on the procedure or procedures performed.    POSTERIOR TIBIAL TENDON DYSFUNCTION (PTTD)  The posterior tibial tendon serves as one of the major supporting structures of the foot, helping it to function while walking. Posterior tibial tendon dysfunction (PTTD) is a condition caused by changes in the tendon, impairing its ability to support the arch. This results in flattening of the foot.  PTTD is often called adult acquired flatfoot because it is the most common type of flatfoot developed during adulthood. Although this condition typically occurs in only one foot,  some people may develop it in both feet. PTTD is usually progressive, which means it will keep getting worse, especially if it is not treated early.  CAUSES  Overuse of the posterior tibial tendon is often the cause of PTTD. In fact, the symptoms usually occur after activities that involve the tendon, such as running, walking, hiking or climbing stairs.  SYMPTOMS   The symptoms of PTTD may include pain, swelling, a flattening of the arch and an inward rolling of the ankle. As the condition progresses, the symptoms will change.  For example, when PTTD initially develops, there is pain on the inside of the foot and ankle (along the course of the tendon). In addition, the area may be red, warm and swollen.  Later, as the arch begins to flatten, there may still be pain on the inside of the foot and ankle. But at this point, the foot and toes begin to turn outward and the ankle rolls inward.  As PTTD becomes more advanced, the arch flattens even more and the pain often shifts to the outside of the foot, below the ankle. The tendon has deteriorated considerably, and arthritis often develops in the foot. In more severe cases, arthritis may also develop in the ankle.  TREATMENT  Because of the progressive nature of PTTD, early treatment is advised. If treated early enough, your symptoms may resolve without the need for surgery, and progression of your condition can be arrested.  In contrast, untreated PTTD could leave you with an extremely flat foot, painful arthritis in the foot and ankle and increasing limitations on walking, running or other activities.  In many cases of PTTD, treatment can begin with nonsurgical approaches that may include:  Orthotic devices or bracing - To give your arch the support it needs, your foot and ankle surgeon may provide you with an ankle brace or a custom orthotic device that fits into the shoe.   Immobilization - Sometimes a short-leg cast or boot is worn to immobilize the foot and allow  the tendon to heal, or you may need to completely avoid all weightbearing for a while.   Physical therapy - Ultrasound therapy and exercises may help rehabilitate the tendon and muscle following immobilization.   Medications - Nonsteroidal anti-inflammatory drugs (NSAIDs), such as ibuprofen, help reduce the pain and inflammation.   Shoe modifications - Your foot and ankle surgeon may advise changes to your shoes and may provide special inserts designed to improve arch support.  SURGERY  In cases of PTTD that have progressed substantially or have failed to improve with nonsurgical treatment, surgery may be required. For some advanced cases, surgery may be the only option. Your foot and ankle surgeon will determine the best approach for you.    [facility] LOC[facility]  [facility]  [address]  [place] [address]  Phone: [phone]  Fax: [phone] [facility]  [address]  [place] [address]  Phone: [phone]   Fax: [phone]   [facility]  [address]  [place] [address]  Phone: [phone]  Fax: [phone] [facility]  [address]  [place] [address]  Phone: [phone]   Fax: [phone]   [facility]   [address]  [place] [address]  Phone: [phone]  Fax: [phone] * Please call any location listed to make an appointment for a casting/fitting. Your referral was sent to their central office and they will all have the order on file.

## 2021-03-16 ENCOUNTER — MYC MEDICAL ADVICE (OUTPATIENT)
Dept: PODIATRY | Facility: CLINIC | Age: 41
End: 2021-03-16

## 2021-03-25 ENCOUNTER — OFFICE VISIT (OUTPATIENT)
Dept: PODIATRY | Facility: CLINIC | Age: 41
End: 2021-03-25
Payer: COMMERCIAL

## 2021-03-25 VITALS — WEIGHT: 175 LBS | HEIGHT: 69 IN | BODY MASS INDEX: 25.92 KG/M2

## 2021-03-25 DIAGNOSIS — G89.29 CHRONIC PAIN OF LEFT ANKLE: ICD-10-CM

## 2021-03-25 DIAGNOSIS — M21.42 ACQUIRED FLAT FOOT, LEFT: ICD-10-CM

## 2021-03-25 DIAGNOSIS — M67.979 TIBIALIS POSTERIOR TENDINOPATHY: Primary | ICD-10-CM

## 2021-03-25 DIAGNOSIS — M25.572 CHRONIC PAIN OF LEFT ANKLE: ICD-10-CM

## 2021-03-25 PROCEDURE — 99214 OFFICE O/P EST MOD 30 MIN: CPT | Performed by: PODIATRIST

## 2021-03-25 ASSESSMENT — MIFFLIN-ST. JEOR: SCORE: 1684.17

## 2021-03-25 NOTE — PROGRESS NOTES
ASSESSMENT:  Encounter Diagnoses   Name Primary?     Tibialis posterior tendinopathy Yes     Acquired flat foot, left      Chronic pain of left ankle      MEDICAL DECISION MAKING:  Given his degree of ongoing pain as well as MRI findings showing significant disease of the posterior tibial tendon, I think surgical intervention is a reasonable request.    I explained to him that there are no guarantees.  I explained we are attempting to make his flatfoot less flat to offload the posterior tibial tendon that will be repaired.  The tendon will never be normal.    I discussed the likely procedures in detail, including a gastrocnemius recession, calcaneal osteotomy, stabilizing midfoot osteotomy and posterior tibial tendon repair.  The tendon work might involve tendon grafting and/or tenodesis.  I explained that he will be admitted for one night after surgery.    I discussed the long recovery.  Full weightbearing on the left foot does not occur, at the earliest, until 10 weeks postoperative leave.    He is asked to consider what we discussed.  If he opts to proceed with surgery, I will have him back for a more detailed discussion regarding the risks and the postoperative cares and course.    A case request was placed.    Total time spent on his care today, date of service, 3/25/2021 was 35 minutes.  This involved review of previous imaging results, face-to-face discussion and coordination of care, documentation of today's visit and submitting a case request.    Disclaimer: This note consists of symbols derived from keyboarding, dictation and/or voice recognition software. As a result, there may be errors in the script that have gone undetected. Please consider this when interpreting information found in this chart.    Shelton Smallwood, HUNG, FACFAS, MS    [facility]le [facility]      ____________________________________________________________________    HPI:         Chief Complaint: follow up for  significant left ankle pain.   I evaluated him 3/11/21.  His pain is most consistent with a severe posterior tibial tendinopathy.  This is supported by clinical exam, as well as MRI findings.  Due to the pain, he has worn a cam walker and use crutches.  His pain persist.  He inquires about surgical intervention.  The pain started 3 months ago and initial work-up was done by Dr. Dos Santos. He then referred to Podiatry.   Pain persist at a 9/10  He outlines the course of the posterior tibial tendon around the medial ankle.    Past Medical History:   Diagnosis Date     Anxiety      Depressive disorder      Diabetes mellitus (H)      Hypertension      Liver disease      Neuralgic amyotrophy      Pain disorder 12/8/2015     Parsonage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      Prostate infection      Seizures (H)      Staph infection    *  *  Past Surgical History:   Procedure Laterality Date     ORTHOPEDIC SURGERY     *  *  Current Outpatient Medications   Medication Sig Dispense Refill     ammonium lactate (LAC-HYDRIN) 12 % external cream Apply topically 2 times daily as needed for dry skin 385 g 3     atorvastatin (LIPITOR) 20 MG tablet TAKE ONE TABLET BY MOUTH ONCE DAILY 90 tablet 3     blood glucose (NO BRAND SPECIFIED) lancets standard Use to test blood sugar 2 times daily or as directed. 100 each 1     blood glucose (NO BRAND SPECIFIED) test strip Use to test blood sugar 2 times daily or as directed. 100 each 1     blood glucose monitoring (NO BRAND SPECIFIED) meter device kit Use to test blood sugar 2 times daily or as directed. 1 kit 0     Buprenorphine HCl-Naloxone HCl (SUBOXONE) 4-1 MG film Place 1 Film under the tongue 3 times daily Taking 2-3 times a day and weening off       continuous blood glucose monitoring (FREESTYLE JHON) sensor For use with Freestyle Jhon Flash  for continuous monitioring of blood glucose levels. Replace sensor every 14 days. 6 each 3     cyclobenzaprine (FLEXERIL) 5 MG tablet Take 2 tablets (10 mg)  "by mouth 3 times daily as needed for muscle spasms 20 tablet 0     diazepam (VALIUM) 5 MG tablet Take 1 tablet (5 mg) 3 times daily as needed. 60 tablet      EPINEPHrine (EPIPEN) 0.3 MG/0.3ML injection Inject 0.3 mLs (0.3 mg) into the muscle once as needed for anaphylaxis 1 each 2     gabapentin (NEURONTIN) 600 MG tablet Take 0.5 tablets (300 mg) by mouth 2 times daily       ibuprofen (ADVIL,MOTRIN) 200 MG tablet Take 1-2 tablets by mouth every 6 hours as needed.       insulin pen needle 31G X 6 MM Use 2 daily or as directed. 100 each 11     liraglutide (VICTOZA PEN) 18 MG/3ML solution INJCT 1.8MG UNDER THE SKIN ONCE DAILY 27 mL 1     [START ON 5/4/2021] metFORMIN (GLUCOPHAGE-XR) 500 MG 24 hr tablet TAKE TWO TABLETS BY MOUTH TWICE A DAY WITH MEALS. 120 tablet 0     nicotine (NICORETTE) 4 MG lozenge PLACE 1 LOZENGE INSIDE THE CHEEK AS NEEDED FOR SMOKING CESSATION 144 lozenge 0     nortriptyline (PAMELOR) 25 MG capsule TAKE 2 CAPSULES(50 MG) BY MOUTH AT BEDTIME 60 capsule 1     oxyCODONE (ROXICODONE) 5 MG tablet Take 1 tablet (5 mg) by mouth every 6 hours as needed for severe pain (sleep) 6 tablet 0     sildenafil (VIAGRA) 100 MG tablet Take 1/2 to 1 pill as needed for erectile dysfunction. 5 tablet 5     venlafaxine (EFFEXOR-ER) 150 MG TB24 Take 1 tablet by mouth daily (with breakfast). 30 each 0         EXAM:    Vitals: Ht 1.753 m (5' 9\")   Wt 79.4 kg (175 lb)   BMI 25.84 kg/m    BMI: Body mass index is 25.84 kg/m .    Constitutional:  Phillip Rueda is in no apparent distress, appears well-nourished.  Cooperative with history and physical exam.     Vascular:  Pedal pulses are palpable for both the DP and PT arteries.  CFT < 3 sec.     Neuro: Light touch sensation is intact to the L4, L5, S1 distributions  No evidence of weakness, spasticity, or contracture in the lower extremities.      Derm: Normal texture and turgor.  No erythema, ecchymosis, or cyanosis.  No open lesions.      Musculoskeletal:    Lower " extremity muscle strength is normal.  Significant pes planus on the left, more so than right.  This involves forefoot abduction, decrease in medial longitudinal arch and rear foot eversion.  It is painful for him to move the ankle.  His pain is medial and he traces the course of the posterior tibial tendon.  He has pain with palpation over the tendon and with inversion of the foot against resistance.  Reports he cannot go up on his toes without pain.     MRI Left Ankle:  3/7/21                                                 Impression:  1. Highly abnormal tibialis posterior tendon extending from most  proximal within the field-of-view to just proximal to its attachment  side distally. High-grade interstitial split tear (that reconstitutes  itself just proximal to its distal attachment at the navicular, medial  cuneiform bone). Markedly increased diameter of the tendon due to  high-grade tendinopathy proximal and distal to the tear and high-grade  tenosynovitis with a large amount of fluid within the tendon sheath.  Small accessory tendon slip is noted far proximally. Reactive bone  marrow edema within the medial malleolus. Bowed, but intact flexor  retinaculum, tendon remains situated in the posterior medial malleolar  groove.  2. Moderate grade tenosynovitis involving the flexor digitorum,  however tendon appears normal, normal flexor hallucis longus tendon  and significant fluid about the master knot of Ned.  3. Edema and fluid within the sinus Tarsi as can be seen in sinus  Tarsi syndrome, which may be associated with posterior tibialis tendon  pathology. Subcortical cystic changes in close proximity at the  calcaneal angle of Gissane.  3. Degenerative changes about the first tarsometatarsal joint.  4. Thickening of the deltoid and spring ligamentous complex, likely  coexisting in the context of the posterior tibialis tendon pathology.   However, the deltoid and spring ligamentous complex remains  intact,  consistent with sequela of prior injury.     JUTTA ELLERMANN, MD        LEFT FOOT THREE OR MORE VIEWS, LEFT ANKLE THREE OR MORE VIEWS   1/2/2021 7:22 PM      HISTORY: Medial foot and ankle pain.     COMPARISON: None.                                                                      IMPRESSION: No evidence of acute fracture or subluxation in the ankle  or foot. Mortise and talar dome are intact.     VICTORIA DUFF MD

## 2021-03-25 NOTE — PATIENT INSTRUCTIONS
Thank you for choosing [facility] / [facility]le Surgery!    DR. GAVIN'S CLINIC LOCATIONS     [facility] SURGERY: [phone]   [address] [place] [address] APPOINTMENTS: [phone]   [place] [address] BILLING QUESTIONS: [phone]  [phone] RADIOLOGY: [phone]       [place] [address]    [place] [address]    [phone]  [phone]        Phillip to follow up with Primary Care provider regarding elevated blood pressure. (if equal or greater than 140/90)

## 2021-03-25 NOTE — LETTER
3/25/2021         RE: Phillip Rueda  [address]  [facility] [address]        Dear Colleague,    Thank you for referring your patient, Phillip Rueda, to the [facility]. Please see a copy of my visit note below.    ASSESSMENT:  Encounter Diagnoses   Name Primary?     Tibialis posterior tendinopathy Yes     Acquired flat foot, left      Chronic pain of left ankle      MEDICAL DECISION MAKING:  Given his degree of ongoing pain as well as MRI findings showing significant disease of the posterior tibial tendon, I think surgical intervention is a reasonable request.    I explained to him that there are no guarantees.  I explained we are attempting to make his flatfoot less flat to offload the posterior tibial tendon that will be repaired.  The tendon will never be normal.    I discussed the likely procedures in detail, including a gastrocnemius recession, calcaneal osteotomy, stabilizing midfoot osteotomy and posterior tibial tendon repair.  The tendon work might involve tendon grafting and/or tenodesis.  I explained that he will be admitted for one night after surgery.    I discussed the long recovery.  Full weightbearing on the left foot does not occur, at the earliest, until 10 weeks postoperative leave.    He is asked to consider what we discussed.  If he opts to proceed with surgery, I will have him back for a more detailed discussion regarding the risks and the postoperative cares and course.    A case request was placed.    Total time spent on his care today, date of service, 3/25/2021 was 35 minutes.  This involved review of previous imaging results, face-to-face discussion and coordination of care, documentation of today's visit and submitting a case request.    Disclaimer: This note consists of symbols derived from keyboarding, dictation and/or voice recognition software. As a result, there may be errors in the script that have gone undetected. Please consider this  when interpreting information found in this chart.    Shelton Smallwood, HUNG, FACFAS, MS    [place] Department of Podiatry/Foot & Ankle Surgery      ____________________________________________________________________    HPI:         Chief Complaint: follow up for significant left ankle pain.   I evaluated him 3/11/21.  His pain is most consistent with a severe posterior tibial tendinopathy.  This is supported by clinical exam, as well as MRI findings.  Due to the pain, he has worn a cam walker and use crutches.  His pain persist.  He inquires about surgical intervention.  The pain started 3 months ago and initial work-up was done by Dr. Dos Santos. He then referred to Podiatry.   Pain persist at a 9/10  He outlines the course of the posterior tibial tendon around the medial ankle.    Past Medical History:   Diagnosis Date     Anxiety      Depressive disorder      Diabetes mellitus (H)      Hypertension      Liver disease      Neuralgic amyotrophy      Pain disorder 12/8/2015     Parsonage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      Prostate infection      Seizures (H)      Staph infection    *  *  Past Surgical History:   Procedure Laterality Date     ORTHOPEDIC SURGERY     *  *  Current Outpatient Medications   Medication Sig Dispense Refill     ammonium lactate (LAC-HYDRIN) 12 % external cream Apply topically 2 times daily as needed for dry skin 385 g 3     atorvastatin (LIPITOR) 20 MG tablet TAKE ONE TABLET BY MOUTH ONCE DAILY 90 tablet 3     blood glucose (NO BRAND SPECIFIED) lancets standard Use to test blood sugar 2 times daily or as directed. 100 each 1     blood glucose (NO BRAND SPECIFIED) test strip Use to test blood sugar 2 times daily or as directed. 100 each 1     blood glucose monitoring (NO BRAND SPECIFIED) meter device kit Use to test blood sugar 2 times daily or as directed. 1 kit 0     Buprenorphine HCl-Naloxone HCl (SUBOXONE) 4-1 MG film Place 1 Film under the tongue 3 times daily Taking 2-3 times a day and weening off        "continuous blood glucose monitoring (FREESTYLE JHON) sensor For use with Freestyle Jhon Flash  for continuous monitioring of blood glucose levels. Replace sensor every 14 days. 6 each 3     cyclobenzaprine (FLEXERIL) 5 MG tablet Take 2 tablets (10 mg) by mouth 3 times daily as needed for muscle spasms 20 tablet 0     diazepam (VALIUM) 5 MG tablet Take 1 tablet (5 mg) 3 times daily as needed. 60 tablet      EPINEPHrine (EPIPEN) 0.3 MG/0.3ML injection Inject 0.3 mLs (0.3 mg) into the muscle once as needed for anaphylaxis 1 each 2     gabapentin (NEURONTIN) 600 MG tablet Take 0.5 tablets (300 mg) by mouth 2 times daily       ibuprofen (ADVIL,MOTRIN) 200 MG tablet Take 1-2 tablets by mouth every 6 hours as needed.       insulin pen needle 31G X 6 MM Use 2 daily or as directed. 100 each 11     liraglutide (VICTOZA PEN) 18 MG/3ML solution INJCT 1.8MG UNDER THE SKIN ONCE DAILY 27 mL 1     [START ON 5/4/2021] metFORMIN (GLUCOPHAGE-XR) 500 MG 24 hr tablet TAKE TWO TABLETS BY MOUTH TWICE A DAY WITH MEALS. 120 tablet 0     nicotine (NICORETTE) 4 MG lozenge PLACE 1 LOZENGE INSIDE THE CHEEK AS NEEDED FOR SMOKING CESSATION 144 lozenge 0     nortriptyline (PAMELOR) 25 MG capsule TAKE 2 CAPSULES(50 MG) BY MOUTH AT BEDTIME 60 capsule 1     oxyCODONE (ROXICODONE) 5 MG tablet Take 1 tablet (5 mg) by mouth every 6 hours as needed for severe pain (sleep) 6 tablet 0     sildenafil (VIAGRA) 100 MG tablet Take 1/2 to 1 pill as needed for erectile dysfunction. 5 tablet 5     venlafaxine (EFFEXOR-ER) 150 MG TB24 Take 1 tablet by mouth daily (with breakfast). 30 each 0         EXAM:    Vitals: Ht 1.753 m (5' 9\")   Wt 79.4 kg (175 lb)   BMI 25.84 kg/m    BMI: Body mass index is 25.84 kg/m .    Constitutional:  Phillip Rueda is in no apparent distress, appears well-nourished.  Cooperative with history and physical exam.     Vascular:  Pedal pulses are palpable for both the DP and PT arteries.  CFT < 3 sec.     Neuro: Light touch " sensation is intact to the L4, L5, S1 distributions  No evidence of weakness, spasticity, or contracture in the lower extremities.      Derm: Normal texture and turgor.  No erythema, ecchymosis, or cyanosis.  No open lesions.      Musculoskeletal:    Lower extremity muscle strength is normal.  Significant pes planus on the left, more so than right.  This involves forefoot abduction, decrease in medial longitudinal arch and rear foot eversion.  It is painful for him to move the ankle.  His pain is medial and he traces the course of the posterior tibial tendon.  He has pain with palpation over the tendon and with inversion of the foot against resistance.  Reports he cannot go up on his toes without pain.     MRI Left Ankle:  3/7/21                                                 Impression:  1. Highly abnormal tibialis posterior tendon extending from most  proximal within the field-of-view to just proximal to its attachment  side distally. High-grade interstitial split tear (that reconstitutes  itself just proximal to its distal attachment at the navicular, medial  cuneiform bone). Markedly increased diameter of the tendon due to  high-grade tendinopathy proximal and distal to the tear and high-grade  tenosynovitis with a large amount of fluid within the tendon sheath.  Small accessory tendon slip is noted far proximally. Reactive bone  marrow edema within the medial malleolus. Bowed, but intact flexor  retinaculum, tendon remains situated in the posterior medial malleolar  groove.  2. Moderate grade tenosynovitis involving the flexor digitorum,  however tendon appears normal, normal flexor hallucis longus tendon  and significant fluid about the master knot of Ned.  3. Edema and fluid within the sinus Tarsi as can be seen in sinus  Tarsi syndrome, which may be associated with posterior tibialis tendon  pathology. Subcortical cystic changes in close proximity at the  calcaneal angle of Gissane.  3. Degenerative  changes about the first tarsometatarsal joint.  4. Thickening of the deltoid and spring ligamentous complex, likely  coexisting in the context of the posterior tibialis tendon pathology.   However, the deltoid and spring ligamentous complex remains intact,  consistent with sequela of prior injury.     JUTTA ELLERMANN, MD        LEFT FOOT THREE OR MORE VIEWS, LEFT ANKLE THREE OR MORE VIEWS   1/2/2021 7:22 PM      HISTORY: Medial foot and ankle pain.     COMPARISON: None.                                                                      IMPRESSION: No evidence of acute fracture or subluxation in the ankle  or foot. Mortise and talar dome are intact.     VICTORIA DUFF MD            Again, thank you for allowing me to participate in the care of your patient.        Sincerely,        Shelton Smallwood DPM

## 2021-03-26 ENCOUNTER — HOSPITAL ENCOUNTER (OUTPATIENT)
Facility: CLINIC | Age: 41
End: 2021-03-26
Attending: PODIATRIST | Admitting: PODIATRIST
Payer: COMMERCIAL

## 2021-03-26 DIAGNOSIS — M67.979 TIBIALIS POSTERIOR TENDINOPATHY: ICD-10-CM

## 2021-03-26 DIAGNOSIS — M21.42 ACQUIRED FLAT FOOT, LEFT: ICD-10-CM

## 2021-03-26 DIAGNOSIS — G89.29 CHRONIC PAIN OF LEFT ANKLE: ICD-10-CM

## 2021-03-26 DIAGNOSIS — M25.572 CHRONIC PAIN OF LEFT ANKLE: ICD-10-CM

## 2021-04-06 ENCOUNTER — TELEPHONE (OUTPATIENT)
Dept: PODIATRY | Facility: CLINIC | Age: 41
End: 2021-04-06

## 2021-04-06 NOTE — TELEPHONE ENCOUNTER
Scheduled surgery.     Type of surgery: left ankle/foot surgery  Location of surgery: [facility]  Date and time of surgery: 5/5/21 @ 1245pm   Surgeon: Rupesh  Pre-Op Appt Date: patient will schedule  Post-Op Appt Date: 5/14/21   Packet sent out: Yes  Pre-cert/Authorization completed:  No  Date: 4/6/21      Aaron Bonds, Surgery Scheduler

## 2021-04-07 ENCOUNTER — TELEPHONE (OUTPATIENT)
Dept: PODIATRY | Facility: CLINIC | Age: 41
End: 2021-04-07

## 2021-04-07 ENCOUNTER — MYC MEDICAL ADVICE (OUTPATIENT)
Dept: FAMILY MEDICINE | Facility: CLINIC | Age: 41
End: 2021-04-07

## 2021-04-07 ENCOUNTER — TRANSFERRED RECORDS (OUTPATIENT)
Dept: HEALTH INFORMATION MANAGEMENT | Facility: CLINIC | Age: 41
End: 2021-04-07

## 2021-04-07 DIAGNOSIS — E11.9 TYPE 2 DIABETES MELLITUS WITHOUT COMPLICATION, WITHOUT LONG-TERM CURRENT USE OF INSULIN (H): Primary | ICD-10-CM

## 2021-04-07 NOTE — TELEPHONE ENCOUNTER
Patient called and apologized because he is cancelling surgery with Dr Smallwood on 5/5/21     Patient stating he is in too much pain, got a second opinion and was able to schedule surgery next week with another provider.       Aaron Bonds, Surgery Scheduler

## 2021-04-09 ENCOUNTER — TELEPHONE (OUTPATIENT)
Dept: FAMILY MEDICINE | Facility: CLINIC | Age: 41
End: 2021-04-09

## 2021-04-09 DIAGNOSIS — E11.9 TYPE 2 DIABETES MELLITUS WITHOUT COMPLICATION, WITHOUT LONG-TERM CURRENT USE OF INSULIN (H): ICD-10-CM

## 2021-04-09 NOTE — TELEPHONE ENCOUNTER
DUPLICATE- please see encounter from 04/07/2021 for more details, as this has already been submitted to insurance and is pending.

## 2021-04-09 NOTE — TELEPHONE ENCOUNTER
Central Prior Authorization Team   Phone: [phone]      PA Initiation    Medication: Invokana-Initiated  Insurance Company: [employer] - Phone [phone] Fax [phone]  Pharmacy Filling the Rx: [id number] [place], [place] - [facility] AT [address]  Filling Pharmacy Phone: [phone]  Filling Pharmacy Fax:    Start Date: 4/9/2021

## 2021-04-09 NOTE — TELEPHONE ENCOUNTER
Prior Authorization Retail Medication Request    Medication/Dose: canagliflozin (INVOKANA) 100 MG tablet  ICD code (if different than what is on RX):  Previously Tried and Failed:  Rationale:    Insurance Name:    Insurance ID: [id number]    Pharmacy Information (if different than what is on RX)  Name:  Phone:    Please include previous medications tried and failed.  Please ask insurance for medications on formulary.    Start PA? Ninoska Cole RN

## 2021-04-12 ENCOUNTER — OFFICE VISIT (OUTPATIENT)
Dept: FAMILY MEDICINE | Facility: CLINIC | Age: 41
End: 2021-04-12
Payer: COMMERCIAL

## 2021-04-12 VITALS
DIASTOLIC BLOOD PRESSURE: 84 MMHG | WEIGHT: 181 LBS | HEIGHT: 68 IN | RESPIRATION RATE: 15 BRPM | BODY MASS INDEX: 27.43 KG/M2 | TEMPERATURE: 95.9 F | OXYGEN SATURATION: 96 % | SYSTOLIC BLOOD PRESSURE: 126 MMHG | HEART RATE: 72 BPM

## 2021-04-12 DIAGNOSIS — I10 HYPERTENSION GOAL BP (BLOOD PRESSURE) < 140/90: ICD-10-CM

## 2021-04-12 DIAGNOSIS — M25.572 PAIN IN JOINT INVOLVING ANKLE AND FOOT, LEFT: ICD-10-CM

## 2021-04-12 DIAGNOSIS — Z79.891 CHRONIC PRESCRIPTION OPIATE USE: ICD-10-CM

## 2021-04-12 DIAGNOSIS — E11.9 TYPE 2 DIABETES MELLITUS WITHOUT COMPLICATION, WITHOUT LONG-TERM CURRENT USE OF INSULIN (H): ICD-10-CM

## 2021-04-12 DIAGNOSIS — Z01.818 PREOP GENERAL PHYSICAL EXAM: Primary | ICD-10-CM

## 2021-04-12 PROCEDURE — 99214 OFFICE O/P EST MOD 30 MIN: CPT | Performed by: FAMILY MEDICINE

## 2021-04-12 RX ORDER — DAPAGLIFLOZIN 5 MG/1
5 TABLET, FILM COATED ORAL EVERY MORNING
Qty: 90 TABLET | Refills: 0 | Status: ON HOLD | OUTPATIENT
Start: 2021-04-12 | End: 2021-12-13

## 2021-04-12 RX ORDER — INSULIN LISPRO 100 [IU]/ML
INJECTION, SOLUTION INTRAVENOUS; SUBCUTANEOUS
Qty: 15 ML | Refills: 0 | Status: SHIPPED | OUTPATIENT
Start: 2021-04-12 | End: 2021-05-12

## 2021-04-12 ASSESSMENT — MIFFLIN-ST. JEOR: SCORE: 1695.51

## 2021-04-12 ASSESSMENT — ANXIETY QUESTIONNAIRES
3. WORRYING TOO MUCH ABOUT DIFFERENT THINGS: SEVERAL DAYS
1. FEELING NERVOUS, ANXIOUS, OR ON EDGE: SEVERAL DAYS
GAD7 TOTAL SCORE: 6
2. NOT BEING ABLE TO STOP OR CONTROL WORRYING: SEVERAL DAYS
4. TROUBLE RELAXING: SEVERAL DAYS
7. FEELING AFRAID AS IF SOMETHING AWFUL MIGHT HAPPEN: SEVERAL DAYS
5. BEING SO RESTLESS THAT IT IS HARD TO SIT STILL: NOT AT ALL
GAD7 TOTAL SCORE: 6
7. FEELING AFRAID AS IF SOMETHING AWFUL MIGHT HAPPEN: SEVERAL DAYS
GAD7 TOTAL SCORE: 6
6. BECOMING EASILY ANNOYED OR IRRITABLE: SEVERAL DAYS

## 2021-04-12 ASSESSMENT — PATIENT HEALTH QUESTIONNAIRE - PHQ9
SUM OF ALL RESPONSES TO PHQ QUESTIONS 1-9: 3
SUM OF ALL RESPONSES TO PHQ QUESTIONS 1-9: 3
10. IF YOU CHECKED OFF ANY PROBLEMS, HOW DIFFICULT HAVE THESE PROBLEMS MADE IT FOR YOU TO DO YOUR WORK, TAKE CARE OF THINGS AT HOME, OR GET ALONG WITH OTHER PEOPLE: SOMEWHAT DIFFICULT

## 2021-04-12 NOTE — TELEPHONE ENCOUNTER
Writer responded via [employer].    MARTY TiwariN, RN  [facility]

## 2021-04-12 NOTE — PROGRESS NOTES
[facility] [address] [place] [address]  Phone: [phone]  Primary Provider: Km Dos Santos  Pre-op Performing Provider: KM DOS SANTOS      PREOPERATIVE EVALUATION:  Today's date: 4/12/2021    Phillip Rueda is a 40 year old male who presents for a preoperative evaluation.    Surgical Information:  Surgery/Procedure: left calcaneal osteotomy, left foot Cotton osteotomy, Gastrocnemius recession, left leg, repair of the left posterior tibial tendon with possible tendon graft and/or tenodesis  Surgery Location:  [facility]  Surgeon: Carlos Yang MD  Surgery Date: 4/20/2021  Time of Surgery: TBA  Where patient plans to recover: At home with family  Fax number for surgical facility: [phone]    Type of Anesthesia Anticipated: General and to be determined  Answers for HPI/ROS submitted by the patient on 4/12/2021   If you checked off any problems, how difficult have these problems made it for you to do your work, take care of things at home, or get along with other people?: Somewhat difficult  PHQ9 TOTAL SCORE: 3  SANDHYA 7 TOTAL SCORE: 6    Assessment & Plan     The proposed surgical procedure is considered INTERMEDIATE risk.    Preop general physical exam       Pain in joint involving ankle and foot, left  Needs day surgery.    Type 2 diabetes mellitus without complication, without long-term current use of insulin (H)  Does overall very well.  Does not use insulin except for when needing to use steroid.  - insulin lispro (HUMALOG KWIKPEN) 100 UNIT/ML (1 unit dial) KWIKPEN; Take it with steroid use as needed.    Hypertension goal BP (blood pressure) < 140/90  Has had some elevated blood pressure reading but upon recheck it looked better.  Currently not on any antihypertensive medication.    Chronic prescription opiate use  On Suboxone.  Going to check with pain specialist if he should stop Suboxone day before his surgery.    Risks and  Recommendations:  The patient has the following additional risks and recommendations for perioperative complications:   - Consult Hospitalist / IM to assist with post-op medical management  Diabetes:  - Patient is not on insulin therapy: regular NPO guidelines can be followed.   Social and Substance:    - High tolerance to opioid analgesics due to chronic suboxone use.     Medication Instructions:  Patient Instructions   Only effexor on the day of surgery. Skip all of your diabetes medication, gabapentin and benzo on the day of surgery.     Check with pain specialist - if it is ok to take suboxone on the day of surgery. If no response, OK to skip it as you have done it in the past.       RECOMMENDATION:  APPROVAL GIVEN to proceed with proposed procedure, without further diagnostic evaluation.                      Subjective     HPI related to upcoming procedure: left ankle pain - not improving.   [facility] - able to get in sooner than Dr Smallwood.   Not staying overnight.     Preop Questions 4/12/2021   1. Have you ever had a heart attack or stroke? No   2. Have you ever had surgery on your heart or blood vessels, such as a stent placement, a coronary artery bypass, or surgery on an artery in your head, neck, heart, or legs? No   3. Do you have chest pain with activity? No   4. Do you have a history of  heart failure? No   5. Do you currently have a cold, bronchitis or symptoms of other infection? No   6. Do you have a cough, shortness of breath, or wheezing? No   7. Do you or anyone in your family have previous history of blood clots? YES -     8. Do you or does anyone in your family have a serious bleeding problem such as prolonged bleeding following surgeries or cuts? No   9. Have you ever had problems with anemia or been told to take iron pills? No   10. Have you had any abnormal blood loss such as black, tarry or bloody stools? No   11. Have you ever had a blood transfusion? No   12. Are you willing to  have a blood transfusion if it is medically needed before, during, or after your surgery? Yes   13. Have you or any of your relatives ever had problems with anesthesia? No   14. Do you have sleep apnea, excessive snoring or daytime drowsiness? No   15. Do you have any artifical heart valves or other implanted medical devices like a pacemaker, defibrillator, or continuous glucose monitor? No   16. Do you have artificial joints? No   17. Are you allergic to latex? No      Health Care Directive:  Patient does not have a Health Care Directive or Living Will: Discussed advance care planning with patient; information given to patient to review.    Preoperative Review of :   reviewed - controlled substances reflected in medication list.      Doing well with blood sugar. Using nohemi.   Blood pressure high due to pain. Overall bp has been good.   Using suboxone tid on average basis. Seeing pain specialist.   Diazepam once per day.   Oxycodone - has it but not using it   No insulin use for 6 months or so. Uses with steroid only or if blood sugars are high with his flare of arm pain.     \plain    Review of Systems  Constitutional, neuro, ENT, endocrine, pulmonary, cardiac, gastrointestinal, genitourinary, musculoskeletal, integument and psychiatric systems are negative, except as otherwise noted.    Patient Active Problem List    Diagnosis Date Noted     Tibialis posterior tendinopathy 03/26/2021     Priority: Medium     Added automatically from request for surgery [id number]       Acquired flat foot, left 03/26/2021     Priority: Medium     Added automatically from request for surgery [id number]       Chronic pain of left ankle 03/26/2021     Priority: Medium     Added automatically from request for surgery [id number]       Diabetic polyneuropathy associated with type 2 diabetes mellitus (H) 11/06/2020     Priority: Medium     Former smoker 06/02/2017     Priority: Medium     Health Care Home 12/07/2016     Priority: Medium      Pain disorder 12/08/2015     Priority: Medium     ED (erectile dysfunction) 08/11/2015     Priority: Medium     Right shoulder pain 05/11/2015     Priority: Medium     CARDIOVASCULAR SCREENING; LDL GOAL LESS THAN 100 02/11/2015     Priority: Medium     Type 2 diabetes mellitus without complication, without long-term current use of insulin (H) 02/04/2015     Priority: Medium     Hypertension goal BP (blood pressure) < 140/90 02/04/2015     Priority: Medium     Anemia 02/04/2015     Priority: Medium     Drug dependence, in remission (H) 04/08/2013     Priority: Medium     Problem list name updated by automated process. 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      Prostate infection      Seizures (H)      Staph infection      Past Surgical History:   Procedure Laterality Date     ORTHOPEDIC SURGERY       Current Outpatient Medications   Medication Sig Dispense Refill     ammonium lactate (LAC-HYDRIN) 12 % external cream Apply topically 2 times daily as needed for dry skin 385 g 3     atorvastatin (LIPITOR) 20 MG tablet TAKE ONE TABLET BY MOUTH ONCE DAILY 90 tablet 3     blood glucose (NO BRAND SPECIFIED) lancets standard Use to test blood sugar 2 times daily or as directed. 100 each 1     blood glucose (NO BRAND SPECIFIED) test strip Use to test blood sugar 2 times daily or as directed. 100 each 1     blood glucose monitoring (NO BRAND SPECIFIED) meter device kit Use to test blood sugar 2 times daily or as directed. 1 kit 0     Buprenorphine HCl-Naloxone HCl (SUBOXONE) 4-1 MG film Place 1 Film under  the tongue 3 times daily Taking 2-3 times a day and weening off       canagliflozin (INVOKANA) 100 MG tablet Take 1 tablet (100 mg) by mouth every morning (before breakfast) 90 tablet 0     continuous blood glucose monitoring (FREESTYLE JHON) sensor For use with Freestyle Jhon Flash  for continuous monitioring of blood glucose levels. Replace sensor every 14 days. 6 each 3     cyclobenzaprine (FLEXERIL) 5 MG tablet Take 2 tablets (10 mg) by mouth 3 times daily as needed for muscle spasms 20 tablet 0     diazepam (VALIUM) 5 MG tablet Take 1 tablet (5 mg) 3 times daily as needed. 60 tablet      EPINEPHrine (EPIPEN) 0.3 MG/0.3ML injection Inject 0.3 mLs (0.3 mg) into the muscle once as needed for anaphylaxis 1 each 2     gabapentin (NEURONTIN) 600 MG tablet Take 0.5 tablets (300 mg) by mouth 2 times daily       ibuprofen (ADVIL,MOTRIN) 200 MG tablet Take 1-2 tablets by mouth every 6 hours as needed.       insulin pen needle 31G X 6 MM Use 2 daily or as directed. 100 each 11     liraglutide (VICTOZA PEN) 18 MG/3ML solution INJCT 1.8MG UNDER THE SKIN ONCE DAILY 27 mL 1     [START ON 5/4/2021] metFORMIN (GLUCOPHAGE-XR) 500 MG 24 hr tablet TAKE TWO TABLETS BY MOUTH TWICE A DAY WITH MEALS. 120 tablet 0     nortriptyline (PAMELOR) 25 MG capsule TAKE 2 CAPSULES(50 MG) BY MOUTH AT BEDTIME 60 capsule 1     oxyCODONE (ROXICODONE) 5 MG tablet Take 1 tablet (5 mg) by mouth every 6 hours as needed for severe pain (sleep) 6 tablet 0     sildenafil (VIAGRA) 100 MG tablet Take 1/2 to 1 pill as needed for erectile dysfunction. 5 tablet 5     venlafaxine (EFFEXOR-ER) 150 MG TB24 Take 1 tablet by mouth daily (with breakfast). 30 each 0       Allergies   Allergen Reactions     Bees Swelling     Bupropion Hcl Other (See Comments)     seizure     Lisinopril Cough     Penicillins Other (See Comments)     Unable to close mouth        Social History     Tobacco Use     Smoking status: Former Smoker     Packs/day: 0.25     Years:  "15.00     Pack years: 3.75     Types: Cigarettes, Dip, chew, snus or snuff     Start date: 1996     Quit date: 2013     Years since quittin.7     Smokeless tobacco: Former User   Substance Use Topics     Alcohol use: No     Alcohol/week: 0.0 standard drinks     Comment: 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  Drink 1.75 every 2 days     Family History   Problem Relation Age of Onset     Depression Mother      Anxiety Disorder Mother      Substance Abuse Maternal Grandfather      Anxiety Disorder Brother      Glaucoma No family hx of      Macular Degeneration No family hx of      History   Drug Use No         Objective     BP (!) 140/94 (BP Location: Right arm, Patient Position: Chair, Cuff Size: Adult Regular)   Pulse 90   Temp 95.9  F (35.5  C) (Tympanic)   Resp 15   Ht 1.727 m (5' 8\")   Wt 82.1 kg (181 lb)   SpO2 96%   BMI 27.52 kg/m      Physical Exam  GENERAL APPEARANCE: healthy, alert and no distress  HENT: ear canals and TM's normal and nose and mouth without ulcers or lesions  RESP: lungs clear to auscultation - no rales, rhonchi or wheezes  CV: regular rate and rhythm, normal S1 S2, no S3 or S4 and no murmur, click or rub   NEURO: Normal strength and tone, sensory exam grossly normal, mentation intact and speech normal  Left foot in cam boot.   Skin - no visible deformity on visible parts.     Recent Labs   Lab Test 03/10/21  1607 20  1354 10/24/20  0935 20  0833   HGB  --  12.4* 13.3  --    PLT  --  208 204  --    INR  --  0.88  --   --    NA  --  138 139 137   POTASSIUM  --  3.9 4.2 4.0   CR  --  0.87 0.87 0.93   A1C 7.1*  --   --  6.9*        Diagnostics:  No labs were ordered during this visit.   No EKG required, no history of coronary heart disease, significant arrhythmia, peripheral arterial disease or other structural heart disease.    Revised Cardiac Risk Index (RCRI):  The patient has the following serious cardiovascular risks for perioperative complications:   - No serious " cardiac risks = 0 points     RCRI Interpretation: 0 points: Class I (very low risk - 0.4% complication rate)           Signed Electronically by: Km Dos Santos MD, MD  Copy of this evaluation report is provided to requesting physician.

## 2021-04-12 NOTE — TELEPHONE ENCOUNTER
invokana not covered.  Needs to use farxiga which comes from the same family.   Switched. Please notify patient.

## 2021-04-12 NOTE — TELEPHONE ENCOUNTER
PA was denied. Please order alternative med with complete SIG or begin appeal process.     If you would like to appeal:   Create letter of medical necessity or    Compile supporting clinical documentation in EPIC Telephone encounter (TE).    Route TE to: [facility].    PRIOR AUTHORIZATION DENIED     Medication: Invokana-DENIED     Denial Date: 4/9/2021     Denial Rational: Criteria not met.

## 2021-04-12 NOTE — PATIENT INSTRUCTIONS
Only effexor on the day of surgery. Skip all of your diabetes medication, gabapentin and benzo on the day of surgery.     Check with pain specialist - if it is ok to take suboxone on the day of surgery. If no response, OK to skip it as you have done it in the past.

## 2021-04-12 NOTE — TELEPHONE ENCOUNTER
PRIOR AUTHORIZATION DENIED    Medication: Invokana-DENIED    Denial Date: 4/9/2021    Denial Rational: Criteria not met.        Appeal Information:     If provider would like to appeal please provide a letter of medical necessity stating why formulary alternatives would not be clinically appropriate for patient and route back to the PA team.

## 2021-04-13 ENCOUNTER — TELEPHONE (OUTPATIENT)
Dept: FAMILY MEDICINE | Facility: CLINIC | Age: 41
End: 2021-04-13

## 2021-04-13 ASSESSMENT — ANXIETY QUESTIONNAIRES: GAD7 TOTAL SCORE: 6

## 2021-04-13 ASSESSMENT — PATIENT HEALTH QUESTIONNAIRE - PHQ9: SUM OF ALL RESPONSES TO PHQ QUESTIONS 1-9: 3

## 2021-04-13 NOTE — TELEPHONE ENCOUNTER
Plan requires specific directions to process the prescription.  Please fax back with frequency of how patient will be using the medication and days supply limitations.

## 2021-04-13 NOTE — TELEPHONE ENCOUNTER
Dr Dos Santos,    VENECIA  only    This was explained to pt    Because his shoulder pain comes without warning he has the prednisone at home. He has humalog  at home that is expiring . If he has an episode he will call or mychart for the insulin    (pharmacy was notified to hold rx  for now)    Jacinta Triana RN, BSN  [facility]

## 2021-04-13 NOTE — TELEPHONE ENCOUNTER
Dr Dos Santos,    Pharmacy needs more specific directions for insurance to pay for Lispro.    Jacinta Triana RN, BSN  [facility]

## 2021-04-13 NOTE — TELEPHONE ENCOUNTER
Patient currently is not using insulin and it is as needed medication only during prednisone use which he requires frequently with his flare of shoulder pain.  If still not covered by insurance okay to hold off on prescription for now and notify patient.

## 2021-04-19 DIAGNOSIS — E11.9 TYPE 2 DIABETES MELLITUS WITHOUT COMPLICATION, WITHOUT LONG-TERM CURRENT USE OF INSULIN (H): ICD-10-CM

## 2021-04-21 RX ORDER — METFORMIN HCL 500 MG
TABLET, EXTENDED RELEASE 24 HR ORAL
Qty: 120 TABLET | Refills: 0 | Status: SHIPPED | OUTPATIENT
Start: 2021-05-04 | End: 2021-05-24

## 2021-05-05 ENCOUNTER — TRANSFERRED RECORDS (OUTPATIENT)
Dept: HEALTH INFORMATION MANAGEMENT | Facility: CLINIC | Age: 41
End: 2021-05-05

## 2021-05-12 ENCOUNTER — TELEPHONE (OUTPATIENT)
Dept: FAMILY MEDICINE | Facility: CLINIC | Age: 41
End: 2021-05-12

## 2021-05-12 DIAGNOSIS — E11.9 TYPE 2 DIABETES MELLITUS WITHOUT COMPLICATION, WITHOUT LONG-TERM CURRENT USE OF INSULIN (H): ICD-10-CM

## 2021-05-12 RX ORDER — INSULIN LISPRO 100 [IU]/ML
INJECTION, SOLUTION INTRAVENOUS; SUBCUTANEOUS
Qty: 15 ML | Refills: 0 | Status: SHIPPED | OUTPATIENT
Start: 2021-05-12 | End: 2021-05-17

## 2021-05-17 RX ORDER — INSULIN LISPRO 100 [IU]/ML
INJECTION, SOLUTION INTRAVENOUS; SUBCUTANEOUS
Qty: 15 ML | Refills: 0 | Status: SHIPPED | OUTPATIENT
Start: 2021-05-17 | End: 2022-01-12

## 2021-05-17 NOTE — TELEPHONE ENCOUNTER
"Call from Hilario MENENDEZ, pharmacist at [facility].  They need a more specific script beyond just \"Take it with steroid use as needed.\"  Can say Take with steroid as needed to a maximum of XX units daily\"  Please send updated prescription.  Vivien Gifford RN  [facility]  "

## 2021-05-24 ENCOUNTER — MYC REFILL (OUTPATIENT)
Dept: FAMILY MEDICINE | Facility: CLINIC | Age: 41
End: 2021-05-24

## 2021-05-24 DIAGNOSIS — E11.9 TYPE 2 DIABETES MELLITUS WITHOUT COMPLICATION, WITHOUT LONG-TERM CURRENT USE OF INSULIN (H): ICD-10-CM

## 2021-05-25 DIAGNOSIS — E11.9 TYPE 2 DIABETES MELLITUS WITHOUT COMPLICATION, WITHOUT LONG-TERM CURRENT USE OF INSULIN (H): ICD-10-CM

## 2021-05-25 RX ORDER — METFORMIN HCL 500 MG
TABLET, EXTENDED RELEASE 24 HR ORAL
Qty: 120 TABLET | Refills: 1 | Status: SHIPPED | OUTPATIENT
Start: 2021-05-25 | End: 2021-06-21

## 2021-05-26 RX ORDER — METFORMIN HCL 500 MG
TABLET, EXTENDED RELEASE 24 HR ORAL
Qty: 360 TABLET | OUTPATIENT
Start: 2021-05-26

## 2021-06-18 DIAGNOSIS — E11.9 TYPE 2 DIABETES MELLITUS WITHOUT COMPLICATION, WITHOUT LONG-TERM CURRENT USE OF INSULIN (H): ICD-10-CM

## 2021-06-18 DIAGNOSIS — E11.9 TYPE 2 DIABETES MELLITUS WITHOUT COMPLICATION, WITH LONG-TERM CURRENT USE OF INSULIN (H): ICD-10-CM

## 2021-06-18 DIAGNOSIS — Z79.4 TYPE 2 DIABETES MELLITUS WITHOUT COMPLICATION, WITH LONG-TERM CURRENT USE OF INSULIN (H): ICD-10-CM

## 2021-06-21 RX ORDER — METFORMIN HCL 500 MG
TABLET, EXTENDED RELEASE 24 HR ORAL
Qty: 120 TABLET | Refills: 1 | Status: SHIPPED | OUTPATIENT
Start: 2021-06-21 | End: 2021-08-24

## 2021-06-21 RX ORDER — BLOOD SUGAR DIAGNOSTIC
STRIP MISCELLANEOUS
Qty: 200 STRIP | Refills: 1 | Status: SHIPPED | OUTPATIENT
Start: 2021-06-21 | End: 2024-01-09

## 2021-06-21 NOTE — TELEPHONE ENCOUNTER
---Prescription approved per [facility] Refill Protocol.       Margareth Barlow RN BSN     [facility]        --Last visit:  4/12/2021

## 2021-06-22 ENCOUNTER — TELEPHONE (OUTPATIENT)
Dept: FAMILY MEDICINE | Facility: CLINIC | Age: 41
End: 2021-06-22

## 2021-06-22 NOTE — TELEPHONE ENCOUNTER
"PA started for ACCU-CHEK GUIDE test strip.  To submit the PA:  1. Go to [email or web address] and click Enter a key  2. Enter the pt's last name and date of birth and the donald   Patient last name:Mohit   :[birth year]   Donald:[id number]  3. Complete the form and click \"Send to Plan\"    "

## 2021-06-23 NOTE — TELEPHONE ENCOUNTER
PA Initiation    Medication: ACCU-CHEK GUIDE test strip, rec 6-22-21- INITIATED  Insurance Company: [employer] - Phone [phone] Fax [phone]  Pharmacy Filling the Rx: [facility] - [place], [place] - [address]  Filling Pharmacy Phone: [phone]  Filling Pharmacy Fax: [phone]  Start Date: 6/23/2021

## 2021-06-24 NOTE — TELEPHONE ENCOUNTER
Faxed PA request to [facility] per [employer].     Central Prior Authorization Team  Phone: [phone]

## 2021-06-25 NOTE — TELEPHONE ENCOUNTER
PRIOR AUTHORIZATION DENIED    Medication: ACCU-CHEK GUIDE test strip- DENIED    Denial Date: 6/25/2021    Denial Rational:  CRITERIA NOT MET            Appeal Information:             Central Prior Authorization Team  Phone: [phone]

## 2021-07-14 NOTE — ED TRIAGE NOTES
40Y Male patient - presenting to ED for eval of chronic right arm/shoulder pain; radiate into neck.  Last flare-up was 4 years ago, per patient.  Patient states he is on a steroid regimen.   [facility] called in reference to an issue with Zolpidem Tartrate. Please call [phone] Reference number [id number].

## 2021-08-10 DIAGNOSIS — E11.9 TYPE 2 DIABETES MELLITUS WITHOUT COMPLICATION, WITHOUT LONG-TERM CURRENT USE OF INSULIN (H): Primary | ICD-10-CM

## 2021-08-10 NOTE — TELEPHONE ENCOUNTER
"Please send new Rx's for Freestyle Darron [place] and Sensors (Medicare B Compliant)  Rx's must be written this way:    Freestyle Libre2  [place]  Qty: 1  Refills: 0  Sig \"USE TO READ BLOOD SUGARS PER  INSTRUCTIONS\"    Freestyle Darron 2  Sensors  Qty:2  Refills: can have up to 5 additional refills  Sig \"CHANGE EVERY 14 DAYS\"    Please call [phone] and speak to one of our Durable Medical Equipment Team members if you have any questions.  "

## 2021-08-10 NOTE — TELEPHONE ENCOUNTER
Dr. Dos Santos-Please review, sign if agree and may close encounter.  Freestyle nohemi orders pended per request.    Thank you!  MARTY TiwariN, RN  [facility]

## 2021-08-19 DIAGNOSIS — E11.9 TYPE 2 DIABETES MELLITUS WITHOUT COMPLICATION, WITHOUT LONG-TERM CURRENT USE OF INSULIN (H): ICD-10-CM

## 2021-08-24 RX ORDER — METFORMIN HCL 500 MG
TABLET, EXTENDED RELEASE 24 HR ORAL
Qty: 120 TABLET | Refills: 0 | Status: SHIPPED | OUTPATIENT
Start: 2021-08-24 | End: 2021-09-28

## 2021-08-24 NOTE — TELEPHONE ENCOUNTER
---Prescription approved per [facility] Refill Protocol.       Margareth Barlow RN BSN     [facility]          --Last visit:  4/12/2021 [facility] pre-op.    --Future Visit: none.

## 2021-09-27 DIAGNOSIS — E11.9 TYPE 2 DIABETES MELLITUS WITHOUT COMPLICATION, WITHOUT LONG-TERM CURRENT USE OF INSULIN (H): ICD-10-CM

## 2021-09-28 RX ORDER — METFORMIN HCL 500 MG
TABLET, EXTENDED RELEASE 24 HR ORAL
Qty: 120 TABLET | Refills: 0 | Status: SHIPPED | OUTPATIENT
Start: 2021-09-28 | End: 2021-10-28

## 2021-09-28 NOTE — TELEPHONE ENCOUNTER
Routing refill request to provider for review/approval because:  Labs out of range:  HgbA1C  Labs not current:  HgbA1C, due to be checked 9/10/21      Team Coordinators: Please contact patient to set up lab only appointment to get HgbA1C checked (future order in) for any further refills.     Argenis Rios, MARTYN RN  [facility]

## 2021-10-08 DIAGNOSIS — E11.9 TYPE 2 DIABETES MELLITUS WITHOUT COMPLICATION, WITHOUT LONG-TERM CURRENT USE OF INSULIN (H): ICD-10-CM

## 2021-10-12 RX ORDER — LIRAGLUTIDE 6 MG/ML
INJECTION SUBCUTANEOUS
Qty: 27 ML | Refills: 0 | Status: SHIPPED | OUTPATIENT
Start: 2021-10-12 | End: 2021-12-21

## 2021-10-12 NOTE — TELEPHONE ENCOUNTER
Medication is being filled for 1 time refill only due to:  Patient needs labs HgbA1C. Future labs ordered HgbA1C.     TCs- please contact patient to set up lab only appt to get this checked for further refills.       MARTY LuzN RN  [facility]

## 2021-10-19 ENCOUNTER — LAB (OUTPATIENT)
Dept: LAB | Facility: CLINIC | Age: 41
End: 2021-10-19
Payer: COMMERCIAL

## 2021-10-19 DIAGNOSIS — E11.42 DIABETIC POLYNEUROPATHY ASSOCIATED WITH TYPE 2 DIABETES MELLITUS (H): ICD-10-CM

## 2021-10-19 LAB — HBA1C MFR BLD: 6.9 % (ref 0–5.6)

## 2021-10-19 PROCEDURE — 83036 HEMOGLOBIN GLYCOSYLATED A1C: CPT

## 2021-10-19 PROCEDURE — 36415 COLL VENOUS BLD VENIPUNCTURE: CPT

## 2021-10-24 ENCOUNTER — HEALTH MAINTENANCE LETTER (OUTPATIENT)
Age: 41
End: 2021-10-24

## 2021-10-28 DIAGNOSIS — E11.9 TYPE 2 DIABETES MELLITUS WITHOUT COMPLICATION, WITHOUT LONG-TERM CURRENT USE OF INSULIN (H): ICD-10-CM

## 2021-10-28 RX ORDER — METFORMIN HCL 500 MG
TABLET, EXTENDED RELEASE 24 HR ORAL
Qty: 360 TABLET | Refills: 0 | Status: SHIPPED | OUTPATIENT
Start: 2021-10-28 | End: 2022-01-04

## 2021-10-28 NOTE — TELEPHONE ENCOUNTER
"Requested Prescriptions   Pending Prescriptions Disp Refills     ACCU-CHEK ADARSH PLUS test strip [Pharmacy Med Name: ACCU-CHEK ADARSH PLUS STRIPS 100'S]  Last Written Prescription Date:  12/22/2015  Last Fill Quantity: 4 box,  # refills: 12   Last office visit: 8/29/2018 with prescribing provider:  Raya   Future Office Visit:     200 strip 0     Sig: USE AS DIRECTED TWICE DAILY    Diabetic Supplies Protocol Passed    11/16/2018  6:58 AM       Passed - Patient is 18 years of age or older       Passed - Recent (6 mo) or future (30 days) visit within the authorizing provider's specialty    Patient had office visit in the last 6 months or has a visit in the next 30 days with authorizing provider.  See \"Patient Info\" tab in inbasket, or \"Choose Columns\" in Meds & Orders section of the refill encounter.              " normal...

## 2021-10-28 NOTE — TELEPHONE ENCOUNTER
Team Coordinators-Please contact patient to schedule diabetes follow up visit.    Prescription approved per [facility] Refill Protocol.    Thank you!  BRENT Tiwari, RN  [facility]

## 2021-10-28 NOTE — TELEPHONE ENCOUNTER
Reason for Call:  Other prescription    Detailed comments: Patient requesting refill on pended medication. Please assist. States only has a few left. Thanks!    Phone Number Patient can be reached at: Cell number on file:    Telephone Information:   Mobile [phone]     Best Time: Any    Can we leave a detailed message on this number? YES    Call taken on 10/28/2021 at 5:07 PM by Tabitha Cardona

## 2021-11-16 ENCOUNTER — OFFICE VISIT (OUTPATIENT)
Dept: URGENT CARE | Facility: URGENT CARE | Age: 41
End: 2021-11-16
Payer: COMMERCIAL

## 2021-11-16 ENCOUNTER — ANCILLARY PROCEDURE (OUTPATIENT)
Dept: GENERAL RADIOLOGY | Facility: CLINIC | Age: 41
End: 2021-11-16
Attending: FAMILY MEDICINE
Payer: COMMERCIAL

## 2021-11-16 VITALS
BODY MASS INDEX: 27.52 KG/M2 | TEMPERATURE: 99 F | OXYGEN SATURATION: 100 % | DIASTOLIC BLOOD PRESSURE: 88 MMHG | HEART RATE: 83 BPM | SYSTOLIC BLOOD PRESSURE: 136 MMHG | WEIGHT: 181 LBS

## 2021-11-16 DIAGNOSIS — M79.645 PAIN OF LEFT THUMB: Primary | ICD-10-CM

## 2021-11-16 PROCEDURE — 99213 OFFICE O/P EST LOW 20 MIN: CPT | Performed by: FAMILY MEDICINE

## 2021-11-16 PROCEDURE — 73140 X-RAY EXAM OF FINGER(S): CPT | Mod: LT | Performed by: RADIOLOGY

## 2021-11-16 NOTE — PROGRESS NOTES
Chief Complaint   Patient presents with     Urgent Care     Thumb Discomfort     c/o left thumb infected for 2 days     Initial differential diagnosis included but was not restricted to   Differential Diagnosis:   sprain, tendonitis, muscle strain, contusion and dislocation, finger infection   Medical Decision Making:    ASSESMENT AND PLAN   Phillip was seen today for urgent care and thumb discomfort.    Diagnoses and all orders for this visit:    Pain of left thumb  -     XR Finger Left G/E 2 Views  -     Orthopedic  Referral; Future        Discussed with patient with x-ray finding if symptoms do not get better advised to follow-up with Ortho reviewed with patient to avoid doing any kind of repetitive activity with his hand limit playing instruments for next 24 to 48 hours. Consider wearing the makeshift splint with the pain   apply ice packs to area   Advised to do ibuprofen to help with the pain   If pain does not get better follow up with ortho   Routine discharge counseling was given to the patient and the patient understands that worsening, changing or persistent symptoms should prompt an immediate call or follow up with their primary physician or the emergency department. The importance of appropriate follow up was also discussed with the patient.     I have reviewed the nursing notes.  Review of the result(s) of each unique test -   X-Ray was done, my findings are: Small calcifications noted but no sign of infection    Time  spent on the date of the encounter doing chart review, review of test results, interpretation of tests, patient visit and documentation   see orders in Epic  Pt verbalized and agreed with the plan and is aware of the worsening symptoms for which would need to follow up .  Pt was stable during time of discharge from the clinic     SUBJECTIVE     Phillip Rueda is a 41 year old male presenting with a chief complaint of    Chief Complaint   Patient presents with     Urgent Care      Thumb Discomfort     c/o left thumb infected for 2 days     MS Injury/Pain    Onset of symptoms was 2 day(s) ago.  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    Severity moderate  Current and Associated symptoms: Pain and Tenderness over the palmar aspect of the left first proximal phalanx  Denies  Pain and Tenderness  Aggravating Factors: flexion/extension  Therapies to improve symptoms include: none  This is the first time this type of problem has occurred for this patient.         Past Medical History:   Diagnosis Date     Anxiety      Depressive disorder      Diabetes mellitus (H)      Hypertension      Liver disease      Neuralgic amyotrophy      Pain disorder 12/8/2015     Parsonage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      Prostate infection      Seizures (H)      Staph infection      Current Outpatient Medications   Medication Sig Dispense Refill     ACCU-CHEK GUIDE test strip USE TO TEST BLOOD SUGAR TWO TIMES A DAY OR AS DIRECTED 200 strip 1     ammonium lactate (LAC-HYDRIN) 12 % external cream Apply topically 2 times daily as needed for dry skin 385 g 3     atorvastatin (LIPITOR) 20 MG tablet TAKE ONE TABLET BY MOUTH ONCE DAILY 90 tablet 0     blood glucose (NO BRAND SPECIFIED) lancets standard Use to test blood sugar 2 times daily or as directed. 100 each 1     blood glucose monitoring (NO BRAND SPECIFIED) meter device kit Use to test blood sugar 2 times daily or as directed. 1 kit 0     Buprenorphine HCl-Naloxone HCl (SUBOXONE) 4-1 MG film Place 1 Film under the tongue 3 times daily Taking 2-3 times a day and weening off       Continuous Blood Gluc  (FREESTYLE JHON 2 READER) JESSI 1 each continuous USE TO READ BLOOD SUGARS PER  INSTRUCTIONS 1 each 0     Continuous Blood Gluc Sensor (FREESTYLE JHON 2 SENSOR) [facility] 1 each continuous CHANGE EVERY 14 DAYS 2 each 5     continuous blood glucose monitoring  (FREESTYLE JHON) sensor For use with Freestyle Jhon Flash  for continuous monitioring of blood glucose levels. Replace sensor every 14 days. 6 each 3     cyclobenzaprine (FLEXERIL) 5 MG tablet Take 2 tablets (10 mg) by mouth 3 times daily as needed for muscle spasms 20 tablet 0     dapagliflozin (FARXIGA) 5 MG TABS tablet Take 1 tablet (5 mg) by mouth every morning 90 tablet 0     diazepam (VALIUM) 5 MG tablet Take 1 tablet (5 mg) 3 times daily as needed. 60 tablet      EPINEPHrine (EPIPEN) 0.3 MG/0.3ML injection Inject 0.3 mLs (0.3 mg) into the muscle once as needed for anaphylaxis 1 each 2     gabapentin (NEURONTIN) 600 MG tablet Take 0.5 tablets (300 mg) by mouth 2 times daily       ibuprofen (ADVIL,MOTRIN) 200 MG tablet Take 1-2 tablets by mouth every 6 hours as needed.       insulin lispro (HUMALOG KWIKPEN) 100 UNIT/ML (1 unit dial) KWIKPEN Take it with steroid use as needed. Maximum 10 units per day 15 mL 0     insulin pen needle 31G X 6 MM Use 2 daily or as directed. 100 each 11     metFORMIN (GLUCOPHAGE-XR) 500 MG 24 hr tablet TAKE TWO TABLETS BY MOUTH TWICE A DAY WITH MEALS. 360 tablet 0     nortriptyline (PAMELOR) 25 MG capsule TAKE 2 CAPSULES(50 MG) BY MOUTH AT BEDTIME 60 capsule 1     oxyCODONE (ROXICODONE) 5 MG tablet Take 1 tablet (5 mg) by mouth every 6 hours as needed for severe pain (sleep) 6 tablet 0     sildenafil (VIAGRA) 100 MG tablet Take 1/2 to 1 pill as needed for erectile dysfunction. 5 tablet 5     venlafaxine (EFFEXOR-ER) 150 MG TB24 Take 1 tablet by mouth daily (with breakfast). 30 each 0     VICTOZA PEN 18 MG/3ML soln INJCT 1.8MG UNDER THE SKIN ONCE DAILY 27 mL 0     Social History     Tobacco Use     Smoking status: Former Smoker     Packs/day: 0.25     Years: 15.00     Pack years: 3.75     Types: Cigarettes, Dip, chew, snus or snuff     Start date: 1996     Quit date: 2013     Years since quittin.3     Smokeless tobacco: Former User   Substance Use Topics      Alcohol use: No     Alcohol/week: 0.0 standard drinks     Comment: 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  Drink 1.75 every 2 days     Family History   Problem Relation Age of Onset     Depression Mother      Anxiety Disorder Mother      Substance Abuse Maternal Grandfather      Anxiety Disorder Brother      Glaucoma No family hx of      Macular Degeneration No family hx of          ROS:    10 point ROS of systems including Constitutional, Eyes, Respiratory, Cardiovascular, Gastroenterology, Genitourinary, Integumentary,Psychiatric ,neurological were all negative except for pertinent positives noted in my HPI         OBJECTIVE:    /88   Pulse 83   Temp 99  F (37.2  C) (Tympanic)   Wt 82.1 kg (181 lb)   SpO2 100%   BMI 27.52 kg/m    GENERAL APPEARANCE: healthy, alert and no distress  MS: left side finger  first tenderness to palpation and pain over the proximal phalanx of the thumb  SKIN: no suspicious lesions or rashes  Physical Exam      (Note was completed, in part, with [employer] voice-recognition software. Documentation reviewed, but some grammatical, spelling, and word errors may remain.)  Molly Ramos MD on 11/16/2021 at 8:08 PM

## 2021-11-17 NOTE — PATIENT INSTRUCTIONS
"  Patient Education     Tendonitis and Tenosynovitis  What are tendonitis and tenosynovitis?  Tendons are strong cords of tissue that connect muscles to bones. When a tendon is inflamed, it's called tendonitis. It can happen to any tendon in the body. An inflamed tendon can cause swelling, pain, and discomfort.    Another problem called tenosynovitis is linked to tendonitis. This is the inflammation of the lining of the tendon sheath around a tendon. Often the sheath itself is inflamed, but both the sheath and the tendon can be inflamed at the same time.   Common types of these tendon problems include:    Lateral epicondylitis. This is most often known as tennis elbow. It causes pain to the side of the elbow and forearm, along the thumb side of the arm. The pain is caused by damage to the tendons that bend the wrist back and away from the palm.    Medial epicondylitis. This is most often known as golfer's or baseball elbow. It causes pain from the elbow to the wrist on the palm side of the forearm. The pain is caused by damage to the tendons that bend the wrist toward the palm.    Rotator cuff tendonitis. This is a shoulder disorder. It causes inflammation of the shoulder capsule and related tendons.    DeQuervain tenosynovitis. This is a common tenosynovitis disorder. It causes swelling in the tendon sheath of the tendons of the thumb.    Trigger finger or trigger thumb. This is a type of tenosynovitis. The tendon sheath becomes inflamed and thickened. This makes it hard to extend or flex the finger or thumb. The finger or thumb may lock or \"trigger\" suddenly.  What causes tendonitis and tenosynovitis?  The cause of tendonitis and tenosynovitis is often not known. They may be caused by strain, overuse, injury, or too much exercise. They may also be linked to a disease such as diabetes, rheumatoid arthritis, or infection.   What are the symptoms of tendonitis and tenosynovitis?  Symptoms may include:    Pain in the " tendon when moved    Swelling from fluid and inflammation    A grating feeling when moving the joint  The symptoms of tendonitis can seem like other health problems. Talk with your healthcare provider for a diagnosis.   How are tendonitis and tenosynovitis diagnosed?  Your healthcare provider will ask about your health history and give you a physical exam. You may have tests to check for other problems that may be causing your symptoms. The tests may include:     Joint aspiration. The healthcare provider uses a needle to take a small amount of fluid from the joint. The fluid is tested to check for gout or signs of an infection.    X-ray. A small amount of radiation is used to make an image. Tendons can t be seen on an X-ray, but they can show bone. This test can check for arthritis, calcifications, and other problems.  How are tendonitis and tenosynovitis treated?  Treatment may include:    Changing your activities    Icing the area to reduce inflammation and pain. To make a cold pack, put ice cubes in a plastic bag that seals at the top. Wrap the bag in a clean, thin towel or cloth.    Putting a splint on the area to limit movement    Steroid injections to reduce inflammation and pain    Nonsteroidal anti-inflammatory drugs (called NSAIDs) to reduce inflammation and pain    Antibiotics if due to infection    Surgery if other treatments don't work  Key points about tendonitis and tenosynovitis    Tendonitis is when a tendon is inflamed. It can cause swelling, pain, and discomfort.    Another problem called tenosynovitis is linked to tendonitis. This is the inflammation of the lining of the tendon sheath around a tendon.    Common types of tendon problems include rotator cuff tendonitis and trigger finger or trigger thumb.    Tendonitis can be caused by strain, overuse, injury, and too much exercise.    Treatment may include changing your activities, icing the area to reduce pain, and using a splint to limit  movement.    Next steps  Tips to help you get the most from a visit to your healthcare provider:     Know the reason for your visit and what you want to happen.    Before your visit, write down questions you want answered.    Bring someone with you to help you ask questions and remember what your provider tells you.    At the visit, write down the name of a new diagnosis, and any new medicines, treatments, or tests. Also write down any new instructions your provider gives you.    Know why a new medicine or treatment is prescribed, and how it will help you. Also know what the side effects are.    Ask if your condition can be treated in other ways.    Know why a test or procedure is recommended and what the results could mean.    Know what to expect if you do not take the medicine or have the test or procedure.    If you have a follow-up appointment, write down the date, time, and purpose for that visit.    Know how you can contact your provider if you have questions.  Sera last reviewed this educational content on 1/1/2021 2000-2021 [employer]. All rights reserved. This information is not intended as a substitute for professional medical care. Always follow your healthcare professional's instructions.

## 2021-12-10 ENCOUNTER — IMMUNIZATION (OUTPATIENT)
Dept: NURSING | Facility: CLINIC | Age: 41
End: 2021-12-10
Payer: COMMERCIAL

## 2021-12-10 PROCEDURE — 0004A PR COVID VAC PFIZER DIL RECON 30 MCG/0.3 ML IM: CPT

## 2021-12-10 PROCEDURE — 91300 PR COVID VAC PFIZER DIL RECON 30 MCG/0.3 ML IM: CPT

## 2021-12-12 ENCOUNTER — OFFICE VISIT (OUTPATIENT)
Dept: URGENT CARE | Facility: URGENT CARE | Age: 41
End: 2021-12-12
Payer: COMMERCIAL

## 2021-12-12 ENCOUNTER — APPOINTMENT (OUTPATIENT)
Dept: MRI IMAGING | Facility: CLINIC | Age: 41
DRG: 638 | End: 2021-12-12
Attending: EMERGENCY MEDICINE
Payer: COMMERCIAL

## 2021-12-12 ENCOUNTER — HOSPITAL ENCOUNTER (INPATIENT)
Facility: CLINIC | Age: 41
LOS: 3 days | Discharge: HOME | DRG: 638 | End: 2021-12-15
Attending: EMERGENCY MEDICINE | Admitting: HOSPITALIST
Payer: COMMERCIAL

## 2021-12-12 VITALS — SYSTOLIC BLOOD PRESSURE: 133 MMHG | HEART RATE: 76 BPM | DIASTOLIC BLOOD PRESSURE: 90 MMHG | TEMPERATURE: 98.6 F

## 2021-12-12 DIAGNOSIS — G89.29 CHRONIC PAIN OF LEFT ANKLE: ICD-10-CM

## 2021-12-12 DIAGNOSIS — M25.572 CHRONIC PAIN OF LEFT ANKLE: ICD-10-CM

## 2021-12-12 DIAGNOSIS — Z11.52 ENCOUNTER FOR SCREENING LABORATORY TESTING FOR SEVERE ACUTE RESPIRATORY SYNDROME CORONAVIRUS 2 (SARS-COV-2): ICD-10-CM

## 2021-12-12 DIAGNOSIS — M25.511 CHRONIC RIGHT SHOULDER PAIN: ICD-10-CM

## 2021-12-12 DIAGNOSIS — L03.031 CELLULITIS OF TOE OF RIGHT FOOT: Primary | ICD-10-CM

## 2021-12-12 DIAGNOSIS — Z79.4 ENCOUNTER FOR LONG-TERM (CURRENT) USE OF INSULIN (H): ICD-10-CM

## 2021-12-12 DIAGNOSIS — E11.40 DIABETIC NEUROPATHY (H): ICD-10-CM

## 2021-12-12 DIAGNOSIS — M86.171 OTHER ACUTE OSTEOMYELITIS OF RIGHT FOOT (H): ICD-10-CM

## 2021-12-12 DIAGNOSIS — R09.81 NASAL CONGESTION: ICD-10-CM

## 2021-12-12 DIAGNOSIS — G89.29 CHRONIC RIGHT SHOULDER PAIN: ICD-10-CM

## 2021-12-12 DIAGNOSIS — M86.9 OSTEOMYELITIS OF RIGHT FOOT, UNSPECIFIED TYPE (H): ICD-10-CM

## 2021-12-12 DIAGNOSIS — L03.031 CELLULITIS OF GREAT TOE OF RIGHT FOOT: Primary | ICD-10-CM

## 2021-12-12 DIAGNOSIS — E11.69 DIABETES MELLITUS ASSOCIATED WITH HORMONAL ETIOLOGY (H): ICD-10-CM

## 2021-12-12 DIAGNOSIS — K59.03 DRUG-INDUCED CONSTIPATION: ICD-10-CM

## 2021-12-12 LAB
ALBUMIN SERPL-MCNC: 3.7 G/DL (ref 3.4–5)
ALP SERPL-CCNC: 114 U/L (ref 40–150)
ALT SERPL W P-5'-P-CCNC: 21 U/L (ref 0–70)
ANION GAP SERPL CALCULATED.3IONS-SCNC: 7 MMOL/L (ref 3–14)
AST SERPL W P-5'-P-CCNC: 18 U/L (ref 0–45)
BASOPHILS # BLD AUTO: 0 10E3/UL (ref 0–0.2)
BASOPHILS NFR BLD AUTO: 1 %
BILIRUB SERPL-MCNC: 0.4 MG/DL (ref 0.2–1.3)
BUN SERPL-MCNC: 11 MG/DL (ref 7–30)
CALCIUM SERPL-MCNC: 9 MG/DL (ref 8.5–10.1)
CHLORIDE BLD-SCNC: 101 MMOL/L (ref 94–109)
CO2 SERPL-SCNC: 28 MMOL/L (ref 20–32)
CREAT SERPL-MCNC: 0.84 MG/DL (ref 0.66–1.25)
CRP SERPL-MCNC: 20 MG/L (ref 0–8)
EOSINOPHIL # BLD AUTO: 0.2 10E3/UL (ref 0–0.7)
EOSINOPHIL NFR BLD AUTO: 2 %
ERYTHROCYTE [DISTWIDTH] IN BLOOD BY AUTOMATED COUNT: 12.7 % (ref 10–15)
GFR SERPL CREATININE-BSD FRML MDRD: >90 ML/MIN/1.73M2
GLUCOSE BLD-MCNC: 117 MG/DL (ref 70–99)
HCT VFR BLD AUTO: 38.8 % (ref 40–53)
HGB BLD-MCNC: 13.2 G/DL (ref 13.3–17.7)
HOLD SPECIMEN: NORMAL
HOLD SPECIMEN: NORMAL
IMM GRANULOCYTES # BLD: 0 10E3/UL
IMM GRANULOCYTES NFR BLD: 0 %
LYMPHOCYTES # BLD AUTO: 2.1 10E3/UL (ref 0.8–5.3)
LYMPHOCYTES NFR BLD AUTO: 27 %
MCH RBC QN AUTO: 29.2 PG (ref 26.5–33)
MCHC RBC AUTO-ENTMCNC: 34 G/DL (ref 31.5–36.5)
MCV RBC AUTO: 86 FL (ref 78–100)
MONOCYTES # BLD AUTO: 0.5 10E3/UL (ref 0–1.3)
MONOCYTES NFR BLD AUTO: 7 %
NEUTROPHILS # BLD AUTO: 4.9 10E3/UL (ref 1.6–8.3)
NEUTROPHILS NFR BLD AUTO: 63 %
NRBC # BLD AUTO: 0 10E3/UL
NRBC BLD AUTO-RTO: 0 /100
PLATELET # BLD AUTO: 218 10E3/UL (ref 150–450)
POTASSIUM BLD-SCNC: 3.6 MMOL/L (ref 3.4–5.3)
PROT SERPL-MCNC: 7.4 G/DL (ref 6.8–8.8)
RBC # BLD AUTO: 4.52 10E6/UL (ref 4.4–5.9)
SARS-COV-2 RNA RESP QL NAA+PROBE: NEGATIVE
SODIUM SERPL-SCNC: 136 MMOL/L (ref 133–144)
WBC # BLD AUTO: 7.8 10E3/UL (ref 4–11)

## 2021-12-12 PROCEDURE — 11750 EXCISION NAIL&NAIL MATRIX: CPT | Performed by: EMERGENCY MEDICINE

## 2021-12-12 PROCEDURE — 96366 THER/PROPH/DIAG IV INF ADDON: CPT | Performed by: EMERGENCY MEDICINE

## 2021-12-12 PROCEDURE — 99285 EMERGENCY DEPT VISIT HI MDM: CPT | Mod: 25 | Performed by: EMERGENCY MEDICINE

## 2021-12-12 PROCEDURE — U0005 INFEC AGEN DETEC AMPLI PROBE: HCPCS | Performed by: EMERGENCY MEDICINE

## 2021-12-12 PROCEDURE — C9803 HOPD COVID-19 SPEC COLLECT: HCPCS | Performed by: EMERGENCY MEDICINE

## 2021-12-12 PROCEDURE — 73722 MRI JOINT OF LWR EXTR W/DYE: CPT | Mod: 26 | Performed by: RADIOLOGY

## 2021-12-12 PROCEDURE — 96375 TX/PRO/DX INJ NEW DRUG ADDON: CPT | Performed by: EMERGENCY MEDICINE

## 2021-12-12 PROCEDURE — G1004 CDSM NDSC: HCPCS

## 2021-12-12 PROCEDURE — 36415 COLL VENOUS BLD VENIPUNCTURE: CPT | Performed by: EMERGENCY MEDICINE

## 2021-12-12 PROCEDURE — 99207 PR INPT ADMISSION FROM CLINIC: CPT | Performed by: STUDENT IN AN ORGANIZED HEALTH CARE EDUCATION/TRAINING PROGRAM

## 2021-12-12 PROCEDURE — 80053 COMPREHEN METABOLIC PANEL: CPT | Performed by: EMERGENCY MEDICINE

## 2021-12-12 PROCEDURE — 250N000011 HC RX IP 250 OP 636: Performed by: EMERGENCY MEDICINE

## 2021-12-12 PROCEDURE — 250N000009 HC RX 250

## 2021-12-12 PROCEDURE — 255N000002 HC RX 255 OP 636: Performed by: EMERGENCY MEDICINE

## 2021-12-12 PROCEDURE — 86140 C-REACTIVE PROTEIN: CPT | Performed by: EMERGENCY MEDICINE

## 2021-12-12 PROCEDURE — 120N000002 HC R&B MED SURG/OB UMMC

## 2021-12-12 PROCEDURE — 258N000003 HC RX IP 258 OP 636: Performed by: EMERGENCY MEDICINE

## 2021-12-12 PROCEDURE — 85025 COMPLETE CBC W/AUTO DIFF WBC: CPT | Performed by: EMERGENCY MEDICINE

## 2021-12-12 PROCEDURE — 96376 TX/PRO/DX INJ SAME DRUG ADON: CPT | Performed by: EMERGENCY MEDICINE

## 2021-12-12 PROCEDURE — 96365 THER/PROPH/DIAG IV INF INIT: CPT | Mod: 59 | Performed by: EMERGENCY MEDICINE

## 2021-12-12 PROCEDURE — 96367 TX/PROPH/DG ADDL SEQ IV INF: CPT | Performed by: EMERGENCY MEDICINE

## 2021-12-12 PROCEDURE — A9585 GADOBUTROL INJECTION: HCPCS | Performed by: EMERGENCY MEDICINE

## 2021-12-12 RX ORDER — CEPHALEXIN 500 MG/1
500 CAPSULE ORAL 4 TIMES DAILY
Qty: 40 CAPSULE | Refills: 0 | Status: SHIPPED | OUTPATIENT
Start: 2021-12-12 | End: 2021-12-12

## 2021-12-12 RX ORDER — HYDROMORPHONE HYDROCHLORIDE 1 MG/ML
0.5 INJECTION, SOLUTION INTRAMUSCULAR; INTRAVENOUS; SUBCUTANEOUS
Status: COMPLETED | OUTPATIENT
Start: 2021-12-12 | End: 2021-12-12

## 2021-12-12 RX ORDER — GABAPENTIN 600 MG/1
300 TABLET ORAL 2 TIMES DAILY
Status: DISCONTINUED | OUTPATIENT
Start: 2021-12-12 | End: 2021-12-13

## 2021-12-12 RX ORDER — ACETAMINOPHEN 325 MG/1
650 TABLET ORAL EVERY 6 HOURS PRN
Status: DISCONTINUED | OUTPATIENT
Start: 2021-12-12 | End: 2021-12-13

## 2021-12-12 RX ORDER — DIAZEPAM 5 MG
5 TABLET ORAL EVERY 8 HOURS PRN
Status: DISCONTINUED | OUTPATIENT
Start: 2021-12-12 | End: 2021-12-15 | Stop reason: HOSPADM

## 2021-12-12 RX ORDER — GADOBUTROL 604.72 MG/ML
0.1 INJECTION INTRAVENOUS ONCE
Status: COMPLETED | OUTPATIENT
Start: 2021-12-12 | End: 2021-12-12

## 2021-12-12 RX ORDER — CEFAZOLIN SODIUM 1 G/3ML
1 INJECTION, POWDER, FOR SOLUTION INTRAMUSCULAR; INTRAVENOUS ONCE
Status: COMPLETED | OUTPATIENT
Start: 2021-12-12 | End: 2021-12-12

## 2021-12-12 RX ORDER — NORTRIPTYLINE HYDROCHLORIDE 50 MG/1
50 CAPSULE ORAL AT BEDTIME
Status: DISCONTINUED | OUTPATIENT
Start: 2021-12-13 | End: 2021-12-15 | Stop reason: HOSPADM

## 2021-12-12 RX ORDER — BUPRENORPHINE AND NALOXONE 4; 1 MG/1; MG/1
1 FILM, SOLUBLE BUCCAL; SUBLINGUAL 3 TIMES DAILY
Status: DISCONTINUED | OUTPATIENT
Start: 2021-12-13 | End: 2021-12-13

## 2021-12-12 RX ORDER — VENLAFAXINE HYDROCHLORIDE 150 MG/1
150 TABLET, EXTENDED RELEASE ORAL
Status: DISCONTINUED | OUTPATIENT
Start: 2021-12-13 | End: 2021-12-13 | Stop reason: CLARIF

## 2021-12-12 RX ORDER — LIDOCAINE HYDROCHLORIDE 10 MG/ML
INJECTION, SOLUTION EPIDURAL; INFILTRATION; INTRACAUDAL; PERINEURAL
Status: COMPLETED
Start: 2021-12-12 | End: 2021-12-12

## 2021-12-12 RX ORDER — LIDOCAINE 40 MG/G
CREAM TOPICAL
Status: DISCONTINUED | OUTPATIENT
Start: 2021-12-12 | End: 2021-12-15 | Stop reason: HOSPADM

## 2021-12-12 RX ORDER — HYDROMORPHONE HYDROCHLORIDE 1 MG/ML
0.5 INJECTION, SOLUTION INTRAMUSCULAR; INTRAVENOUS; SUBCUTANEOUS ONCE
Status: COMPLETED | OUTPATIENT
Start: 2021-12-12 | End: 2021-12-12

## 2021-12-12 RX ORDER — ACETAMINOPHEN 650 MG/1
650 SUPPOSITORY RECTAL EVERY 6 HOURS PRN
Status: DISCONTINUED | OUTPATIENT
Start: 2021-12-12 | End: 2021-12-13

## 2021-12-12 RX ORDER — CYCLOBENZAPRINE HCL 5 MG
10 TABLET ORAL 3 TIMES DAILY PRN
Status: DISCONTINUED | OUTPATIENT
Start: 2021-12-12 | End: 2021-12-12

## 2021-12-12 RX ORDER — LIDOCAINE HYDROCHLORIDE 10 MG/ML
10 INJECTION, SOLUTION EPIDURAL; INFILTRATION; INTRACAUDAL; PERINEURAL ONCE
Status: COMPLETED | OUTPATIENT
Start: 2021-12-12 | End: 2021-12-12

## 2021-12-12 RX ADMIN — CEFAZOLIN 1 G: 1 INJECTION, POWDER, FOR SOLUTION INTRAMUSCULAR; INTRAVENOUS at 17:34

## 2021-12-12 RX ADMIN — GADOBUTROL 7.4 ML: 604.72 INJECTION INTRAVENOUS at 20:47

## 2021-12-12 RX ADMIN — HYDROMORPHONE HYDROCHLORIDE 0.5 MG: 1 INJECTION, SOLUTION INTRAMUSCULAR; INTRAVENOUS; SUBCUTANEOUS at 21:21

## 2021-12-12 RX ADMIN — VANCOMYCIN HYDROCHLORIDE 1250 MG: 100 INJECTION, POWDER, LYOPHILIZED, FOR SOLUTION INTRAVENOUS at 21:48

## 2021-12-12 RX ADMIN — HYDROMORPHONE HYDROCHLORIDE 0.5 MG: 1 INJECTION, SOLUTION INTRAMUSCULAR; INTRAVENOUS; SUBCUTANEOUS at 17:49

## 2021-12-12 RX ADMIN — LIDOCAINE HYDROCHLORIDE 10 ML: 10 INJECTION, SOLUTION EPIDURAL; INFILTRATION; INTRACAUDAL; PERINEURAL at 21:22

## 2021-12-12 RX ADMIN — HYDROMORPHONE HYDROCHLORIDE 0.5 MG: 1 INJECTION, SOLUTION INTRAMUSCULAR; INTRAVENOUS; SUBCUTANEOUS at 19:24

## 2021-12-12 RX ADMIN — HYDROMORPHONE HYDROCHLORIDE 0.5 MG: 1 INJECTION, SOLUTION INTRAMUSCULAR; INTRAVENOUS; SUBCUTANEOUS at 18:42

## 2021-12-12 ASSESSMENT — MIFFLIN-ST. JEOR: SCORE: 1617.94

## 2021-12-12 ASSESSMENT — ACTIVITIES OF DAILY LIVING (ADL)
ADLS_ACUITY_SCORE: 9
ADLS_ACUITY_SCORE: 9

## 2021-12-12 ASSESSMENT — PAIN SCALES - GENERAL: PAINLEVEL: MODERATE PAIN (4)

## 2021-12-12 NOTE — PROGRESS NOTES
SUBJECTIVE:  Phillip Rueda is an 41 year old male who presents for possible diabetic foot infection.  Patient presents with about 2 days of right great toe pain and swelling which has been worsening.  He noticed some purulent drainage this morning.  He has a history of type 2 diabetes which has recently been very well controlled but prior to about 78 years ago was very poorly controlled.  He states that at one point he did almost lose this toe.  He has had some generalized symptoms of some mild nausea and malaise which worsened after he got his COVID-19 booster a couple of days ago but were also present prior to that.  No obvious fevers.  He normally has neuropathy and cannot feel his toe well which is one reason he is concerned about his toe at this time.  His wife is a diabetic educator and is also quite concerned about him.    Has also had a little bit of nasal congestion and occasional cough ongoing since a bad cold a couple of weeks ago.  No difficulties breathing and no other systemic symptoms except as above.  Fully vaccinated for COVID-19.    PMH:   has a past medical history of Anxiety, Depressive disorder, Diabetes mellitus (H), Hypertension, Liver disease, Neuralgic amyotrophy, Pain disorder (12/8/2015), Parsonage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, Prostate infection, Seizures (H), and Staph infection.  Patient Active Problem List   Diagnosis     Neuralgic amyotrophy     Hereditary and idiopathic peripheral neuropathy     Drug dependence, in remission (H)     Type 2 diabetes mellitus without complication, without long-term current use of insulin (H)     Hypertension goal BP (blood pressure) < 140/90     Anemia     CARDIOVASCULAR SCREENING; LDL GOAL LESS THAN 100     Right shoulder pain     ED (erectile dysfunction)     Pain disorder     Health Care Home     Former smoker     Diabetic polyneuropathy associated with type 2 diabetes mellitus (H)     Tibialis posterior tendinopathy     Acquired flat foot, left     Chronic  pain of left ankle     Social History     Socioeconomic History     Marital status:      Spouse name: Not on file     Number of children: Not on file     Years of education: Not on file     Highest education level: Not on file   Occupational History     Not on file   Tobacco Use     Smoking status: Former Smoker     Packs/day: 0.25     Years: 15.00     Pack years: 3.75     Types: Cigarettes, Dip, chew, snus or snuff     Start date: 1996     Quit date: 2013     Years since quittin.4     Smokeless tobacco: Former User   Substance and Sexual Activity     Alcohol use: No     Alcohol/week: 0.0 standard drinks     Comment: 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  Drink 1.75 every 2 days     Drug use: No     Sexual activity: Yes     Partners: Female     Birth control/protection: None     Comment: Trying to get my wife pregnant   Other Topics Concern     Parent/sibling w/ CABG, MI or angioplasty before 65F 55M? Yes     Comment: My dads dad  of heart issues in his 40's   Social History Narrative     Not on file     Social Determinants of Health     Financial Resource Strain: Not on file   Food Insecurity: Not on file   Transportation Needs: Not on file   Physical Activity: Not on file   Stress: Not on file   Social Connections: Not on file   Intimate Partner Violence: Not on file   Housing Stability: Not on file     Family History   Problem Relation Age of Onset     Depression Mother      Anxiety Disorder Mother      Substance Abuse Maternal Grandfather      Anxiety Disorder Brother      Glaucoma No family hx of      Macular Degeneration No family hx of        ALLERGIES:  Bees, Bupropion hcl, Lisinopril, and Penicillins    Current Outpatient Medications   Medication     ACCU-CHEK GUIDE test strip     ammonium lactate (LAC-HYDRIN) 12 % external cream     atorvastatin (LIPITOR) 20 MG tablet     blood glucose (NO BRAND SPECIFIED) lancets standard     blood glucose monitoring (NO BRAND SPECIFIED) meter device kit      Buprenorphine HCl-Naloxone HCl (SUBOXONE) 4-1 MG film     Continuous Blood Gluc  (FREESTYLE JHON 2 READER) JESSI     Continuous Blood Gluc Sensor (FREESTYLE JHON 2 SENSOR) MISC     continuous blood glucose monitoring (FREESTYLE JHON) sensor     cyclobenzaprine (FLEXERIL) 5 MG tablet     dapagliflozin (FARXIGA) 5 MG TABS tablet     diazepam (VALIUM) 5 MG tablet     EPINEPHrine (EPIPEN) 0.3 MG/0.3ML injection     gabapentin (NEURONTIN) 600 MG tablet     ibuprofen (ADVIL,MOTRIN) 200 MG tablet     insulin lispro (HUMALOG KWIKPEN) 100 UNIT/ML (1 unit dial) KWIKPEN     insulin pen needle 31G X 6 MM     metFORMIN (GLUCOPHAGE-XR) 500 MG 24 hr tablet     nortriptyline (PAMELOR) 25 MG capsule     oxyCODONE (ROXICODONE) 5 MG tablet     sildenafil (VIAGRA) 100 MG tablet     venlafaxine (EFFEXOR-ER) 150 MG TB24     VICTOZA PEN 18 MG/3ML soln     No current facility-administered medications for this visit.         ROS:  ROS is done and is negative for general/constitutional, eye, ENT, Respiratory, cardiovascular, GI, , Skin, musculoskeletal except as noted elsewhere.  All other review of systems negative except as noted elsewhere.    OBJECTIVE:  BP (!) 133/90   Pulse 76   Temp 98.6  F (37  C)   GENERAL APPEARANCE: Alert, in no acute distress.  EYES: Conjunctivae clear.  EARS: External ears normal. Canals clear. TM's normal.  NOSE: Normal, no drainage.  OROPHARYNX: MMM.  Not erythamatous.  No exudate.  NECK: Supple, symmetrical, no adenopathy.  RESP: Clear to auscultation bilaterally, no wheezing or retractions, no increased effort.  CV: Regular rate and rhythm, no murmurs, good capillary refill.  ABDOMEN: nondistended.  SKIN: No ulcers, lesions or rash.  MUSCULOSKELETAL: No gross deformities.  NEURO: No gross deficits, CN 2-12 grossly intact.    RESULTS  No results found for any visits on 12/12/21.  No results found for this or any previous visit (from the past 48 hour(s)).    ASSESSMENT/PLAN:  (L03.031)  Cellulitis of toe of right foot  (primary encounter diagnosis)  Comment: Patient's presentation is quite concerning for diabetic foot infection.  He has a visible abscess with some purulent drainage and significant swelling and erythema with fairly diffuse tenderness in his right great toe.  The pain even extends up into his dorsal midfoot and is somewhat painful with toe movements.  Given the symptoms as well as some of his generalized nausea and malaise that was present from before his Covid vaccine, I am concerned that this could be more serious and is obviously the case and the patient requires further work-up in the emergency department that cannot be obtained here.  Specifically, an MRI is very likely indicated to determine whether this patient has a deep infection, abscess, or osteomyelitis that requires management in the operating room.  Discussed this with patient and he agreed to go to the emergency department and receive further work-up and treatment there.  Also placed referral to podiatry to make sure he sees them as an outpatient regardless of what happens in the emergency department  Plan: Podiatry referral, to emergency department as above.    (R09.81) Nasal congestion  Comment: Fairly mild symptoms of nasal congestion ongoing after having a cold a couple of weeks ago.  Most likely just residual at this time as he has no other significant symptoms that appear related.  We will hold off on flu and COVID-19 testing at this time as he may obtain these in the ED  Plan: As above.    PPE worn: Full PPE.    Options for treatment and follow-up care were reviewed with the patient and/or guardian. Phillip Rueda and/or guardian engaged in the decision making process and verbalized understanding of the options discussed and agreed with the final plan.    See [facility] for orders, medications, letters, patient instructions    German Jim MD

## 2021-12-12 NOTE — ED PROVIDER NOTES
[place] EMERGENCY DEPARTMENT ([facility])  12/12/21     History     Chief Complaint   Patient presents with     Toe Pain     HPI  Phillip Rueda is a 41 year old male with history of type 2 diabetes, diabetic neuropathy, and Parsonage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 who presents from urgent care for further evaluation of possible diabetic foot wound of the right great toe.  Symptoms started 2 days ago with right great toe pain and swelling has been progressively worsening.  This morning he noticed purulent discharge from the plantar aspect of the right great toe and went to [facility] urgent care.  He noted that at baseline he has diabetic neuropathy and has decreased sensation in his feet, therefore being able to feel pain is concerning for him.  He was seen by Dr. Jim who was concerned as he had a visible abscess with purulent drainage, significant swelling and erythema as well as diffuse tenderness on the right great toe extending up to his dorsal midfoot.  Dr. Ji felt that an MRI would likely be indicated to determine whether he had a deep space infection, abscess or osteomyelitis requiring OR management.  He was sent to the ED for further evaluation and given a podiatry referral to be seen as an outpatient regardless of ER course.  He has no past history of osteomyelitis.    Patient has no fever or chills.  He has no nausea or vomiting.  He has chronic numbness.  He has no weakness.  He has no known injury.    Patient does have a penicillin allergy as a child with a mild reaction  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      Prostate infection      Seizures (H)      Staph infection      Past Surgical History:   Procedure Laterality Date     ORTHOPEDIC SURGERY       gabapentin (NEURONTIN)  600 MG tablet  metFORMIN (GLUCOPHAGE-XR) 500 MG 24 hr tablet  venlafaxine (EFFEXOR-ER) 150 MG TB24  ACCU-CHEK GUIDE test strip  ammonium lactate (LAC-HYDRIN) 12 % external cream  atorvastatin (LIPITOR) 20 MG tablet  blood glucose (NO BRAND SPECIFIED) lancets standard  blood glucose monitoring (NO BRAND SPECIFIED) meter device kit  Buprenorphine HCl-Naloxone HCl (SUBOXONE) 4-1 MG film  Continuous Blood Gluc  (FREESTYLE JHON 2 READER) JESSI  Continuous Blood Gluc Sensor (FREESTYLE JHON 2 SENSOR) MISC  continuous blood glucose monitoring (FREESTYLE JHON) sensor  cyclobenzaprine (FLEXERIL) 5 MG tablet  dapagliflozin (FARXIGA) 5 MG TABS tablet  diazepam (VALIUM) 5 MG tablet  EPINEPHrine (EPIPEN) 0.3 MG/0.3ML injection  ibuprofen (ADVIL,MOTRIN) 200 MG tablet  insulin lispro (HUMALOG KWIKPEN) 100 UNIT/ML (1 unit dial) KWIKPEN  insulin pen needle 31G X 6 MM  nortriptyline (PAMELOR) 25 MG capsule  oxyCODONE (ROXICODONE) 5 MG tablet  sildenafil (VIAGRA) 100 MG tablet  VICTOZA PEN 18 MG/3ML soln      Allergies   Allergen Reactions     Bees Swelling     Bupropion Hcl Other (See Comments)     seizure     Lisinopril Cough     Penicillins Other (See Comments)     Unable to close mouth  Tolerated cefazolin (21)     Family History  Family History   Problem Relation Age of Onset     Depression Mother      Anxiety Disorder Mother      Substance Abuse Maternal Grandfather      Anxiety Disorder Brother      Glaucoma No family hx of      Macular Degeneration No family hx of      Social History   Social History     Tobacco Use     Smoking status: Former Smoker     Packs/day: 0.25     Years: 15.00     Pack years: 3.75     Types: Cigarettes, Dip, chew, snus or snuff     Start date: 1996     Quit date: 2013     Years since quittin.4     Smokeless tobacco: Former User   Substance Use Topics     Alcohol use: No     Alcohol/week: 0.0 standard drinks     Comment: 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  Drink 1.75 every 2 days  "    Drug use: No      Past medical history, past surgical history, medications, allergies, family history, and social history were reviewed with the patient. No additional pertinent items.       Review of Systems  A complete review of systems was performed with pertinent positives and negatives noted in the HPI, and all other systems negative.    Physical Exam   BP: (!) 152/113  Pulse: 93  Temp: 98.6  F (37  C)  Resp: 16  Height: 172.7 cm (5' 8\")  Weight: 74.8 kg (165 lb)  SpO2: 98 %  Physical Exam  Vitals and nursing note reviewed.   Constitutional:       General: He is not in acute distress.     Appearance: He is well-developed. He is not diaphoretic.   HENT:      Head: Normocephalic and atraumatic.   Eyes:      General: No scleral icterus.     Pupils: Pupils are equal, round, and reactive to light.   Cardiovascular:      Rate and Rhythm: Normal rate and regular rhythm.      Heart sounds: Normal heart sounds. No murmur heard.      Pulmonary:      Effort: No respiratory distress.      Breath sounds: No stridor. No wheezing or rales.   Abdominal:      General: Bowel sounds are normal.      Palpations: Abdomen is soft.      Tenderness: There is no abdominal tenderness.   Musculoskeletal:         General: Tenderness present.      Cervical back: Normal range of motion and neck supple.        Feet:    Skin:     General: Skin is warm and dry.      Coloration: Skin is not pale.      Findings: No erythema or rash.   Neurological:      Mental Status: He is alert and oriented to person, place, and time.      Sensory: No sensory deficit.      Coordination: Coordination normal.         ED Course      Procedures       Nail removal:  Patient has onychomycosis of the right great toenail with erythema at the nailbed suggestive of a paronychia as well.  With his current toe infection this could be a nidus of infection.    A digital block was performed on the great toe using 6 cc of 1% lidocaine.  The toe was cleansed with " Hibiclens.  The toenail was then removed in its entirety.  There was some postprocedural bleeding which was controlled with pressure.  Bacitracin was applied.          Results for orders placed or performed during the hospital encounter of 12/12/21   Comprehensive metabolic panel     Status: Abnormal   Result Value Ref Range    Sodium 136 133 - 144 mmol/L    Potassium 3.6 3.4 - 5.3 mmol/L    Chloride 101 94 - 109 mmol/L    Carbon Dioxide (CO2) 28 20 - 32 mmol/L    Anion Gap 7 3 - 14 mmol/L    Urea Nitrogen 11 7 - 30 mg/dL    Creatinine 0.84 0.66 - 1.25 mg/dL    Calcium 9.0 8.5 - 10.1 mg/dL    Glucose 117 (H) 70 - 99 mg/dL    Alkaline Phosphatase 114 40 - 150 U/L    AST 18 0 - 45 U/L    ALT 21 0 - 70 U/L    Protein Total 7.4 6.8 - 8.8 g/dL    Albumin 3.7 3.4 - 5.0 g/dL    Bilirubin Total 0.4 0.2 - 1.3 mg/dL    GFR Estimate >90 >60 mL/min/1.73m2   CBC with platelets and differential     Status: Abnormal   Result Value Ref Range    WBC Count 7.8 4.0 - 11.0 10e3/uL    RBC Count 4.52 4.40 - 5.90 10e6/uL    Hemoglobin 13.2 (L) 13.3 - 17.7 g/dL    Hematocrit 38.8 (L) 40.0 - 53.0 %    MCV 86 78 - 100 fL    MCH 29.2 26.5 - 33.0 pg    MCHC 34.0 31.5 - 36.5 g/dL    RDW 12.7 10.0 - 15.0 %    Platelet Count 218 150 - 450 10e3/uL    % Neutrophils 63 %    % Lymphocytes 27 %    % Monocytes 7 %    % Eosinophils 2 %    % Basophils 1 %    % Immature Granulocytes 0 %    NRBCs per 100 WBC 0 <1 /100    Absolute Neutrophils 4.9 1.6 - 8.3 10e3/uL    Absolute Lymphocytes 2.1 0.8 - 5.3 10e3/uL    Absolute Monocytes 0.5 0.0 - 1.3 10e3/uL    Absolute Eosinophils 0.2 0.0 - 0.7 10e3/uL    Absolute Basophils 0.0 0.0 - 0.2 10e3/uL    Absolute Immature Granulocytes 0.0 <=0.4 10e3/uL    Absolute NRBCs 0.0 10e3/uL     Medications   vancomycin 1250 mg in 0.9% NaCl 250 mL intermittent infusion 1,250 mg (has no administration in time range)   vancomycin 1250 mg in 0.9% NaCl 250 mL intermittent infusion 1,250 mg (has no administration in time range)    ceFEPIme (MAXIPIME) 2 g vial to attach to  ml bag for ADULTS or 50 ml bag for PEDS (has no administration in time range)   pharmacy alert - intermittent dosing (has no administration in time range)   ceFAZolin (ANCEF) 1 g vial to attach to  ml bag for ADULT or 50 ml bag for PEDS (0 g Intravenous Stopped 12/12/21 1800)   lidocaine (PF) (XYLOCAINE) 1 % injection 10 mL (10 mLs Infiltration Given by Other 12/12/21 2122)   HYDROmorphone (PF) (DILAUDID) injection 0.5 mg (0.5 mg Intravenous Given 12/12/21 1924)   gadobutrol (GADAVIST) injection 7.48 mL (7.4 mLs Intravenous Given 12/12/21 2047)   HYDROmorphone (PF) (DILAUDID) injection 0.5 mg (0.5 mg Intravenous Given 12/12/21 2121)        Assessments & Plan (with Medical Decision Making)   41-year-old male with a history of type 2 diabetes with diabetic neuropathy who presents with increasing right toe redness, swelling, tenderness, and drainage.  She has no systemic signs of infection.  He was seen earlier today in urgent care.  He was referred here for further management.    On exam he has a rim of redness at the base of his right great toenail with significant onychomycosis.  He also had callus at the bottom of his toe with an area of ulceration and some drainage.  Patient has been following his blood sugars and they are slightly elevated from his normal.    IV was established and bloods were drawn.  Patient was initiated on IV antibiotics.  An MRI will be obtained.      Patient's comprehensive metabolic profile was remarkable for a glucose of 117.  His white count was 7.8 with a hemoglobin of 13.2.    MRI results are below    IMPRESSION:  1. The degree of marrow edema-like signal in the distal tuft of the great toe and the questionable cortical destruction are concerning for osteomyelitis but not definitive. Correlation with radiographs recommended.  2. Edema in the soft tissues consistent with cellulitis. There is also some adjacent muscle edema, so  myositis cannot be ruled out. There is no evidence of abscess, tenosynovitis or septic joint.    Patient has possibility of osteomyelitis on imaging, and will be admitted for IV antibiotics and orthopedics consultation.      I have reviewed the nursing notes. I have reviewed the findings, diagnosis, plan and need for follow up with the patient.        Final diagnoses:   Other acute osteomyelitis of right foot (H)       --  [facility] EMERGENCY DEPARTMENT  12/12/2021     Haseeb Stewart MD  12/13/21 3412

## 2021-12-12 NOTE — ED TRIAGE NOTES
Pt came in ambulatory to triage after being sent from urgent care for concerns for infection to right great toe. Hx of DM2 pt states recently he has had good control, but has not in past. Per  doctor, toe reddened w/ purulent drainage and swollen. Pt states generalized malaise, denies fever. Pain radiating up to foot.

## 2021-12-13 ENCOUNTER — PRE VISIT (OUTPATIENT)
Dept: ORTHOPEDICS | Facility: CLINIC | Age: 41
End: 2021-12-13

## 2021-12-13 ENCOUNTER — APPOINTMENT (OUTPATIENT)
Dept: GENERAL RADIOLOGY | Facility: CLINIC | Age: 41
DRG: 638 | End: 2021-12-13
Payer: COMMERCIAL

## 2021-12-13 LAB
ANION GAP SERPL CALCULATED.3IONS-SCNC: 8 MMOL/L (ref 3–14)
BASOPHILS # BLD AUTO: 0 10E3/UL (ref 0–0.2)
BASOPHILS NFR BLD AUTO: 1 %
BUN SERPL-MCNC: 11 MG/DL (ref 7–30)
CALCIUM SERPL-MCNC: 8.9 MG/DL (ref 8.5–10.1)
CHLORIDE BLD-SCNC: 108 MMOL/L (ref 94–109)
CO2 SERPL-SCNC: 24 MMOL/L (ref 20–32)
CREAT SERPL-MCNC: 0.76 MG/DL (ref 0.66–1.25)
EOSINOPHIL # BLD AUTO: 0.1 10E3/UL (ref 0–0.7)
EOSINOPHIL NFR BLD AUTO: 3 %
ERYTHROCYTE [DISTWIDTH] IN BLOOD BY AUTOMATED COUNT: 12.7 % (ref 10–15)
ERYTHROCYTE [SEDIMENTATION RATE] IN BLOOD BY WESTERGREN METHOD: 11 MM/HR (ref 0–15)
GFR SERPL CREATININE-BSD FRML MDRD: >90 ML/MIN/1.73M2
GLUCOSE BLD-MCNC: 96 MG/DL (ref 70–99)
GLUCOSE BLDC GLUCOMTR-MCNC: 101 MG/DL (ref 70–99)
GLUCOSE BLDC GLUCOMTR-MCNC: 105 MG/DL (ref 70–99)
GLUCOSE BLDC GLUCOMTR-MCNC: 107 MG/DL (ref 70–99)
GLUCOSE BLDC GLUCOMTR-MCNC: 113 MG/DL (ref 70–99)
GLUCOSE BLDC GLUCOMTR-MCNC: 139 MG/DL (ref 70–99)
HCT VFR BLD AUTO: 36.8 % (ref 40–53)
HGB BLD-MCNC: 12.3 G/DL (ref 13.3–17.7)
IMM GRANULOCYTES # BLD: 0 10E3/UL
IMM GRANULOCYTES NFR BLD: 0 %
LYMPHOCYTES # BLD AUTO: 2.1 10E3/UL (ref 0.8–5.3)
LYMPHOCYTES NFR BLD AUTO: 41 %
MCH RBC QN AUTO: 29.1 PG (ref 26.5–33)
MCHC RBC AUTO-ENTMCNC: 33.4 G/DL (ref 31.5–36.5)
MCV RBC AUTO: 87 FL (ref 78–100)
MONOCYTES # BLD AUTO: 0.5 10E3/UL (ref 0–1.3)
MONOCYTES NFR BLD AUTO: 9 %
MRSA DNA SPEC QL NAA+PROBE: NEGATIVE
NEUTROPHILS # BLD AUTO: 2.5 10E3/UL (ref 1.6–8.3)
NEUTROPHILS NFR BLD AUTO: 46 %
NRBC # BLD AUTO: 0 10E3/UL
NRBC BLD AUTO-RTO: 0 /100
PLATELET # BLD AUTO: 212 10E3/UL (ref 150–450)
POTASSIUM BLD-SCNC: 3.8 MMOL/L (ref 3.4–5.3)
RBC # BLD AUTO: 4.23 10E6/UL (ref 4.4–5.9)
SA TARGET DNA: NEGATIVE
SODIUM SERPL-SCNC: 140 MMOL/L (ref 133–144)
WBC # BLD AUTO: 5.3 10E3/UL (ref 4–11)

## 2021-12-13 PROCEDURE — 87040 BLOOD CULTURE FOR BACTERIA: CPT

## 2021-12-13 PROCEDURE — 36415 COLL VENOUS BLD VENIPUNCTURE: CPT

## 2021-12-13 PROCEDURE — 99222 1ST HOSP IP/OBS MODERATE 55: CPT | Mod: AI | Performed by: HOSPITALIST

## 2021-12-13 PROCEDURE — 120N000002 HC R&B MED SURG/OB UMMC

## 2021-12-13 PROCEDURE — 96367 TX/PROPH/DG ADDL SEQ IV INF: CPT | Performed by: EMERGENCY MEDICINE

## 2021-12-13 PROCEDURE — 85652 RBC SED RATE AUTOMATED: CPT | Performed by: EMERGENCY MEDICINE

## 2021-12-13 PROCEDURE — 250N000013 HC RX MED GY IP 250 OP 250 PS 637

## 2021-12-13 PROCEDURE — 99207 PR APP CREDIT; MD BILLING SHARED VISIT: CPT | Mod: GC | Performed by: PEDIATRICS

## 2021-12-13 PROCEDURE — 99207 PR CDG-MDM COMPONENT: MEETS MODERATE - DOWN CODED: CPT | Performed by: HOSPITALIST

## 2021-12-13 PROCEDURE — 85025 COMPLETE CBC W/AUTO DIFF WBC: CPT

## 2021-12-13 PROCEDURE — 258N000003 HC RX IP 258 OP 636: Performed by: EMERGENCY MEDICINE

## 2021-12-13 PROCEDURE — 87070 CULTURE OTHR SPECIMN AEROBIC: CPT

## 2021-12-13 PROCEDURE — 87641 MR-STAPH DNA AMP PROBE: CPT

## 2021-12-13 PROCEDURE — 250N000011 HC RX IP 250 OP 636

## 2021-12-13 PROCEDURE — 250N000011 HC RX IP 250 OP 636: Performed by: EMERGENCY MEDICINE

## 2021-12-13 PROCEDURE — 73630 X-RAY EXAM OF FOOT: CPT | Mod: 26 | Performed by: RADIOLOGY

## 2021-12-13 PROCEDURE — 36415 COLL VENOUS BLD VENIPUNCTURE: CPT | Performed by: EMERGENCY MEDICINE

## 2021-12-13 PROCEDURE — 73630 X-RAY EXAM OF FOOT: CPT | Mod: RT

## 2021-12-13 PROCEDURE — G0463 HOSPITAL OUTPT CLINIC VISIT: HCPCS

## 2021-12-13 PROCEDURE — 250N000013 HC RX MED GY IP 250 OP 250 PS 637: Performed by: STUDENT IN AN ORGANIZED HEALTH CARE EDUCATION/TRAINING PROGRAM

## 2021-12-13 PROCEDURE — 82310 ASSAY OF CALCIUM: CPT

## 2021-12-13 RX ORDER — POLYETHYLENE GLYCOL 3350 17 G/17G
17 POWDER, FOR SOLUTION ORAL DAILY PRN
Status: DISCONTINUED | OUTPATIENT
Start: 2021-12-13 | End: 2021-12-15 | Stop reason: HOSPADM

## 2021-12-13 RX ORDER — OXYCODONE HYDROCHLORIDE 5 MG/1
5 TABLET ORAL EVERY 6 HOURS PRN
Status: DISCONTINUED | OUTPATIENT
Start: 2021-12-13 | End: 2021-12-13

## 2021-12-13 RX ORDER — ACETAMINOPHEN 325 MG/1
650 TABLET ORAL
Status: DISCONTINUED | OUTPATIENT
Start: 2021-12-13 | End: 2021-12-15 | Stop reason: HOSPADM

## 2021-12-13 RX ORDER — GABAPENTIN 600 MG/1
1200 TABLET ORAL 3 TIMES DAILY
Status: DISCONTINUED | OUTPATIENT
Start: 2021-12-13 | End: 2021-12-15 | Stop reason: HOSPADM

## 2021-12-13 RX ORDER — OXYCODONE HYDROCHLORIDE 5 MG/1
5 TABLET ORAL EVERY 4 HOURS PRN
Status: DISCONTINUED | OUTPATIENT
Start: 2021-12-13 | End: 2021-12-14

## 2021-12-13 RX ORDER — HYDROMORPHONE HYDROCHLORIDE 1 MG/ML
0.5 INJECTION, SOLUTION INTRAMUSCULAR; INTRAVENOUS; SUBCUTANEOUS ONCE
Status: COMPLETED | OUTPATIENT
Start: 2021-12-13 | End: 2021-12-13

## 2021-12-13 RX ORDER — VENLAFAXINE HYDROCHLORIDE 150 MG/1
150 CAPSULE, EXTENDED RELEASE ORAL
Status: DISCONTINUED | OUTPATIENT
Start: 2021-12-13 | End: 2021-12-15 | Stop reason: HOSPADM

## 2021-12-13 RX ORDER — SENNOSIDES 8.6 MG
8.6 TABLET ORAL 2 TIMES DAILY PRN
Status: DISCONTINUED | OUTPATIENT
Start: 2021-12-13 | End: 2021-12-15 | Stop reason: HOSPADM

## 2021-12-13 RX ORDER — DEXTROSE MONOHYDRATE 25 G/50ML
25-50 INJECTION, SOLUTION INTRAVENOUS
Status: DISCONTINUED | OUTPATIENT
Start: 2021-12-13 | End: 2021-12-15 | Stop reason: HOSPADM

## 2021-12-13 RX ORDER — NICOTINE POLACRILEX 4 MG
15-30 LOZENGE BUCCAL
Status: DISCONTINUED | OUTPATIENT
Start: 2021-12-13 | End: 2021-12-15 | Stop reason: HOSPADM

## 2021-12-13 RX ORDER — BUPRENORPHINE AND NALOXONE 4; 1 MG/1; MG/1
1 FILM, SOLUBLE BUCCAL; SUBLINGUAL 2 TIMES DAILY
Status: DISCONTINUED | OUTPATIENT
Start: 2021-12-13 | End: 2021-12-15 | Stop reason: HOSPADM

## 2021-12-13 RX ORDER — OXYCODONE HYDROCHLORIDE 5 MG/1
5 TABLET ORAL ONCE
Status: COMPLETED | OUTPATIENT
Start: 2021-12-13 | End: 2021-12-14

## 2021-12-13 RX ADMIN — GABAPENTIN 1200 MG: 600 TABLET, FILM COATED ORAL at 10:14

## 2021-12-13 RX ADMIN — BUPRENORPHINE AND NALOXONE 1 FILM: 4; 1 FILM BUCCAL; SUBLINGUAL at 10:14

## 2021-12-13 RX ADMIN — BUPRENORPHINE AND NALOXONE 1 FILM: 4; 1 FILM BUCCAL; SUBLINGUAL at 20:26

## 2021-12-13 RX ADMIN — NORTRIPTYLINE HYDROCHLORIDE 50 MG: 50 CAPSULE ORAL at 00:47

## 2021-12-13 RX ADMIN — Medication 1 MG: at 21:20

## 2021-12-13 RX ADMIN — VENLAFAXINE HYDROCHLORIDE 150 MG: 150 CAPSULE, EXTENDED RELEASE ORAL at 10:13

## 2021-12-13 RX ADMIN — OXYCODONE HYDROCHLORIDE 5 MG: 5 TABLET ORAL at 16:35

## 2021-12-13 RX ADMIN — ACETAMINOPHEN 650 MG: 325 TABLET, FILM COATED ORAL at 16:05

## 2021-12-13 RX ADMIN — CEFEPIME HYDROCHLORIDE 2 G: 2 INJECTION, POWDER, FOR SOLUTION INTRAVENOUS at 00:05

## 2021-12-13 RX ADMIN — DIAZEPAM 5 MG: 5 TABLET ORAL at 20:26

## 2021-12-13 RX ADMIN — ACETAMINOPHEN 650 MG: 325 TABLET, FILM COATED ORAL at 20:26

## 2021-12-13 RX ADMIN — POLYETHYLENE GLYCOL 3350 17 G: 17 POWDER, FOR SOLUTION ORAL at 10:28

## 2021-12-13 RX ADMIN — ENOXAPARIN SODIUM 40 MG: 40 INJECTION SUBCUTANEOUS at 10:13

## 2021-12-13 RX ADMIN — VANCOMYCIN HYDROCHLORIDE 1250 MG: 100 INJECTION, POWDER, LYOPHILIZED, FOR SOLUTION INTRAVENOUS at 10:10

## 2021-12-13 RX ADMIN — STANDARDIZED SENNA CONCENTRATE 8.6 MG: 8.6 TABLET ORAL at 10:28

## 2021-12-13 RX ADMIN — NORTRIPTYLINE HYDROCHLORIDE 50 MG: 50 CAPSULE ORAL at 21:19

## 2021-12-13 RX ADMIN — OXYCODONE HYDROCHLORIDE 5 MG: 5 TABLET ORAL at 21:20

## 2021-12-13 RX ADMIN — GABAPENTIN 1200 MG: 600 TABLET, FILM COATED ORAL at 20:26

## 2021-12-13 RX ADMIN — CEFEPIME HYDROCHLORIDE 2 G: 2 INJECTION, POWDER, FOR SOLUTION INTRAVENOUS at 12:27

## 2021-12-13 RX ADMIN — HYDROMORPHONE HYDROCHLORIDE 0.5 MG: 1 INJECTION, SOLUTION INTRAMUSCULAR; INTRAVENOUS; SUBCUTANEOUS at 01:25

## 2021-12-13 RX ADMIN — GABAPENTIN 1200 MG: 600 TABLET, FILM COATED ORAL at 16:05

## 2021-12-13 RX ADMIN — OXYCODONE HYDROCHLORIDE 5 MG: 5 TABLET ORAL at 13:08

## 2021-12-13 ASSESSMENT — ACTIVITIES OF DAILY LIVING (ADL)
ADLS_ACUITY_SCORE: 9

## 2021-12-13 NOTE — DISCHARGE INSTRUCTIONS
Warm soaks twice a day  Keep a dressing on the toe for the next few days  Take the antibiotic as prescribed  Follow-up with primary care in 1 week.

## 2021-12-13 NOTE — PHARMACY-ADMISSION MEDICATION HISTORY
Admission Medication History Completed by Pharmacy    See [facility] Admission Navigator for allergy information, preferred outpatient pharmacy, prior to admission medications and immunization status.     Medication History Sources:     Patient Interview     SureScripts    Changes made to PTA medication list (reason):    Added: None    Deleted:   o OxyCODONE   o farxiga      Changed:   o Gabapentin dose updated to 600mg  o suboxone updated to twice daily  o Atorvastatin changed to qpm  o Nortriptyline updated to 25mg    Additional Information:    Only uses humalog when he uses steroids, has used it twice within the last year    Averages about 2 doses of diazepam per day    Prior to Admission medications    Medication Sig Last Dose Taking? Auth Provider   gabapentin (NEURONTIN) 600 MG tablet Take 600 mg by mouth 2 times daily  12/12/2021 at Unknown time Yes Km Dos Santos MD   metFORMIN (GLUCOPHAGE-XR) 500 MG 24 hr tablet TAKE TWO TABLETS BY MOUTH TWICE A DAY WITH MEALS. 12/12/2021 at Unknown time Yes Km Dos Santos MD   venlafaxine (EFFEXOR-ER) 150 MG TB24 Take 1 tablet by mouth daily (with breakfast). 12/12/2021 at Unknown time Yes Jonathan Woodard MD   ACCU-CHEK GUIDE test strip USE TO TEST BLOOD SUGAR TWO TIMES A DAY OR AS DIRECTED   Km Dos Santos MD   ammonium lactate (LAC-HYDRIN) 12 % external cream Apply topically 2 times daily as needed for dry skin   Ulisses Triana DPM   atorvastatin (LIPITOR) 20 MG tablet TAKE ONE TABLET BY MOUTH ONCE DAILY  Patient taking differently: Take 20 mg by mouth every evening    Km Dos Santos MD   blood glucose (NO BRAND SPECIFIED) lancets standard Use to test blood sugar 2 times daily or as directed.   Km Dos Santos MD   blood glucose monitoring (NO BRAND SPECIFIED) meter device kit Use to test blood sugar 2 times daily or as directed.   Km Dos Santos MD   Buprenorphine HCl-Naloxone HCl (SUBOXONE) 4-1 MG  film Place 1 Film under the tongue 2 times daily    Km Dos Santos MD   Continuous Blood Gluc  (FREESTYLE DARRON 2 READER) JESSI 1 each continuous USE TO READ BLOOD SUGARS PER  INSTRUCTIONS   Km Dos Santos MD   Continuous Blood Gluc Sensor (FREESTYLE DARRON 2 SENSOR) MISC 1 each continuous CHANGE EVERY 14 DAYS   Km Dos Santos MD   continuous blood glucose monitoring (FREESTYLE DARRON) sensor For use with Freestyle Darron Flash  for continuous monitioring of blood glucose levels. Replace sensor every 14 days.   Km Dos Santos MD   cyclobenzaprine (FLEXERIL) 5 MG tablet Take 2 tablets (10 mg) by mouth 3 times daily as needed for muscle spasms   Heidi Manzo MD   diazepam (VALIUM) 5 MG tablet Take 1 tablet (5 mg) 3 times daily as needed.   Km Dos Santos MD   EPINEPHrine (EPIPEN) 0.3 MG/0.3ML injection Inject 0.3 mLs (0.3 mg) into the muscle once as needed for anaphylaxis   Km Dos Santos MD   ibuprofen (ADVIL,MOTRIN) 200 MG tablet Take 1-2 tablets by mouth every 6 hours as needed.   Reported, Patient   insulin lispro (HUMALOG KWIKPEN) 100 UNIT/ML (1 unit dial) KWIKPEN Take it with steroid use as needed. Maximum 10 units per day   Km Dos Santos MD   insulin pen needle 31G X 6 MM Use 2 daily or as directed.   Km Dos Santos MD   nortriptyline (PAMELOR) 25 MG capsule TAKE 2 CAPSULES(50 MG) BY MOUTH AT BEDTIME  Patient taking differently: Take 25 mg by mouth At Bedtime    Ky Guerrero MD   sildenafil (VIAGRA) 100 MG tablet Take 1/2 to 1 pill as needed for erectile dysfunction.   Km Dos Santos MD   VICTOZA PEN 18 MG/3ML soln INJCT 1.8MG UNDER THE SKIN ONCE DAILY   Km Dos Santos MD       Date completed: 12/13/21    Medication history completed by: Ramon Walker formerly [facility]

## 2021-12-13 NOTE — CONSULTS
[facility] Nurse Inpatient Wound Assessment   Reason for consultation: Evaluate and treat  Right great toe wounds    Assessment  Right toe wounds due to Diabetic Ulcer and unknown etiology  Status: initial assessment- pt reported wound on plantar aspect (diabetic wound) of toe was a prior blood blister and that was where purulent drainage was coming from. He also also reported having had erythema and swelling at base of nail (unknown etiology). Nail had been removed prior to [facility] assessment and patient reported that the pain and erythema has somewhat improved since nail removal.    Treatment Plan  Right 1st toe wounds: Daily    [place] wound beds and entire toe with povidone iodine. Wrap toe with double layer of Vaseline gauze ([id number]). Cover with dry gauze and secure in place. Change once a day, if dressing sticking to nail bed increase changes to BID. Clean socks each day.   Please defer to Podiatry if alternative wound care orders placed.    Orders Written  Recommended provider order: None, at this time- Podiatry consult already in place for suspected osteomyelitis  WO Nurse follow-up plan:weekly  Nursing to notify the Provider(s) and re-consult the [facility] Nurse if wound(s) deteriorates or new skin concern.    Patient History  According to provider note(s):  Phillip Rueda is a 41 year old male w/ PMHx of type 2 diabetes, diabetic neuropathy, and Parsonage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 who is being admitted for cellulitis of right great toe with some c/f for osteomyelitis on MRI    Objective Data  Containment of urine/stool: Continent of bladder and Continent of bowel    Active Diet Order  Orders Placed This Encounter      Combination Diet Regular Diet Adult      Output:   I/O last 3 completed shifts:  In: 200 [P.O.:200]  Out: 1100 [Urine:1100]    Risk Assessment:   Sensory Perception: 3-->slightly limited  Moisture: 4-->rarely moist  Activity: 3-->walks occasionally  Mobility: 3-->slightly limited  Nutrition: 3-->adequate  Friction and  Shear: 3-->no apparent problem  Ollie Score: 19                          Labs: Recent Labs   Lab 12/13/21  0026 12/12/21  1624   ALBUMIN  --  3.7   HGB 12.3* 13.2*   WBC 5.3 7.8   CRP  --  20.0*       Physical Exam  Areas of skin assessed: focused Right 1st toe    Wound Location:  Right 1st toe    Plantar toe 12/13    Anterior toe 12/13    Wound History: Wounds present on admission. Plantar wound started as a blister per patient and he had some purulent drainage. Toe was red and swollen at base of toenail and so toe nail had been removed in the ED by a provider per the patient. Patient reports pain, swelling, and erythema have somewhat improved since toe nail removal.    Wound Base: 100 % dermis     Palpation of the wound bed: normal      Drainage: small     Description of drainage: bloody- from anterior nail bed wound. No purulent drainage witnessed at this time.     Measurements (length x width x depth, in cm) nail bed-  1.4 x 2 x 0.1 cm ; plantar toe 0.1 x 0.1 x 0.1 cm     Tunneling N/A     Undermining N/A  Periwound skin: hyperkeratosis and dry, some small cracks      Color: pink and red      Temperature: warm  Odor: none  Pain: mild, aching and tender      Interventions  Visual inspection and assessment completed   Wound Care Rationale Promote moist wound healing without tissue dehydration , Provide protection  and Decrease bacterial load  Wound Care: done per plan of care  Supplies: gathered, placed at the bedside, floor stock, discussed with RN and discussed with patient  Current off-loading measures: Pillows  Current support surface: Standard  Atmos Air mattress  Education provided to: plan of care, wound progress, Infection prevention  and Hygiene- Patients wife is a diabetic educator and patient is well educated on how to care for his feet prior to this- assessed with some teach back.   Discussed plan of care with Patient    Rhonda Blankenship RN, CWOCN

## 2021-12-13 NOTE — PHARMACY-VANCOMYCIN DOSING SERVICE
Pharmacy Vancomycin Initial Note  Date of Service 2021  Patient's  1980  41 year old, male    Indication: Osteomyelitis    Current estimated CrCl = Estimated Creatinine Clearance: 120 mL/min (based on SCr of 0.84 mg/dL).    Creatinine for last 3 days  2021:  4:24 PM Creatinine 0.84 mg/dL    Recent Vancomycin Level(s) for last 3 days  No results found for requested labs within last 72 hours.      Vancomycin IV Administrations (past 72 hours)      No vancomycin orders with administrations in past 72 hours.                Nephrotoxins and other renal medications (From now, onward)    Start     Dose/Rate Route Frequency Ordered Stop    21 1000  vancomycin 1250 mg in 0.9% NaCl 250 mL intermittent infusion 1,250 mg         1,250 mg  over 90 Minutes Intravenous EVERY 12 HOURS 21  vancomycin 1250 mg in 0.9% NaCl 250 mL intermittent infusion 1,250 mg         1,250 mg  over 90 Minutes Intravenous ONCE 21            Contrast Orders - past 72 hours (72h ago, onward)            Start     Dose/Rate Route Frequency Ordered Stop    21  gadobutrol (GADAVIST) injection 7.48 mL         0.1 mL/kg × 74.8 kg Intravenous ONCE 21          [employer] Prediction of Planned Initial Vancomycin Regimen  Loading dose: N/A  Regimen: 1250 mg IV every 12 hours.  Start time: 21:15 on 2021  Exposure target: AUC24 (range)400-600 mg/L.hr   AUC24,ss: 547 mg/L.hr  Probability of AUC24 > 400: 81 %  Ctrough,ss: 16.4 mg/L  Probability of Ctrough,ss > 20: 34 %  Probability of nephrotoxicity (Lodise ADAMARIS ): 12 %        Plan:  1. Start vancomycin  1250 mg IV q12h.   2. Vancomycin monitoring method: AUC  3. Vancomycin therapeutic monitoring goal: 400-600 mg*h/L  4. Pharmacy will check vancomycin levels as appropriate in 1-3 Days.    5. Serum creatinine levels will be ordered daily for the first week of therapy and at least twice weekly for  subsequent weeks.      Favio Luo RPH

## 2021-12-13 NOTE — PLAN OF CARE
ED admit overnight. Up with SBA to the bed. Pain controled with 1x dose of IV dilaudid. Home meds given. Constipated, milirax and senna ordered this AM by provided. Skin assessment still needed. Toe wound dressed by ED provider, WOC consult placed for the day. Will continue with plan of care.

## 2021-12-13 NOTE — TELEPHONE ENCOUNTER
DIAGNOSIS: cellulitis of toe of right foot   APPOINTMENT DATE: 12.13.21   NOTES STATUS DETAILS   OFFICE NOTE from referring provider Internal 12.13.21 [place]   OFFICE NOTE from other specialist N/A    DISCHARGE SUMMARY from hospital N/A    DISCHARGE REPORT from the ER N/A    OPERATIVE REPORT N/A    MEDICATION LIST Internal    EMG (for Spine) N/A    IMPLANT RECORD/STICKER N/A    LABS     CBC/DIFF Internal    CULTURES N/A    INJECTIONS DONE IN RADIOLOGY N/A    MRI Internal 12.12.21 [facility]   CT SCAN N/A    XRAYS (IMAGES & REPORTS) N/A    TUMOR     PATHOLOGY  Slides & report N/A

## 2021-12-13 NOTE — CONSULTS
[facility] Orthopedic Consultation    Phillip Rueda MRN# [id number]   Age: 41 year old YOB: 1980   Date of Admission:  12/12/2021    Reason for consult: Diabetic foot wound   Requesting physician: Jeannette Narvaez              Impression and Recommendation:   Impression:  Phillip Rueda is a 41 year old male with DM2, peripheral neuropathy who has been admitted for evaulation and management of a new diabetic ulcer involving the plantar great toe. Patient is currently not septic with 0/4 SIRS.     Recomendations:  Operative Plan: None at this time.     Activity: NWB RLE while wound heals. ROM as tolerated.   Splint: CAM boot applied to extremity and should remain in place at all times.  Only remove once daily for wound cares.  Wound Cares/Dressing: Daily dressing changes and wound flushing by nursing. Keep wound clean/dry.   DVT PPx: Per primary.  Antibiotic: Recommend broad spectrum IV antibiotic for infected ulcer; once clinical picture improves, transition to oral antibiotics.   Labs: Follow inflammatory labs; repeat WBC, CRP and q48 hours until trending down.   Imaging: Plain films of right foot (ordered). No further imaging needed at this time.   PT/OT: Work on strengthening, gait training, mobility, and ADLs  Consults: Consider infectious disease consult to aid in antibiotic selection/duration.    Dispo: Per Primary team. No current plans for OR.  Follow Up: Pending.         Chief Complaint:   Right diabetic foot ulcer         History of Present Illness:   This patient is a 41 year old male with DMII, peripheral neuropathy, Parsonage-Avendaño Syndrome who presents with new diabetic foot ulcer involving the plantar surface of the right great toe.  Patient had a surgery on the left ankle thus has been favoring the right more frequently leading to development of the ulcer on the plantar surface of the right great toe. Patient had a blood blister develop over the summer but noted  12/9/2021 that it had opened and was draining.  Patient states the purulence continued and pain worsened over the weekend eventually prompting him to present to the ED.  Patient had not had any diabetic foot wounds prior to current ulcer. He denies fever, chills, nausea, vomiting, diarrhea, abdominal pain, headache.    Glycemic Control:  Patient reports good compliance with DM medications. Currently controlled with insulin. Most recent Hgb A1C on 10/19/21 was 6.9.    Wound History:  No trauma to the great toe, no history of infections requiring debridement or antibiotic treatment.  Patient follows with Dr. Smallwood of podiatry.    History obtained from patient interview and chart review.        Past Medical History:     Past Medical History:   Diagnosis Date     Anxiety      Depressive disorder      Diabetes mellitus (H)      Hypertension      Liver disease      Neuralgic amyotrophy      Pain disorder 12/8/2015     Parsonage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      Prostate infection      Seizures (H)      Staph infection              Past Surgical History:     Past Surgical History:   Procedure Laterality Date     ORTHOPEDIC SURGERY               Social History:   Tobacco use: 0.25 packs/day for 15 years, quit in 2013  Alcohol use: Denies  Elicit drug use: Patient denies  Occupation:  "[employer]  Living situation: Lives in house with wife, kids          Family History:   No family history of anesthesia, bleeding or clotting complications.           Allergies:     Allergies   Allergen Reactions     Bees Swelling     Bupropion Hcl Other (See Comments)     seizure     Lisinopril Cough     Penicillins Other (See Comments)     Unable to close mouth  Tolerated cefazolin (12/12/21)             Medications:   Medication reviewed with patient and in chart.  Anticoagulation: None  Antibiotics: None          Review of Systems:   CONSTITUTIONAL:  negative for  fevers, chills, sweats, fatigue, malaise and weight loss  HEENT:  negative for tinnitus,  "earaches, nasal congestion, epistaxis, sore throat  RESPIRATORY:  negative for dyspnea, wheezing, chest pain and cough.  CARDIOVASCULAR:  negative for chest pain, palpitations, orthopnea, edema, syncope.  GASTROINTESTINAL:  negative for nausea, vomiting, diarrhea, constipation, abdominal pain.  GENITOURINARY:  negative for dysuria, nocturia, urinary incontinence and hematuria  INTEGUMENT/BREAST:  negative for rash and skin lesions  HEMATOLOGIC/LYMPHATIC:  negative for easy bruising, bleeding, swelling/edema  ALLERGIC/IMMUNOLOGIC:  negative for recurrent infections and drug reactions  ENDOCRINE: negative for diabetic symptoms including polyuria and polydipsia  MUSCULOSKELETAL: negative for myalgias, arthralgias, joint swelling and muscle weakness  NEUROLOGICAL: negative for headaches, dizziness, gait problems, numbness/tingling          Physical Exam:     /72 (BP Location: Right arm)   Pulse 70   Temp 97.9  F (36.6  C) (Oral)   Resp 18   Ht 1.727 m (5' 8\")   Wt 74.8 kg (165 lb)   SpO2 97%   BMI 25.09 kg/m    General: awake, alert, cooperative, no apparent distress, appears stated age  HEENT: normocephalic, atraumatic, PERRL, EOMI, no scleral icterus, MMM  Respiratory: breathing non-labored, no wheezing  Cardiovascular: peripheral pulses 2+ and symmetric, capillary refill < 2sec, skin wwp  Skin: no rashes or lesions  Neurological: A&Ox3, CN II-XII grossly intact  Musculoskeletal:  RLE: No gross deformity. Nail has been removed from great toe. There is a foot wound located over plantar surface of great toe with small puncture, 1cm x 1 cm callus over medial great toe. No purulent drainage. No significant anika-wound erythema. No fluctuance or expressible purulence/drainage.  Probing wound reveals no significant cavities or tracts. Negative probe-to-bone. Sensation diminished in stocking pattern distribution in BLE which normal sensation return at level of malleoli. 5/5 SLR. Fires TA/Gastroc/EHL/FHL with 5/5 " strength. Dorsalis pedis and posterior tibial arteries are palpable and strong.          Imaging:   Review of R foot films from 12/13/2021 demonstrate great toe tuft with subtle indistinctiveness, nonspecific.  12/12/2021 MR right toe independently reviewed demonstrating possible osteomyelitis of the distal tuft of right great toe but not definitive.          Laboratory date:   CBC:  Lab Results   Component Value Date    WBC 5.3 12/13/2021    HGB 12.3 (L) 12/13/2021     12/13/2021       BMP:  Lab Results   Component Value Date     12/12/2021    POTASSIUM 3.6 12/12/2021    CHLORIDE 101 12/12/2021    CO2 28 12/12/2021    BUN 11 12/12/2021    CR 0.84 12/12/2021    ANIONGAP 7 12/12/2021    ARON 9.0 12/12/2021     (H) 12/13/2021       Inflammatory Markers:  Lab Results   Component Value Date    WBC 5.3 12/13/2021    CRP 20.0 (H) 12/12/2021    SED 11 12/13/2021       Cultures:  No results for input(s): CULT in the last 168 hours.      Shelton Reyna MD  Orthopaedic Surgery Resident  Pager: [phone]  12/13/2021       Attestation:  This patient was discussed with Dr. Warner, senior resident, and Dr Branch, staff, who agrees with the above.

## 2021-12-13 NOTE — H&P
History and Physical  Internal Medicine      Phillip Rueda MRN:[id number]   YOB: 1980  Date of Admission:12/12/2021  Primary care provider: Km Dos Santos           Assessment and Plan:      Assessment & Plan:   Phillip Rueda is a 41 year old male w/ PMHx of type 2 diabetes, diabetic neuropathy, and Parsonage-Zuniga syndrome who is being admitted for cellulitis of right great toe with some c/f for osteomyelitis on MRI    Cellulitis of right great toe/right foot with c/f Osteomyelitis   T2DM c/b diabetic neuropathy  Cardinal symptoms of cellulitis began ~2-3 days ago and worsened today. Endorses purulent drainage as well. Denies fevers, chills, night sweats. Denies injury. HbA1c 6.9 10/19/21.   - Continue cefepime 2g Q12h + Vanc  - Follow blcx  - Podiatry/WOC consults  - PTA gabapentin 1200 TID for neuropathy  - Medium dose sliding scale  - Hold PTA diabetes regimen  - Hypoglycemia protocol    Parsonage-zuniga syndrome  Chronic R shoulder pain  PTS is an inflammatory disorder of the brachial plexus. Follows with pain and palliative care. Seems as if this is hereditary with both his mother and brother having similar diagnosis. He was transitioned off of oxycodone and onto suboxone in 6/21. Well controlled   - continue PTA suboxone BID     Anxiety   - PTA diazepam TID prn (prescribed by psychiatrist). Takes 2-3x weekly but available as TID prn  - PTA nortriptyline  - PTA venlafaxine      CODE: Full code  DVT: Enoxaparin  FEN: Regular adult diet  Disposition: Home after resolution of cellulitis/osteo    Pardeep Clifford, DO  PGY-1, Internal Medicine    To be staffed in the AM    HISTORY OF PRESENT ILLNESS:  Phillip Rueda is a 41 year old with a PMHx of type 2 diabetes, diabetic neuropathy, and Parsonage-Zuniga syndrome who presents with a right foot wound. He reports that he noticed a blood blister on his toe, and he has been monitoring it for ~ 4-5 days. On 12/12 AM he noticed increased  pain on his right toe, some purulent drainage and increased swelling. Patient has diabetic neuropathy in his feet bilaterally, so having pain in his toes was concerning for him. He denies fevers, chills, night sweats, n/v/d.     Reports that he is a usually very health other than his diabetes which has been under good control.     PAST MEDICAL HISTORY:    Past Medical History:   Diagnosis Date     Anxiety      Depressive disorder      Diabetes mellitus (H)      Hypertension      Liver disease      Neuralgic amyotrophy      Pain disorder 12/8/2015     Parsonage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      Prostate infection      Seizures (H)      Staph infection        Past Surgical History:   Procedure Laterality Date     ORTHOPEDIC SURGERY          MEDICATIONS:  PTA Meds  Prior to Admission medications    Medication Sig Last Dose Taking? Auth Provider   gabapentin (NEURONTIN) 600 MG tablet Take 0.5 tablets (300 mg) by mouth 2 times daily 12/12/2021 at Unknown time Yes Km Dos Santos MD   metFORMIN (GLUCOPHAGE-XR) 500 MG 24 hr tablet TAKE TWO TABLETS BY MOUTH TWICE A DAY WITH MEALS. 12/12/2021 at Unknown time Yes Km Dos Santos MD   venlafaxine (EFFEXOR-ER) 150 MG TB24 Take 1 tablet by mouth daily (with breakfast). 12/12/2021 at Unknown time Yes Jonathan Woodard MD   ACCU-CHEK GUIDE test strip USE TO TEST BLOOD SUGAR TWO TIMES A DAY OR AS DIRECTED   Km Dos Santos MD   ammonium lactate (LAC-HYDRIN) 12 % external cream Apply topically 2 times daily as needed for dry skin   Ulisses Triana DPM   atorvastatin (LIPITOR) 20 MG tablet TAKE ONE TABLET BY MOUTH ONCE DAILY   Km Dos Santos MD   blood glucose (NO BRAND SPECIFIED) lancets standard Use to test blood sugar 2 times daily or as directed.   Km Dos Santos MD   blood glucose monitoring (NO BRAND SPECIFIED) meter device kit Use to test blood sugar 2 times daily or as directed.   Km Dos Santos MD   Buprenorphine  HCl-Naloxone HCl (SUBOXONE) 4-1 MG film Place 1 Film under the tongue 3 times daily Taking 2-3 times a day and weening off   Km Dos Santos MD   Continuous Blood Gluc  (FREESTYLE DARRON 2 READER) JESSI 1 each continuous USE TO READ BLOOD SUGARS PER  INSTRUCTIONS   Km Dos Santos MD   Continuous Blood Gluc Sensor (FREESTYLE DARRON 2 SENSOR) MISC 1 each continuous CHANGE EVERY 14 DAYS   Km Dos Santos MD   continuous blood glucose monitoring (FREESTYLE DARRON) sensor For use with Freestyle Darron Flash  for continuous monitioring of blood glucose levels. Replace sensor every 14 days.   Km Dos Santos MD   cyclobenzaprine (FLEXERIL) 5 MG tablet Take 2 tablets (10 mg) by mouth 3 times daily as needed for muscle spasms   Heidi Manzo MD   dapagliflozin (FARXIGA) 5 MG TABS tablet Take 1 tablet (5 mg) by mouth every morning   Km Dos Santos MD   diazepam (VALIUM) 5 MG tablet Take 1 tablet (5 mg) 3 times daily as needed.   Km Dos Santos MD   EPINEPHrine (EPIPEN) 0.3 MG/0.3ML injection Inject 0.3 mLs (0.3 mg) into the muscle once as needed for anaphylaxis   Km Dos Santos MD   ibuprofen (ADVIL,MOTRIN) 200 MG tablet Take 1-2 tablets by mouth every 6 hours as needed.   Reported, Patient   insulin lispro (HUMALOG KWIKPEN) 100 UNIT/ML (1 unit dial) KWIKPEN Take it with steroid use as needed. Maximum 10 units per day   Km Dos Santos MD   insulin pen needle 31G X 6 MM Use 2 daily or as directed.   Km Dos Santos MD   nortriptyline (PAMELOR) 25 MG capsule TAKE 2 CAPSULES(50 MG) BY MOUTH AT BEDTIME   Ky Guerrero MD   oxyCODONE (ROXICODONE) 5 MG tablet Take 1 tablet (5 mg) by mouth every 6 hours as needed for severe pain (sleep)   Hilario Liz MD   sildenafil (VIAGRA) 100 MG tablet Take 1/2 to 1 pill as needed for erectile dysfunction.   Km Dos Santos MD   VICTOZA PEN 18  "MG/3ML soln INJCT 1.8MG UNDER THE SKIN ONCE DAILY   Km Dos Santos MD      Current Meds    buprenorphine HCl-naloxone HCl  1 Film Sublingual BID     ceFEPIme (MAXIPIME) IV  2 g Intravenous Once     enoxaparin ANTICOAGULANT  40 mg Subcutaneous Q24H     gabapentin  1,200 mg Oral TID     nortriptyline  50 mg Oral At Bedtime     pharmacy alert - intermittent dosing  1 each Other See Admin Instructions     sodium chloride (PF)  3 mL Intracatheter Q8H     vancomycin  1,250 mg Intravenous Q12H     venlafaxine  150 mg Oral Daily with breakfast       ALLERGIES:    Allergies   Allergen Reactions     Bees Swelling     Bupropion Hcl Other (See Comments)     seizure     Lisinopril Cough     Penicillins Other (See Comments)     Unable to close mouth  Tolerated cefazolin (12/12/21)       REVIEW OF SYSTEMS:  A 10 point review of systems was negative except as noted above.    SOCIAL HISTORY:   Tobacco: Denies  Alcohol: Occasionally  Illicit Drugs: Denies    FAMILY MEDICAL HISTORY:   Family History   Problem Relation Age of Onset     Depression Mother      Anxiety Disorder Mother      Substance Abuse Maternal Grandfather      Anxiety Disorder Brother      Glaucoma No family hx of      Macular Degeneration No family hx of        PHYSICAL EXAM:   BP (!) 145/96   Pulse 67   Temp 98.6  F (37  C) (Oral)   Resp 18   Ht 1.727 m (5' 8\")   Wt 74.8 kg (165 lb)   SpO2 98%   BMI 25.09 kg/m          General: alert, mild distress   Head: NC/AT  Eyes: non-icteric sclera, EOMI  Pulmonary: CTAB, no cough or wheezing  CV: RRR, +s1/s2, no murmurs  GI: soft, non-tender, non-distended + bowel sounds  Skin: no rashes seen  EXT: Severe onychomycosis of right great toe, good pulses, sensation intact bilaterally. Bandage with blood stains  Neuro: A&Ox3, no focal deficits  Psych: Appropriate mood and affect    LABS:   CMP  Recent Labs   Lab 12/12/21  1624      POTASSIUM 3.6   CHLORIDE 101   CO2 28   ANIONGAP 7   *   BUN 11   CR " 0.84   GFRESTIMATED >90   ARON 9.0   PROTTOTAL 7.4   ALBUMIN 3.7   BILITOTAL 0.4   ALKPHOS 114   AST 18   ALT 21     CBC  Recent Labs   Lab 12/12/21  1624   HGB 13.2*   WBC 7.8   RBC 4.52   HCT 38.8*   MCV 86   MCH 29.2   MCHC 34.0   RDW 12.7        INRNo lab results found in last 7 days.

## 2021-12-14 ENCOUNTER — HOME INFUSION (PRE-WILLOW HOME INFUSION) (OUTPATIENT)
Dept: PHARMACY | Facility: CLINIC | Age: 41
End: 2021-12-14
Payer: MEDICARE

## 2021-12-14 LAB
ANION GAP SERPL CALCULATED.3IONS-SCNC: 6 MMOL/L (ref 3–14)
BASOPHILS # BLD AUTO: 0 10E3/UL (ref 0–0.2)
BASOPHILS NFR BLD AUTO: 1 %
BUN SERPL-MCNC: 15 MG/DL (ref 7–30)
CALCIUM SERPL-MCNC: 9 MG/DL (ref 8.5–10.1)
CHLORIDE BLD-SCNC: 106 MMOL/L (ref 94–109)
CO2 SERPL-SCNC: 28 MMOL/L (ref 20–32)
CREAT SERPL-MCNC: 0.86 MG/DL (ref 0.66–1.25)
CRP SERPL-MCNC: 7.9 MG/L (ref 0–8)
EOSINOPHIL # BLD AUTO: 0.2 10E3/UL (ref 0–0.7)
EOSINOPHIL NFR BLD AUTO: 4 %
ERYTHROCYTE [DISTWIDTH] IN BLOOD BY AUTOMATED COUNT: 12.5 % (ref 10–15)
GFR SERPL CREATININE-BSD FRML MDRD: >90 ML/MIN/1.73M2
GLUCOSE BLD-MCNC: 185 MG/DL (ref 70–99)
GLUCOSE BLDC GLUCOMTR-MCNC: 147 MG/DL (ref 70–99)
GLUCOSE BLDC GLUCOMTR-MCNC: 178 MG/DL (ref 70–99)
GLUCOSE BLDC GLUCOMTR-MCNC: 180 MG/DL (ref 70–99)
GLUCOSE BLDC GLUCOMTR-MCNC: 190 MG/DL (ref 70–99)
GLUCOSE BLDC GLUCOMTR-MCNC: 192 MG/DL (ref 70–99)
HCT VFR BLD AUTO: 38.5 % (ref 40–53)
HGB BLD-MCNC: 12.8 G/DL (ref 13.3–17.7)
IMM GRANULOCYTES # BLD: 0 10E3/UL
IMM GRANULOCYTES NFR BLD: 0 %
LYMPHOCYTES # BLD AUTO: 2.2 10E3/UL (ref 0.8–5.3)
LYMPHOCYTES NFR BLD AUTO: 44 %
MCH RBC QN AUTO: 29.2 PG (ref 26.5–33)
MCHC RBC AUTO-ENTMCNC: 33.2 G/DL (ref 31.5–36.5)
MCV RBC AUTO: 88 FL (ref 78–100)
MONOCYTES # BLD AUTO: 0.4 10E3/UL (ref 0–1.3)
MONOCYTES NFR BLD AUTO: 7 %
NEUTROPHILS # BLD AUTO: 2.2 10E3/UL (ref 1.6–8.3)
NEUTROPHILS NFR BLD AUTO: 44 %
NRBC # BLD AUTO: 0 10E3/UL
NRBC BLD AUTO-RTO: 0 /100
PLATELET # BLD AUTO: 236 10E3/UL (ref 150–450)
POTASSIUM BLD-SCNC: 3.8 MMOL/L (ref 3.4–5.3)
RBC # BLD AUTO: 4.38 10E6/UL (ref 4.4–5.9)
SODIUM SERPL-SCNC: 140 MMOL/L (ref 133–144)
WBC # BLD AUTO: 5 10E3/UL (ref 4–11)

## 2021-12-14 PROCEDURE — 250N000013 HC RX MED GY IP 250 OP 250 PS 637: Performed by: STUDENT IN AN ORGANIZED HEALTH CARE EDUCATION/TRAINING PROGRAM

## 2021-12-14 PROCEDURE — 250N000013 HC RX MED GY IP 250 OP 250 PS 637

## 2021-12-14 PROCEDURE — 250N000012 HC RX MED GY IP 250 OP 636 PS 637

## 2021-12-14 PROCEDURE — 36415 COLL VENOUS BLD VENIPUNCTURE: CPT

## 2021-12-14 PROCEDURE — 86140 C-REACTIVE PROTEIN: CPT

## 2021-12-14 PROCEDURE — 80048 BASIC METABOLIC PNL TOTAL CA: CPT

## 2021-12-14 PROCEDURE — 85004 AUTOMATED DIFF WBC COUNT: CPT

## 2021-12-14 PROCEDURE — 250N000011 HC RX IP 250 OP 636

## 2021-12-14 PROCEDURE — 120N000002 HC R&B MED SURG/OB UMMC

## 2021-12-14 PROCEDURE — 99223 1ST HOSP IP/OBS HIGH 75: CPT | Performed by: INTERNAL MEDICINE

## 2021-12-14 PROCEDURE — 99233 SBSQ HOSP IP/OBS HIGH 50: CPT | Mod: GC | Performed by: INTERNAL MEDICINE

## 2021-12-14 RX ORDER — OXYCODONE HYDROCHLORIDE 5 MG/1
5-10 TABLET ORAL EVERY 6 HOURS PRN
Status: DISCONTINUED | OUTPATIENT
Start: 2021-12-14 | End: 2021-12-15 | Stop reason: HOSPADM

## 2021-12-14 RX ORDER — OXYCODONE HYDROCHLORIDE 5 MG/1
5 TABLET ORAL
Status: COMPLETED | OUTPATIENT
Start: 2021-12-14 | End: 2021-12-14

## 2021-12-14 RX ORDER — LEVOFLOXACIN 750 MG/1
750 TABLET, FILM COATED ORAL DAILY
Status: DISCONTINUED | OUTPATIENT
Start: 2021-12-15 | End: 2021-12-15 | Stop reason: HOSPADM

## 2021-12-14 RX ORDER — METRONIDAZOLE 500 MG/1
500 TABLET ORAL 3 TIMES DAILY
Status: DISCONTINUED | OUTPATIENT
Start: 2021-12-15 | End: 2021-12-15 | Stop reason: HOSPADM

## 2021-12-14 RX ORDER — DOXYCYCLINE 100 MG/1
100 CAPSULE ORAL EVERY 12 HOURS SCHEDULED
Status: DISCONTINUED | OUTPATIENT
Start: 2021-12-15 | End: 2021-12-15 | Stop reason: HOSPADM

## 2021-12-14 RX ADMIN — OXYCODONE HYDROCHLORIDE 5 MG: 5 TABLET ORAL at 11:25

## 2021-12-14 RX ADMIN — NORTRIPTYLINE HYDROCHLORIDE 50 MG: 50 CAPSULE ORAL at 22:30

## 2021-12-14 RX ADMIN — ACETAMINOPHEN 650 MG: 325 TABLET, FILM COATED ORAL at 15:18

## 2021-12-14 RX ADMIN — DIAZEPAM 5 MG: 5 TABLET ORAL at 16:44

## 2021-12-14 RX ADMIN — OXYCODONE HYDROCHLORIDE 5 MG: 5 TABLET ORAL at 10:38

## 2021-12-14 RX ADMIN — POLYETHYLENE GLYCOL 3350 17 G: 17 POWDER, FOR SOLUTION ORAL at 09:18

## 2021-12-14 RX ADMIN — OXYCODONE HYDROCHLORIDE 5 MG: 5 TABLET ORAL at 20:55

## 2021-12-14 RX ADMIN — INSULIN ASPART 2 UNITS: 100 INJECTION, SOLUTION INTRAVENOUS; SUBCUTANEOUS at 18:44

## 2021-12-14 RX ADMIN — GABAPENTIN 1200 MG: 600 TABLET, FILM COATED ORAL at 19:40

## 2021-12-14 RX ADMIN — CEFEPIME HYDROCHLORIDE 2 G: 2 INJECTION, POWDER, FOR SOLUTION INTRAVENOUS at 01:02

## 2021-12-14 RX ADMIN — ENOXAPARIN SODIUM 40 MG: 40 INJECTION SUBCUTANEOUS at 09:07

## 2021-12-14 RX ADMIN — GABAPENTIN 1200 MG: 600 TABLET, FILM COATED ORAL at 07:57

## 2021-12-14 RX ADMIN — OXYCODONE HYDROCHLORIDE 10 MG: 5 TABLET ORAL at 22:30

## 2021-12-14 RX ADMIN — CEFEPIME HYDROCHLORIDE 2 G: 2 INJECTION, POWDER, FOR SOLUTION INTRAVENOUS at 12:42

## 2021-12-14 RX ADMIN — BUPRENORPHINE AND NALOXONE 1 FILM: 4; 1 FILM BUCCAL; SUBLINGUAL at 07:57

## 2021-12-14 RX ADMIN — GABAPENTIN 1200 MG: 600 TABLET, FILM COATED ORAL at 15:18

## 2021-12-14 RX ADMIN — ACETAMINOPHEN 650 MG: 325 TABLET, FILM COATED ORAL at 01:58

## 2021-12-14 RX ADMIN — OXYCODONE HYDROCHLORIDE 5 MG: 5 TABLET ORAL at 06:08

## 2021-12-14 RX ADMIN — VENLAFAXINE HYDROCHLORIDE 150 MG: 150 CAPSULE, EXTENDED RELEASE ORAL at 07:57

## 2021-12-14 RX ADMIN — Medication 1 MG: at 22:30

## 2021-12-14 RX ADMIN — OXYCODONE HYDROCHLORIDE 10 MG: 5 TABLET ORAL at 16:29

## 2021-12-14 RX ADMIN — STANDARDIZED SENNA CONCENTRATE 8.6 MG: 8.6 TABLET ORAL at 09:18

## 2021-12-14 RX ADMIN — INSULIN ASPART 1 UNITS: 100 INJECTION, SOLUTION INTRAVENOUS; SUBCUTANEOUS at 07:58

## 2021-12-14 RX ADMIN — OXYCODONE HYDROCHLORIDE 5 MG: 5 TABLET ORAL at 00:53

## 2021-12-14 RX ADMIN — STANDARDIZED SENNA CONCENTRATE 8.6 MG: 8.6 TABLET ORAL at 22:30

## 2021-12-14 RX ADMIN — ACETAMINOPHEN 650 MG: 325 TABLET, FILM COATED ORAL at 07:57

## 2021-12-14 RX ADMIN — ACETAMINOPHEN 650 MG: 325 TABLET, FILM COATED ORAL at 20:55

## 2021-12-14 RX ADMIN — BUPRENORPHINE AND NALOXONE 1 FILM: 4; 1 FILM BUCCAL; SUBLINGUAL at 19:40

## 2021-12-14 RX ADMIN — INSULIN ASPART 1 UNITS: 100 INJECTION, SOLUTION INTRAVENOUS; SUBCUTANEOUS at 12:48

## 2021-12-14 ASSESSMENT — ACTIVITIES OF DAILY LIVING (ADL)
CONCENTRATING,_REMEMBERING_OR_MAKING_DECISIONS_DIFFICULTY: NO
ADLS_ACUITY_SCORE: 9
ADLS_ACUITY_SCORE: 5
ADLS_ACUITY_SCORE: 5
ADLS_ACUITY_SCORE: 9
ADLS_ACUITY_SCORE: 9
ADLS_ACUITY_SCORE: 5
HEARING_DIFFICULTY_OR_DEAF: NO
ADLS_ACUITY_SCORE: 9
ADLS_ACUITY_SCORE: 5
ADLS_ACUITY_SCORE: 9
DIFFICULTY_EATING/SWALLOWING: NO
ADLS_ACUITY_SCORE: 9
WEAR_GLASSES_OR_BLIND: NO
ADLS_ACUITY_SCORE: 5
DRESSING/BATHING_DIFFICULTY: NO
ADLS_ACUITY_SCORE: 9
DOING_ERRANDS_INDEPENDENTLY_DIFFICULTY: NO
ADLS_ACUITY_SCORE: 9
ADLS_ACUITY_SCORE: 5
TOILETING_ISSUES: NO
FALL_HISTORY_WITHIN_LAST_SIX_MONTHS: NO
ADLS_ACUITY_SCORE: 5
ADLS_ACUITY_SCORE: 5
WALKING_OR_CLIMBING_STAIRS_DIFFICULTY: NO
ADLS_ACUITY_SCORE: 9
ADLS_ACUITY_SCORE: 9
DIFFICULTY_COMMUNICATING: NO
ADLS_ACUITY_SCORE: 9

## 2021-12-14 NOTE — PROGRESS NOTES
Therapy:IV abx  Insurance: PF1   Ded: $400    Met: $400  Co-Insurance: 85/15  Max Out of Pocket: $2500  Met: $2500    In reference to admission on 12/12/21 to check IV abx coverage    Please contact Intake with any questions, [phone] or In Basket pool, [facility] ([phone]).

## 2021-12-14 NOTE — PLAN OF CARE
Afebrile, hypertensive but within parameters, OVSS on RA.  Oxycodone given x2.  Denies nausea.  Right great toe dressing CDI.  and 139.  PIV SL.  Tolerating regular diet, appetite fair.  Voiding spontaneously without difficulty.  No BM this shift.  Up independently in room.  Continue plan of care.

## 2021-12-14 NOTE — PROGRESS NOTES
Resident/Fellow Attestation   I, SHAHZAD ARIAS MD, was present with the medical/BEA student who participated in the service and in the documentation of the note.  I have verified the history and personally performed the physical exam and medical decision making.  I agree with the assessment and plan of care as documented in the note.      SHAHZAD ARIAS  Internal Medicine & Pediatrics, PGY4  Date of Service (when I saw the patient): 12/14/2021    [facility]    Progress Note - [place] [facility] Service        Date of Admission:  12/12/2021    Assessment & Plan   Phillip Rueda is a 41 year old male with history of type 2 diabetes, diabetic neuropathy, and Parsonage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 who is admitted for cellulitis of right great toe with concern for osteomyelitis based upon MRI.    Changes Today:  -Continue cefepime; vancomycin discontinued given negative MRSA nares  -Oxycodone adjusted to 5-10mg q6h PRN for pain  -ID consult requested  -WOC recommendations appreciated  -Podiatry recommendations appreciated  -Ordered CAM boot     Cellulitis of Right Great Toe with concern for osteomyelitis  Patient presenting with 2-3 day history of symptoms consistent with R great toe cellulitis with associated presence of purulence, with MRI concerning for possible presence of osteomyelitis. No associated presence of systemic signs of infection. Pain and inflammatory markers have improved on abx, with blood cultures NGTD. No indication for surgery, per podiatry. Given unclear utility of bone biopsy at this time given the fact that patient has been pretreated with abx, as well as unclear duration of abx course and choice of abx agent without bone culture results, will consult ID to assist with evaluation and mangement  -Continue cefepime, 12/13 - *  -s/p vancomycin (12/12-12/13) - CT'ed due to negative MRSA nares  -ID consult, appreciate recs  -Trend CBC + CRP q48h  -WOC consulted,  appreciate recommendations   - Change wound dressing once daily, if dressing sticks increase to BID   - Clean socks daily  -Podiatry consulted, appreciate recommendations   - CAM boot on R leg    - PT/OT to address strength, gait training, mobility, and ADLs  -Tylenol 650mg q6h  -Oxycodone 5-10mg q6h PRN  -Wound cultures, no growth <1 day    Type 2 Diabetes Mellitus complicated by Diabetic Neuropathy  Managed by long-term current use of insulin. HbA1c 6.9 on 10/19/2021. Peripheral neuropathy managed with gabapentin.  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 Followed by pain and palliative care. Possibly hereditary with his mother and brother having same diagnosis. Transitioned from oxycodone to suboxone in June 2021. Well maintained.  -Cont PTA suboxone BID    Anxiety  -PTA diazepam TID PRN, prescribed by psychiatrist. Takes 2-3x weekly but available as TID PRN  -PTA nortriptyline   -PTA venlafaxine       Diet: Combination Diet Regular Diet Adult    DVT Prophylaxis: Enoxaparin   Kennedy Catheter: Not present  Fluids: None  Central Lines: None  Code Status: Full Code      Disposition Plan   Expected Discharge: 12/15/2021     Anticipated discharge location: Prior home setting after cellulitis stabilized, evaluation for osteomyelitis completed, and antibiotic plan established.      The patient's care was discussed with the Attending Physician, Dr. Mitul Cool MD.    VERENICE CARNEY-EV  Medical Student  [address]  Securely message with the Vocera Web Console (learn more here)  Text page via [employer] Paging/Directory    Please see sign in/sign out for up to date coverage information  ______________________________________________________________________    Interval History   Nursing  notes reviewed. R toe/foot pain overall somewhat improved relative to yesterday. Patient described that his severe pain is confined to the R great toe, with more mild pain over R dorsal aspect of foot proximal to great toe extending into mid-foot area. Pt continues to deny fever/chills, CP, or SOB. Oxycodone adjusted for pt to allow for 5-10 mg range every 6 hrs PRN for pain control. Evaluation for osteomyelitis continues with ID consult requested. Wound cultures show no growth <1 day.     Data reviewed today: I reviewed all medications, new labs and imaging results over the last 24 hours.    Physical Exam   Vital Signs: Temp: 97.5  F (36.4  C) Temp src: Oral BP: 137/81 Pulse: 69   Resp: 18 SpO2: 95 % O2 Device: None (Room air)    Weight: 165 lbs 0 oz  General Appearance: Laying down in bed in no acute distress.  Respiratory: Breathing comfortably on RA, lungs are CTAB  Cardiovascular: RRR, normal S1 and S2, no M/R/G. No peripheral edema   Skin: R great toe notable for diffuse erythema/swelling with small, clean appearing wound on the plantar surface of the toe; R great toenail removed 12/13. Mild tenderness to palpation along medial aspect of R foot up to mid-foot area, with no associated crepitus, erythema, or edema. No other abrasions or marks on visible skin.    Neuro: Spontaneously moving all extremities.  Psych: Appropriate speech/language.     Data   Recent Labs   Lab 12/14/21  0639 12/14/21  0412 12/14/21  0221 12/13/21  2215 12/13/21  0730 12/13/21  0656 12/13/21  0202 12/13/21  0026 12/12/21  1624   WBC 5.0  --   --   --   --   --   --  5.3 7.8   HGB 12.8*  --   --   --   --   --   --  12.3* 13.2*   MCV 88  --   --   --   --   --   --  87 86     --   --   --   --   --   --  212 218     --   --   --   --  140  --   --  136   POTASSIUM 3.8  --   --   --   --  3.8  --   --  3.6   CHLORIDE 106  --   --   --   --  108  --   --  101   CO2  --   --   --   --   --  24  --   --  28   BUN  --   --   --    --   --  11  --   --  11   CR  --   --   --   --   --  0.76  --   --  0.84   ANIONGAP  --   --   --   --   --  8  --   --  7   ARON  --   --   --   --   --  8.9  --   --  9.0   GLC  --  178* 180* 107*   < > 96   < >  --  117*   ALBUMIN  --   --   --   --   --   --   --   --  3.7   PROTTOTAL  --   --   --   --   --   --   --   --  7.4   BILITOTAL  --   --   --   --   --   --   --   --  0.4   ALKPHOS  --   --   --   --   --   --   --   --  114   ALT  --   --   --   --   --   --   --   --  21   AST  --   --   --   --   --   --   --   --  18    < > = values in this interval not displayed.

## 2021-12-14 NOTE — CONSULTS
[facility] General Infectious Disease Consult Note - New Patient     Patient:  Phillip Rueda, Date of birth 1980, Medical record number [id number]  Date of Visit:  12/14/2021  Consult requested by Dr. Mitul Cool for evaluation of right first toe diabetic ulcer with cellulitis and possible toe osteomyelitis.         Assessment and Recommendations:   Recommendations:  - Give levofloxacin 750 mg PO daily (to cover Pseudomonas and other likely Gram negatives and Strep), doxycycline 100 mg PO BID (to cover Staph), and metronidazole 500 mg PO TID (to cover anaerobes) for six weeks through 1/25/21 to presumptively treat the possibility of acute right first toe diabetic osteomyelitis.  - Can discontinue cefepime if he tolerates the first doses of the oral antibiotics well.  - Repeat an EKG for QTc prior to discharge.  - Check every other week CBC / diff / platelets, Basic Metabolic Panel, CRP while on the antibiotics.  - Since he will presumably have ongoing follow-up appointments in Podiatry / Wound Clinic, a post-discharge appointment in Infectious Disease Clinic is not expected to be needed (but I will be available to be contacted should any ID-related concerns or questions arise during the next 1.5 months).  - Check on his seasonal influenza vaccination status prior to discharge (I forgot to ask).    Thanks for the consult on this complex patient.  The case was discussed with the [place] [address] Medicine cross-cover.  General ID will follow with you.    Gordo Crespo MD  Pager [phone]    Assessment:  A 41 year old type 2 diabetic gentleman with a history of diabetic neuropathy with poor feet sensation and Parsonage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 (neuralgic amyotrophy) who has been in general good health with good diabetes glucose control, now admitted to the [facility] 2 Medicine service overnight 12/12/21 with three days of a progressing painful right first toe cellulitis extending  up the foot and a newly draining right distal first toe ulcer.  Right foot MRI scan on 12/12/21 shows toe marrow edema and possible cortical destruction potentially suggestive of possible toe osteomyelitis.  On broad-spectrum empiric antibiotic therapy with cefepime (plus an initial 24 hours of IV vancomycin), his foot cellulitis, toe pain, and mild CRP elevation (20.0 down to 7.9) are improved.  He lacks constitutional or other symptoms above the right ankle.    Although the diagnosis of right first toe osteomyelitis is not definitive, the 12/12/21 MRI scan and his acute presentation are suggestive enough that his foot needs to be treated for presumptive acute osteomyelitis, to be certain it is fully treated.  That requires a six week course of bio-available (IV equivalent) antibiotic therapy.  In the absence of a bone biopsy culture or blood culture isolate, empiric antibiotic therapy will need to be broad spectrum, covering likely Gram negative pathogens (including foot Pseudomonas), Staph and Strep (all covered nicely by IV cefepime), and diabetic ulcer anaerobes.  He lacks much in the way of healthcare exposure or recurrent past infections, so the empiric risk of having multi-drug-resistant pathogens (such as MDR Pseudomonas, VRE, MRSA, ESBL) in the toe infection is low.  So, this infection can very likely be treated with three bioavailable oral antibiotics (assuming his gut can tolerate them):  Levofloxacin 750 mg PO daily (to cover Pseudomonas and other likely Gram negatives and Strep), doxycycline 100 mg PO BID (to cover Staph), and metronidazole 500 mg PO TID (to cover anaerobes).  (Metronidazole also incidentally provides some anti-C difficile protection while on such a broad-spectrum regimen.)  Would give one to two doses of each of these before he is discharged, to make certain his initial GI tolerance is adequate.  If he can not tolerate these oral agents, a back-up alternative empiric regimen of home  continuous IV Zosyn therapy (via a PICC) would be a reasonable (albeit more involved and expensive) option, instead.    Other ID issues:  - QTc interval:  425 msec on 11/2/20.  - Immunization status:  Received 3 Pfizer Covid-19 vaccinations (booster on 12/10/21).  Influenza 2021 status not in Epic.  Otherwise up to date.  - Isolation status: Routine.         History of Infectious Disease Illness:   Mr. 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  He has considerably compromised sensation in both feet but has never had a previous diabetic foot infection.  He is otherwise generally in good health.  His diabetes blood glucose control is usually good with a hemoglobin A1C of 6.9% on 10/19/21.  He initially developed right first toe pain and swelling on ~ 12/9/21 which subsequently worsened.  He did not knowingly injure the toe.  Then on the morning of 12/12/21 he observed purulent discharge from the plantar first toe.  At the Urgent Care Clinic, his right toe tenderness extended up to his dorsal midfoot.  There was concern for a plantar toe abscess.  Upon referral to the ER, he had some mild malaise, but no fever, chills, night sweats and no symptoms above the ankle.  His peripheral WBC was normal at 7.8, CRP was 20.0, and creatinine was 0.84.  Foot exam showed right first toe onychomycosis so the toenail was removed in the ER.  A 12/12/21 afternoon right foot x-ray and a 12/12/21 evening right foot MRI scan showed marrow-edema in the toe and possible cortical distal toe destruction, suggestive but not diagnostic for  toe osteomyelitis, along with toe cellulitis and possible myositis without any evident abscess.  He was placed on empiric IV vancomycin in the ER and cefepime was added soon after midnight on 12/13/21 after he was admitted to the [place] [address] Medicine service.  The IV vancomycin was discontinued after 24 hours once a nares MRSA PCR screen was reported as negative, but he remains on cefepime.  Upon admission and since, he has lacked other symptoms, including no EENT symptoms, cough, dyspnea (on room air), chest pain, nausea, abdominal pain, diarrhea, skin changes elsewhere, new neurological symptoms, or other new complaints.  Since admission, he has been consistently afebrile (T max 98.6 degrees F) with his WBC down to 5.0 and CRP down to 7.9 this morning.  He was seen by Orthopedics / Podiatry Consult on 12/13/21 which felt operative intervention was not indicated and recommended a CAM boot for off-loading and daily dressing changes.  He was also seen by the [facility] RN.  He now continues to have significant right toe pain, but it is somewhat better controlled today, and the pain no longer extends beyond the first toe.  He lacks additional new symptoms since admission.  Admission blood cultures are without growth.  A 12/13/21 wound swab culture (obtained after on antibiotics for 24+ hours) is without growth to date and there is no other microbiology from this hospitalization.  He has no history of prior infections nor of hospitalizations in recent years.  General ID is consulted regarding the question of possible toe osteomyelitis and antibiotic management.    Review of Systems:  CONSTITUTIONAL:  No fever, chills, or sweats.  No fatigue or anorexia.  EYES: No eye pain, visual changes, or scleral icterus.  ENT:  No rhinorrhea, sinus pain, otalgia, hearing loss, tinnitus, sore throat, or oral pain.  LYMPH:  No edema.  RESPIRATORY:  No cough, increased sputum, or dyspnea.  CARDIOVASCULAR:  No chest pain,  palpitations.  GASTROINTESTINAL:  Mild constipation post-admission.  No abdominal pain, nausea, vomiting, diarrhea or constipation.  GENITOURINARY:  No dysuria.  HEME:  No anemia.  No easy bruising.  ENDOCRINE:  Usually well-controlled type 2 diabetes mellitus.  MUSCULOSKELETAL:  Right toe plantar diabetic ulcer / pain / edema as per HPI.  No other myalgias / arthralgias.  SKIN:  No rash or pruritus.  NEURO:  Peripheral diabetic neuropathy of feet with chronic numbness.  No headache.  PSYCH:  Negative.    Past Medical History:   Diagnosis Date     Anxiety      Depressive disorder      Diabetes mellitus (H)      Hypertension      Liver disease      Neuralgic amyotrophy      Pain disorder 2015     Parsonage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      Prostate infection      Seizures (H)      Staph infection      Past Surgical History:   Procedure Laterality Date     ORTHOPEDIC SURGERY       Family History   Problem Relation Age of Onset     Depression Mother      Anxiety Disorder Mother      Substance Abuse Maternal Grandfather      Anxiety Disorder Brother      Glaucoma No family hx of      Macular Degeneration No family hx of    Mother and brother also have Avendaño-Parsonage syndrome.    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  Also fathers his 4 and 1.5 year old children during the daytimes and works two evenings a week at [employer]    Immunization History   Administered Date(s) Administered     COVID-19,PF,Pfizer (12+ Yrs) 2021, 2021, 12/10/2021     DT (PEDS <7y) 1979,  07/21/1983     Flu, Unspecified 12/19/2008, 10/04/2010, 10/24/2011, 12/05/2012, 10/30/2013, 12/21/2018, 12/17/2019     Hep B, Peds or Adolescent 12/12/1994, 01/27/1995, 08/17/1995     Influenza Vaccine IM > 6 months Valent IIV4 (Alfuria,Fluzone) 10/22/2014, 09/22/2015, 10/21/2016, 12/21/2018, 12/17/2019     MMR 09/10/1979, 08/20/1991     OPV, trivalent, live 12/12/1979, 07/21/1983     Pneumococcal 23 valent 03/26/2013, 07/22/2015     TDAP Vaccine (Adacel) 08/20/2008, 08/29/2018     Td (Adult), Adsorbed 08/30/1994     Patient Active Problem List   Diagnosis     Neuralgic amyotrophy     Hereditary and idiopathic peripheral neuropathy     Drug dependence, in remission (H)     Type 2 diabetes mellitus without complication, without long-term current use of insulin (H)     Hypertension goal BP (blood pressure) < 140/90     Anemia     CARDIOVASCULAR SCREENING; LDL GOAL LESS THAN 100     Right shoulder pain     ED (erectile dysfunction)     Pain disorder     Health Care Home     Former smoker     Diabetic polyneuropathy associated with type 2 diabetes mellitus (H)     Tibialis posterior tendinopathy     Acquired flat foot, left     Chronic pain of left ankle     Cellulitis of great toe of right foot     Other acute osteomyelitis of right foot (H)          Current Medications & Allergies:       acetaminophen  650 mg Oral Q6H     buprenorphine HCl-naloxone HCl  1 Film Sublingual BID     ceFEPIme (MAXIPIME) IV  2 g Intravenous Q12H     enoxaparin ANTICOAGULANT  40 mg Subcutaneous Q24H     gabapentin  1,200 mg Oral TID     insulin aspart  1-7 Units Subcutaneous TID AC     insulin aspart  1-5 Units Subcutaneous At Bedtime     nortriptyline  50 mg Oral At Bedtime     sodium chloride (PF)  3 mL Intracatheter Q8H     venlafaxine  150 mg Oral Daily with breakfast     Infusions/Drips:      Allergies   Allergen Reactions     Bees Swelling     Bupropion Hcl Other (See Comments)     seizure     Lisinopril Cough     Penicillins Other  (See Comments)     Unable to close mouth  Tolerated cefazolin (12/12/21)          Physical Exam:     Patient Vitals for the past 24 hrs:   BP Temp Temp src Pulse Resp SpO2   12/14/21 0608 137/81 97.5  F (36.4  C) Oral 69 18 95 %   12/13/21 2217 (!) 156/100 97.4  F (36.3  C) Oral 78 17 98 %   12/13/21 1600 (!) 159/102 -- -- -- -- --   12/13/21 1546 (!) 134/104 97.2  F (36.2  C) Oral 80 18 100 %     Ranges for vital signs over the past 24 hours:   Temp:  [97.2  F (36.2  C)-97.5  F (36.4  C)] 97.5  F (36.4  C)  Pulse:  [69-80] 69  Resp:  [17-18] 18  BP: (134-159)/() 137/81  SpO2:  [95 %-100 %] 95 %  Vitals:    12/12/21 1613   Weight: 74.8 kg (165 lb)     Intake/Output Summary (Last 24 hours) at 12/14/2021 1246  Last data filed at 12/14/2021 1048  Gross per 24 hour   Intake 720 ml   Output --   Net 720 ml     Physical Examination:  GENERAL:  A pleasant, conversant, well-developed, well-nourished, 41 year old man sitting upright in bed in no acute discomfort.  HEAD:  Normocephalic, atraumatic.  EYES:  EOMI, PERRL, anicteric sclerae without conjunctival injection.  ENT:  External auditory canals patent without discharge.  Oropharynx is moist without exudates or ulcers.  NECK:  Supple.  LYMPH:  No cervical or supraclavicular lymphadenopathy  LUNGS:  Clear to auscultation bilaterally.  CARDIOVASCULAR:  Regular rate and rhythm, normal S1, S2, without murmur, gallop, or rub.  ABDOMEN:  Normal bowel sounds, soft, nontender, no hepatosplenomegaly.  BACK:  No CVA tenderness.  :  No Kennedy.  EXTREMITIES:  Left great toe dressed / wrapped, with almost no residual erythema or swelling extending beyond the guaze -- improved versus photos in yesterday's WOC Epic note; very minimal residual tenderness of the medial dorsal midfoot to firm palpation.  Feet are distally warm, no edema.  Right foot is normal  SKIN:  No acute rash or lesion.  Left arm peripheral IV line site lacks inflammation.  Tattoos on arms.  NEUROLOGIC:  Alert,  oriented, moves extremities x 4.         Laboratory Data:     Inflammatory Markers    Recent Labs   Lab Test 12/14/21  0639 12/13/21  0026 12/12/21  1624 10/24/20  0935 04/16/17  1859 03/23/16  1631 02/11/16  0815   SED  --  11  --  4 6   < >  --    CRP 7.9  --  20.0* <2.9 <2.9   < >  --    PSA  --   --   --   --   --   --  0.34    < > = values in this interval not displayed.     Metabolic Studies       Recent Labs   Lab Test 12/14/21  0639 12/13/21  0730 12/13/21  0656 12/12/21  1624 10/19/21  1005 09/07/16  0842 07/18/16  2153 06/14/16  0921 05/31/16  1102 05/10/16  2122 03/23/16  1631     --  140   < >  --    < > 137   < > 139   < > 137   POTASSIUM 3.8  --  3.8   < >  --    < > 3.9   < > 4.2   < > 4.2   CHLORIDE 106  --  108   < >  --    < > 104   < > 105   < > 102   CO2 28  --  24   < >  --    < > 27   < > 27   < > 28   ANIONGAP 6  --  8   < >  --    < > 6   < > 7   < > 8   BUN 15  --  11   < >  --    < > 16   < > 17   < > 16   CR 0.86  --  0.76   < >  --    < > 0.97   < > 0.94   < > 0.93   GFRESTIMATED >90  --  >90   < >  --    < > 87   < > 89   < > >90  Non  GFR Calc     *   < > 96   < >  --    < > 173*   < > 171*   < > 193*   A1C  --   --   --   --  6.9*   < >  --    < >  --   --   --    ARON 9.0  --  8.9   < >  --    < > 9.4   < > 8.9   < > 9.0   MAG  --   --   --   --   --   --   --   --   --   --  2.0   LACT  --   --   --   --   --   --   --   --  2.0  --   --    CKT  --   --   --   --   --   --  85  --   --   --  194    < > = values in this interval not displayed.     Hepatic Studies    Recent Labs   Lab Test 12/12/21  1624 11/02/20  1354   BILITOTAL 0.4 0.3   ALKPHOS 114 103   PROTTOTAL 7.4 6.9   ALBUMIN 3.7 3.4   AST 18 22   ALT 21 37     Pancreatitis testing    Recent Labs   Lab Test 08/12/20  0833   TRIG 63     Hematology Studies   Recent Labs   Lab Test 12/14/21  0639 12/13/21  0026 12/12/21  1624 12/12/21  1624 11/02/20  1354 10/24/20  0935   WBC 5.0 5.3  --  7.8 8.3 5.3    ANEU  --   --   --   --  5.4 3.3   ANEUTAUTO 2.2 2.5   < > 4.9  --   --    ALYM  --   --   --   --  2.0 1.6   ALYMPAUTO 2.2 2.1   < > 2.1  --   --    HUE  --   --   --   --  0.7 0.3   AMONOAUTO 0.4 0.5   < > 0.5  --   --    AEOS  --   --   --   --  0.2 0.2   AEOSAUTO 0.2 0.1   < > 0.2  --   --    ABSBASO 0.0 0.0   < > 0.0  --   --    HGB 12.8* 12.3*   < > 13.2* 12.4* 13.3   HCT 38.5* 36.8*   < > 38.8* 38.0* 40.3    212   < > 218 208 204    < > = values in this interval not displayed.     Clotting Studies    Recent Labs   Lab Test 11/02/20  1354   INR 0.88   PTT 28     Iron Testing    Recent Labs   Lab Test 12/14/21  0639 02/19/16  1900 02/11/16  0815 08/10/15  1828 05/11/15  1610   IRON  --   --  157  --   --    FEB  --   --  349  --   --    IRONSAT  --   --  45  --   --    MCV 88   < > 85   < > 88   B12  --   --   --   --  454    < > = values in this interval not displayed.     Thyroid Studies     Recent Labs   Lab Test 11/02/20  1354 08/29/18  1644 02/11/16  0815 01/22/16  1713 05/11/15  1610 02/11/15  0854   TSH 1.10 1.46 1.74 0.33* 1.68 0.39*   T4  --   --   --  0.96  --  0.85     Urine Studies     Recent Labs   Lab Test 08/10/15  1921   URINEPH 5.5   NITRITE Negative   LEUKEST Negative     Microbiology:    Last Culture results with specimen source  Culture   Date Value Ref Range Status   12/13/2021 No growth, less than 1 day  Preliminary   12/13/2021 No growth after 1 day  Preliminary   12/13/2021 No growth after 1 day  Preliminary     Culture Micro   Date Value Ref Range Status   06/24/2019 No beta hemolytic Streptococcus Group A isolated  Final    Specimen Description   Date Value Ref Range Status   06/24/2019 Throat  Final   06/24/2019 Throat  Final   07/01/2017 Feces  Final   07/01/2017 Feces  Final   06/29/2017 Feces  Final   01/11/2003 Midstream Urine  Final   01/10/2003 Midstream Urine  Final        Last check of C difficile  C Diff Toxin B PCR   Date Value Ref Range Status   06/29/2017  NEG  Final    Negative  Negative: Clostridium difficile target DNA sequences NOT detected, presumed   negative for Clostridium difficile toxin B or the number of bacteria present   may be below the limit of detection for the test.   FDA approved assay performed using [employer] GeneXpert real-time PCR.   A negative result does not exclude actual disease due to Clostridium difficile   and may be due to improper collection, handling and storage of the specimen or   the number of organisms in the specimen is below the detection limit of the   assay.       Syphilis Testing    No results found for: TREPT, TREPAB, RPR, TPPAT, CVD, CVD    Quantiferon testing   Recent Labs   Lab Test 12/14/21  0639 12/13/21  0026   LYMPH 44 41     Virology:    Coronavirus-19 testing    Recent Labs   Lab Test 12/12/21  2120 01/03/21  0903 12/30/20  0920   [id number] Negative Test received-See reflex to IDDL test SARS CoV2 (COVID-19) Virus RT-PCR  NEGATIVE Not Detected   [id number]  --  Nasopharyngeal  --    [id number]  --  Nasopharyngeal Nasopharyngeal     Respiratory virus (non-coronavirus-19) testing    Recent Labs   Lab Test 01/22/16  1714   AFLU Negative   Test results must be correlated with clinical data. If necessary, results   should be confirmed by a molecular assay or viral culture.     BFLU Positive   Test results must be correlated with clinical data. If necessary, results   should be confirmed by a molecular assay or viral culture.  *     Hepatitis B Testing   No lab results found.   No results found for: HCVAB, HQTG, HCGENO, HCPCR, HQTRNA, HEPRNA, CRYOG    Imaging:  Recent Results (from the past 48 hour(s))   MR Toe Right w Contrast    Narrative    CLINICAL HISTORY: Great toe infection. Evaluate for osteomyelitis.    COMPARISON: None.    TECHNIQUE: T1 and T2-weighted images and postcontrast images were obtained. All 3 conventional planes.    CONTRAST: 7.4 ml Gadavist.    FINDINGS:    BONES AND JOINTS: There is bone marrow edema-like  signal in the distal tuft of the great toe including subtle loss of T1 signal. There is questionable cortical destruction in the distal tuft as well. No effusion or synovitis.    MUSCLES AND TENDONS: No muscle atrophy. Mild edema and enhancement in the visualized foot muscles, predominantly the abductor hallucis muscle. The tendons appear normal. No tenosynovitis.    OTHER SOFT TISSUES: There is moderate edema and enhancement in the soft tissues diffusely including the nailbed. No absent enhancement to suggest devitalized tissue. No rim-enhancing fluid collection to suggest abscess.      Impression    IMPRESSION:  1. The degree of marrow edema-like signal in the distal tuft of the great toe and the questionable cortical destruction are concerning for osteomyelitis but not definitive. Correlation with radiographs recommended.  2. Edema in the soft tissues consistent with cellulitis. There is also some adjacent muscle edema, so myositis cannot be ruled out. There is no evidence of abscess, tenosynovitis or septic joint.   XR Foot Right G/E 3 Views    Narrative    3 views right foot radiographs 12/13/2021 2:58 PM    History: concern for right great toe osteo  -to allow for serial  imaging studies    Comparison: MR 12/12/2021, radiograph 1/5/2017    Findings:    Standing AP, oblique, and lateral  views of the right foot were  obtained.     Subtle indistinctiveness of the tuft of the great toe, nonspecific.  Diffuse soft tissue swelling of the great toe. Soft tissue contour  abnormality of the great toe distally.    Lisfranc articulation alignment is congruent on these non-weight  bearing images.    Achilles tendon insertional enthesopathy, possibly discontinuous.       Impression    Impression: Subtle indistinctiveness of the tuft of the great toe,  nonspecific. Attention on follow up imaging.    [email or web address]         SYSTEM ID:  [id number]

## 2021-12-14 NOTE — PROVIDER NOTIFICATION
Notified [facility] team regarding order sets for CAM boot usage but no actual CAM boot ordered for pt.

## 2021-12-14 NOTE — PROVIDER NOTIFICATION
[id number]   Yohannes intern paged d/t pt c/o R great toe pain unrelieved w/ scheduled meds, PRN oxy x1. Awaiting response, will continue to monitor.

## 2021-12-14 NOTE — PROGRESS NOTES
"BP (!) 159/102 (BP Location: Right arm)   Pulse 80   Temp 97.2  F (36.2  C) (Oral)   Resp 18   Ht 1.727 m (5' 8\")   Wt 74.8 kg (165 lb)   SpO2 100%   BMI 25.09 kg/m      Shift: 8106-4649  VS: Hypertensive w/in parameters (150s/100s). OVSS on RA  Neuro: AOx4  BG: ACHS (107)  Respiratory: WDL  Pain/Nausea/PRN: Denies nausea. R great toe pain unrelieved w/ scheduled meds, PRN oxy x1; MD notified, see note. PRN valium x1 given for anxiety.  Diet: RD  LDA: PIV SL  GI/: No BM this shift. Voids w/o difficulty, not saving  Skin: R great toe dressing CDI  Mobility: UAL  Plan: Continue POC, update MD as needed w/ concerns or changes in status    Handoff given to following RN.    "

## 2021-12-15 VITALS
SYSTOLIC BLOOD PRESSURE: 146 MMHG | BODY MASS INDEX: 25.01 KG/M2 | HEART RATE: 75 BPM | RESPIRATION RATE: 16 BRPM | DIASTOLIC BLOOD PRESSURE: 98 MMHG | TEMPERATURE: 96.9 F | WEIGHT: 165 LBS | HEIGHT: 68 IN | OXYGEN SATURATION: 95 %

## 2021-12-15 LAB
ANION GAP SERPL CALCULATED.3IONS-SCNC: 7 MMOL/L (ref 3–14)
BACTERIA WND CULT: NO GROWTH
BASOPHILS # BLD AUTO: 0 10E3/UL (ref 0–0.2)
BASOPHILS NFR BLD AUTO: 1 %
BUN SERPL-MCNC: 14 MG/DL (ref 7–30)
CALCIUM SERPL-MCNC: 8.8 MG/DL (ref 8.5–10.1)
CHLORIDE BLD-SCNC: 107 MMOL/L (ref 94–109)
CO2 SERPL-SCNC: 26 MMOL/L (ref 20–32)
CREAT SERPL-MCNC: 0.9 MG/DL (ref 0.66–1.25)
EOSINOPHIL # BLD AUTO: 0.2 10E3/UL (ref 0–0.7)
EOSINOPHIL NFR BLD AUTO: 5 %
ERYTHROCYTE [DISTWIDTH] IN BLOOD BY AUTOMATED COUNT: 12.6 % (ref 10–15)
GFR SERPL CREATININE-BSD FRML MDRD: >90 ML/MIN/1.73M2
GLUCOSE BLD-MCNC: 157 MG/DL (ref 70–99)
GLUCOSE BLDC GLUCOMTR-MCNC: 136 MG/DL (ref 70–99)
GLUCOSE BLDC GLUCOMTR-MCNC: 251 MG/DL (ref 70–99)
HCT VFR BLD AUTO: 37.3 % (ref 40–53)
HGB BLD-MCNC: 12.8 G/DL (ref 13.3–17.7)
IMM GRANULOCYTES # BLD: 0 10E3/UL
IMM GRANULOCYTES NFR BLD: 0 %
LYMPHOCYTES # BLD AUTO: 2.3 10E3/UL (ref 0.8–5.3)
LYMPHOCYTES NFR BLD AUTO: 50 %
MCH RBC QN AUTO: 29.4 PG (ref 26.5–33)
MCHC RBC AUTO-ENTMCNC: 34.3 G/DL (ref 31.5–36.5)
MCV RBC AUTO: 86 FL (ref 78–100)
MONOCYTES # BLD AUTO: 0.3 10E3/UL (ref 0–1.3)
MONOCYTES NFR BLD AUTO: 7 %
NEUTROPHILS # BLD AUTO: 1.7 10E3/UL (ref 1.6–8.3)
NEUTROPHILS NFR BLD AUTO: 37 %
NRBC # BLD AUTO: 0 10E3/UL
NRBC BLD AUTO-RTO: 0 /100
PLATELET # BLD AUTO: 223 10E3/UL (ref 150–450)
POTASSIUM BLD-SCNC: 4 MMOL/L (ref 3.4–5.3)
RBC # BLD AUTO: 4.35 10E6/UL (ref 4.4–5.9)
SODIUM SERPL-SCNC: 140 MMOL/L (ref 133–144)
WBC # BLD AUTO: 4.6 10E3/UL (ref 4–11)

## 2021-12-15 PROCEDURE — 36415 COLL VENOUS BLD VENIPUNCTURE: CPT

## 2021-12-15 PROCEDURE — 250N000013 HC RX MED GY IP 250 OP 250 PS 637

## 2021-12-15 PROCEDURE — L3260 AMBULATORY SURGICAL BOOT EAC: HCPCS

## 2021-12-15 PROCEDURE — 99239 HOSP IP/OBS DSCHRG MGMT >30: CPT | Mod: GC | Performed by: INTERNAL MEDICINE

## 2021-12-15 PROCEDURE — 93005 ELECTROCARDIOGRAM TRACING: CPT

## 2021-12-15 PROCEDURE — 80048 BASIC METABOLIC PNL TOTAL CA: CPT

## 2021-12-15 PROCEDURE — 250N000011 HC RX IP 250 OP 636

## 2021-12-15 PROCEDURE — 85025 COMPLETE CBC W/AUTO DIFF WBC: CPT

## 2021-12-15 PROCEDURE — 93010 ELECTROCARDIOGRAM REPORT: CPT | Performed by: INTERNAL MEDICINE

## 2021-12-15 PROCEDURE — 99233 SBSQ HOSP IP/OBS HIGH 50: CPT | Performed by: INTERNAL MEDICINE

## 2021-12-15 RX ORDER — METRONIDAZOLE 500 MG/1
500 TABLET ORAL 3 TIMES DAILY
Qty: 126 TABLET | Refills: 0 | Status: SHIPPED | OUTPATIENT
Start: 2021-12-15 | End: 2022-03-15

## 2021-12-15 RX ORDER — ACETAMINOPHEN 325 MG/1
975 TABLET ORAL EVERY 6 HOURS
Status: ON HOLD
Start: 2021-12-15 | End: 2022-01-18

## 2021-12-15 RX ORDER — GABAPENTIN 600 MG/1
1200 TABLET ORAL 3 TIMES DAILY
Status: ON HOLD
Start: 2021-12-15 | End: 2022-10-10

## 2021-12-15 RX ORDER — OXYCODONE HYDROCHLORIDE 5 MG/1
5-10 TABLET ORAL EVERY 6 HOURS PRN
Qty: 8 TABLET | Refills: 0 | Status: SHIPPED | OUTPATIENT
Start: 2021-12-15 | End: 2021-12-16

## 2021-12-15 RX ORDER — SENNOSIDES 8.6 MG
2 TABLET ORAL 2 TIMES DAILY PRN
Start: 2021-12-15 | End: 2022-03-15

## 2021-12-15 RX ORDER — LEVOFLOXACIN 750 MG/1
750 TABLET, FILM COATED ORAL DAILY
Qty: 42 TABLET | Refills: 0 | Status: SHIPPED | OUTPATIENT
Start: 2021-12-16 | End: 2022-03-15

## 2021-12-15 RX ORDER — DOXYCYCLINE 100 MG/1
100 CAPSULE ORAL EVERY 12 HOURS
Qty: 84 CAPSULE | Refills: 0 | Status: SHIPPED | OUTPATIENT
Start: 2021-12-15 | End: 2022-03-15

## 2021-12-15 RX ORDER — OXYCODONE HYDROCHLORIDE 5 MG/1
5-10 TABLET ORAL EVERY 6 HOURS PRN
Qty: 24 TABLET | Refills: 0 | Status: SHIPPED | OUTPATIENT
Start: 2021-12-15 | End: 2021-12-15

## 2021-12-15 RX ORDER — POLYETHYLENE GLYCOL 3350 17 G/17G
17 POWDER, FOR SOLUTION ORAL 2 TIMES DAILY
Qty: 510 G
Start: 2021-12-15 | End: 2022-03-15

## 2021-12-15 RX ADMIN — METRONIDAZOLE 500 MG: 500 TABLET ORAL at 09:29

## 2021-12-15 RX ADMIN — LEVOFLOXACIN 750 MG: 750 TABLET, FILM COATED ORAL at 09:29

## 2021-12-15 RX ADMIN — GABAPENTIN 1200 MG: 600 TABLET, FILM COATED ORAL at 13:48

## 2021-12-15 RX ADMIN — DOXYCYCLINE HYCLATE 100 MG: 100 CAPSULE ORAL at 09:29

## 2021-12-15 RX ADMIN — ACETAMINOPHEN 650 MG: 325 TABLET, FILM COATED ORAL at 13:48

## 2021-12-15 RX ADMIN — BUPRENORPHINE AND NALOXONE 1 FILM: 4; 1 FILM BUCCAL; SUBLINGUAL at 09:30

## 2021-12-15 RX ADMIN — OXYCODONE HYDROCHLORIDE 10 MG: 5 TABLET ORAL at 11:16

## 2021-12-15 RX ADMIN — CEFEPIME HYDROCHLORIDE 2 G: 2 INJECTION, POWDER, FOR SOLUTION INTRAVENOUS at 00:27

## 2021-12-15 RX ADMIN — ACETAMINOPHEN 650 MG: 325 TABLET, FILM COATED ORAL at 09:30

## 2021-12-15 RX ADMIN — INSULIN ASPART 2 UNITS: 100 INJECTION, SOLUTION INTRAVENOUS; SUBCUTANEOUS at 13:48

## 2021-12-15 RX ADMIN — METRONIDAZOLE 500 MG: 500 TABLET ORAL at 13:48

## 2021-12-15 RX ADMIN — VENLAFAXINE HYDROCHLORIDE 150 MG: 150 CAPSULE, EXTENDED RELEASE ORAL at 09:29

## 2021-12-15 RX ADMIN — GABAPENTIN 1200 MG: 600 TABLET, FILM COATED ORAL at 09:29

## 2021-12-15 RX ADMIN — OXYCODONE HYDROCHLORIDE 10 MG: 5 TABLET ORAL at 05:11

## 2021-12-15 RX ADMIN — ENOXAPARIN SODIUM 40 MG: 40 INJECTION SUBCUTANEOUS at 09:28

## 2021-12-15 ASSESSMENT — ACTIVITIES OF DAILY LIVING (ADL)
ADLS_ACUITY_SCORE: 5

## 2021-12-15 NOTE — PROVIDER NOTIFICATION
Loi Juares Hand at 1938. Pt is reporting right toe/foot pain 7/10. Pain not relieved with current pain regimen. Oxycodone not available until 2230.     MD ordered one time dose 5 mg oxycodone.

## 2021-12-15 NOTE — PLAN OF CARE
Tolerated switch to oral antibiotics.  Orthotics provided boot.  Patient aware of wound cares.  Supplies provided.  Discharge orders reviewed and verbalized understanding of follow up plans.

## 2021-12-15 NOTE — PROGRESS NOTES
S: Pt seen bedside with reports of RT Great toe infection and a need for protection. O: I see the EPIC order for the boot for his foot/ankle. After speaking with Phillip it was decided to give him a DH2 shoe with ample room at the toe for protection. A: We sized the Large and the XLarge shoe and the large would have the possibility for touching his toe so the XL was chosen. (Size 11.5 or 12 shoe) Instructions were given and seemed to be understood. P: FU PRN. G: The goal is to protect his RT great toe while healing.  Electronically signed Segun Bone CPO, LPO

## 2021-12-15 NOTE — PROGRESS NOTES
[facility] Infectious Disease Progress Note      Patient:  Phillip Rueda, Date of birth 1980, Medical record number [id number]  Date of Visit:  12/15/2021         Assessment and Recommendations:   Recommendations:  - Continue levofloxacin 750 mg PO daily (to cover Pseudomonas and other likely Gram negatives and Strep), doxycycline 100 mg PO BID (to cover Staph), and metronidazole 500 mg PO TID (to cover anaerobes) for six weeks through 1/25/21 to presumptively treat the possibility of acute right first toe diabetic osteomyelitis.  - Check every other week CBC / diff / platelets, Basic Metabolic Panel, CRP while on the antibiotics.  - Since he will presumably have ongoing follow-up appointments in Podiatry / Wound Clinic, a post-discharge appointment in Infectious Disease Clinic is not expected to be needed (but I will be available to be contacted should any ID-related concerns or questions arise during the next 1.5 months).  - Check on his seasonal influenza vaccination status prior to discharge.    Gordo Crespo MD  Pager [phone]    Assessment:  A 41 year old type 2 diabetic gentleman with a history of diabetic neuropathy with poor feet sensation and Parsonage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 (neuralgic amyotrophy) who has been in general good health with good diabetes glucose control, now admitted to the [facility] Medicine service overnight 12/12/21 with three days of a progressing painful right first toe cellulitis extending up the foot and a newly draining right distal first toe ulcer.  Right foot MRI scan on 12/12/21 shows toe marrow edema and possible cortical destruction potentially suggestive of possible toe osteomyelitis.  On broad-spectrum empiric antibiotic therapy with cefepime (plus an initial 24 hours of IV vancomycin), his foot cellulitis, toe pain, and mild CRP elevation (20.0 down to 7.9) are improved.  He lacks constitutional or other symptoms above the  right ankle.    Although the diagnosis of right first toe osteomyelitis is not definitive, the 12/12/21 MRI scan and his acute presentation are suggestive enough that his foot needs to be treated for presumptive acute osteomyelitis, to be certain it is fully treated.  That requires a six week course of bio-available (IV equivalent) antibiotic therapy.  In the absence of a bone biopsy culture or blood culture isolate, empiric antibiotic therapy will need to be broad spectrum, covering likely Gram negative pathogens (including foot Pseudomonas), Staph and Strep (all covered nicely by IV cefepime), and diabetic ulcer anaerobes.  He lacks much in the way of healthcare exposure or recurrent past infections, so the empiric risk of having multi-drug-resistant pathogens (such as MDR Pseudomonas, VRE, MRSA, ESBL) in the toe infection is low.  So, this infection can very likely be successfully treated with three bioavailable oral antibiotics (assuming his gut can tolerate them):  Levofloxacin 750 mg PO daily (to cover Pseudomonas and other likely Gram negatives and Strep), doxycycline 100 mg PO BID (to cover Staph), and metronidazole 500 mg PO TID (to cover anaerobes).  (Metronidazole also incidentally provides some anti-C difficile protection while on such a broad-spectrum regimen.) After one initial dose of each of these so far, he seems to be tolerating them well.  He can likely discharge this PM.   If he eventually does not tolerate these oral agents down the road, a back-up alternative empiric regimen of home continuous IV Zosyn therapy (via a PICC) would be a reasonable (albeit more involved and expensive) back-up option.    Other ID issues:  - QTc interval:  425 msec on 11/2/20.  - Immunization status:  Received 3 Pfizer Covid-19 vaccinations (booster on 12/10/21).  Influenza 2021 status not in Epic.  Otherwise up to date.  - Isolation status: Routine.        Interval History:   Mr. Rueda has remained afebrile (T max  98.6 degrees F) since admission with a decreased and still normal peripheral WBC of 4.6 this AM.  His initial cefepime IV antibiotic therapy was switched to oral levofloxacin, doxycycline, and metronidazole at about 9 AM this morning and so far he has not noticed any adverse effects from the PO antibiotics, including no nausea, abdominal discomfort, diarrhea, rash, or other complaints.  His right first toe pain is slightly additionally better today although extant, and the toe swelling is decreasing.  The rest of his foot is now mostly nontender, except for a little bit of residual contact tenderness of the medial dorsal midfoot just proximal to the toe.  His other toes feel fine.  He lacks EENT symptoms, fatigue, anorexia, cough, chest pain, dyspnea on room air, or other complaints.  His glucoses have generally been well-controlled below 200.  He is expecting to discharge to home this afternoon and understands he contact me if during the next six weeks he experiences new significant intolerance to the oral antibiotics.  There is no new radiology today.  The admission blood cultures and the 12/13/21 toe wound culture are still without growth.        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  He has considerably compromised sensation in both feet but has never had a previous diabetic foot infection.  He is otherwise generally in good health.  His diabetes blood glucose control is usually good with a hemoglobin A1C of 6.9% on 10/19/21.   He initially developed right first toe pain and swelling on ~ 12/9/21 which subsequently worsened.  He did not knowingly injure the toe.  Then on the morning of 12/12/21 he observed purulent discharge from the plantar first toe.  At the Urgent Care Clinic, his right toe tenderness extended up to his dorsal midfoot.  There was concern for a plantar toe abscess.  Upon referral to the ER, he had some mild malaise, but no fever, chills, night sweats and no symptoms above the ankle.  His peripheral WBC was normal at 7.8, CRP was 20.0, and creatinine was 0.84.  Foot exam showed right first toe onychomycosis so the toenail was removed in the ER.  A 12/12/21 afternoon right foot x-ray and a 12/12/21 evening right foot MRI scan showed marrow-edema in the toe and possible cortical distal toe destruction, suggestive but not diagnostic for toe osteomyelitis, along with toe cellulitis and possible myositis without any evident abscess.  He was placed on empiric IV vancomycin in the ER and cefepime was added soon after midnight on 12/13/21 after he was admitted to the [place] [address] Medicine service.  The IV vancomycin was discontinued after 24 hours once a nares MRSA PCR screen was reported as negative, but he remains on cefepime.  Upon admission and since, he has lacked other symptoms, including no EENT symptoms, cough, dyspnea (on room air), chest pain, nausea, abdominal pain, diarrhea, skin changes elsewhere, new neurological symptoms, or other new complaints.  Since admission, he has been consistently afebrile (T max 98.6 degrees F) with his WBC down to 5.0 and CRP down to 7.9 this morning.  He was seen by Orthopedics / Podiatry Consult on 12/13/21 which felt operative intervention was not indicated and recommended a CAM boot for off-loading and daily dressing changes.  He was also seen by the [facility] RN.  He now continues to have significant right toe pain, but it is somewhat better controlled today, and the pain no longer  extends beyond the first toe.  He lacks additional new symptoms since admission.  Admission blood cultures are without growth.  A 12/13/21 wound swab culture (obtained after on antibiotics for 24+ hours) is without growth to date and there is no other microbiology from this hospitalization.  He has no history of prior infections nor of hospitalizations in recent years.  General ID is consulted regarding the question of possible toe osteomyelitis and antibiotic management.    Review of Systems:  CONSTITUTIONAL:  No fever, chills, or sweats.  No fatigue or anorexia.  EYES: No eye pain, visual changes, or scleral icterus.  ENT:  No rhinorrhea, sinus pain, otalgia, hearing loss, tinnitus, sore throat, or oral pain.  LYMPH:  No edema.  RESPIRATORY:  No cough, increased sputum, or dyspnea.  CARDIOVASCULAR:  No chest pain, palpitations.  GASTROINTESTINAL:  Mild constipation post-admission.  No abdominal pain, nausea, vomiting, diarrhea or constipation.  GENITOURINARY:  No dysuria.  HEME:  No anemia.  No easy bruising.  ENDOCRINE:  Usually well-controlled type 2 diabetes mellitus.  MUSCULOSKELETAL:  Right toe plantar diabetic ulcer / pain / edema as per HPI.  No other myalgias / arthralgias.  SKIN:  No rash or pruritus.  NEURO:  Peripheral diabetic neuropathy of feet with chronic numbness.  No headache.  PSYCH:  Negative.    Past Medical History:   Diagnosis Date     Anxiety      Depressive disorder      Diabetes mellitus (H)      Hypertension      Liver disease      Neuralgic amyotrophy      Pain disorder 12/8/2015     Parsonage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      Prostate infection      Seizures (H)      Staph infection      Past Surgical History:   Procedure Laterality Date     ORTHOPEDIC SURGERY       Family History   Problem Relation Age of Onset     Depression Mother      Anxiety Disorder Mother      Substance Abuse Maternal Grandfather      Anxiety Disorder Brother      Glaucoma No family hx of      Macular Degeneration No family  hx of    Mother and brother also have Avendaño-Parsonage syndrome.    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  Also fathers his 4 and 1.5 year old children during the daytimes and works two evenings a week at [employer].    Immunization History   Administered Date(s) Administered     COVID-19,PF,Pfizer (12+ Yrs) 2021, 2021, 12/10/2021     DT (PEDS <7y) 1979, 1983     Flu, Unspecified 2008, 10/04/2010, 10/24/2011, 2012, 10/30/2013, 2018, 2019     Hep B, Peds or Adolescent 1994, 1995, 1995     Influenza Vaccine IM > 6 months Valent IIV4 (Alfuria,Fluzone) 10/22/2014, 2015, 10/21/2016, 2018, 2019     MMR 09/10/1979, 1991     OPV, trivalent, live 1979, 1983     Pneumococcal 23 valent 2013, 2015     TDAP Vaccine (Adacel) 2008, 2018     Td (Adult), Adsorbed 1994          Current Medications & Allergies:       acetaminophen  650 mg Oral Q6H     buprenorphine HCl-naloxone HCl  1 Film Sublingual BID     doxycycline hyclate  100 mg Oral Q12H ABBY     enoxaparin ANTICOAGULANT  40 mg Subcutaneous Q24H     gabapentin  1,200 mg Oral TID     insulin aspart  1-7 Units Subcutaneous TID AC     insulin aspart  1-5 Units Subcutaneous At Bedtime     levofloxacin  750 mg Oral Daily     metroNIDAZOLE  500 mg Oral TID     nortriptyline  50 mg Oral At Bedtime     sodium chloride (PF)  3 mL Intracatheter Q8H     venlafaxine  150 mg Oral Daily with breakfast      Infusions/Drips:      Allergies   Allergen Reactions     Bees Swelling     Bupropion Hcl Other (See Comments)     seizure     Lisinopril Cough     Penicillins Other (See Comments)     Unable to close mouth  Tolerated cefazolin (12/12/21)          Physical Exam:     Patient Vitals for the past 24 hrs:   BP Temp Temp src Pulse Resp SpO2   12/15/21 0705 (!) 146/98 96.9  F (36.1  C) Temporal 75 16 95 %   12/14/21 2233 (!) 163/105 97.2  F (36.2  C) Oral 77 14 97 %   12/14/21 1525 (!) 158/85 97.8  F (36.6  C) Oral 95 18 97 %     Ranges for vital signs over the past 24 hours:   Temp:  [96.9  F (36.1  C)-97.8  F (36.6  C)] 96.9  F (36.1  C)  Pulse:  [75-95] 75  Resp:  [14-18] 16  BP: (146-163)/() 146/98  SpO2:  [95 %-97 %] 95 %  Vitals:    12/12/21 1613   Weight: 74.8 kg (165 lb)     Intake/Output Summary (Last 24 hours) at 12/15/2021 1356  Last data filed at 12/14/2021 2200  Gross per 24 hour   Intake 320 ml   Output --   Net 320 ml     Physical Examination:  GENERAL:  A pleasant, conversant, well-developed, well-nourished, 41 year old man sitting upright in bed in no acute discomfort.  HEAD:  Normocephalic, atraumatic.  EYES:  EOMI, PERRL, anicteric sclerae without conjunctival injection.  ENT:  External auditory canals patent without discharge.  Oropharynx is moist without exudates or ulcers.  NECK:  Supple.  LYMPH:  No cervical or supraclavicular lymphadenopathy  LUNGS:  Clear to auscultation bilaterally.  CARDIOVASCULAR:  Regular rate and rhythm, normal S1, S2, without murmur, gallop, or rub.  ABDOMEN:  Normal bowel sounds, soft, nontender, no hepatosplenomegaly.  BACK:  No CVA tenderness.  :  No Kennedy.  EXTREMITIES:  Left great toe dressed / wrapped, with almost no residual erythema or swelling extending beyond the guaze -- improved versus photos in yesterday's W[facility] Epic note; very minimal residual tenderness of the medial dorsal midfoot to firm palpation.  Feet are distally warm, no edema.  Right foot is  normal  SKIN:  No acute rash or lesion.  Left arm peripheral IV line site lacks inflammation.  Tattoos on arms.  NEUROLOGIC:  Alert, oriented, moves extremities x 4.         Laboratory Data:     Inflammatory Markers    Recent Labs   Lab Test 12/14/21  0639 12/13/21  0026 12/12/21  1624 10/24/20  0935 04/16/17  1859 03/23/16  1631 02/11/16  0815   SED  --  11  --  4 6   < >  --    CRP 7.9  --  20.0* <2.9 <2.9   < >  --    PSA  --   --   --   --   --   --  0.34    < > = values in this interval not displayed.     Metabolic Studies       Recent Labs   Lab Test 12/15/21  1332 12/15/21  0734 12/14/21  1248 12/14/21  0639 12/12/21  1624 10/19/21  1005 09/07/16  0842 07/18/16  2153 06/14/16  0921 05/31/16  1102 05/10/16  2122 03/23/16  1631   NA  --  140  --  140   < >  --    < > 137   < > 139   < > 137   POTASSIUM  --  4.0  --  3.8   < >  --    < > 3.9   < > 4.2   < > 4.2   CHLORIDE  --  107  --  106   < >  --    < > 104   < > 105   < > 102   CO2  --  26  --  28   < >  --    < > 27   < > 27   < > 28   ANIONGAP  --  7  --  6   < >  --    < > 6   < > 7   < > 8   BUN  --  14  --  15   < >  --    < > 16   < > 17   < > 16   CR  --  0.90  --  0.86   < >  --    < > 0.97   < > 0.94   < > 0.93   GFRESTIMATED  --  >90  --  >90   < >  --    < > 87   < > 89   < > >90  Non  GFR Calc     * 157*  136*   < > 185*   < >  --    < > 173*   < > 171*   < > 193*   A1C  --   --   --   --   --  6.9*   < >  --    < >  --   --   --    ARON  --  8.8  --  9.0   < >  --    < > 9.4   < > 8.9   < > 9.0   MAG  --   --   --   --   --   --   --   --   --   --   --  2.0   LACT  --   --   --   --   --   --   --   --   --  2.0  --   --    CKT  --   --   --   --   --   --   --  85  --   --   --  194    < > = values in this interval not displayed.     Hepatic Studies    Recent Labs   Lab Test 12/12/21  1624 11/02/20  1354   BILITOTAL 0.4 0.3   ALKPHOS 114 103   PROTTOTAL 7.4 6.9   ALBUMIN 3.7 3.4   AST 18 22   ALT 21 37     Pancreatitis  testing    Recent Labs   Lab Test 08/12/20  0833   TRIG 63     Hematology Studies   Recent Labs   Lab Test 12/15/21  0734 12/14/21  0639 12/13/21  0026 12/12/21  1624 12/12/21  1624 11/02/20  1354 10/24/20  0935   WBC 4.6 5.0 5.3  --  7.8 8.3 5.3   ANEU  --   --   --   --   --  5.4 3.3   ANEUTAUTO 1.7 2.2 2.5   < > 4.9  --   --    ALYM  --   --   --   --   --  2.0 1.6   ALYMPAUTO 2.3 2.2 2.1   < > 2.1  --   --    HUE  --   --   --   --   --  0.7 0.3   AMONOAUTO 0.3 0.4 0.5   < > 0.5  --   --    AEOS  --   --   --   --   --  0.2 0.2   AEOSAUTO 0.2 0.2 0.1   < > 0.2  --   --    ABSBASO 0.0 0.0 0.0   < > 0.0  --   --    HGB 12.8* 12.8* 12.3*   < > 13.2* 12.4* 13.3   HCT 37.3* 38.5* 36.8*   < > 38.8* 38.0* 40.3    236 212   < > 218 208 204    < > = values in this interval not displayed.     Clotting Studies    Recent Labs   Lab Test 11/02/20  1354   INR 0.88   PTT 28     Iron Testing    Recent Labs   Lab Test 12/15/21  0734 02/19/16  1900 02/11/16  0815 08/10/15  1828 05/11/15  1610   IRON  --   --  157  --   --    FEB  --   --  349  --   --    IRONSAT  --   --  45  --   --    MCV 86   < > 85   < > 88   B12  --   --   --   --  454    < > = values in this interval not displayed.     Thyroid Studies     Recent Labs   Lab Test 11/02/20  1354 08/29/18  1644 02/11/16  0815 01/22/16  1713 05/11/15  1610 02/11/15  0854   TSH 1.10 1.46 1.74 0.33* 1.68 0.39*   T4  --   --   --  0.96  --  0.85     Urine Studies     Recent Labs   Lab Test 08/10/15  1921   URINEPH 5.5   NITRITE Negative   LEUKEST Negative     Microbiology:    Last Culture results with specimen source  Culture   Date Value Ref Range Status   12/13/2021 No Growth  Final   12/13/2021 No growth after 2 days  Preliminary   12/13/2021 No growth after 2 days  Preliminary     Culture Micro   Date Value Ref Range Status   06/24/2019 No beta hemolytic Streptococcus Group A isolated  Final    Specimen Description   Date Value Ref Range Status   06/24/2019 Throat   Final   06/24/2019 Throat  Final   07/01/2017 Feces  Final   07/01/2017 Feces  Final   06/29/2017 Feces  Final   01/11/2003 Midstream Urine  Final   01/10/2003 Midstream Urine  Final        Last check of C difficile  C Diff Toxin B PCR   Date Value Ref Range Status   06/29/2017  NEG Final    Negative  Negative: Clostridium difficile target DNA sequences NOT detected, presumed   negative for Clostridium difficile toxin B or the number of bacteria present   may be below the limit of detection for the test.   FDA approved assay performed using [employer] real-time PCR.   A negative result does not exclude actual disease due to Clostridium difficile   and may be due to improper collection, handling and storage of the specimen or   the number of organisms in the specimen is below the detection limit of the   assay.       Syphilis Testing    No results found for: TREPT, TREPAB, RPR, TPPAT, CVD, CVD    Quantiferon testing   Recent Labs   Lab Test 12/15/21  0734 12/14/21  0639   LYMPH 50 44     Virology:    Coronavirus-19 testing    Recent Labs   Lab Test 12/12/21  2120 01/03/21  0903 12/30/20  0920   [id number] Negative Test received-See reflex to IDDL test SARS CoV2 (COVID-19) Virus RT-PCR  NEGATIVE Not Detected   [id number]  --  Nasopharyngeal  --    [id number]  --  Nasopharyngeal Nasopharyngeal     Respiratory virus (non-coronavirus-19) testing    Recent Labs   Lab Test 01/22/16  1714   AFLU Negative   Test results must be correlated with clinical data. If necessary, results   should be confirmed by a molecular assay or viral culture.     BFLU Positive   Test results must be correlated with clinical data. If necessary, results   should be confirmed by a molecular assay or viral culture.  *     Hepatitis B Testing   No lab results found.   No results found for: HCVAB, HQTG, HCGENO, HCPCR, HQTRNA, HEPRNA, CRYOG    Imaging:  Recent Results (from the past 48 hour(s))   XR Foot Right G/E 3 Views    Narrative    3  views right foot radiographs 12/13/2021 2:58 PM    History: concern for right great toe osteo  -to allow for serial  imaging studies    Comparison: MR 12/12/2021, radiograph 1/5/2017    Findings:    Standing AP, oblique, and lateral  views of the right foot were  obtained.     Subtle indistinctiveness of the tuft of the great toe, nonspecific.  Diffuse soft tissue swelling of the great toe. Soft tissue contour  abnormality of the great toe distally.    Lisfranc articulation alignment is congruent on these non-weight  bearing images.    Achilles tendon insertional enthesopathy, possibly discontinuous.       Impression    Impression: Subtle indistinctiveness of the tuft of the great toe,  nonspecific. Attention on follow up imaging.    [employer]         SYSTEM ID:  [id number]

## 2021-12-15 NOTE — PROGRESS NOTES
Hypertensive not within notifying parameters. OVSS on RA. Pain managed w prn oxycodone. Void spont, no gas or bm this shift. Regular diet. Blood sugars AC/HS insulin per sliding scale. Up ad radha. PIV saline locked in btw ax. Right toe dressing clean dry and intact. Possible discharge today. Will continue to monitor.

## 2021-12-15 NOTE — PLAN OF CARE
AOx4. VSS. UAL. +flatus; no BM since 12/10; PRN bowel meds given. Pt voiding but not saving. R big toe drsg changed x2 on shift. PIV SL. BG monitored and covered with sliding scale insulin. ID consulted; awaiting for team to see pt. Cont POC. Plan is for potential discharge 12/16 Thur.

## 2021-12-15 NOTE — PLAN OF CARE
[id number] Alert and oriented x4. Dressing C/D/I to right toe. Pain to right toe and foot managed with Tylenol, gabapentin, Suboxone, and PRN 10 mg oxycodone. Pt received additional one time dose 5 mg oxycodone d/t increased pain. PIV saline locked. Voiding spontaneously, not saving. Denies passing flatus, no BM. PRN senna given this evening. On regular diet. BG ac and hs, did not meet parameters for bedtime insulin. Up independently. Hypertensive, does not meet parameters to notify team. OVSS on room air.

## 2021-12-16 ENCOUNTER — PATIENT OUTREACH (OUTPATIENT)
Dept: FAMILY MEDICINE | Facility: CLINIC | Age: 41
End: 2021-12-16
Payer: MEDICARE

## 2021-12-16 NOTE — DISCHARGE SUMMARY
[facility]  Discharge Summary - Medicine & Pediatrics       Date of Admission:  12/12/2021  Date of Discharge:  12/15/2021  3:30 PM  Discharging Provider: Mike Cool  Discharge Service: Yohannes Benson    Discharge Diagnoses   -- New diabetic ulcer + cellulitis of R great toe, with additional concern for acute osteomyelitis of this toe based upon presentation and MRI     Follow-ups Needed After Discharge   -- Primary care follow-up in 3-5 days for post-hospital follow-up  -- Obtain CBC w/ diff, BMP, and CRP every 2 weeks while on antibiotics, with first set of labs to be obtained on 12/20/21  -- Continue with previous plan for pain management clinic visit on 12/17/21, at which time pain management may be further discussed  -- Podiatry follow-up in 2-4 weeks    Unresulted Labs Ordered in the Past 30 Days of this Admission     Date and Time Order Name Status Description    12/13/2021  5:00 AM Blood Culture Arm, Left Preliminary     12/13/2021  5:00 AM Blood Culture Arm, Right Preliminary       These results will be followed up by primary care.    Discharge Disposition   Discharged to home  Condition at discharge: Stable    Hospital Course   Phillip Rueda is a 41 year old male with history of type 2 diabetes, diabetic neuropathy, and Parsonage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 who was admitted on 12/12/21 for new diabetic ulcer + cellulitis of right great toe, with additional concern for acute osteomyelitis based upon presentation and MRI.     New diabetic ulcer + cellulitis of R great toe, with additional concern for acute osteomyelitis of this toe based upon presentation and MRI  Patient presenting with 2-3 day history of symptoms consistent with R great toe cellulitis with associated presence of purulence, with MRI concerning for possible presence of osteomyelitis. Upon admission, patient was started on empiric broad-spectrum antibiotics, from which patient demonstrated initial improvement in  symptoms and normalization of inflammatory markers. Blood cultures were obtained x2 and demonstrated NGTD during hospitalization, with patient also not demonstrating any signs of sepsis during this admission. Podiatry was consulted and determined that there was no indication for surgical intervention, recommending NWB status on RLE to allow proper wound healing. Given the fact that patient had been started on broad-spectrum antibiotics prior to obtaining a potential bone biopsy for culture, ID was consulted during admission and recommended treating with levofloxacin, doxycycline and metronidazole for planned 6-week course of presumed osteomyelitis in the setting of diabetic foot infection. For pain management, patient was managed on scheduled tylenol and oxycodone PRN during his admission with appropriate control of pain. Plan for continued outpatient management as documented below:  -- Antibiotics x6 week course (12/15/21-1/26/22)  - Levofloxacin 750mg PO qday  - Doxycycline 100mg PO BID  - Metronidazole 500mg PO TID  -- Lab monitoring:  - Obtain CBC w/ diff, BMP, and CRP every 2 weeks while on antibiotics, with first set of labs to be obtained on 12/20/21  -- Pain Management:  - Encouraged patient to continue scheduled tylenol + ibuprofen PRN  - Continue PTA gabapentin  - Patient prescribed 4-total doses of oxycodone 10mg q6h PRN at time of discharge, which was the dosing that patient was utilizing during his hospitalization. Patient planning to follow-up with pain management team on 12/17/21 (previously scheduled visit), during which time patient and pain management team may reassess pain management  -- Follow-up:  - Pain management follow-up on 12/17/21 (previously scheduled visit), per above  - Primary care follow-up in 3-5 days  - Podiatry follow-up in 2-4 weeks  - Wound follow-up PRN  - No need for ID follow-up   - Podiatry recommendations, per podiatry note on 12/13/21:  - Activity: NWB RLE while wound  heals. ROM as tolerated.   - Splint: CAM boot applied to extremity and should remain in place at all times.  Only remove once daily for wound cares.  - Wound recommendations, per C note on 12/13/21  Right 1st toe wounds: Daily    Epworth wound beds and entire toe with povidone iodine. Wrap toe with double layer of Vaseline gauze (955116). Cover with dry gauze and secure in place. Change once a day, if dressing sticking to nail bed increase changes to BID. Clean socks each day.   Please defer to Podiatry if alternative wound care orders placed.    Constipation  -- Miralax 17g BID PRN  -- Sennosides 8.6mg 2 tabs BID PRN    Consultations This Hospital Stay   PHARMACY TO DOSE VANCO  WOUND OSTOMY CONTINENCE NURSE  IP CONSULT  PODIATRY IP CONSULT  INFECTIOUS DISEASE GENERAL ADULT IP CONSULT    Code Status   Prior     The patient was discussed with Dr. Cool.    Dima Castillo MD   Internal Medicine & Pediatrics, PGY-4  [address] [place] [id number]  Phone: [phone]  ______________________________________________________________________    Physical Exam   Vital Signs:                   Weight: 165 lbs 0 oz  General Appearance:  Laying down in bed in no acute distress.  Respiratory: Breathing comfortably on RA, lungs are CTAB  Cardiovascular: RRR, normal S1 and S2, no M/R/G  Extremities: R great toe notable for diffuse erythema/swelling with small, clean appearing wound on the plantar surface of the toe; R great toenail removed 12/13. Mild tenderness to palpation along medial aspect of R foot up to mid-foot area, with no associated crepitus, erythema, or edema. No other abrasions or marks on visible skin.      Primary Care Physician   Km Dos Santos MD    Discharge Orders      Orthopedic  Referral      Reason for your hospital stay    Dear Phillip Rueda    Your were hospitalized at [facility] with  osteomyelitis (infection) of the right great toe and treated with antibiotics.  Over your hospitalization your infection improved improved and today you are ready to be discharged home.  Continue the antibiotic therapy for 6 weeks. If you develop fever, shortness of breath, light headedness, chest pain, severe diarrhea with abdominal pain, worsening redness of infection of the right toe, please seek medical attention.    We are suggesting the following medication changes:  Levofloxacin 750mg daily  Doxycycline 100mg twice daily   Metronidazole 500mg three times daily    Please get the following tests done:  Every other week starting the week of 12/20, please get the following labs drawn at any [place] site:    CBC / diff / platelets, Basic Metabolic Panel, CRP     Please set up an appointment with:  Podiatry, PCP    It was a pleasure meeting with you today. Thank you for allowing me and my team the privilege of caring for you today. You are the reason we are here, and I truly hope we provided you with the excellent service you deserve. Please let us know if there is anything else we can do for you so that we can be sure you are leaving completely satisfied with your care experience.    Your hospital unit at the time of discharge is 7C so if you have any questions please call the hospital at [phone] and ask to talk to a nurse on 7C.    Take care!  Husam Munoz MD  Department of Medicine  [facility]     Activity    Your activity upon discharge:   Non-weight bearing RLE while wound heals. Range of motion as tolerated.   Stay in the CAM boot at all times. Only remove once daily for wound cares.     Adult [facility] Follow-up and recommended labs and tests    Follow up with primary care provider, Km Dos Santos MD, within 1-2 days for hospital follow- up.    Follow up with podiatry in several weeks for hospital follow up (referral placed, they should call you).   Follow up with your pain  specialist for continued pain management for the right toe infection.     You will need labs every two weeks start the week of 12/20  The following labs/tests are recommended: CBC with platelets and diff, BMP, CRP.      Appointments on [place] and/or [facility] (with [facility] or [facility] provider or service). Call [phone] if you haven't heard regarding these appointments within 7 days of discharge.     Diet    Follow this diet upon discharge: Orders Placed This Encounter      Regular Diet Adult       Discharge Medications   Discharge Medication List as of 12/15/2021  1:54 PM      START taking these medications    Details   acetaminophen (TYLENOL) 325 MG tablet Take 3 tablets (975 mg) by mouth every 6 hours, No Print Out      doxycycline hyclate (VIBRAMYCIN) 100 MG capsule Take 1 capsule (100 mg) by mouth every 12 hours, Disp-84 capsule, R-0, E-Prescribe      levofloxacin (LEVAQUIN) 750 MG tablet Take 1 tablet (750 mg) by mouth daily, Disp-42 tablet, R-0, E-Prescribe      metroNIDAZOLE (FLAGYL) 500 MG tablet Take 1 tablet (500 mg) by mouth 3 times daily, Disp-126 tablet, R-0, E-Prescribe      polyethylene glycol (MIRALAX) 17 GM/Dose powder Take 17 g by mouth 2 times daily, Disp-510 g, No Print Out      sennosides (SENOKOT) 8.6 MG tablet Take 2 tablets by mouth 2 times daily as needed for constipation, No Print Out         CONTINUE these medications which have CHANGED    Details   gabapentin (NEURONTIN) 600 MG tablet Take 2 tablets (1,200 mg) by mouth 3 times daily, No Print Out      oxyCODONE (ROXICODONE) 5 MG tablet Take 1-2 tablets (5-10 mg) by mouth every 6 hours as needed for moderate to severe pain, Disp-8 tablet, R-0, E-PrescribeDecrease quantity to 8 pills (one day)         CONTINUE these medications which have NOT CHANGED    Details   metFORMIN (GLUCOPHAGE-XR) 500 MG 24 hr tablet TAKE TWO TABLETS BY MOUTH TWICE A DAY WITH MEALS., Disp-360 tablet, R-0, E-Prescribe++VISIT NEEDED BEFORE FURTHER REFILLS++       venlafaxine (EFFEXOR-ER) 150 MG TB24 Take 1 tablet by mouth daily (with breakfast)., Disp-30 each, R-0, E-Prescribe      ACCU-CHEK GUIDE test strip USE TO TEST BLOOD SUGAR TWO TIMES A DAY OR AS DIRECTED, Disp-200 strip, R-1, E-Prescribe      ammonium lactate (LAC-HYDRIN) 12 % external cream Apply topically 2 times daily as needed for dry skinDisp-385 g, [id number]      atorvastatin (LIPITOR) 20 MG tablet TAKE ONE TABLET BY MOUTH ONCE DAILY, Disp-90 tablet, R-0, E-PrescribeMedication is being filled for 1 time refill only due to:  Patient needs labs .      blood glucose (NO BRAND SPECIFIED) lancets standard Use to test blood sugar 2 times daily or as directed.Disp-100 each,[id number]++Accu Check Guide Me brand, please++      blood glucose monitoring (NO BRAND SPECIFIED) meter device kit Use to test blood sugar 2 times daily or as directed.Disp-1 kit,[id number]++Accu Check Guide Me brand, please++      Buprenorphine HCl-Naloxone HCl (SUBOXONE) 4-1 MG film Place 1 Film under the tongue 2 times daily , Historical      Continuous Blood Gluc  (FREESTYLE JHON 2 READER) JESSI 1 each continuous USE TO READ BLOOD SUGARS PER  INSTRUCTIONS, Disp-1 each, R-0, E-Prescribe      !! Continuous Blood Gluc Sensor (FREESTYLE JHON 2 SENSOR) MISC 1 each continuous CHANGE EVERY 14 DAYS, Disp-2 each, R-5, E-Prescribe      !! continuous blood glucose monitoring (FREESTYLE JHON) sensor For use with Freestyle Jhon Flash  for continuous monitioring of blood glucose levels. Replace sensor every 14 days., Disp-6 each, R-3, E-Prescribe      cyclobenzaprine (FLEXERIL) 5 MG tablet Take 2 tablets (10 mg) by mouth 3 times daily as needed for muscle spasms, Disp-20 tablet, R-0, Local Print      diazepam (VALIUM) 5 MG tablet Take 1 tablet (5 mg) 3 times daily as needed., Disp-60 tablet, Historical      EPINEPHrine (EPIPEN) 0.3 MG/0.3ML injection Inject 0.3 mLs (0.3 mg) into the muscle once as needed for  anaphylaxis, Disp-1 each, R-2, E-Prescribe      ibuprofen (ADVIL,MOTRIN) 200 MG tablet Take 1-2 tablets by mouth every 6 hours as needed., Historical      insulin lispro (HUMALOG KWIKPEN) 100 UNIT/ML (1 unit dial) KWIKPEN Take it with steroid use as needed. Maximum 10 units per day, Disp-15 mL, R-0, E-Prescribe      insulin pen needle 31G X 6 MM Use 2 daily or as directed.Disp-100 each, [id number]      nortriptyline (PAMELOR) 25 MG capsule TAKE 2 CAPSULES(50 MG) BY MOUTH AT BEDTIME, Disp-60 capsule, R-1, E-Prescribe      sildenafil (VIAGRA) 100 MG tablet Take 1/2 to 1 pill as needed for erectile dysfunction., Disp-5 tablet,R-5, E-PrescribeProfile Rx: patient will contact pharmacy when needed      VICTOZA PEN 18 MG/3ML soln INJCT 1.8MG UNDER THE SKIN ONCE DAILY, Disp-27 mL, R-0, E-PrescribeNeeds labs for further refills       !! - Potential duplicate medications found. Please discuss with provider.        Allergies   Allergies   Allergen Reactions     Bees Swelling     Bupropion Hcl Other (See Comments)     seizure     Lisinopril Cough     Penicillins Other (See Comments)     Unable to close mouth  Tolerated cefazolin (12/12/21)

## 2021-12-18 LAB
BACTERIA BLD CULT: NO GROWTH
BACTERIA BLD CULT: NO GROWTH

## 2021-12-19 ENCOUNTER — MYC MEDICAL ADVICE (OUTPATIENT)
Dept: PODIATRY | Facility: CLINIC | Age: 41
End: 2021-12-19
Payer: MEDICARE

## 2021-12-20 LAB
ATRIAL RATE - MUSE: 100 BPM
DIASTOLIC BLOOD PRESSURE - MUSE: NORMAL MMHG
INTERPRETATION ECG - MUSE: NORMAL
P AXIS - MUSE: 37 DEGREES
PR INTERVAL - MUSE: 166 MS
QRS DURATION - MUSE: 92 MS
QT - MUSE: 364 MS
QTC - MUSE: 469 MS
R AXIS - MUSE: 51 DEGREES
SYSTOLIC BLOOD PRESSURE - MUSE: NORMAL MMHG
T AXIS - MUSE: 36 DEGREES
VENTRICULAR RATE- MUSE: 100 BPM

## 2021-12-21 ENCOUNTER — OFFICE VISIT (OUTPATIENT)
Dept: FAMILY MEDICINE | Facility: CLINIC | Age: 41
End: 2021-12-21
Payer: COMMERCIAL

## 2021-12-21 VITALS
OXYGEN SATURATION: 100 % | DIASTOLIC BLOOD PRESSURE: 93 MMHG | TEMPERATURE: 98.2 F | SYSTOLIC BLOOD PRESSURE: 159 MMHG | BODY MASS INDEX: 25.55 KG/M2 | WEIGHT: 168.04 LBS | HEART RATE: 77 BPM

## 2021-12-21 DIAGNOSIS — E11.9 TYPE 2 DIABETES MELLITUS WITHOUT COMPLICATION, WITHOUT LONG-TERM CURRENT USE OF INSULIN (H): ICD-10-CM

## 2021-12-21 DIAGNOSIS — L03.031 CELLULITIS OF TOE OF RIGHT FOOT: Primary | ICD-10-CM

## 2021-12-21 LAB
BASOPHILS # BLD AUTO: 0 10E3/UL (ref 0–0.2)
BASOPHILS NFR BLD AUTO: 0 %
EOSINOPHIL # BLD AUTO: 0.1 10E3/UL (ref 0–0.7)
EOSINOPHIL NFR BLD AUTO: 2 %
ERYTHROCYTE [DISTWIDTH] IN BLOOD BY AUTOMATED COUNT: 12.5 % (ref 10–15)
HCT VFR BLD AUTO: 38.9 % (ref 40–53)
HGB BLD-MCNC: 12.9 G/DL (ref 13.3–17.7)
IMM GRANULOCYTES # BLD: 0 10E3/UL
IMM GRANULOCYTES NFR BLD: 0 %
LYMPHOCYTES # BLD AUTO: 2 10E3/UL (ref 0.8–5.3)
LYMPHOCYTES NFR BLD AUTO: 33 %
MCH RBC QN AUTO: 28.4 PG (ref 26.5–33)
MCHC RBC AUTO-ENTMCNC: 33.2 G/DL (ref 31.5–36.5)
MCV RBC AUTO: 86 FL (ref 78–100)
MONOCYTES # BLD AUTO: 0.3 10E3/UL (ref 0–1.3)
MONOCYTES NFR BLD AUTO: 6 %
NEUTROPHILS # BLD AUTO: 3.6 10E3/UL (ref 1.6–8.3)
NEUTROPHILS NFR BLD AUTO: 59 %
PLATELET # BLD AUTO: 260 10E3/UL (ref 150–450)
RBC # BLD AUTO: 4.55 10E6/UL (ref 4.4–5.9)
WBC # BLD AUTO: 6.1 10E3/UL (ref 4–11)

## 2021-12-21 PROCEDURE — 99214 OFFICE O/P EST MOD 30 MIN: CPT | Performed by: FAMILY MEDICINE

## 2021-12-21 PROCEDURE — 36415 COLL VENOUS BLD VENIPUNCTURE: CPT | Performed by: FAMILY MEDICINE

## 2021-12-21 PROCEDURE — 85025 COMPLETE CBC W/AUTO DIFF WBC: CPT | Performed by: FAMILY MEDICINE

## 2021-12-21 PROCEDURE — 80048 BASIC METABOLIC PNL TOTAL CA: CPT | Performed by: FAMILY MEDICINE

## 2021-12-21 PROCEDURE — 86140 C-REACTIVE PROTEIN: CPT | Performed by: FAMILY MEDICINE

## 2021-12-21 RX ORDER — SEMAGLUTIDE 1.34 MG/ML
0.5 INJECTION, SOLUTION SUBCUTANEOUS
Qty: 4.5 ML | Refills: 1 | Status: SHIPPED | OUTPATIENT
Start: 2022-01-01 | End: 2022-08-22

## 2021-12-21 NOTE — PROGRESS NOTES
"  Assessment & Plan     Cellulitis of toe of right foot  With concern of possible osteomyelitis.  Currently on 6-week course of antibiotics.  Needs to schedule an appoint with dermatology.  I recommended CBC, CRP, BMP every 2 weeks while on antibiotics.  I have put a standing order for him.  He is not scheduled to have ID follow-up but if needed we can do referral.  He understood.  Seeing a pain specialist and treatment as per pain specialist regarding his pain.  -Needs BP follow-up.  - CBC with platelets and differential; Standing  - CRP, inflammation; Standing  - Basic metabolic panel  (Ca, Cl, CO2, Creat, Gluc, K, Na, BUN); Standing  - Basic metabolic panel  (Ca, Cl, CO2, Creat, Gluc, K, Na, BUN)  - CRP, inflammation  - CBC with platelets and differential    Type 2 diabetes mellitus without complication, without long-term current use of insulin (H)  Victoza will not be covered starting next year.  Wondering about Ozempic.  We discussed reasonable option.  On max dose of Victoza we will do the max dose of semaglutide.  Side effects discussed and he understood that it is not a daily injection.  - semaglutide (OZEMPIC, 0.25 OR 0.5 MG/DOSE,) 2 MG/1.5ML SOPN pen; Inject 0.5 mg Subcutaneous every 7 days             BMI:   Estimated body mass index is 25.55 kg/m  as calculated from the following:    Height as of 12/12/21: 1.727 m (5' 8\").    Weight as of this encounter: 76.2 kg (168 lb 0.6 oz).           No follow-ups on file.    Km Dos Santos MD, MD  [facility]    Venkatesh Fisher is a 41 year old who presents for the following health issues     HPI     Go over medication, his insurance will not cover one of his medications     Hospital Follow-up Visit:    Hospital/Nursing Home/IP Rehab Facility: [facility]  Date of Admission: 12/12/2021  Date of Discharge: 12/15/2021  Reason(s) for Admission: Cellulitis of great toe of right " foot     Other acute osteomyelitis of right foot (H)      Was your hospitalization related to COVID-19? No   Problems taking medications regularly:  None  Medication changes since discharge:   START taking these medications     Details   acetaminophen (TYLENOL) 325 MG tablet Take 3 tablets (975 mg) by mouth every 6 hours, No Print Out       doxycycline hyclate (VIBRAMYCIN) 100 MG capsule Take 1 capsule (100 mg) by mouth every 12 hours, Disp-84 capsule, R-0, E-Prescribe       levofloxacin (LEVAQUIN) 750 MG tablet Take 1 tablet (750 mg) by mouth daily, Disp-42 tablet, R-0, E-Prescribe       metroNIDAZOLE (FLAGYL) 500 MG tablet Take 1 tablet (500 mg) by mouth 3 times daily, Disp-126 tablet, R-0, E-Prescribe       polyethylene glycol (MIRALAX) 17 GM/Dose powder Take 17 g by mouth 2 times daily, Disp-510 g, No Print Out       sennosides (SENOKOT) 8.6 MG tablet Take 2 tablets by mouth 2 times daily as needed for constipation, No Print Out               CONTINUE these medications which have CHANGED     Details   gabapentin (NEURONTIN) 600 MG tablet Take 2 tablets (1,200 mg) by mouth 3 times daily, No Print Out       oxyCODONE (ROXICODONE) 5 MG tablet Take 1-2 tablets (5-10 mg) by mouth every 6 hours as needed for moderate to severe pain, Disp-8 tablet, R-0, E-PrescribeDecrease quantity to 8 pills (one day)               Problems adhering to non-medication therapy:  None    Summary of hospitalization:  [facility] discharge summary reviewed  Diagnostic Tests/Treatments reviewed.  Follow up needed: labs    Other Healthcare Providers Involved in Patient s Care:         Specialist appointment - podiatry - needs to schedule  Update since discharge: improved.       Post Discharge Medication Reconciliation: discharge medications reconciled, continue medications without change.  Plan of care communicated with patient              Started with blood blister on right foot.   Had left ankle surgery last year.   Was  seen in urgent care and then was routed to ER.  Was in hospital for 4 days.   6 weeks of antibiotics. No need for surgical intervention yet.   Will try to see podiatrist next week.   Advised not to walk on it.   Needs doctors note for work. Missed first day of work on 12/12. Hoping to go back to work on 1/2/22.  Pain is raising bp.          Review of Systems         Objective    BP (!) 159/93 (BP Location: Left arm, Patient Position: Sitting, Cuff Size: Adult Regular)   Pulse 77   Temp 98.2  F (36.8  C) (Temporal)   Wt 76.2 kg (168 lb 0.6 oz)   SpO2 100%   BMI 25.55 kg/m    Body mass index is 25.55 kg/m .  Physical Exam   Right foot great toe examined -dry dressing present which was removed.  Toenail surgically absent.  Dorsum of the great toe scaling of some skin.  There is 4 mm x 3 mm superficial abrasion of skin. Sensation mildly impaired. Walking with cane.

## 2021-12-21 NOTE — LETTER
December 21, 2021      Phillip Rueda  [address] [id number]        To Whom It May Concern:    Phillip Rueda was seen in our clinic. Please excuse him from work from 12/12/21 to 1/1/22. He may return to work without restrictions on 1/2/22      Sincerely,        Km Dos Santos MD, MD

## 2021-12-22 ENCOUNTER — TELEPHONE (OUTPATIENT)
Dept: FAMILY MEDICINE | Facility: CLINIC | Age: 41
End: 2021-12-22
Payer: MEDICARE

## 2021-12-22 LAB
ANION GAP SERPL CALCULATED.3IONS-SCNC: 7 MMOL/L (ref 3–14)
BUN SERPL-MCNC: 20 MG/DL (ref 7–30)
CALCIUM SERPL-MCNC: 9.3 MG/DL (ref 8.5–10.1)
CHLORIDE BLD-SCNC: 101 MMOL/L (ref 94–109)
CO2 SERPL-SCNC: 28 MMOL/L (ref 20–32)
CREAT SERPL-MCNC: 1.01 MG/DL (ref 0.66–1.25)
CRP SERPL-MCNC: <2.9 MG/L (ref 0–8)
GFR SERPL CREATININE-BSD FRML MDRD: >90 ML/MIN/1.73M2
GLUCOSE BLD-MCNC: 89 MG/DL (ref 70–99)
POTASSIUM BLD-SCNC: 4.2 MMOL/L (ref 3.4–5.3)
SODIUM SERPL-SCNC: 136 MMOL/L (ref 133–144)

## 2021-12-22 NOTE — TELEPHONE ENCOUNTER
Called Lor[facility] and was placed on hold x 3.  Terminated call due to past end of shift.    Pt was previously on max dose of Victoza, needs to switch to Ozempic as of first of the year due to insurance issues.  Normal starting dose of Ozempic is to inject 0.25 mg weekly, pt is starting on 0.5 mg dose since it is not a new category of medication for him.  Please confirm this to pharmacy as they are questioning the dosage.      Magy Soliz RN  [facility]

## 2021-12-27 ENCOUNTER — TELEPHONE (OUTPATIENT)
Dept: PODIATRY | Facility: CLINIC | Age: 41
End: 2021-12-27
Payer: MEDICARE

## 2021-12-27 NOTE — TELEPHONE ENCOUNTER
Patient called in to schedule an appt with .  Pt was recently in the hospital w/cellulitis of his toe.  Pt prefers [facility].  Offered 1st available opening at [facility], 1/11/22 or [place].  Pt wanted message sent to 's team to see if he can be seen sooner at [facility].  No order entered for this.  Please contact pt to triage and determine urgency.      Susan Ferreira

## 2021-12-27 NOTE — TELEPHONE ENCOUNTER
Left voicemail returning patient call.   Multiple appointments available at this time with Dr. Smallwood within the recommended follow up time from recent admission.  Patient was discharged on 12/15/21, and instructed to follow up in 2-4 weeks with Podiatry.      Provided callback number for patient.     Alexandra De Los Santos, ATC

## 2021-12-28 NOTE — TELEPHONE ENCOUNTER
Phone call to patient. Appointment scheduled for 12/31/21 at 7:45 in [place] after offering two other appointments at the [facility] and [facility] location with other providers. Asked patient to arrive 10 minutes early for paperwork. Address provided. He verbalized understanding.     CHANDRA Muniz RN

## 2021-12-29 ENCOUNTER — TELEPHONE (OUTPATIENT)
Dept: PODIATRY | Facility: CLINIC | Age: 41
End: 2021-12-29
Payer: MEDICARE

## 2021-12-29 NOTE — TELEPHONE ENCOUNTER
Dr. Smallwood is out. Tried calling to gaurang. Original appt for Dr Smallwood was for 12/31 but LVM to reschedule appt.

## 2021-12-31 ENCOUNTER — PREP FOR PROCEDURE (OUTPATIENT)
Dept: PODIATRY | Facility: CLINIC | Age: 41
End: 2021-12-31

## 2021-12-31 ENCOUNTER — OFFICE VISIT (OUTPATIENT)
Dept: PODIATRY | Facility: CLINIC | Age: 41
End: 2021-12-31
Payer: COMMERCIAL

## 2021-12-31 ENCOUNTER — TELEPHONE (OUTPATIENT)
Dept: PODIATRY | Facility: CLINIC | Age: 41
End: 2021-12-31

## 2021-12-31 VITALS
BODY MASS INDEX: 25.46 KG/M2 | DIASTOLIC BLOOD PRESSURE: 90 MMHG | WEIGHT: 168 LBS | SYSTOLIC BLOOD PRESSURE: 137 MMHG | HEIGHT: 68 IN

## 2021-12-31 DIAGNOSIS — M86.171 ACUTE OSTEOMYELITIS OF TOE, RIGHT (H): ICD-10-CM

## 2021-12-31 DIAGNOSIS — Z11.59 ENCOUNTER FOR SCREENING FOR OTHER VIRAL DISEASES: Primary | ICD-10-CM

## 2021-12-31 DIAGNOSIS — M86.171 ACUTE OSTEOMYELITIS OF TOE, RIGHT (H): Primary | ICD-10-CM

## 2021-12-31 DIAGNOSIS — E11.9 TYPE 2 DIABETES MELLITUS WITHOUT COMPLICATION, WITHOUT LONG-TERM CURRENT USE OF INSULIN (H): Primary | ICD-10-CM

## 2021-12-31 PROCEDURE — 99214 OFFICE O/P EST MOD 30 MIN: CPT | Performed by: PODIATRIST

## 2021-12-31 ASSESSMENT — MIFFLIN-ST. JEOR: SCORE: 1631.54

## 2021-12-31 NOTE — PROGRESS NOTES
"Foot & Ankle Surgery   December 31, 2021    S:  Pt is seen today for hospital follow-up regarding a right great toe infection in the setting of diabetes mellitus with peripheral neuropathy.  The patient is frequently seen Dr. Smallwood in the past.  He was hospitalized at the [facility].  X-rays and an MRI were suggestive of osteomyelitis of the right great toe distal phalanx.  There was initial discussion of doing a biopsy of the bone but this was not done.  He was discharged on Levaquin, doxycycline and metronidazole for osteomyelitis suppression.  His right great toenail was removed during the hospitalization.  He also had a left flatfoot reconstruction done by Dr Celia ABDI and is having some issues with the foot.  This may be related to bearing more weight on the foot because of his right foot infection.    Vitals:    12/31/21 1007   BP: (!) 137/90   Weight: 76.2 kg (168 lb)   Height: 1.727 m (5' 8\")   '      ROS - Pos for CC.  Patient denies current nausea, vomiting, chills, fevers, belly pain, calf pain, chest pain or SOB.  Complete remainder of ROS it otherwise neg.      PE:  Gen:   No apparent distress  Eye:    Visual scanning without deficit  Ear:    Response to auditory stimuli wnl  Lung:    Non-labored breathing on RA noted  Abd:    NTND per patient report  Lymph:    Neg for pitting/non-pitting edema BLE  Vasc:    Pulses palpable, CFT minimally delayed  Neuro:    Light touch sensation diminished distally  Derm:    Right great toe -the nail has previously been removed.  There is a dry eschar at the nail bed but this otherwise appears to be nearly fully healed.  The initial wound presents today is a small dry scab on the plantar aspect of the toe.  Upon debridement, no visible wound is present.  The toe is without erythema or edema  MSK:    Status post right flatfoot reconstruction with cotton medial cuneiform osteotomy, PT tendon debridement and repair and [place] calcaneal " osteotomy.  Some discomfort of the dorsal foot and along the medial arch  Calf:    Neg for redness, swelling or tenderness      Imaging: MRI right foot 12/12/2021 - IMPRESSION:  1. The degree of marrow edema-like signal in the distal tuft of the great toe and the questionable cortical destruction are concerning for osteomyelitis but not definitive. Correlation with radiographs recommended.  2. Edema in the soft tissues consistent with cellulitis. There is also some adjacent muscle edema, so myositis cannot be ruled out. There is no evidence of abscess, tenosynovitis or septic joint.    Assessment:  41 year old male with suspected osteomyelitis right great toe distal phalanx in setting of diabetes mellitus with peripheral neuropathy and previous wound, currently on 3 suppressive oral antibiotics      Plan:  Discussed etiologies, anatomy and options  1.  Suspected osteomyelitis right great toe distal phalanx in setting of diabetes mellitus with peripheral neuropathy and previous wound, currently on 3 suppressive oral antibiotics  -I personally reviewed and interpreted his imaging and labs.  -There is no current wound on the toe, and the nailbed appears to be nearly fully healed with a dry stable scab.  As such, no further appointment for bandages are needed unless there is drainage present from the nail bed.  -We discussed that a biopsy of the distal phalanx would be the most reasonable step going forward.  If there is osteomyelitis, we discussed that amputation is typically part of the treatment equation.  We discussed that antibiotics are suppressive but not curative for osteomyelitis.  Cultures could be obtained to further narrow down antibiotics going forward.  If osteomyelitis is not present, no further treatment would be indicated.  -Regarding his left flatfoot reconstruction, I advise he reach out to the surgeon's office to discuss his concerns of continued discomfort.  -The surgery scheduling handout was  dispensed for scheduling the bone biopsy procedure.    Follow up:  Prn for biopsy or sooner with acute issues           Ismael Vasquez DPM FACFAS FACFAOM  Podiatric Foot & Ankle Surgeon  [facility]  [phone]    Disclaimer: This note consists of symbols derived from keyboarding, dictation and/or voice recognition software. As a result, there may be errors in the script that have gone undetected. Please consider this when interpreting information found in this chart.

## 2021-12-31 NOTE — TELEPHONE ENCOUNTER
Patient called with interest to schedule foot biopsy surgery.     Please place surgery order.       Aaron Bonds, Surgery Scheduler

## 2021-12-31 NOTE — PROGRESS NOTES
"Order signed for \"bone biopsy right great toe\"    MAC    Supine    No vendor    Ismael Vasquez DPM FACFAS FACFAOM  Podiatric Foot & Ankle Surgeon  [facility]  [phone]      "

## 2021-12-31 NOTE — PATIENT INSTRUCTIONS
"Surgical planning.  If you have decided to have surgery, follow these few steps to get the procedure scheduled and to have the proper paperwork filled out.  If you are unsure about surgery, or would like to sit down and further discuss your issue and treatment options, please make a clinic appointment with Dr Vasquez.    1.  Pick the date that you would like to have surgery.  Keep in mind that you will likely need at least 2 weeks off after the procedure for proper rest and healing.    2.  Call the surgery scheduling line at [phone] to get the procedure scheduled.  My , [employer], will assist you in getting surgery set up.      3.  Make an appointment to see Dr Vasquez within 1-2 weeks of the date of surgery for your pre-operative consult.  When making the appointment, say \"I need to make a 30 minute surgical consult with Dr Vasquez\".  All the paperwork will be ready for you during the visit.  It is recommended that you bring a spouse, family member or friend with you.  There will be lots of information presented.  It can be overwhelming, and it is better to have someone there to help sort out the details.    4.  Make an appointment to see your Primary Care Physician within 4 weeks of the date of surgery for your \"Pre-operative History and Physical\".  This is done to make sure you are medically healthy to undergo surgery.        If you have any post-operative questions regarding your procedure, call our triage nurses at [phone], option 3.      "

## 2022-01-03 DIAGNOSIS — Z11.59 ENCOUNTER FOR SCREENING FOR OTHER VIRAL DISEASES: ICD-10-CM

## 2022-01-03 DIAGNOSIS — M86.9 OSTEOMYELITIS OF GREAT TOE OF RIGHT FOOT (H): Primary | ICD-10-CM

## 2022-01-04 ENCOUNTER — LAB (OUTPATIENT)
Dept: LAB | Facility: CLINIC | Age: 42
End: 2022-01-04
Payer: COMMERCIAL

## 2022-01-04 DIAGNOSIS — E11.42 DIABETIC POLYNEUROPATHY (H): Primary | ICD-10-CM

## 2022-01-04 DIAGNOSIS — Z11.59 NEED FOR HEPATITIS C SCREENING TEST: ICD-10-CM

## 2022-01-04 DIAGNOSIS — L03.031 CELLULITIS OF TOE OF RIGHT FOOT: ICD-10-CM

## 2022-01-04 LAB
ANION GAP SERPL CALCULATED.3IONS-SCNC: 5 MMOL/L (ref 3–14)
BASOPHILS # BLD AUTO: 0 10E3/UL (ref 0–0.2)
BASOPHILS NFR BLD AUTO: 0 %
BUN SERPL-MCNC: 19 MG/DL (ref 7–30)
CALCIUM SERPL-MCNC: 9 MG/DL (ref 8.5–10.1)
CHLORIDE BLD-SCNC: 101 MMOL/L (ref 94–109)
CO2 SERPL-SCNC: 28 MMOL/L (ref 20–32)
CREAT SERPL-MCNC: 0.9 MG/DL (ref 0.66–1.25)
EOSINOPHIL # BLD AUTO: 0.1 10E3/UL (ref 0–0.7)
EOSINOPHIL NFR BLD AUTO: 2 %
ERYTHROCYTE [DISTWIDTH] IN BLOOD BY AUTOMATED COUNT: 12.6 % (ref 10–15)
GFR SERPL CREATININE-BSD FRML MDRD: >90 ML/MIN/1.73M2
GLUCOSE BLD-MCNC: 109 MG/DL (ref 70–99)
HCT VFR BLD AUTO: 39.9 % (ref 40–53)
HGB BLD-MCNC: 13.2 G/DL (ref 13.3–17.7)
IMM GRANULOCYTES # BLD: 0 10E3/UL
IMM GRANULOCYTES NFR BLD: 0 %
LYMPHOCYTES # BLD AUTO: 2.1 10E3/UL (ref 0.8–5.3)
LYMPHOCYTES NFR BLD AUTO: 26 %
MCH RBC QN AUTO: 28.6 PG (ref 26.5–33)
MCHC RBC AUTO-ENTMCNC: 33.1 G/DL (ref 31.5–36.5)
MCV RBC AUTO: 87 FL (ref 78–100)
MONOCYTES # BLD AUTO: 0.4 10E3/UL (ref 0–1.3)
MONOCYTES NFR BLD AUTO: 5 %
NEUTROPHILS # BLD AUTO: 5.3 10E3/UL (ref 1.6–8.3)
NEUTROPHILS NFR BLD AUTO: 67 %
PLATELET # BLD AUTO: 244 10E3/UL (ref 150–450)
POTASSIUM BLD-SCNC: 4.1 MMOL/L (ref 3.4–5.3)
RBC # BLD AUTO: 4.61 10E6/UL (ref 4.4–5.9)
SODIUM SERPL-SCNC: 134 MMOL/L (ref 133–144)
WBC # BLD AUTO: 8 10E3/UL (ref 4–11)

## 2022-01-04 PROCEDURE — 36415 COLL VENOUS BLD VENIPUNCTURE: CPT

## 2022-01-04 PROCEDURE — 85025 COMPLETE CBC W/AUTO DIFF WBC: CPT

## 2022-01-04 PROCEDURE — 82043 UR ALBUMIN QUANTITATIVE: CPT

## 2022-01-04 PROCEDURE — 86803 HEPATITIS C AB TEST: CPT

## 2022-01-04 PROCEDURE — 80048 BASIC METABOLIC PNL TOTAL CA: CPT

## 2022-01-04 PROCEDURE — 86140 C-REACTIVE PROTEIN: CPT

## 2022-01-05 LAB
CRP SERPL-MCNC: <2.9 MG/L (ref 0–8)
HCV AB SERPL QL IA: NONREACTIVE

## 2022-01-05 NOTE — TELEPHONE ENCOUNTER
Dr Vasquez is out ill.  Patient has seen Dr. Smallwood before.  Dr. Smallwood will do the surgery.     ATC: please place surgery order.     Type of surgery: bone biopsy of great toe  Location of surgery: [facility] OR  Date and time of surgery: 1/18/22  Surgeon: Cl  Pre-Op Appt Date: patient to schedule  Post-Op Appt Date: 1/25/22   Packet sent out: Yes  Pre-cert/Authorization completed:  No  Date: 1/5/22      Aaron Bonds, Surgery Scheduler

## 2022-01-11 LAB
CREAT UR-MCNC: 117 MG/DL
MICROALBUMIN UR-MCNC: 10 MG/L
MICROALBUMIN/CREAT UR: 8.55 MG/G CR (ref 0–17)

## 2022-01-11 NOTE — RESULT ENCOUNTER NOTE
Jose Cruz Fisher,  Your lab results were within acceptable limits.  This includes blood counts, CRP (C-reactive protein, an inflammatory marker), Hepatitis C screen, urine albumin (protein) level, and basic metabolic panel results (blood salts, blood sugar, and kidney function).  Minor highs/lows were noted which are not clinically significant.     Alesia Jiménez MD for Km Dos Santos MD

## 2022-01-12 ENCOUNTER — OFFICE VISIT (OUTPATIENT)
Dept: FAMILY MEDICINE | Facility: CLINIC | Age: 42
End: 2022-01-12
Payer: COMMERCIAL

## 2022-01-12 VITALS
TEMPERATURE: 98.1 F | DIASTOLIC BLOOD PRESSURE: 87 MMHG | HEIGHT: 69 IN | WEIGHT: 164 LBS | OXYGEN SATURATION: 99 % | HEART RATE: 79 BPM | RESPIRATION RATE: 16 BRPM | SYSTOLIC BLOOD PRESSURE: 138 MMHG | BODY MASS INDEX: 24.29 KG/M2

## 2022-01-12 DIAGNOSIS — Z01.818 PREOP GENERAL PHYSICAL EXAM: Primary | ICD-10-CM

## 2022-01-12 DIAGNOSIS — E11.42 DIABETIC POLYNEUROPATHY ASSOCIATED WITH TYPE 2 DIABETES MELLITUS (H): ICD-10-CM

## 2022-01-12 DIAGNOSIS — F11.20 BUPRENORPHINE DEPENDENCE (H): ICD-10-CM

## 2022-01-12 DIAGNOSIS — M86.171 ACUTE OSTEOMYELITIS OF TOE, RIGHT (H): ICD-10-CM

## 2022-01-12 DIAGNOSIS — F33.40 RECURRENT MAJOR DEPRESSION IN REMISSION (H): ICD-10-CM

## 2022-01-12 PROCEDURE — 96127 BRIEF EMOTIONAL/BEHAV ASSMT: CPT | Mod: 59 | Performed by: NURSE PRACTITIONER

## 2022-01-12 PROCEDURE — 99214 OFFICE O/P EST MOD 30 MIN: CPT | Performed by: NURSE PRACTITIONER

## 2022-01-12 RX ORDER — DIAZEPAM 5 MG
TABLET ORAL
COMMUNITY
Start: 2021-07-28 | End: 2022-03-15

## 2022-01-12 RX ORDER — GABAPENTIN 600 MG/1
1200 TABLET ORAL
COMMUNITY
Start: 2021-12-17 | End: 2022-03-15

## 2022-01-12 RX ORDER — NORTRIPTYLINE HCL 25 MG
25 CAPSULE ORAL
COMMUNITY
Start: 2021-12-17 | End: 2022-03-15

## 2022-01-12 RX ORDER — VENLAFAXINE HYDROCHLORIDE 150 MG/1
CAPSULE, EXTENDED RELEASE ORAL
COMMUNITY
Start: 2021-12-12 | End: 2022-03-15

## 2022-01-12 RX ORDER — VENLAFAXINE HYDROCHLORIDE 150 MG/1
150 CAPSULE, EXTENDED RELEASE ORAL
COMMUNITY
Start: 2021-10-15 | End: 2022-03-15

## 2022-01-12 ASSESSMENT — MIFFLIN-ST. JEOR: SCORE: 1621.34

## 2022-01-12 NOTE — PROGRESS NOTES
[facility] [address] [place] [address]  Phone: [phone]  Primary Provider: Km Dos Santos  Pre-op Performing Provider: REBECCA GONZALES    PREOPERATIVE EVALUATION:  Today's date: 1/12/2022    Phillip Rueda is a 41 year old male who presents for a preoperative evaluation.    Surgical Information:  Surgery/Procedure: BIOPSY, BONE, RIGHT GREAT TOE (DISTAL PHALANX)  Surgery Location: [facility]  Surgeon: Shelton Smallwood DPM  Surgery Date: 1/18/22  Time of Surgery: 9:00 AM  Where patient plans to recover: At home with family  Fax number for surgical facility: Note does not need to be faxed, will be available electronically in Epic.    Type of Anesthesia Anticipated: Local with MAC    Assessment & Plan     The proposed surgical procedure is considered LOW risk.    Preop general physical exam  Patient seen today for preop physical. No health concerns, illness or mental health reservations present today. Patient medically cleared for surgical procedure.    Acute osteomyelitis of toe, right (H)  completed oral antibiotic regimen for suppression  Plan: scheduled bone biopsy on 1/18/2022    Buprenorphine dependence (H)  Stable; no changes    Recurrent major depression in remission (H)  Stable on Effexor; no SI, intent or plan  - no changes    Diabetic polyneuropathy associated with type 2 diabetes mellitus (H)  Stable; A1C stable 6.9%; transitioning to Ozempic from Victoza; metformin 2 gm daily  - no changes    Risks and Recommendations:  The patient has the following additional risks and recommendations for perioperative complications:  Diabetes:  - Patient is not on insulin therapy: regular NPO guidelines can be followed.     Medication Instructions:   - metformin: HOLD day of surgery.   - GLP-1 Injectable (exenitide, liraglutide, semaglutide, dulaglutide, etc.): HOLD day of surgery    - pregabalin, gabapentin: Continue without  modification.   - ibuprofen (Advil, Motrin): HOLD 1 day before surgery.     RECOMMENDATION:  APPROVAL GIVEN to proceed with proposed procedure, without further diagnostic evaluation.    Review of external notes as documented above   Independent interpretation of a test performed by another physician/other qualified health care professional (not separately reported) -           Subjective     HPI related to upcoming procedure:   41 year old male with PMH of DM II, HTN, Osteomyelitis of right great toe in clinic today for preop evaluation for bone biopsy of right distal phalanx due to history of cellulitis. Wound history per previous Podiatry visit follow-up regarding a right great toe infection in the setting of diabetes mellitus with peripheral neuropathy.  He was hospitalized at the [facility] 12/2021.  X-rays and an MRI were suggestive of osteomyelitis of the right great toe distal phalanx.  There was initial discussion of doing a biopsy of the bone but this was not done.  He was discharged on Levaquin, doxycycline and metronidazole for osteomyelitis suppression.  His right great toenail was removed during the hospitalization. He denies any health concerns today which would prevent surgical procedure. Patient is low cardiac risk.       Preop Questions 1/12/2022   1. Have you ever had a heart attack or stroke? No   2. Have you ever had surgery on your heart or blood vessels, such as a stent placement, a coronary artery bypass, or surgery on an artery in your head, neck, heart, or legs? No   3. Do you have chest pain with activity? No   4. Do you have a history of  heart failure? No   5. Do you currently have a cold, bronchitis or symptoms of other infection? No   6. Do you have a cough, shortness of breath, or wheezing? No   7. Do you or anyone in your family have previous history of blood clots? UNKNOWN -    8. Do you or does anyone in your family have a serious bleeding problem such as  prolonged bleeding following surgeries or cuts? No   9. Have you ever had problems with anemia or been told to take iron pills? No   10. Have you had any abnormal blood loss such as black, tarry or bloody stools? No   11. Have you ever had a blood transfusion? No   12. Are you willing to have a blood transfusion if it is medically needed before, during, or after your surgery? Yes   13. Have you or any of your relatives ever had problems with anesthesia? No   14. Do you have sleep apnea, excessive snoring or daytime drowsiness? No   15. Do you have any artifical heart valves or other implanted medical devices like a pacemaker, defibrillator, or continuous glucose monitor? No   16. Do you have artificial joints? No   17. Are you allergic to latex? No     Health Care Directive:  Patient does not have a Health Care Directive or Living Will: Discussed advance care planning with patient; however, patient declined at this time.    Preoperative Review of :   reviewed - no record of controlled substances prescribed.      Status of Chronic Conditions:  ANEMIA - Patient has a recent history of moderate-severe anemia, which has not been symptomatic. Work up to date has revealed stable anemia of chronic disease. Treatment has been close monitoring.     DIABETES - Patient has a longstanding history of DiabetesType Type II . Patient is being treated with diet, oral agents and GLP1 and denies significant side effects. Control has been good. Complicating factors include but are not limited to: hypertension and foot ulcer.     HYPERTENSION - Patient has longstanding history of HTN , currently denies any symptoms referable to elevated blood pressure. Specifically denies chest pain, palpitations, dyspnea, orthopnea, PND or peripheral edema. Blood pressure readings have been in normal range. Current medication regimen is as listed below. Patient denies any side effects of medication.       Review of Systems  CONSTITUTIONAL:  NEGATIVE for fever, chills, change in weight  INTEGUMENTARY/SKIN: NEGATIVE for worrisome rashes, moles or lesions  EYES: NEGATIVE for vision changes or irritation  ENT/MOUTH: NEGATIVE for ear, mouth and throat problems  RESP: NEGATIVE for significant cough or SOB  CV: NEGATIVE for chest pain, palpitations or peripheral edema  GI: NEGATIVE for nausea, abdominal pain, heartburn, or change in bowel habits  : NEGATIVE for frequency, dysuria, or hematuria  MUSCULOSKELETAL: NEGATIVE for significant arthralgias or myalgia  NEURO: NEGATIVE for weakness, dizziness or paresthesias  ENDOCRINE: NEGATIVE for temperature intolerance, skin/hair changes  HEME: NEGATIVE for bleeding problems  PSYCHIATRIC: NEGATIVE for changes in mood or affect    Patient Active Problem List    Diagnosis Date Noted     Cellulitis of great toe of right foot 12/12/2021     Priority: Medium     Other acute osteomyelitis of right foot (H) 12/12/2021     Priority: Medium     Tibialis posterior tendinopathy 03/26/2021     Priority: Medium     Added automatically from request for surgery [id number]       Acquired flat foot, left 03/26/2021     Priority: Medium     Added automatically from request for surgery [id number]       Chronic pain of left ankle 03/26/2021     Priority: Medium     Added automatically from request for surgery [id number]       Diabetic polyneuropathy associated with type 2 diabetes mellitus (H) 11/06/2020     Priority: Medium     Former smoker 06/02/2017     Priority: Medium     Health Care Home 12/07/2016     Priority: Medium     Pain disorder 12/08/2015     Priority: Medium     ED (erectile dysfunction) 08/11/2015     Priority: Medium     Right shoulder pain 05/11/2015     Priority: Medium     CARDIOVASCULAR SCREENING; LDL GOAL LESS THAN 100 02/11/2015     Priority: Medium     Type 2 diabetes mellitus without complication, without long-term current use of insulin (H) 02/04/2015     Priority: Medium     Hypertension goal BP (blood pressure) <  140/90 02/04/2015     Priority: Medium     Anemia 02/04/2015     Priority: Medium     Drug dependence, in remission (H) 04/08/2013     Priority: Medium     Problem list name updated by automated process. 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      Prostate infection      Seizures (H)      Staph infection      Past Surgical History:   Procedure Laterality Date     ORTHOPEDIC SURGERY       Current Outpatient Medications   Medication Sig Dispense Refill     acetaminophen (TYLENOL) 325 MG tablet Take 3 tablets (975 mg) by mouth every 6 hours       atorvastatin (LIPITOR) 20 MG tablet TAKE ONE TABLET BY MOUTH ONCE DAILY (Patient taking differently: Take 20 mg by mouth every evening ) 90 tablet 0     Buprenorphine HCl-Naloxone HCl (SUBOXONE) 4-1 MG film Place 1 Film under the tongue 2 times daily        Continuous Blood Gluc Sensor (FREESTYLE JHON 2 SENSOR) [facility] 1 each continuous CHANGE EVERY 14 DAYS 2 each 5     continuous blood glucose monitoring (FREESTYLE JHON) sensor For use with Freestyle Jhon Flash  for continuous monitioring of blood glucose levels. Replace sensor every 14 days. 6 each 3     diazepam (VALIUM) 5 MG tablet Take 1 tablet (5 mg) 3 times daily as needed. 60 tablet      doxycycline hyclate (VIBRAMYCIN) 100 MG capsule Take 1 capsule (100 mg) by mouth every 12 hours 84 capsule 0     gabapentin (NEURONTIN) 600 MG tablet Take 2 tablets (1,200 mg) by mouth 3 times daily (Patient taking differently: Take 600 mg by mouth 2 times daily )       ibuprofen (ADVIL,MOTRIN) 200 MG tablet  Take 1-2 tablets by mouth every 6 hours as needed.       levofloxacin (LEVAQUIN) 750 MG tablet Take 1 tablet (750 mg) by mouth daily 42 tablet 0     metFORMIN (GLUCOPHAGE-XR) 500 MG 24 hr tablet TAKE TWO TABLETS BY MOUTH TWICE A DAY WITH MEALS. 120 tablet 0     metroNIDAZOLE (FLAGYL) 500 MG tablet Take 1 tablet (500 mg) by mouth 3 times daily 126 tablet 0     nortriptyline (PAMELOR) 25 MG capsule TAKE 2 CAPSULES(50 MG) BY MOUTH AT BEDTIME (Patient taking differently: Take 25 mg by mouth At Bedtime ) 60 capsule 1     sildenafil (VIAGRA) 100 MG tablet Take 1/2 to 1 pill as needed for erectile dysfunction. 5 tablet 5     venlafaxine (EFFEXOR-ER) 150 MG TB24 Take 1 tablet by mouth daily (with breakfast). 30 each 0     ACCU-CHEK GUIDE test strip USE TO TEST BLOOD SUGAR TWO TIMES A DAY OR AS DIRECTED 200 strip 1     blood glucose (NO BRAND SPECIFIED) lancets standard Use to test blood sugar 2 times daily or as directed. 100 each 1     blood glucose monitoring (NO BRAND SPECIFIED) meter device kit Use to test blood sugar 2 times daily or as directed. 1 kit 0     Continuous Blood Gluc  (FREESTYLE JHON 2 READER) JESSI 1 each continuous USE TO READ BLOOD SUGARS PER  INSTRUCTIONS 1 each 0     EPINEPHrine (EPIPEN) 0.3 MG/0.3ML injection Inject 0.3 mLs (0.3 mg) into the muscle once as needed for anaphylaxis 1 each 2     insulin lispro (HUMALOG KWIKPEN) 100 UNIT/ML (1 unit dial) KWIKPEN Take it with steroid use as needed. Maximum 10 units per day (Patient not taking: Reported on 1/12/2022) 15 mL 0     insulin pen needle 31G X 6 MM Use 2 daily or as directed. 100 each 11     polyethylene glycol (MIRALAX) 17 GM/Dose powder Take 17 g by mouth 2 times daily (Patient not taking: Reported on 1/12/2022) 510 g      semaglutide (OZEMPIC, 0.25 OR 0.5 MG/DOSE,) 2 MG/1.5ML SOPN pen Inject 0.5 mg Subcutaneous every 7 days (Patient not taking: Reported on 1/12/2022) 4.5 mL 1     sennosides (SENOKOT) 8.6 MG tablet Take 2  tablets by mouth 2 times daily as needed for constipation (Patient not taking: Reported on 2022)         Allergies   Allergen Reactions     Bees Swelling     Bupropion Hcl Other (See Comments)     seizure     Lisinopril Cough     Penicillins Other (See Comments)     Unable to close mouth  Tolerated cefazolin (21)        Social History     Tobacco Use     Smoking status: Former Smoker     Packs/day: 0.25     Years: 15.00     Pack years: 3.75     Types: Cigarettes, Dip, chew, snus or snuff     Start date: 1996     Quit date: 2013     Years since quittin.5     Smokeless tobacco: Former User   Substance Use Topics     Alcohol use: No     Alcohol/week: 0.0 standard drinks     Comment: 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  Drink 1.75 every 2 days     Family History   Problem Relation Age of Onset     Depression Mother      Anxiety Disorder Mother      Substance Abuse Maternal Grandfather      Anxiety Disorder Brother      Glaucoma No family hx of      Macular Degeneration No family hx of      History   Drug Use No         Objective     There were no vitals taken for this visit.    Physical Exam    GENERAL APPEARANCE: healthy, alert and no distress     EYES: EOMI,  PERRL     HENT: ear canals and TM's normal and nose and mouth without ulcers or lesions     NECK: no adenopathy, no asymmetry, masses, or scars and thyroid normal to palpation     RESP: lungs clear to auscultation - no rales, rhonchi or wheezes     CV: regular rates and rhythm, normal S1 S2, no S3 or S4 and no murmur, click or rub     ABDOMEN:  soft, nontender, no HSM or masses and bowel sounds normal     MS: extremities normal- no gross deformities noted, no evidence of inflammation in joints, FROM in all extremities.     SKIN: no suspicious lesions or rashes     NEURO: Normal strength and tone, sensory exam grossly normal, mentation intact and speech normal     PSYCH: mentation appears normal. and affect normal/bright     LYMPHATICS: No  cervical adenopathy    Recent Labs   Lab Test 01/04/22  1637 12/21/21  1657 12/12/21  1624 10/19/21  1005 03/10/21  1607 11/02/20  1354   HGB 13.2* 12.9*   < >  --   --  12.4*    260   < >  --   --  208   INR  --   --   --   --   --  0.88    136   < >  --   --  138   POTASSIUM 4.1 4.2   < >  --   --  3.9   CR 0.90 1.01   < >  --   --  0.87   A1C  --   --   --  6.9* 7.1*  --     < > = values in this interval not displayed.        Diagnostics:  Recent Results (from the past 720 hour(s))   Glucose by meter    Collection Time: 12/14/21 12:48 PM   Result Value Ref Range    GLUCOSE BY METER POCT 147 (H) 70 - 99 mg/dL   Glucose by meter    Collection Time: 12/14/21  6:37 PM   Result Value Ref Range    GLUCOSE BY METER POCT 190 (H) 70 - 99 mg/dL   Glucose by meter    Collection Time: 12/14/21 10:29 PM   Result Value Ref Range    GLUCOSE BY METER POCT 192 (H) 70 - 99 mg/dL   Basic metabolic panel    Collection Time: 12/15/21  7:34 AM   Result Value Ref Range    Sodium 140 133 - 144 mmol/L    Potassium 4.0 3.4 - 5.3 mmol/L    Chloride 107 94 - 109 mmol/L    Carbon Dioxide (CO2) 26 20 - 32 mmol/L    Anion Gap 7 3 - 14 mmol/L    Urea Nitrogen 14 7 - 30 mg/dL    Creatinine 0.90 0.66 - 1.25 mg/dL    Calcium 8.8 8.5 - 10.1 mg/dL    Glucose 157 (H) 70 - 99 mg/dL    GFR Estimate >90 >60 mL/min/1.73m2   CBC with platelets and differential    Collection Time: 12/15/21  7:34 AM   Result Value Ref Range    WBC Count 4.6 4.0 - 11.0 10e3/uL    RBC Count 4.35 (L) 4.40 - 5.90 10e6/uL    Hemoglobin 12.8 (L) 13.3 - 17.7 g/dL    Hematocrit 37.3 (L) 40.0 - 53.0 %    MCV 86 78 - 100 fL    MCH 29.4 26.5 - 33.0 pg    MCHC 34.3 31.5 - 36.5 g/dL    RDW 12.6 10.0 - 15.0 %    Platelet Count 223 150 - 450 10e3/uL    % Neutrophils 37 %    % Lymphocytes 50 %    % Monocytes 7 %    % Eosinophils 5 %    % Basophils 1 %    % Immature Granulocytes 0 %    NRBCs per 100 WBC 0 <1 /100    Absolute Neutrophils 1.7 1.6 - 8.3 10e3/uL    Absolute  Lymphocytes 2.3 0.8 - 5.3 10e3/uL    Absolute Monocytes 0.3 0.0 - 1.3 10e3/uL    Absolute Eosinophils 0.2 0.0 - 0.7 10e3/uL    Absolute Basophils 0.0 0.0 - 0.2 10e3/uL    Absolute Immature Granulocytes 0.0 <=0.4 10e3/uL    Absolute NRBCs 0.0 10e3/uL   Glucose by meter    Collection Time: 12/15/21  7:34 AM   Result Value Ref Range    GLUCOSE BY METER POCT 136 (H) 70 - 99 mg/dL   EKG 12-lead, complete    Collection Time: 12/15/21 10:44 AM   Result Value Ref Range    Systolic Blood Pressure  mmHg    Diastolic Blood Pressure  mmHg    Ventricular Rate 100 BPM    Atrial Rate 100 BPM    ND Interval 166 ms    QRS Duration 92 ms     ms    QTc 469 ms    P Axis 37 degrees    R AXIS 51 degrees    T Axis 36 degrees    Interpretation ECG       Sinus rhythm  Nonspecific T wave abnormality  Abnormal ECG  When compared with ECG of 02-NOV-2020 17:33,  No significant change was found  Confirmed by Licha Méndez ([id number]) on 12/20/2021 5:41:35 PM     Glucose by meter    Collection Time: 12/15/21  1:32 PM   Result Value Ref Range    GLUCOSE BY METER POCT 251 (H) 70 - 99 mg/dL   Basic metabolic panel  (Ca, Cl, CO2, Creat, Gluc, K, Na, BUN)    Collection Time: 12/21/21  4:57 PM   Result Value Ref Range    Sodium 136 133 - 144 mmol/L    Potassium 4.2 3.4 - 5.3 mmol/L    Chloride 101 94 - 109 mmol/L    Carbon Dioxide (CO2) 28 20 - 32 mmol/L    Anion Gap 7 3 - 14 mmol/L    Urea Nitrogen 20 7 - 30 mg/dL    Creatinine 1.01 0.66 - 1.25 mg/dL    Calcium 9.3 8.5 - 10.1 mg/dL    Glucose 89 70 - 99 mg/dL    GFR Estimate >90 >60 mL/min/1.73m2   CRP, inflammation    Collection Time: 12/21/21  4:57 PM   Result Value Ref Range    CRP Inflammation <2.9 0.0 - 8.0 mg/L   CBC with platelets and differential    Collection Time: 12/21/21  4:57 PM   Result Value Ref Range    WBC Count 6.1 4.0 - 11.0 10e3/uL    RBC Count 4.55 4.40 - 5.90 10e6/uL    Hemoglobin 12.9 (L) 13.3 - 17.7 g/dL    Hematocrit 38.9 (L) 40.0 - 53.0 %    MCV 86 78 - 100 fL    MCH 28.4  26.5 - 33.0 pg    MCHC 33.2 31.5 - 36.5 g/dL    RDW 12.5 10.0 - 15.0 %    Platelet Count 260 150 - 450 10e3/uL    % Neutrophils 59 %    % Lymphocytes 33 %    % Monocytes 6 %    % Eosinophils 2 %    % Basophils 0 %    % Immature Granulocytes 0 %    Absolute Neutrophils 3.6 1.6 - 8.3 10e3/uL    Absolute Lymphocytes 2.0 0.8 - 5.3 10e3/uL    Absolute Monocytes 0.3 0.0 - 1.3 10e3/uL    Absolute Eosinophils 0.1 0.0 - 0.7 10e3/uL    Absolute Basophils 0.0 0.0 - 0.2 10e3/uL    Absolute Immature Granulocytes 0.0 <=0.4 10e3/uL   CRP, inflammation    Collection Time: 01/04/22  4:37 PM   Result Value Ref Range    CRP Inflammation <2.9 0.0 - 8.0 mg/L   Basic metabolic panel  (Ca, Cl, CO2, Creat, Gluc, K, Na, BUN)    Collection Time: 01/04/22  4:37 PM   Result Value Ref Range    Sodium 134 133 - 144 mmol/L    Potassium 4.1 3.4 - 5.3 mmol/L    Chloride 101 94 - 109 mmol/L    Carbon Dioxide (CO2) 28 20 - 32 mmol/L    Anion Gap 5 3 - 14 mmol/L    Urea Nitrogen 19 7 - 30 mg/dL    Creatinine 0.90 0.66 - 1.25 mg/dL    Calcium 9.0 8.5 - 10.1 mg/dL    Glucose 109 (H) 70 - 99 mg/dL    GFR Estimate >90 >60 mL/min/1.73m2   Hepatitis C Screen Reflex to HCV RNA Quant and Genotype    Collection Time: 01/04/22  4:37 PM   Result Value Ref Range    Hepatitis C Antibody Nonreactive Nonreactive   Albumin Random Urine Quantitative with Creat Ratio    Collection Time: 01/04/22  4:37 PM   Result Value Ref Range    Creatinine Urine mg/dL 117 mg/dL    Albumin Urine mg/L 10 mg/L    Albumin Urine mg/g Cr 8.55 0.00 - 17.00 mg/g Cr   CBC with platelets and differential    Collection Time: 01/04/22  4:37 PM   Result Value Ref Range    WBC Count 8.0 4.0 - 11.0 10e3/uL    RBC Count 4.61 4.40 - 5.90 10e6/uL    Hemoglobin 13.2 (L) 13.3 - 17.7 g/dL    Hematocrit 39.9 (L) 40.0 - 53.0 %    MCV 87 78 - 100 fL    MCH 28.6 26.5 - 33.0 pg    MCHC 33.1 31.5 - 36.5 g/dL    RDW 12.6 10.0 - 15.0 %    Platelet Count 244 150 - 450 10e3/uL    % Neutrophils 67 %    %  Lymphocytes 26 %    % Monocytes 5 %    % Eosinophils 2 %    % Basophils 0 %    % Immature Granulocytes 0 %    Absolute Neutrophils 5.3 1.6 - 8.3 10e3/uL    Absolute Lymphocytes 2.1 0.8 - 5.3 10e3/uL    Absolute Monocytes 0.4 0.0 - 1.3 10e3/uL    Absolute Eosinophils 0.1 0.0 - 0.7 10e3/uL    Absolute Basophils 0.0 0.0 - 0.2 10e3/uL    Absolute Immature Granulocytes 0.0 <=0.4 10e3/uL      No EKG required, no history of coronary heart disease, significant arrhythmia, peripheral arterial disease or other structural heart disease.    Revised Cardiac Risk Index (RCRI):  The patient has the following serious cardiovascular risks for perioperative complications:   - No serious cardiac risks = 0 points     RCRI Interpretation: 0 points: Class I (very low risk - 0.4% complication rate)      Signed Electronically by: WALESKA Sanchez CNP  Copy of this evaluation report is provided to requesting physician.

## 2022-01-12 NOTE — PATIENT INSTRUCTIONS
Preparing for Your Surgery  Getting started  A nurse will call you to review your health history and instructions. They will give you an arrival time based on your scheduled surgery time. Please be ready to share:    Your doctor's clinic name and phone number    Your medical, surgical and anesthesia history    A list of allergies and sensitivities    A list of medicines, including herbal treatments and over-the-counter drugs    Whether the patient has a legal guardian (ask how to send us the papers in advance)  Please tell us if you're pregnant--or if there's any chance you might be pregnant. Some surgeries may injure a fetus (unborn baby), so they require a pregnancy test. Surgeries that are safe for a fetus don't always need a test, and you can choose whether to have one.   If you have a child who's having surgery, please ask for a copy of Preparing for Your Child's Surgery.    Preparing for surgery    Within 30 days of surgery: Have a pre-op exam (sometimes called an H&P, or History and Physical). This can be done at a clinic or pre-operative center.  ? If you're having a , you may not need this exam. Talk to your care team.    At your pre-op exam, talk to your care team about all medicines you take. If you need to stop any medicines before surgery, ask when to start taking them again.  ? We do this for your safety. Many medicines can make you bleed too much during surgery. Some change how well surgery (anesthesia) drugs work.    Call your insurance company to let them know you're having surgery. (If you don't have insurance, call [phone].)    Call your clinic if there's any change in your health. This includes signs of a cold or flu (sore throat, runny nose, cough, rash, fever). It also includes a scrape or scratch near the surgery site.    If you have questions on the day of surgery, call your hospital or surgery center.  COVID testing  You may need to be tested for COVID-19 before having  surgery. If so, we will give you instructions.  Eating and drinking guidelines  For your safety: Unless your surgeon tells you otherwise, follow the guidelines below.    Eat and drink as usual until 8 hours before surgery. After that, no food or milk.    Drink clear liquids until 2 hours before surgery. These are liquids you can see through, like water, Gatorade and Propel Water. You may also have black coffee and tea (no cream or milk).    Nothing by mouth within 2 hours of surgery. This includes gum, candy and breath mints.    If you drink alcohol: Stop drinking it the night before surgery.    If your care team tells you to take medicine on the morning of surgery, it's okay to take it with a sip of water.  Preventing infection    Shower or bathe the night before and morning of your surgery. Follow the instructions your clinic gave you. (If no instructions, use regular soap.)    Don't shave or clip hair near your surgery site. We'll remove the hair if needed.    Don't smoke or vape the morning of surgery. You may chew nicotine gum up to 2 hours before surgery. A nicotine patch is okay.  ? Note: Some surgeries require you to completely quit smoking and nicotine. Check with your surgeon.    Your care team will make every effort to keep you safe from infection. We will:  ? Clean our hands often with soap and water (or an alcohol-based hand rub).  ? Clean the skin at your surgery site with a special soap that kills germs.  ? Give you a special gown to keep you warm. (Cold raises the risk of infection.)  ? Wear special hair covers, masks, gowns and gloves during surgery.  ? Give antibiotic medicine, if prescribed. Not all surgeries need antibiotics.  What to bring on the day of surgery    Photo ID and insurance card    Copy of your health care directive, if you have one    Glasses and hearing aides (bring cases)  ? You can't wear contacts during surgery    Inhaler and eye drops, if you use them (tell us about these when  you arrive)    CPAP machine or breathing device, if you use them    A few personal items, if spending the night    If you have . . .  ? A pacemaker, ICD (cardiac defibrillator) or other implant: Bring the ID card.  ? An implanted stimulator: Bring the remote control.  ? A legal guardian: Bring a copy of the certified (court-stamped) guardianship papers.  Please remove any jewelry, including body piercings. Leave jewelry and other valuables at home.  If you're going home the day of surgery    You must have a responsible adult drive you home. They should stay with you overnight as well.    If you don't have someone to stay with you, and you aren't safe to go home alone, we may keep you overnight. Insurance often won't pay for this.  After surgery  If it's hard to control your pain or you need more pain medicine, please call your surgeon's office.  Questions?   If you have any questions for your care team, list them here: _________________________________________________________________________________________________________________________________________________________________________ ____________________________________ ____________________________________ ____________________________________  For informational purposes only. Not to replace the advice of your health care provider. Copyright   2003, 2019 [facility]. All rights reserved. Clinically reviewed by Denise Medrano MD. [employer] [id number] - REV 07/21.    Preparing for Your Surgery  Getting started  A nurse will call you to review your health history and instructions. They will give you an arrival time based on your scheduled surgery time. Please be ready to share:    Your doctor's clinic name and phone number    Your medical, surgical and anesthesia history    A list of allergies and sensitivities    A list of medicines, including herbal treatments and over-the-counter drugs    Whether the patient has a legal guardian (ask how to send us the  papers in advance)  Please tell us if you're pregnant--or if there's any chance you might be pregnant. Some surgeries may injure a fetus (unborn baby), so they require a pregnancy test. Surgeries that are safe for a fetus don't always need a test, and you can choose whether to have one.   If you have a child who's having surgery, please ask for a copy of Preparing for Your Child's Surgery.    Preparing for surgery    Within 30 days of surgery: Have a pre-op exam (sometimes called an H&P, or History and Physical). This can be done at a clinic or pre-operative center.  ? If you're having a , you may not need this exam. Talk to your care team.    At your pre-op exam, talk to your care team about all medicines you take. If you need to stop any medicines before surgery, ask when to start taking them again.  ? We do this for your safety. Many medicines can make you bleed too much during surgery. Some change how well surgery (anesthesia) drugs work.    Call your insurance company to let them know you're having surgery. (If you don't have insurance, call [phone].)    Call your clinic if there's any change in your health. This includes signs of a cold or flu (sore throat, runny nose, cough, rash, fever). It also includes a scrape or scratch near the surgery site.    If you have questions on the day of surgery, call your hospital or surgery center.  COVID testing  You may need to be tested for COVID-19 before having surgery. If so, we will give you instructions.  Eating and drinking guidelines  For your safety: Unless your surgeon tells you otherwise, follow the guidelines below.    Eat and drink as usual until 8 hours before surgery. After that, no food or milk.    Drink clear liquids until 2 hours before surgery. These are liquids you can see through, like water, Gatorade and Propel Water. You may also have black coffee and tea (no cream or milk).    Nothing by mouth within 2 hours of surgery. This includes  gum, candy and breath mints.    If you drink alcohol: Stop drinking it the night before surgery.    If your care team tells you to take medicine on the morning of surgery, it's okay to take it with a sip of water.  Preventing infection    Shower or bathe the night before and morning of your surgery. Follow the instructions your clinic gave you. (If no instructions, use regular soap.)    Don't shave or clip hair near your surgery site. We'll remove the hair if needed.    Don't smoke or vape the morning of surgery. You may chew nicotine gum up to 2 hours before surgery. A nicotine patch is okay.  ? Note: Some surgeries require you to completely quit smoking and nicotine. Check with your surgeon.    Your care team will make every effort to keep you safe from infection. We will:  ? Clean our hands often with soap and water (or an alcohol-based hand rub).  ? Clean the skin at your surgery site with a special soap that kills germs.  ? Give you a special gown to keep you warm. (Cold raises the risk of infection.)  ? Wear special hair covers, masks, gowns and gloves during surgery.  ? Give antibiotic medicine, if prescribed. Not all surgeries need antibiotics.  What to bring on the day of surgery    Photo ID and insurance card    Copy of your health care directive, if you have one    Glasses and hearing aides (bring cases)  ? You can't wear contacts during surgery    Inhaler and eye drops, if you use them (tell us about these when you arrive)    CPAP machine or breathing device, if you use them    A few personal items, if spending the night    If you have . . .  ? A pacemaker, ICD (cardiac defibrillator) or other implant: Bring the ID card.  ? An implanted stimulator: Bring the remote control.  ? A legal guardian: Bring a copy of the certified (court-stamped) guardianship papers.  Please remove any jewelry, including body piercings. Leave jewelry and other valuables at home.  If you're going home the day of surgery    You  must have a responsible adult drive you home. They should stay with you overnight as well.    If you don't have someone to stay with you, and you aren't safe to go home alone, we may keep you overnight. Insurance often won't pay for this.  After surgery  If it's hard to control your pain or you need more pain medicine, please call your surgeon's office.  Questions?   If you have any questions for your care team, list them here: _________________________________________________________________________________________________________________________________________________________________________ ____________________________________ ____________________________________ ____________________________________  For informational purposes only. Not to replace the advice of your health care provider. Copyright   2003, 2019 [employer]. All rights reserved. Clinically reviewed by Denise Medrano MD. [employer] [id number] - REV 07/21.

## 2022-01-12 NOTE — H&P (VIEW-ONLY)
[facility] [address] [place] [address]  Phone: [phone]  Primary Provider: Km Dos Santos  Pre-op Performing Provider: REBECCA GONZALES    PREOPERATIVE EVALUATION:  Today's date: 1/12/2022    Phillip Rueda is a 41 year old male who presents for a preoperative evaluation.    Surgical Information:  Surgery/Procedure: BIOPSY, BONE, RIGHT GREAT TOE (DISTAL PHALANX)  Surgery Location: [facility]  Surgeon: Shelton Smallwood DPM  Surgery Date: 1/18/22  Time of Surgery: 9:00 AM  Where patient plans to recover: At home with family  Fax number for surgical facility: Note does not need to be faxed, will be available electronically in Epic.    Type of Anesthesia Anticipated: Local with MAC    Assessment & Plan     The proposed surgical procedure is considered LOW risk.    Preop general physical exam  Patient seen today for preop physical. No health concerns, illness or mental health reservations present today. Patient medically cleared for surgical procedure.    Acute osteomyelitis of toe, right (H)  completed oral antibiotic regimen for suppression  Plan: scheduled bone biopsy on 1/18/2022    Buprenorphine dependence (H)  Stable; no changes    Recurrent major depression in remission (H)  Stable on Effexor; no SI, intent or plan  - no changes    Diabetic polyneuropathy associated with type 2 diabetes mellitus (H)  Stable; A1C stable 6.9%; transitioning to Ozempic from Victoza; metformin 2 gm daily  - no changes    Risks and Recommendations:  The patient has the following additional risks and recommendations for perioperative complications:  Diabetes:  - Patient is not on insulin therapy: regular NPO guidelines can be followed.     Medication Instructions:   - metformin: HOLD day of surgery.   - GLP-1 Injectable (exenitide, liraglutide, semaglutide, dulaglutide, etc.): HOLD day of surgery    - pregabalin, gabapentin: Continue without  modification.   - ibuprofen (Advil, Motrin): HOLD 1 day before surgery.     RECOMMENDATION:  APPROVAL GIVEN to proceed with proposed procedure, without further diagnostic evaluation.    Review of external notes as documented above   Independent interpretation of a test performed by another physician/other qualified health care professional (not separately reported) -           Subjective     HPI related to upcoming procedure:   41 year old male with PMH of DM II, HTN, Osteomyelitis of right great toe in clinic today for preop evaluation for bone biopsy of right distal phalanx due to history of cellulitis. Wound history per previous Podiatry visit follow-up regarding a right great toe infection in the setting of diabetes mellitus with peripheral neuropathy.  He was hospitalized at the [facility] 12/2021.  X-rays and an MRI were suggestive of osteomyelitis of the right great toe distal phalanx.  There was initial discussion of doing a biopsy of the bone but this was not done.  He was discharged on Levaquin, doxycycline and metronidazole for osteomyelitis suppression.  His right great toenail was removed during the hospitalization. He denies any health concerns today which would prevent surgical procedure. Patient is low cardiac risk.       Preop Questions 1/12/2022   1. Have you ever had a heart attack or stroke? No   2. Have you ever had surgery on your heart or blood vessels, such as a stent placement, a coronary artery bypass, or surgery on an artery in your head, neck, heart, or legs? No   3. Do you have chest pain with activity? No   4. Do you have a history of  heart failure? No   5. Do you currently have a cold, bronchitis or symptoms of other infection? No   6. Do you have a cough, shortness of breath, or wheezing? No   7. Do you or anyone in your family have previous history of blood clots? UNKNOWN -    8. Do you or does anyone in your family have a serious bleeding problem such as  prolonged bleeding following surgeries or cuts? No   9. Have you ever had problems with anemia or been told to take iron pills? No   10. Have you had any abnormal blood loss such as black, tarry or bloody stools? No   11. Have you ever had a blood transfusion? No   12. Are you willing to have a blood transfusion if it is medically needed before, during, or after your surgery? Yes   13. Have you or any of your relatives ever had problems with anesthesia? No   14. Do you have sleep apnea, excessive snoring or daytime drowsiness? No   15. Do you have any artifical heart valves or other implanted medical devices like a pacemaker, defibrillator, or continuous glucose monitor? No   16. Do you have artificial joints? No   17. Are you allergic to latex? No     Health Care Directive:  Patient does not have a Health Care Directive or Living Will: Discussed advance care planning with patient; however, patient declined at this time.    Preoperative Review of :   reviewed - no record of controlled substances prescribed.      Status of Chronic Conditions:  ANEMIA - Patient has a recent history of moderate-severe anemia, which has not been symptomatic. Work up to date has revealed stable anemia of chronic disease. Treatment has been close monitoring.     DIABETES - Patient has a longstanding history of DiabetesType Type II . Patient is being treated with diet, oral agents and GLP1 and denies significant side effects. Control has been good. Complicating factors include but are not limited to: hypertension and foot ulcer.     HYPERTENSION - Patient has longstanding history of HTN , currently denies any symptoms referable to elevated blood pressure. Specifically denies chest pain, palpitations, dyspnea, orthopnea, PND or peripheral edema. Blood pressure readings have been in normal range. Current medication regimen is as listed below. Patient denies any side effects of medication.       Review of Systems  CONSTITUTIONAL:  NEGATIVE for fever, chills, change in weight  INTEGUMENTARY/SKIN: NEGATIVE for worrisome rashes, moles or lesions  EYES: NEGATIVE for vision changes or irritation  ENT/MOUTH: NEGATIVE for ear, mouth and throat problems  RESP: NEGATIVE for significant cough or SOB  CV: NEGATIVE for chest pain, palpitations or peripheral edema  GI: NEGATIVE for nausea, abdominal pain, heartburn, or change in bowel habits  : NEGATIVE for frequency, dysuria, or hematuria  MUSCULOSKELETAL: NEGATIVE for significant arthralgias or myalgia  NEURO: NEGATIVE for weakness, dizziness or paresthesias  ENDOCRINE: NEGATIVE for temperature intolerance, skin/hair changes  HEME: NEGATIVE for bleeding problems  PSYCHIATRIC: NEGATIVE for changes in mood or affect    Patient Active Problem List    Diagnosis Date Noted     Cellulitis of great toe of right foot 12/12/2021     Priority: Medium     Other acute osteomyelitis of right foot (H) 12/12/2021     Priority: Medium     Tibialis posterior tendinopathy 03/26/2021     Priority: Medium     Added automatically from request for surgery [id number]       Acquired flat foot, left 03/26/2021     Priority: Medium     Added automatically from request for surgery [id number]       Chronic pain of left ankle 03/26/2021     Priority: Medium     Added automatically from request for surgery [id number]       Diabetic polyneuropathy associated with type 2 diabetes mellitus (H) 11/06/2020     Priority: Medium     Former smoker 06/02/2017     Priority: Medium     Health Care Home 12/07/2016     Priority: Medium     Pain disorder 12/08/2015     Priority: Medium     ED (erectile dysfunction) 08/11/2015     Priority: Medium     Right shoulder pain 05/11/2015     Priority: Medium     CARDIOVASCULAR SCREENING; LDL GOAL LESS THAN 100 02/11/2015     Priority: Medium     Type 2 diabetes mellitus without complication, without long-term current use of insulin (H) 02/04/2015     Priority: Medium     Hypertension goal BP (blood pressure) <  140/90 02/04/2015     Priority: Medium     Anemia 02/04/2015     Priority: Medium     Drug dependence, in remission (H) 04/08/2013     Priority: Medium     Problem list name updated by automated process. 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      Prostate infection      Seizures (H)      Staph infection      Past Surgical History:   Procedure Laterality Date     ORTHOPEDIC SURGERY       Current Outpatient Medications   Medication Sig Dispense Refill     acetaminophen (TYLENOL) 325 MG tablet Take 3 tablets (975 mg) by mouth every 6 hours       atorvastatin (LIPITOR) 20 MG tablet TAKE ONE TABLET BY MOUTH ONCE DAILY (Patient taking differently: Take 20 mg by mouth every evening ) 90 tablet 0     Buprenorphine HCl-Naloxone HCl (SUBOXONE) 4-1 MG film Place 1 Film under the tongue 2 times daily        Continuous Blood Gluc Sensor (FREESTYLE JHON 2 SENSOR) [facility] 1 each continuous CHANGE EVERY 14 DAYS 2 each 5     continuous blood glucose monitoring (FREESTYLE JHON) sensor For use with Freestyle Jhon Flash  for continuous monitioring of blood glucose levels. Replace sensor every 14 days. 6 each 3     diazepam (VALIUM) 5 MG tablet Take 1 tablet (5 mg) 3 times daily as needed. 60 tablet      doxycycline hyclate (VIBRAMYCIN) 100 MG capsule Take 1 capsule (100 mg) by mouth every 12 hours 84 capsule 0     gabapentin (NEURONTIN) 600 MG tablet Take 2 tablets (1,200 mg) by mouth 3 times daily (Patient taking differently: Take 600 mg by mouth 2 times daily )       ibuprofen (ADVIL,MOTRIN) 200 MG tablet  Take 1-2 tablets by mouth every 6 hours as needed.       levofloxacin (LEVAQUIN) 750 MG tablet Take 1 tablet (750 mg) by mouth daily 42 tablet 0     metFORMIN (GLUCOPHAGE-XR) 500 MG 24 hr tablet TAKE TWO TABLETS BY MOUTH TWICE A DAY WITH MEALS. 120 tablet 0     metroNIDAZOLE (FLAGYL) 500 MG tablet Take 1 tablet (500 mg) by mouth 3 times daily 126 tablet 0     nortriptyline (PAMELOR) 25 MG capsule TAKE 2 CAPSULES(50 MG) BY MOUTH AT BEDTIME (Patient taking differently: Take 25 mg by mouth At Bedtime ) 60 capsule 1     sildenafil (VIAGRA) 100 MG tablet Take 1/2 to 1 pill as needed for erectile dysfunction. 5 tablet 5     venlafaxine (EFFEXOR-ER) 150 MG TB24 Take 1 tablet by mouth daily (with breakfast). 30 each 0     ACCU-CHEK GUIDE test strip USE TO TEST BLOOD SUGAR TWO TIMES A DAY OR AS DIRECTED 200 strip 1     blood glucose (NO BRAND SPECIFIED) lancets standard Use to test blood sugar 2 times daily or as directed. 100 each 1     blood glucose monitoring (NO BRAND SPECIFIED) meter device kit Use to test blood sugar 2 times daily or as directed. 1 kit 0     Continuous Blood Gluc  (FREESTYLE JHON 2 READER) JESSI 1 each continuous USE TO READ BLOOD SUGARS PER  INSTRUCTIONS 1 each 0     EPINEPHrine (EPIPEN) 0.3 MG/0.3ML injection Inject 0.3 mLs (0.3 mg) into the muscle once as needed for anaphylaxis 1 each 2     insulin lispro (HUMALOG KWIKPEN) 100 UNIT/ML (1 unit dial) KWIKPEN Take it with steroid use as needed. Maximum 10 units per day (Patient not taking: Reported on 1/12/2022) 15 mL 0     insulin pen needle 31G X 6 MM Use 2 daily or as directed. 100 each 11     polyethylene glycol (MIRALAX) 17 GM/Dose powder Take 17 g by mouth 2 times daily (Patient not taking: Reported on 1/12/2022) 510 g      semaglutide (OZEMPIC, 0.25 OR 0.5 MG/DOSE,) 2 MG/1.5ML SOPN pen Inject 0.5 mg Subcutaneous every 7 days (Patient not taking: Reported on 1/12/2022) 4.5 mL 1     sennosides (SENOKOT) 8.6 MG tablet Take 2  tablets by mouth 2 times daily as needed for constipation (Patient not taking: Reported on 2022)         Allergies   Allergen Reactions     Bees Swelling     Bupropion Hcl Other (See Comments)     seizure     Lisinopril Cough     Penicillins Other (See Comments)     Unable to close mouth  Tolerated cefazolin (21)        Social History     Tobacco Use     Smoking status: Former Smoker     Packs/day: 0.25     Years: 15.00     Pack years: 3.75     Types: Cigarettes, Dip, chew, snus or snuff     Start date: 1996     Quit date: 2013     Years since quittin.5     Smokeless tobacco: Former User   Substance Use Topics     Alcohol use: No     Alcohol/week: 0.0 standard drinks     Comment: 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  Drink 1.75 every 2 days     Family History   Problem Relation Age of Onset     Depression Mother      Anxiety Disorder Mother      Substance Abuse Maternal Grandfather      Anxiety Disorder Brother      Glaucoma No family hx of      Macular Degeneration No family hx of      History   Drug Use No         Objective     There were no vitals taken for this visit.    Physical Exam    GENERAL APPEARANCE: healthy, alert and no distress     EYES: EOMI,  PERRL     HENT: ear canals and TM's normal and nose and mouth without ulcers or lesions     NECK: no adenopathy, no asymmetry, masses, or scars and thyroid normal to palpation     RESP: lungs clear to auscultation - no rales, rhonchi or wheezes     CV: regular rates and rhythm, normal S1 S2, no S3 or S4 and no murmur, click or rub     ABDOMEN:  soft, nontender, no HSM or masses and bowel sounds normal     MS: extremities normal- no gross deformities noted, no evidence of inflammation in joints, FROM in all extremities.     SKIN: no suspicious lesions or rashes     NEURO: Normal strength and tone, sensory exam grossly normal, mentation intact and speech normal     PSYCH: mentation appears normal. and affect normal/bright     LYMPHATICS: No  cervical adenopathy    Recent Labs   Lab Test 01/04/22  1637 12/21/21  1657 12/12/21  1624 10/19/21  1005 03/10/21  1607 11/02/20  1354   HGB 13.2* 12.9*   < >  --   --  12.4*    260   < >  --   --  208   INR  --   --   --   --   --  0.88    136   < >  --   --  138   POTASSIUM 4.1 4.2   < >  --   --  3.9   CR 0.90 1.01   < >  --   --  0.87   A1C  --   --   --  6.9* 7.1*  --     < > = values in this interval not displayed.        Diagnostics:  Recent Results (from the past 720 hour(s))   Glucose by meter    Collection Time: 12/14/21 12:48 PM   Result Value Ref Range    GLUCOSE BY METER POCT 147 (H) 70 - 99 mg/dL   Glucose by meter    Collection Time: 12/14/21  6:37 PM   Result Value Ref Range    GLUCOSE BY METER POCT 190 (H) 70 - 99 mg/dL   Glucose by meter    Collection Time: 12/14/21 10:29 PM   Result Value Ref Range    GLUCOSE BY METER POCT 192 (H) 70 - 99 mg/dL   Basic metabolic panel    Collection Time: 12/15/21  7:34 AM   Result Value Ref Range    Sodium 140 133 - 144 mmol/L    Potassium 4.0 3.4 - 5.3 mmol/L    Chloride 107 94 - 109 mmol/L    Carbon Dioxide (CO2) 26 20 - 32 mmol/L    Anion Gap 7 3 - 14 mmol/L    Urea Nitrogen 14 7 - 30 mg/dL    Creatinine 0.90 0.66 - 1.25 mg/dL    Calcium 8.8 8.5 - 10.1 mg/dL    Glucose 157 (H) 70 - 99 mg/dL    GFR Estimate >90 >60 mL/min/1.73m2   CBC with platelets and differential    Collection Time: 12/15/21  7:34 AM   Result Value Ref Range    WBC Count 4.6 4.0 - 11.0 10e3/uL    RBC Count 4.35 (L) 4.40 - 5.90 10e6/uL    Hemoglobin 12.8 (L) 13.3 - 17.7 g/dL    Hematocrit 37.3 (L) 40.0 - 53.0 %    MCV 86 78 - 100 fL    MCH 29.4 26.5 - 33.0 pg    MCHC 34.3 31.5 - 36.5 g/dL    RDW 12.6 10.0 - 15.0 %    Platelet Count 223 150 - 450 10e3/uL    % Neutrophils 37 %    % Lymphocytes 50 %    % Monocytes 7 %    % Eosinophils 5 %    % Basophils 1 %    % Immature Granulocytes 0 %    NRBCs per 100 WBC 0 <1 /100    Absolute Neutrophils 1.7 1.6 - 8.3 10e3/uL    Absolute  Lymphocytes 2.3 0.8 - 5.3 10e3/uL    Absolute Monocytes 0.3 0.0 - 1.3 10e3/uL    Absolute Eosinophils 0.2 0.0 - 0.7 10e3/uL    Absolute Basophils 0.0 0.0 - 0.2 10e3/uL    Absolute Immature Granulocytes 0.0 <=0.4 10e3/uL    Absolute NRBCs 0.0 10e3/uL   Glucose by meter    Collection Time: 12/15/21  7:34 AM   Result Value Ref Range    GLUCOSE BY METER POCT 136 (H) 70 - 99 mg/dL   EKG 12-lead, complete    Collection Time: 12/15/21 10:44 AM   Result Value Ref Range    Systolic Blood Pressure  mmHg    Diastolic Blood Pressure  mmHg    Ventricular Rate 100 BPM    Atrial Rate 100 BPM    NM Interval 166 ms    QRS Duration 92 ms     ms    QTc 469 ms    P Axis 37 degrees    R AXIS 51 degrees    T Axis 36 degrees    Interpretation ECG       Sinus rhythm  Nonspecific T wave abnormality  Abnormal ECG  When compared with ECG of 02-NOV-2020 17:33,  No significant change was found  Confirmed by Licha Méndez ([id number]) on 12/20/2021 5:41:35 PM     Glucose by meter    Collection Time: 12/15/21  1:32 PM   Result Value Ref Range    GLUCOSE BY METER POCT 251 (H) 70 - 99 mg/dL   Basic metabolic panel  (Ca, Cl, CO2, Creat, Gluc, K, Na, BUN)    Collection Time: 12/21/21  4:57 PM   Result Value Ref Range    Sodium 136 133 - 144 mmol/L    Potassium 4.2 3.4 - 5.3 mmol/L    Chloride 101 94 - 109 mmol/L    Carbon Dioxide (CO2) 28 20 - 32 mmol/L    Anion Gap 7 3 - 14 mmol/L    Urea Nitrogen 20 7 - 30 mg/dL    Creatinine 1.01 0.66 - 1.25 mg/dL    Calcium 9.3 8.5 - 10.1 mg/dL    Glucose 89 70 - 99 mg/dL    GFR Estimate >90 >60 mL/min/1.73m2   CRP, inflammation    Collection Time: 12/21/21  4:57 PM   Result Value Ref Range    CRP Inflammation <2.9 0.0 - 8.0 mg/L   CBC with platelets and differential    Collection Time: 12/21/21  4:57 PM   Result Value Ref Range    WBC Count 6.1 4.0 - 11.0 10e3/uL    RBC Count 4.55 4.40 - 5.90 10e6/uL    Hemoglobin 12.9 (L) 13.3 - 17.7 g/dL    Hematocrit 38.9 (L) 40.0 - 53.0 %    MCV 86 78 - 100 fL    MCH 28.4  26.5 - 33.0 pg    MCHC 33.2 31.5 - 36.5 g/dL    RDW 12.5 10.0 - 15.0 %    Platelet Count 260 150 - 450 10e3/uL    % Neutrophils 59 %    % Lymphocytes 33 %    % Monocytes 6 %    % Eosinophils 2 %    % Basophils 0 %    % Immature Granulocytes 0 %    Absolute Neutrophils 3.6 1.6 - 8.3 10e3/uL    Absolute Lymphocytes 2.0 0.8 - 5.3 10e3/uL    Absolute Monocytes 0.3 0.0 - 1.3 10e3/uL    Absolute Eosinophils 0.1 0.0 - 0.7 10e3/uL    Absolute Basophils 0.0 0.0 - 0.2 10e3/uL    Absolute Immature Granulocytes 0.0 <=0.4 10e3/uL   CRP, inflammation    Collection Time: 01/04/22  4:37 PM   Result Value Ref Range    CRP Inflammation <2.9 0.0 - 8.0 mg/L   Basic metabolic panel  (Ca, Cl, CO2, Creat, Gluc, K, Na, BUN)    Collection Time: 01/04/22  4:37 PM   Result Value Ref Range    Sodium 134 133 - 144 mmol/L    Potassium 4.1 3.4 - 5.3 mmol/L    Chloride 101 94 - 109 mmol/L    Carbon Dioxide (CO2) 28 20 - 32 mmol/L    Anion Gap 5 3 - 14 mmol/L    Urea Nitrogen 19 7 - 30 mg/dL    Creatinine 0.90 0.66 - 1.25 mg/dL    Calcium 9.0 8.5 - 10.1 mg/dL    Glucose 109 (H) 70 - 99 mg/dL    GFR Estimate >90 >60 mL/min/1.73m2   Hepatitis C Screen Reflex to HCV RNA Quant and Genotype    Collection Time: 01/04/22  4:37 PM   Result Value Ref Range    Hepatitis C Antibody Nonreactive Nonreactive   Albumin Random Urine Quantitative with Creat Ratio    Collection Time: 01/04/22  4:37 PM   Result Value Ref Range    Creatinine Urine mg/dL 117 mg/dL    Albumin Urine mg/L 10 mg/L    Albumin Urine mg/g Cr 8.55 0.00 - 17.00 mg/g Cr   CBC with platelets and differential    Collection Time: 01/04/22  4:37 PM   Result Value Ref Range    WBC Count 8.0 4.0 - 11.0 10e3/uL    RBC Count 4.61 4.40 - 5.90 10e6/uL    Hemoglobin 13.2 (L) 13.3 - 17.7 g/dL    Hematocrit 39.9 (L) 40.0 - 53.0 %    MCV 87 78 - 100 fL    MCH 28.6 26.5 - 33.0 pg    MCHC 33.1 31.5 - 36.5 g/dL    RDW 12.6 10.0 - 15.0 %    Platelet Count 244 150 - 450 10e3/uL    % Neutrophils 67 %    %  Lymphocytes 26 %    % Monocytes 5 %    % Eosinophils 2 %    % Basophils 0 %    % Immature Granulocytes 0 %    Absolute Neutrophils 5.3 1.6 - 8.3 10e3/uL    Absolute Lymphocytes 2.1 0.8 - 5.3 10e3/uL    Absolute Monocytes 0.4 0.0 - 1.3 10e3/uL    Absolute Eosinophils 0.1 0.0 - 0.7 10e3/uL    Absolute Basophils 0.0 0.0 - 0.2 10e3/uL    Absolute Immature Granulocytes 0.0 <=0.4 10e3/uL      No EKG required, no history of coronary heart disease, significant arrhythmia, peripheral arterial disease or other structural heart disease.    Revised Cardiac Risk Index (RCRI):  The patient has the following serious cardiovascular risks for perioperative complications:   - No serious cardiac risks = 0 points     RCRI Interpretation: 0 points: Class I (very low risk - 0.4% complication rate)      Signed Electronically by: WALESKA Sanchez CNP  Copy of this evaluation report is provided to requesting physician.

## 2022-01-13 PROBLEM — F33.40 RECURRENT MAJOR DEPRESSION IN REMISSION (H): Status: ACTIVE | Noted: 2022-01-13

## 2022-01-14 ENCOUNTER — LAB (OUTPATIENT)
Dept: LAB | Facility: CLINIC | Age: 42
End: 2022-01-14
Attending: PODIATRIST
Payer: COMMERCIAL

## 2022-01-14 DIAGNOSIS — Z11.59 ENCOUNTER FOR SCREENING FOR OTHER VIRAL DISEASES: ICD-10-CM

## 2022-01-14 PROCEDURE — U0005 INFEC AGEN DETEC AMPLI PROBE: HCPCS

## 2022-01-14 PROCEDURE — U0003 INFECTIOUS AGENT DETECTION BY NUCLEIC ACID (DNA OR RNA); SEVERE ACUTE RESPIRATORY SYNDROME CORONAVIRUS 2 (SARS-COV-2) (CORONAVIRUS DISEASE [COVID-19]), AMPLIFIED PROBE TECHNIQUE, MAKING USE OF HIGH THROUGHPUT TECHNOLOGIES AS DESCRIBED BY CMS-2020-01-R: HCPCS

## 2022-01-15 LAB — SARS-COV-2 RNA RESP QL NAA+PROBE: NEGATIVE

## 2022-01-17 ENCOUNTER — ANESTHESIA EVENT (OUTPATIENT)
Dept: SURGERY | Facility: CLINIC | Age: 42
End: 2022-01-17
Payer: COMMERCIAL

## 2022-01-18 ENCOUNTER — APPOINTMENT (OUTPATIENT)
Dept: GENERAL RADIOLOGY | Facility: CLINIC | Age: 42
End: 2022-01-18
Attending: PODIATRIST
Payer: COMMERCIAL

## 2022-01-18 ENCOUNTER — HOSPITAL ENCOUNTER (OUTPATIENT)
Facility: CLINIC | Age: 42
Discharge: HOME | End: 2022-01-18
Attending: PODIATRIST | Admitting: PODIATRIST
Payer: COMMERCIAL

## 2022-01-18 ENCOUNTER — ANESTHESIA (OUTPATIENT)
Dept: SURGERY | Facility: CLINIC | Age: 42
End: 2022-01-18
Payer: COMMERCIAL

## 2022-01-18 VITALS
DIASTOLIC BLOOD PRESSURE: 85 MMHG | BODY MASS INDEX: 23.4 KG/M2 | SYSTOLIC BLOOD PRESSURE: 121 MMHG | TEMPERATURE: 97.5 F | HEART RATE: 74 BPM | RESPIRATION RATE: 16 BRPM | HEIGHT: 68 IN | OXYGEN SATURATION: 98 % | WEIGHT: 154.4 LBS

## 2022-01-18 DIAGNOSIS — G89.18 POST-OPERATIVE PAIN: Primary | ICD-10-CM

## 2022-01-18 LAB — GLUCOSE BLDC GLUCOMTR-MCNC: 85 MG/DL (ref 70–99)

## 2022-01-18 PROCEDURE — 258N000003 HC RX IP 258 OP 636: Performed by: ANESTHESIOLOGY

## 2022-01-18 PROCEDURE — 360N000082 HC SURGERY LEVEL 2 W/ FLUORO, PER MIN: Performed by: PODIATRIST

## 2022-01-18 PROCEDURE — 272N000001 HC OR GENERAL SUPPLY STERILE: Performed by: PODIATRIST

## 2022-01-18 PROCEDURE — 370N000017 HC ANESTHESIA TECHNICAL FEE, PER MIN: Performed by: PODIATRIST

## 2022-01-18 PROCEDURE — 250N000009 HC RX 250: Performed by: PODIATRIST

## 2022-01-18 PROCEDURE — 250N000011 HC RX IP 250 OP 636: Performed by: NURSE ANESTHETIST, CERTIFIED REGISTERED

## 2022-01-18 PROCEDURE — 999N000141 HC STATISTIC PRE-PROCEDURE NURSING ASSESSMENT: Performed by: PODIATRIST

## 2022-01-18 PROCEDURE — 87070 CULTURE OTHR SPECIMN AEROBIC: CPT | Performed by: PODIATRIST

## 2022-01-18 PROCEDURE — 999N000063 XR FOOT PORT RIGHT 3 VIEWS: Mod: RT

## 2022-01-18 PROCEDURE — 250N000009 HC RX 250: Performed by: NURSE ANESTHETIST, CERTIFIED REGISTERED

## 2022-01-18 PROCEDURE — 710N000009 HC RECOVERY PHASE 1, LEVEL 1, PER MIN: Performed by: PODIATRIST

## 2022-01-18 PROCEDURE — 82962 GLUCOSE BLOOD TEST: CPT

## 2022-01-18 PROCEDURE — 250N000011 HC RX IP 250 OP 636: Performed by: ANESTHESIOLOGY

## 2022-01-18 PROCEDURE — 710N000012 HC RECOVERY PHASE 2, PER MINUTE: Performed by: PODIATRIST

## 2022-01-18 PROCEDURE — 999N000179 XR SURGERY CARM FLUORO LESS THAN 5 MIN W STILLS

## 2022-01-18 PROCEDURE — 250N000011 HC RX IP 250 OP 636: Performed by: PODIATRIST

## 2022-01-18 PROCEDURE — 20240 BONE BIOPSY OPEN SUPERFICIAL: CPT | Mod: T5 | Performed by: PODIATRIST

## 2022-01-18 PROCEDURE — 87075 CULTR BACTERIA EXCEPT BLOOD: CPT | Performed by: PODIATRIST

## 2022-01-18 PROCEDURE — 87077 CULTURE AEROBIC IDENTIFY: CPT | Performed by: PODIATRIST

## 2022-01-18 PROCEDURE — 88311 DECALCIFY TISSUE: CPT | Mod: TC | Performed by: PODIATRIST

## 2022-01-18 RX ORDER — OXYCODONE HYDROCHLORIDE 5 MG/1
5 TABLET ORAL EVERY 4 HOURS PRN
Status: DISCONTINUED | OUTPATIENT
Start: 2022-01-18 | End: 2022-01-18 | Stop reason: HOSPADM

## 2022-01-18 RX ORDER — CLINDAMYCIN PHOSPHATE 900 MG/50ML
900 INJECTION, SOLUTION INTRAVENOUS SEE ADMIN INSTRUCTIONS
Status: DISCONTINUED | OUTPATIENT
Start: 2022-01-18 | End: 2022-01-18 | Stop reason: HOSPADM

## 2022-01-18 RX ORDER — SODIUM CHLORIDE, SODIUM LACTATE, POTASSIUM CHLORIDE, CALCIUM CHLORIDE 600; 310; 30; 20 MG/100ML; MG/100ML; MG/100ML; MG/100ML
INJECTION, SOLUTION INTRAVENOUS CONTINUOUS
Status: DISCONTINUED | OUTPATIENT
Start: 2022-01-18 | End: 2022-01-18 | Stop reason: HOSPADM

## 2022-01-18 RX ORDER — CLINDAMYCIN PHOSPHATE 900 MG/50ML
900 INJECTION, SOLUTION INTRAVENOUS
Status: DISCONTINUED | OUTPATIENT
Start: 2022-01-18 | End: 2022-01-18 | Stop reason: HOSPADM

## 2022-01-18 RX ORDER — LIDOCAINE 40 MG/G
CREAM TOPICAL
Status: DISCONTINUED | OUTPATIENT
Start: 2022-01-18 | End: 2022-01-18 | Stop reason: HOSPADM

## 2022-01-18 RX ORDER — FENTANYL CITRATE 0.05 MG/ML
50 INJECTION, SOLUTION INTRAMUSCULAR; INTRAVENOUS
Status: DISCONTINUED | OUTPATIENT
Start: 2022-01-18 | End: 2022-01-18 | Stop reason: HOSPADM

## 2022-01-18 RX ORDER — HYDROMORPHONE HCL IN WATER/PF 6 MG/30 ML
0.4 PATIENT CONTROLLED ANALGESIA SYRINGE INTRAVENOUS EVERY 5 MIN PRN
Status: DISCONTINUED | OUTPATIENT
Start: 2022-01-18 | End: 2022-01-18 | Stop reason: HOSPADM

## 2022-01-18 RX ORDER — LIDOCAINE HYDROCHLORIDE 20 MG/ML
INJECTION, SOLUTION INFILTRATION; PERINEURAL PRN
Status: DISCONTINUED | OUTPATIENT
Start: 2022-01-18 | End: 2022-01-18

## 2022-01-18 RX ORDER — ONDANSETRON 4 MG/1
4 TABLET, ORALLY DISINTEGRATING ORAL EVERY 30 MIN PRN
Status: DISCONTINUED | OUTPATIENT
Start: 2022-01-18 | End: 2022-01-18 | Stop reason: HOSPADM

## 2022-01-18 RX ORDER — BUPIVACAINE HYDROCHLORIDE 5 MG/ML
INJECTION, SOLUTION PERINEURAL PRN
Status: DISCONTINUED | OUTPATIENT
Start: 2022-01-18 | End: 2022-01-18 | Stop reason: HOSPADM

## 2022-01-18 RX ORDER — FENTANYL CITRATE 50 UG/ML
INJECTION, SOLUTION INTRAMUSCULAR; INTRAVENOUS PRN
Status: DISCONTINUED | OUTPATIENT
Start: 2022-01-18 | End: 2022-01-18

## 2022-01-18 RX ORDER — KETAMINE HYDROCHLORIDE 10 MG/ML
INJECTION INTRAMUSCULAR; INTRAVENOUS PRN
Status: DISCONTINUED | OUTPATIENT
Start: 2022-01-18 | End: 2022-01-18

## 2022-01-18 RX ORDER — ONDANSETRON 2 MG/ML
4 INJECTION INTRAMUSCULAR; INTRAVENOUS EVERY 30 MIN PRN
Status: DISCONTINUED | OUTPATIENT
Start: 2022-01-18 | End: 2022-01-18 | Stop reason: HOSPADM

## 2022-01-18 RX ORDER — DEXTROSE MONOHYDRATE 25 G/50ML
25 INJECTION, SOLUTION INTRAVENOUS
Status: DISCONTINUED | OUTPATIENT
Start: 2022-01-18 | End: 2022-01-18 | Stop reason: HOSPADM

## 2022-01-18 RX ORDER — ACETAMINOPHEN 500 MG
500-1000 TABLET ORAL EVERY 8 HOURS PRN
Qty: 30 TABLET | Refills: 0 | Status: SHIPPED | OUTPATIENT
Start: 2022-01-18 | End: 2022-03-15

## 2022-01-18 RX ORDER — FENTANYL CITRATE 0.05 MG/ML
50 INJECTION, SOLUTION INTRAMUSCULAR; INTRAVENOUS EVERY 5 MIN PRN
Status: DISCONTINUED | OUTPATIENT
Start: 2022-01-18 | End: 2022-01-18 | Stop reason: HOSPADM

## 2022-01-18 RX ORDER — IBUPROFEN 600 MG/1
600 TABLET, FILM COATED ORAL EVERY 6 HOURS PRN
Qty: 20 TABLET | Refills: 1 | Status: SHIPPED | OUTPATIENT
Start: 2022-01-18 | End: 2022-03-15

## 2022-01-18 RX ORDER — PROPOFOL 10 MG/ML
INJECTION, EMULSION INTRAVENOUS CONTINUOUS PRN
Status: DISCONTINUED | OUTPATIENT
Start: 2022-01-18 | End: 2022-01-18

## 2022-01-18 RX ORDER — ONDANSETRON 2 MG/ML
INJECTION INTRAMUSCULAR; INTRAVENOUS PRN
Status: DISCONTINUED | OUTPATIENT
Start: 2022-01-18 | End: 2022-01-18

## 2022-01-18 RX ADMIN — CLINDAMYCIN PHOSPHATE 900 MG: 900 INJECTION, SOLUTION INTRAVENOUS at 08:05

## 2022-01-18 RX ADMIN — MIDAZOLAM 2 MG: 1 INJECTION INTRAMUSCULAR; INTRAVENOUS at 08:55

## 2022-01-18 RX ADMIN — PROPOFOL 150 MCG/KG/MIN: 10 INJECTION, EMULSION INTRAVENOUS at 08:55

## 2022-01-18 RX ADMIN — LIDOCAINE HYDROCHLORIDE 60 MG: 20 INJECTION, SOLUTION INFILTRATION; PERINEURAL at 08:55

## 2022-01-18 RX ADMIN — FENTANYL CITRATE 50 MCG: 50 INJECTION, SOLUTION INTRAMUSCULAR; INTRAVENOUS at 08:55

## 2022-01-18 RX ADMIN — Medication 30 MG: at 08:54

## 2022-01-18 RX ADMIN — SODIUM CHLORIDE, POTASSIUM CHLORIDE, SODIUM LACTATE AND CALCIUM CHLORIDE: 600; 310; 30; 20 INJECTION, SOLUTION INTRAVENOUS at 08:06

## 2022-01-18 RX ADMIN — ONDANSETRON 4 MG: 2 INJECTION INTRAMUSCULAR; INTRAVENOUS at 08:59

## 2022-01-18 ASSESSMENT — ENCOUNTER SYMPTOMS: SEIZURES: 1

## 2022-01-18 ASSESSMENT — LIFESTYLE VARIABLES: TOBACCO_USE: 1

## 2022-01-18 ASSESSMENT — MIFFLIN-ST. JEOR: SCORE: 1569.85

## 2022-01-18 NOTE — DISCHARGE INSTRUCTIONS
Same Day Surgery Discharge Instructions for  Sedation and General Anesthesia       It's not unusual to feel dizzy, light-headed or faint for up to 24 hours after surgery or while taking pain medication.  If you have these symptoms: sit for a few minutes before standing and have someone assist you when you get up to walk or use the bathroom.      You should rest and relax for the next 24 hours. We recommend you make arrangements to have an adult stay with you for at least 24 hours after your discharge.  Avoid hazardous and strenuous activity.      DO NOT DRIVE any vehicle or operate mechanical equipment for 24 hours following the end of your surgery.  Even though you may feel normal, your reactions may be affected by the medication you have received.      Do not drink alcoholic beverages for 24 hours following surgery.       Slowly progress to your regular diet as you feel able. It's not unusual to feel nauseated and/or vomit after receiving anesthesia.  If you develop these symptoms, drink clear liquids (apple juice, ginger ale, broth, 7-up, etc. ) until you feel better.  If your nausea and vomiting persists for 24 hours, please notify your surgeon.        All narcotic pain medications, along with inactivity and anesthesia, can cause constipation. Drinking plenty of liquids and increasing fiber intake will help.      For any questions of a medical nature, call your surgeon.      Do not make important decisions for 24 hours.      If you had general anesthesia, you may have a sore throat for a couple of days related to the breathing tube used during surgery.  You may use Cepacol lozenges to help with this discomfort.  If it worsens or if you develop a fever, contact your surgeon.       If you feel your pain is not well managed with the pain medications prescribed by your surgeon, please contact your surgeon's office to let them know so they can address your concerns.       CoVid 19 Information    We want to give you  information regarding Covid. Please consult your primary care provider with any questions you might have.     Patient who have symptoms (cough, fever, or shortness of breath), need to isolate for 7 days from when symptoms started OR 72 hours after fever resolves (without fever reducing medications) AND improvement of respiratory symptoms (whichever is longer).      Isolate yourself at home (in own room/own bathroom if possible)    Do Not allow any visitors    Do Not go to work or school    Do Not go to Confucian[employer],  centers, shopping, or other public places.    Do Not shake hands.    Avoid close and intimate contact with others (hugging, kissing).    Follow CDC recommendations for household cleaning of frequently touched services.     After the initial 7 days, continue to isolate yourself from household members as much as possible. To continue decrease the risk of community spread and exposure, you and any members of your household should limit activities in public for 14 days after starting home isolation.     You can reference the following CDC link for helpful home isolation/care tips:  https://www.cdc.gov/coronavirus/2019-ncov/downloads/10Things.pdf    Protect Others:    Cover Your Mouth and Nose with a mask, disposable tissue or wash cloth to avoid spreading germs to others.    Wash your hands and face frequently with soap and water    Call Your Primary Doctor If: Breathing difficulty develops or you become worse.    For more information about COVID19 and options for caring for yourself at home, please visit the CDC website at https://www.cdc.gov/coronavirus/2019-ncov/about/steps-when-sick.html  For more options for care at [facility], please visit our website at [email or web address]          Reasons to contact your surgeon:    1. Signs of possible infection: Check your incision daily for redness, swelling, warmth, red streaks or foul drainage.   2. Elevated  temperature.  3. Pain not controlled with pain medication and/or rest.   4. Uncontrolled nausea or vomiting.  5. Any questions or concerns.          ** If you have questions or concerns about your procedure,  call Dr. Smallwood at [phone] **

## 2022-01-18 NOTE — OR NURSING
Pt blood sugar is 85 and pt states that ok for now but is getting low for him.  Dr Weiner notified.

## 2022-01-18 NOTE — ANESTHESIA POSTPROCEDURE EVALUATION
Patient: Phillip Rueda    Procedure: Procedure(s):  BIOPSY, BONE, RIGHT GREAT TOE (DISTAL PHALANX)       Diagnosis:Osteomyelitis of great toe of right foot (H) [M86.9]  Diagnosis Additional Information: No value filed.    Anesthesia Type:  MAC    Note:     Postop Pain Control: Uneventful            Sign Out: Well controlled pain   PONV: No   Neuro/Psych: Uneventful            Sign Out: Acceptable/Baseline neuro status   Airway/Respiratory: Uneventful            Sign Out: Acceptable/Baseline resp. status   CV/Hemodynamics: Uneventful            Sign Out: Acceptable CV status; No obvious hypovolemia; No obvious fluid overload   Other NRE: NONE   DID A NON-ROUTINE EVENT OCCUR? No           Last vitals:  Vitals Value Taken Time   /81 01/18/22 1015   Temp 36.5  C (97.7  F) 01/18/22 1015   Pulse 78 01/18/22 1019   Resp 11 01/18/22 1019   SpO2 97 % 01/18/22 1019   Vitals shown include unvalidated device data.    Electronically Signed By: Avelino Weiner MD  January 18, 2022  2:37 PM

## 2022-01-18 NOTE — ANESTHESIA PREPROCEDURE EVALUATION
Anesthesia Pre-Procedure Evaluation    Patient: Phillip Rueda   MRN: [id number] : 1980        Preoperative Diagnosis: Osteomyelitis of great toe of right foot (H) [M86.9]    Procedure : Procedure(s):  BIOPSY, BONE, RIGHT GREAT TOE (DISTAL PHALANX)          Past Medical History:   Diagnosis Date     Anxiety      Depressive disorder      Diabetes mellitus (H)      Hypertension      Liver disease      Neuralgic amyotrophy      Other chronic pain      Pain disorder 2015     Parsonage-Zuniga syndrome      Prostate infection      Seizures (H)      Staph infection       Past Surgical History:   Procedure Laterality Date     ORTHOPEDIC SURGERY        Allergies   Allergen Reactions     Bees Swelling     Bupropion Hcl Other (See Comments)     seizure     Lisinopril Cough     Penicillins Other (See Comments)     Unable to close mouth  Tolerated cefazolin (21)      Social History     Tobacco Use     Smoking status: Former Smoker     Packs/day: 0.25     Years: 15.00     Pack years: 3.75     Types: Cigarettes, Dip, chew, snus or snuff     Start date: 1996     Quit date: 2013     Years since quittin.5     Smokeless tobacco: Former User   Substance Use Topics     Alcohol use: No     Alcohol/week: 0.0 standard drinks     Comment: 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  Drink 1.75 every 2 days      Wt Readings from Last 1 Encounters:   22 70 kg (154 lb 6.4 oz)        Anesthesia Evaluation   Pt has had prior anesthetic.     No history of anesthetic complications       ROS/MED HX  ENT/Pulmonary:     (+) tobacco use, Past use,     Neurologic: Comment: Parsonage-zuniga syndrome  Affects bilateral UE    (+) peripheral neuropathy, seizures, last seizure: age 15, 2/2 wellbutrin,     Cardiovascular:       METS/Exercise Tolerance:     Hematologic:       Musculoskeletal: Comment: Osteomyelitis foot      GI/Hepatic:  - neg GI/hepatic ROS     Renal/Genitourinary:       Endo:     (+) type II DM,      Psychiatric/Substance Use:     (+) psychiatric history anxiety and depression (Buprenorphine dependence)     Infectious Disease:       Malignancy:       Other:      (+) , H/O Chronic Pain,        Physical Exam    Airway        Mallampati: I   TM distance: > 3 FB   Neck ROM: full   Mouth opening: > 3 cm    Respiratory Devices and Support         Dental  no notable dental history         Cardiovascular   cardiovascular exam normal          Pulmonary   pulmonary exam normal                OUTSIDE LABS:  CBC:   Lab Results   Component Value Date    WBC 8.0 01/04/2022    WBC 6.1 12/21/2021    HGB 13.2 (L) 01/04/2022    HGB 12.9 (L) 12/21/2021    HCT 39.9 (L) 01/04/2022    HCT 38.9 (L) 12/21/2021     01/04/2022     12/21/2021     BMP:   Lab Results   Component Value Date     01/04/2022     12/21/2021    POTASSIUM 4.1 01/04/2022    POTASSIUM 4.2 12/21/2021    CHLORIDE 101 01/04/2022    CHLORIDE 101 12/21/2021    CO2 28 01/04/2022    CO2 28 12/21/2021    BUN 19 01/04/2022    BUN 20 12/21/2021    CR 0.90 01/04/2022    CR 1.01 12/21/2021     (H) 01/04/2022    GLC 89 12/21/2021     COAGS:   Lab Results   Component Value Date    PTT 28 11/02/2020    INR 0.88 11/02/2020     POC:   Lab Results   Component Value Date     (H) 11/02/2020     HEPATIC:   Lab Results   Component Value Date    ALBUMIN 3.7 12/12/2021    PROTTOTAL 7.4 12/12/2021    ALT 21 12/12/2021    AST 18 12/12/2021     (H) 04/09/2013    ALKPHOS 114 12/12/2021    BILITOTAL 0.4 12/12/2021     OTHER:   Lab Results   Component Value Date    LACT 2.0 05/31/2016    A1C 6.9 (H) 10/19/2021    ARON 9.0 01/04/2022    MAG 2.0 03/23/2016    TSH 1.10 11/02/2020    T4 0.96 01/22/2016    CRP <2.9 01/04/2022    SED 11 12/13/2021       Anesthesia Plan    ASA Status:  2      Anesthesia Type: MAC.              Consents    Anesthesia Plan(s) and associated risks, benefits, and realistic alternatives discussed. Questions answered and  patient/representative(s) expressed understanding.    - Discussed:     - Discussed with:  Patient         Postoperative Care            Comments:    Other Comments: Blood glucose 85.  Feels ok, will need to follow.            Avelino Weiner MD

## 2022-01-18 NOTE — OR NURSING
Pt dressed, up in recliner and transported to Phase 2.  Post-op shoe in place.  Post-op X-ray completed.

## 2022-01-18 NOTE — OP NOTE
Procedure Date: 01/18/2022    SURGEON:  Shelton Smallwood DPM    :  Will Joe DPM    PREOPERATIVE DIAGNOSIS:  Possible osteomyelitis, distal phalanx, right hallux.    POSTOPERATIVE DIAGNOSIS:  Possible osteomyelitis, distal phalanx, right hallux.    PROCEDURE:  Bone biopsy, distal phalanx, right hallux.    ANESTHESIA:  MAC with local.    HEMOSTASIS:  Ankle pneumatic tourniquet.    ESTIMATED BLOOD LOSS:  1 mL.    MATERIALS:  Nonabsorbable suture material.    INJECTABLES:  0.5% Marcaine plain.    INTRAOPERATIVE FINDINGS:  The bone felt hard at the distal phalanx.    COMPLICATIONS:  None apparent.    INDICATIONS FOR PROCEDURE:  Phillip Rueda is a 41-year-old male with type 2 diabetes and peripheral neuropathy, who was hospitalized at the [facility] in mid December for cellulitis secondary to a wound, right hallux.  Plain films and MRI raised some concern for possible underlying osteomyelitis.  He received antibiotics at that time.  Ultimately, biopsy was not done.  He followed up with [facility] after hospitalization.  He initially saw Dr. Vasquez.  Due to the concern for osteomyelitis, a bone biopsy was discussed and recommended.  Phillip was agreeable to this.  For details regarding that visit, please see the clinic note from 12/31/2021.  Phillip was scheduled with me due to surgical scheduling constraints.  I have treated Phillip in the past and know him well.  I was happy to help out.    DESCRIPTION OF PROCEDURE:  Phillip Rueda was transported to the operating room and placed supine on the operating table.  IV sedation was initiated.  A timeout was called and local anesthetic was injected into the base of the right great toe.  The right foot was then prepped and draped in the normal aseptic fashion.  A timeout was called for the procedure.  The foot was exsanguinated and an ankle tourniquet inflated.    A small transverse incision was made at the tip of the right hallux.   Blunt dissection was then carried out down to the level of bone.  Intraoperative imaging was used to confirm appropriate access to the bone for biopsy.    Using an 8-gauge Jamshidi bone marrow trephine, 2 small dowels of bone were harvested through this incision.    Specimens obtained included bone for aerobic and anaerobic culture.  Two specimens were sent to Pathology for microscopic exam.    The wound was irrigated with a copious amount of normal sterile saline.  Closure was achieved with 4-0 Prolene.    Char Victorina tolerated the anesthesia and procedure well.    Shelton Smallwood DPM        D: 2022   T: 2022   MT: SHARLA    Name:     VICTORINA CHAR SEWELLMirna  MRN:      [id number]        Account:        [id number]   :      1980           Procedure Date: 2022     Document: [id number]

## 2022-01-18 NOTE — BRIEF OP NOTE
[facility] Brief Operative Note    Pre-operative diagnosis: Osteomyelitis of great toe of right foot (H) [M86.9]   Post-operative diagnosis Same     Procedure: Procedure(s):  BIOPSY, BONE, RIGHT GREAT TOE (DISTAL PHALANX)   Surgeon(s): Surgeon(s) and Role:     * Shelton Smallwood DPM - Primary   Estimated blood loss: * No values recorded between 1/18/2022  9:07 AM and 1/18/2022  9:25 AM *    Specimens: ID Type Source Tests Collected by Time Destination   1 : RIGHT GREAT TOE BONE SAMPLE 1 FOR OSTEOMYLITIS  Bone Biopsy Toe, Right SURGICAL PATHOLOGY EXAM Shelton Smallwood DPM 1/18/2022  9:16 AM    2 : RIGHT GREAT TOE BONE SAMPLE 2 FOR OSTEOMYLITIS  Bone Biopsy Toe, Right SURGICAL PATHOLOGY EXAM Shelton Smallwood DPM 1/18/2022  9:18 AM    A : RIGHT GREAT TOE BONE Bone Biopsy Toe, Right AEROBIC BACTERIAL CULTURE ROUTINE Shelton Smallwood DPM 1/18/2022  9:14 AM    B : RIGHT GREAT TOE BONE Bone Biopsy Toe, Right ANAEROBIC BACTERIAL CULTURE ROUTINE Shelton Smallwood DPM 1/18/2022  9:16 AM       Findings: Bone at the tip of the distal phalanx felt hard.

## 2022-01-18 NOTE — ANESTHESIA CARE TRANSFER NOTE
Patient: Phillip Rueda    Procedure: Procedure(s):  BIOPSY, BONE, RIGHT GREAT TOE (DISTAL PHALANX)       Diagnosis: Osteomyelitis of great toe of right foot (H) [M86.9]  Diagnosis Additional Information: No value filed.    Anesthesia Type:   MAC     Note:    Oropharynx: oropharynx clear of all foreign objects  Level of Consciousness: awake  Oxygen Supplementation: room air    Independent Airway: airway patency satisfactory and stable  Dentition: dentition unchanged  Vital Signs Stable: post-procedure vital signs reviewed and stable  Report to RN Given: handoff report given  Patient transferred to: PACU    Handoff Report: Identifed the Patient, Identified the Reponsible Provider, Reviewed the pertinent medical history, Discussed the surgical course, Reviewed Intra-OP anesthesia mangement and issues during anesthesia, Set expectations for post-procedure period and Allowed opportunity for questions and acknowledgement of understanding      Vitals:  Vitals Value Taken Time   /87 01/18/22 0931   Temp     Pulse 88 01/18/22 0932   Resp 9 01/18/22 0932   SpO2 98 % 01/18/22 0932   Vitals shown include unvalidated device data.    Electronically Signed By: WALESKA Wray CRNA  January 18, 2022  9:34 AM

## 2022-01-20 LAB
PATH REPORT.COMMENTS IMP SPEC: NORMAL
PATH REPORT.COMMENTS IMP SPEC: NORMAL
PATH REPORT.FINAL DX SPEC: NORMAL
PATH REPORT.GROSS SPEC: NORMAL
PATH REPORT.MICROSCOPIC SPEC OTHER STN: NORMAL
PATH REPORT.RELEVANT HX SPEC: NORMAL
PHOTO IMAGE: NORMAL

## 2022-01-20 PROCEDURE — 88311 DECALCIFY TISSUE: CPT | Mod: 26 | Performed by: PATHOLOGY

## 2022-01-20 PROCEDURE — 88307 TISSUE EXAM BY PATHOLOGIST: CPT | Mod: 26 | Performed by: PATHOLOGY

## 2022-01-21 ENCOUNTER — MYC MEDICAL ADVICE (OUTPATIENT)
Dept: PODIATRY | Facility: CLINIC | Age: 42
End: 2022-01-21
Payer: COMMERCIAL

## 2022-01-22 LAB — BACTERIA BONE ANAEROBE+AEROBE CULT: ABNORMAL

## 2022-01-23 ENCOUNTER — MYC MEDICAL ADVICE (OUTPATIENT)
Dept: FAMILY MEDICINE | Facility: CLINIC | Age: 42
End: 2022-01-23
Payer: COMMERCIAL

## 2022-01-24 ENCOUNTER — VIRTUAL VISIT (OUTPATIENT)
Dept: FAMILY MEDICINE | Facility: CLINIC | Age: 42
End: 2022-01-24
Payer: COMMERCIAL

## 2022-01-24 ENCOUNTER — TELEPHONE (OUTPATIENT)
Dept: PODIATRY | Facility: CLINIC | Age: 42
End: 2022-01-24

## 2022-01-24 ENCOUNTER — LAB (OUTPATIENT)
Dept: LAB | Facility: CLINIC | Age: 42
End: 2022-01-24

## 2022-01-24 DIAGNOSIS — E11.9 TYPE 2 DIABETES MELLITUS WITHOUT COMPLICATION, WITH LONG-TERM CURRENT USE OF INSULIN (H): ICD-10-CM

## 2022-01-24 DIAGNOSIS — L03.031 CELLULITIS OF TOE OF RIGHT FOOT: ICD-10-CM

## 2022-01-24 DIAGNOSIS — L03.031 CELLULITIS OF RIGHT TOE: Primary | ICD-10-CM

## 2022-01-24 DIAGNOSIS — Z79.4 TYPE 2 DIABETES MELLITUS WITHOUT COMPLICATION, WITH LONG-TERM CURRENT USE OF INSULIN (H): ICD-10-CM

## 2022-01-24 LAB
BASOPHILS # BLD AUTO: 0 10E3/UL (ref 0–0.2)
BASOPHILS NFR BLD AUTO: 0 %
EOSINOPHIL # BLD AUTO: 0.1 10E3/UL (ref 0–0.7)
EOSINOPHIL NFR BLD AUTO: 2 %
ERYTHROCYTE [DISTWIDTH] IN BLOOD BY AUTOMATED COUNT: 12.5 % (ref 10–15)
HCT VFR BLD AUTO: 37.1 % (ref 40–53)
HGB BLD-MCNC: 12.2 G/DL (ref 13.3–17.7)
IMM GRANULOCYTES # BLD: 0 10E3/UL
IMM GRANULOCYTES NFR BLD: 0 %
LYMPHOCYTES # BLD AUTO: 1.9 10E3/UL (ref 0.8–5.3)
LYMPHOCYTES NFR BLD AUTO: 36 %
MCH RBC QN AUTO: 28.5 PG (ref 26.5–33)
MCHC RBC AUTO-ENTMCNC: 32.9 G/DL (ref 31.5–36.5)
MCV RBC AUTO: 87 FL (ref 78–100)
MONOCYTES # BLD AUTO: 0.3 10E3/UL (ref 0–1.3)
MONOCYTES NFR BLD AUTO: 5 %
NEUTROPHILS # BLD AUTO: 3.1 10E3/UL (ref 1.6–8.3)
NEUTROPHILS NFR BLD AUTO: 57 %
PLATELET # BLD AUTO: 194 10E3/UL (ref 150–450)
RBC # BLD AUTO: 4.28 10E6/UL (ref 4.4–5.9)
WBC # BLD AUTO: 5.4 10E3/UL (ref 4–11)

## 2022-01-24 PROCEDURE — 86140 C-REACTIVE PROTEIN: CPT

## 2022-01-24 PROCEDURE — 85025 COMPLETE CBC W/AUTO DIFF WBC: CPT

## 2022-01-24 PROCEDURE — 80048 BASIC METABOLIC PNL TOTAL CA: CPT

## 2022-01-24 PROCEDURE — 80061 LIPID PANEL: CPT

## 2022-01-24 PROCEDURE — 99213 OFFICE O/P EST LOW 20 MIN: CPT | Mod: 95 | Performed by: FAMILY MEDICINE

## 2022-01-24 PROCEDURE — 36415 COLL VENOUS BLD VENIPUNCTURE: CPT

## 2022-01-24 ASSESSMENT — ENCOUNTER SYMPTOMS
PALPITATIONS: 0
SORE THROAT: 0
DIZZINESS: 0
WEAKNESS: 0
EYE PAIN: 0
HEADACHES: 0
FEVER: 0
HEARTBURN: 0
CONSTIPATION: 0
CHILLS: 0
NERVOUS/ANXIOUS: 0
JOINT SWELLING: 0
MYALGIAS: 0
ABDOMINAL PAIN: 0
DYSURIA: 0
ARTHRALGIAS: 0
PARESTHESIAS: 0
HEMATURIA: 0
HEMATOCHEZIA: 0
SHORTNESS OF BREATH: 0
COUGH: 0
FREQUENCY: 0
NAUSEA: 1
DIARRHEA: 1

## 2022-01-24 ASSESSMENT — ACTIVITIES OF DAILY LIVING (ADL): CURRENT_FUNCTION: NO ASSISTANCE NEEDED

## 2022-01-24 NOTE — TELEPHONE ENCOUNTER
Please advise on patient's 1/18/2022 right great toe biopsy results and patient's mychart message from 1/21/2022. Thank you. Yanira Galindo, ATC

## 2022-01-24 NOTE — PROGRESS NOTES
"Answers for HPI/ROS submitted by the patient on 1/24/2022  In general, how would you rate your overall physical health?: good  Frequency of exercise:: None  Do you usually eat at least 4 servings of fruit and vegetables a day, include whole grains & fiber, and avoid regularly eating high fat or \"junk\" foods? : Yes  Taking medications regularly:: Yes  Medication side effects:: Not applicable  Activities of Daily Living: no assistance needed  Home safety: no safety concerns identified  Hearing Impairment:: no hearing concerns  In the past 6 months, have you been bothered by leaking of urine?: No  abdominal pain: No  Blood in stool: No  Blood in urine: No  chest pain: No  chills: No  congestion: No  constipation: No  cough: No  diarrhea: Yes  dizziness: No  ear pain: No  eye pain: No  nervous/anxious: No  fever: No  frequency: No  genital sores: No  headaches: No  hearing loss: No  heartburn: No  arthralgias: No  joint swelling: No  peripheral edema: No  mood changes: No  myalgias: No  nausea: Yes  dysuria: No  palpitations: No  Skin sensation changes: No  sore throat: No  urgency: No  rash: No  shortness of breath: No  visual disturbance: No  weakness: No  impotence: Yes  penile discharge: No  In general, how would you rate your overall mental or emotional health?: good  Additional concerns today:: Kellen Fisher is a 41 year old who is being evaluated via a billable video visit.      How would you like to obtain your AVS? MyChart  If the video visit is dropped, the invitation should be resent by: Text to cell phone:    Will anyone else be joining your video visit? No      Video Start Time: 9:19 AM    Assessment & Plan     Cellulitis of right toe  With initial concern of osteomyelitis.  Biopsy came back negative.  Was on antibiotics and was having GI side effects.  Now happy that he does not need to use antibiotics and he is symptoms are significantly improving after stopping antibiotics.  He is scheduled to see " podiatrist tomorrow.  Further treatment as per podiatry.  He was scheduled to come in for physical today.  He is planning to come in in future for in person visit for his physical.                   No follow-ups on file.    Km Dos Santos MD, MD  [facility]    Venkatesh Fisher is a 41 year old who presents for the following health issues     HPI     Not walking a lot due to bone biopsy. Biopsy did not show osteomyelitis.   Stopped antibiotics.   Stomach is much better now.   Was having severe symptoms. Mild diarrhea now but much better.   Seeing Dr Smallwood tomorrow.       Review of Systems         Objective           Vitals:  No vitals were obtained today due to virtual visit.    Physical Exam   GENERAL: Healthy, alert and no distress  EYES: Eyes grossly normal to inspection.  No discharge or erythema, or obvious scleral/conjunctival abnormalities.  RESP: No audible wheeze, cough, or visible cyanosis.  No visible retractions or increased work of breathing.    SKIN: Visible skin clear. No significant rash, abnormal pigmentation or lesions.  NEURO: Cranial nerves grossly intact.  Mentation and speech appropriate for age.  PSYCH: Mentation appears normal, affect normal/bright, judgement and insight intact, normal speech and appearance well-groomed.                Video-Visit Details    Type of service:  Video Visit    Video End Time:9:31 AM    Originating Location (pt. Location): Home    Distant Location (provider location):  [facility]     Platform used for Video Visit: [employer]

## 2022-01-24 NOTE — TELEPHONE ENCOUNTER
"I called and spoke with Phillip regarding the bone biopsy results.  I previously sent him a MyChart result note.      The bone biopsy: both specimens report as \"viable bone without significant inflammation.\"     Cultures are growing two organisms , isolated in broth only:   1) Gram positive bacilli, resembling diphtheroids   2)  Isolated in broth only Bacillus species, not anthracis nor cereus group          He reported that he had to discontinue the antibiotics due to diarrhea, GI upset and weight loss. He is now improving. He talked to his PCP, concerned about C. Diff.      I explained that I put more weight in the histologic evaluation than the cultures, yet osteomyelitis is not ruled out.  He understands that there is still risk of losing the toe.  We discussed close follow up and monitoring in clinic.  Awaiting sensitivities. Could consider clindamycin, once GI improved.  However, definitive treatment for osteomyelitis is surgery.      A reasonable approach is serial x-ray and monitor for changes.     I will evaluate him in clinic tomorrow.     Shelton Smallwood DPM, FACFAS, MS  [facility] Department of Podiatry/Foot & Ankle Surgery      "

## 2022-01-25 ENCOUNTER — TELEPHONE (OUTPATIENT)
Dept: PODIATRY | Facility: CLINIC | Age: 42
End: 2022-01-25

## 2022-01-25 LAB
ANION GAP SERPL CALCULATED.3IONS-SCNC: 5 MMOL/L (ref 3–14)
BACTERIA BONE ANAEROBE+AEROBE CULT: ABNORMAL
BUN SERPL-MCNC: 14 MG/DL (ref 7–30)
CALCIUM SERPL-MCNC: 8.9 MG/DL (ref 8.5–10.1)
CHLORIDE BLD-SCNC: 101 MMOL/L (ref 94–109)
CHOLEST SERPL-MCNC: 131 MG/DL
CO2 SERPL-SCNC: 30 MMOL/L (ref 20–32)
CREAT SERPL-MCNC: 0.88 MG/DL (ref 0.66–1.25)
CRP SERPL-MCNC: <2.9 MG/L (ref 0–8)
FASTING STATUS PATIENT QL REPORTED: NO
GFR SERPL CREATININE-BSD FRML MDRD: >90 ML/MIN/1.73M2
GLUCOSE BLD-MCNC: 227 MG/DL (ref 70–99)
HDLC SERPL-MCNC: 73 MG/DL
LDLC SERPL CALC-MCNC: 44 MG/DL
NONHDLC SERPL-MCNC: 58 MG/DL
POTASSIUM BLD-SCNC: 4.3 MMOL/L (ref 3.4–5.3)
SODIUM SERPL-SCNC: 136 MMOL/L (ref 133–144)
TRIGL SERPL-MCNC: 69 MG/DL

## 2022-01-26 ENCOUNTER — OFFICE VISIT (OUTPATIENT)
Dept: PODIATRY | Facility: CLINIC | Age: 42
End: 2022-01-26
Payer: COMMERCIAL

## 2022-01-26 VITALS
RESPIRATION RATE: 16 BRPM | DIASTOLIC BLOOD PRESSURE: 61 MMHG | SYSTOLIC BLOOD PRESSURE: 81 MMHG | HEART RATE: 99 BPM | BODY MASS INDEX: 24.29 KG/M2 | WEIGHT: 164 LBS | HEIGHT: 69 IN

## 2022-01-26 DIAGNOSIS — E11.621 DIABETIC ULCER OF TOE OF RIGHT FOOT ASSOCIATED WITH TYPE 2 DIABETES MELLITUS, WITH OTHER ULCER SEVERITY (H): ICD-10-CM

## 2022-01-26 DIAGNOSIS — L97.518 DIABETIC ULCER OF TOE OF RIGHT FOOT ASSOCIATED WITH TYPE 2 DIABETES MELLITUS, WITH OTHER ULCER SEVERITY (H): ICD-10-CM

## 2022-01-26 DIAGNOSIS — Z98.890 POSTOPERATIVE STATE: Primary | ICD-10-CM

## 2022-01-26 PROCEDURE — 99024 POSTOP FOLLOW-UP VISIT: CPT | Performed by: PODIATRIST

## 2022-01-26 ASSESSMENT — MIFFLIN-ST. JEOR: SCORE: 1621.34

## 2022-01-26 NOTE — LETTER
1/26/2022         RE: Phillip Rueda  [address]  [facility] [address]        Dear Colleague,    Thank you for referring your patient, Phillip Rueda, to the [facility]. Please see a copy of my visit note below.    Assessment:      ICD-10-CM    1. Postoperative state  Z98.890    2. Diabetic ulcer of toe of right foot associated with type 2 diabetes mellitus, with other ulcer severity (H)  E11.621     L97.518        8 days s/p bone biopsy R hallux    Plan:  No orders of the defined types were placed in this encounter.      Discussed the post-operative recovery plan with the patient.  Bone biopsy negative; cultures showing contaminants.  Discussed it appears no residual infection, however can never be certain.    -Sutures- left intact  Sterile dressing re-applied.   -WB- WB to operative limb in post-op shoe  -Activity- Elevate above heart level at rest.  Limit periods of dependency to <5mins per hour.  Ice behind knee of operative limb.  -Pain- tylenol  -DVT prophylaxis- WB  -Abx- none additional    Return:  Return in about 1 week (around 2/2/2022) for with Dr Smallwood.     Muriel Bee DPM                Chief Complaint:     Patient presents with:  Consult: Bone biopsy, distal phalanx, right hallux 1/18/22.     Post-op Exam    HPI:  Phillip Rueda is a 41 year old year old male who presents for post-op exam.    Surgery Date- 1/18/21  Post-operative day #9  Procedure- Bone biopsy distal phalanx R hallux  Pain- none  Pain medication use- none  Abx- none  WB status- WB  DVT Px- WB  Dressing clean/dry/intact?- yes    Worried about persistent infection & results of cultures/biopsy.    Past Medical & Surgical History:  Past Medical History:   Diagnosis Date     Anxiety      Depressive disorder      Diabetes mellitus (H)      Hypertension      Liver disease      Neuralgic amyotrophy      Other chronic pain      Pain disorder 12/8/2015     Parsonage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      Prostate  infection      Seizures (H)      Staph infection       Past Surgical History:   Procedure Laterality Date     BIOPSY BONE FOOT Right 1/18/2022    Procedure: BIOPSY, BONE, RIGHT GREAT TOE (DISTAL PHALANX);  Surgeon: Shelton Smallwood DPM;  Location: [facility] OR     ORTHOPEDIC SURGERY        Family History   Problem Relation Age of Onset     Depression Mother      Anxiety Disorder Mother      Substance Abuse Maternal Grandfather      Anxiety Disorder Brother      Glaucoma No family hx of      Macular Degeneration No family hx of         Social History:  ?  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  Drink 1.75 every 2 days      Allergies:  ?   Allergies   Allergen Reactions     Bees Swelling     Bupropion Hcl Other (See Comments)     seizure     Lisinopril Cough     Penicillins Other (See Comments)     Unable to close mouth  Tolerated cefazolin (12/12/21)        Medications:    Current Outpatient Medications   Medication     ACCU-CHEK GUIDE test strip     acetaminophen (TYLENOL) 500 MG tablet     atorvastatin (LIPITOR) 20 MG tablet     blood glucose (NO BRAND SPECIFIED) lancets standard     blood glucose monitoring (NO BRAND SPECIFIED) meter device kit     Buprenorphine HCl-Naloxone HCl (SUBOXONE) 4-1 MG film     Continuous Blood Gluc  (FREESTYLE JHON 2 READER) JESSI     Continuous Blood Gluc Sensor (FREESTYLE JHON 2 SENSOR) MISC     continuous blood glucose monitoring (FREESTYLE JHON) sensor     diazepam (VALIUM) 5 MG tablet     diazepam (VALIUM) 5 MG tablet     doxycycline hyclate (VIBRAMYCIN) 100 MG capsule     EPINEPHrine (EPIPEN) 0.3 MG/0.3ML injection      "gabapentin (NEURONTIN) 600 MG tablet     gabapentin (NEURONTIN) 600 MG tablet     ibuprofen (ADVIL/MOTRIN) 600 MG tablet     insulin pen needle 31G X 6 MM     levofloxacin (LEVAQUIN) 750 MG tablet     metFORMIN (GLUCOPHAGE-XR) 500 MG 24 hr tablet     metroNIDAZOLE (FLAGYL) 500 MG tablet     nortriptyline (PAMELOR) 25 MG capsule     nortriptyline (PAMELOR) 25 MG capsule     polyethylene glycol (MIRALAX) 17 GM/Dose powder     semaglutide (OZEMPIC, 0.25 OR 0.5 MG/DOSE,) 2 MG/1.5ML SOPN pen     sennosides (SENOKOT) 8.6 MG tablet     sildenafil (VIAGRA) 100 MG tablet     venlafaxine (EFFEXOR-ER) 150 MG TB24     venlafaxine (EFFEXOR-XR) 150 MG 24 hr capsule     venlafaxine (EFFEXOR-XR) 150 MG 24 hr capsule     No current facility-administered medications for this visit.       Physical Exam:  ?  Vitals:  BP (!) 81/61   Pulse 99   Resp 16   Ht 1.74 m (5' 8.5\")   Wt 74.4 kg (164 lb)   BMI 24.57 kg/m     General:  WD/WN, in NAD.  A&O x3.    Dermatologic:    Incision  is well coapted, dehiscence absent.   Leila-incisional erythema absent, ecchymosis absent.  Vascular:  Digital capillary refill time normal right.  Skin temperature is warm  to operative site.  Focal edema- mild to operative site.   Calf of operative leg supple, without induration, generalized edema, or venous hue.  Neurologic:    Gross sensation normal to operative limb.  Gait and balance abnormal - post-op shoe  to operative limb.  Musculoskeletal:  no pain to palpation of hallux right.  1st MPJ, HIPJ ROM  intact to operative limb.  Muscle strength intact to contralateral limb.    Imaging:   x-ray  Non-weight-bearing views right foot dated 12/21/22, reveal no osteolysis to hallux; long 1st ray overall.  Hallux limitus, tailor's bunion.   NWB films.      Labs:  Sed Rate   Date Value Ref Range Status   10/24/2020 4 0 - 15 mm/h Final     Erythrocyte Sedimentation Rate   Date Value Ref Range Status   12/13/2021 11 0 - 15 mm/hr Final     CRP Inflammation   Date " Value Ref Range Status   01/24/2022 <2.9 0.0 - 8.0 mg/L Final   10/24/2020 <2.9 0.0 - 8.0 mg/L Final     Path:  Case Report   Surgical Pathology Report                         Case: [id number]                                   Authorizing Provider:  Shelton Smallwood DPM    Collected:           01/18/2022 09:16 AM           Ordering Location:     [facility]          Received:            01/18/2022 09:46 AM                                  [facility] OR                                                             Pathologist:           Gianna Slaughter MD                                                                            Specimens:   A) - Toe, Right, RIGHT GREAT TOE BONE SAMPLE 1 FOR OSTEOMYLITIS                                      B) - Toe, Right, RIGHT GREAT TOE BONE SAMPLE 2 FOR OSTEOMYLITIS                            Final Diagnosis   A: Bone, right great toe, sample 1, biopsy  -Viable bone with no significant inflammation     B: Bone, right great toe, sample 2, biopsy  -Viable bone with no significant inflammation   Electronically signed by Gianna Slaughter MD on 1/20/2022 at 10:52 AM     Culture:   Collected 1/18/2022  9:16 AM     Status: Final result     Visible to patient: No (scheduled for 2/1/2022 10:29 AM)    Specimen Information: Toe, Right; Bone Biopsy         0 Result Notes    Culture Isolated in broth only Cutibacterium (Propionibacterium) acnes Abnormal     On day 6 of incubation   Susceptibility testing of Cutibacterium (Propionibacterium) species is not done from this source. This organism is susceptible to penicillin and cefotaxime and most are susceptible to clindamycin.         1/18/2022  9:14 AM     Status: Final result     Visible to patient: No (scheduled for 1/29/2022  8:03 AM)    Specimen Information: Toe, Right; Bone Biopsy         0 Result Notes    Culture Isolated in broth only  Bacillus species, not anthracis nor cereus group Abnormal     On day 2 of incubation   Identification obtained by MALDI-TOF mass spectrometry research use only database. Test characteristics determined and verified by the Infectious Diseases Diagnostic Laboratory.   Susceptibilities not routinely done   These bacteria can be environmental contaminants, but on occasion may be pathogens. Clinical correlation must be applied to interpreting this microbiology result.                    Again, thank you for allowing me to participate in the care of your patient.        Sincerely,        Muriel Bee DPM

## 2022-01-26 NOTE — PATIENT INSTRUCTIONS
PATIENT INSTRUCTIONS    Post-operative Instructions    -Dressing:    Keep your dressing clean, dry, and intact.    DO NOT get the surgical dressing dirty or wet.    DO NOT remove the surgical dressing.    Call the clinic immediately if the dressing becomes dirty, wet, or comes off.    -Weight-bearing:    weight-bearing to the operative foot/ankle in the surgical shoe.  Use knee scooter, crutches, or other gait assistive device.    -Activity:    Minimal activity, around home, until your 1st post-operative follow-up.  Elevate the operative limb at or above heart level when at rest,    -Pain :.  Tylenol (acetaminophen) as needed.    Do not exceed 4,000mg of acetaminophen daily, from all sources; this includes acetaminophen contained in pain pills (Norco) & any additional Tylenol (acetaminophen).    -Antibiotics:  None additional    Follow-up with Dr Smallwood next week

## 2022-01-26 NOTE — PROGRESS NOTES
Assessment:      ICD-10-CM    1. Postoperative state  Z98.890    2. Diabetic ulcer of toe of right foot associated with type 2 diabetes mellitus, with other ulcer severity (H)  E11.621     L97.518        8 days s/p bone biopsy R hallux    Plan:  No orders of the defined types were placed in this encounter.      Discussed the post-operative recovery plan with the patient.  Bone biopsy negative; cultures showing contaminants.  Discussed it appears no residual infection, however can never be certain.    -Sutures- left intact  Sterile dressing re-applied.   -WB- WB to operative limb in post-op shoe  -Activity- Elevate above heart level at rest.  Limit periods of dependency to <5mins per hour.  Ice behind knee of operative limb.  -Pain- tylenol  -DVT prophylaxis- WB  -Abx- none additional    Return:  Return in about 1 week (around 2/2/2022) for with Dr Smallwood.     Muriel Bee DPM                Chief Complaint:     Patient presents with:  Consult: Bone biopsy, distal phalanx, right hallux 1/18/22.     Post-op Exam    HPI:  Phililp Rueda is a 41 year old year old male who presents for post-op exam.    Surgery Date- 1/18/21  Post-operative day #9  Procedure- Bone biopsy distal phalanx R hallux  Pain- none  Pain medication use- none  Abx- none  WB status- WB  DVT Px- WB  Dressing clean/dry/intact?- yes    Worried about persistent infection & results of cultures/biopsy.    Past Medical & Surgical History:  Past Medical History:   Diagnosis Date     Anxiety      Depressive disorder      Diabetes mellitus (H)      Hypertension      Liver disease      Neuralgic amyotrophy      Other chronic pain      Pain disorder 12/8/2015     Parsonage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      Prostate infection      Seizures (H)      Staph infection       Past Surgical History:   Procedure Laterality Date     BIOPSY BONE FOOT Right 1/18/2022    Procedure: BIOPSY, BONE, RIGHT GREAT TOE (DISTAL PHALANX);  Surgeon: Shelton Smallwood DPM;  Location:  OR      ORTHOPEDIC SURGERY        Family History   Problem Relation Age of Onset     Depression Mother      Anxiety Disorder Mother      Substance Abuse Maternal Grandfather      Anxiety Disorder Brother      Glaucoma No family hx of      Macular Degeneration No family hx of         Social History:  ?  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  Drink 1.75 every 2 days      Allergies:  ?   Allergies   Allergen Reactions     Bees Swelling     Bupropion Hcl Other (See Comments)     seizure     Lisinopril Cough     Penicillins Other (See Comments)     Unable to close mouth  Tolerated cefazolin (12/12/21)        Medications:    Current Outpatient Medications   Medication     ACCU-CHEK GUIDE test strip     acetaminophen (TYLENOL) 500 MG tablet     atorvastatin (LIPITOR) 20 MG tablet     blood glucose (NO BRAND SPECIFIED) lancets standard     blood glucose monitoring (NO BRAND SPECIFIED) meter device kit     Buprenorphine HCl-Naloxone HCl (SUBOXONE) 4-1 MG film     Continuous Blood Gluc  (FREESTYLE JHON 2 READER) JESSI     Continuous Blood Gluc Sensor (FREESTYLE JHON 2 SENSOR) MISC     continuous blood glucose monitoring (FREESTYLE JHON) sensor     diazepam (VALIUM) 5 MG tablet     diazepam (VALIUM) 5 MG tablet     doxycycline hyclate (VIBRAMYCIN) 100 MG capsule     EPINEPHrine (EPIPEN) 0.3 MG/0.3ML injection     gabapentin (NEURONTIN) 600 MG tablet     gabapentin (NEURONTIN) 600 MG tablet     ibuprofen (ADVIL/MOTRIN) 600 MG tablet     insulin pen needle 31G X 6 MM     levofloxacin (LEVAQUIN) 750 MG tablet     metFORMIN (GLUCOPHAGE-XR) 500 MG 24 hr tablet      "metroNIDAZOLE (FLAGYL) 500 MG tablet     nortriptyline (PAMELOR) 25 MG capsule     nortriptyline (PAMELOR) 25 MG capsule     polyethylene glycol (MIRALAX) 17 GM/Dose powder     semaglutide (OZEMPIC, 0.25 OR 0.5 MG/DOSE,) 2 MG/1.5ML SOPN pen     sennosides (SENOKOT) 8.6 MG tablet     sildenafil (VIAGRA) 100 MG tablet     venlafaxine (EFFEXOR-ER) 150 MG TB24     venlafaxine (EFFEXOR-XR) 150 MG 24 hr capsule     venlafaxine (EFFEXOR-XR) 150 MG 24 hr capsule     No current facility-administered medications for this visit.       Physical Exam:  ?  Vitals:  BP (!) 81/61   Pulse 99   Resp 16   Ht 1.74 m (5' 8.5\")   Wt 74.4 kg (164 lb)   BMI 24.57 kg/m     General:  WD/WN, in NAD.  A&O x3.    Dermatologic:    Incision  is well coapted, dehiscence absent.   Leila-incisional erythema absent, ecchymosis absent.  Vascular:  Digital capillary refill time normal right.  Skin temperature is warm  to operative site.  Focal edema- mild to operative site.   Calf of operative leg supple, without induration, generalized edema, or venous hue.  Neurologic:    Gross sensation normal to operative limb.  Gait and balance abnormal - post-op shoe  to operative limb.  Musculoskeletal:  no pain to palpation of hallux right.  1st MPJ, HIPJ ROM  intact to operative limb.  Muscle strength intact to contralateral limb.    Imaging:   x-ray  Non-weight-bearing views right foot dated 12/21/22, reveal no osteolysis to hallux; long 1st ray overall.  Hallux limitus, tailor's bunion.   NWB films.      Labs:  Sed Rate   Date Value Ref Range Status   10/24/2020 4 0 - 15 mm/h Final     Erythrocyte Sedimentation Rate   Date Value Ref Range Status   12/13/2021 11 0 - 15 mm/hr Final     CRP Inflammation   Date Value Ref Range Status   01/24/2022 <2.9 0.0 - 8.0 mg/L Final   10/24/2020 <2.9 0.0 - 8.0 mg/L Final     Path:  Case Report   Surgical Pathology Report                         Case: [id number]                                   Authorizing Provider: "  Shelton Smallwood DPM    Collected:           01/18/2022 09:16 AM           Ordering Location:     [facility]          Received:            01/18/2022 09:46 AM                                  [facility] OR                                                             Pathologist:           Gianna Slaughter MD                                                                            Specimens:   A) - Toe, Right, RIGHT GREAT TOE BONE SAMPLE 1 FOR OSTEOMYLITIS                                      B) - Toe, Right, RIGHT GREAT TOE BONE SAMPLE 2 FOR OSTEOMYLITIS                            Final Diagnosis   A: Bone, right great toe, sample 1, biopsy  -Viable bone with no significant inflammation     B: Bone, right great toe, sample 2, biopsy  -Viable bone with no significant inflammation   Electronically signed by Gianna Slaughter MD on 1/20/2022 at 10:52 AM     Culture:   Collected 1/18/2022  9:16 AM     Status: Final result     Visible to patient: No (scheduled for 2/1/2022 10:29 AM)    Specimen Information: Toe, Right; Bone Biopsy         0 Result Notes    Culture Isolated in broth only Cutibacterium (Propionibacterium) acnes Abnormal     On day 6 of incubation   Susceptibility testing of Cutibacterium (Propionibacterium) species is not done from this source. This organism is susceptible to penicillin and cefotaxime and most are susceptible to clindamycin.         1/18/2022  9:14 AM     Status: Final result     Visible to patient: No (scheduled for 1/29/2022  8:03 AM)    Specimen Information: Toe, Right; Bone Biopsy         0 Result Notes    Culture Isolated in broth only Bacillus species, not anthracis nor cereus group Abnormal     On day 2 of incubation   Identification obtained by MALDI-TOF mass spectrometry research use only database. Test characteristics determined and verified by the Infectious Diseases  Diagnostic Laboratory.   Susceptibilities not routinely done   These bacteria can be environmental contaminants, but on occasion may be pathogens. Clinical correlation must be applied to interpreting this microbiology result.

## 2022-02-02 ENCOUNTER — OFFICE VISIT (OUTPATIENT)
Dept: PODIATRY | Facility: CLINIC | Age: 42
End: 2022-02-02
Payer: COMMERCIAL

## 2022-02-02 VITALS
WEIGHT: 163.4 LBS | HEIGHT: 69 IN | DIASTOLIC BLOOD PRESSURE: 60 MMHG | BODY MASS INDEX: 24.2 KG/M2 | SYSTOLIC BLOOD PRESSURE: 154 MMHG

## 2022-02-02 DIAGNOSIS — Z09 SURGERY FOLLOW-UP EXAMINATION: Primary | ICD-10-CM

## 2022-02-02 DIAGNOSIS — E11.42 DIABETIC POLYNEUROPATHY ASSOCIATED WITH TYPE 2 DIABETES MELLITUS (H): ICD-10-CM

## 2022-02-02 PROCEDURE — 99213 OFFICE O/P EST LOW 20 MIN: CPT | Performed by: PODIATRIST

## 2022-02-02 ASSESSMENT — MIFFLIN-ST. JEOR: SCORE: 1618.62

## 2022-02-02 NOTE — PROGRESS NOTES
ASSESSMENT:  Encounter Diagnoses   Name Primary?     Surgery follow-up examination Yes     Diabetic polyneuropathy associated with type 2 diabetes mellitus (H)      MEDICAL DECISION MAKING:    The incision is healed.  Sutures were removed without difficulty.  There is no ulceration or other wound.  Of asked him to wait 1 to 2 days before getting his foot wet.  He is going to [place] soon.  I suggested water shoes.  Keep a close eye on his feet.    At this time, there is no evidence of osteomyelitis, right hallux, based on medical exam and bone biopsy.  I placed more weight in the bone biopsy than the leg growth in cultures.  We reviewed that if there is an infection, it will eventually show its face, and surgery might be needed.    A letter was provided for his employer.  This states that he can return to work without restrictions on 2/13/2022.    Follow-up in 1 month for repeat x-ray.    Disclaimer: This note consists of symbols derived from keyboarding, dictation and/or voice recognition software. As a result, there may be errors in the script that have gone undetected. Please consider this when interpreting information found in this chart.    Shelton Smallwood DPM, FACFAS, MS    [place] [facility] of Podiatry/Foot & Ankle Surgery      ____________________________________________________________________    HPI:       Janett presents today in follow-up after bone biopsy of the right great toe, distal phalanx.  This was done on 1/18/2022.  Follow-up postoperatively with my partner Dr. Bee on 1/26/22.    The bone biopsy specimens were negative for osteomyelitis.  There is some late bacterial growth in broth only that is likely contaminants and unlikely infective organisms.  He previously discontinued the multiple oral antibiotics on his own, due to GI upset.  He is feeling better now.    INDICATIONS FOR PROCEDURE:  Phillip Rueda is a 41-year-old male with type 2 diabetes and peripheral neuropathy, who was  hospitalized at the [facility] in mid December for cellulitis secondary to a wound, right hallux.  Plain films and MRI raised some concern for possible underlying osteomyelitis.  He received antibiotics at that time.  Ultimately, biopsy was not done.  He followed up with [facility] after hospitalization.  He initially saw Dr. Vasquez.  Due to the concern for osteomyelitis, a bone biopsy was discussed and recommended.  Phillip was agreeable to this.  For details regarding that visit, please see the clinic note from 12/31/2021.  Phillip was scheduled with me due to surgical scheduling constraints.  I have treated Phillip in the past and know him well.  I was happy to help out.      Past Medical History:   Diagnosis Date     Anxiety      Depressive disorder      Diabetes mellitus (H)      Hypertension      Liver disease      Neuralgic amyotrophy      Other chronic pain      Pain disorder 12/8/2015     Parsonage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      Prostate infection      Seizures (H)      Staph infection    *  *  Past Surgical History:   Procedure Laterality Date     BIOPSY BONE FOOT Right 1/18/2022    Procedure: BIOPSY, BONE, RIGHT GREAT TOE (DISTAL PHALANX);  Surgeon: Shelton Smallwood DPM;  Location:  OR     ORTHOPEDIC SURGERY     *  *  EXAM:      Constitutional:  Phillip Rueda is in no apparent distress, appears well-nourished.  Cooperative with history and physical exam.    Vascular:  Pedal pulses are palpable for both the DP and PT arteries.  CFT < 3 sec.  No edema.      Neuro: Light touch sensation is intact to the L4, L5, S1 distributions  No evidence of weakness, spasticity, or contracture in the lower extremities.     Derm: The incision at the distal aspect of the right hallux has completely healed.  3 sutures are intact.  No ulceration.    Surgical Pathology:   Final Diagnosis   A: Bone, right great toe, sample 1, biopsy  -Viable bone with no significant inflammation     B: Bone, right great  toe, sample 2, biopsy  -Viable bone with no significant inflammation   Electronically signed by Gianna Slaughter MD on        Cultures:  Cutibacterium acnes, bacillus species

## 2022-02-02 NOTE — LETTER
[facility]  [address]  [address]  [place] [address]  Phone: [phone]  Fax: [phone]      REPORT OF WORK ABILITY    NOTE TO EMPLOYEE: You must promptly provide a copy of this report to your  employer or worker's compensation insurer, and Qualified Rehabilitation Consultant.    Date: 2/2/2022                     Employee Name: Phillip Rueda         YOB: 1980      To Whom It May Concern:    Phillip Rueda was evaluated in clinic today. He had right foot surgery 1/18/22. He is cleared to return to work without restrictions on February 13th, 2022.     Dr. Shelton Smallwood

## 2022-02-02 NOTE — LETTER
2/2/2022         RE: Phillip Rueda  [address]  [facility] [address]        Dear Colleague,    Thank you for referring your patient, Phillip Rueda, to the [facility]. Please see a copy of my visit note below.    ASSESSMENT:  Encounter Diagnoses   Name Primary?     Surgery follow-up examination Yes     Diabetic polyneuropathy associated with type 2 diabetes mellitus (H)      MEDICAL DECISION MAKING:    The incision is healed.  Sutures were removed without difficulty.  There is no ulceration or other wound.  Of asked him to wait 1 to 2 days before getting his foot wet.  He is going to [place] soon.  I suggested water shoes.  Keep a close eye on his feet.    At this time, there is no evidence of osteomyelitis, right hallux, based on medical exam and bone biopsy.  I placed more weight in the bone biopsy than the leg growth in cultures.  We reviewed that if there is an infection, it will eventually show its face, and surgery might be needed.    A letter was provided for his employer.  This states that he can return to work without restrictions on 2/13/2022.    Follow-up in 1 month for repeat x-ray.    Disclaimer: This note consists of symbols derived from keyboarding, dictation and/or voice recognition software. As a result, there may be errors in the script that have gone undetected. Please consider this when interpreting information found in this chart.    Shelton Smallwood DPM, FACFAS, [facility] Department of Podiatry/Foot & Ankle Surgery      ____________________________________________________________________    HPI:       Janett presents today in follow-up after bone biopsy of the right great toe, distal phalanx.  This was done on 1/18/2022.  Follow-up postoperatively with my partner Dr. Bee on 1/26/22.    The bone biopsy specimens were negative for osteomyelitis.  There is some late bacterial growth in broth only that is likely contaminants and unlikely  infective organisms.  He previously discontinued the multiple oral antibiotics on his own, due to GI upset.  He is feeling better now.    INDICATIONS FOR PROCEDURE:  Phillip Rueda is a 41-year-old male with type 2 diabetes and peripheral neuropathy, who was hospitalized at the [facility] in mid December for cellulitis secondary to a wound, right hallux.  Plain films and MRI raised some concern for possible underlying osteomyelitis.  He received antibiotics at that time.  Ultimately, biopsy was not done.  He followed up with [facility] after hospitalization.  He initially saw Dr. Vasquez.  Due to the concern for osteomyelitis, a bone biopsy was discussed and recommended.  Phillip was agreeable to this.  For details regarding that visit, please see the clinic note from 12/31/2021.  Phillip was scheduled with me due to surgical scheduling constraints.  I have treated Phillip in the past and know him well.  I was happy to help out.      Past Medical History:   Diagnosis Date     Anxiety      Depressive disorder      Diabetes mellitus (H)      Hypertension      Liver disease      Neuralgic amyotrophy      Other chronic pain      Pain disorder 12/8/2015     Parsonage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      Prostate infection      Seizures (H)      Staph infection    *  *  Past Surgical History:   Procedure Laterality Date     BIOPSY BONE FOOT Right 1/18/2022    Procedure: BIOPSY, BONE, RIGHT GREAT TOE (DISTAL PHALANX);  Surgeon: Shelton Smallwood DPM;  Location:  OR     ORTHOPEDIC SURGERY     *  *  EXAM:      Constitutional:  Phillip Rueda is in no apparent distress, appears well-nourished.  Cooperative with history and physical exam.    Vascular:  Pedal pulses are palpable for both the DP and PT arteries.  CFT < 3 sec.  No edema.      Neuro: Light touch sensation is intact to the L4, L5, S1 distributions  No evidence of weakness, spasticity, or contracture in the lower extremities.     Derm: The incision at  the distal aspect of the right hallux has completely healed.  3 sutures are intact.  No ulceration.    Surgical Pathology:   Final Diagnosis   A: Bone, right great toe, sample 1, biopsy  -Viable bone with no significant inflammation     B: Bone, right great toe, sample 2, biopsy  -Viable bone with no significant inflammation   Electronically signed by Gianna Slaughter MD on        Cultures:  Cutibacterium acnes, bacillus species           Again, thank you for allowing me to participate in the care of your patient.        Sincerely,        Shelton Smallwood DPM

## 2022-02-11 DIAGNOSIS — E11.9 TYPE 2 DIABETES MELLITUS WITHOUT COMPLICATION, WITHOUT LONG-TERM CURRENT USE OF INSULIN (H): ICD-10-CM

## 2022-02-11 RX ORDER — METFORMIN HCL 500 MG
TABLET, EXTENDED RELEASE 24 HR ORAL
Qty: 180 TABLET | Refills: 0 | Status: SHIPPED | OUTPATIENT
Start: 2022-02-11 | End: 2022-03-15

## 2022-02-11 NOTE — TELEPHONE ENCOUNTER
Authorized per nursing refill protocol    Due for physical- has appt scheduled 3/15/22    MARTY LuzN RN  [facility]

## 2022-02-13 ENCOUNTER — HEALTH MAINTENANCE LETTER (OUTPATIENT)
Age: 42
End: 2022-02-13

## 2022-02-21 ENCOUNTER — IMMUNIZATION (OUTPATIENT)
Dept: PEDIATRICS | Facility: CLINIC | Age: 42
End: 2022-02-21
Payer: MEDICARE

## 2022-02-21 PROCEDURE — 90686 IIV4 VACC NO PRSV 0.5 ML IM: CPT

## 2022-02-21 PROCEDURE — G0008 ADMIN INFLUENZA VIRUS VAC: HCPCS

## 2022-03-02 ENCOUNTER — MYC MEDICAL ADVICE (OUTPATIENT)
Dept: FAMILY MEDICINE | Facility: CLINIC | Age: 42
End: 2022-03-02
Payer: COMMERCIAL

## 2022-03-15 ENCOUNTER — OFFICE VISIT (OUTPATIENT)
Dept: FAMILY MEDICINE | Facility: CLINIC | Age: 42
End: 2022-03-15
Payer: COMMERCIAL

## 2022-03-15 VITALS
HEART RATE: 68 BPM | BODY MASS INDEX: 24.72 KG/M2 | OXYGEN SATURATION: 100 % | DIASTOLIC BLOOD PRESSURE: 85 MMHG | SYSTOLIC BLOOD PRESSURE: 133 MMHG | TEMPERATURE: 97.5 F | WEIGHT: 165 LBS

## 2022-03-15 DIAGNOSIS — N52.9 ERECTILE DYSFUNCTION, UNSPECIFIED ERECTILE DYSFUNCTION TYPE: ICD-10-CM

## 2022-03-15 DIAGNOSIS — Z79.899 ENCOUNTER FOR LONG-TERM (CURRENT) USE OF MEDICATIONS: Primary | ICD-10-CM

## 2022-03-15 DIAGNOSIS — E11.9 TYPE 2 DIABETES MELLITUS WITHOUT COMPLICATION, WITHOUT LONG-TERM CURRENT USE OF INSULIN (H): ICD-10-CM

## 2022-03-15 LAB
BASOPHILS # BLD AUTO: 0 10E3/UL (ref 0–0.2)
BASOPHILS NFR BLD AUTO: 0 %
CRP SERPL-MCNC: <2.9 MG/L (ref 0–8)
EOSINOPHIL # BLD AUTO: 0.2 10E3/UL (ref 0–0.7)
EOSINOPHIL NFR BLD AUTO: 4 %
ERYTHROCYTE [DISTWIDTH] IN BLOOD BY AUTOMATED COUNT: 12.5 % (ref 10–15)
HBA1C MFR BLD: 6.7 % (ref 0–5.6)
HCT VFR BLD AUTO: 40.4 % (ref 40–53)
HGB BLD-MCNC: 13.2 G/DL (ref 13.3–17.7)
IMM GRANULOCYTES # BLD: 0 10E3/UL
IMM GRANULOCYTES NFR BLD: 0 %
LYMPHOCYTES # BLD AUTO: 2.2 10E3/UL (ref 0.8–5.3)
LYMPHOCYTES NFR BLD AUTO: 46 %
MCH RBC QN AUTO: 28.2 PG (ref 26.5–33)
MCHC RBC AUTO-ENTMCNC: 32.7 G/DL (ref 31.5–36.5)
MCV RBC AUTO: 86 FL (ref 78–100)
MONOCYTES # BLD AUTO: 0.3 10E3/UL (ref 0–1.3)
MONOCYTES NFR BLD AUTO: 7 %
NEUTROPHILS # BLD AUTO: 2 10E3/UL (ref 1.6–8.3)
NEUTROPHILS NFR BLD AUTO: 42 %
PLATELET # BLD AUTO: 243 10E3/UL (ref 150–450)
RBC # BLD AUTO: 4.68 10E6/UL (ref 4.4–5.9)
WBC # BLD AUTO: 4.8 10E3/UL (ref 4–11)

## 2022-03-15 PROCEDURE — 85025 COMPLETE CBC W/AUTO DIFF WBC: CPT | Performed by: FAMILY MEDICINE

## 2022-03-15 PROCEDURE — 86140 C-REACTIVE PROTEIN: CPT | Performed by: FAMILY MEDICINE

## 2022-03-15 PROCEDURE — 80048 BASIC METABOLIC PNL TOTAL CA: CPT | Performed by: FAMILY MEDICINE

## 2022-03-15 PROCEDURE — 36415 COLL VENOUS BLD VENIPUNCTURE: CPT | Performed by: FAMILY MEDICINE

## 2022-03-15 PROCEDURE — G0438 PPPS, INITIAL VISIT: HCPCS | Performed by: FAMILY MEDICINE

## 2022-03-15 PROCEDURE — 83036 HEMOGLOBIN GLYCOSYLATED A1C: CPT | Performed by: FAMILY MEDICINE

## 2022-03-15 RX ORDER — SILDENAFIL CITRATE 20 MG/1
TABLET ORAL
Qty: 30 TABLET | Refills: 5 | Status: SHIPPED | OUTPATIENT
Start: 2022-03-15 | End: 2022-03-24 | Stop reason: ALTCHOICE

## 2022-03-15 RX ORDER — METFORMIN HCL 500 MG
TABLET, EXTENDED RELEASE 24 HR ORAL
Qty: 180 TABLET | Refills: 1 | Status: SHIPPED | OUTPATIENT
Start: 2022-05-01 | End: 2022-08-15

## 2022-03-15 ASSESSMENT — ENCOUNTER SYMPTOMS
JOINT SWELLING: 0
ARTHRALGIAS: 0
SHORTNESS OF BREATH: 0
EYE PAIN: 0
CONSTIPATION: 0
DIZZINESS: 0
HEADACHES: 0
NAUSEA: 0
SORE THROAT: 0
MYALGIAS: 0
HEMATOCHEZIA: 0
HEMATURIA: 0
COUGH: 0
PALPITATIONS: 0
DIARRHEA: 0
FREQUENCY: 0
FEVER: 0
CHILLS: 0
PARESTHESIAS: 0
DYSURIA: 0
NERVOUS/ANXIOUS: 1
WEAKNESS: 0
HEARTBURN: 0
ABDOMINAL PAIN: 0

## 2022-03-15 ASSESSMENT — ANXIETY QUESTIONNAIRES
4. TROUBLE RELAXING: SEVERAL DAYS
7. FEELING AFRAID AS IF SOMETHING AWFUL MIGHT HAPPEN: SEVERAL DAYS
2. NOT BEING ABLE TO STOP OR CONTROL WORRYING: SEVERAL DAYS
3. WORRYING TOO MUCH ABOUT DIFFERENT THINGS: SEVERAL DAYS
1. FEELING NERVOUS, ANXIOUS, OR ON EDGE: SEVERAL DAYS
GAD7 TOTAL SCORE: 6
5. BEING SO RESTLESS THAT IT IS HARD TO SIT STILL: NOT AT ALL
7. FEELING AFRAID AS IF SOMETHING AWFUL MIGHT HAPPEN: SEVERAL DAYS
6. BECOMING EASILY ANNOYED OR IRRITABLE: SEVERAL DAYS
GAD7 TOTAL SCORE: 6
GAD7 TOTAL SCORE: 6

## 2022-03-15 ASSESSMENT — ACTIVITIES OF DAILY LIVING (ADL): CURRENT_FUNCTION: NO ASSISTANCE NEEDED

## 2022-03-15 ASSESSMENT — PATIENT HEALTH QUESTIONNAIRE - PHQ9
10. IF YOU CHECKED OFF ANY PROBLEMS, HOW DIFFICULT HAVE THESE PROBLEMS MADE IT FOR YOU TO DO YOUR WORK, TAKE CARE OF THINGS AT HOME, OR GET ALONG WITH OTHER PEOPLE: SOMEWHAT DIFFICULT
SUM OF ALL RESPONSES TO PHQ QUESTIONS 1-9: 3
SUM OF ALL RESPONSES TO PHQ QUESTIONS 1-9: 3

## 2022-03-15 NOTE — PLAN OF CARE
"Time: 2300-0730.  Reason for admission: Cellulitis or right great toe.   VS: BP (!) 156/100 (BP Location: Right arm)   Pulse 78   Temp 97.4  F (36.3  C) (Oral)   Resp 17   Ht 1.727 m (5' 8\")   Wt 74.8 kg (165 lb)   SpO2 98%   BMI 25.09 kg/m      Activity: Independent.   Neuros: A&Ox4. Denied N/T.   Cardiac: WNL. BP elevated but within parameters.  Respiratory: LS clear. Sating mid 90's on RA. Denied cough or SOB.   GI/: Voids without difficultly. Uses bedside urinal. Abd soft non tender. +flatus. +BS. Reports constipation. On bowel meds.   Diet: Regular.   Skin: R great tow wound. Dressing is CDI.   Lines: L PIV SL and intermittent ABX.  Labs: reviewed. No replacement needed.   New changes this shift/Plan: Continue with pOC and notify MD with changes.   Will continue to monitor & follow POC.    " Will send fax when received.

## 2022-03-15 NOTE — PROGRESS NOTES
"SUBJECTIVE:   Phillip Rueda is a 41 year old male who presents for Preventive Visit.    Patient has been advised of split billing requirements and indicates understanding: Yes  Are you in the first 12 months of your Medicare coverage?  No    Healthy Habits:     In general, how would you rate your overall health?  Good    Frequency of exercise:  1 day/week    Duration of exercise:  15-30 minutes    Do you usually eat at least 4 servings of fruit and vegetables a day, include whole grains    & fiber and avoid regularly eating high fat or \"junk\" foods?  No    Taking medications regularly:  Yes    Medication side effects:  None    Ability to successfully perform activities of daily living:  No assistance needed    Home Safety:  No safety concerns identified    Hearing Impairment:  Difficulty following a conversation in a noisy restaurant or crowded room and need to ask people to speak up or repeat themselves    In the past 6 months, have you been bothered by leaking of urine?  No    In general, how would you rate your overall mental or emotional health?  Good      PHQ-2 Total Score: 1    Additional concerns today:  No    Do you feel safe in your environment? Yes    Have you ever done Advance Care Planning? (For example, a Health Directive, POLST, or a discussion with a medical provider or your loved ones about your wishes): No, advance care planning information given to patient to review.  Advanced care planning was discussed at today's visit.       Fall risk  Fallen 2 or more times in the past year?: No  Any fall with injury in the past year?: No     Mini-CogTM Leidy Brown. Licensed by the author for use in [facility]; reprinted with permission ([email or web address]). All rights reserved.          Reviewed and updated as needed this visit by clinical staff   Tobacco  Allergies  Meds   Med Hx  Surg Hx  Fam Hx  Soc Hx        Reviewed and updated as needed this visit by Provider                 Social " History     Tobacco Use     Smoking status: Former Smoker     Packs/day: 0.25     Years: 15.00     Pack years: 3.75     Types: Cigarettes, Dip, chew, snus or snuff     Start date: 1996     Quit date: 2013     Years since quittin.7     Smokeless tobacco: Former User   Substance Use Topics     Alcohol use: No     Alcohol/week: 0.0 standard drinks     Comment: 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  Drink 1.75 every 2 days     If you drink alcohol do you typically have >3 drinks per day or >7 drinks per week? No    Alcohol Use 3/15/2022   Prescreen: >3 drinks/day or >7 drinks/week? Not Applicable   Prescreen: >3 drinks/day or >7 drinks/week? -               Current providers sharing in care for this patient include:   Patient Care Team:  Km Dos Santos MD as PCP - General (Family Practice)  [place] as Other (see comments) (Pain Clinic)  Ky Guerrero MD as MD (Neurology)  Tiffany Cardenas RN as Nurse Coordinator  Ulisses Jordan MD as MD (Pain Clinic)  Kelvin Hickman MD as MD (Ophthalmology)  Km Dos Santos MD as MD (Family Practice)  Erna Pineda RN as Registered Nurse  Dat, Mc Membreno MD as Assigned Surgical Provider  Km Dos Santos MD as Assigned PCP  Ky Guerrero MD as MD (Neurology)  Shelton Smallwood DPM as Assigned Musculoskeletal Provider    The following health maintenance items are reviewed in Epic and correct as of today:  Health Maintenance Due   Topic Date Due     DEPRESSION ACTION PLAN  Never done     URINE DRUG SCREEN  2014     EYE EXAM  10/13/2016     DIABETIC FOOT EXAM  2019     PHQ-9  10/12/2021     Mother has parkinson and she broke her hip. She is also depressed. Stressed due to that. phq and jen high due to that. Father is full time caretaker.     Been to podiatrist - did bone biopsy and that is also healed now.     Sometime nohemi states his blood sugars are low but  when he checks blood sugar those are fine.       Hemoglobin A1C POCT   Date Value Ref Range Status   03/10/2021 7.1 (H) 0 - 5.6 % Final     Comment:     Normal <5.7% Prediabetes 5.7-6.4%  Diabetes 6.5% or higher - adopted from ADA   consensus guidelines.     08/12/2020 6.9 (H) 0 - 5.6 % Final     Comment:     Normal <5.7% Prediabetes 5.7-6.4%  Diabetes 6.5% or higher - adopted from ADA   consensus guidelines.     08/28/2019 6.3 (H) 0 - 5.6 % Final     Comment:     Normal <5.7% Prediabetes 5.7-6.4%  Diabetes 6.5% or higher - adopted from ADA   consensus guidelines.       Hemoglobin A1C   Date Value Ref Range Status   10/19/2021 6.9 (H) 0.0 - 5.6 % Final     Comment:     Normal <5.7%   Prediabetes 5.7-6.4%    Diabetes 6.5% or higher     Note: Adopted from ADA consensus guidelines.       ozempic - average slightly above. Now going to increase it to 0.5.     Would like to get viagra refills.     Review of Systems   Constitutional: Negative for chills and fever.   HENT: Negative for congestion, ear pain, hearing loss and sore throat.    Eyes: Negative for pain and visual disturbance.   Respiratory: Negative for cough and shortness of breath.    Cardiovascular: Negative for chest pain, palpitations and peripheral edema.   Gastrointestinal: Negative for abdominal pain, constipation, diarrhea, heartburn, hematochezia and nausea.   Genitourinary: Positive for impotence. Negative for dysuria, frequency, genital sores, hematuria, penile discharge and urgency.   Musculoskeletal: Negative for arthralgias, joint swelling and myalgias.   Skin: Negative for rash.   Neurological: Negative for dizziness, weakness, headaches and paresthesias.   Psychiatric/Behavioral: Negative for mood changes. The patient is nervous/anxious.      OBJECTIVE:   /85 (BP Location: Right arm, Patient Position: Chair, Cuff Size: Adult Regular)   Pulse 68   Temp 97.5  F (36.4  C) (Temporal)   Wt 74.8 kg (165 lb)   SpO2 100%   BMI 24.72 kg/m    "Estimated body mass index is 24.72 kg/m  as calculated from the following:    Height as of 2/2/22: 1.74 m (5' 8.5\").    Weight as of this encounter: 74.8 kg (165 lb).  Physical Exam  GENERAL: healthy, alert and no distress  EYES: Eyes grossly normal to inspection, PERRL and conjunctivae and sclerae normal  HENT: ear canals and TM's normal, nose and mouth without ulcers or lesions  NECK: no adenopathy, no asymmetry, masses, or scars and thyroid normal to palpation  RESP: lungs clear to auscultation - no rales, rhonchi or wheezes  CV: regular rate and rhythm, normal S1 S2, no S3 or S4, no murmur, click or rub, no peripheral edema and peripheral pulses strong  ABDOMEN: soft, nontender, no hepatosplenomegaly, no masses and bowel sounds normal   (male): normal male genitalia without lesions or urethral discharge, no hernia  MS: no gross musculoskeletal defects noted, no edema  SKIN: no suspicious lesions or rashes  NEURO: Normal strength and tone, mentation intact and speech normal  PSYCH: mentation appears normal, affect normal/bright        ASSESSMENT / PLAN:   (Z79.899) Encounter for long-term (current) use of medications  (primary encounter diagnosis)  Comment:    Plan:      (E11.9) Type 2 diabetes mellitus without complication, without long-term current use of insulin (H)  Comment:  Doing well. contiue same rx. Go up on ozempic dose to 0.5mg  Plan: OPTOMETRY REFERRAL, Continuous Blood Gluc         Sensor (FREESTYLE JHON 2 SENSOR) MISC,         metFORMIN (GLUCOPHAGE-XR) 500 MG 24 hr tablet,         Hemoglobin A1c             (N52.9) Erectile dysfunction, unspecified erectile dysfunction type  Comment:  Patient is tolerating current medication without any major side effects of concerns and current dose seems reasonable too.  Current medication regime is effective. Continue current treatment without any changes.   Plan: sildenafil (REVATIO) 20 MG tablet          COUNSELING:  Reviewed preventive health counseling, as " "reflected in patient instructions  Special attention given to:       Regular exercise       Healthy diet/nutrition       Vision screening       Hearing screening    Estimated body mass index is 24.72 kg/m  as calculated from the following:    Height as of 2/2/22: 1.74 m (5' 8.5\").    Weight as of this encounter: 74.8 kg (165 lb).        He reports that he quit smoking about 8 years ago. His smoking use included cigarettes and dip, chew, snus or snuff. He started smoking about 25 years ago. He has a 3.75 pack-year smoking history. He has quit using smokeless tobacco.      Appropriate preventive services were discussed with this patient, including applicable screening as appropriate for cardiovascular disease, diabetes, osteopenia/osteoporosis, and glaucoma.  As appropriate for age/gender, discussed screening for colorectal cancer, prostate cancer, breast cancer, and cervical cancer. Checklist reviewing preventive services available has been given to the patient.    Reviewed patients plan of care and provided an AVS. The Basic Care Plan (routine screening as documented in Health Maintenance) for Phillip meets the Care Plan requirement. This Care Plan has been established and reviewed with the Patient.    Counseling Resources:  ATP IV Guidelines  Pooled Cohorts Equation Calculator  Breast Cancer Risk Calculator  Breast Cancer: Medication to Reduce Risk  FRAX Risk Assessment  ICSI Preventive Guidelines  Dietary Guidelines for Americans, 2010  Be Here's MyPlate  ASA Prophylaxis  Lung CA Screening    Km Dos Santos MD  [facility]    Identified Health Risks:  Answers for HPI/ROS submitted by the patient on 3/15/2022  If you checked off any problems, how difficult have these problems made it for you to do your work, take care of things at home, or get along with other people?: Somewhat difficult  PHQ9 TOTAL SCORE: 3  SANDHYA 7 TOTAL SCORE: 6      "

## 2022-03-16 LAB
ANION GAP SERPL CALCULATED.3IONS-SCNC: 6 MMOL/L (ref 3–14)
BUN SERPL-MCNC: 9 MG/DL (ref 7–30)
CALCIUM SERPL-MCNC: 9.6 MG/DL (ref 8.5–10.1)
CHLORIDE BLD-SCNC: 105 MMOL/L (ref 94–109)
CO2 SERPL-SCNC: 30 MMOL/L (ref 20–32)
CREAT SERPL-MCNC: 0.91 MG/DL (ref 0.66–1.25)
GFR SERPL CREATININE-BSD FRML MDRD: >90 ML/MIN/1.73M2
GLUCOSE BLD-MCNC: 113 MG/DL (ref 70–99)
POTASSIUM BLD-SCNC: 4.2 MMOL/L (ref 3.4–5.3)
SODIUM SERPL-SCNC: 141 MMOL/L (ref 133–144)

## 2022-03-16 ASSESSMENT — PATIENT HEALTH QUESTIONNAIRE - PHQ9: SUM OF ALL RESPONSES TO PHQ QUESTIONS 1-9: 3

## 2022-03-16 ASSESSMENT — ANXIETY QUESTIONNAIRES: GAD7 TOTAL SCORE: 6

## 2022-03-24 DIAGNOSIS — N52.9 ERECTILE DYSFUNCTION, UNSPECIFIED ERECTILE DYSFUNCTION TYPE: Primary | ICD-10-CM

## 2022-03-24 RX ORDER — TADALAFIL 10 MG/1
10 TABLET ORAL DAILY PRN
Qty: 12 TABLET | Refills: 3 | Status: ON HOLD | OUTPATIENT
Start: 2022-03-24 | End: 2022-10-10

## 2022-04-27 DIAGNOSIS — Z79.4 TYPE 2 DIABETES MELLITUS WITHOUT COMPLICATION, WITH LONG-TERM CURRENT USE OF INSULIN (H): ICD-10-CM

## 2022-04-27 DIAGNOSIS — E11.9 TYPE 2 DIABETES MELLITUS WITHOUT COMPLICATION, WITH LONG-TERM CURRENT USE OF INSULIN (H): ICD-10-CM

## 2022-04-28 RX ORDER — ATORVASTATIN CALCIUM 20 MG/1
20 TABLET, FILM COATED ORAL DAILY
Qty: 90 TABLET | Refills: 2 | Status: SHIPPED | OUTPATIENT
Start: 2022-04-28 | End: 2023-05-31

## 2022-07-07 DIAGNOSIS — E11.9 TYPE 2 DIABETES MELLITUS WITHOUT COMPLICATION, WITHOUT LONG-TERM CURRENT USE OF INSULIN (H): ICD-10-CM

## 2022-07-20 ENCOUNTER — MYC MEDICAL ADVICE (OUTPATIENT)
Dept: FAMILY MEDICINE | Facility: CLINIC | Age: 42
End: 2022-07-20

## 2022-07-20 DIAGNOSIS — E11.9 TYPE 2 DIABETES MELLITUS WITHOUT COMPLICATION, WITHOUT LONG-TERM CURRENT USE OF INSULIN (H): ICD-10-CM

## 2022-07-20 DIAGNOSIS — N52.9 ERECTILE DYSFUNCTION, UNSPECIFIED ERECTILE DYSFUNCTION TYPE: ICD-10-CM

## 2022-07-22 RX ORDER — METFORMIN HCL 500 MG
TABLET, EXTENDED RELEASE 24 HR ORAL
Qty: 180 TABLET | Refills: 1 | OUTPATIENT
Start: 2022-07-22

## 2022-07-22 NOTE — TELEPHONE ENCOUNTER
180 tablets with 1 refill was sent 5/1/22    Argenis Rios BSN RN  [facility]

## 2022-07-22 NOTE — TELEPHONE ENCOUNTER
Cialis is too expensive can you change it back to Viagra, sign off on script if correct.    Thank you   Chest pain

## 2022-07-25 RX ORDER — SILDENAFIL CITRATE 20 MG/1
TABLET ORAL
Qty: 30 TABLET | Refills: 5 | Status: ON HOLD | OUTPATIENT
Start: 2022-07-25 | End: 2022-10-10

## 2022-08-11 DIAGNOSIS — E11.9 TYPE 2 DIABETES MELLITUS WITHOUT COMPLICATION, WITHOUT LONG-TERM CURRENT USE OF INSULIN (H): ICD-10-CM

## 2022-08-12 RX ORDER — METFORMIN HCL 500 MG
TABLET, EXTENDED RELEASE 24 HR ORAL
Qty: 180 TABLET | Refills: 1 | OUTPATIENT
Start: 2022-08-12

## 2022-08-15 RX ORDER — METFORMIN HCL 500 MG
TABLET, EXTENDED RELEASE 24 HR ORAL
Qty: 360 TABLET | Refills: 0 | Status: SHIPPED | OUTPATIENT
Start: 2022-08-15 | End: 2022-10-21

## 2022-08-15 NOTE — TELEPHONE ENCOUNTER
---Prescription approved per [facility] Refill Protocol.       Margareth Barlow RN BSN     [employer] [facility]                --Last visit:  3/15/2022     --Future Visit: none.

## 2022-08-17 DIAGNOSIS — E11.9 TYPE 2 DIABETES MELLITUS WITHOUT COMPLICATION, WITHOUT LONG-TERM CURRENT USE OF INSULIN (H): ICD-10-CM

## 2022-08-22 RX ORDER — SEMAGLUTIDE 1.34 MG/ML
INJECTION, SOLUTION SUBCUTANEOUS
Qty: 4.5 ML | Refills: 0 | Status: SHIPPED | OUTPATIENT
Start: 2022-08-22 | End: 2022-10-21

## 2022-08-22 NOTE — TELEPHONE ENCOUNTER
---Prescription approved per [facility] Refill Protocol.       Margareth Barlow RN BSN     [employer] [facility]        --Last visit at [facility]:  3/15/2022 Raya. RTC 6 months.    --Future Visit: none.

## 2022-08-31 ENCOUNTER — MYC MEDICAL ADVICE (OUTPATIENT)
Dept: FAMILY MEDICINE | Facility: CLINIC | Age: 42
End: 2022-08-31

## 2022-08-31 DIAGNOSIS — M79.673 PAIN OF FOOT, UNSPECIFIED LATERALITY: Primary | ICD-10-CM

## 2022-09-13 ENCOUNTER — OFFICE VISIT (OUTPATIENT)
Dept: PODIATRY | Facility: CLINIC | Age: 42
End: 2022-09-13
Payer: COMMERCIAL

## 2022-09-13 VITALS — HEIGHT: 69 IN | BODY MASS INDEX: 23.85 KG/M2 | WEIGHT: 161 LBS

## 2022-09-13 DIAGNOSIS — M79.674 TOE PAIN, BILATERAL: ICD-10-CM

## 2022-09-13 DIAGNOSIS — M25.572 ACUTE LEFT ANKLE PAIN: Primary | ICD-10-CM

## 2022-09-13 DIAGNOSIS — L97.511 DIABETIC ULCER OF TOE OF RIGHT FOOT ASSOCIATED WITH DIABETES MELLITUS DUE TO UNDERLYING CONDITION, LIMITED TO BREAKDOWN OF SKIN (H): ICD-10-CM

## 2022-09-13 DIAGNOSIS — E11.9 TYPE 2 DIABETES MELLITUS WITHOUT COMPLICATION, WITHOUT LONG-TERM CURRENT USE OF INSULIN (H): ICD-10-CM

## 2022-09-13 DIAGNOSIS — E08.621 DIABETIC ULCER OF TOE OF RIGHT FOOT ASSOCIATED WITH DIABETES MELLITUS DUE TO UNDERLYING CONDITION, LIMITED TO BREAKDOWN OF SKIN (H): ICD-10-CM

## 2022-09-13 DIAGNOSIS — L84 CALLUS OF FOOT: ICD-10-CM

## 2022-09-13 DIAGNOSIS — M21.42 ACQUIRED FLAT FOOT, LEFT: ICD-10-CM

## 2022-09-13 DIAGNOSIS — L60.8 NAIL DEFORMITY: ICD-10-CM

## 2022-09-13 DIAGNOSIS — E11.42 DIABETIC POLYNEUROPATHY ASSOCIATED WITH TYPE 2 DIABETES MELLITUS (H): ICD-10-CM

## 2022-09-13 DIAGNOSIS — M79.675 TOE PAIN, BILATERAL: ICD-10-CM

## 2022-09-13 PROCEDURE — 11055 PARING/CUTG B9 HYPRKER LES 1: CPT | Mod: 51 | Performed by: PODIATRIST

## 2022-09-13 PROCEDURE — 97597 DBRDMT OPN WND 1ST 20 CM/<: CPT | Performed by: PODIATRIST

## 2022-09-13 PROCEDURE — 99214 OFFICE O/P EST MOD 30 MIN: CPT | Mod: 25 | Performed by: PODIATRIST

## 2022-09-13 NOTE — LETTER
9/13/2022         RE: Phillip Rueda  [address]  [place] [facility] [address]        Dear Colleague,    Thank you for referring your patient, Phillip Rueda, to the [facility]. Please see a copy of my visit note below.    ASSESSMENT:  Encounter Diagnoses   Name Primary?     Acute left ankle pain Yes     Acquired flat foot, left      Diabetic ulcer of toe of right foot associated with diabetes mellitus due to underlying condition, limited to breakdown of skin (H)      Callus of foot      Toe pain, bilateral      Nail deformity      Diabetic polyneuropathy associated with type 2 diabetes mellitus (H)      Type 2 diabetes mellitus without complication, without long-term current use of insulin (H)      MEDICAL DECISION MAKING:  His left ankle pain is likely related to the faulty mechanics of his foot.  I reminded him that, although he had a flatfoot reconstructive type surgery, he still has a flatfoot.  He needs good support.  His current shoes are worn out and without custom orthoses.    The anterior ankle pain is likely synovitis related to the faulty mechanics.  The lateral ankle pain is likely lateral ankle impingement.    Tri-Lock ankle brace with foot strap medial to bring the heel under t better alignment and decompress the lateral ankle.    He is referred for new diabetic shoes and custom molded multi density orthoses.    We discussed the option of left ankle x-ray today.  He opted for the bracing and new orthoses first.  If the problem persists, we will consider imaging.  There is no known recent injury.  She denies any change in weightbearing activities.    Inquired about having his bilateral hallux nails permanently removed.  He reports that the thickness causes discomfort.  Due to pain, I think this is a reasonable request.  His last hemoglobin A1c was 6.7 and his pedal pulses are palpable.  The procedures were discussed as well as the post procedure course.  He is  asked to make a 30 to 45-minute appointment for these procedures.    He was instructed on basic wound care as per the right hallux: Daily cleansing, topical antibiotic and light dressing.  Now that the hyperkeratotic eschar is debrided, anticipate this will heal.  We discussed periodic follow-up for trimming of the hyperkeratotic tissue.  A more definitive option might be surgery, involving an interphalangeal joint arthroplasty.    Procedures:  1) using a #15 scalpel, the hyperkeratotic lesion on the left hallux was pared down to healthier underlying epithelium.  No bleeding.     2) using a #15 scalpel, the right hallux ulcer was debrided via excision.  The hyperkeratotic tissue was excised.  There was mild bleeding.  The area was cleansed with an alcohol wipe, bacitracin and light dressing applied.    Follow-up for the above noted procedures    Total time spent on this patient's care, date of service 9/13/2022, was 30 minutes.  This involved history taking, clinical exam, discussion regarding the different diagnoses, care plans for the different diagnoses, excisional debridement, and documentation of today's encounter.    Disclaimer: This note consists of symbols derived from keyboarding, dictation and/or voice recognition software. As a result, there may be errors in the script that have gone undetected. Please consider this when interpreting information found in this chart.    Shelton Smallwood DPM, FACFAS, MS    [place] Department of Podiatry/Foot & Ankle Surgery      ____________________________________________________________________    HPI:       Phillip presents today with left ankle pain.  He had left left foot reconstructive surgery at [facility] in the spring 2021.  Today he specifies pain over the anterior left ankle as well as lateral ankle.  The onset is fairly recent.  He was doing well up until that time.  He said he needs to buy new shoes.    He also is concerned about a blood blister on his right great toe.  I  "performed a bone biopsy on this toe in January 2022, due to concern for possible underlying osteomyelitis.  It was negative.  He was last evaluated for this on 2/2/2022.    Phillip reports that he has bilateral nail related hallux pain.  He inquires about permanent removal of these nails.    Type 2 DM  Peripheral neuropathy  Last Hgb A1C = 6.7.    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    Constitutional:  Phillip Rueda is in no apparent distress, appears well-nourished.  Cooperative with history and physical exam.    Vascular:  Pedal pulses are palpable for both the DP and PT arteries.  CFT < 3 sec.  No edema.      Neuro: Light touch sensation is diminished, bilateral foot, to the L4, L5, S1 distributions  No evidence of weakness, spasticity, or contracture in the lower extremities.     Derm: There is thick hyperkeratotic skin at the distal aspect of the right hallux.  There is evidence of intraepidermal bleeding.  Post debridement there are several superficial ulcerative areas, to the depth of deeper skin.  No associated erythema.    Thick hyperkeratotic lesion plantar medial left hallux.    Bilateral hallux nails are thickened.  The right is dystrophic.    Musculoskeletal:    Lower extremity muscle strength is normal.  Bilateral flatfoot deformity.  Some pain on palpation to the anterior aspect " of the left ankle.  He is also has pain over the anterior lateral aspect of the ankle.            Again, thank you for allowing me to participate in the care of your patient.        Sincerely,        Shelton Smallwood DPM

## 2022-09-13 NOTE — PROGRESS NOTES
ASSESSMENT:  Encounter Diagnoses   Name Primary?     Acute left ankle pain Yes     Acquired flat foot, left      Diabetic ulcer of toe of right foot associated with diabetes mellitus due to underlying condition, limited to breakdown of skin (H)      Callus of foot      Toe pain, bilateral      Nail deformity      Diabetic polyneuropathy associated with type 2 diabetes mellitus (H)      Type 2 diabetes mellitus without complication, without long-term current use of insulin (H)      MEDICAL DECISION MAKING:  His left ankle pain is likely related to the faulty mechanics of his foot.  I reminded him that, although he had a flatfoot reconstructive type surgery, he still has a flatfoot.  He needs good support.  His current shoes are worn out and without custom orthoses.    The anterior ankle pain is likely synovitis related to the faulty mechanics.  The lateral ankle pain is likely lateral ankle impingement.    Tri-Lock ankle brace with foot strap medial to bring the heel under t better alignment and decompress the lateral ankle.    He is referred for new diabetic shoes and custom molded multi density orthoses.    We discussed the option of left ankle x-ray today.  He opted for the bracing and new orthoses first.  If the problem persists, we will consider imaging.  There is no known recent injury.  She denies any change in weightbearing activities.    Inquired about having his bilateral hallux nails permanently removed.  He reports that the thickness causes discomfort.  Due to pain, I think this is a reasonable request.  His last hemoglobin A1c was 6.7 and his pedal pulses are palpable.  The procedures were discussed as well as the post procedure course.  He is asked to make a 30 to 45-minute appointment for these procedures.    He was instructed on basic wound care as per the right hallux: Daily cleansing, topical antibiotic and light dressing.  Now that the hyperkeratotic eschar is debrided, anticipate this will heal.   We discussed periodic follow-up for trimming of the hyperkeratotic tissue.  A more definitive option might be surgery, involving an interphalangeal joint arthroplasty.    Procedures:  1) using a #15 scalpel, the hyperkeratotic lesion on the left hallux was pared down to healthier underlying epithelium.  No bleeding.     2) using a #15 scalpel, the right hallux ulcer was debrided via excision.  The hyperkeratotic tissue was excised.  There was mild bleeding.  The area was cleansed with an alcohol wipe, bacitracin and light dressing applied.    Follow-up for the above noted procedures    Total time spent on this patient's care, date of service 9/13/2022, was 30 minutes.  This involved history taking, clinical exam, discussion regarding the different diagnoses, care plans for the different diagnoses, excisional debridement, and documentation of today's encounter.    Disclaimer: This note consists of symbols derived from keyboarding, dictation and/or voice recognition software. As a result, there may be errors in the script that have gone undetected. Please consider this when interpreting information found in this chart.    Shelton Smallwood DPM, FACFAS, MS    [facility] of Podiatry/Foot & Ankle Surgery      ____________________________________________________________________    HPI:       Phillip presents today with left ankle pain.  He had left left foot reconstructive surgery at [facility] in the spring 2021.  Today he specifies pain over the anterior left ankle as well as lateral ankle.  The onset is fairly recent.  He was doing well up until that time.  He said he needs to buy new shoes.    He also is concerned about a blood blister on his right great toe.  I performed a bone biopsy on this toe in January 2022, due to concern for possible underlying osteomyelitis.  It was negative.  He was last evaluated for this on 2/2/2022.    Phillip reports that he has bilateral nail related hallux pain.  He inquires about permanent  "removal of these nails.    Type 2 DM  Peripheral neuropathy  Last Hgb A1C = 6.7.    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    Constitutional:  Phillip Rueda is in no apparent distress, appears well-nourished.  Cooperative with history and physical exam.    Vascular:  Pedal pulses are palpable for both the DP and PT arteries.  CFT < 3 sec.  No edema.      Neuro: Light touch sensation is diminished, bilateral foot, to the L4, L5, S1 distributions  No evidence of weakness, spasticity, or contracture in the lower extremities.     Derm: There is thick hyperkeratotic skin at the distal aspect of the right hallux.  There is evidence of intraepidermal bleeding.  Post debridement there are several superficial ulcerative areas, to the depth of deeper skin.  No associated erythema.    Thick hyperkeratotic lesion plantar medial left hallux.    Bilateral hallux nails are thickened.  The right is dystrophic.    Musculoskeletal:    Lower extremity muscle strength is normal.  Bilateral flatfoot deformity.  Some pain on palpation to the anterior aspect of the left ankle.  He is also has pain over the anterior lateral aspect of the ankle.        "

## 2022-09-13 NOTE — PATIENT INSTRUCTIONS
Thank you for choosing [facility] Podiatry / Foot & Ankle Surgery!    DR. GAVIN'S CLINIC LOCATIONS:     [facility] TRIAGE LINE: [phone]   [address] [place] [address] APPOINTMENTS: [phone]   [place] [address] RADIOLOGY: [phone]   (Every other Tues - Wed - Fri PM) SET UP SURGERY: [phone]    BILLING QUESTIONS: [phone]   [facility] FAX: [phone]   [address]    [place] [address]    (Thurs & Fri AM)      Follow up: 45 minute appointment require for toenail procedures - let scheduling know    Next steps: orthotics    [facility] LOCATIONS  [facility]  [address] at the [facility] [address]  [place] [address]  Phone: [phone]  Fax: [phone] [facility]  [address]  [place] [address]  Phone: [phone]   Fax: [phone]   [facility]  [address]  [place] [address]  Phone: [phone]  Fax: [phone] [facility]  [address]  [place] [address]  Phone: [phone]   Fax: [phone]   [facility]   [address]  [place] [address]  Phone: [phone]  Fax: [phone] * Please call any location listed to make an appointment for a casting/fitting. Your referral was sent to their central office and they will all have the order on file.       WOUND CARE INSTRUCTIONS    1)  Keep the wound covered by a bandage when bathing.    2)  Gently clean the wound with soap water, separate from bath/shower water.      3)  Each day, apply a topical antibiotic ointment to the wound (Neosporin, Triple antibiotic, Bacitracin).   Cover with large band-aid or gauze.      5)  Please seek immediate medical attention if any increasing redness, drainage, smell, or pain related to the wound.     6)  Please return to clinic in the period of time requested by Dr. Gavin.      SIGNS OF INFECTION  expanding redness  around the wound   yellow or greenish-colored pus or cloudy wound drainage   red streaking spreading from the wound   increased swelling, tenderness, or pain around the wound   fever  *If you notice any of these signs of infection, call us right away!

## 2022-10-09 ENCOUNTER — APPOINTMENT (OUTPATIENT)
Dept: CT IMAGING | Facility: CLINIC | Age: 42
End: 2022-10-09
Attending: EMERGENCY MEDICINE
Payer: COMMERCIAL

## 2022-10-09 ENCOUNTER — HOSPITAL ENCOUNTER (OUTPATIENT)
Facility: CLINIC | Age: 42
Setting detail: OBSERVATION
Discharge: HOME | End: 2022-10-10
Attending: EMERGENCY MEDICINE | Admitting: INTERNAL MEDICINE
Payer: COMMERCIAL

## 2022-10-09 DIAGNOSIS — Z20.822 LAB TEST NEGATIVE FOR COVID-19 VIRUS: ICD-10-CM

## 2022-10-09 DIAGNOSIS — G89.29 CHRONIC PAIN OF LEFT ANKLE: ICD-10-CM

## 2022-10-09 DIAGNOSIS — M25.572 CHRONIC PAIN OF LEFT ANKLE: ICD-10-CM

## 2022-10-09 DIAGNOSIS — R11.10 INTRACTABLE VOMITING: Primary | ICD-10-CM

## 2022-10-09 DIAGNOSIS — R19.7 DIARRHEA, UNSPECIFIED TYPE: ICD-10-CM

## 2022-10-09 DIAGNOSIS — R19.7 DIARRHEA: ICD-10-CM

## 2022-10-09 DIAGNOSIS — R11.14 BILIOUS VOMITING WITH NAUSEA: ICD-10-CM

## 2022-10-09 DIAGNOSIS — G54.5 NEURALGIC AMYOTROPHY: ICD-10-CM

## 2022-10-09 DIAGNOSIS — G89.29 CHRONIC RIGHT SHOULDER PAIN: ICD-10-CM

## 2022-10-09 DIAGNOSIS — M25.511 CHRONIC RIGHT SHOULDER PAIN: ICD-10-CM

## 2022-10-09 LAB
ALBUMIN SERPL BCG-MCNC: 5.1 G/DL (ref 3.5–5.2)
ALBUMIN UR-MCNC: NEGATIVE MG/DL
ALP SERPL-CCNC: 112 U/L (ref 40–129)
ALT SERPL W P-5'-P-CCNC: 17 U/L (ref 10–50)
ANION GAP SERPL CALCULATED.3IONS-SCNC: 18 MMOL/L (ref 7–15)
APPEARANCE UR: CLEAR
AST SERPL W P-5'-P-CCNC: ABNORMAL U/L
BASOPHILS # BLD AUTO: 0 10E3/UL (ref 0–0.2)
BASOPHILS NFR BLD AUTO: 0 %
BILIRUB SERPL-MCNC: 0.9 MG/DL
BILIRUB UR QL STRIP: NEGATIVE
BUN SERPL-MCNC: 14.1 MG/DL (ref 6–20)
CALCIUM SERPL-MCNC: 10.4 MG/DL (ref 8.6–10)
CHLORIDE SERPL-SCNC: 99 MMOL/L (ref 98–107)
COLOR UR AUTO: ABNORMAL
CREAT SERPL-MCNC: 1.04 MG/DL (ref 0.67–1.17)
DEPRECATED HCO3 PLAS-SCNC: 23 MMOL/L (ref 22–29)
EOSINOPHIL # BLD AUTO: 0 10E3/UL (ref 0–0.7)
EOSINOPHIL NFR BLD AUTO: 0 %
ERYTHROCYTE [DISTWIDTH] IN BLOOD BY AUTOMATED COUNT: 13.2 % (ref 10–15)
GFR SERPL CREATININE-BSD FRML MDRD: >90 ML/MIN/1.73M2
GLUCOSE BLDC GLUCOMTR-MCNC: 83 MG/DL (ref 70–99)
GLUCOSE SERPL-MCNC: 91 MG/DL (ref 70–99)
GLUCOSE UR STRIP-MCNC: NEGATIVE MG/DL
HCT VFR BLD AUTO: 47.3 % (ref 40–53)
HGB BLD-MCNC: 16.1 G/DL (ref 13.3–17.7)
HGB UR QL STRIP: NEGATIVE
HYALINE CASTS: 5 /LPF
IMM GRANULOCYTES # BLD: 0 10E3/UL
IMM GRANULOCYTES NFR BLD: 0 %
KETONES UR STRIP-MCNC: 40 MG/DL
LEUKOCYTE ESTERASE UR QL STRIP: NEGATIVE
LIPASE SERPL-CCNC: 61 U/L (ref 13–60)
LYMPHOCYTES # BLD AUTO: 2.5 10E3/UL (ref 0.8–5.3)
LYMPHOCYTES NFR BLD AUTO: 25 %
MAGNESIUM SERPL-MCNC: 1.6 MG/DL (ref 1.7–2.3)
MCH RBC QN AUTO: 28.5 PG (ref 26.5–33)
MCHC RBC AUTO-ENTMCNC: 34 G/DL (ref 31.5–36.5)
MCV RBC AUTO: 84 FL (ref 78–100)
MONOCYTES # BLD AUTO: 0.5 10E3/UL (ref 0–1.3)
MONOCYTES NFR BLD AUTO: 5 %
MUCOUS THREADS #/AREA URNS LPF: PRESENT /LPF
NEUTROPHILS # BLD AUTO: 6.7 10E3/UL (ref 1.6–8.3)
NEUTROPHILS NFR BLD AUTO: 70 %
NITRATE UR QL: NEGATIVE
NRBC # BLD AUTO: 0 10E3/UL
NRBC BLD AUTO-RTO: 0 /100
PH UR STRIP: 6.5 [PH] (ref 5–7)
PLATELET # BLD AUTO: 283 10E3/UL (ref 150–450)
POTASSIUM SERPL-SCNC: 4.1 MMOL/L (ref 3.4–5.3)
PROT SERPL-MCNC: 8 G/DL (ref 6.4–8.3)
RBC # BLD AUTO: 5.64 10E6/UL (ref 4.4–5.9)
RBC URINE: 3 /HPF
SARS-COV-2 RNA RESP QL NAA+PROBE: NEGATIVE
SODIUM SERPL-SCNC: 140 MMOL/L (ref 136–145)
SP GR UR STRIP: 1.01 (ref 1–1.03)
UROBILINOGEN UR STRIP-MCNC: NORMAL MG/DL
WBC # BLD AUTO: 9.8 10E3/UL (ref 4–11)
WBC URINE: 0 /HPF

## 2022-10-09 PROCEDURE — 96375 TX/PRO/DX INJ NEW DRUG ADDON: CPT | Performed by: NURSE PRACTITIONER

## 2022-10-09 PROCEDURE — 96366 THER/PROPH/DIAG IV INF ADDON: CPT | Performed by: NURSE PRACTITIONER

## 2022-10-09 PROCEDURE — 36415 COLL VENOUS BLD VENIPUNCTURE: CPT | Performed by: EMERGENCY MEDICINE

## 2022-10-09 PROCEDURE — 99285 EMERGENCY DEPT VISIT HI MDM: CPT | Mod: 25 | Performed by: NURSE PRACTITIONER

## 2022-10-09 PROCEDURE — 96361 HYDRATE IV INFUSION ADD-ON: CPT

## 2022-10-09 PROCEDURE — 84155 ASSAY OF PROTEIN SERUM: CPT | Performed by: EMERGENCY MEDICINE

## 2022-10-09 PROCEDURE — 250N000011 HC RX IP 250 OP 636: Performed by: NURSE PRACTITIONER

## 2022-10-09 PROCEDURE — 96365 THER/PROPH/DIAG IV INF INIT: CPT | Mod: 59 | Performed by: NURSE PRACTITIONER

## 2022-10-09 PROCEDURE — 250N000011 HC RX IP 250 OP 636: Performed by: EMERGENCY MEDICINE

## 2022-10-09 PROCEDURE — C9803 HOPD COVID-19 SPEC COLLECT: HCPCS | Performed by: NURSE PRACTITIONER

## 2022-10-09 PROCEDURE — 258N000003 HC RX IP 258 OP 636: Performed by: EMERGENCY MEDICINE

## 2022-10-09 PROCEDURE — U0005 INFEC AGEN DETEC AMPLI PROBE: HCPCS | Performed by: EMERGENCY MEDICINE

## 2022-10-09 PROCEDURE — 74177 CT ABD & PELVIS W/CONTRAST: CPT | Mod: 26 | Performed by: RADIOLOGY

## 2022-10-09 PROCEDURE — 84484 ASSAY OF TROPONIN QUANT: CPT | Performed by: PHYSICIAN ASSISTANT

## 2022-10-09 PROCEDURE — G1010 CDSM STANSON: HCPCS

## 2022-10-09 PROCEDURE — 83735 ASSAY OF MAGNESIUM: CPT | Performed by: EMERGENCY MEDICINE

## 2022-10-09 PROCEDURE — 74177 CT ABD & PELVIS W/CONTRAST: CPT | Mod: MG

## 2022-10-09 PROCEDURE — G0378 HOSPITAL OBSERVATION PER HR: HCPCS

## 2022-10-09 PROCEDURE — 81001 URINALYSIS AUTO W/SCOPE: CPT | Performed by: EMERGENCY MEDICINE

## 2022-10-09 PROCEDURE — 83690 ASSAY OF LIPASE: CPT | Performed by: EMERGENCY MEDICINE

## 2022-10-09 PROCEDURE — 85025 COMPLETE CBC W/AUTO DIFF WBC: CPT | Performed by: EMERGENCY MEDICINE

## 2022-10-09 PROCEDURE — 93005 ELECTROCARDIOGRAM TRACING: CPT | Performed by: NURSE PRACTITIONER

## 2022-10-09 PROCEDURE — 93010 ELECTROCARDIOGRAM REPORT: CPT | Mod: 59 | Performed by: NURSE PRACTITIONER

## 2022-10-09 PROCEDURE — 82962 GLUCOSE BLOOD TEST: CPT

## 2022-10-09 PROCEDURE — G1010 CDSM STANSON: HCPCS | Mod: GC | Performed by: RADIOLOGY

## 2022-10-09 PROCEDURE — 96376 TX/PRO/DX INJ SAME DRUG ADON: CPT | Performed by: NURSE PRACTITIONER

## 2022-10-09 RX ORDER — SODIUM CHLORIDE 9 MG/ML
INJECTION, SOLUTION INTRAVENOUS CONTINUOUS
Status: ACTIVE | OUTPATIENT
Start: 2022-10-09 | End: 2022-10-10

## 2022-10-09 RX ORDER — OLANZAPINE 10 MG/1
2.5 INJECTION, POWDER, LYOPHILIZED, FOR SOLUTION INTRAMUSCULAR ONCE
Status: DISCONTINUED | OUTPATIENT
Start: 2022-10-09 | End: 2022-10-09

## 2022-10-09 RX ORDER — SODIUM CHLORIDE 9 MG/ML
INJECTION, SOLUTION INTRAVENOUS CONTINUOUS
Status: DISCONTINUED | OUTPATIENT
Start: 2022-10-09 | End: 2022-10-10

## 2022-10-09 RX ORDER — ONDANSETRON 4 MG/1
8 TABLET, ORALLY DISINTEGRATING ORAL EVERY 8 HOURS PRN
Qty: 10 TABLET | Refills: 0 | Status: SHIPPED | OUTPATIENT
Start: 2022-10-09 | End: 2022-10-10

## 2022-10-09 RX ORDER — PROCHLORPERAZINE MALEATE 5 MG
5 TABLET ORAL EVERY 6 HOURS PRN
Status: DISCONTINUED | OUTPATIENT
Start: 2022-10-09 | End: 2022-10-10 | Stop reason: HOSPADM

## 2022-10-09 RX ORDER — PROCHLORPERAZINE 25 MG
25 SUPPOSITORY, RECTAL RECTAL EVERY 12 HOURS PRN
Status: DISCONTINUED | OUTPATIENT
Start: 2022-10-09 | End: 2022-10-10 | Stop reason: HOSPADM

## 2022-10-09 RX ORDER — IOPAMIDOL 755 MG/ML
98 INJECTION, SOLUTION INTRAVASCULAR ONCE
Status: COMPLETED | OUTPATIENT
Start: 2022-10-09 | End: 2022-10-09

## 2022-10-09 RX ORDER — ONDANSETRON 2 MG/ML
4 INJECTION INTRAMUSCULAR; INTRAVENOUS ONCE
Status: COMPLETED | OUTPATIENT
Start: 2022-10-09 | End: 2022-10-09

## 2022-10-09 RX ORDER — LORAZEPAM 2 MG/ML
0.5 INJECTION INTRAMUSCULAR ONCE
Status: DISCONTINUED | OUTPATIENT
Start: 2022-10-09 | End: 2022-10-09

## 2022-10-09 RX ORDER — MAGNESIUM SULFATE HEPTAHYDRATE 40 MG/ML
2 INJECTION, SOLUTION INTRAVENOUS ONCE
Status: COMPLETED | OUTPATIENT
Start: 2022-10-09 | End: 2022-10-09

## 2022-10-09 RX ORDER — DIAZEPAM 10 MG/2ML
2.5 INJECTION, SOLUTION INTRAMUSCULAR; INTRAVENOUS ONCE
Status: COMPLETED | OUTPATIENT
Start: 2022-10-09 | End: 2022-10-09

## 2022-10-09 RX ORDER — BUPRENORPHINE 5 UG/H
5 PATCH TRANSDERMAL WEEKLY
COMMUNITY
Start: 2022-09-28 | End: 2023-05-31

## 2022-10-09 RX ORDER — ONDANSETRON 4 MG/1
4 TABLET, ORALLY DISINTEGRATING ORAL EVERY 6 HOURS PRN
Status: DISCONTINUED | OUTPATIENT
Start: 2022-10-09 | End: 2022-10-10 | Stop reason: HOSPADM

## 2022-10-09 RX ADMIN — MAGNESIUM SULFATE IN WATER 2 G: 40 INJECTION, SOLUTION INTRAVENOUS at 17:41

## 2022-10-09 RX ADMIN — SODIUM CHLORIDE 1000 ML: 9 INJECTION, SOLUTION INTRAVENOUS at 14:17

## 2022-10-09 RX ADMIN — DIAZEPAM 2.5 MG: 5 INJECTION, SOLUTION INTRAMUSCULAR; INTRAVENOUS at 16:16

## 2022-10-09 RX ADMIN — SODIUM CHLORIDE 1000 ML: 9 INJECTION, SOLUTION INTRAVENOUS at 20:42

## 2022-10-09 RX ADMIN — ONDANSETRON 4 MG: 2 INJECTION INTRAMUSCULAR; INTRAVENOUS at 14:47

## 2022-10-09 RX ADMIN — ONDANSETRON 4 MG: 2 INJECTION INTRAMUSCULAR; INTRAVENOUS at 19:16

## 2022-10-09 RX ADMIN — SODIUM CHLORIDE: 9 INJECTION, SOLUTION INTRAVENOUS at 16:20

## 2022-10-09 RX ADMIN — IOPAMIDOL 98 ML: 755 INJECTION, SOLUTION INTRAVENOUS at 17:03

## 2022-10-09 ASSESSMENT — ENCOUNTER SYMPTOMS
ALLERGIC/IMMUNOLOGIC NEGATIVE: 1
ABDOMINAL PAIN: 1
ENDOCRINE NEGATIVE: 1
CONSTITUTIONAL NEGATIVE: 1
VOMITING: 1
FATIGUE: 0
NEUROLOGICAL NEGATIVE: 1
RESPIRATORY NEGATIVE: 1
FEVER: 0
NAUSEA: 1
MUSCULOSKELETAL NEGATIVE: 1
HEMATOLOGIC/LYMPHATIC NEGATIVE: 1
CHILLS: 0
DIARRHEA: 1

## 2022-10-09 ASSESSMENT — ACTIVITIES OF DAILY LIVING (ADL)
ADLS_ACUITY_SCORE: 37

## 2022-10-09 NOTE — ED TRIAGE NOTES
PAtient ambulatory to triage for nausea and vomiting over the past few days. Patient states oral zofran has not helped at home. Patient is anxious in triage room.     Triage Assessment     Row Name 10/09/22 6520       Triage Assessment (Adult)    Airway WDL WDL       Respiratory WDL    Respiratory WDL WDL       Skin Circulation/Temperature WDL    Skin Circulation/Temperature WDL WDL       Cardiac WDL    Cardiac WDL WDL       Peripheral/Neurovascular WDL    Peripheral Neurovascular WDL WDL       Cognitive/Neuro/Behavioral WDL    Cognitive/Neuro/Behavioral WDL WDL

## 2022-10-09 NOTE — ED PROVIDER NOTES
ED Provider Note  [facility]      History     Chief Complaint   Patient presents with     Nausea, Vomiting, & Diarrhea     HPI  Phillip Rueda is a 42 year old male with a past medical history significant for Parsonage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, chronic pain, insulin-dependent type 2 diabetes, anxiety, remote history of alcohol and substance abuse sober 10 years, who presents with 3 days of nausea, vomiting, diarrhea.  Patient reports he has had at least 3 diarrhea stools per day, not watery but very loose.  No blood noted in diarrhea or emesis.  For the last 2 days has been dry heaving as well as having bilious vomiting.  Also endorses epigastric abdominal pain and some mid sternal chest pain that does not radiate.    Reports feeling very anxious.  Blood pressure is noted to be very high in triage today.  Patient states he has not taken any medications for his blood pressure in several years, as once he quit smoking he no longer needed them blood pressure returned to normal.     Denies any recent drug or alcohol use, aside from rare (2-3 times per month) marijuana use, last time yesterday which improved his symptoms.  He is on chronic Suboxone for pain from Parsonage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.  Recently switched from oral to patch approximately 2 weeks ago.  Has never had pancreatitis previously still has his appendix and gallbladder, denies any previous abdominal surgeries.    Past Medical History  Past Medical History:   Diagnosis Date     Anxiety      Depressive disorder      Diabetes mellitus (H)      Hypertension      Liver disease      Neuralgic amyotrophy      Other chronic pain      Pain disorder 12/8/2015     Parsonage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      Prostate infection      Seizures (H)      Staph infection      Past Surgical History:   Procedure Laterality Date     BIOPSY BONE FOOT Right 1/18/2022    Procedure: BIOPSY, BONE, RIGHT GREAT TOE (DISTAL PHALANX);  Surgeon: Shelton Smallwood DPM;  Location:   OR     ORTHOPEDIC SURGERY       ondansetron (ZOFRAN ODT) 4 MG ODT tab  ACCU-CHEK GUIDE test strip  atorvastatin (LIPITOR) 20 MG tablet  blood glucose (NO BRAND SPECIFIED) lancets standard  blood glucose monitoring (NO BRAND SPECIFIED) meter device kit  Buprenorphine HCl-Naloxone HCl (SUBOXONE) 4-1 MG film  Continuous Blood Gluc  (FREESTYLE JHON 2 READER) JESSI  Continuous Blood Gluc Sensor (FREESTYLE JHON 2 SENSOR) MISC  continuous blood glucose monitoring (FREESTYLE JHON) sensor  diazepam (VALIUM) 5 MG tablet  gabapentin (NEURONTIN) 600 MG tablet  metFORMIN (GLUCOPHAGE XR) 500 MG 24 hr tablet  nortriptyline (PAMELOR) 25 MG capsule  OZEMPIC, 0.25 OR 0.5 MG/DOSE, 2 MG/1.5ML SOPN pen  sildenafil (REVATIO) 20 MG tablet  tadalafil (CIALIS) 10 MG tablet  venlafaxine (EFFEXOR-ER) 150 MG TB24      Allergies   Allergen Reactions     Bees Swelling     Bupropion Hcl Other (See Comments)     seizure     Lisinopril Cough     Penicillins Other (See Comments)     Unable to close mouth  Tolerated cefazolin (21)     Family History  Family History   Problem Relation Age of Onset     Depression Mother      Anxiety Disorder Mother      Substance Abuse Maternal Grandfather      Anxiety Disorder Brother      Glaucoma No family hx of      Macular Degeneration No family hx of      Social History   Social History     Tobacco Use     Smoking status: Former     Packs/day: 0.25     Years: 15.00     Pack years: 3.75     Types: Cigarettes, Dip, chew, snus or snuff     Start date: 1996     Quit date: 2013     Years since quittin.2     Smokeless tobacco: Former   Substance Use Topics     Alcohol use: No     Alcohol/week: 0.0 standard drinks     Comment: 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  Drink 1.75 every 2 days     Drug use: No      Past medical history, past surgical history, medications, allergies, family history, and social history were reviewed with the patient. No additional pertinent items.       Review of Systems    Constitutional: Negative.  Negative for chills, fatigue and fever.   HENT: Negative.    Respiratory: Negative.    Cardiovascular: Positive for chest pain.   Gastrointestinal: Positive for abdominal pain, diarrhea, nausea and vomiting.   Endocrine: Negative.    Genitourinary: Negative.    Musculoskeletal: Negative.    Skin: Negative.    Allergic/Immunologic: Negative.    Neurological: Negative.    Hematological: Negative.    Psychiatric/Behavioral:        Anxiety     A complete review of systems was performed with pertinent positives and negatives noted in the HPI, and all other systems negative.    Physical Exam   BP: (!) 190/112  Pulse: 75  Temp: 98.1  F (36.7  C)  Resp: 20  SpO2: 98 %  Physical Exam  Constitutional:       Appearance: Normal appearance.   HENT:      Head: Normocephalic and atraumatic.   Cardiovascular:      Rate and Rhythm: Normal rate and regular rhythm.      Heart sounds: Normal heart sounds.      Comments: hypertensive  Pulmonary:      Effort: Pulmonary effort is normal.      Breath sounds: Normal breath sounds.   Abdominal:      General: Abdomen is flat. There is no distension.      Palpations: Abdomen is soft.      Tenderness: There is abdominal tenderness. There is no right CVA tenderness, left CVA tenderness or rebound.      Comments: Epigastric abdominal pain   Musculoskeletal:         General: Normal range of motion.      Cervical back: Normal range of motion and neck supple.   Skin:     General: Skin is warm and dry.      Capillary Refill: Capillary refill takes less than 2 seconds.      Findings: No bruising, erythema, lesion or rash.   Neurological:      General: No focal deficit present.      Mental Status: He is alert and oriented to person, place, and time.   Psychiatric:         Mood and Affect: Mood normal.         Behavior: Behavior normal.         ED Course      Procedures            EKG Interpretation:      Interpreted by WALESKA Narvaez CNP  Time reviewed:  1430  Symptoms at time of EKG: epigastric pain   Rhythm: normal sinus with sinus arrhythmia   Rate: normal  Axis: normal  Ectopy: none  Conduction: normal  ST Segments/ T Waves: No ST-T wave changes  Q Waves: none  Comparison to prior: changed from 12/15/2021, at that time patient having T wave abnormalities on EKG which are not present today.    Clinical Impression: normal EKG         Results for orders placed or performed during the hospital encounter of 10/09/22   CT Abdomen Pelvis w Contrast     Status: None    Narrative    Examination: CT ABDOMEN PELVIS W CONTRAST, 10/9/2022 5:13 PM    Technique:  Helical CT images from the lung bases through the  symphysis pubis were obtained with contrast.  Coronal reformatted  images were generated at a workstation for further assessment.    Contrast:  98 mL Isovue-370 IV    Comparison: None.    History: abd pain    Findings:    Abdomen and pelvis:   Liver: No suspicious liver lesions.   Gallbladder: No gallstones. No evidence of acute cholecystitis.  Spleen: Normal size.  Pancreas: No suspicious pancreatic lesions. The pancreatic duct is not  dilated.  Adrenal glands: No adrenal nodules.  Urinary system simple attenuating cyst in the superior pole of the  left kidney.. No hydronephrosis, hydroureter, or obstructing renal  stones. Urinary bladder is unremarkable.  Reproductive organs: Unremarkable.  Bowel: No abnormally dilated loops of large or small bowel. No  abnormal bowel wall thickening or enhancement. The appendix is  unremarkable. Periampullary duodenal diverticulum.  Lymph nodes: No retroperitoneal, mesenteric, or pelvic  lymphadenopathy.  Fluid: No free fluid within the abdomen.  Vessels: No infrarenal aortic aneurysm. The portal, superior  mesenteric, and splenic veins are patent.    Lung bases: No consolidation or pleural effusion.    Bones and soft tissues: No suspicious osseous lesions. Advanced  degenerative change at L5-S1 with severe intervertebral disc  height  loss and sclerosis of the opposing endplates at this level and  resulting in bilateral neural foraminal stenoses.      Impression    Impression:   No CT evidence to explain the patient's abdominal pain.     I have personally reviewed the examination and initial interpretation  and I agree with the findings.    CHAIM CAMPOS          SYSTEM ID:  [id number]   Comprehensive metabolic panel     Status: Abnormal   Result Value Ref Range    Sodium 140 136 - 145 mmol/L    Potassium 4.1 3.4 - 5.3 mmol/L    Chloride 99 98 - 107 mmol/L    Carbon Dioxide (CO2) 23 22 - 29 mmol/L    Anion Gap 18 (H) 7 - 15 mmol/L    Urea Nitrogen 14.1 6.0 - 20.0 mg/dL    Creatinine 1.04 0.67 - 1.17 mg/dL    Calcium 10.4 (H) 8.6 - 10.0 mg/dL    Glucose 91 70 - 99 mg/dL    Alkaline Phosphatase 112 40 - 129 U/L    AST      ALT 17 10 - 50 U/L    Protein Total 8.0 6.4 - 8.3 g/dL    Albumin 5.1 3.5 - 5.2 g/dL    Bilirubin Total 0.9 <=1.2 mg/dL    GFR Estimate >90 >60 mL/min/1.73m2   Lipase     Status: Abnormal   Result Value Ref Range    Lipase 61 (H) 13 - 60 U/L   Magnesium     Status: Abnormal   Result Value Ref Range    Magnesium 1.6 (L) 1.7 - 2.3 mg/dL   UA with Microscopic reflex to Culture     Status: Abnormal    Specimen: Urine, Clean Catch   Result Value Ref Range    Color Urine Light Yellow Colorless, Straw, Light Yellow, Yellow    Appearance Urine Clear Clear    Glucose Urine Negative Negative mg/dL    Bilirubin Urine Negative Negative    Ketones Urine 40 (A) Negative mg/dL    Specific Gravity Urine 1.015 1.003 - 1.035    Blood Urine Negative Negative    pH Urine 6.5 5.0 - 7.0    Protein Albumin Urine Negative Negative mg/dL    Urobilinogen Urine Normal Normal, 2.0 mg/dL    Nitrite Urine Negative Negative    Leukocyte Esterase Urine Negative Negative    Mucus Urine Present (A) None Seen /LPF    RBC Urine 3 (H) <=2 /HPF    WBC Urine 0 <=5 /HPF    Hyaline Casts Urine 5 (H) <=2 /LPF    Narrative    Urine Culture not indicated   CBC  with platelets and differential     Status: None   Result Value Ref Range    WBC Count 9.8 4.0 - 11.0 10e3/uL    RBC Count 5.64 4.40 - 5.90 10e6/uL    Hemoglobin 16.1 13.3 - 17.7 g/dL    Hematocrit 47.3 40.0 - 53.0 %    MCV 84 78 - 100 fL    MCH 28.5 26.5 - 33.0 pg    MCHC 34.0 31.5 - 36.5 g/dL    RDW 13.2 10.0 - 15.0 %    Platelet Count 283 150 - 450 10e3/uL    % Neutrophils 70 %    % Lymphocytes 25 %    % Monocytes 5 %    % Eosinophils 0 %    % Basophils 0 %    % Immature Granulocytes 0 %    NRBCs per 100 WBC 0 <1 /100    Absolute Neutrophils 6.7 1.6 - 8.3 10e3/uL    Absolute Lymphocytes 2.5 0.8 - 5.3 10e3/uL    Absolute Monocytes 0.5 0.0 - 1.3 10e3/uL    Absolute Eosinophils 0.0 0.0 - 0.7 10e3/uL    Absolute Basophils 0.0 0.0 - 0.2 10e3/uL    Absolute Immature Granulocytes 0.0 <=0.4 10e3/uL    Absolute NRBCs 0.0 10e3/uL   EKG 12 lead     Status: None (Preliminary result)   Result Value Ref Range    Systolic Blood Pressure  mmHg    Diastolic Blood Pressure  mmHg    Ventricular Rate 67 BPM    Atrial Rate 67 BPM    MT Interval 168 ms    QRS Duration 84 ms     ms    QTc 418 ms    P Axis 46 degrees    R AXIS 51 degrees    T Axis 50 degrees    Interpretation ECG Sinus rhythm with sinus arrhythmia  Normal ECG      CBC with platelets differential     Status: None    Narrative    The following orders were created for panel order CBC with platelets differential.  Procedure                               Abnormality         Status                     ---------                               -----------         ------                     CBC with platelets and d...[[id number]]                      Final result                 Please view results for these tests on the individual orders.     Medications   0.9% sodium chloride BOLUS (0 mLs Intravenous Stopped 10/9/22 1621)     Followed by   sodium chloride 0.9% infusion ( Intravenous New Bag 10/9/22 1620)   0.9% sodium chloride BOLUS (has no administration in time range)      Followed by   sodium chloride 0.9% infusion (has no administration in time range)   0.9% sodium chloride BOLUS (has no administration in time range)     Followed by   sodium chloride 0.9% infusion (has no administration in time range)   ondansetron (ZOFRAN) injection 4 mg (4 mg Intravenous Given 10/9/22 1447)   diazepam (VALIUM) injection 2.5 mg (2.5 mg Intravenous Given 10/9/22 1616)   magnesium sulfate 2 g in water intermittent infusion (2 g Intravenous New Bag 10/9/22 1741)   iopamidol (ISOVUE-370) solution 98 mL (98 mLs Intravenous Given 10/9/22 1703)   sodium chloride (PF) 0.9% PF flush 75 mL (75 mLs Intravenous Given 10/9/22 1703)   ondansetron (ZOFRAN) injection 4 mg (4 mg Intravenous Given 10/9/22 1916)        Assessments & Plan (with Medical Decision Making)   Phillip Rueda is a 42 year old male with a past medical history significant for Parsonage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, chronic pain, insulin-dependent type 2 diabetes, anxiety, remote history of alcohol and substance abuse sober 10 years, who presents with 3 days of nausea, vomiting, diarrhea.    IV access established, Labs collected and sent for analysis which showed no leukocytosis WBC 9.8, hemoglobin normal 16.1.  Slight electrolyte abnormalities with magnesium low at 1.6 (replaced), calcium elevated at 10.4.  BUN/creatinine normal.  Lipase slightly elevated at 61 not to the level that would explain his abdominal pain, do not suspect acute pancreatitis with these findings.  UA showed presence of ketones 40, no nitrates or leukocytes, small RBCs 3, small hyaline cast.  Not suspicious for infection.  Specific gravity is normal.  Stool sample was unable to be obtained in the emergency department as patient did not have a bowel movement during the time he was here.    Patient initially seen by triage physician who ordered labs, fluid bolus, and Zofran IV which were infusing upon my assessment of the patient.  Patient still appeared very uncomfortable, anxious  was dry heaving with blood pressure still elevated.  Patient endorsed feeling very anxious.  Gave IV Valium and a total of 2 L of normal saline IV fluids with relief of anxiety and vomiting, reports he still having nausea.  Still having significant epigastric pain on explained by lab results, CT was ordered.    Very tender epigastrium on abdominal exam, no explanation for this found on EKG or lab work, sent for CT of abdomen which did not show any evidence that would explain patient symptoms. Repeated dose of Zofran after CT scan as patient continuing to have severe nausea, less frequent vomiting. After this patient still having severe nausea and reports of vomiting. Will admit to hospital for observation, rehydration, and to try and control vomiting.  Discussed with medicine provider who accepted patient onto their service.      I have reviewed the nursing notes.      Final diagnoses:   Bilious vomiting with nausea   Diarrhea   Intractable vomiting       --  WALESKA Narvaez CNP  [facility] EMERGENCY DEPARTMENT  10/9/2022     Minoo Real APRN CNP  10/09/22 2119    --    ED Attending Physician Attestation    I Peri Webster MD, cared for this patient with the Advanced Practice Provider (BEA). I have performed a history and physical examination of the patient independent of the BEA. I reviewed the BEA's documentation above and agree with the documented findings and plan of care. I personally provided a substantive portion of the care for this patient.    I personally performed the substantive portion of the medical decision making for this visit - please see the BEA's documentation for full details.    Key management decisions made by me and carried out under my direction: evaluation and admission    Summary of HPI, PE, ED Course   Patient is a 42 year old male evaluated in the emergency department for abd pain, n/v. Exam notable for abd tender. ED course notable for intractable symptoms.  After the completion of care in the emergency department, the patient was admitted to inpatient.    Critical Care & Procedures  Not applicable.    Medical Decision Making  The medical record was reviewed and interpreted.  Current labs reviewed and interpreted.  Previous labs reviewed and interpreted.      Peri Webster MD  Emergency Medicine        Darin, Peri MENENDEZ MD  11/03/22 1410

## 2022-10-09 NOTE — ED NOTES
"ED Triage Provider Note  [facility]  Encounter Date: Oct 9, 2022    History:  Chief Complaint   Patient presents with     Nausea, Vomiting, & Diarrhea     Phillip Rueda is a 42 year old male who presents to the ED with 3 days of diarrhea, followed by n/v 2 days ago \"bilious\" non-bloody, unrelieved with zofran at home    Review of Systems:  +abd pain   Exam:  There were no vitals taken for this visit.  General: No acute distress. Appears stated age.   Cardio: Regular rate, extremities well perfused  Resp: Normal work of breathing, grossly normal respiratory rate  Neuro: Alert. CN II-XII grossly intact. Grossly intact strength.       Medical Decision Making:  Patient arriving to the ED with problem as above. A medical screening exam was performed. IV, labs, EKG, IVF, zofran 4 mg  orders initiated from Triage. The patient is appropriate to wait in triage.      Peri Webster MD on 10/9/2022 at 1:53 PM     Peri Webster MD  10/09/22 1400       Peri Webster MD  10/09/22 1429    "

## 2022-10-10 VITALS
HEART RATE: 71 BPM | OXYGEN SATURATION: 98 % | TEMPERATURE: 99.3 F | RESPIRATION RATE: 20 BRPM | SYSTOLIC BLOOD PRESSURE: 147 MMHG | DIASTOLIC BLOOD PRESSURE: 85 MMHG

## 2022-10-10 LAB
ALBUMIN SERPL-MCNC: 3.9 G/DL (ref 3.4–5)
ALP SERPL-CCNC: 91 U/L (ref 40–150)
ALT SERPL W P-5'-P-CCNC: 18 U/L (ref 0–70)
ANION GAP SERPL CALCULATED.3IONS-SCNC: 10 MMOL/L (ref 3–14)
AST SERPL W P-5'-P-CCNC: 16 U/L (ref 0–45)
ATRIAL RATE - MUSE: 67 BPM
BILIRUB SERPL-MCNC: 1.2 MG/DL (ref 0.2–1.3)
BUN SERPL-MCNC: 12 MG/DL (ref 7–30)
CALCIUM SERPL-MCNC: 9.2 MG/DL (ref 8.5–10.1)
CHLORIDE BLD-SCNC: 101 MMOL/L (ref 94–109)
CO2 SERPL-SCNC: 24 MMOL/L (ref 20–32)
CREAT SERPL-MCNC: 0.82 MG/DL (ref 0.66–1.25)
DIASTOLIC BLOOD PRESSURE - MUSE: NORMAL MMHG
GFR SERPL CREATININE-BSD FRML MDRD: >90 ML/MIN/1.73M2
GLUCOSE BLD-MCNC: 90 MG/DL (ref 70–99)
HOLD SPECIMEN: NORMAL
INTERPRETATION ECG - MUSE: NORMAL
MAGNESIUM SERPL-MCNC: 1.8 MG/DL (ref 1.6–2.3)
P AXIS - MUSE: 46 DEGREES
POTASSIUM BLD-SCNC: 3.5 MMOL/L (ref 3.4–5.3)
PR INTERVAL - MUSE: 168 MS
PROT SERPL-MCNC: 7 G/DL (ref 6.8–8.8)
QRS DURATION - MUSE: 84 MS
QT - MUSE: 396 MS
QTC - MUSE: 418 MS
R AXIS - MUSE: 51 DEGREES
SODIUM SERPL-SCNC: 135 MMOL/L (ref 133–144)
SYSTOLIC BLOOD PRESSURE - MUSE: NORMAL MMHG
T AXIS - MUSE: 50 DEGREES
TROPONIN T SERPL HS-MCNC: 9 NG/L
VENTRICULAR RATE- MUSE: 67 BPM

## 2022-10-10 PROCEDURE — 250N000013 HC RX MED GY IP 250 OP 250 PS 637: Performed by: INTERNAL MEDICINE

## 2022-10-10 PROCEDURE — 96376 TX/PRO/DX INJ SAME DRUG ADON: CPT

## 2022-10-10 PROCEDURE — 258N000003 HC RX IP 258 OP 636: Performed by: INTERNAL MEDICINE

## 2022-10-10 PROCEDURE — 96375 TX/PRO/DX INJ NEW DRUG ADDON: CPT

## 2022-10-10 PROCEDURE — 83735 ASSAY OF MAGNESIUM: CPT | Performed by: INTERNAL MEDICINE

## 2022-10-10 PROCEDURE — C9113 INJ PANTOPRAZOLE SODIUM, VIA: HCPCS | Performed by: INTERNAL MEDICINE

## 2022-10-10 PROCEDURE — G0378 HOSPITAL OBSERVATION PER HR: HCPCS

## 2022-10-10 PROCEDURE — 250N000011 HC RX IP 250 OP 636: Performed by: INTERNAL MEDICINE

## 2022-10-10 PROCEDURE — 250N000009 HC RX 250: Performed by: INTERNAL MEDICINE

## 2022-10-10 PROCEDURE — 80053 COMPREHEN METABOLIC PANEL: CPT | Performed by: INTERNAL MEDICINE

## 2022-10-10 PROCEDURE — 96361 HYDRATE IV INFUSION ADD-ON: CPT

## 2022-10-10 PROCEDURE — 99235 HOSP IP/OBS SAME DATE MOD 70: CPT | Performed by: INTERNAL MEDICINE

## 2022-10-10 PROCEDURE — 99207 PR APP CREDIT; MD BILLING SHARED VISIT: CPT | Performed by: INTERNAL MEDICINE

## 2022-10-10 PROCEDURE — 36415 COLL VENOUS BLD VENIPUNCTURE: CPT | Performed by: INTERNAL MEDICINE

## 2022-10-10 RX ORDER — NORTRIPTYLINE HCL 25 MG
25 CAPSULE ORAL AT BEDTIME
Start: 2022-10-10

## 2022-10-10 RX ORDER — SCOLOPAMINE TRANSDERMAL SYSTEM 1 MG/1
1 PATCH, EXTENDED RELEASE TRANSDERMAL
Status: DISCONTINUED | OUTPATIENT
Start: 2022-10-10 | End: 2022-10-10 | Stop reason: HOSPADM

## 2022-10-10 RX ORDER — NORTRIPTYLINE HCL 25 MG
25 CAPSULE ORAL AT BEDTIME
Status: DISCONTINUED | OUTPATIENT
Start: 2022-10-10 | End: 2022-10-10 | Stop reason: HOSPADM

## 2022-10-10 RX ORDER — SODIUM CHLORIDE 9 MG/ML
INJECTION, SOLUTION INTRAVENOUS CONTINUOUS
Status: DISCONTINUED | OUTPATIENT
Start: 2022-10-10 | End: 2022-10-10

## 2022-10-10 RX ORDER — GABAPENTIN 600 MG/1
600 TABLET ORAL 2 TIMES DAILY
Status: DISCONTINUED | OUTPATIENT
Start: 2022-10-10 | End: 2022-10-10 | Stop reason: HOSPADM

## 2022-10-10 RX ORDER — POLYETHYLENE GLYCOL 3350 17 G
4 POWDER IN PACKET (EA) ORAL
Status: DISCONTINUED | OUTPATIENT
Start: 2022-10-10 | End: 2022-10-10 | Stop reason: HOSPADM

## 2022-10-10 RX ORDER — NICOTINE POLACRILEX 4 MG
15-30 LOZENGE BUCCAL
Status: DISCONTINUED | OUTPATIENT
Start: 2022-10-10 | End: 2022-10-10 | Stop reason: HOSPADM

## 2022-10-10 RX ORDER — DIAZEPAM 10 MG/2ML
5 INJECTION, SOLUTION INTRAMUSCULAR; INTRAVENOUS 3 TIMES DAILY PRN
Status: DISCONTINUED | OUTPATIENT
Start: 2022-10-10 | End: 2022-10-10

## 2022-10-10 RX ORDER — GABAPENTIN 600 MG/1
600 TABLET ORAL 2 TIMES DAILY
Start: 2022-10-10

## 2022-10-10 RX ORDER — VENLAFAXINE HYDROCHLORIDE 150 MG/1
150 CAPSULE, EXTENDED RELEASE ORAL
Status: DISCONTINUED | OUTPATIENT
Start: 2022-10-10 | End: 2022-10-10 | Stop reason: HOSPADM

## 2022-10-10 RX ORDER — PROCHLORPERAZINE MALEATE 5 MG
5 TABLET ORAL EVERY 6 HOURS PRN
Qty: 10 TABLET | Refills: 0 | Status: SHIPPED | OUTPATIENT
Start: 2022-10-10 | End: 2023-05-31

## 2022-10-10 RX ORDER — DIAZEPAM 5 MG
5 TABLET ORAL 3 TIMES DAILY
Status: DISCONTINUED | OUTPATIENT
Start: 2022-10-10 | End: 2022-10-10 | Stop reason: HOSPADM

## 2022-10-10 RX ORDER — DEXTROSE MONOHYDRATE 25 G/50ML
25-50 INJECTION, SOLUTION INTRAVENOUS
Status: DISCONTINUED | OUTPATIENT
Start: 2022-10-10 | End: 2022-10-10 | Stop reason: HOSPADM

## 2022-10-10 RX ADMIN — Medication 1 MG: at 01:38

## 2022-10-10 RX ADMIN — PROCHLORPERAZINE EDISYLATE 5 MG: 5 INJECTION INTRAMUSCULAR; INTRAVENOUS at 06:09

## 2022-10-10 RX ADMIN — DIAZEPAM 5 MG: 5 INJECTION, SOLUTION INTRAMUSCULAR; INTRAVENOUS at 12:11

## 2022-10-10 RX ADMIN — VENLAFAXINE HYDROCHLORIDE 150 MG: 150 CAPSULE, EXTENDED RELEASE ORAL at 09:10

## 2022-10-10 RX ADMIN — SODIUM CHLORIDE: 9 INJECTION, SOLUTION INTRAVENOUS at 10:30

## 2022-10-10 RX ADMIN — PANTOPRAZOLE SODIUM 40 MG: 40 INJECTION, POWDER, FOR SOLUTION INTRAVENOUS at 09:11

## 2022-10-10 RX ADMIN — SODIUM CHLORIDE: 9 INJECTION, SOLUTION INTRAVENOUS at 01:19

## 2022-10-10 RX ADMIN — ONDANSETRON 4 MG: 4 TABLET, ORALLY DISINTEGRATING ORAL at 01:38

## 2022-10-10 RX ADMIN — SCOPALAMINE 1 PATCH: 1 PATCH, EXTENDED RELEASE TRANSDERMAL at 12:30

## 2022-10-10 RX ADMIN — GABAPENTIN 600 MG: 600 TABLET, FILM COATED ORAL at 09:11

## 2022-10-10 ASSESSMENT — ACTIVITIES OF DAILY LIVING (ADL)
FALL_HISTORY_WITHIN_LAST_SIX_MONTHS: NO
ADLS_ACUITY_SCORE: 20
TOILETING_ISSUES: NO
HEARING_DIFFICULTY_OR_DEAF: NO
CONCENTRATING,_REMEMBERING_OR_MAKING_DECISIONS_DIFFICULTY: NO
ADLS_ACUITY_SCORE: 20
DIFFICULTY_EATING/SWALLOWING: NO
ADLS_ACUITY_SCORE: 20
CHANGE_IN_FUNCTIONAL_STATUS_SINCE_ONSET_OF_CURRENT_ILLNESS/INJURY: NO
EQUIPMENT_CURRENTLY_USED_AT_HOME: GLUCOMETER
DRESSING/BATHING_DIFFICULTY: NO
WEAR_GLASSES_OR_BLIND: NO
WALKING_OR_CLIMBING_STAIRS_DIFFICULTY: NO
DOING_ERRANDS_INDEPENDENTLY_DIFFICULTY: NO
DIFFICULTY_COMMUNICATING: NO

## 2022-10-10 NOTE — DISCHARGE SUMMARY
[facility]  Hospitalist Discharge Summary      Date of Admission:  10/9/2022  Date of Discharge:  10/10/2022  Discharging Provider: Cherrie Clark MD  Discharge Service: Hospitalist Service, GOLD TEAM 17    Discharge Diagnoses   Suspected viral gastroenteritis    Follow-ups Needed After Discharge   Follow-up Appointments     Adult [facility] Follow-up and recommended labs and tests      Follow up with primary care provider, Km Dos Santos MD, within   7 days  as needed for hospital follow- up.  No follow up labs or test are   needed.      Appointments on [employer] and/or [facility] (with [facility] or [facility]   provider or service). Call [phone] if you haven't heard regarding   these appointments within 7 days of discharge.           Discharge Disposition   Discharged to home  Condition at discharge: Stable      Hospital Course   Phillip Rueda is a 42 year old male patient with a past medical history significant for Parsonage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 c/b chronic pain syndrome, insulin-dependent type 2 diabetes, anxiety, and occasional marijuana use who presented to [facility] ED with nausea/vomiting and loose stools x 3 days; admitted to observation secondary to intractable nausea/vomiting.     Acute Nausea/Vomiting  Loose Stools  Epigastric Pain  Anion Gap Metabolic Acidosis  Patient endorsed above symptoms x 3 days prior to admission. Labs notable for calcium 10.4, anion gap 18, BG 91, lipase only 61, LFTs normal, magnesium 1.6. WBC 9.8 without left shift. UA negative for infection but consistent with starvation ketosis. COVID negative. EKG sinus arrhythmia with t wave inversion V2, QTc 418. Troponin pending, but overall unlikely to be cardiac in origin. CT A/P W at admission with no evidence of clear worrisome pathology to explain patient's abdominal pain, exam overall benign at admission most suggestive of acute gastroenteritis.   Patient was  given IV fluids with symptomatic managements of his nausea and vomiting   He did not have any further bowel movements. His nausea and vomiting also improved and he was able to tolerate diet  Labs were within acceptable limits on discharge   Likely cause is suspect to be viral gastroenteritis          Hypomagnesemia  Magnesium 1.6 at admission. Likely related to poor PO, nausea/vomiting. Given 2g mag sulfate in ED.  Rep[eat check at 1.8     T2DM  Last A1C 6.7 3/15/22. PTA on Metformin 1000mg BID and Ozempic Q7 days.   - Hold metformin     - Ozempic on hold while inpatient (last taken Friday)   Resume all meds at discharge      Parsonage-Avendaño Syndrome c/b Chronic Pain Syndrome   - Placed qwk Butrans patch on Wednesday, overall not consistent with withdrawal experience    - Continue PTA Valium     - Continue PTA Gabapentin 600 mg BID per patient request, may help to crush       HTN  BP elevated in setting of acute illness upwards of 205/112 in triage with improvement after valium and Zofran x 2 to treat severe intractable vomiting. He was previously on antihypertensives but has been off since he stopped smoking.   - Continue to monitor. Antiemetics and supportive care as above.     HLD   - hold PTA statin      Anxiety   - Continue PTA Valium 5mg TID prn   - hold PTA Pamelor 50mg at bedtime   - Continue PTA Effexor 150mg daily per patient request        Consultations This Hospital Stay   None    Code Status   Full Code    Time Spent on this Encounter   ICherrie MD, personally saw the patient today and spent greater than 30 minutes discharging this patient.       Cherrie Clark MD  [facility] MED SURG  [facility] [address]  Phone: [phone]  Fax: [phone]  ______________________________________________________________________    Physical Exam   Vital Signs: Temp: 99.3  F (37.4  C) Temp src: Oral BP: (!) 147/85 Pulse: 71   Resp: 20 SpO2:  98 % O2 Device: None (Room air)    Weight: 0 lbs 0 oz  General Appearance: Awake, alert and not in distress  Respiratory: Clear breath sounds bilaterally   Cardiovascular: Normal heart sounds. No murmurs   GI: Soft, non tender. Normal bowel sounds   Skin: No bruising or bleeding   Other:Awake, alert and orientated X 3          Primary Care Physician   Km Dos Santos MD    Discharge Orders      Reason for your hospital stay    Diarrhea and vomiting     Activity    Your activity upon discharge: activity as tolerated     Adult [facility] Follow-up and recommended labs and tests    Follow up with primary care provider, Km Dos Santos MD, within 7 days  as needed for hospital follow- up.  No follow up labs or test are needed.      Appointments on [employer] and/or [facility] (with [facility] or [facility] provider or service). Call [phone] if you haven't heard regarding these appointments within 7 days of discharge.     Diet    Follow this diet upon discharge: Orders Placed This Encounter      Full Liquid Diet           Discharge Medications   Discharge Medication List as of 10/10/2022  2:25 PM      START taking these medications    Details   prochlorperazine (COMPAZINE) 5 MG tablet Take 1 tablet (5 mg) by mouth every 6 hours as needed for nausea or vomiting, Disp-10 tablet, R-0, E-Prescribe         CONTINUE these medications which have CHANGED    Details   gabapentin (NEURONTIN) 600 MG tablet Take 1 tablet (600 mg) by mouth 2 times daily, No Print Out      nortriptyline (PAMELOR) 25 MG capsule Take 1 capsule (25 mg) by mouth At Bedtime, No Print Out         CONTINUE these medications which have NOT CHANGED    Details   ACCU-CHEK GUIDE test strip USE TO TEST BLOOD SUGAR TWO TIMES A DAY OR AS DIRECTED, Disp-200 strip, R-1, E-Prescribe      atorvastatin (LIPITOR) 20 MG tablet Take 1 tablet (20 mg) by mouth daily, Disp-90 tablet, R-2, E-Prescribe      blood glucose (NO BRAND SPECIFIED) lancets standard Use  to test blood sugar 2 times daily or as directed.Disp-100 each, [id number]++Accu Check Guide Me brand, please++      blood glucose monitoring (NO BRAND SPECIFIED) meter device kit Use to test blood sugar 2 times daily or as directed.Disp-1 kit, [id number]++Accu Check Guide Me brand, please++      buprenorphine (BUTRANS) 5 MCG/HR WK patch Place 5 mcg onto the skin once a week, Historical      Continuous Blood Gluc  (FREESTYLE DARRON 2 READER) JESSI 1 each continuous USE TO READ BLOOD SUGARS PER  INSTRUCTIONS, Disp-1 each, R-0, E-Prescribe      !! Continuous Blood Gluc Sensor (FREESTYLE DARRON 2 SENSOR) MISC CHANGE EVERY 14 DAYS (FURTHER REFILLS BY PCP DR ARCEO), Disp-6 each, R-1, E-Prescribe      !! continuous blood glucose monitoring (FREESTYLE DARRON) sensor For use with Freestyle Darron Flash  for continuous monitioring of blood glucose levels. Replace sensor every 14 days., Disp-6 each, R-3, E-Prescribe      diazepam (VALIUM) 5 MG tablet Take 1 tablet (5 mg) 3 times daily as needed., Disp-60 tablet, Historical      metFORMIN (GLUCOPHAGE XR) 500 MG 24 hr tablet TAKE 2 TABLETS BY MOUTH TWICE DAILY WITH MEALS, Disp-360 tablet, R-0, E-PrescribePLEASE REMIND CHAR HE IS DUE FOR A DIABETES VISIT IN SEPTEMBER.      venlafaxine (EFFEXOR-ER) 150 MG TB24 Take 1 tablet by mouth daily (with breakfast)., Disp-30 each, R-0, E-Prescribe      OZEMPIC, 0.25 OR 0.5 MG/DOSE, 2 MG/1.5ML SOPN pen INJECT 0.5 MG UNDER THE SKIN EVERY 7 DAYS, Disp-4.5 mL, R-0, E-PrescribePLEASE ASK CHAR TO SCHEDULE A DIABETES VISIT WITH AD[facility] IN SEPTEMBER.       !! - Potential duplicate medications found. Please discuss with provider.      STOP taking these medications       Buprenorphine HCl-Naloxone HCl (SUBOXONE) 4-1 MG film Comments:   Reason for Stopping:         sildenafil (REVATIO) 20 MG tablet Comments:   Reason for Stopping:         tadalafil (CIALIS) 10 MG tablet Comments:   Reason for Stopping:              Allergies   Allergies   Allergen Reactions     Bees Swelling     Bupropion Hcl Other (See Comments)     seizure     Lisinopril Cough     Penicillins Other (See Comments)     Unable to close mouth  Tolerated cefazolin (12/12/21)

## 2022-10-10 NOTE — PROGRESS NOTES
Pt arrived on 6 MS unit at about 0005, transported via stretcher, accompanied by x2 EMT staff and was admitted to room 650. Pt alert and appears stable on arrival, talking and communicating with EMT and staff on arrival. Primary nurse notified, met pt on arrival and is currently assisting with rooming-in pt.

## 2022-10-10 NOTE — H&P
[facility]    History and Physical - Hospitalist Service, GOLD TEAM        Date of Admission:  10/10/2022    Assessment & Plan      Phillip Rueda is a 42 year old male patient with a past medical history significant for Parsonage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 c/b chronic pain syndrome, insulin-dependent type 2 diabetes, anxiety, and occasional marijuana use who presented to [facility] ED with nausea/vomiting and loose stools x 3 days; admitted to observation secondary to intractable nausea/vomiting.    Acute Nausea/Vomiting  Loose Stools  Epigastric Pain  Anion Gap Metabolic Acidosis  Patient endorsing above symptoms x 3 days prior to admission. Labs notable for calcium 10.4, anion gap 18, BG 91, lipase only 61, LFTs normal, magnesium 1.6. WBC 9.8 without left shift. UA negative for infection but consistent with starvation ketosis. COVID negative. EKG sinus arrhythmia with t wave inversion V2, QTc 418. Troponin pending, but overall unlikely to be cardiac in origin. CT A/P W at admission with no evidence of clear worrisome pathology to explain patient's abdominal pain, exam overall benign at admission most suggestive of acute gastroenteritis.    - Will plan for IVF overnight    - start PPI for gastritis component, switch to PO as tolerated    - Antiemetics   - Check stool panel and C.Diff.   - Clear liquid diet advance as tolerated.    Hypomagnesemia  Magnesium 1.6 at admission. Likely related to poor PO, nausea/vomiting. Given 2g mag sulfate in ED.   - Recheck PRN     T2DM  Last A1C 6.7 3/15/22. PTA on Metformin 1000mg BID and Ozempic Q7 days.   - Hold metformin     - Ozempic on hold while inpatient (last taken Friday)    - Monitor BG for hypoglycemia QID with meals and at bedtime given poor PO     Parsonage-Avendaño Syndrome c/b Chronic Pain Syndrome   - Placed qwk Butrans patch on Wednesday, overall not consistent with withdrawal experience    - Continue PTA Valium     - Continue  PTA Gabapentin 600 mg BID per patient request, may help to crush      HTN  BP elevated in setting of acute illness upwards of 205/112 in triage with improvement after valium and Zofran x 2 to treat severe intractable vomiting. He was previously on antihypertensives but has been off since he stopped smoking.   - Continue to monitor. Antiemetics and supportive care as above.    HLD   - hold PTA statin     Anxiety   - Continue PTA Valium 5mg TID prn   - hold PTA Pamelor 50mg at bedtime   - Continue PTA Effexor 150mg daily per patient request        Diet: Advance Diet as Tolerated: Clear Liquid Diet   DVT Prophylaxis: Ambulate every shift  Kennedy Catheter: Not present  Central Lines: None  Cardiac Monitoring: None  Code Status: Full Code      Clinically Significant Risk Factors Present on Admission          # Hypercalcemia: Ca = 10.4 mg/dL (Ref range: 8.6 - 10.0 mg/dL) and/or iCa = N/A on admission, will monitor as appropriate                 Disposition Plan      Expected Discharge Date: 10/11/2022                The patient's care was discussed with the Bedside Nurse and Patient.    Cecilia Browne MD   Hospitalist Service, [facility]  Securely message with the Vocera Web Console (learn more here)  Text page via [facility] Paging/Directory   Please see signed in provider for up to date coverage information      ______________________________________________________________________    Chief Complaint   Nausea/Vomiting, Epigastric Pain    History is obtained from the patient and EMR.    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    Mr. Rueda described that symptoms started quite acutely in the evening Friday with nausea and diarrhea, followed by progressive nausea and vomiting, often green in color. No blood in the stool or emesis. Unable to keep down much over the last few days, vague abdominal pain, no fever. Did eat out prior ([employer]) but no other family members sick the same way or this duration (toddler had one day diarrhea 2 days ago, resolved).   Very hypertensive in triage.     Review of Systems    The 10 point Review of Systems is negative other than noted in the HPI or here.    Past Medical History    I have reviewed this patient's medical history and updated it with pertinent information if needed.   Past Medical History:   Diagnosis Date     Anxiety      Depressive disorder      Diabetes mellitus (H)      Hypertension      Liver disease      Neuralgic amyotrophy      Other chronic pain      Pain disorder 2015     Parsonage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      Prostate infection      Seizures (H)      Staph infection        Past Surgical History   I have reviewed this patient's surgical history and updated it with pertinent information if needed.  Past Surgical History:   Procedure Laterality Date     BIOPSY BONE FOOT Right 2022    Procedure: BIOPSY, BONE, RIGHT GREAT TOE (DISTAL PHALANX);  Surgeon: Shelton Smallwood DPM;  Location:  OR     ORTHOPEDIC SURGERY         Social History   I have reviewed this patient's social history and updated it with pertinent information if needed.  Social History     Tobacco Use     Smoking status: Former     Packs/day: 0.25     Years: 15.00     Pack years: 3.75     Types: Cigarettes, Dip, chew, snus or snuff     Start date: 1996     Quit date: 2013     Years since quittin.2     Smokeless tobacco: Former   Substance Use Topics     Alcohol use: No     Alcohol/week: 0.0 standard drinks      Comment: 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  Drink 1.75 every 2 days     Drug use: No       Family History   I have reviewed this patient's family history and updated it with pertinent information if needed.  Family History   Problem Relation Age of Onset     Depression Mother      Anxiety Disorder Mother      Substance Abuse Maternal Grandfather      Anxiety Disorder Brother      Glaucoma No family hx of      Macular Degeneration No family hx of        Prior to Admission Medications   Prior to Admission Medications   Prescriptions Last Dose Informant Patient Reported? Taking?   ACCU-CHEK GUIDE test strip Unknown Self No Yes   Sig: USE TO TEST BLOOD SUGAR TWO TIMES A DAY OR AS DIRECTED   Buprenorphine HCl-Naloxone HCl (SUBOXONE) 4-1 MG film Not Taking Self Yes No   Sig: Place 1 Film under the tongue 2 times daily    Patient not taking: Reported on 10/9/2022   Continuous Blood Gluc  (FREESTYLE JHON 2 READER) JESSI Unknown at unknown Self No Yes   Si each continuous USE TO READ BLOOD SUGARS PER  INSTRUCTIONS   Continuous Blood Gluc Sensor (FREESTYLE JHON 2 SENSOR) MISC Unknown at unknown Self No Yes   Sig: CHANGE EVERY 14 DAYS (FURTHER REFILLS BY PCP DR ARCEO)   OZEMPIC, 0.25 OR 0.5 MG/DOSE, 2 MG/1.5ML SOPN pen  Self No No   Sig: INJECT 0.5 MG UNDER THE SKIN EVERY 7 DAYS   atorvastatin (LIPITOR) 20 MG tablet 10/8/2022 at night Self No Yes   Sig: Take 1 tablet (20 mg) by mouth daily   blood glucose (NO BRAND SPECIFIED) lancets standard Unknown at unknown Self No Yes   Sig: Use to test blood sugar 2 times daily or as directed.   blood glucose monitoring (NO BRAND SPECIFIED) meter device kit Unknown at unknown Self No Yes   Sig: Use to test blood sugar 2 times daily or as directed.   buprenorphine (BUTRANS) 5 MCG/HR WK patch  Self Yes No   Sig: Place 5 mcg onto the skin once a week   continuous blood glucose monitoring (FREESTYLE JHON) sensor Unknown at unknown Self No Yes   Sig: For use with  Freestyle Darron Flash  for continuous monitioring of blood glucose levels. Replace sensor every 14 days.   diazepam (VALIUM) 5 MG tablet Unknown at as needed Self Yes Yes   Sig: Take 1 tablet (5 mg) 3 times daily as needed.   gabapentin (NEURONTIN) 600 MG tablet  Self No No   Sig: Take 2 tablets (1,200 mg) by mouth 3 times daily   Patient taking differently: Take 1 tablet (600 mg) by mouth 2 times daily   metFORMIN (GLUCOPHAGE XR) 500 MG 24 hr tablet 10/9/2022 at am Self No Yes   Sig: TAKE 2 TABLETS BY MOUTH TWICE DAILY WITH MEALS   nortriptyline (PAMELOR) 25 MG capsule 10/8/2022 at night Self No Yes   Sig: TAKE 2 CAPSULES(50 MG) BY MOUTH AT BEDTIME   Patient taking differently: Take 1 capsule (25 mg) by mouth At Bedtime   sildenafil (REVATIO) 20 MG tablet Not Taking Self No No   Sig: Take 3-5 pills as needed before sex. Do not use it with prostate or heart medication.   Patient not taking: Reported on 10/9/2022   tadalafil (CIALIS) 10 MG tablet Not Taking Self No No   Sig: Take 1 tablet (10 mg) by mouth daily as needed   Patient not taking: Reported on 10/9/2022   venlafaxine (EFFEXOR-ER) 150 MG TB24 10/9/2022 at am Self No Yes   Sig: Take 1 tablet by mouth daily (with breakfast).      Facility-Administered Medications: None     Allergies   Allergies   Allergen Reactions     Bees Swelling     Bupropion Hcl Other (See Comments)     seizure     Lisinopril Cough     Penicillins Other (See Comments)     Unable to close mouth  Tolerated cefazolin (12/12/21)       Physical Exam   Vital Signs: Temp: 98.7  F (37.1  C) Temp src: Oral BP: (!) 152/94 Pulse: 86   Resp: 20 SpO2: 100 % O2 Device: None (Room air)    Weight: 0 lbs 0 oz   Gen: alert, interactive and pleasant but lying in bed clearly nauseated   HEENT: Normocephalic/atraumatic, sclera clear, oropharynx with moist mucous membranes  Resp: Clear bilaterally, easy work of breathing on room air  CV: Regular rate and rhythm, no murmurs noted   Abd: soft, flat,  mildly diffusely tender, +BS, nondistended, no rebound/guarding   Ext: no lower extremity edema, warm and well-perfused with brisk capillary refill   Neuro: Alert and oriented x4, grossly non-focal, moving all extremities equally       Data   Data reviewed

## 2022-10-10 NOTE — PROGRESS NOTES
[facility]  Transfer Triage Note    Date of call: 10/09/22  Time of call: 9:46 PM    Current Patient Location: [facility] ED   Current Level of Care: Med Surg    Vitals: Temp: 98.5  F (36.9  C) Temp src: Oral BP: (!) 166/102 Pulse: 68   Resp: 18 SpO2: 100 %      O2 Device: None (Room air) at    Diagnosis: Abdominal pain, nausea, vomiting, diarrhea   Is COVID-19 a concern? No  Reason for requested transfer: appropriate for [facility]   Isolation Needs: None    Outside Records: Not available  Additional records may be faxed to [phone].    Transfer accepted: Yes  Stability of Patient: Patient is vitally stable, with no critical labs, and will likely remain stable throughout the transfer process  Level of Care Needed: Med Surg  Telemetry Needed:  None  Expected Time of Arrival for Transfer: 8-24 hours  Arrival Location:  [facility]    Recommendations for Management and Stabilization: Not needed    Additional Comments: 41 yo male with history of Parsonage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, chronic pain on suboxone, DM2, who presented to [place] ED with abdominal pain, nausea, vomiting, and diarrhea. CT abdomen negative for acute cause, labs with mild hypomagnesemia and hypercalcemia but otherwise unremarkable. Unable to discharge home as unable to tolerate PO. Would be admitted to ED obs but they are full. Appropriate for transfer to [facility] for observation admission, suspect gastroenteritis. Enteric/stool studies pending. Accepted for transfer, bed available on [facility]. Pending transportation.     Ashley Soria,

## 2022-10-10 NOTE — PROGRESS NOTES
Saw and examined patient. Please see detailed H&P by Dr. Browne on 10/10   Continues to have some nausea, but denies abdominal pain   Has not had diarrhea since yesterday afternoon, and has not been able to give us a sample for tests  Denies chest pian or SOB  BP (!) 182/107 (BP Location: Right arm, Patient Position: Semi-Rae's, Cuff Size: Adult Regular)   Pulse 71   Temp 99.3  F (37.4  C) (Oral)   Resp 18   SpO2 98%    General Appearance: Awake, alert and not in distress  Respiratory: Clear breath sounds bilaterally   Cardiovascular: Normal heart sounds. No murmurs   GI: Soft, non tender. Normal bowel sounds   Skin: No bruising or bleeding   Other:Awake, alert and orientated X 3      Labs reviewed and largely within acceptable limits     Impression:  Likely viral gastroenteritis, resolving  Continue IV fluids  Symptomatic management of Nausea and vomiting. Scopolamine patch ordered  Continue to monitor       Dr MICHAEL Grier MD, FACP  Hospitalist ( Internal medicine)  Pager: [phone]

## 2022-10-10 NOTE — PLAN OF CARE
[id number]    Pt alert and oriented X 4, able to use call light and make needs known. Bp at 0600 182/107, provider notified. On RA, sating at 98%. Denies chest pain, SOB, dizziness and discomfort. C/o nausea and vomiting which was managed with PRN Zofran and Compazine. Emesis X 1. No diarrhea reported, voiding without difficulties. Peripheral IV left antecubital NS infusing at 125 ml/hr. Independent in room. Will continue to follow POC.       Plan of Care Reviewed With: patient    Overall Patient Progress: no change    Outcome Evaluation: Observation goals: Normal VS, Tolerating oral intake to maintain hydration and pain control.    Vital signs not met ( BP trending high)  Oral intake not met; only sips of water.  Pain control progressing

## 2022-10-10 NOTE — PROGRESS NOTES
Patient admitted to unit at 0005 in room 650 via stretcher accompanied by two EMT from [facility]. Verbal report given to previous shift. Pt alert and oriented X 4, able to use call light and make needs known. BP:152/94, P:86, T:98.7, RR:20, 02:100% RA.  Denies chest pain, SOB, dizziness and discomfort. C/o mild headache and nausea. Skin is clean, dry and intact. Skin assessment completed by 2nd RN Blayne. Peripheral IV left antecubital NS infusing at 125 ml/hr. Pt oriented to room, call light, TV, bathroom, bed controls; all questions and concerns were answered and addressed. On Enteric precautions.

## 2022-10-11 ENCOUNTER — PATIENT OUTREACH (OUTPATIENT)
Dept: FAMILY MEDICINE | Facility: CLINIC | Age: 42
End: 2022-10-11

## 2022-10-11 NOTE — TELEPHONE ENCOUNTER
Pt in ED with vomiting/apparent viral illness but advised in discharge instructions to f/u with PCP  Pt also due for a diabetic 6 month check as last seen by PCP 3/15/22     ED / Discharge Outreach Protocol    Patient Contact    Attempt # 1    Was call answered?  No.  Left message on voicemail with information to call me back.    Jacinta Triana, RN, BSN  [facility]

## 2022-10-13 NOTE — TELEPHONE ENCOUNTER
ED / Discharge Outreach Protocol  Pt in ED with vomiting/apparent viral illness but advised in discharge instructions to f/u with PCP  Pt also due for a diabetic 6 month check as last seen by PCP 3/15/22    Patient Contact    Attempt # 2    Was call answered?  No.  Left message on voicemail with information to call me back.    Mai Orozco, BSN RN  [facility]

## 2022-10-15 ENCOUNTER — HEALTH MAINTENANCE LETTER (OUTPATIENT)
Age: 42
End: 2022-10-15

## 2022-10-19 ENCOUNTER — OFFICE VISIT (OUTPATIENT)
Dept: PODIATRY | Facility: CLINIC | Age: 42
End: 2022-10-19
Payer: COMMERCIAL

## 2022-10-19 VITALS
SYSTOLIC BLOOD PRESSURE: 144 MMHG | RESPIRATION RATE: 16 BRPM | DIASTOLIC BLOOD PRESSURE: 84 MMHG | BODY MASS INDEX: 23.97 KG/M2 | HEART RATE: 80 BPM | WEIGHT: 160 LBS

## 2022-10-19 DIAGNOSIS — M79.675 TOE PAIN, BILATERAL: ICD-10-CM

## 2022-10-19 DIAGNOSIS — L60.0 INGROWING LEFT GREAT TOENAIL: ICD-10-CM

## 2022-10-19 DIAGNOSIS — L60.0 INGROWING RIGHT GREAT TOENAIL: Primary | ICD-10-CM

## 2022-10-19 DIAGNOSIS — L60.8 NAIL DEFORMITY: ICD-10-CM

## 2022-10-19 DIAGNOSIS — L97.511 ULCER OF GREAT TOE, RIGHT, LIMITED TO BREAKDOWN OF SKIN (H): ICD-10-CM

## 2022-10-19 DIAGNOSIS — E11.9 TYPE 2 DIABETES MELLITUS WITHOUT COMPLICATION, WITHOUT LONG-TERM CURRENT USE OF INSULIN (H): ICD-10-CM

## 2022-10-19 DIAGNOSIS — M79.674 TOE PAIN, BILATERAL: ICD-10-CM

## 2022-10-19 DIAGNOSIS — E11.42 DIABETIC POLYNEUROPATHY ASSOCIATED WITH TYPE 2 DIABETES MELLITUS (H): ICD-10-CM

## 2022-10-19 PROCEDURE — 11750 EXCISION NAIL&NAIL MATRIX: CPT | Mod: 51 | Performed by: PODIATRIST

## 2022-10-19 PROCEDURE — 97597 DBRDMT OPN WND 1ST 20 CM/<: CPT | Mod: 51 | Performed by: PODIATRIST

## 2022-10-19 NOTE — NURSING NOTE
Chief Complaint   Patient presents with     Left Great Toe - Ingrown Toenail     Right Great Toe - Ingrown Toenail    Minoo Willingham MA,CMA,3:27 PM

## 2022-10-19 NOTE — LETTER
"    10/19/2022         RE: Phillip Rueda  [address]  [facility] [address]        Dear Colleague,    Thank you for referring your patient, Phillip Rueda, to the [facility]. Please see a copy of my visit note below.    [place] [facility]KLE SURGERY    Phillip returns for permanent removal/chemical matrixectomy, bilateral hallux nail.  Please see the clinic note from 9/13/2022.    Encounter Diagnoses   Name Primary?     Ingrowing right great toenail Yes     Ingrowing left great toenail      Toe pain, bilateral      Nail deformity        Chemical Matrixectomy/ Permanent nail removal:    The chemical matrixectomy procedure was reviewed, including risks, benefits, and post-procedure cares.  The risk of discomfort and infection was discussed.  The chance of nail regrowth was discussed.  Verbal and written consent was obtained.  The site was marked and the \"Time Out\" called.      The base of the of the right hallux lwas injected with 2 cc of  2% Lidocaine plain.  The toe was then prepped with betadine solution.  A tourniquet was applied around the base of the toe to for hemostasis.   Next the toe was checked for adequate anesthesia.  The nail was freed from the nail bed and marginal soft tissue attachments with a small spatula.  The nail was firmly grasped with a hemostat and removed in total.      Using small applicator sticks, three applications for 30 seconds, of Phenol to the nail matrix were performed.  The area was diluted with a copious amount of isopropyl alcohol.  It was blotted dry.    Next Silvadene ointment was applied to the nail bed, followed by a compressive dressing.  The tourniquet was removed.  The distal toe became immediately pink.  The involved foot was kept elevated for several minutes.  Patient tolerated the procedure well. Post-procedure instruction handout provided.    Procedure #2:      The exact procedure performed on the right hallux was " performed on the left hallux, without exception.    Procedure #3:   Using a #15 scalpel, excisional debridement was performed on the right hallux ulceration.  Hyperkeratotic eschar was excised.  The ulcer base was curettaged.  This ulcer measured 0.5 cm x 0.2 cm and to the depth of deeper skin.  The base is granular.    Follow-up in 2 weeks for a check of healing status.    Shelton Smallwood DPM, FACFAS, MS  [facility] Department of Podiatry/Foot & Ankle Surgery              Again, thank you for allowing me to participate in the care of your patient.        Sincerely,        Shelton Smallwood DPM

## 2022-10-19 NOTE — PATIENT INSTRUCTIONS
POST-PERMANENT NAIL REMOVAL  1. Over-the-counter pain medication (tylenol / ibuprofen), elevating your foot, and ice application to the top of the foot is all that you will need for pain control.    2. Some bleeding is normal. If bleeding seems excessive to you, place ice on top of your foot for 15-20 minutes and elevate your foot above heart level.   3. Keep bandage on until this evening or tomorrow morning. If the bandage falls off or if it feels too tight , start the soaking process below.  4. Soaking instructions:   a. Soak your foot twice a day for 15 minutes in a mild solution of warm water and soap for a minimum of 2-4 weeks. If your toe is still draining at 4 weeks, continue with soaking.    b. It s okay to soak your foot for a few minutes to loosen the bandage applied at your appointment.   c. After soaking, use a Q-tip to clean under and around the skin where the nail was removed. This helps get rid of the brownish material and helps the wound drain.    d. Blot your toe dry and apply a band-aid.  5. It is normal to experience some discomfort and redness around the nail for 1-2 weeks following the procedure. Drainage will likely appear brownish and thick at times. This is normal. It might drain for up to 2 months.  7. Initial discomfort might last 2-3 days. You may resume with regular activities at that time, as long as you keep the wound clean and follow the soaking instructions. It is recommended that you do not enter a public swimming pool or hot tub while your toe is draining.   8. After 2-3 days, if you are experiencing worsening pain and redness, or notice pus, please contact the clinic. Ask to speak with a triage nurse and they will inform our team of your symptoms and we can advise if a follow up is needed.  Thank you for choosing [facility]iat[facility] / Foot & Ankle Surgery!    DR. GAVIN'S CLINIC LOCATIONS:     [facility] TRIAGE LINE: [phone]   [address] [place] [address] APPOINTMENTS:  [phone]   [place] [address] RADIOLOGY: [phone]   (Every other Tues - Wed - Fri PM) SET UP SURGERY: [phone]    BILLING QUESTIONS: [phone]   [facility] FAX: [phone] [id number]    [place] [address]    (Thurs & Fri AM)      Follow up: 2 weeks

## 2022-10-19 NOTE — PROGRESS NOTES
"[place] [facility] ANKLE SURGERY    Phillip returns for permanent removal/chemical matrixectomy, bilateral hallux nail.  Please see the clinic note from 9/13/2022.    Encounter Diagnoses   Name Primary?     Ingrowing right great toenail Yes     Ingrowing left great toenail      Toe pain, bilateral      Nail deformity        Chemical Matrixectomy/ Permanent nail removal:    The chemical matrixectomy procedure was reviewed, including risks, benefits, and post-procedure cares.  The risk of discomfort and infection was discussed.  The chance of nail regrowth was discussed.  Verbal and written consent was obtained.  The site was marked and the \"Time Out\" called.      The base of the of the right hallux lwas injected with 2 cc of  2% Lidocaine plain.  The toe was then prepped with betadine solution.  A tourniquet was applied around the base of the toe to for hemostasis.   Next the toe was checked for adequate anesthesia.  The nail was freed from the nail bed and marginal soft tissue attachments with a small spatula.  The nail was firmly grasped with a hemostat and removed in total.      Using small applicator sticks, three applications for 30 seconds, of Phenol to the nail matrix were performed.  The area was diluted with a copious amount of isopropyl alcohol.  It was blotted dry.    Next Silvadene ointment was applied to the nail bed, followed by a compressive dressing.  The tourniquet was removed.  The distal toe became immediately pink.  The involved foot was kept elevated for several minutes.  Patient tolerated the procedure well. Post-procedure instruction handout provided.    Procedure #2:      The exact procedure performed on the right hallux was performed on the left hallux, without exception.    Procedure #3:   Using a #15 scalpel, excisional debridement was performed on the right hallux ulceration.  Hyperkeratotic eschar was excised.  The ulcer base was curettaged.  This ulcer measured 0.5 cm x 0.2 cm and to " the depth of deeper skin.  The base is granular.    Follow-up in 2 weeks for a check of healing status.    Shelton Smallwood DPM, ARACELI, MS  [facility] Department of Podiatry/Foot & Ankle Surgery

## 2022-10-21 ENCOUNTER — VIRTUAL VISIT (OUTPATIENT)
Dept: FAMILY MEDICINE | Facility: CLINIC | Age: 42
End: 2022-10-21
Payer: COMMERCIAL

## 2022-10-21 DIAGNOSIS — R19.7 DIARRHEA, UNSPECIFIED TYPE: ICD-10-CM

## 2022-10-21 DIAGNOSIS — E11.9 TYPE 2 DIABETES MELLITUS WITHOUT COMPLICATION, WITHOUT LONG-TERM CURRENT USE OF INSULIN (H): ICD-10-CM

## 2022-10-21 DIAGNOSIS — R11.10 INTRACTABLE VOMITING: Primary | ICD-10-CM

## 2022-10-21 PROCEDURE — 99495 TRANSJ CARE MGMT MOD F2F 14D: CPT | Mod: GT | Performed by: FAMILY MEDICINE

## 2022-10-21 RX ORDER — SEMAGLUTIDE 1.34 MG/ML
INJECTION, SOLUTION SUBCUTANEOUS
Qty: 4.5 ML | Refills: 0 | Status: SHIPPED | OUTPATIENT
Start: 2022-10-21 | End: 2023-01-30

## 2022-10-21 RX ORDER — METFORMIN HCL 500 MG
TABLET, EXTENDED RELEASE 24 HR ORAL
Qty: 360 TABLET | Refills: 0 | Status: SHIPPED | OUTPATIENT
Start: 2022-10-21 | End: 2023-01-20

## 2022-10-21 NOTE — PROGRESS NOTES
Phillip is a 42 year old who is being evaluated via a billable video visit.      How would you like to obtain your AVS? MyChart  If the video visit is dropped, the invitation should be resent by: Text to cell phone: [phone]  Will anyone else be joining your video visit? No          Assessment & Plan     Intractable vomiting  Unclear etiology. Had metabolic acidosis from gastroenteritis symptoms. Now resolved.     Diarrhea, unspecified type  Now resolved. Feeling fine.     Type 2 diabetes mellitus without complication, without long-term current use of insulin (H)  Doing well overall. Continue to monitor labs.   - metFORMIN (GLUCOPHAGE XR) 500 MG 24 hr tablet; TAKE 2 TABLETS BY MOUTH TWICE DAILY WITH MEALS  - semaglutide (OZEMPIC, 0.25 OR 0.5 MG/DOSE,) 2 MG/1.5ML SOPN pen; INJECT 0.5 MG UNDER THE SKIN EVERY 7 DAYS Strength: 2 MG/1.5ML  - Hemoglobin A1c; Future                 No follow-ups on file.    Km Dos Santos MD, MD  [facility]    Venkatesh Fisher is a 42 year old, presenting for the following health issues:  No chief complaint on file.      \[facility]\""       Hospital Follow-up Visit:    Hospital/Nursing Home/IP Rehab Facility: [facility]  Date of Admission: 10/9/2022  Date of Discharge: 10/10/2022  Reason(s) for Admission: Suspected viral gastroenteritis    Was your hospitalization related to COVID-19? No   Problems taking medications regularly:  None  Medication changes since discharge: None  Problems adhering to non-medication therapy:  None    Summary of hospitalization:  [facility] discharge summary reviewed  Diagnostic Tests/Treatments reviewed.  Follow up needed: none  Other Healthcare Providers Involved in Patient s Care:         None  Update since discharge: improved.       Plan of care communicated with patient         No specific trigger. Like food poisoning but much severe. Could not give stool  sample.  Took around 6 days but now bowel habits are fine. No nausea.      Eye exam - still need to schedule it.     Review of Systems         Objective           Vitals:  No vitals were obtained today due to virtual visit.    Physical Exam   GENERAL: Healthy, alert and no distress  EYES: Eyes grossly normal to inspection.  No discharge or erythema, or obvious scleral/conjunctival abnormalities.  RESP: No audible wheeze, cough, or visible cyanosis.  No visible retractions or increased work of breathing.    SKIN: Visible skin clear. No significant rash, abnormal pigmentation or lesions.  NEURO: Cranial nerves grossly intact.  Mentation and speech appropriate for age.  PSYCH: Mentation appears normal, affect normal/bright, judgement and insight intact, normal speech and appearance well-groomed.                 Video-Visit Details    Video Start Time: 7:56 AM    Type of service:  Video Visit    Video End Time:8:03 AM    Originating Location (pt. Location): Home        Distant Location (provider location):  Off-site    Platform used for Video Visit: LeanKit

## 2022-11-02 ENCOUNTER — MYC MEDICAL ADVICE (OUTPATIENT)
Dept: PODIATRY | Facility: CLINIC | Age: 42
End: 2022-11-02

## 2022-11-03 NOTE — TELEPHONE ENCOUNTER
Patient was last seen in the office on 10/19/22 regarding ulcer of right great toe, and chemical matrixectomies of the right and left great toe nails.     Patient was instructed to follow up in the office in 2 weeks for reevaluation and check healing status.   -Per to behart message patient unable to schedule follow up visit until 11/16/22 due to personal schedule.     Per [employer] message dated 11/2/22 there were no concerns regarding submitted photos.     Patient is scheduled for follow up visit on 11/9/22 as instructed by Dr. Burns.     Closing encounter.     Alexandra De Los Santos, ATC

## 2022-11-04 ENCOUNTER — ALLIED HEALTH/NURSE VISIT (OUTPATIENT)
Dept: FAMILY MEDICINE | Facility: CLINIC | Age: 42
End: 2022-11-04
Payer: COMMERCIAL

## 2022-11-04 DIAGNOSIS — Z23 NEED FOR VACCINATION: Primary | ICD-10-CM

## 2022-11-04 PROCEDURE — 91312 COVID-19,PF,PFIZER BOOSTER BIVALENT: CPT

## 2022-11-04 PROCEDURE — 99207 PR NO CHARGE NURSE ONLY: CPT

## 2022-11-04 PROCEDURE — 0124A COVID-19,PF,PFIZER BOOSTER BIVALENT: CPT

## 2022-11-04 NOTE — PROGRESS NOTES
Prior to immunization administration, verified patients identity using patient s name and date of birth. Please see Immunization Activity for additional information.     Screening Questionnaire for Adult Immunization    Are you sick today?   No   Do you have allergies to medications, food, a vaccine component or latex?   No   Have you ever had a serious reaction after receiving a vaccination?   No   Do you have a long-term health problem with heart, lung, kidney, or metabolic disease (e.g., diabetes), asthma, a blood disorder, no spleen, complement component deficiency, a cochlear implant, or a spinal fluid leak?  Are you on long-term aspirin therapy?   No   Do you have cancer, leukemia, HIV/AIDS, or any other immune system problem?   No   Do you have a parent, brother, or sister with an immune system problem?   No   In the past 3 months, have you taken medications that affect  your immune system, such as prednisone, other steroids, or anticancer drugs; drugs for the treatment of rheumatoid arthritis, Crohn s disease, or psoriasis; or have you had radiation treatments?   No   Have you had a seizure, or a brain or other nervous system problem?   No   During the past year, have you received a transfusion of blood or blood    products, or been given immune (gamma) globulin or antiviral drug?   No   For women: Are you pregnant or is there a chance you could become       pregnant during the next month?   No   Have you received any vaccinations in the past 4 weeks?   No     Immunization questionnaire answers were all negative.        Per orders of Dr. Higginbotham, injection of Bivalent Pfizer given by Florida Kat CMA. Patient instructed to remain in clinic for 15 minutes afterwards, and to report any adverse reaction to me immediately.       Screening performed by Florida Kat CMA on 11/4/2022 at 10:22 AM.

## 2022-11-09 ENCOUNTER — OFFICE VISIT (OUTPATIENT)
Dept: PODIATRY | Facility: CLINIC | Age: 42
End: 2022-11-09
Payer: COMMERCIAL

## 2022-11-09 VITALS — SYSTOLIC BLOOD PRESSURE: 128 MMHG | DIASTOLIC BLOOD PRESSURE: 80 MMHG | BODY MASS INDEX: 23.97 KG/M2 | WEIGHT: 160 LBS

## 2022-11-09 DIAGNOSIS — E11.9 TYPE 2 DIABETES MELLITUS WITHOUT COMPLICATION, WITHOUT LONG-TERM CURRENT USE OF INSULIN (H): ICD-10-CM

## 2022-11-09 DIAGNOSIS — Z09 SURGERY FOLLOW-UP EXAMINATION: Primary | ICD-10-CM

## 2022-11-09 PROCEDURE — 99213 OFFICE O/P EST LOW 20 MIN: CPT | Performed by: PODIATRIST

## 2022-11-09 NOTE — PROGRESS NOTES
ASSESSMENT:  Encounter Diagnoses   Name Primary?     Surgery follow-up examination Yes     Type 2 diabetes mellitus without complication, without long-term current use of insulin (H)      MEDICAL DECISION MAKING:  Normal healing of the bilateral hallux nail bed.  Not concerned about the erythema of the left hallux, as long as it stays localized over the eponychial region.  If this persists however, I am open to a short course of antibiotic.  I have asked him to follow-up via MyChart in 1 week.  Continue twice daily soaking in lukewarm soap water for 1 week  Follow-up for diabetic shoes and custom orthoses  We discussed using a pumice stone to file down the callus at the distal aspect of the right hallux.  This is a region of recurrent ulceration.    Disclaimer: This note consists of symbols derived from keyboarding, dictation and/or voice recognition software. As a result, there may be errors in the script that have gone undetected. Please consider this when interpreting information found in this chart.    Shelton Smallwood, HUNG, FACFAS, MS    [place] Department of Podiatry/Foot & Ankle Surgery      ____________________________________________________________________    HPI:       Phillip follows up for a check of his bilateral hallux.  On 10/19/2022 he had bilateral total chemical matrixectomy.  I had asked him to follow-up, due to his diabetes, to make sure he is healing.  Reports some ongoing drainage.  There is some redness on the left hallux which she is outlined with a marker.  He reports no progression.  He is scheduled to get new orthoses and diabetic shoes.    *  Past Medical History:   Diagnosis Date     Anxiety      Depressive disorder      Diabetes mellitus (H)      Hypertension      Liver disease      Neuralgic amyotrophy      Other chronic pain      Pain disorder 12/8/2015     Parsonage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      Prostate infection      Seizures (H)      Staph infection    *  *  Past Surgical History:   Procedure  Laterality Date     BIOPSY BONE FOOT Right 1/18/2022    Procedure: BIOPSY, BONE, RIGHT GREAT TOE (DISTAL PHALANX);  Surgeon: Shelton Smallwood DPM;  Location:  OR     ORTHOPEDIC SURGERY     *  *  Current Outpatient Medications   Medication Sig Dispense Refill     ACCU-CHEK GUIDE test strip USE TO TEST BLOOD SUGAR TWO TIMES A DAY OR AS DIRECTED 200 strip 1     atorvastatin (LIPITOR) 20 MG tablet Take 1 tablet (20 mg) by mouth daily 90 tablet 2     blood glucose (NO BRAND SPECIFIED) lancets standard Use to test blood sugar 2 times daily or as directed. 100 each 1     blood glucose monitoring (NO BRAND SPECIFIED) meter device kit Use to test blood sugar 2 times daily or as directed. 1 kit 0     buprenorphine (BUTRANS) 5 MCG/HR WK patch Place 5 mcg onto the skin once a week       Continuous Blood Gluc  (FREESTYLE JHON 2 READER) JESSI 1 each continuous USE TO READ BLOOD SUGARS PER  INSTRUCTIONS 1 each 0     Continuous Blood Gluc Sensor (FREESTYLE JHON 2 SENSOR) MISC CHANGE EVERY 14 DAYS (FURTHER REFILLS BY PCP DR ARCEO) 6 each 1     continuous blood glucose monitoring (FREESTYLE JHON) sensor For use with Freestyle Jhon Flash  for continuous monitioring of blood glucose levels. Replace sensor every 14 days. 6 each 3     diazepam (VALIUM) 5 MG tablet Take 1 tablet (5 mg) 3 times daily as needed. 60 tablet      gabapentin (NEURONTIN) 600 MG tablet Take 1 tablet (600 mg) by mouth 2 times daily       metFORMIN (GLUCOPHAGE XR) 500 MG 24 hr tablet TAKE 2 TABLETS BY MOUTH TWICE DAILY WITH MEALS 360 tablet 0     nortriptyline (PAMELOR) 25 MG capsule Take 1 capsule (25 mg) by mouth At Bedtime       prochlorperazine (COMPAZINE) 5 MG tablet Take 1 tablet (5 mg) by mouth every 6 hours as needed for nausea or vomiting 10 tablet 0     semaglutide (OZEMPIC, 0.25 OR 0.5 MG/DOSE,) 2 MG/1.5ML SOPN pen INJECT 0.5 MG UNDER THE SKIN EVERY 7 DAYS Strength: 2 MG/1.5ML 4.5 mL 0     venlafaxine (EFFEXOR-ER) 150  MG TB24 Take 1 tablet by mouth daily (with breakfast). 30 each 0         EXAM:    Vitals: /80   Wt 72.6 kg (160 lb)   BMI 23.97 kg/m    BMI: Body mass index is 23.97 kg/m .    Vascular:  Pedal pulses are palpable for both the DP and PT arteries.  CFT < 3 sec.  No edema.      Neuro: Light touch sensation is intact to the L4, L5, S1 distributions  No evidence of weakness, spasticity, or contracture in the lower extremities.     Derm: Bilateral hallux nail bed shows granulation tissue approximately with dry hyperkeratotic crust distally.  Erythema localized over the left hallux eponychial him.  Ink marking proximal.

## 2022-11-09 NOTE — LETTER
11/9/2022         RE: Phillip Rueda  [address]  [facility] [address]        Dear Colleague,    Thank you for referring your patient, Phillip Rueda, to the [facility]. Please see a copy of my visit note below.    ASSESSMENT:  Encounter Diagnoses   Name Primary?     Surgery follow-up examination Yes     Type 2 diabetes mellitus without complication, without long-term current use of insulin (H)      MEDICAL DECISION MAKING:  Normal healing of the bilateral hallux nail bed.  Not concerned about the erythema of the left hallux, as long as it stays localized over the eponychial region.  If this persists however, I am open to a short course of antibiotic.  I have asked him to follow-up via MyChart in 1 week.  Continue twice daily soaking in lukewarm soap water for 1 week  Follow-up for diabetic shoes and custom orthoses  We discussed using a pumice stone to file down the callus at the distal aspect of the right hallux.  This is a region of recurrent ulceration.    Disclaimer: This note consists of symbols derived from keyboarding, dictation and/or voice recognition software. As a result, there may be errors in the script that have gone undetected. Please consider this when interpreting information found in this chart.    Shelton Smallwood DPM, FACFAS, MS    [place] Department of Podiatry/Foot & Ankle Surgery      ____________________________________________________________________    HPI:       Phillip follows up for a check of his bilateral hallux.  On 10/19/2022 he had bilateral total chemical matrixectomy.  I had asked him to follow-up, due to his diabetes, to make sure he is healing.  Reports some ongoing drainage.  There is some redness on the left hallux which she is outlined with a marker.  He reports no progression.  He is scheduled to get new orthoses and diabetic shoes.    *  Past Medical History:   Diagnosis Date     Anxiety      Depressive disorder      Diabetes  mellitus (H)      Hypertension      Liver disease      Neuralgic amyotrophy      Other chronic pain      Pain disorder 12/8/2015     Parsonage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      Prostate infection      Seizures (H)      Staph infection    *  *  Past Surgical History:   Procedure Laterality Date     BIOPSY BONE FOOT Right 1/18/2022    Procedure: BIOPSY, BONE, RIGHT GREAT TOE (DISTAL PHALANX);  Surgeon: Shelton Smallwood DPM;  Location:  OR     ORTHOPEDIC SURGERY     *  *  Current Outpatient Medications   Medication Sig Dispense Refill     ACCU-CHEK GUIDE test strip USE TO TEST BLOOD SUGAR TWO TIMES A DAY OR AS DIRECTED 200 strip 1     atorvastatin (LIPITOR) 20 MG tablet Take 1 tablet (20 mg) by mouth daily 90 tablet 2     blood glucose (NO BRAND SPECIFIED) lancets standard Use to test blood sugar 2 times daily or as directed. 100 each 1     blood glucose monitoring (NO BRAND SPECIFIED) meter device kit Use to test blood sugar 2 times daily or as directed. 1 kit 0     buprenorphine (BUTRANS) 5 MCG/HR WK patch Place 5 mcg onto the skin once a week       Continuous Blood Gluc  (FREESTYLE JHON 2 READER) JESSI 1 each continuous USE TO READ BLOOD SUGARS PER  INSTRUCTIONS 1 each 0     Continuous Blood Gluc Sensor (FREESTYLE JHON 2 SENSOR) MISC CHANGE EVERY 14 DAYS (FURTHER REFILLS BY PCP DR ARCEO) 6 each 1     continuous blood glucose monitoring (FREESTYLE JHON) sensor For use with Freestyle Jhon Flash  for continuous monitioring of blood glucose levels. Replace sensor every 14 days. 6 each 3     diazepam (VALIUM) 5 MG tablet Take 1 tablet (5 mg) 3 times daily as needed. 60 tablet      gabapentin (NEURONTIN) 600 MG tablet Take 1 tablet (600 mg) by mouth 2 times daily       metFORMIN (GLUCOPHAGE XR) 500 MG 24 hr tablet TAKE 2 TABLETS BY MOUTH TWICE DAILY WITH MEALS 360 tablet 0     nortriptyline (PAMELOR) 25 MG capsule Take 1 capsule (25 mg) by mouth At Bedtime       prochlorperazine (COMPAZINE) 5 MG  tablet Take 1 tablet (5 mg) by mouth every 6 hours as needed for nausea or vomiting 10 tablet 0     semaglutide (OZEMPIC, 0.25 OR 0.5 MG/DOSE,) 2 MG/1.5ML SOPN pen INJECT 0.5 MG UNDER THE SKIN EVERY 7 DAYS Strength: 2 MG/1.5ML 4.5 mL 0     venlafaxine (EFFEXOR-ER) 150 MG TB24 Take 1 tablet by mouth daily (with breakfast). 30 each 0         EXAM:    Vitals: /80   Wt 72.6 kg (160 lb)   BMI 23.97 kg/m    BMI: Body mass index is 23.97 kg/m .    Vascular:  Pedal pulses are palpable for both the DP and PT arteries.  CFT < 3 sec.  No edema.      Neuro: Light touch sensation is intact to the L4, L5, S1 distributions  No evidence of weakness, spasticity, or contracture in the lower extremities.     Derm: Bilateral hallux nail bed shows granulation tissue approximately with dry hyperkeratotic crust distally.  Erythema localized over the left hallux eponychial him.  Ink marking proximal.          Again, thank you for allowing me to participate in the care of your patient.        Sincerely,        Shelton Smallwood DPM

## 2022-11-16 ENCOUNTER — MYC MEDICAL ADVICE (OUTPATIENT)
Dept: PODIATRY | Facility: CLINIC | Age: 42
End: 2022-11-16

## 2022-11-17 NOTE — TELEPHONE ENCOUNTER
Patient was last seen on 11/9/22. Patient was instructed to f/u via [employer] 1 week after appt. Please see patient's [employer] message and attached picture and advise.    Paula Avelar MBA, ATC

## 2023-01-18 DIAGNOSIS — E11.9 TYPE 2 DIABETES MELLITUS WITHOUT COMPLICATION, WITHOUT LONG-TERM CURRENT USE OF INSULIN (H): ICD-10-CM

## 2023-01-20 RX ORDER — METFORMIN HCL 500 MG
TABLET, EXTENDED RELEASE 24 HR ORAL
Qty: 360 TABLET | Refills: 0 | Status: SHIPPED | OUTPATIENT
Start: 2023-01-20 | End: 2023-05-04

## 2023-01-20 NOTE — TELEPHONE ENCOUNTER
Routing refill request to provider for review/approval because:  Labs not current:  A1C (order was placed 10/21/22)    Last Written Prescription Date:  10/21/22  Last Fill Quantity: 360,  # refills: 0   Last office visit: 10/21/22 virtual with Raya  Future Office Visit:      Scheduling: please reach out to patient for lab visit    PARAG Soliz RN  [facility]

## 2023-01-26 DIAGNOSIS — E11.9 TYPE 2 DIABETES MELLITUS WITHOUT COMPLICATION, WITHOUT LONG-TERM CURRENT USE OF INSULIN (H): ICD-10-CM

## 2023-01-30 RX ORDER — SEMAGLUTIDE 1.34 MG/ML
INJECTION, SOLUTION SUBCUTANEOUS
Qty: 4.5 ML | Refills: 0 | Status: SHIPPED | OUTPATIENT
Start: 2023-01-30 | End: 2023-05-04

## 2023-01-30 NOTE — TELEPHONE ENCOUNTER
,  --Please contact patient and ask to schedule lab-only for a1c..      --Last visit:  10/21/22 christi Dos Santos.    --Future Visit: none.    ---Prescription approved per [facility] Refill Protocol.       Margareth Barlow RN BSN     [facility]

## 2023-02-17 ENCOUNTER — OFFICE VISIT (OUTPATIENT)
Dept: PODIATRY | Facility: CLINIC | Age: 43
End: 2023-02-17
Payer: COMMERCIAL

## 2023-02-17 VITALS
DIASTOLIC BLOOD PRESSURE: 86 MMHG | SYSTOLIC BLOOD PRESSURE: 150 MMHG | HEIGHT: 68 IN | BODY MASS INDEX: 24.67 KG/M2 | WEIGHT: 162.8 LBS

## 2023-02-17 DIAGNOSIS — E11.9 TYPE 2 DIABETES MELLITUS WITHOUT COMPLICATION, WITHOUT LONG-TERM CURRENT USE OF INSULIN (H): ICD-10-CM

## 2023-02-17 DIAGNOSIS — L97.511 ULCER OF GREAT TOE, RIGHT, LIMITED TO BREAKDOWN OF SKIN (H): Primary | ICD-10-CM

## 2023-02-17 DIAGNOSIS — L84 CALLUS OF FOOT: ICD-10-CM

## 2023-02-17 DIAGNOSIS — L85.3 DRY SKIN: ICD-10-CM

## 2023-02-17 PROCEDURE — 97597 DBRDMT OPN WND 1ST 20 CM/<: CPT | Performed by: PODIATRIST

## 2023-02-17 PROCEDURE — 99213 OFFICE O/P EST LOW 20 MIN: CPT | Mod: 25 | Performed by: PODIATRIST

## 2023-02-17 RX ORDER — AMMONIUM LACTATE 12 G/100G
CREAM TOPICAL 2 TIMES DAILY
Qty: 385 G | Refills: 1 | Status: SHIPPED | OUTPATIENT
Start: 2023-02-17 | End: 2023-05-31

## 2023-02-17 NOTE — PATIENT INSTRUCTIONS
Thank you for choosing [facility]!    DR. GAVIN'S CLINIC LOCATIONS:     [facility] TRIAGE LINE: [phone]   [address] APPOINTMENTS: [phone]   [place] [address] RADIOLOGY: [phone]   (Every other Tues - Wed - Fri PM) SET UP SURGERY: [phone]    PHYSICAL THERAPY: [phone]   [facility] SPECIAL[facility] BILLING QUESTIONS: [phone] [id number] FAX: [phone]   [place] [address]    (Thurs & Fri AM)       Follow up: 1-2 months, sooner if needed    WOUND CARE INSTRUCTIONS    1)  Keep the wound covered by a bandage when bathing.    2)  Gently clean the wound with soap water, separate from bath/shower water.      3)  Each day, apply a topical antibiotic ointment to the wound (Neosporin, Triple antibiotic, Bacitracin).   Cover with large band-aid or gauze.      5)  Please seek immediate medical attention if any increasing redness, drainage, smell, or pain related to the wound.     6)  Please return to clinic in the period of time requested by Dr. Gavin.      SIGNS OF INFECTION  expanding redness around the wound   yellow or greenish-colored pus or cloudy wound drainage   red streaking spreading from the wound   increased swelling, tenderness, or pain around the wound   fever  *If you notice any of these signs of infection, call us right away!

## 2023-02-17 NOTE — LETTER
2/17/2023         RE: Phillip Rueda  [address]  [facility] [address]        Dear Colleague,    Thank you for referring your patient, Phillip Rueda, to the [facility]. Please see a copy of my visit note below.    ASSESSMENT:  Encounter Diagnoses   Name Primary?     Ulcer of great toe, right, limited to breakdown of skin (H) Yes     Callus of foot      Dry skin      Type 2 diabetes mellitus without complication, without long-term current use of insulin (H)      MEDICAL DECISION MAKING:  No clinical signs of infection.  After prepping both told with alcohol, and using a number 10 scalpel, excisional debridement was performed bilateral hallux to the level of deeper skin.  This involved an area less than 20 cm  and to the depth of deeper skin.    A superficial ulceration was found at the tip of the right hallux.  No ulceration at the tip of the left hallux.    We discussed how his hallux limitus contributes to high pressures, callusing and ulceration.    Basic wound cares were reviewed.  He is encouraged to file the hyperkeratotic skin down.    AmLactin 12% cream for callusing and dry skin    Follow-up in 1 to 2 months, sooner if concerns.    Disclaimer: This note consists of symbols derived from keyboarding, dictation and/or voice recognition software. As a result, there may be errors in the script that have gone undetected. Please consider this when interpreting information found in this chart.    Shelton Smallwood DPM, FACFAS, [facility] Department of Podiatry/Foot & Ankle Surgery      ____________________________________________________________________    HPI:       Phillip presents today for thick hyperkeratotic skin at the distal aspect of the bilateral hallux.  There is discoloration that is concerning.  He has a history of a deep ulcer on the right hallux.  In October 2022 I performed a total chemical matrixectomy, bilateral hallux.  He reports that it did resolve  his toe pain.  He is glad he had the procedures done.        *  Past Medical History:   Diagnosis Date     Anxiety      Depressive disorder      Diabetes mellitus (H)      Hypertension      Liver disease      Neuralgic amyotrophy      Other chronic pain      Pain disorder 12/8/2015     Parsonage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      Prostate infection      Seizures (H)      Staph infection    *  *  Past Surgical History:   Procedure Laterality Date     BIOPSY BONE FOOT Right 1/18/2022    Procedure: BIOPSY, BONE, RIGHT GREAT TOE (DISTAL PHALANX);  Surgeon: Shelton Smallwood DPM;  Location:  OR     ORTHOPEDIC SURGERY     *  *  Current Outpatient Medications   Medication Sig Dispense Refill     ACCU-CHEK GUIDE test strip USE TO TEST BLOOD SUGAR TWO TIMES A DAY OR AS DIRECTED 200 strip 1     atorvastatin (LIPITOR) 20 MG tablet Take 1 tablet (20 mg) by mouth daily 90 tablet 2     blood glucose (NO BRAND SPECIFIED) lancets standard Use to test blood sugar 2 times daily or as directed. 100 each 1     blood glucose monitoring (NO BRAND SPECIFIED) meter device kit Use to test blood sugar 2 times daily or as directed. 1 kit 0     Continuous Blood Gluc  (FREESTYLE JHON 2 READER) JESSI 1 each continuous USE TO READ BLOOD SUGARS PER  INSTRUCTIONS 1 each 0     Continuous Blood Gluc Sensor (FREESTYLE JHON 2 SENSOR) MISC CHANGE EVERY 14 DAYS (FURTHER REFILLS BY PCP DR ARCEO) 6 each 1     continuous blood glucose monitoring (FREESTYLE JHON) sensor For use with Freestyle Jhon Flash  for continuous monitioring of blood glucose levels. Replace sensor every 14 days. 6 each 3     diazepam (VALIUM) 5 MG tablet Take 1 tablet (5 mg) 3 times daily as needed. 60 tablet      gabapentin (NEURONTIN) 600 MG tablet Take 1 tablet (600 mg) by mouth 2 times daily       metFORMIN (GLUCOPHAGE XR) 500 MG 24 hr tablet TAKE 2 TABLETS BY MOUTH TWICE DAILY WITH MEALS 360 tablet 0     nortriptyline (PAMELOR) 25 MG capsule Take 1 capsule  "(25 mg) by mouth At Bedtime       semaglutide (OZEMPIC, 0.25 OR 0.5 MG/DOSE,) 2 MG/1.5ML SOPN pen INJECT 0.5 MG UNDER THE SKIN EVERY 7 DAYS 4.5 mL 0     venlafaxine (EFFEXOR-ER) 150 MG TB24 Take 1 tablet by mouth daily (with breakfast). 30 each 0     buprenorphine (BUTRANS) 5 MCG/HR WK patch Place 5 mcg onto the skin once a week (Patient not taking: Reported on 2/17/2023)       prochlorperazine (COMPAZINE) 5 MG tablet Take 1 tablet (5 mg) by mouth every 6 hours as needed for nausea or vomiting (Patient not taking: Reported on 2/17/2023) 10 tablet 0         EXAM:    Vitals: BP (!) 150/86   Ht 1.727 m (5' 8\")   Wt 73.8 kg (162 lb 12.8 oz)   BMI 24.75 kg/m    BMI: Body mass index is 24.75 kg/m .    Constitutional:  Phillip Rueda is in no apparent distress, appears well-nourished.  Cooperative with history and physical exam.    Vascular:  Pedal pulses are palpable for both the DP and PT arteries.  CFT < 3 sec.  No edema.      Neuro: Light touch sensation is intact to the L4, L5, S1 distributions  No evidence of weakness, spasticity, or contracture in the lower extremities.     Derm: Thick hyperkeratotic skin involving the distal aspect of the bilateral hallux.  Hyperpigmentation seen in both, indicative of intraepidermal bleeding.  Post excisional debridement, there is a 0.3 cm diameter ulceration to the depth of deeper skin, distal right hallux.  No ulceration found on the left.    Musculoskeletal:    Lower extremity muscle strength is normal.  Bilateral hallux limitus.        Again, thank you for allowing me to participate in the care of your patient.        Sincerely,        Shelton Smallwood, HUNG    "

## 2023-02-17 NOTE — PROGRESS NOTES
ASSESSMENT:  Encounter Diagnoses   Name Primary?     Ulcer of great toe, right, limited to breakdown of skin (H) Yes     Callus of foot      Dry skin      Type 2 diabetes mellitus without complication, without long-term current use of insulin (H)      MEDICAL DECISION MAKING:  No clinical signs of infection.  After prepping both told with alcohol, and using a number 10 scalpel, excisional debridement was performed bilateral hallux to the level of deeper skin.  This involved an area less than 20 cm  and to the depth of deeper skin.    A superficial ulceration was found at the tip of the right hallux.  No ulceration at the tip of the left hallux.    We discussed how his hallux limitus contributes to high pressures, callusing and ulceration.    Basic wound cares were reviewed.  He is encouraged to file the hyperkeratotic skin down.    AmLactin 12% cream for callusing and dry skin    Follow-up in 1 to 2 months, sooner if concerns.    Disclaimer: This note consists of symbols derived from keyboarding, dictation and/or voice recognition software. As a result, there may be errors in the script that have gone undetected. Please consider this when interpreting information found in this chart.    Shelton Smallwood, HUNG, FACFAS, MS    [place] Department of Podiatry/Foot & Ankle Surgery      ____________________________________________________________________    HPI:       Phillip presents today for thick hyperkeratotic skin at the distal aspect of the bilateral hallux.  There is discoloration that is concerning.  He has a history of a deep ulcer on the right hallux.  In October 2022 I performed a total chemical matrixectomy, bilateral hallux.  He reports that it did resolve his toe pain.  He is glad he had the procedures done.        *  Past Medical History:   Diagnosis Date     Anxiety      Depressive disorder      Diabetes mellitus (H)      Hypertension      Liver disease      Neuralgic amyotrophy      Other chronic pain      Pain  disorder 12/8/2015     Parsonage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      Prostate infection      Seizures (H)      Staph infection    *  *  Past Surgical History:   Procedure Laterality Date     BIOPSY BONE FOOT Right 1/18/2022    Procedure: BIOPSY, BONE, RIGHT GREAT TOE (DISTAL PHALANX);  Surgeon: Shelton Smallwood DPM;  Location:  OR     ORTHOPEDIC SURGERY     *  *  Current Outpatient Medications   Medication Sig Dispense Refill     ACCU-CHEK GUIDE test strip USE TO TEST BLOOD SUGAR TWO TIMES A DAY OR AS DIRECTED 200 strip 1     atorvastatin (LIPITOR) 20 MG tablet Take 1 tablet (20 mg) by mouth daily 90 tablet 2     blood glucose (NO BRAND SPECIFIED) lancets standard Use to test blood sugar 2 times daily or as directed. 100 each 1     blood glucose monitoring (NO BRAND SPECIFIED) meter device kit Use to test blood sugar 2 times daily or as directed. 1 kit 0     Continuous Blood Gluc  (FREESTYLE JHON 2 READER) JESSI 1 each continuous USE TO READ BLOOD SUGARS PER  INSTRUCTIONS 1 each 0     Continuous Blood Gluc Sensor (FREESTYLE JHON 2 SENSOR) MISC CHANGE EVERY 14 DAYS (FURTHER REFILLS BY PCP DR ARCEO) 6 each 1     continuous blood glucose monitoring (FREESTYLE JHON) sensor For use with Freestyle Jhon Flash  for continuous monitioring of blood glucose levels. Replace sensor every 14 days. 6 each 3     diazepam (VALIUM) 5 MG tablet Take 1 tablet (5 mg) 3 times daily as needed. 60 tablet      gabapentin (NEURONTIN) 600 MG tablet Take 1 tablet (600 mg) by mouth 2 times daily       metFORMIN (GLUCOPHAGE XR) 500 MG 24 hr tablet TAKE 2 TABLETS BY MOUTH TWICE DAILY WITH MEALS 360 tablet 0     nortriptyline (PAMELOR) 25 MG capsule Take 1 capsule (25 mg) by mouth At Bedtime       semaglutide (OZEMPIC, 0.25 OR 0.5 MG/DOSE,) 2 MG/1.5ML SOPN pen INJECT 0.5 MG UNDER THE SKIN EVERY 7 DAYS 4.5 mL 0     venlafaxine (EFFEXOR-ER) 150 MG TB24 Take 1 tablet by mouth daily (with breakfast). 30 each 0     buprenorphine  "(BUTRANS) 5 MCG/HR WK patch Place 5 mcg onto the skin once a week (Patient not taking: Reported on 2/17/2023)       prochlorperazine (COMPAZINE) 5 MG tablet Take 1 tablet (5 mg) by mouth every 6 hours as needed for nausea or vomiting (Patient not taking: Reported on 2/17/2023) 10 tablet 0         EXAM:    Vitals: BP (!) 150/86   Ht 1.727 m (5' 8\")   Wt 73.8 kg (162 lb 12.8 oz)   BMI 24.75 kg/m    BMI: Body mass index is 24.75 kg/m .    Constitutional:  Phillip Rueda is in no apparent distress, appears well-nourished.  Cooperative with history and physical exam.    Vascular:  Pedal pulses are palpable for both the DP and PT arteries.  CFT < 3 sec.  No edema.      Neuro: Light touch sensation is intact to the L4, L5, S1 distributions  No evidence of weakness, spasticity, or contracture in the lower extremities.     Derm: Thick hyperkeratotic skin involving the distal aspect of the bilateral hallux.  Hyperpigmentation seen in both, indicative of intraepidermal bleeding.  Post excisional debridement, there is a 0.3 cm diameter ulceration to the depth of deeper skin, distal right hallux.  No ulceration found on the left.    Musculoskeletal:    Lower extremity muscle strength is normal.  Bilateral hallux limitus.    "

## 2023-03-01 ENCOUNTER — LAB (OUTPATIENT)
Dept: LAB | Facility: CLINIC | Age: 43
End: 2023-03-01
Payer: COMMERCIAL

## 2023-03-01 DIAGNOSIS — E11.9 TYPE 2 DIABETES MELLITUS WITHOUT COMPLICATION, WITHOUT LONG-TERM CURRENT USE OF INSULIN (H): ICD-10-CM

## 2023-03-01 LAB — HBA1C MFR BLD: 6.7 % (ref 0–5.6)

## 2023-03-01 PROCEDURE — 83036 HEMOGLOBIN GLYCOSYLATED A1C: CPT

## 2023-03-01 PROCEDURE — 36415 COLL VENOUS BLD VENIPUNCTURE: CPT

## 2023-05-02 DIAGNOSIS — E11.9 TYPE 2 DIABETES MELLITUS WITHOUT COMPLICATION, WITHOUT LONG-TERM CURRENT USE OF INSULIN (H): ICD-10-CM

## 2023-05-03 DIAGNOSIS — E11.9 TYPE 2 DIABETES MELLITUS WITHOUT COMPLICATION, WITHOUT LONG-TERM CURRENT USE OF INSULIN (H): ICD-10-CM

## 2023-05-04 RX ORDER — METFORMIN HCL 500 MG
1000 TABLET, EXTENDED RELEASE 24 HR ORAL 2 TIMES DAILY WITH MEALS
Qty: 360 TABLET | Refills: 0 | Status: SHIPPED | OUTPATIENT
Start: 2023-05-04 | End: 2023-05-31

## 2023-05-04 RX ORDER — SEMAGLUTIDE 1.34 MG/ML
INJECTION, SOLUTION SUBCUTANEOUS
Qty: 4.5 ML | Refills: 0 | Status: SHIPPED | OUTPATIENT
Start: 2023-05-04 | End: 2023-05-31

## 2023-05-04 NOTE — TELEPHONE ENCOUNTER
Prescription approved per [facility] Refill Protocol.  Labs were done, future appt 5/31/23  Magy HOBSON RN  [facility]

## 2023-05-04 NOTE — TELEPHONE ENCOUNTER
,  --Please contact patient and ask to schedule a diabetes visit/annual preventive visit with Raya.      --Last visit:  10/21/22 virtual Raya.  --Last in person annual preventive 3/15/22.    --Future Visit: none.      ---Prescription approved per [facility] Refill Protocol.       Margareth Barlow RN BSN     [facility]

## 2023-05-31 ENCOUNTER — OFFICE VISIT (OUTPATIENT)
Dept: FAMILY MEDICINE | Facility: CLINIC | Age: 43
End: 2023-05-31
Payer: COMMERCIAL

## 2023-05-31 VITALS
WEIGHT: 160 LBS | SYSTOLIC BLOOD PRESSURE: 179 MMHG | TEMPERATURE: 97.2 F | HEIGHT: 69 IN | DIASTOLIC BLOOD PRESSURE: 103 MMHG | BODY MASS INDEX: 23.7 KG/M2 | HEART RATE: 67 BPM | OXYGEN SATURATION: 98 % | RESPIRATION RATE: 16 BRPM

## 2023-05-31 DIAGNOSIS — Z00.00 ENCOUNTER FOR MEDICARE ANNUAL WELLNESS EXAM: Primary | ICD-10-CM

## 2023-05-31 DIAGNOSIS — F33.40 RECURRENT MAJOR DEPRESSION IN REMISSION (H): ICD-10-CM

## 2023-05-31 DIAGNOSIS — I10 HYPERTENSION GOAL BP (BLOOD PRESSURE) < 140/90: ICD-10-CM

## 2023-05-31 DIAGNOSIS — N52.9 ERECTILE DYSFUNCTION, UNSPECIFIED ERECTILE DYSFUNCTION TYPE: ICD-10-CM

## 2023-05-31 DIAGNOSIS — E11.9 TYPE 2 DIABETES MELLITUS WITHOUT COMPLICATION, WITHOUT LONG-TERM CURRENT USE OF INSULIN (H): ICD-10-CM

## 2023-05-31 DIAGNOSIS — F19.21 DRUG DEPENDENCE, IN REMISSION (H): ICD-10-CM

## 2023-05-31 DIAGNOSIS — E11.42 DIABETIC POLYNEUROPATHY ASSOCIATED WITH TYPE 2 DIABETES MELLITUS (H): ICD-10-CM

## 2023-05-31 PROBLEM — M86.171 OTHER ACUTE OSTEOMYELITIS OF RIGHT FOOT (H): Status: RESOLVED | Noted: 2021-12-12 | Resolved: 2023-05-31

## 2023-05-31 LAB
ALBUMIN SERPL BCG-MCNC: 4.4 G/DL (ref 3.5–5.2)
ALP SERPL-CCNC: 105 U/L (ref 40–129)
ALT SERPL W P-5'-P-CCNC: 9 U/L (ref 10–50)
ANION GAP SERPL CALCULATED.3IONS-SCNC: 13 MMOL/L (ref 7–15)
AST SERPL W P-5'-P-CCNC: 25 U/L (ref 10–50)
BILIRUB SERPL-MCNC: 0.5 MG/DL
BUN SERPL-MCNC: 9.5 MG/DL (ref 6–20)
CALCIUM SERPL-MCNC: 9.3 MG/DL (ref 8.6–10)
CHLORIDE SERPL-SCNC: 98 MMOL/L (ref 98–107)
CHOLEST SERPL-MCNC: 186 MG/DL
CREAT SERPL-MCNC: 0.95 MG/DL (ref 0.67–1.17)
CREAT UR-MCNC: 92.6 MG/DL
DEPRECATED HCO3 PLAS-SCNC: 29 MMOL/L (ref 22–29)
ERYTHROCYTE [DISTWIDTH] IN BLOOD BY AUTOMATED COUNT: 13.2 % (ref 10–15)
GFR SERPL CREATININE-BSD FRML MDRD: >90 ML/MIN/1.73M2
GLUCOSE SERPL-MCNC: 79 MG/DL (ref 70–99)
HBA1C MFR BLD: 6.2 % (ref 0–5.6)
HCT VFR BLD AUTO: 39 % (ref 40–53)
HDLC SERPL-MCNC: 93 MG/DL
HGB BLD-MCNC: 13.1 G/DL (ref 13.3–17.7)
LDLC SERPL CALC-MCNC: 84 MG/DL
MCH RBC QN AUTO: 28.2 PG (ref 26.5–33)
MCHC RBC AUTO-ENTMCNC: 33.6 G/DL (ref 31.5–36.5)
MCV RBC AUTO: 84 FL (ref 78–100)
MICROALBUMIN UR-MCNC: <12 MG/L
MICROALBUMIN/CREAT UR: NORMAL MG/G{CREAT}
NONHDLC SERPL-MCNC: 93 MG/DL
PLATELET # BLD AUTO: 230 10E3/UL (ref 150–450)
POTASSIUM SERPL-SCNC: 4 MMOL/L (ref 3.4–5.3)
PROT SERPL-MCNC: 7.1 G/DL (ref 6.4–8.3)
RBC # BLD AUTO: 4.64 10E6/UL (ref 4.4–5.9)
SODIUM SERPL-SCNC: 140 MMOL/L (ref 136–145)
TRIGL SERPL-MCNC: 46 MG/DL
TSH SERPL DL<=0.005 MIU/L-ACNC: 1.12 UIU/ML (ref 0.3–4.2)
VIT B12 SERPL-MCNC: 273 PG/ML (ref 232–1245)
WBC # BLD AUTO: 5.5 10E3/UL (ref 4–11)

## 2023-05-31 PROCEDURE — 84443 ASSAY THYROID STIM HORMONE: CPT | Performed by: FAMILY MEDICINE

## 2023-05-31 PROCEDURE — 85027 COMPLETE CBC AUTOMATED: CPT | Performed by: FAMILY MEDICINE

## 2023-05-31 PROCEDURE — 80053 COMPREHEN METABOLIC PANEL: CPT | Performed by: FAMILY MEDICINE

## 2023-05-31 PROCEDURE — 82043 UR ALBUMIN QUANTITATIVE: CPT | Performed by: FAMILY MEDICINE

## 2023-05-31 PROCEDURE — 83036 HEMOGLOBIN GLYCOSYLATED A1C: CPT | Performed by: FAMILY MEDICINE

## 2023-05-31 PROCEDURE — 80061 LIPID PANEL: CPT | Performed by: FAMILY MEDICINE

## 2023-05-31 PROCEDURE — 99214 OFFICE O/P EST MOD 30 MIN: CPT | Mod: 25 | Performed by: FAMILY MEDICINE

## 2023-05-31 PROCEDURE — G0009 ADMIN PNEUMOCOCCAL VACCINE: HCPCS | Performed by: FAMILY MEDICINE

## 2023-05-31 PROCEDURE — G0439 PPPS, SUBSEQ VISIT: HCPCS | Mod: 25 | Performed by: FAMILY MEDICINE

## 2023-05-31 PROCEDURE — 90677 PCV20 VACCINE IM: CPT | Performed by: FAMILY MEDICINE

## 2023-05-31 PROCEDURE — 82607 VITAMIN B-12: CPT | Performed by: FAMILY MEDICINE

## 2023-05-31 PROCEDURE — 36415 COLL VENOUS BLD VENIPUNCTURE: CPT | Performed by: FAMILY MEDICINE

## 2023-05-31 PROCEDURE — 82570 ASSAY OF URINE CREATININE: CPT | Performed by: FAMILY MEDICINE

## 2023-05-31 RX ORDER — BUPRENORPHINE AND NALOXONE 8; 2 MG/1; MG/1
1 FILM, SOLUBLE BUCCAL; SUBLINGUAL DAILY
COMMUNITY
Start: 2023-05-31

## 2023-05-31 RX ORDER — ATENOLOL 25 MG/1
25 TABLET ORAL DAILY
Qty: 90 TABLET | Refills: 0 | Status: SHIPPED | OUTPATIENT
Start: 2023-05-31 | End: 2023-10-03

## 2023-05-31 RX ORDER — METFORMIN HCL 500 MG
1000 TABLET, EXTENDED RELEASE 24 HR ORAL 2 TIMES DAILY WITH MEALS
Qty: 360 TABLET | Refills: 1 | Status: SHIPPED | OUTPATIENT
Start: 2023-05-31 | End: 2024-01-09

## 2023-05-31 RX ORDER — ATORVASTATIN CALCIUM 20 MG/1
20 TABLET, FILM COATED ORAL DAILY
Qty: 90 TABLET | Refills: 1 | Status: SHIPPED | OUTPATIENT
Start: 2023-05-31 | End: 2023-12-01

## 2023-05-31 RX ORDER — SEMAGLUTIDE 1.34 MG/ML
INJECTION, SOLUTION SUBCUTANEOUS
Qty: 4.5 ML | Refills: 0 | Status: SHIPPED | OUTPATIENT
Start: 2023-08-01 | End: 2023-11-06

## 2023-05-31 RX ORDER — SILDENAFIL CITRATE 20 MG/1
TABLET ORAL
Qty: 30 TABLET | Refills: 5 | Status: SHIPPED | OUTPATIENT
Start: 2023-05-31 | End: 2023-06-09 | Stop reason: DRUGHIGH

## 2023-05-31 ASSESSMENT — ENCOUNTER SYMPTOMS
CONSTIPATION: 1
FREQUENCY: 0
PARESTHESIAS: 0
FEVER: 0
CHILLS: 0
EYE PAIN: 0
JOINT SWELLING: 0
PALPITATIONS: 0
DIARRHEA: 0
WEAKNESS: 0
NAUSEA: 0
SHORTNESS OF BREATH: 0
COUGH: 0
MYALGIAS: 0
DIZZINESS: 0
HEARTBURN: 0
NERVOUS/ANXIOUS: 1
ARTHRALGIAS: 0
HEMATOCHEZIA: 0
HEMATURIA: 0
ABDOMINAL PAIN: 0
SORE THROAT: 0
HEADACHES: 0
DYSURIA: 0

## 2023-05-31 ASSESSMENT — ACTIVITIES OF DAILY LIVING (ADL): CURRENT_FUNCTION: NO ASSISTANCE NEEDED

## 2023-05-31 ASSESSMENT — PATIENT HEALTH QUESTIONNAIRE - PHQ9
SUM OF ALL RESPONSES TO PHQ QUESTIONS 1-9: 6
10. IF YOU CHECKED OFF ANY PROBLEMS, HOW DIFFICULT HAVE THESE PROBLEMS MADE IT FOR YOU TO DO YOUR WORK, TAKE CARE OF THINGS AT HOME, OR GET ALONG WITH OTHER PEOPLE: SOMEWHAT DIFFICULT
SUM OF ALL RESPONSES TO PHQ QUESTIONS 1-9: 6

## 2023-05-31 ASSESSMENT — PAIN SCALES - GENERAL: PAINLEVEL: NO PAIN (0)

## 2023-05-31 NOTE — PROGRESS NOTES
"SUBJECTIVE:   Phillip is a 43 year old who presents for Preventive Visit.      5/31/2023     1:24 PM   Additional Questions   Roomed by Bing DOOLEY.     Are you in the first 12 months of your Medicare coverage?  No    Healthy Habits:     In general, how would you rate your overall health?  Good    Frequency of exercise:  2-3 days/week    Duration of exercise:  15-30 minutes    Do you usually eat at least 4 servings of fruit and vegetables a day, include whole grains    & fiber and avoid regularly eating high fat or \"junk\" foods?  Yes    Taking medications regularly:  Yes    Medication side effects:  None    Ability to successfully perform activities of daily living:  No assistance needed    Home Safety:  No safety concerns identified    Hearing Impairment:  No hearing concerns    In the past 6 months, have you been bothered by leaking of urine?  No    In general, how would you rate your overall mental or emotional health?  Good      PHQ-2 Total Score: 1    Additional concerns today:  No    Have you ever done Advance Care Planning? (For example, a Health Directive, POLST, or a discussion with a medical provider or your loved ones about your wishes): No, advance care planning information given to patient to review.  Advanced care planning was discussed at today's visit.     Cognitive Screening   1) Repeat 3 items (Leader, Season, Table)    2) Clock draw: NORMAL  3) 3 item recall: Recalls 3 objects  Results: 3 items recalled: COGNITIVE IMPAIRMENT LESS LIKELY    Mini-CogTM Copyright S Stephanie. Licensed by the author for use in [facility]; reprinted with permission (arleth@.[email or web address]). All rights reserved.      Do you have sleep apnea, excessive snoring or daytime drowsiness?: no    Reviewed and updated as needed this visit by clinical staff   Tobacco  Allergies  Meds              Reviewed and updated as needed this visit by Provider                 Social History     Tobacco Use     Smoking status: Former     " Packs/day: 0.25     Years: 15.00     Pack years: 3.75     Types: Cigarettes, Dip, chew, snus or snuff     Start date: 1996     Quit date: 2013     Years since quittin.9     Smokeless tobacco: Former   Vaping Use     Vaping status: Never Used   Substance Use Topics     Alcohol use: No     Alcohol/week: 0.0 standard drinks of alcohol     Comment: 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  Drink 1.75 every 2 days             2023     1:21 PM   Alcohol Use   Prescreen: >3 drinks/day or >7 drinks/week? Not Applicable          View : No data to display.              Do you have a current opioid prescription? No  Do you use any other controlled substances or medications that are not prescribed by a provider? None               Current providers sharing in care for this patient include:   Patient Care Team:  Km Dos Santos MD as PCP - General (Family Practice)  [place], Edouard Panda [facility] as Other (see comments) (Pain Clinic)  Ky Guerrero MD as MD (Neurology)  Tiffany Cardenas RN as Nurse Coordinator  Ulisses Jordan MD as MD (Pain Clinic)  Kelvin Hickman MD as MD (Ophthalmology)  Km Dos Santos MD as MD (Family Practice)  Erna Pineda RN as Registered Nurse  Km Dos Santos MD as Assigned PCP  Ky Guerrero MD as MD (Neurology)  Sehlton Smallwood DPM as Assigned Surgical Provider  Ismael Vasquez DPM as Assigned Musculoskeletal Provider    The following health maintenance items are reviewed in Epic and correct as of today:  Health Maintenance   Topic Date Due     URINE DRUG SCREEN  2014     Pneumococcal Vaccine: Pediatrics (0 to 5 Years) and At-Risk Patients (6 to 64 Years) (2 - PCV) 2016     EYE EXAM  10/13/2016     DIABETIC FOOT EXAM  2019     ANNUAL REVIEW OF HM ORDERS  2022     MICROALBUMIN  2023     LIPID  2023     MEDICARE ANNUAL WELLNESS VISIT  03/15/2023     A1C  2023     BMP   "10/10/2023     PHQ-9  11/30/2023     ADVANCE CARE PLANNING  03/15/2027     COLORECTAL CANCER SCREENING  10/05/2027     DTAP/TDAP/TD IMMUNIZATION (4 - Td or Tdap) 08/29/2028     HEPATITIS C SCREENING  Completed     HIV SCREENING  Completed     DEPRESSION ACTION PLAN  Completed     INFLUENZA VACCINE  Completed     HEPATITIS B IMMUNIZATION  Completed     COVID-19 Vaccine  Completed     IPV IMMUNIZATION  Aged Out     MENINGITIS IMMUNIZATION  Aged Out     At [facility] - bp was high. Checked at home -   Used to be on bp medication.   Wife with cancer and some stress due to that.   No new change in medication.     Last oct - vomiting. bp was high too at that time too.     When gets steroid for shoulder - has to use insulin.     Doing daily foot exam - toe infection is all gone.     Seeing psychiatrist and pain specialist.     Review of Systems   Constitutional: Negative for chills and fever.   HENT: Negative for congestion, ear pain, hearing loss and sore throat.    Eyes: Negative for pain and visual disturbance.   Respiratory: Negative for cough and shortness of breath.    Cardiovascular: Negative for chest pain, palpitations and peripheral edema.   Gastrointestinal: Positive for constipation. Negative for abdominal pain, diarrhea, heartburn, hematochezia and nausea.   Genitourinary: Negative for dysuria, frequency, genital sores, hematuria and urgency.   Musculoskeletal: Negative for arthralgias, joint swelling and myalgias.   Skin: Negative for rash.   Neurological: Negative for dizziness, weakness, headaches and paresthesias.   Psychiatric/Behavioral: Negative for mood changes. The patient is nervous/anxious.      OBJECTIVE:   BP (!) 154/100 (BP Location: Right arm, Patient Position: Sitting, Cuff Size: Adult Regular)   Pulse 67   Temp 97.2  F (36.2  C) (Temporal)   Resp 16   Ht 1.753 m (5' 9\")   Wt 72.6 kg (160 lb)   SpO2 98%   BMI 23.63 kg/m   Estimated body mass index is 23.63 kg/m  as calculated from " "the following:    Height as of this encounter: 1.753 m (5' 9\").    Weight as of this encounter: 72.6 kg (160 lb).  Physical Exam  GENERAL: healthy, alert and no distress  EYES: Eyes grossly normal to inspection, PERRL and conjunctivae and sclerae normal  HENT: ear canals and TM's normal, nose and mouth without ulcers or lesions  NECK: no adenopathy, no asymmetry, masses, or scars and thyroid normal to palpation  RESP: lungs clear to auscultation - no rales, rhonchi or wheezes  CV: regular rate and rhythm, normal S1 S2, no S3 or S4, no murmur, click or rub, no peripheral edema and peripheral pulses strong  ABDOMEN: soft, nontender, no hepatosplenomegaly, no masses and bowel sounds normal  MS: no gross musculoskeletal defects noted, no edema  SKIN: no suspicious lesions or rashes  NEURO: Normal strength and tone, mentation intact and speech normal  PSYCH: mentation appears normal, affect normal/bright    ASSESSMENT / PLAN:   (Z00.00) Encounter for Medicare annual wellness exam  (primary encounter diagnosis)  Comment:    Plan:      (E11.9) Type 2 diabetes mellitus without complication, without long-term current use of insulin (H)  Comment: Stable A1c.  Continue the same Rx.  Plan: metFORMIN (GLUCOPHAGE XR) 500 MG 24 hr tablet             (E11.42) Diabetic polyneuropathy associated with type 2 diabetes mellitus (H)  Comment:    Plan: Foot ulcer healed.  Continue to monitor feet as he is already doing.    (I10) Hypertension goal BP (blood pressure) < 140/90  Comment: Remote history of high blood pressure but lately blood pressure has been running high.  He has used the atenolol as per his record and he has tolerated it well.  We will resume same.  He is going to report his heart rate and blood pressure in a month and we will adjust Rx depending upon his response.  Plan: atenolol (TENORMIN) 25 MG tablet             (N52.9) Erectile dysfunction, unspecified erectile dysfunction type  Comment:  Patient is tolerating current " medication without any major side effects of concerns and current dose seems reasonable too.  Current medication regime is effective. Continue current treatment without any changes.   Plan: sildenafil (REVATIO) 20 MG tablet             (F19.21) Drug dependence, in remission (H)  Comment:    Plan: Seeing addiction medicine specialist. On suboxone.    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 rx as per them.          COUNSELING:  Reviewed preventive health counseling, as reflected in patient instructions        He reports that he quit smoking about 9 years ago. His smoking use included cigarettes and dip, chew, snus or snuff. He started smoking about 26 years ago. He has a 3.75 pack-year smoking history. He has quit using smokeless tobacco.      Appropriate preventive services were discussed with this patient, including applicable screening as appropriate for cardiovascular disease, diabetes, osteopenia/osteoporosis, and glaucoma.  As appropriate for age/gender, discussed screening for colorectal cancer, prostate cancer, breast cancer, and cervical cancer. Checklist reviewing preventive services available has been given to the patient.    Reviewed patients plan of care and provided an AVS. The Intermediate Care Plan ( asthma action plan, low back pain action plan, and migraine action plan) for Phillip meets the Care Plan requirement. This Care Plan has been established and reviewed with the Patient.          Km Dos Santos MD, MD  [facility]    Identified Health Risks:    I have reviewed Opioid Use Disorder and Substance Use Disorder risk factors and made any needed referrals.     Answers for HPI/ROS submitted by the patient on 5/31/2023  If you checked off any problems, how difficult have these problems made it for you to do your work, take care of things at home, or get along with other people?: Somewhat difficult  PHQ9 TOTAL  SCORE: 6        The patient's PHQ-9 score is consistent with mild depression. He was provided with information regarding depression and was advised to schedule a follow up appointment in 26 weeks to further address this issue.

## 2023-05-31 NOTE — PATIENT INSTRUCTIONS
"Send me jerryhart in a month with average blood pressure readings.         Patient Education   Personalized Prevention Plan  You are due for the preventive services outlined below.  Your care team is available to assist you in scheduling these services.  If you have already completed any of these items, please share that information with your care team to update in your medical record.  Health Maintenance Due   Topic Date Due    URINE DRUG SCREEN  04/08/2014    Pneumococcal Vaccine (2 - PCV) 07/22/2016    Eye Exam  10/13/2016    Diabetic Foot Exam  03/01/2019    ANNUAL REVIEW OF HM ORDERS  12/21/2022    Kidney Microalbumin Urine Test  01/04/2023    Cholesterol Lab  01/24/2023    Annual Wellness Visit  03/15/2023       Depression and Suicide in Older Adults  Nearly 2 million older adults in the [place] have some type of depression. Some of them even take their own lives. Yet depression among older adults is often ignored. Learning about the warning signs of depression may help spare a loved one needless pain. You may also save a life.   What is depression?  Depression is a common and serious illness. It affects the way you think and feel. It is not a normal part of aging. It is not a sign of weakness, a character flaw, or something you can \"snap out of.\" Most people with depression need treatment to get better. The most common symptom is a feeling of deep sadness. People who are depressed also may seem tired and listless. And nothing seems to give them pleasure. It s normal to grieve or be sad sometimes. But sadness lessens or passes with time. Depression rarely goes away or improves on its own. Other symptoms of depression are:   Sleeping more or less than normal  Eating more or less than normal  Having headaches, stomachaches, or other pains that don t go away  Feeling nervous,  empty,  or worthless  Crying a lot  Thinking or talking about suicide or death  Loss of interest in activities previously enjoyed  Social " isolation  Feeling confused or forgetful  What causes it?  The causes of depression aren t fully known. But it is thought to result from a complex blend of these factors:   Biochemistry. Certain chemicals in the brain play a role.  Genes. Depression does run in families.  Life stress. Life stresses can also trigger depression in some people. Older adults often face many stressors. These may include isolation, the death of friends or a spouse, health problems, and financial concerns.  Chronic health conditions. This includes diabetes, heart disease, or cancer. These can cause symptoms of depression. Medicine side effects can cause changes in thoughts and behaviors.  Giving support    Depressed people often may not want to ask for help. When they do, they may be ignored. Or they may get the wrong treatment. You can help by showing parents and older friends love and support. If they seem depressed, don t lecture the person or ignore the symptoms. Don't discount the symptoms as a  normal  part of aging. They are not. Get involved, listen, and show interest and support.   Help them understand that depression is a treatable illness. Tell them you can help them find the right treatment. Offer to go to their healthcare provider's appointment with them for support when the symptoms are discussed. With their approval, contact a local mental health center, social service agency, or hospital about services.   Helping at healthcare visits  You can be an advocate for them at healthcare appointments. Many older adults have chronic illnesses. Many of these can cause symptoms of depression. Medicine side effects can change thoughts and behaviors.   You can help make sure that the healthcare provider looks at all of these factors. They should refer your family member or friend to a mental healthcare provider when needed. In some cases, untreated depression can lead to a misdiagnosis. A person may be diagnosed with a brain disorder  such as dementia. If the healthcare provider does not take the issue of depression seriously, help your family member or friend find another provider.   Asking about self-harm thoughts  If you think an older person you care about could be suicidal, ask,  Have you thought about suicide?  Most people will tell you the truth. If they say yes, they may already have a plan for how and when they will attempt it. Find out as much as you can. The more detailed the plan, and the easier it is to carry out, the more danger the person is in right now. Tell the person you are there for them and you do not want them to get harmed. Don't wait to get help for the person. Call the person's healthcare provider, local hospital, or emergency services.   Call 988 in a crisis   Never leave the person alone. A person who is actively suicidal needs crisis care right away. They need constant supervision. Never leave the person out of sight. Call 988 Tell the crisis counselor you need help for a person who is thinking about suicide. The counselor will help you get the right level of crisis help. You may be advised to take the person to the nearest emergency room.   The National Suicide Prevention Lifeline is available at 988, or 800-273-talk (340.685.4752), or www.suicidepreventionlifeline.org. When you call or text 988, you will be connected to trained counselors who are part of the National Suicide Prevention Lifeline network. An online chat option is also available. Lifeline is free and available 24/7.   To learn more  National Suicide Prevention Lifeline at www.suicidepreventionlifeline.org  or 364-159-UYBV (663-251-6198)  National [place] on Mental Illness at www.trinity.org  or 635-220-XYVU (227-694-5577)  Mental Health Jenna at www.nmha.org  or 849-762-7537  National [place] of Mental Health at www.nimh.nih.gov  or 091-585-9335    Sera last reviewed this educational content on 7/1/2022 2000-2022 [employer]. All  rights reserved. This information is not intended as a substitute for professional medical care. Always follow your healthcare professional's instructions.

## 2023-06-01 ENCOUNTER — TELEPHONE (OUTPATIENT)
Dept: FAMILY MEDICINE | Facility: CLINIC | Age: 43
End: 2023-06-01
Payer: COMMERCIAL

## 2023-06-01 DIAGNOSIS — N52.9 ERECTILE DYSFUNCTION, UNSPECIFIED ERECTILE DYSFUNCTION TYPE: ICD-10-CM

## 2023-06-01 DIAGNOSIS — N52.9 ERECTILE DYSFUNCTION, UNSPECIFIED ERECTILE DYSFUNCTION TYPE: Primary | ICD-10-CM

## 2023-06-01 RX ORDER — SILDENAFIL CITRATE 20 MG/1
TABLET ORAL
Qty: 30 TABLET | Refills: 5 | Status: CANCELLED | OUTPATIENT
Start: 2023-06-01

## 2023-06-01 NOTE — TELEPHONE ENCOUNTER
Gianna Aguilar  to Me        6/1/23 12:46 PM  Hello,   I have created a telephone encounter for this PA request and routed it to the PA team.   Due to accessibility our team is unable to process PA's through other request types.   Please create telephone encounters for all PA requests.   If you would like an up to date SOP please reach out to Minoo Wu.     Thank you!

## 2023-06-07 NOTE — TELEPHONE ENCOUNTER
Central Prior Authorization Team   Phone: [phone]    PA Initiation    Medication: sildenafil (REVATIO) 20 MG tablet  Insurance Company: [employer] - Phone [phone] Fax [phone]  Pharmacy Filling the Rx: [id number] - [place], [place] - [address]  Filling Pharmacy Phone: [phone]  Filling Pharmacy Fax:    Start Date: 6/7/2023

## 2023-06-08 NOTE — TELEPHONE ENCOUNTER
Dr. Dos Santos: do you wish to prescribe a different strength?  Pended 6# with 5 refills per the below.    Magy HOBSON RN  [facility]

## 2023-06-08 NOTE — TELEPHONE ENCOUNTER
Per insurance, sildenafil 20 mg is only covered for PAH.  Sildenafil 25, 50, and 100 are covered for ED and patient can get up to 6 tablets per month covered by the plan.  If patient would like to fill for the sildenafil 20 mg tablets he will need to pay out of pocket for them.

## 2023-06-09 RX ORDER — SILDENAFIL 100 MG/1
100 TABLET, FILM COATED ORAL DAILY PRN
Qty: 6 TABLET | Refills: 11 | Status: SHIPPED | OUTPATIENT
Start: 2023-06-09

## 2023-10-02 DIAGNOSIS — I10 HYPERTENSION GOAL BP (BLOOD PRESSURE) < 140/90: ICD-10-CM

## 2023-10-03 RX ORDER — ATENOLOL 25 MG/1
25 TABLET ORAL DAILY
Qty: 30 TABLET | Refills: 0 | Status: SHIPPED | OUTPATIENT
Start: 2023-10-03 | End: 2023-11-06

## 2023-10-03 NOTE — TELEPHONE ENCOUNTER
I have signed prescription for 30 days. Please notify and help patient to schedule a follow up appointment before further refills. MA only for bp recheck or report home bp with mychart.

## 2023-11-04 DIAGNOSIS — I10 HYPERTENSION GOAL BP (BLOOD PRESSURE) < 140/90: ICD-10-CM

## 2023-11-04 DIAGNOSIS — E11.9 TYPE 2 DIABETES MELLITUS WITHOUT COMPLICATION, WITHOUT LONG-TERM CURRENT USE OF INSULIN (H): ICD-10-CM

## 2023-11-06 RX ORDER — ATENOLOL 25 MG/1
25 TABLET ORAL DAILY
Qty: 30 TABLET | Refills: 1 | Status: SHIPPED | OUTPATIENT
Start: 2023-11-06 | End: 2024-01-09

## 2023-11-06 RX ORDER — SEMAGLUTIDE 0.68 MG/ML
INJECTION, SOLUTION SUBCUTANEOUS
Qty: 2 ML | Refills: 1 | Status: SHIPPED | OUTPATIENT
Start: 2023-11-06 | End: 2024-01-09

## 2023-11-06 NOTE — TELEPHONE ENCOUNTER
I have signed prescription for 60 days. Please notify and help patient to schedule a follow up appointment before further refills.

## 2023-12-29 DIAGNOSIS — I10 HYPERTENSION GOAL BP (BLOOD PRESSURE) < 140/90: ICD-10-CM

## 2023-12-29 RX ORDER — ATENOLOL 25 MG/1
25 TABLET ORAL DAILY
Qty: 30 TABLET | Refills: 1 | OUTPATIENT
Start: 2023-12-29

## 2024-01-09 ENCOUNTER — OFFICE VISIT (OUTPATIENT)
Dept: FAMILY MEDICINE | Facility: CLINIC | Age: 44
End: 2024-01-09
Payer: COMMERCIAL

## 2024-01-09 VITALS
TEMPERATURE: 97.9 F | DIASTOLIC BLOOD PRESSURE: 68 MMHG | HEIGHT: 69 IN | BODY MASS INDEX: 25.24 KG/M2 | WEIGHT: 170.4 LBS | SYSTOLIC BLOOD PRESSURE: 128 MMHG | OXYGEN SATURATION: 98 % | HEART RATE: 85 BPM | RESPIRATION RATE: 21 BRPM

## 2024-01-09 DIAGNOSIS — E78.00 HYPERCHOLESTEREMIA: ICD-10-CM

## 2024-01-09 DIAGNOSIS — E11.9 TYPE 2 DIABETES MELLITUS WITHOUT COMPLICATION, WITHOUT LONG-TERM CURRENT USE OF INSULIN (H): Primary | ICD-10-CM

## 2024-01-09 DIAGNOSIS — I10 HYPERTENSION GOAL BP (BLOOD PRESSURE) < 140/90: ICD-10-CM

## 2024-01-09 DIAGNOSIS — E11.42 DIABETIC POLYNEUROPATHY ASSOCIATED WITH TYPE 2 DIABETES MELLITUS (H): ICD-10-CM

## 2024-01-09 LAB
HBA1C MFR BLD: 6.2 % (ref 0–5.6)
HOLD SPECIMEN: NORMAL

## 2024-01-09 PROCEDURE — 90686 IIV4 VACC NO PRSV 0.5 ML IM: CPT | Performed by: FAMILY MEDICINE

## 2024-01-09 PROCEDURE — 91320 SARSCV2 VAC 30MCG TRS-SUC IM: CPT | Performed by: FAMILY MEDICINE

## 2024-01-09 PROCEDURE — 90471 IMMUNIZATION ADMIN: CPT | Performed by: FAMILY MEDICINE

## 2024-01-09 PROCEDURE — 83036 HEMOGLOBIN GLYCOSYLATED A1C: CPT | Performed by: FAMILY MEDICINE

## 2024-01-09 PROCEDURE — 36415 COLL VENOUS BLD VENIPUNCTURE: CPT | Performed by: FAMILY MEDICINE

## 2024-01-09 PROCEDURE — 90480 ADMN SARSCOV2 VAC 1/ONLY CMP: CPT | Performed by: FAMILY MEDICINE

## 2024-01-09 PROCEDURE — 99214 OFFICE O/P EST MOD 30 MIN: CPT | Mod: 25 | Performed by: FAMILY MEDICINE

## 2024-01-09 RX ORDER — ATENOLOL 25 MG/1
25 TABLET ORAL DAILY
Qty: 90 TABLET | Refills: 1 | Status: SHIPPED | OUTPATIENT
Start: 2024-01-09 | End: 2024-06-25

## 2024-01-09 RX ORDER — METFORMIN HCL 500 MG
1000 TABLET, EXTENDED RELEASE 24 HR ORAL 2 TIMES DAILY WITH MEALS
Qty: 360 TABLET | Refills: 1 | Status: SHIPPED | OUTPATIENT
Start: 2024-01-09 | End: 2024-01-09

## 2024-01-09 RX ORDER — METFORMIN HCL 500 MG
1000 TABLET, EXTENDED RELEASE 24 HR ORAL 2 TIMES DAILY WITH MEALS
Qty: 360 TABLET | Refills: 1 | Status: SHIPPED | OUTPATIENT
Start: 2024-01-09 | End: 2024-06-25

## 2024-01-09 RX ORDER — FLASH GLUCOSE SENSOR
KIT MISCELLANEOUS
Qty: 2 EACH | Refills: 5 | Status: SHIPPED | OUTPATIENT
Start: 2024-01-09

## 2024-01-09 RX ORDER — ATORVASTATIN CALCIUM 20 MG/1
20 TABLET, FILM COATED ORAL DAILY
Qty: 90 TABLET | Refills: 1 | Status: SHIPPED | OUTPATIENT
Start: 2024-01-09 | End: 2024-01-09

## 2024-01-09 RX ORDER — SEMAGLUTIDE 0.68 MG/ML
INJECTION, SOLUTION SUBCUTANEOUS
Qty: 2 ML | Refills: 1 | Status: SHIPPED | OUTPATIENT
Start: 2024-01-09 | End: 2024-01-09

## 2024-01-09 RX ORDER — SEMAGLUTIDE 0.68 MG/ML
INJECTION, SOLUTION SUBCUTANEOUS
Qty: 2 ML | Refills: 1 | Status: SHIPPED | OUTPATIENT
Start: 2024-01-09 | End: 2024-03-26

## 2024-01-09 RX ORDER — BLOOD SUGAR DIAGNOSTIC
STRIP MISCELLANEOUS
Qty: 200 STRIP | Refills: 1 | Status: CANCELLED | OUTPATIENT
Start: 2024-01-09

## 2024-01-09 RX ORDER — BLOOD SUGAR DIAGNOSTIC
STRIP MISCELLANEOUS
Qty: 200 STRIP | Refills: 1 | Status: SHIPPED | OUTPATIENT
Start: 2024-01-09 | End: 2024-01-09

## 2024-01-09 RX ORDER — ATORVASTATIN CALCIUM 20 MG/1
20 TABLET, FILM COATED ORAL DAILY
Qty: 90 TABLET | Refills: 1 | Status: SHIPPED | OUTPATIENT
Start: 2024-01-09 | End: 2024-06-25

## 2024-01-09 RX ORDER — BLOOD SUGAR DIAGNOSTIC
STRIP MISCELLANEOUS
Qty: 200 STRIP | Refills: 1 | Status: SHIPPED | OUTPATIENT
Start: 2024-01-09

## 2024-01-09 RX ORDER — ATENOLOL 25 MG/1
25 TABLET ORAL DAILY
Qty: 90 TABLET | Refills: 1 | Status: SHIPPED | OUTPATIENT
Start: 2024-01-09 | End: 2024-01-09

## 2024-01-09 ASSESSMENT — PAIN SCALES - GENERAL: PAINLEVEL: NO PAIN (0)

## 2024-01-09 NOTE — PROGRESS NOTES
"  Assessment & Plan     Type 2 diabetes mellitus without complication, without long-term current use of insulin (H)  A1c stable.  If A1c goes below 6 we agreed to cut down metformin but today no change in the dose of Ozempic or metformin.  - Hemoglobin A1c; Future  - Hemoglobin A1c  - Extra Green Top Tube (LAB USE ONLY)  - Continuous Blood Gluc Sensor (FREESTYLE JHON 14 DAY SENSOR) MISC; Change every 14 days.  - metFORMIN (GLUCOPHAGE XR) 500 MG 24 hr tablet; Take 2 tablets (1,000 mg) by mouth 2 times daily (with meals)  - semaglutide (OZEMPIC, 0.25 OR 0.5 MG/DOSE,) 2 MG/3ML pen; INJECT 0.5MG UNDER THE SKIN EVERY 7 DAYS    Diabetic polyneuropathy associated with type 2 diabetes mellitus (H)  Seeing psychiatry and getting gabapentin from them along with other medications.         Hypertension goal BP (blood pressure) < 140/90  Patient is tolerating current medication without any major side effects of concerns and current dose seems reasonable too.  Current medication regime is effective. Continue current treatment without any changes.   - atenolol (TENORMIN) 25 MG tablet; Take 1 tablet (25 mg) by mouth daily    Hypercholesteremia  Patient is tolerating current medication without any major side effects of concerns and current dose seems reasonable too.  Current medication regime is effective. Continue current treatment without any changes.   - atorvastatin (LIPITOR) 20 MG tablet; Take 1 tablet (20 mg) by mouth daily             BMI:   Estimated body mass index is 25.16 kg/m  as calculated from the following:    Height as of this encounter: 1.753 m (5' 9\").    Weight as of this encounter: 77.3 kg (170 lb 6.4 oz).           Km Dos Santos MD, MD  [facility]    Venkatesh Fisher is a 43 year old, presenting for the following health issues:  Diabetes        1/9/2024     2:30 PM   Additional Questions   Roomed by ERICKSON Hughes   Accompanied by self         1/9/2024     2:30 PM   Patient " "Reported Additional Medications   Patient reports taking the following new medications none       History of Present Illness       Diabetes:   He presents for follow up of diabetes.    He is not checking blood glucose.        He is concerned about other.    He is not experiencing numbness or burning in feet, excessive thirst, blurry vision, weight changes or redness, sores or blisters on feet. The patient has not had a diabetic eye exam in the last 12 months.          Hypertension: He presents for follow up of hypertension.  He does check blood pressure  regularly outside of the clinic. Outside blood pressures have been over 140/90. He does not follow a low salt diet.     He eats 2-3 servings of fruits and vegetables daily.He consumes 0 sweetened beverage(s) daily.He exercises with enough effort to increase his heart rate 10 to 19 minutes per day.  He exercises with enough effort to increase his heart rate 4 days per week.   He is taking medications regularly.     Wife had breast cancer. She is doing well.   Was busy with taking care of her and was not checking blood sugar.   Diet - healthy diet.   Atenolol and atrovastatin.   Needs nohemi and finger stick monitoring rx.       Review of Systems         Objective    /68 (BP Location: Right arm, Patient Position: Sitting, Cuff Size: Adult Regular)   Pulse 85   Temp 97.9  F (36.6  C) (Temporal)   Resp 21   Ht 1.753 m (5' 9\")   Wt 77.3 kg (170 lb 6.4 oz)   SpO2 98%   BMI 25.16 kg/m    Body mass index is 25.16 kg/m .  Physical Exam   Mildly anxious but pleasant                      "

## 2024-01-09 NOTE — COMMUNITY RESOURCES LIST (ENGLISH)
01/09/2024   [facility]  N/A  For questions about this resource list or additional care needs, please contact your primary care clinic or care manager.  Phone: [phone]   Email: N/A   Address: [address] [place] [address]   Hours: N/A        Financial Stability       Utility payment assistance  1  [facility] Distance: 2.5 miles      In-Person   [address] [place] [address]  Language: English  Hours: Mon - Fri 8:00 AM - 3:00 PM  Fees: Free   Phone: (682) 638-4476 Ext.14 Email: [email or web address] Website: [email or web address]     2  [facility] - Utility payment assistance Distance: 3.47 miles      In-Person, Phone/Virtual   [address] [place] [address]  Language: English  Hours: Mon - Thu 9:00 AM - 3:00 PM  Fees: Free   Phone: (356) 707-6758 Email: W[facility].org Website: http://www.[email or web address]/care-ministries/          Important Numbers & Websites       Emergency Services   911  [facility] Services   311  Poison Control   (928) 108-6095  Suicide Prevention Lifeline   (198) 515-8230 (TALK)  Child Abuse Hotline   (226) 756-5464 (4-A-Child)  Sexual Assault Hotline   (353) 936-6033 (HOPE)  National Runaway Safeline   (156) 197-6832 (RUNAWAY)  All-Options Talkline   (888) 626-4834  Substance Abuse Referral   (463) 860-5732 (HELP)

## 2024-01-12 ENCOUNTER — TELEPHONE (OUTPATIENT)
Dept: FAMILY MEDICINE | Facility: CLINIC | Age: 44
End: 2024-01-12
Payer: MEDICARE

## 2024-01-12 NOTE — TELEPHONE ENCOUNTER
{Screenshot of Product, Insurance, and Cardholder ID)      Please route determinations to the Pharm Diabetes pool ([id number]).      Thank you!    Diabetes Care Services Team   [facility]  [address] [place] [address]

## 2024-01-21 NOTE — TELEPHONE ENCOUNTER
PA Initiation    Medication: FREESTYLE JHON 14 DAY SENSOR [facility]  Insurance Company: [employer] - Phone [phone] Fax [phone]  Pharmacy Filling the Rx: [facility] - [place], [place] - [facility] [address]  Filling Pharmacy Phone: [phone]  Filling Pharmacy Fax: [phone]  Start Date: 1/21/2024

## 2024-01-22 NOTE — TELEPHONE ENCOUNTER
Prior Authorization Approval    Medication: FREESTYLE JHON 14 DAY SENSOR MISC  Authorization Effective Date: 1/22/2024  Authorization Expiration Date: 1/21/2025  Approved Dose/Quantity:   Reference #:     Insurance Company: [employer] - Phone [phone] Fax [phone]  Expected CoPay: $    CoPay Card Available:      Financial Assistance Needed:   Which Pharmacy is filling the prescription: [facility] - [place] [address]  Pharmacy Notified: YES  Patient Notified: **Instructed pharmacy to notify patient when script is ready to /ship.**

## 2024-03-26 DIAGNOSIS — E11.9 TYPE 2 DIABETES MELLITUS WITHOUT COMPLICATION, WITHOUT LONG-TERM CURRENT USE OF INSULIN (H): ICD-10-CM

## 2024-03-26 RX ORDER — SEMAGLUTIDE 0.68 MG/ML
INJECTION, SOLUTION SUBCUTANEOUS
Qty: 3 ML | Refills: 1 | Status: SHIPPED | OUTPATIENT
Start: 2024-03-26 | End: 2024-05-27

## 2024-05-01 ENCOUNTER — PATIENT OUTREACH (OUTPATIENT)
Dept: CARE COORDINATION | Facility: CLINIC | Age: 44
End: 2024-05-01
Payer: MEDICARE

## 2024-05-01 ENCOUNTER — MYC MEDICAL ADVICE (OUTPATIENT)
Dept: PODIATRY | Facility: CLINIC | Age: 44
End: 2024-05-01
Payer: MEDICARE

## 2024-05-01 ENCOUNTER — TELEPHONE (OUTPATIENT)
Dept: PODIATRY | Facility: CLINIC | Age: 44
End: 2024-05-01
Payer: MEDICARE

## 2024-05-01 ENCOUNTER — HOSPITAL ENCOUNTER (INPATIENT)
Facility: CLINIC | Age: 44
LOS: 6 days | Discharge: HOME | DRG: 580 | End: 2024-05-07
Attending: STUDENT IN AN ORGANIZED HEALTH CARE EDUCATION/TRAINING PROGRAM | Admitting: INTERNAL MEDICINE
Payer: COMMERCIAL

## 2024-05-01 ENCOUNTER — APPOINTMENT (OUTPATIENT)
Dept: MRI IMAGING | Facility: CLINIC | Age: 44
DRG: 580 | End: 2024-05-01
Attending: STUDENT IN AN ORGANIZED HEALTH CARE EDUCATION/TRAINING PROGRAM
Payer: COMMERCIAL

## 2024-05-01 DIAGNOSIS — L02.611 ABSCESS, TOE, RIGHT: ICD-10-CM

## 2024-05-01 DIAGNOSIS — L03.031 CELLULITIS OF GREAT TOE OF RIGHT FOOT: ICD-10-CM

## 2024-05-01 LAB
ALBUMIN SERPL BCG-MCNC: 4.1 G/DL (ref 3.5–5.2)
ALP SERPL-CCNC: 148 U/L (ref 40–150)
ALT SERPL W P-5'-P-CCNC: 13 U/L (ref 0–70)
ANION GAP SERPL CALCULATED.3IONS-SCNC: 13 MMOL/L (ref 7–15)
AST SERPL W P-5'-P-CCNC: 20 U/L (ref 0–45)
BASOPHILS # BLD AUTO: 0 10E3/UL (ref 0–0.2)
BASOPHILS NFR BLD AUTO: 0 %
BILIRUB SERPL-MCNC: 0.5 MG/DL
BUN SERPL-MCNC: 10.4 MG/DL (ref 6–20)
CALCIUM SERPL-MCNC: 9.1 MG/DL (ref 8.6–10)
CHLORIDE SERPL-SCNC: 100 MMOL/L (ref 98–107)
CREAT SERPL-MCNC: 0.99 MG/DL (ref 0.67–1.17)
CRP SERPL-MCNC: 30.52 MG/L
DEPRECATED HCO3 PLAS-SCNC: 26 MMOL/L (ref 22–29)
EGFRCR SERPLBLD CKD-EPI 2021: >90 ML/MIN/1.73M2
EOSINOPHIL # BLD AUTO: 0.2 10E3/UL (ref 0–0.7)
EOSINOPHIL NFR BLD AUTO: 2 %
ERYTHROCYTE [DISTWIDTH] IN BLOOD BY AUTOMATED COUNT: 12.8 % (ref 10–15)
GLUCOSE SERPL-MCNC: 135 MG/DL (ref 70–99)
HCT VFR BLD AUTO: 34.5 % (ref 40–53)
HGB BLD-MCNC: 11.3 G/DL (ref 13.3–17.7)
IMM GRANULOCYTES # BLD: 0 10E3/UL
IMM GRANULOCYTES NFR BLD: 0 %
LACTATE SERPL-SCNC: 0.9 MMOL/L (ref 0.7–2)
LYMPHOCYTES # BLD AUTO: 1.9 10E3/UL (ref 0.8–5.3)
LYMPHOCYTES NFR BLD AUTO: 27 %
MCH RBC QN AUTO: 29 PG (ref 26.5–33)
MCHC RBC AUTO-ENTMCNC: 32.8 G/DL (ref 31.5–36.5)
MCV RBC AUTO: 89 FL (ref 78–100)
MONOCYTES # BLD AUTO: 0.5 10E3/UL (ref 0–1.3)
MONOCYTES NFR BLD AUTO: 7 %
NEUTROPHILS # BLD AUTO: 4.4 10E3/UL (ref 1.6–8.3)
NEUTROPHILS NFR BLD AUTO: 64 %
NRBC # BLD AUTO: 0 10E3/UL
NRBC BLD AUTO-RTO: 0 /100
PLATELET # BLD AUTO: 216 10E3/UL (ref 150–450)
POTASSIUM SERPL-SCNC: 4.2 MMOL/L (ref 3.4–5.3)
PROT SERPL-MCNC: 6.7 G/DL (ref 6.4–8.3)
RBC # BLD AUTO: 3.9 10E6/UL (ref 4.4–5.9)
SODIUM SERPL-SCNC: 139 MMOL/L (ref 135–145)
WBC # BLD AUTO: 6.9 10E3/UL (ref 4–11)

## 2024-05-01 PROCEDURE — 87205 SMEAR GRAM STAIN: CPT | Performed by: STUDENT IN AN ORGANIZED HEALTH CARE EDUCATION/TRAINING PROGRAM

## 2024-05-01 PROCEDURE — 80053 COMPREHEN METABOLIC PANEL: CPT | Performed by: STUDENT IN AN ORGANIZED HEALTH CARE EDUCATION/TRAINING PROGRAM

## 2024-05-01 PROCEDURE — 96375 TX/PRO/DX INJ NEW DRUG ADDON: CPT

## 2024-05-01 PROCEDURE — A9585 GADOBUTROL INJECTION: HCPCS | Performed by: STUDENT IN AN ORGANIZED HEALTH CARE EDUCATION/TRAINING PROGRAM

## 2024-05-01 PROCEDURE — 87040 BLOOD CULTURE FOR BACTERIA: CPT | Performed by: STUDENT IN AN ORGANIZED HEALTH CARE EDUCATION/TRAINING PROGRAM

## 2024-05-01 PROCEDURE — 36415 COLL VENOUS BLD VENIPUNCTURE: CPT | Performed by: EMERGENCY MEDICINE

## 2024-05-01 PROCEDURE — 82040 ASSAY OF SERUM ALBUMIN: CPT | Performed by: EMERGENCY MEDICINE

## 2024-05-01 PROCEDURE — 120N000001 HC R&B MED SURG/OB

## 2024-05-01 PROCEDURE — 99285 EMERGENCY DEPT VISIT HI MDM: CPT | Mod: 25

## 2024-05-01 PROCEDURE — 96376 TX/PRO/DX INJ SAME DRUG ADON: CPT

## 2024-05-01 PROCEDURE — 250N000011 HC RX IP 250 OP 636: Performed by: STUDENT IN AN ORGANIZED HEALTH CARE EDUCATION/TRAINING PROGRAM

## 2024-05-01 PROCEDURE — 258N000003 HC RX IP 258 OP 636: Performed by: STUDENT IN AN ORGANIZED HEALTH CARE EDUCATION/TRAINING PROGRAM

## 2024-05-01 PROCEDURE — 99222 1ST HOSP IP/OBS MODERATE 55: CPT | Performed by: INTERNAL MEDICINE

## 2024-05-01 PROCEDURE — 86140 C-REACTIVE PROTEIN: CPT | Performed by: STUDENT IN AN ORGANIZED HEALTH CARE EDUCATION/TRAINING PROGRAM

## 2024-05-01 PROCEDURE — 83605 ASSAY OF LACTIC ACID: CPT | Performed by: STUDENT IN AN ORGANIZED HEALTH CARE EDUCATION/TRAINING PROGRAM

## 2024-05-01 PROCEDURE — 36415 COLL VENOUS BLD VENIPUNCTURE: CPT | Performed by: STUDENT IN AN ORGANIZED HEALTH CARE EDUCATION/TRAINING PROGRAM

## 2024-05-01 PROCEDURE — 85025 COMPLETE CBC W/AUTO DIFF WBC: CPT | Performed by: STUDENT IN AN ORGANIZED HEALTH CARE EDUCATION/TRAINING PROGRAM

## 2024-05-01 PROCEDURE — 96365 THER/PROPH/DIAG IV INF INIT: CPT

## 2024-05-01 PROCEDURE — 73723 MRI JOINT LWR EXTR W/O&W/DYE: CPT | Mod: RT,MA

## 2024-05-01 PROCEDURE — 87075 CULTR BACTERIA EXCEPT BLOOD: CPT | Performed by: STUDENT IN AN ORGANIZED HEALTH CARE EDUCATION/TRAINING PROGRAM

## 2024-05-01 PROCEDURE — 10060 I&D ABSCESS SIMPLE/SINGLE: CPT

## 2024-05-01 PROCEDURE — 85041 AUTOMATED RBC COUNT: CPT | Performed by: EMERGENCY MEDICINE

## 2024-05-01 PROCEDURE — 255N000002 HC RX 255 OP 636: Performed by: STUDENT IN AN ORGANIZED HEALTH CARE EDUCATION/TRAINING PROGRAM

## 2024-05-01 RX ORDER — HYDROMORPHONE HYDROCHLORIDE 1 MG/ML
0.5 INJECTION, SOLUTION INTRAMUSCULAR; INTRAVENOUS; SUBCUTANEOUS ONCE
Status: COMPLETED | OUTPATIENT
Start: 2024-05-01 | End: 2024-05-01

## 2024-05-01 RX ORDER — LORAZEPAM 2 MG/ML
1 INJECTION INTRAMUSCULAR
Status: COMPLETED | OUTPATIENT
Start: 2024-05-01 | End: 2024-05-01

## 2024-05-01 RX ORDER — ONDANSETRON 2 MG/ML
4 INJECTION INTRAMUSCULAR; INTRAVENOUS ONCE
Status: COMPLETED | OUTPATIENT
Start: 2024-05-01 | End: 2024-05-01

## 2024-05-01 RX ORDER — GADOBUTROL 604.72 MG/ML
7 INJECTION INTRAVENOUS ONCE
Status: COMPLETED | OUTPATIENT
Start: 2024-05-01 | End: 2024-05-01

## 2024-05-01 RX ORDER — CEFEPIME HYDROCHLORIDE 2 G/1
2 INJECTION, POWDER, FOR SOLUTION INTRAVENOUS ONCE
Status: COMPLETED | OUTPATIENT
Start: 2024-05-01 | End: 2024-05-01

## 2024-05-01 RX ADMIN — HYDROMORPHONE HYDROCHLORIDE 0.5 MG: 1 INJECTION, SOLUTION INTRAMUSCULAR; INTRAVENOUS; SUBCUTANEOUS at 21:48

## 2024-05-01 RX ADMIN — VANCOMYCIN HYDROCHLORIDE 1500 MG: 10 INJECTION, POWDER, LYOPHILIZED, FOR SOLUTION INTRAVENOUS at 22:39

## 2024-05-01 RX ADMIN — ONDANSETRON 4 MG: 2 INJECTION INTRAMUSCULAR; INTRAVENOUS at 21:47

## 2024-05-01 RX ADMIN — GADOBUTROL 7 ML: 604.72 INJECTION INTRAVENOUS at 21:36

## 2024-05-01 RX ADMIN — CEFEPIME 2 G: 2 INJECTION, POWDER, FOR SOLUTION INTRAVENOUS at 21:57

## 2024-05-01 RX ADMIN — SODIUM CHLORIDE 1000 ML: 9 INJECTION, SOLUTION INTRAVENOUS at 21:46

## 2024-05-01 RX ADMIN — LORAZEPAM 1 MG: 2 INJECTION INTRAMUSCULAR; INTRAVENOUS at 20:36

## 2024-05-01 ASSESSMENT — ACTIVITIES OF DAILY LIVING (ADL)
ADLS_ACUITY_SCORE: 37

## 2024-05-01 ASSESSMENT — COLUMBIA-SUICIDE SEVERITY RATING SCALE - C-SSRS
2. HAVE YOU ACTUALLY HAD ANY THOUGHTS OF KILLING YOURSELF IN THE PAST MONTH?: NO
1. IN THE PAST MONTH, HAVE YOU WISHED YOU WERE DEAD OR WISHED YOU COULD GO TO SLEEP AND NOT WAKE UP?: NO
6. HAVE YOU EVER DONE ANYTHING, STARTED TO DO ANYTHING, OR PREPARED TO DO ANYTHING TO END YOUR LIFE?: NO

## 2024-05-01 NOTE — TELEPHONE ENCOUNTER
Other: Hello, right toe infection is a diabetic sent a picture on Mychart, wondering what he should do please call him     Could we send this information to you in MyChart or would you prefer to receive a phone call?:   Patient would prefer a phone call   Okay to leave a detailed message?: Yes at Cell number on file:    Telephone Information:   Mobile [phone]

## 2024-05-01 NOTE — TELEPHONE ENCOUNTER
"Phone call to patient. He just noticed the red streaking today due to increased pain he was having. He is concerned that the toe itself is \"squishy with fluid\". He denies fever, chills, nausea or fatigue.   Recommended he go to the ED as soon as possible. Discussed the concern for infection going to the bloodstream causing sepsis which is very serious and can be fatal if not treated. He states he has gone to [facility] in the past but they did not have a surgical podiatrist available. Recommended he go to [facility] ED. He verbalized understanding.     Routing to Podiatrist on call as an FYI.     CHANDRA Muniz RN    "

## 2024-05-02 LAB
GLUCOSE BLDC GLUCOMTR-MCNC: 134 MG/DL (ref 70–99)
GLUCOSE BLDC GLUCOMTR-MCNC: 205 MG/DL (ref 70–99)
GLUCOSE BLDC GLUCOMTR-MCNC: 83 MG/DL (ref 70–99)
GLUCOSE BLDC GLUCOMTR-MCNC: 88 MG/DL (ref 70–99)

## 2024-05-02 PROCEDURE — 250N000011 HC RX IP 250 OP 636: Performed by: INTERNAL MEDICINE

## 2024-05-02 PROCEDURE — 120N000001 HC R&B MED SURG/OB

## 2024-05-02 PROCEDURE — 99232 SBSQ HOSP IP/OBS MODERATE 35: CPT | Performed by: STUDENT IN AN ORGANIZED HEALTH CARE EDUCATION/TRAINING PROGRAM

## 2024-05-02 PROCEDURE — 250N000011 HC RX IP 250 OP 636: Performed by: STUDENT IN AN ORGANIZED HEALTH CARE EDUCATION/TRAINING PROGRAM

## 2024-05-02 PROCEDURE — 99223 1ST HOSP IP/OBS HIGH 75: CPT | Mod: 57 | Performed by: PODIATRIST

## 2024-05-02 PROCEDURE — L3260 AMBULATORY SURGICAL BOOT EAC: HCPCS

## 2024-05-02 PROCEDURE — 250N000013 HC RX MED GY IP 250 OP 250 PS 637: Performed by: INTERNAL MEDICINE

## 2024-05-02 RX ORDER — LIDOCAINE 40 MG/G
CREAM TOPICAL
Status: DISCONTINUED | OUTPATIENT
Start: 2024-05-02 | End: 2024-05-07 | Stop reason: HOSPADM

## 2024-05-02 RX ORDER — GABAPENTIN 300 MG/1
600 CAPSULE ORAL 2 TIMES DAILY
Status: DISCONTINUED | OUTPATIENT
Start: 2024-05-02 | End: 2024-05-07 | Stop reason: HOSPADM

## 2024-05-02 RX ORDER — DIAZEPAM 5 MG
5 TABLET ORAL EVERY 8 HOURS PRN
Status: DISCONTINUED | OUTPATIENT
Start: 2024-05-02 | End: 2024-05-07 | Stop reason: HOSPADM

## 2024-05-02 RX ORDER — POLYETHYLENE GLYCOL 3350 17 G/17G
17 POWDER, FOR SOLUTION ORAL 2 TIMES DAILY PRN
Status: DISCONTINUED | OUTPATIENT
Start: 2024-05-02 | End: 2024-05-07 | Stop reason: HOSPADM

## 2024-05-02 RX ORDER — AMOXICILLIN 250 MG
2 CAPSULE ORAL 2 TIMES DAILY PRN
Status: DISCONTINUED | OUTPATIENT
Start: 2024-05-02 | End: 2024-05-07 | Stop reason: HOSPADM

## 2024-05-02 RX ORDER — ACETAMINOPHEN 325 MG/1
650 TABLET ORAL EVERY 4 HOURS PRN
Status: DISCONTINUED | OUTPATIENT
Start: 2024-05-02 | End: 2024-05-07 | Stop reason: HOSPADM

## 2024-05-02 RX ORDER — ATORVASTATIN CALCIUM 20 MG/1
20 TABLET, FILM COATED ORAL DAILY
Status: DISCONTINUED | OUTPATIENT
Start: 2024-05-02 | End: 2024-05-07 | Stop reason: HOSPADM

## 2024-05-02 RX ORDER — BUPRENORPHINE AND NALOXONE 8; 2 MG/1; MG/1
1 FILM, SOLUBLE BUCCAL; SUBLINGUAL DAILY
Status: DISCONTINUED | OUTPATIENT
Start: 2024-05-02 | End: 2024-05-07 | Stop reason: HOSPADM

## 2024-05-02 RX ORDER — KETOROLAC TROMETHAMINE 15 MG/ML
15 INJECTION, SOLUTION INTRAMUSCULAR; INTRAVENOUS EVERY 6 HOURS PRN
Status: DISPENSED | OUTPATIENT
Start: 2024-05-02 | End: 2024-05-07

## 2024-05-02 RX ORDER — DEXTROSE MONOHYDRATE 25 G/50ML
25-50 INJECTION, SOLUTION INTRAVENOUS
Status: DISCONTINUED | OUTPATIENT
Start: 2024-05-02 | End: 2024-05-07 | Stop reason: HOSPADM

## 2024-05-02 RX ORDER — VANCOMYCIN HYDROCHLORIDE 1 G/200ML
1000 INJECTION, SOLUTION INTRAVENOUS EVERY 12 HOURS
Status: DISCONTINUED | OUTPATIENT
Start: 2024-05-02 | End: 2024-05-05

## 2024-05-02 RX ORDER — VENLAFAXINE HYDROCHLORIDE 150 MG/1
150 CAPSULE, EXTENDED RELEASE ORAL
Status: DISCONTINUED | OUTPATIENT
Start: 2024-05-02 | End: 2024-05-07 | Stop reason: HOSPADM

## 2024-05-02 RX ORDER — NICOTINE POLACRILEX 4 MG
15-30 LOZENGE BUCCAL
Status: DISCONTINUED | OUTPATIENT
Start: 2024-05-02 | End: 2024-05-07 | Stop reason: HOSPADM

## 2024-05-02 RX ORDER — PROCHLORPERAZINE 25 MG
25 SUPPOSITORY, RECTAL RECTAL EVERY 12 HOURS PRN
Status: DISCONTINUED | OUTPATIENT
Start: 2024-05-02 | End: 2024-05-07 | Stop reason: HOSPADM

## 2024-05-02 RX ORDER — ONDANSETRON 4 MG/1
4 TABLET, ORALLY DISINTEGRATING ORAL EVERY 6 HOURS PRN
Status: DISCONTINUED | OUTPATIENT
Start: 2024-05-02 | End: 2024-05-07 | Stop reason: HOSPADM

## 2024-05-02 RX ORDER — NORTRIPTYLINE HCL 25 MG
25 CAPSULE ORAL AT BEDTIME
Status: DISCONTINUED | OUTPATIENT
Start: 2024-05-02 | End: 2024-05-07 | Stop reason: HOSPADM

## 2024-05-02 RX ORDER — PROCHLORPERAZINE MALEATE 10 MG
10 TABLET ORAL EVERY 6 HOURS PRN
Status: DISCONTINUED | OUTPATIENT
Start: 2024-05-02 | End: 2024-05-07 | Stop reason: HOSPADM

## 2024-05-02 RX ORDER — ONDANSETRON 2 MG/ML
4 INJECTION INTRAMUSCULAR; INTRAVENOUS EVERY 6 HOURS PRN
Status: DISCONTINUED | OUTPATIENT
Start: 2024-05-02 | End: 2024-05-07 | Stop reason: HOSPADM

## 2024-05-02 RX ORDER — ACETAMINOPHEN 650 MG/1
650 SUPPOSITORY RECTAL EVERY 4 HOURS PRN
Status: DISCONTINUED | OUTPATIENT
Start: 2024-05-02 | End: 2024-05-07 | Stop reason: HOSPADM

## 2024-05-02 RX ORDER — AMOXICILLIN 250 MG
1 CAPSULE ORAL 2 TIMES DAILY PRN
Status: DISCONTINUED | OUTPATIENT
Start: 2024-05-02 | End: 2024-05-07 | Stop reason: HOSPADM

## 2024-05-02 RX ORDER — CALCIUM CARBONATE 500 MG/1
1000 TABLET, CHEWABLE ORAL 4 TIMES DAILY PRN
Status: DISCONTINUED | OUTPATIENT
Start: 2024-05-02 | End: 2024-05-07 | Stop reason: HOSPADM

## 2024-05-02 RX ORDER — CEFEPIME HYDROCHLORIDE 2 G/1
2 INJECTION, POWDER, FOR SOLUTION INTRAVENOUS EVERY 12 HOURS
Status: DISCONTINUED | OUTPATIENT
Start: 2024-05-02 | End: 2024-05-05

## 2024-05-02 RX ORDER — ATENOLOL 25 MG/1
25 TABLET ORAL DAILY
Status: DISCONTINUED | OUTPATIENT
Start: 2024-05-02 | End: 2024-05-07 | Stop reason: HOSPADM

## 2024-05-02 RX ADMIN — GABAPENTIN 600 MG: 300 CAPSULE ORAL at 19:59

## 2024-05-02 RX ADMIN — BUPRENORPHINE AND NALOXONE 1 FILM: 8; 2 FILM, SOLUBLE BUCCAL; SUBLINGUAL at 09:31

## 2024-05-02 RX ADMIN — DIAZEPAM 5 MG: 5 TABLET ORAL at 22:05

## 2024-05-02 RX ADMIN — ATENOLOL 25 MG: 25 TABLET ORAL at 09:31

## 2024-05-02 RX ADMIN — NORTRIPTYLINE HYDROCHLORIDE 25 MG: 25 CAPSULE ORAL at 04:48

## 2024-05-02 RX ADMIN — VANCOMYCIN HYDROCHLORIDE 1000 MG: 1 INJECTION, SOLUTION INTRAVENOUS at 22:45

## 2024-05-02 RX ADMIN — ACETAMINOPHEN 650 MG: 325 TABLET, FILM COATED ORAL at 22:05

## 2024-05-02 RX ADMIN — KETOROLAC TROMETHAMINE 15 MG: 15 INJECTION, SOLUTION INTRAMUSCULAR; INTRAVENOUS at 19:59

## 2024-05-02 RX ADMIN — VANCOMYCIN HYDROCHLORIDE 1000 MG: 1 INJECTION, SOLUTION INTRAVENOUS at 11:04

## 2024-05-02 RX ADMIN — NORTRIPTYLINE HYDROCHLORIDE 25 MG: 25 CAPSULE ORAL at 21:55

## 2024-05-02 RX ADMIN — CEFEPIME 2 G: 2 INJECTION, POWDER, FOR SOLUTION INTRAVENOUS at 21:55

## 2024-05-02 RX ADMIN — CEFEPIME 2 G: 2 INJECTION, POWDER, FOR SOLUTION INTRAVENOUS at 09:32

## 2024-05-02 RX ADMIN — VENLAFAXINE HYDROCHLORIDE 150 MG: 150 CAPSULE, EXTENDED RELEASE ORAL at 09:31

## 2024-05-02 RX ADMIN — KETOROLAC TROMETHAMINE 15 MG: 15 INJECTION, SOLUTION INTRAMUSCULAR; INTRAVENOUS at 13:17

## 2024-05-02 RX ADMIN — ATORVASTATIN CALCIUM 20 MG: 20 TABLET, FILM COATED ORAL at 09:31

## 2024-05-02 RX ADMIN — GABAPENTIN 600 MG: 300 CAPSULE ORAL at 09:31

## 2024-05-02 ASSESSMENT — ACTIVITIES OF DAILY LIVING (ADL)
ADLS_ACUITY_SCORE: 20
ADLS_ACUITY_SCORE: 37
ADLS_ACUITY_SCORE: 20
ADLS_ACUITY_SCORE: 37
ADLS_ACUITY_SCORE: 20
ADLS_ACUITY_SCORE: 20
ADLS_ACUITY_SCORE: 37
ADLS_ACUITY_SCORE: 20

## 2024-05-02 NOTE — ED PROVIDER NOTES
ED ATTENDING PHYSICIAN NOTE:   I evaluated this patient in conjunction with Will Littlejohn PA-C  I have participated in the care of the patient and personally performed key elements of the history, exam, and medical decision making.      HPI:   Phillip Rueda is a 43 year old male who presents for 1 day of right great toe pain and redness spreading up his foot.  Denies fevers, chills, numbness or weakness.  No known injuries to the foot.  He sent a picture of his foot to his podiatrist Dr. Lubin who suggested he come to the ER for evaluation.    Independent Historian:   None - Patient Only    Review of External Notes: Hospitalization 2021.  Patient was seen for diabetic foot infection with equivocal findings of osteomyelitis.  Treated with several weeks of antibiotics.    EXAM:   General:  Alert, interactive  Cardiovascular:  Normal rate  Lungs:  No respiratory distress, no accessory muscle use  Abdominal: No distension   Neuro:  Moving all 4 extremities  MSK: No gross deformities  Skin:  Warm, dry        Independent Interpretation (X-rays, CTs, rhythm strip):  None    Consultations/Discussion of Management or Tests:  None     Social Determinants of Health affecting care:   None     MEDICAL DECISION MAKING/ASSESSMENT AND PLAN:   This is a gentleman with history of equivocal osteomyelitis in 2021 who is here with right toe pain and spreading erythema up the foot.  Has a abscess on exam.  I&D performed by SHIMA Littlejonh --please see note -- cultures were obtained.  MRI shows osteitis but not definitive osteomyelitis.  Received vancomycin and cefepime for pseudomonal coverage.  Blood cultures obtained.  CRP slightly elevated.  Patient admitted to Dr. [facility] medicine for further workup and evaluation.     DIAGNOSIS:     ICD-10-CM    1. Cellulitis of great toe of right foot  L03.031       2. Abscess, toe, right  L02.611                DISPOSITION:   Admit     Tere Tellez DO [place]  5/1/2024  [facility]  [facility] EMERGENCY DEPT       Tere Bucio DO  05/01/24 0976

## 2024-05-02 NOTE — ED NOTES
"[facility]  ED Nurse Handoff Report    ED Chief complaint: Toe Pain      ED Diagnosis:   Final diagnoses:   None       Code Status: to be addressed    Allergies:   Allergies   Allergen Reactions    Bees Swelling    Bupropion Hcl Other (See Comments)     seizure    Lisinopril Cough    Penicillins Other (See Comments)     Unable to close mouth  Tolerated cefazolin (12/12/21)       Patient Story: patient  here with pain ,selling and redness to his right great toe. He stated the symptoms started one day ago.patient is diabetic  Focused Assessment:  right great toe pain    Treatments and/or interventions provided: labs,MRI and medications  Patient's response to treatments and/or interventions: ongoing    To be done/followed up on inpatient unit:  .    Does this patient have any cognitive concerns?:  none    Activity level - Baseline/Home:  Independent  Activity Level - Current:   Independent    Patient's Preferred language: English   Needed?: No    Isolation: None  Infection: Not Applicable  Patient tested for COVID 19 prior to admission: NO  Bariatric?: No    Vital Signs:   Vitals:    05/01/24 1600 05/01/24 2140 05/01/24 2155   BP: 119/75 (!) 130/95    Pulse: 74 76    Resp: 18     Temp: 99  F (37.2  C)     TempSrc: Temporal     SpO2: 100%  100%   Weight: 72.6 kg (160 lb)     Height: 1.727 m (5' 8\")         Cardiac Rhythm:     Was the PSS-3 completed:   Yes  What interventions are required if any?               Family Comments: none  OBS brochure/video discussed/provided to patient/family: No              Name of person given brochure if not patient: .              Relationship to patient: .    For the majority of the shift this patient's behavior was Green.   Behavioral interventions performed were none.    ED NURSE PHONE NUMBER: [phone]        "

## 2024-05-02 NOTE — PROGRESS NOTES
S:  Order received for R post of shoe HUNG Kwon.  DX:  abcess/celllitis R first toe  O:  I met with patient and donned an XL post op shoe on the R foot.  A:  The shoe fits adequte.  P:  Pt to wear shoe following Physician guidelines  Woo MCCOY

## 2024-05-02 NOTE — ED PROVIDER NOTES
History     Chief Complaint:  Toe Pain       HPI   Phillip Rueda is a 43 year old male with history of type 2 diabetes, peripheral neuropathy, cellulitis who presents to the ED for evaluation of right toe pain.  Patient states that he developed pain in his right toe last night.  This morning woke up and noticed that his right toe was more red and swollen.  Swelling has extended up his foot today.  He denies recent trauma or injuries to the foot.  He states that he has had a bone infection in the right toe couple years ago and was hospitalized for IV antibiotics.  He denies any recent fevers.  He states that his diabetes is currently well-controlled on Ozempic and metformin.  He sees a podiatrist and talked to them at this morning and was told to come to the ED for further evaluation.      Independent Historian:    Patient only.    Review of External Notes:  Reviewed hospital note from 12/12/2021.  Cellulitis with concern for osteomyelitis on MRI.  Treated with 6-week course of antibiotics.    Medications:    atenolol (TENORMIN) 25 MG tablet  atorvastatin (LIPITOR) 20 MG tablet  buprenorphine HCl-naloxone HCl (SUBOXONE) 8-2 MG per film  diazepam (VALIUM) 5 MG tablet  gabapentin (NEURONTIN) 600 MG tablet  metFORMIN (GLUCOPHAGE XR) 500 MG 24 hr tablet  nortriptyline (PAMELOR) 25 MG capsule  OZEMPIC, 0.25 OR 0.5 MG/DOSE, 2 MG/3ML pen  sildenafil (VIAGRA) 100 MG tablet  venlafaxine (EFFEXOR-ER) 150 MG TB24  blood glucose (ACCU-CHEK GUIDE) test strip  blood glucose (NO BRAND SPECIFIED) lancets standard  blood glucose monitoring (NO BRAND SPECIFIED) meter device kit  Continuous Blood Gluc Sensor (FREESTYLE JHON 14 DAY SENSOR) [facility] [place]        Past Medical History:    Past Medical History:   Diagnosis Date    Anxiety     Depressive disorder     Diabetes mellitus (H)     Hypertension     Liver disease     Neuralgic amyotrophy     Other chronic pain     Pain disorder 12/8/2015    [place]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     Prostate  "infection     Seizures (H)     Staph infection        Past Surgical History:    Past Surgical History:   Procedure Laterality Date    BIOPSY BONE FOOT Right 1/18/2022    Procedure: BIOPSY, BONE, RIGHT GREAT TOE (DISTAL PHALANX);  Surgeon: Shelton Smallwood DPM;  Location:  OR    ORTHOPEDIC SURGERY            Physical Exam   Patient Vitals for the past 24 hrs:   BP Temp Temp src Pulse Resp SpO2 Height Weight   05/01/24 2155 -- -- -- -- -- 100 % -- --   05/01/24 2140 (!) 130/95 -- -- 76 -- -- -- --   05/01/24 1600 119/75 99  F (37.2  C) Temporal 74 18 100 % 1.727 m (5' 8\") 72.6 kg (160 lb)        Physical Exam  Physical Exam:  General: Sitting comfortably on hospital bed  Head: normocephalic, atraumatic  Eyes: PERRLA, EOMI  Throat: moist mucous membranes, no erythema, exudate, or drainage of the posterior oropharynx.   CV: RRR, no murmur/gallop/rubs  Pulm: lungs clear to ausculation bilaterally, normal respiratory effort, normal chest expansion with breathing   Abdomen: soft, non-tender, non-distended, no rigidity or guarding, normal BS  MSK: No cervical tenderness, no midline tenderness  Ext: Erythema, edema along first distal right first toe extending to midfoot.  Fluctuance noted along medial aspect of distal right first toe.  Tenderness along right first toe.  Skin: Warm, dry, no rashes  Neuro: A&O x3, normal speech.  No gross deficits.  Psych: Appropriate mood. Cooperative          Emergency Department Course   ECG  None      Imaging:  MR Toe Right w/o & w Contrast   Final Result   IMPRESSION:   1.  Blisterlike superficial nonenhancing fluid collection upper level of the skin in the medial aspect of the first toe, at the level of the distal phalanx, measuring 3.0 x 1.6 x 0.7 cm.      2.  Nonspecific soft tissue edema and postcontrast enhancement in the distal aspect of the toe suggests cellulitis. No phlegmon or abscess.      3.  Moderate bone marrow edema in the distal phalanx of the first toe, suggests " reactive osteitis. No overt osteomyelitis. No evidence of septic arthritis or septic tenosynovitis.      4.  Previously seen abnormal bone marrow signal in the distal phalanx of the second toe has resolved. The second toe bones and joints appear normal.      5.  Chronic atrophy of the intrinsic foot musculature, and edema-like T2 signal intensity within the muscles in the superficial soft tissues over the dorsum of the foot, within expected limits for diabetic change.          Laboratory:  Labs Ordered and Resulted from Time of ED Arrival to Time of ED Departure   COMPREHENSIVE METABOLIC PANEL - Abnormal       Result Value    Sodium 139      Potassium 4.2      Carbon Dioxide (CO2) 26      Anion Gap 13      Urea Nitrogen 10.4      Creatinine 0.99      GFR Estimate >90      Calcium 9.1      Chloride 100      Glucose 135 (*)     Alkaline Phosphatase 148      AST 20      ALT 13      Protein Total 6.7      Albumin 4.1      Bilirubin Total 0.5     CBC WITH PLATELETS AND DIFFERENTIAL - Abnormal    WBC Count 6.9      RBC Count 3.90 (*)     Hemoglobin 11.3 (*)     Hematocrit 34.5 (*)     MCV 89      MCH 29.0      MCHC 32.8      RDW 12.8      Platelet Count 216      % Neutrophils 64      % Lymphocytes 27      % Monocytes 7      % Eosinophils 2      % Basophils 0      % Immature Granulocytes 0      NRBCs per 100 WBC 0      Absolute Neutrophils 4.4      Absolute Lymphocytes 1.9      Absolute Monocytes 0.5      Absolute Eosinophils 0.2      Absolute Basophils 0.0      Absolute Immature Granulocytes 0.0      Absolute NRBCs 0.0     CRP INFLAMMATION - Abnormal    CRP Inflammation 30.52 (*)    LACTIC ACID WHOLE BLOOD - Normal    Lactic Acid 0.9     BLOOD CULTURE   BLOOD CULTURE   AEROBIC BACTERIAL CULTURE ROUTINE   ANAEROBIC BACTERIAL CULTURE ROUTINE        Procedures     Incision and Drainage     Procedure: Incision and Drainage     Consent: Verbal    Indication: Abscess    Location: Medial right first toe    Size: 2  cm    Ultrasound Guidance: No    Preparation: Chlorhexidine     Anesthesia/Sedation: Lidocaine - 1%     Procedure Detail:    Aspiration: No  Incision Type: Single straight  Scalpel: 11  Lesion Management: Probed and deloculated  and Irrigated   Wound Management: Left open   Packing: None     Patient Status: The patient tolerated the procedure well: Yes. There were no complications.      Emergency Department Course & Assessments:    Interventions:  Medications   vancomycin (VANCOCIN) 1,500 mg in 0.9% NaCl 250 mL intermittent infusion (1,500 mg Intravenous $New Bag 5/1/24 2239)   ceFEPIme (MAXIPIME) 2 g vial to attach to  mL bag for ADULTS or 50 mL bag for PEDS (0 g Intravenous Stopped 5/1/24 2239)   sodium chloride 0.9% BOLUS 1,000 mL (1,000 mLs Intravenous $New Bag 5/1/24 2146)   HYDROmorphone (PF) (DILAUDID) injection 0.5 mg (0.5 mg Intravenous Not Given 5/1/24 2039)   ondansetron (ZOFRAN) injection 4 mg (4 mg Intravenous $Given 5/1/24 2147)   LORazepam (ATIVAN) injection 1 mg (1 mg Intravenous $Given 5/1/24 2036)   HYDROmorphone (PF) (DILAUDID) injection 0.5 mg (0.5 mg Intravenous $Given 5/1/24 2148)   gadobutrol (GADAVIST) injection 7 mL (7 mLs Intravenous $Given 5/1/24 2136)   sodium chloride (PF) 0.9% PF flush 10 mL (10 mLs Intravenous $Given 5/1/24 2136)        Assessments:  I evaluated patient and obtained history  For performed I&D.  Sent cultures.    Independent Interpretation (X-rays, CTs, rhythm strip):  None    Consultations/Discussion of Management or Tests:  ED Course as of 05/01/24 2255   Wed May 01, 2024   2238 [address]          Social Determinants of Health affecting care:  None     Disposition:  The patient was admitted to the hospital under the care of Dr. Guillen.    Impression & Plan        Medical Decision Making:  Patient is a pleasant 43-year-old male who presents to the ED for evaluation of toe pain.  Vitals show elevated blood pressure otherwise normal.  Patient presents with 1 day of  right toe erythema, edema, pain.  He noticed pain in his right toe last night.  This morning woke up with swelling and redness.  He notes that the erythema has extended up his foot.  He has a history of diabetes and neuropathy.  He was hospitalized 2 years ago for right toe cellulitis and concern for osteomyelitis.  He was given IV antibiotics and discharged home with 6-week course of antibiotics at the time.  He denies any fevers.  No new trauma to toe.  See further HPI details above.  Differential includes but is not limited to cellulitis, abscess, osteomyelitis, gout, tendinitis.  On exam, patient has erythema extending from the right toe to midfoot.  He also has edema along of the right toe and tenderness with palpation of MTP.  There is also superficial fluctuance along medial aspect of distal right toe.  MRI shows cellulitis of the right toe with superficial fluctuance along the medial aspect of distal right toe.  No overt osteomyelitis.  Pocket of fluid was drained.  Cultures were sent.  Labs show elevated CRP.  No leukocytosis.  Lactic was normal.  Blood cultures were obtained.  Started patient on IV vancomycin and cefepime.  Given MRI findings and history of osteomyelitis, I think patient would benefit from admission and continuous IV antibiotics.  Discussed patient with Dr. Guillen, who accepted patient for admission for further evaluation and management.  Patient is agreeable to plan of care.      Diagnosis:    ICD-10-CM    1. Cellulitis of great toe of right foot  L03.031       2. Abscess, toe, right  L02.611            Discharge Medications:  New Prescriptions    No medications on file          Will Littlejohn PA-C  5/1/2024              Will Littlejohn PA-C  05/01/24 [id number]

## 2024-05-02 NOTE — PROGRESS NOTES
[facility]    Medicine Progress Note - Hospitalist Service    Date of Admission:  5/1/2024    Assessment & Plan     Right great toe cellulitis with abscess, associated with diabetic neuropathy  Reactive osteitis, concern for possible osteomyelitis  Hx of osteomyelitis of the right great toe - 12/2021  MRI shows multiple abnormalities including Blisterlike superficial nonenhancing fluid collection upper level of the skin in the medial aspect of the first toe, at the level of the distal phalanx, measuring 3.0 x 1.6 x 0.7 cm. Also noted is moderate bone marrow edema of the distal phalanx of the right 1st toe suggesting reactive osteitis, no overt osteomyelitis.   -podiatry consult ordered  -follow up blood and abscess cultures  -continue cefepime and vanco  -narrow abx based on culture results  Podiatry consult appreciated and plan for incision and drainage and bone biopsy tomorrow.  N.p.o. after midnight  Added Toradol for pain patient seen and examined      DM2 with diabetic neuropathy  PTA on metformin and Ozempic. Hgb A1C of 6.2 January 2024.   -hold metformin and Ozempic  -sliding scale  -continue PTA gabapentin     Hypertension  Dyslipidemia  -continue PTA atenolol and lipitor     Depression  Anxiety  -continue PTA Effexor and nortriptyline     Chronic right shoulder pain  -continue PTA suboxone        Diet: Combination Diet Moderate Consistent Carb (60 g CHO per Meal) Diet    DVT Prophylaxis: Pneumatic Compression Devices  Kennedy Catheter: Not present  Lines: None     Cardiac Monitoring: None  Code Status: Full Code      Clinically Significant Risk Factors Present on Admission                  # Hypertension: Noted on problem list                 Disposition Plan     Medically Ready for Discharge: 2-3 days             Aubrey Ortiz MD  Hospitalist Service  [facility]  Securely message with [employer] (more info)  Text page via [email or web address] Paging/Directory    ______________________________________________________________________    Interval History     Patient seen and examined at bedside complaining of 7 out of 10 pain otherwise no other symptoms   Physical Exam   Vital Signs: Temp: 97.9  F (36.6  C) Temp src: Oral BP: (!) 141/80 Pulse: 80   Resp: 16 SpO2: 96 % O2 Device: None (Room air)    Weight: 160 lbs 0 oz    Physical Exam  Cardiovascular:      Rate and Rhythm: Normal rate and regular rhythm.   Pulmonary:      Effort: Pulmonary effort is normal.      Breath sounds: Normal breath sounds.   Abdominal:      General: There is no distension.      Palpations: Abdomen is soft.      Tenderness: There is no abdominal tenderness.   Musculoskeletal:      Comments: Right foot covered with dressing          Medical Decision Making       45  MINUTES SPENT BY ME on the date of service doing chart review, history, exam, documentation & further activities per the note.      Data     I have personally reviewed the following data over the past 24 hrs:    6.9  \   11.3 (L)   / 216     139 100 10.4 /  88   4.2 26 0.99 \     ALT: 13 AST: 20 AP: 148 TBILI: 0.5   ALB: 4.1 TOT PROTEIN: 6.7 LIPASE: N/A     Procal: N/A CRP: 30.52 (H) Lactic Acid: 0.9         Imaging results reviewed over the past 24 hrs:   Recent Results (from the past 24 hour(s))   MR Toe Right w/o & w Contrast    Narrative    EXAM: MR TOE RIGHT W/O and W CONTRAST  LOCATION: [facility]  DATE: 5/1/2024    INDICATION: Right first toe pain, redness, and swelling. History of diabetes.  COMPARISON: 1/18/2022.  TECHNIQUE: Routine. Additional postgadolinium T1 sequences were obtained.  IV CONTRAST: 7mL Gadavist    FINDINGS:    Cystic fluid collection in the skin along the medial aspect of the distal first toe, appearance suggestive of a blister, measuring 3 x 1.6 x 0.7 cm.     Additional soft tissue edema and postcontrast enhancement along the dorsal aspect of the first toe; this is nonspecific, could  represent cellulitis in the appropriate clinical context. No abscess. No phlegmonous change or sinus tract.    No somewhat heterogeneous thickening and enhancement of the soft tissues in the subungual aspect of the great toe is not significantly changed from December 2021, of doubtful acute clinical significance.    Mild edema-like T2 signal intensity within the distal phalanx of the first toe, nonspecific, most compatible with reactive osteitis in the setting of cellulitis. No overt osteomyelitis.     No evidence of septic arthritis at the PIP joint.     No reactive osteitis or osteomyelitis in the proximal phalanx, or elsewhere first toe. Previously seen abnormal bone marrow signal in the distal phalanx of the second toe now appears normal. No joint effusion or septic arthritis. No tenosynovitis.    Mild nonenhancing edema in the dorsum of the midfoot and within the musculature of the midfoot, common findings in patients with diabetes, of doubtful acute significance.    Moderate chronic atrophy of the intrinsic foot musculature.    Capsular and collateral ligaments of the MTP joints are intact. Tendons are intact without tearing or tendinopathy. No tenosynovitis.      Impression    IMPRESSION:  1.  Blisterlike superficial nonenhancing fluid collection upper level of the skin in the medial aspect of the first toe, at the level of the distal phalanx, measuring 3.0 x 1.6 x 0.7 cm.    2.  Nonspecific soft tissue edema and postcontrast enhancement in the distal aspect of the toe suggests cellulitis. No phlegmon or abscess.    3.  Moderate bone marrow edema in the distal phalanx of the first toe, suggests reactive osteitis. No overt osteomyelitis. No evidence of septic arthritis or septic tenosynovitis.    4.  Previously seen abnormal bone marrow signal in the distal phalanx of the second toe has resolved. The second toe bones and joints appear normal.    5.  Chronic atrophy of the intrinsic foot musculature, and  edema-like T2 signal intensity within the muscles in the superficial soft tissues over the dorsum of the foot, within expected limits for diabetic change.

## 2024-05-02 NOTE — PHARMACY-ADMISSION MEDICATION HISTORY
Pharmacist Admission Medication History    Admission medication history is complete. The information provided in this note is only as accurate as the sources available at the time of the update.    Information Source(s): Patient via in-person    Pertinent Information:     Changes made to PTA medication list:  Added: None  Deleted: None  Changed: SUBOXONE DOSE     Allergies reviewed with patient and updates made in EHR: no    Medication History Completed By: Haseeb Mckeon [facility] 5/1/2024 10:45 PM    PTA Med List   Medication Sig Last Dose    atenolol (TENORMIN) 25 MG tablet Take 1 tablet (25 mg) by mouth daily 4/30/2024 at PM    atorvastatin (LIPITOR) 20 MG tablet Take 1 tablet (20 mg) by mouth daily 4/30/2024 at PM    buprenorphine HCl-naloxone HCl (SUBOXONE) 8-2 MG per film Place 1 Film under the tongue daily 5/1/2024 at AM    diazepam (VALIUM) 5 MG tablet Take 1 tablet (5 mg) 3 times daily as needed.  at PRN    gabapentin (NEURONTIN) 600 MG tablet Take 1 tablet (600 mg) by mouth 2 times daily 5/1/2024 at AM    metFORMIN (GLUCOPHAGE XR) 500 MG 24 hr tablet Take 2 tablets (1,000 mg) by mouth 2 times daily (with meals) 5/1/2024 at AM    nortriptyline (PAMELOR) 25 MG capsule Take 1 capsule (25 mg) by mouth At Bedtime 4/30/2024 at PM    OZEMPIC, 0.25 OR 0.5 MG/DOSE, 2 MG/3ML pen INJECT 0.5MG UNDER THE SKIN EVERY 7 DAYS 4/30/2024 at TUESDAYS    sildenafil (VIAGRA) 100 MG tablet Take 1 tablet (100 mg) by mouth daily as needed  at PRN    venlafaxine (EFFEXOR-ER) 150 MG TB24 Take 1 tablet by mouth daily (with breakfast). 5/1/2024 at AM

## 2024-05-02 NOTE — ED NOTES
Assumed care of patient at 1915, received report from day RN at that time. Communication board updated and reviewed with pt. Bed alarm on and in good working order. Assessment complete. No other needs at this time. Will continue to monitor.    Patient to MRI

## 2024-05-02 NOTE — PROGRESS NOTES
RECEIVING UNIT ED HANDOFF REVIEW    ED Nurse Handoff Report was reviewed by: Kimani Mendoza RN on May 2, 2024 at 2:22 AM

## 2024-05-02 NOTE — H&P
[facility]    History and Physical - Hospitalist Service       Date of Admission:  5/1/2024     Assessment & Plan      Phillip Rueda is a 43 year old male with a history of DM, depression, anxiety, chronic right shoulder pain who is admitted on 5/1/2024 with right great toe infection.     Right great toe cellulitis with abscess, associated with diabetic neuropathy  Reactive osteitis, concern for possible osteomyelitis  Hx of osteomyelitis of the right great toe - 12/2021  MRI shows multiple abnormalities including Blisterlike superficial nonenhancing fluid collection upper level of the skin in the medial aspect of the first toe, at the level of the distal phalanx, measuring 3.0 x 1.6 x 0.7 cm. Also noted is moderate bone marrow edema of the distal phalanx of the right 1st toe suggesting reactive osteitis, no overt osteomyelitis.   -podiatry consult ordered  -follow up blood and abscess cultures  -continue cefepime and vanco  -narrow abx based on culture results    DM2 with diabetic neuropathy  PTA on metformin and Ozempic. Hgb A1C of 6.2 January 2024.   -hold metformin and Ozempic  -sliding scale  -continue PTA gabapentin    Hypertension  Dyslipidemia  -continue PTA atenolol and lipitor    Depression  Anxiety  -continue PTA Effexor and nortriptyline    Chronic right shoulder pain  -continue PTA suboxone       Diet:  Mod carb  DVT Prophylaxis: Pneumatic Compression Devices  Code Status:  Full    Disposition: 2-3 days pending plan from podiatry and transition to po abx.    Clinically Significant Risk Factors Present on Admission                    # Hypertension: Noted on problem list                    Jasiel Guillen, DO  Hospitalist Service  [facility]  Securely message with [employer] (more info)  Text page via [employer] Paging/Directory     ______________________________________________________________________    Chief Complaint   Right toe pain and  infection    History is obtained from the patient and ED physician    History of Present Illness   Phillip Rueda is a 43 year old male who has a history of DM, Htn, Depression who presents to the ED with right toe pain. He notes that Tuesday evening he had some discomfort and swelling that he noticed for the first time. He also noted a blister or fluid filled area and this morning had worsening pain and some redness streaking up into his foot. He does have peripheral neuropathy but still has feeling of his feet. He does not recall any trauma. No other skin infections today that he notices. He has had infection of this toe in the past and had foot infections three times in the past. It appears he had osteo of the the right foot in December of 2021. He has not had any fevers, chills, chest pain, cough, abdominal pain, nausea or any other symptoms.     In the ED he had what appeared to be a fluid filled blister. He had an MRI which showed this fluid filled blister and no other area of abscess. MRI called reactive osteitis but no overt osteomyelitis. The blister was drained in the ED with purulent material noted that was sent for culture. Blood cultures also obtained. He was given cefepime and vanco.       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 Taking?   Continuous  Blood Gluc Sensor (FREESTYLE J[place] 14 DAY SENSOR[place] [facility] [place]   No No   Sig: Change every 14 days.   OZEMPIC, 0.25 OR 0.5 MG/DOSE, 2 MG/3ML pen 4/30/2024 at TUESDAYS  No Yes   Sig: INJECT 0.5MG UNDER THE SKIN EVERY 7 DAYS   atenolol (TENORMIN) 25 MG tablet 4/30/2024 at PM  No Yes   Sig: Take 1 tablet (25 mg) by mouth daily   atorvastatin (LIPITOR) 20 MG tablet 4/30/2024 at PM  No Yes   Sig: Take 1 tablet (20 mg) by mouth daily   blood glucose (ACCU-CHEK GUIDE) test strip   No No   Sig: USE TO TEST BLOOD SUGAR TWO TIMES A DAY OR AS DIRECTED   blood glucose (NO BRAND SPECIFIED) lancets standard   No No   Sig: Use to test blood sugar 2 times daily or as directed.   blood glucose monitoring (NO BRAND SPECIFIED) meter device kit   No No   Sig: Use to test blood sugar 2 times daily or as directed.   buprenorphine HCl-naloxone HCl (SUBOXONE) 8-2 MG per film 5/1/2024 at AM  Yes Yes   Sig: Place 1 Film under the tongue daily   diazepam (VALIUM) 5 MG tablet  at PRN Self Yes Yes   Sig: Take 1 tablet (5 mg) 3 times daily as needed.   gabapentin (NEURONTIN) 600 MG tablet 5/1/2024 at AM  No Yes   Sig: Take 1 tablet (600 mg) by mouth 2 times daily   metFORMIN (GLUCOPHAGE XR) 500 MG 24 hr tablet 5/1/2024 at AM  No Yes   Sig: Take 2 tablets (1,000 mg) by mouth 2 times daily (with meals)   nortriptyline (PAMELOR) 25 MG capsule 4/30/2024 at PM  No Yes   Sig: Take 1 capsule (25 mg) by mouth At Bedtime   sildenafil (VIAGRA) 100 MG tablet  at PRN  No Yes   Sig: Take 1 tablet (100 mg) by mouth daily as needed   venlafaxine (EFFEXOR-ER) 150 MG TB24 5/1/2024 at AM Self No Yes   Sig: Take 1 tablet by mouth daily (with breakfast).      Facility-Administered Medications: None        Review of Systems    The 10 point Review of Systems is negative other than noted in the HPI or here.      Physical Exam   Vital Signs: Temp: 99  F (37.2  C) Temp src: Temporal BP: (!) 130/95 Pulse: 76   Resp: 18 SpO2: 100 % O2 Device: None (Room air)    Weight: 160 lbs 0  oz    Gen: lying in bed, appears comfortable  CV: RRR, no m/r/g  Pulm: CTAB, no wheeze or rhonchi  Skin: right great toe wrapped, appears swollen. Some mild erythema up the dorsum of the foot, outlined in the ED.   Lymph: no edema    Medical Decision Making             Data     I have personally reviewed the following data over the past 24 hrs:    6.9  \   11.3 (L)   / 216     139 100 10.4 /  135 (H)   4.2 26 0.99 \     ALT: 13 AST: 20 AP: 148 TBILI: 0.5   ALB: 4.1 TOT PROTEIN: 6.7 LIPASE: N/A     Procal: N/A CRP: 30.52 (H) Lactic Acid: 0.9         Imaging results reviewed over the past 24 hrs:   Recent Results (from the past 24 hour(s))   MR Toe Right w/o & w Contrast    Narrative    EXAM: MR TOE RIGHT W/O and W CONTRAST  LOCATION: [facility]  DATE: 5/1/2024    INDICATION: Right first toe pain, redness, and swelling. History of diabetes.  COMPARISON: 1/18/2022.  TECHNIQUE: Routine. Additional postgadolinium T1 sequences were obtained.  IV CONTRAST: 7mL Gadavist    FINDINGS:    Cystic fluid collection in the skin along the medial aspect of the distal first toe, appearance suggestive of a blister, measuring 3 x 1.6 x 0.7 cm.     Additional soft tissue edema and postcontrast enhancement along the dorsal aspect of the first toe; this is nonspecific, could represent cellulitis in the appropriate clinical context. No abscess. No phlegmonous change or sinus tract.    No somewhat heterogeneous thickening and enhancement of the soft tissues in the subungual aspect of the great toe is not significantly changed from December 2021, of doubtful acute clinical significance.    Mild edema-like T2 signal intensity within the distal phalanx of the first toe, nonspecific, most compatible with reactive osteitis in the setting of cellulitis. No overt osteomyelitis.     No evidence of septic arthritis at the PIP joint.     No reactive osteitis or osteomyelitis in the proximal phalanx, or elsewhere first toe.  Previously seen abnormal bone marrow signal in the distal phalanx of the second toe now appears normal. No joint effusion or septic arthritis. No tenosynovitis.    Mild nonenhancing edema in the dorsum of the midfoot and within the musculature of the midfoot, common findings in patients with diabetes, of doubtful acute significance.    Moderate chronic atrophy of the intrinsic foot musculature.    Capsular and collateral ligaments of the MTP joints are intact. Tendons are intact without tearing or tendinopathy. No tenosynovitis.      Impression    IMPRESSION:  1.  Blisterlike superficial nonenhancing fluid collection upper level of the skin in the medial aspect of the first toe, at the level of the distal phalanx, measuring 3.0 x 1.6 x 0.7 cm.    2.  Nonspecific soft tissue edema and postcontrast enhancement in the distal aspect of the toe suggests cellulitis. No phlegmon or abscess.    3.  Moderate bone marrow edema in the distal phalanx of the first toe, suggests reactive osteitis. No overt osteomyelitis. No evidence of septic arthritis or septic tenosynovitis.    4.  Previously seen abnormal bone marrow signal in the distal phalanx of the second toe has resolved. The second toe bones and joints appear normal.    5.  Chronic atrophy of the intrinsic foot musculature, and edema-like T2 signal intensity within the muscles in the superficial soft tissues over the dorsum of the foot, within expected limits for diabetic change.

## 2024-05-02 NOTE — PHARMACY-VANCOMYCIN DOSING SERVICE
Pharmacy Vancomycin Initial Note  Date of Service May 2, 2024  Patient's  1980  43 year old, male    Indication: Skin and Soft Tissue Infection    Current estimated CrCl = Estimated Creatinine Clearance: 96.8 mL/min (based on SCr of 0.99 mg/dL).    Creatinine for last 3 days  2024:  4:30 PM Creatinine 0.99 mg/dL    Recent Vancomycin Level(s) for last 3 days  No results found for requested labs within last 3 days.      Vancomycin IV Administrations (past 72 hours)                     vancomycin (VANCOCIN) 1,500 mg in 0.9% NaCl 250 mL intermittent infusion (mg) 1,500 mg New Bag 24 2239                    Nephrotoxins and other renal medications (From now, onward)      Start     Dose/Rate Route Frequency Ordered Stop    24 1030  vancomycin (VANCOCIN) 1,000 mg in 200 mL dextrose intermittent infusion         1,000 mg  200 mL/hr over 1 Hours Intravenous EVERY 12 HOURS 24 0417              Contrast Orders - past 72 hours (72h ago, onward)      Start     Dose/Rate Route Frequency Stop    24 2100  gadobutrol (GADAVIST) injection 7 mL         7 mL Intravenous ONCE 24 2136            [employer] Prediction of Planned Initial Vancomycin Regimen  Loading dose: 1500 mg  Regimen: 1000 mg IV every 12 hours.  Start time: 10:39 on 2024  Exposure target: AUC24 (range)400-600 mg/L.hr   AUC24,ss: 524 mg/L.hr  Probability of AUC24 > 400: 77 %  Ctrough,ss: 16.3 mg/L  Probability of Ctrough,ss > 20: 33 %  Probability of nephrotoxicity (Lodise ADAMARIS ): 12 %        Plan:  Start vancomycin 1000 mg IV q12h.   Vancomycin monitoring method: AUC  Vancomycin therapeutic monitoring goal: 400-600 mg*h/L  Pharmacy will check vancomycin levels as appropriate in 1-3 Days.    Serum creatinine levels will be ordered daily for the first week of therapy and at least twice weekly for subsequent weeks.      Tessa Bagley [facility]

## 2024-05-02 NOTE — PLAN OF CARE
Trauma/Ortho/Medical (Choose one):  Medical    Diagnosis: Right great toe cellulitis with abscess, associated with diabetic neuropathy                    Reactive osteitis, concern for possible osteomyelitis  POD#: n/a  Mental Status: A&O x4  Activity/dangle: independent in room, w/ post-op shoe  Diet:  mod CHO  Pain: managed w/ Toradol IV, Tylenol available  Kennedy/Voiding: bathroom  Tele/Restraints/Iso: n/a  02/LDA: room air, PIV SL  D/C Date: TBD  Other Info:  on IV antibiotics

## 2024-05-02 NOTE — PLAN OF CARE
Goal Outcome Evaluation:    Other Info: Cellulitis R great toe, DM   Orientation:A/O x4  Activity: independent  Diet/BS Checks: consistentl Mod CHO   Tele: NA  IV Access/Drains: PIV sl x2  Abnormal VS/Results: gram positive cocci  Bowel/Bladder: continent B/B / BR  Skin/Wounds: wound great toe R leg.  Consults: Podiatry  D/C Disposition: TBD

## 2024-05-03 ENCOUNTER — APPOINTMENT (OUTPATIENT)
Dept: GENERAL RADIOLOGY | Facility: CLINIC | Age: 44
DRG: 580 | End: 2024-05-03
Attending: STUDENT IN AN ORGANIZED HEALTH CARE EDUCATION/TRAINING PROGRAM
Payer: COMMERCIAL

## 2024-05-03 ENCOUNTER — ANESTHESIA (OUTPATIENT)
Dept: SURGERY | Facility: CLINIC | Age: 44
DRG: 580 | End: 2024-05-03
Payer: COMMERCIAL

## 2024-05-03 ENCOUNTER — ANESTHESIA EVENT (OUTPATIENT)
Dept: SURGERY | Facility: CLINIC | Age: 44
DRG: 580 | End: 2024-05-03
Payer: COMMERCIAL

## 2024-05-03 LAB
CREAT SERPL-MCNC: 0.99 MG/DL (ref 0.67–1.17)
EGFRCR SERPLBLD CKD-EPI 2021: >90 ML/MIN/1.73M2
GLUCOSE BLDC GLUCOMTR-MCNC: 102 MG/DL (ref 70–99)
GLUCOSE BLDC GLUCOMTR-MCNC: 104 MG/DL (ref 70–99)
GLUCOSE BLDC GLUCOMTR-MCNC: 106 MG/DL (ref 70–99)
GLUCOSE BLDC GLUCOMTR-MCNC: 129 MG/DL (ref 70–99)
GLUCOSE BLDC GLUCOMTR-MCNC: 148 MG/DL (ref 70–99)
GLUCOSE BLDC GLUCOMTR-MCNC: 248 MG/DL (ref 70–99)

## 2024-05-03 PROCEDURE — 250N000011 HC RX IP 250 OP 636: Performed by: INTERNAL MEDICINE

## 2024-05-03 PROCEDURE — 120N000001 HC R&B MED SURG/OB

## 2024-05-03 PROCEDURE — 97597 DBRDMT OPN WND 1ST 20 CM/<: CPT | Mod: 51 | Performed by: PODIATRIST

## 2024-05-03 PROCEDURE — 20240 BONE BIOPSY OPEN SUPERFICIAL: CPT | Mod: RT | Performed by: PODIATRIST

## 2024-05-03 PROCEDURE — 20240 BONE BIOPSY OPEN SUPERFICIAL: CPT | Performed by: ANESTHESIOLOGY

## 2024-05-03 PROCEDURE — 999N000179 XR SURGERY CARM FLUORO LESS THAN 5 MIN W STILLS

## 2024-05-03 PROCEDURE — 80202 ASSAY OF VANCOMYCIN: CPT

## 2024-05-03 PROCEDURE — 250N000011 HC RX IP 250 OP 636: Mod: JZ | Performed by: PODIATRIST

## 2024-05-03 PROCEDURE — 36415 COLL VENOUS BLD VENIPUNCTURE: CPT | Performed by: STUDENT IN AN ORGANIZED HEALTH CARE EDUCATION/TRAINING PROGRAM

## 2024-05-03 PROCEDURE — 87070 CULTURE OTHR SPECIMN AEROBIC: CPT | Performed by: PODIATRIST

## 2024-05-03 PROCEDURE — 82565 ASSAY OF CREATININE: CPT | Performed by: STUDENT IN AN ORGANIZED HEALTH CARE EDUCATION/TRAINING PROGRAM

## 2024-05-03 PROCEDURE — 272N000001 HC OR GENERAL SUPPLY STERILE: Performed by: PODIATRIST

## 2024-05-03 PROCEDURE — 87075 CULTR BACTERIA EXCEPT BLOOD: CPT | Performed by: PODIATRIST

## 2024-05-03 PROCEDURE — 250N000011 HC RX IP 250 OP 636: Performed by: PODIATRIST

## 2024-05-03 PROCEDURE — 999N000141 HC STATISTIC PRE-PROCEDURE NURSING ASSESSMENT: Performed by: PODIATRIST

## 2024-05-03 PROCEDURE — 250N000011 HC RX IP 250 OP 636: Performed by: STUDENT IN AN ORGANIZED HEALTH CARE EDUCATION/TRAINING PROGRAM

## 2024-05-03 PROCEDURE — 250N000013 HC RX MED GY IP 250 OP 250 PS 637: Performed by: PODIATRIST

## 2024-05-03 PROCEDURE — 250N000009 HC RX 250: Performed by: NURSE ANESTHETIST, CERTIFIED REGISTERED

## 2024-05-03 PROCEDURE — 250N000009 HC RX 250: Performed by: PODIATRIST

## 2024-05-03 PROCEDURE — 99232 SBSQ HOSP IP/OBS MODERATE 35: CPT | Performed by: STUDENT IN AN ORGANIZED HEALTH CARE EDUCATION/TRAINING PROGRAM

## 2024-05-03 PROCEDURE — 88311 DECALCIFY TISSUE: CPT | Mod: TC | Performed by: PODIATRIST

## 2024-05-03 PROCEDURE — 250N000011 HC RX IP 250 OP 636: Performed by: NURSE ANESTHETIST, CERTIFIED REGISTERED

## 2024-05-03 PROCEDURE — 36415 COLL VENOUS BLD VENIPUNCTURE: CPT

## 2024-05-03 PROCEDURE — 271N000001 HC OR GENERAL SUPPLY NON-STERILE: Performed by: PODIATRIST

## 2024-05-03 PROCEDURE — 250N000013 HC RX MED GY IP 250 OP 250 PS 637: Performed by: INTERNAL MEDICINE

## 2024-05-03 PROCEDURE — 20240 BONE BIOPSY OPEN SUPERFICIAL: CPT

## 2024-05-03 PROCEDURE — 370N000017 HC ANESTHESIA TECHNICAL FEE, PER MIN: Performed by: PODIATRIST

## 2024-05-03 PROCEDURE — 0QBQ0ZX EXCISION OF RIGHT TOE PHALANX, OPEN APPROACH, DIAGNOSTIC: ICD-10-PCS | Performed by: PODIATRIST

## 2024-05-03 PROCEDURE — 710N000009 HC RECOVERY PHASE 1, LEVEL 1, PER MIN: Performed by: PODIATRIST

## 2024-05-03 PROCEDURE — 0HBMXZZ EXCISION OF RIGHT FOOT SKIN, EXTERNAL APPROACH: ICD-10-PCS | Performed by: PODIATRIST

## 2024-05-03 PROCEDURE — 250N000011 HC RX IP 250 OP 636: Performed by: ANESTHESIOLOGY

## 2024-05-03 PROCEDURE — 360N000078 HC SURGERY LEVEL 5, PER MIN: Performed by: PODIATRIST

## 2024-05-03 PROCEDURE — 258N000003 HC RX IP 258 OP 636: Performed by: NURSE ANESTHETIST, CERTIFIED REGISTERED

## 2024-05-03 PROCEDURE — 250N000025 HC SEVOFLURANE, PER MIN: Performed by: PODIATRIST

## 2024-05-03 RX ORDER — ONDANSETRON 2 MG/ML
INJECTION INTRAMUSCULAR; INTRAVENOUS PRN
Status: DISCONTINUED | OUTPATIENT
Start: 2024-05-03 | End: 2024-05-03

## 2024-05-03 RX ORDER — NALOXONE HYDROCHLORIDE 0.4 MG/ML
0.2 INJECTION, SOLUTION INTRAMUSCULAR; INTRAVENOUS; SUBCUTANEOUS
Status: DISCONTINUED | OUTPATIENT
Start: 2024-05-03 | End: 2024-05-07 | Stop reason: HOSPADM

## 2024-05-03 RX ORDER — SODIUM CHLORIDE, SODIUM LACTATE, POTASSIUM CHLORIDE, CALCIUM CHLORIDE 600; 310; 30; 20 MG/100ML; MG/100ML; MG/100ML; MG/100ML
INJECTION, SOLUTION INTRAVENOUS CONTINUOUS
Status: DISCONTINUED | OUTPATIENT
Start: 2024-05-03 | End: 2024-05-03 | Stop reason: HOSPADM

## 2024-05-03 RX ORDER — OXYCODONE HYDROCHLORIDE 5 MG/1
5 TABLET ORAL EVERY 4 HOURS PRN
Status: DISCONTINUED | OUTPATIENT
Start: 2024-05-03 | End: 2024-05-07 | Stop reason: HOSPADM

## 2024-05-03 RX ORDER — NALOXONE HYDROCHLORIDE 0.4 MG/ML
0.1 INJECTION, SOLUTION INTRAMUSCULAR; INTRAVENOUS; SUBCUTANEOUS
Status: DISCONTINUED | OUTPATIENT
Start: 2024-05-03 | End: 2024-05-03 | Stop reason: HOSPADM

## 2024-05-03 RX ORDER — FENTANYL CITRATE 0.05 MG/ML
50 INJECTION, SOLUTION INTRAMUSCULAR; INTRAVENOUS EVERY 5 MIN PRN
Status: DISCONTINUED | OUTPATIENT
Start: 2024-05-03 | End: 2024-05-03 | Stop reason: HOSPADM

## 2024-05-03 RX ORDER — NALOXONE HYDROCHLORIDE 0.4 MG/ML
0.4 INJECTION, SOLUTION INTRAMUSCULAR; INTRAVENOUS; SUBCUTANEOUS
Status: DISCONTINUED | OUTPATIENT
Start: 2024-05-03 | End: 2024-05-07 | Stop reason: HOSPADM

## 2024-05-03 RX ORDER — FENTANYL CITRATE 0.05 MG/ML
25 INJECTION, SOLUTION INTRAMUSCULAR; INTRAVENOUS EVERY 5 MIN PRN
Status: DISCONTINUED | OUTPATIENT
Start: 2024-05-03 | End: 2024-05-03 | Stop reason: HOSPADM

## 2024-05-03 RX ORDER — ONDANSETRON 4 MG/1
4 TABLET, ORALLY DISINTEGRATING ORAL EVERY 30 MIN PRN
Status: DISCONTINUED | OUTPATIENT
Start: 2024-05-03 | End: 2024-05-03 | Stop reason: HOSPADM

## 2024-05-03 RX ORDER — BUPIVACAINE HYDROCHLORIDE 5 MG/ML
INJECTION, SOLUTION EPIDURAL; INTRACAUDAL PRN
Status: DISCONTINUED | OUTPATIENT
Start: 2024-05-03 | End: 2024-05-03 | Stop reason: HOSPADM

## 2024-05-03 RX ORDER — ONDANSETRON 2 MG/ML
4 INJECTION INTRAMUSCULAR; INTRAVENOUS EVERY 30 MIN PRN
Status: DISCONTINUED | OUTPATIENT
Start: 2024-05-03 | End: 2024-05-03 | Stop reason: HOSPADM

## 2024-05-03 RX ORDER — HYDROMORPHONE HCL IN WATER/PF 6 MG/30 ML
0.2 PATIENT CONTROLLED ANALGESIA SYRINGE INTRAVENOUS EVERY 5 MIN PRN
Status: DISCONTINUED | OUTPATIENT
Start: 2024-05-03 | End: 2024-05-03 | Stop reason: HOSPADM

## 2024-05-03 RX ORDER — HYDRALAZINE HYDROCHLORIDE 20 MG/ML
2.5-5 INJECTION INTRAMUSCULAR; INTRAVENOUS
Status: DISCONTINUED | OUTPATIENT
Start: 2024-05-03 | End: 2024-05-03 | Stop reason: HOSPADM

## 2024-05-03 RX ORDER — SODIUM CHLORIDE, SODIUM LACTATE, POTASSIUM CHLORIDE, CALCIUM CHLORIDE 600; 310; 30; 20 MG/100ML; MG/100ML; MG/100ML; MG/100ML
INJECTION, SOLUTION INTRAVENOUS CONTINUOUS PRN
Status: DISCONTINUED | OUTPATIENT
Start: 2024-05-03 | End: 2024-05-03

## 2024-05-03 RX ORDER — LABETALOL HYDROCHLORIDE 5 MG/ML
5 INJECTION, SOLUTION INTRAVENOUS
Status: DISCONTINUED | OUTPATIENT
Start: 2024-05-03 | End: 2024-05-03 | Stop reason: HOSPADM

## 2024-05-03 RX ORDER — MAGNESIUM HYDROXIDE 1200 MG/15ML
LIQUID ORAL PRN
Status: DISCONTINUED | OUTPATIENT
Start: 2024-05-03 | End: 2024-05-03 | Stop reason: HOSPADM

## 2024-05-03 RX ORDER — LIDOCAINE HYDROCHLORIDE 20 MG/ML
INJECTION, SOLUTION INFILTRATION; PERINEURAL PRN
Status: DISCONTINUED | OUTPATIENT
Start: 2024-05-03 | End: 2024-05-03

## 2024-05-03 RX ORDER — HYDROMORPHONE HCL IN WATER/PF 6 MG/30 ML
0.4 PATIENT CONTROLLED ANALGESIA SYRINGE INTRAVENOUS EVERY 5 MIN PRN
Status: DISCONTINUED | OUTPATIENT
Start: 2024-05-03 | End: 2024-05-03 | Stop reason: HOSPADM

## 2024-05-03 RX ORDER — DEXAMETHASONE SODIUM PHOSPHATE 4 MG/ML
4 INJECTION, SOLUTION INTRA-ARTICULAR; INTRALESIONAL; INTRAMUSCULAR; INTRAVENOUS; SOFT TISSUE
Status: DISCONTINUED | OUTPATIENT
Start: 2024-05-03 | End: 2024-05-03 | Stop reason: HOSPADM

## 2024-05-03 RX ORDER — PROPOFOL 10 MG/ML
INJECTION, EMULSION INTRAVENOUS PRN
Status: DISCONTINUED | OUTPATIENT
Start: 2024-05-03 | End: 2024-05-03

## 2024-05-03 RX ORDER — DEXAMETHASONE SODIUM PHOSPHATE 4 MG/ML
INJECTION, SOLUTION INTRA-ARTICULAR; INTRALESIONAL; INTRAMUSCULAR; INTRAVENOUS; SOFT TISSUE PRN
Status: DISCONTINUED | OUTPATIENT
Start: 2024-05-03 | End: 2024-05-03

## 2024-05-03 RX ORDER — MEPERIDINE HYDROCHLORIDE 25 MG/ML
12.5 INJECTION INTRAMUSCULAR; INTRAVENOUS; SUBCUTANEOUS EVERY 5 MIN PRN
Status: DISCONTINUED | OUTPATIENT
Start: 2024-05-03 | End: 2024-05-03 | Stop reason: HOSPADM

## 2024-05-03 RX ADMIN — KETOROLAC TROMETHAMINE 15 MG: 15 INJECTION, SOLUTION INTRAMUSCULAR; INTRAVENOUS at 05:33

## 2024-05-03 RX ADMIN — FENTANYL CITRATE 50 MCG: 50 INJECTION, SOLUTION INTRAMUSCULAR; INTRAVENOUS at 14:36

## 2024-05-03 RX ADMIN — FENTANYL CITRATE 50 MCG: 50 INJECTION, SOLUTION INTRAMUSCULAR; INTRAVENOUS at 14:42

## 2024-05-03 RX ADMIN — CEFEPIME 2 G: 2 INJECTION, POWDER, FOR SOLUTION INTRAVENOUS at 21:10

## 2024-05-03 RX ADMIN — ATENOLOL 25 MG: 25 TABLET ORAL at 09:26

## 2024-05-03 RX ADMIN — SUCCINYLCHOLINE CHLORIDE 120 MG: 20 INJECTION, SOLUTION INTRAMUSCULAR; INTRAVENOUS; PARENTERAL at 13:22

## 2024-05-03 RX ADMIN — HYDROMORPHONE HYDROCHLORIDE 0.4 MG: 0.2 INJECTION, SOLUTION INTRAMUSCULAR; INTRAVENOUS; SUBCUTANEOUS at 14:49

## 2024-05-03 RX ADMIN — VENLAFAXINE HYDROCHLORIDE 150 MG: 150 CAPSULE, EXTENDED RELEASE ORAL at 09:26

## 2024-05-03 RX ADMIN — KETOROLAC TROMETHAMINE 15 MG: 15 INJECTION, SOLUTION INTRAMUSCULAR; INTRAVENOUS at 12:10

## 2024-05-03 RX ADMIN — SODIUM CHLORIDE, POTASSIUM CHLORIDE, SODIUM LACTATE AND CALCIUM CHLORIDE: 600; 310; 30; 20 INJECTION, SOLUTION INTRAVENOUS at 13:18

## 2024-05-03 RX ADMIN — GABAPENTIN 600 MG: 300 CAPSULE ORAL at 21:09

## 2024-05-03 RX ADMIN — PROPOFOL 200 MG: 10 INJECTION, EMULSION INTRAVENOUS at 13:22

## 2024-05-03 RX ADMIN — ACETAMINOPHEN 650 MG: 325 TABLET, FILM COATED ORAL at 05:30

## 2024-05-03 RX ADMIN — LIDOCAINE HYDROCHLORIDE 100 MG: 20 INJECTION, SOLUTION INFILTRATION; PERINEURAL at 13:22

## 2024-05-03 RX ADMIN — BUPRENORPHINE AND NALOXONE 1 FILM: 8; 2 FILM, SOLUBLE BUCCAL; SUBLINGUAL at 09:27

## 2024-05-03 RX ADMIN — KETOROLAC TROMETHAMINE 15 MG: 15 INJECTION, SOLUTION INTRAMUSCULAR; INTRAVENOUS at 22:38

## 2024-05-03 RX ADMIN — NORTRIPTYLINE HYDROCHLORIDE 25 MG: 25 CAPSULE ORAL at 21:10

## 2024-05-03 RX ADMIN — MIDAZOLAM 2 MG: 1 INJECTION INTRAMUSCULAR; INTRAVENOUS at 13:16

## 2024-05-03 RX ADMIN — DEXAMETHASONE SODIUM PHOSPHATE 4 MG: 4 INJECTION, SOLUTION INTRA-ARTICULAR; INTRALESIONAL; INTRAMUSCULAR; INTRAVENOUS; SOFT TISSUE at 13:26

## 2024-05-03 RX ADMIN — OXYCODONE HYDROCHLORIDE 5 MG: 5 TABLET ORAL at 21:10

## 2024-05-03 RX ADMIN — VANCOMYCIN HYDROCHLORIDE 1000 MG: 1 INJECTION, SOLUTION INTRAVENOUS at 12:01

## 2024-05-03 RX ADMIN — GABAPENTIN 600 MG: 300 CAPSULE ORAL at 09:26

## 2024-05-03 RX ADMIN — PROPOFOL 50 MCG/KG/MIN: 10 INJECTION, EMULSION INTRAVENOUS at 13:30

## 2024-05-03 RX ADMIN — SENNOSIDES AND DOCUSATE SODIUM 2 TABLET: 50; 8.6 TABLET ORAL at 21:15

## 2024-05-03 RX ADMIN — ATORVASTATIN CALCIUM 20 MG: 20 TABLET, FILM COATED ORAL at 09:26

## 2024-05-03 RX ADMIN — ACETAMINOPHEN 650 MG: 325 TABLET, FILM COATED ORAL at 12:10

## 2024-05-03 RX ADMIN — CEFEPIME 2 G: 2 INJECTION, POWDER, FOR SOLUTION INTRAVENOUS at 09:26

## 2024-05-03 RX ADMIN — ONDANSETRON 4 MG: 2 INJECTION INTRAMUSCULAR; INTRAVENOUS at 14:09

## 2024-05-03 ASSESSMENT — ACTIVITIES OF DAILY LIVING (ADL)
ADLS_ACUITY_SCORE: 20

## 2024-05-03 ASSESSMENT — ENCOUNTER SYMPTOMS
SEIZURES: 0
DYSRHYTHMIAS: 0

## 2024-05-03 ASSESSMENT — COPD QUESTIONNAIRES: COPD: 0

## 2024-05-03 ASSESSMENT — LIFESTYLE VARIABLES: TOBACCO_USE: 1

## 2024-05-03 NOTE — PLAN OF CARE
Goal Outcome Evaluation:    Date/Time: 05/02- 05/03: 5096-6995      Trauma/Ortho/Medical (Choose one): Ortho     Diagnosis: Right great toe cellulitis  POD#: N/A  Mental Status: Oriented x 4  Activity/dangle: Up independent  Diet: NPO   Pain: Managed with tylenol/Toradol  Kennedy/Voiding: Bathroom  Tele/Restraints/Iso: None  02/LDA: RA/ PIVSL x2 LUE and RUE,  Is on intermittent abxs  D/C Date: TBD   Other Info: DIANE wiped, Plan for  I&D of Right great toe with bone bx today at 1313

## 2024-05-03 NOTE — ANESTHESIA POSTPROCEDURE EVALUATION
Patient: Phillip Rueda    Procedure: Procedure(s):  IRRIGATION AND DEBRIDEMENT, TOE FIRST  BIOPSY, BONE, TOE       Anesthesia Type:  General    Note:  Disposition: Inpatient   Postop Pain Control: Uneventful            Sign Out: Well controlled pain   PONV: No   Neuro/Psych: Uneventful            Sign Out: Acceptable/Baseline neuro status   Airway/Respiratory: Uneventful            Sign Out: Acceptable/Baseline resp. status   CV/Hemodynamics: Uneventful            Sign Out: Acceptable CV status; No obvious hypovolemia; No obvious fluid overload   Other NRE:    DID A NON-ROUTINE EVENT OCCUR?            Last vitals:  Vitals Value Taken Time   /95 05/03/24 1510   Temp 36.4  C (97.6  F) 05/03/24 1500   Pulse 66 05/03/24 1519   Resp 21 05/03/24 1519   SpO2 97 % 05/03/24 1519   Vitals shown include unfiled device data.    Electronically Signed By: Adwoa Mathis MD  May 3, 2024  3:20 PM

## 2024-05-03 NOTE — ANESTHESIA PREPROCEDURE EVALUATION
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     Prostate infection     Seizures (H)     Staph infection       Past Surgical History:   Procedure Laterality Date    BIOPSY BONE FOOT Right 2022    Procedure: BIOPSY, BONE, RIGHT GREAT TOE (DISTAL PHALANX);  Surgeon: Shelton Smallwood DPM;  Location: [facility] OR    ORTHOPEDIC SURGERY        Allergies   Allergen Reactions    Bees Swelling    Bupropion Hcl Other (See Comments)     seizure    Lisinopril Cough    Penicillins Other (See Comments)     Unable to close mouth  Tolerated cefazolin (21)      Social History     Tobacco Use    Smoking status: Former     Current packs/day: 0.00     Average packs/day: 0.3 packs/day for 17.0 years (4.3 ttl pk-yrs)     Types: Cigarettes, Dip, chew, snus or snuff     Start date: 1996     Quit date: 2013     Years since quitting: 10.8    Smokeless tobacco: Former   Substance Use Topics    Alcohol use: No     Alcohol/week: 0.0 standard drinks of alcohol     Comment: 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  Drink 1.75 every 2 days      Wt Readings from Last 1 Encounters:   24 72.6 kg (160 lb)        Anesthesia Evaluation            ROS/MED HX  ENT/Pulmonary:     (+)                tobacco use, Past use,                    (-) asthma, COPD and sleep apnea   Neurologic:    (-) no seizures and no CVA   Cardiovascular:     (+)  hypertension- -   -  - -                                    Echo: Date:  Results:  Global and regional left ventricular function is normal with an EF of 55-60%.  Global right ventricular function is normal. The right  ventricle is normal  size.  No significant valvular abnormalities.  There is mild dilation at the level of the sinuses of Valsalva (4.0 cm,  indexed value 2.1 cm/m2).  There is no prior study for direct comparison.    Stress Test:  Date: Results:    ECG Reviewed:  Date: Results:    Cath:  Date: Results:   (-) CAD, CHF and arrhythmias   METS/Exercise Tolerance:     Hematologic:     (+)      anemia,          Musculoskeletal:       GI/Hepatic:    (-) GERD and liver disease   Renal/Genitourinary:    (-) renal disease   Endo:     (+)  type II DM,                 (-) Type I DM   Psychiatric/Substance Use:     (+) psychiatric history depression and anxiety       Infectious Disease:       Malignancy:       Other:            Physical Exam    Airway        Mallampati: II   TM distance: > 3 FB   Neck ROM: full   Mouth opening: > 3 cm    Respiratory Devices and Support         Dental  no notable dental history     (+) Minor Abnormalities - some fillings, tiny chips      Cardiovascular          Rhythm and rate: regular     Pulmonary           breath sounds clear to auscultation           OUTSIDE LABS:  CBC:   Lab Results   Component Value Date    WBC 6.9 05/01/2024    WBC 5.5 05/31/2023    HGB 11.3 (L) 05/01/2024    HGB 13.1 (L) 05/31/2023    HCT 34.5 (L) 05/01/2024    HCT 39.0 (L) 05/31/2023     05/01/2024     05/31/2023     BMP:   Lab Results   Component Value Date     05/01/2024     05/31/2023    POTASSIUM 4.2 05/01/2024    POTASSIUM 4.0 05/31/2023    CHLORIDE 100 05/01/2024    CHLORIDE 98 05/31/2023    CO2 26 05/01/2024    CO2 29 05/31/2023    BUN 10.4 05/01/2024    BUN 9.5 05/31/2023    CR 0.99 05/03/2024    CR 0.99 05/01/2024     (H) 05/03/2024     (H) 05/03/2024     COAGS:   Lab Results   Component Value Date    PTT 28 11/02/2020    INR 0.88 11/02/2020     POC:   Lab Results   Component Value Date     (H) 11/02/2020     HEPATIC:   Lab Results   Component Value Date    ALBUMIN  4.1 05/01/2024    PROTTOTAL 6.7 05/01/2024    ALT 13 05/01/2024    AST 20 05/01/2024     (H) 04/09/2013    ALKPHOS 148 05/01/2024    BILITOTAL 0.5 05/01/2024     OTHER:   Lab Results   Component Value Date    LACT 0.9 05/01/2024    A1C 6.2 (H) 01/09/2024    ARON 9.1 05/01/2024    MAG 1.8 10/10/2022    LIPASE 61 (H) 10/09/2022    TSH 1.12 05/31/2023    T4 0.96 01/22/2016    CRP <2.9 03/15/2022    SED 11 12/13/2021       Anesthesia Plan    ASA Status:  3       Anesthesia Type: General.     - Airway: ETT   Induction: Intravenous, Propofol, RSI.   Maintenance: Balanced.        Consents    Anesthesia Plan(s) and associated risks, benefits, and realistic alternatives discussed. Questions answered and patient/representative(s) expressed understanding.     - Discussed:     - Discussed with:  Patient            Postoperative Care    Pain management: Multi-modal analgesia.   PONV prophylaxis: Ondansetron (or other 5HT-3), Dexamethasone or Solumedrol     Comments:               Rodger Hunt MD    I have reviewed the pertinent notes and labs in the chart from the past 30 days and (re)examined the patient.  Any updates or changes from those notes are reflected in this note.

## 2024-05-03 NOTE — ANESTHESIA PROCEDURE NOTES
Airway       Patient location during procedure: OR       Procedure Start/Stop Times: 5/3/2024 1:23 PM  Staff -        Anesthesiologist:  Adwoa Mathis MD       CRNA: Magy Cisneros APRN CRNA       Other Anesthesia Staff: Gian Childress       Performed By: JAGDISH and with CRNAs       Procedure performed by resident/fellow/CRNA in presence of a teaching physician.    Consent for Airway        Urgency: elective  Indications and Patient Condition       Indications for airway management: airway protection       Induction type:RSI       Mask difficulty assessment: 0 - not attempted    Final Airway Details       Final airway type: endotracheal airway       Successful airway: ETT - single  Endotracheal Airway Details        ETT size (mm): 8.0       Cuffed: yes       Successful intubation technique: video laryngoscopy       VL Blade Size: Manuel 4       Grade View of Cords: 1       Adjucts: stylet       Position: Right       Measured from: gums/teeth       Secured at (cm): 22       Bite block used: None    Post intubation assessment        Placement verified by: capnometry, equal breath sounds and chest rise        Number of attempts at approach: 1       Number of other approaches attempted: 0       Secured with: tape       Ease of procedure: easy       Dentition: Intact and Unchanged    Medication(s) Administered   Medication Administration Time: 5/3/2024 1:23 PM

## 2024-05-03 NOTE — PLAN OF CARE
Date/Time: 05/02: [id number]    Trauma/Ortho/Medical (Choose one): Ortho    Diagnosis: (R) great toe cellulitis  POD#: N/A  Mental Status: Oriented x 4  Activity/dangle: Up independent  Diet: Mod CHO  Pain: Managed with tylenol/Toradol  Kennedy/Voiding: Bathroom  Tele/Restraints/Iso: None  02/LDA: RA. Is on intermittent abxs  D/C Date: Unknown  Other Info: NPO after midnight for I and D of (R) great toe with bone bx tomorrow at 1315

## 2024-05-03 NOTE — BRIEF OP NOTE
[facility]    Brief Operative Note    Pre-operative diagnosis: Abscess, toe, right [L02.611]  Post-operative diagnosis Same as pre-operative diagnosis    Procedure: IRRIGATION AND DEBRIDEMENT, TOE FIRST, Right - Toe  BIOPSY, BONE, TOE, Right - Toe    Surgeon: Surgeons and Role:     * Shelton Smallwood DPM - Primary  Anesthesia: General   Estimated Blood Loss: 3 ml    Drains: None  Specimens:   ID Type Source Tests Collected by Time Destination   1 : right great toe bone biopsy Bone Biopsy Toe, Right SURGICAL PATHOLOGY EXAM Shelton Smallwood DPM 5/3/2024  1:38 PM    A : right great toe culture Tissue Toe, Right ANAEROBIC BACTERIAL CULTURE ROUTINE, AEROBIC BACTERIAL CULTURE ROUTINE Shelton Smallwood DPM 5/3/2024  1:41 PM      Findings:   Bone was noted to be hard when attempting to harvest biopsy. No purulence encountered.     Complications: None.  Implants: * No implants in log *    Plan:   Bone cultures and pathology pending (distal phalanx right hallux).  No abscess encountered.   Cellulitis almost resolved.  Will follow.  Not sure if he needs to remain in hospital for biopsy results, as any partial amputation could be done on an outpatient basis.     Shelton Smallwood DPM, FACFAS, MS  [facility] Department of Podiatry/Foot & Ankle Surgery

## 2024-05-03 NOTE — PROGRESS NOTES
[facility]    Medicine Progress Note - Hospitalist Service    Date of Admission:  5/1/2024    Assessment & Plan   Right great toe cellulitis with abscess, associated with diabetic neuropathy  Reactive osteitis, concern for possible osteomyelitis  Hx of osteomyelitis of the right great toe - 12/2021  MRI shows multiple abnormalities including Blisterlike superficial nonenhancing fluid collection upper level of the skin in the medial aspect of the first toe, at the level of the distal phalanx, measuring 3.0 x 1.6 x 0.7 cm. Also noted is moderate bone marrow edema of the distal phalanx of the right 1st toe suggesting reactive osteitis, no overt osteomyelitis.   -podiatry consult ordered  -follow up blood and abscess cultures  -continue cefepime and vanco  -narrow abx based on culture results  Podiatry consult appreciated and  underwentincision and drainage and bone biopsy tomorrow.'  -Abscess cultures growing Staph Aurues      DM2 with diabetic neuropathy  PTA on metformin and Ozempic. Hgb A1C of 6.2 January 2024.   -hold metformin and Ozempic  -sliding scale  -continue PTA gabapentin     Hypertension  Dyslipidemia  -continue PTA atenolol and lipitor     Depression  Anxiety  -continue PTA Effexor and nortriptyline     Chronic right shoulder pain  -continue PTA suboxone          Diet: Moderate Consistent Carb (60 g CHO per Meal) Diet    DVT Prophylaxis:   Kennedy Catheter: Not present  Lines: None     Cardiac Monitoring: None  Code Status: Full Code      Clinically Significant Risk Factors                  # Hypertension: Noted on problem list                   Disposition Plan     Medically Ready for Discharge: Anticipated in 2-4 Days             Aubrey Ortiz MD  Hospitalist Service  [facility]  Securely message with [employer] (more info)  Text page via [employer] Paging/Directory   ______________________________________________________________________    Patient  seen and examined at bedside after surgery   currently having pain in his lower extremity.  Denies any chest pain or shortness of breath     Physical Exam   Vital Signs: Temp: 97.7  F (36.5  C) Temp src: Oral BP: 126/76 Pulse: 79   Resp: 16 SpO2: 97 % O2 Device: None (Room air) Oxygen Delivery: 2 LPM  Weight: 160 lbs 0 oz    Physical Exam  Cardiovascular:      Rate and Rhythm: Normal rate and regular rhythm.   Pulmonary:      Effort: Pulmonary effort is normal. No respiratory distress.   Abdominal:      General: There is no distension.      Palpations: Abdomen is soft.      Tenderness: There is no abdominal tenderness.          Medical Decision Making       40 MINUTES SPENT BY ME on the date of service doing chart review, history, exam, documentation & further activities per the note.      Data     I have personally reviewed the following data over the past 24 hrs:    N/A  \   N/A   / N/A     N/A N/A N/A /  129 (H)   N/A N/A 0.99 \       Imaging results reviewed over the past 24 hrs:   Recent Results (from the past 24 hour(s))   XR Surgery ELOISA L/T 5 Min Fluoro w Stills    Narrative    SURGERY C-ARM FLUOROSCOPY LESS THAN 5 MINUTES WITH STILLS 5/3/2024  2:25 PM     HISTORY: Cellulitis of great toe right foot mini C-arm 1    NUMBER OF IMAGES ACQUIRED: 2    VIEWS: 2    FLUOROSCOPY TIME: 0.3 minute      Impression    IMPRESSION: Two intraoperative fluoroscopic spot images are provided  for review during the patient's operation located at the great toe  distal phalanx. Total fluoroscopy time 15 seconds. See operative  summary if complete details are needed.     LESA STEEN MD         SYSTEM ID:  [id number]

## 2024-05-03 NOTE — OP NOTE
Operative Report    May 3, 2024        SURGEON:  Shelton Smallwood, HUNG, FACFAS, MS    PREOPERATIVE DIAGNOSIS:   1) abscess and cellulitis of the right hallux  2) concern for underlying osteomyelitis of the distal phalanx  3) diabetes and peripheral neuropathy    POSTOPERATIVE DIAGNOSIS: Same    PROCEDURE(S):  1) bone biopsy, distal phalanx, right hallux  2) excisional debridement to the depth of deeper skin, right hallux    SPECIMENS: Bone was harvested from the distal phalanx and sectioned.  Bone was sent to pathology for microscopic exam and to microbiology for culture    ANESTHESIA: General    ESTIMATED BLOOD LOSS: 3 mL    INJECTABLES:  0.5% Marcaine intraoperatively    COMPLICATIONS:   None apparent    INTRAOPERATIVE FINDINGS: The bone of the distal phalanx, right hallux, was hard.  It appeared viable to the eye.  No purulence encountered.  No deep necrosis.  More superficial, hyperkeratotic and some desquamated skin.    INDICATIONS FOR SURGERY:  Phillip Rueda is a 43-year-old male with diabetes and peripheral neuropathy who was admitted through the emergency department on 5/1/2024 due to pain, erythema and drainage involving the right hallux.  An MRI was completed showing bone marrow edema of the distal phalanx, yet equivocal for osteomyelitis.  Podiatry offered surgical intervention in the form of excisional debridement and bone biopsy to further evaluate for possible osteomyelitis.  Phillip agreed to this.  No guarantees were given.    PROCEDURE: Phillip Rueda was transported to the operating room and placed supine on the operating table.  General endotracheal anesthesia was initiated.  The right foot was prepped and draped in the normal aseptic fashion.  A timeout was called.  Tegaderm was placed over the superficial necrotic tissue.  Next a short longitudinal incision was made on the medial aspect of the right hallux.  Blunt dissection was carried out down to bone.  An 8 gauge Derick bone marrow trephine  was used to harvest a small dowel of bone.  Incision planning and location of harvest guided by intraoperative fluoroscopy.  This bone was sectioned and sent for culture and microscopic exam.  The wound was irrigated with a copious amount normal sterile saline.  Closure of skin with 4-0 Prolene.    Next, excisional debridement was performed down to the level of deeper skin.  This involved area less than 20 cm .  Thick hyperkeratotic skin and some desquamated skin was excised.  The underlying skin level appear grossly viable.  The area was irrigated with sterile saline.  A well-padded compressive dressing was applied.    Phillip Rueda tolerated the anesthesia procedure well.  Counts were correct.  EBL 3 mL.

## 2024-05-03 NOTE — ANESTHESIA CARE TRANSFER NOTE
Patient: Phillip Rueda    Procedure: Procedure(s):  IRRIGATION AND DEBRIDEMENT, TOE FIRST  BIOPSY, BONE, TOE       Diagnosis: Abscess, toe, right [L02.611]  Diagnosis Additional Information: No value filed.    Anesthesia Type:   General     Note:    Oropharynx: oropharynx clear of all foreign objects and spontaneously breathing  Level of Consciousness: awake  Oxygen Supplementation: room air    Independent Airway: airway patency satisfactory and stable  Dentition: dentition unchanged  Vital Signs Stable: post-procedure vital signs reviewed and stable  Report to RN Given: handoff report given  Patient transferred to: PACU    Handoff Report: Identifed the Patient, Identified the Reponsible Provider, Reviewed the pertinent medical history, Discussed the surgical course, Reviewed Intra-OP anesthesia mangement and issues during anesthesia, Set expectations for post-procedure period and Allowed opportunity for questions and acknowledgement of understanding      Vitals:  Vitals Value Taken Time   /96    Temp 36.8 C    Pulse 80 05/03/24 1421   Resp 26 05/03/24 1421   SpO2 95 % 05/03/24 1421   Vitals shown include unfiled device data.    Electronically Signed By: WALESKA Nguyen CRNA  May 3, 2024  2:22 PM

## 2024-05-04 LAB
ANION GAP SERPL CALCULATED.3IONS-SCNC: 7 MMOL/L (ref 7–15)
BACTERIA ABSC ANAEROBE+AEROBE CULT: ABNORMAL
BUN SERPL-MCNC: 12 MG/DL (ref 6–20)
CALCIUM SERPL-MCNC: 8.9 MG/DL (ref 8.6–10)
CHLORIDE SERPL-SCNC: 102 MMOL/L (ref 98–107)
CREAT SERPL-MCNC: 0.99 MG/DL (ref 0.67–1.17)
DEPRECATED HCO3 PLAS-SCNC: 29 MMOL/L (ref 22–29)
EGFRCR SERPLBLD CKD-EPI 2021: >90 ML/MIN/1.73M2
ERYTHROCYTE [DISTWIDTH] IN BLOOD BY AUTOMATED COUNT: 12.4 % (ref 10–15)
GLUCOSE BLDC GLUCOMTR-MCNC: 135 MG/DL (ref 70–99)
GLUCOSE BLDC GLUCOMTR-MCNC: 165 MG/DL (ref 70–99)
GLUCOSE BLDC GLUCOMTR-MCNC: 182 MG/DL (ref 70–99)
GLUCOSE BLDC GLUCOMTR-MCNC: 206 MG/DL (ref 70–99)
GLUCOSE BLDC GLUCOMTR-MCNC: 266 MG/DL (ref 70–99)
GLUCOSE SERPL-MCNC: 142 MG/DL (ref 70–99)
GRAM STAIN RESULT: ABNORMAL
GRAM STAIN RESULT: ABNORMAL
HCT VFR BLD AUTO: 35.6 % (ref 40–53)
HGB BLD-MCNC: 11.8 G/DL (ref 13.3–17.7)
MCH RBC QN AUTO: 29.3 PG (ref 26.5–33)
MCHC RBC AUTO-ENTMCNC: 33.1 G/DL (ref 31.5–36.5)
MCV RBC AUTO: 88 FL (ref 78–100)
PLATELET # BLD AUTO: 231 10E3/UL (ref 150–450)
POTASSIUM SERPL-SCNC: 4.4 MMOL/L (ref 3.4–5.3)
RBC # BLD AUTO: 4.03 10E6/UL (ref 4.4–5.9)
SODIUM SERPL-SCNC: 138 MMOL/L (ref 135–145)
VANCOMYCIN SERPL-MCNC: 13.8 UG/ML
WBC # BLD AUTO: 7.5 10E3/UL (ref 4–11)

## 2024-05-04 PROCEDURE — 36415 COLL VENOUS BLD VENIPUNCTURE: CPT | Performed by: PODIATRIST

## 2024-05-04 PROCEDURE — 250N000011 HC RX IP 250 OP 636: Performed by: PODIATRIST

## 2024-05-04 PROCEDURE — 99232 SBSQ HOSP IP/OBS MODERATE 35: CPT | Performed by: STUDENT IN AN ORGANIZED HEALTH CARE EDUCATION/TRAINING PROGRAM

## 2024-05-04 PROCEDURE — 120N000001 HC R&B MED SURG/OB

## 2024-05-04 PROCEDURE — 80048 BASIC METABOLIC PNL TOTAL CA: CPT | Performed by: PODIATRIST

## 2024-05-04 PROCEDURE — 250N000013 HC RX MED GY IP 250 OP 250 PS 637: Performed by: PODIATRIST

## 2024-05-04 PROCEDURE — 250N000012 HC RX MED GY IP 250 OP 636 PS 637: Performed by: PODIATRIST

## 2024-05-04 PROCEDURE — 250N000013 HC RX MED GY IP 250 OP 250 PS 637: Performed by: INTERNAL MEDICINE

## 2024-05-04 PROCEDURE — 85027 COMPLETE CBC AUTOMATED: CPT | Performed by: PODIATRIST

## 2024-05-04 PROCEDURE — 99232 SBSQ HOSP IP/OBS MODERATE 35: CPT | Performed by: PODIATRIST

## 2024-05-04 RX ADMIN — VENLAFAXINE HYDROCHLORIDE 150 MG: 150 CAPSULE, EXTENDED RELEASE ORAL at 08:09

## 2024-05-04 RX ADMIN — MELATONIN 5 MG TABLET 10 MG: at 22:01

## 2024-05-04 RX ADMIN — OXYCODONE HYDROCHLORIDE 5 MG: 5 TABLET ORAL at 10:26

## 2024-05-04 RX ADMIN — ACETAMINOPHEN 650 MG: 325 TABLET, FILM COATED ORAL at 22:08

## 2024-05-04 RX ADMIN — NORTRIPTYLINE HYDROCHLORIDE 25 MG: 25 CAPSULE ORAL at 22:01

## 2024-05-04 RX ADMIN — KETOROLAC TROMETHAMINE 15 MG: 15 INJECTION, SOLUTION INTRAMUSCULAR; INTRAVENOUS at 04:41

## 2024-05-04 RX ADMIN — VANCOMYCIN HYDROCHLORIDE 1000 MG: 1 INJECTION, SOLUTION INTRAVENOUS at 12:35

## 2024-05-04 RX ADMIN — ATENOLOL 25 MG: 25 TABLET ORAL at 08:09

## 2024-05-04 RX ADMIN — CEFEPIME 2 G: 2 INJECTION, POWDER, FOR SOLUTION INTRAVENOUS at 10:22

## 2024-05-04 RX ADMIN — SENNOSIDES AND DOCUSATE SODIUM 2 TABLET: 50; 8.6 TABLET ORAL at 17:02

## 2024-05-04 RX ADMIN — OXYCODONE HYDROCHLORIDE 5 MG: 5 TABLET ORAL at 01:19

## 2024-05-04 RX ADMIN — CEFEPIME 2 G: 2 INJECTION, POWDER, FOR SOLUTION INTRAVENOUS at 22:01

## 2024-05-04 RX ADMIN — KETOROLAC TROMETHAMINE 15 MG: 15 INJECTION, SOLUTION INTRAMUSCULAR; INTRAVENOUS at 11:33

## 2024-05-04 RX ADMIN — GABAPENTIN 600 MG: 300 CAPSULE ORAL at 08:09

## 2024-05-04 RX ADMIN — ACETAMINOPHEN 650 MG: 325 TABLET, FILM COATED ORAL at 16:55

## 2024-05-04 RX ADMIN — POLYETHYLENE GLYCOL 3350 17 G: 17 POWDER, FOR SOLUTION ORAL at 17:02

## 2024-05-04 RX ADMIN — DIAZEPAM 5 MG: 5 TABLET ORAL at 01:22

## 2024-05-04 RX ADMIN — ATORVASTATIN CALCIUM 20 MG: 20 TABLET, FILM COATED ORAL at 08:09

## 2024-05-04 RX ADMIN — VANCOMYCIN HYDROCHLORIDE 1000 MG: 1 INJECTION, SOLUTION INTRAVENOUS at 00:53

## 2024-05-04 RX ADMIN — OXYCODONE HYDROCHLORIDE 5 MG: 5 TABLET ORAL at 16:55

## 2024-05-04 RX ADMIN — GABAPENTIN 600 MG: 300 CAPSULE ORAL at 22:01

## 2024-05-04 RX ADMIN — OXYCODONE HYDROCHLORIDE 5 MG: 5 TABLET ORAL at 22:08

## 2024-05-04 RX ADMIN — ACETAMINOPHEN 650 MG: 325 TABLET, FILM COATED ORAL at 08:09

## 2024-05-04 RX ADMIN — INSULIN ASPART 1 UNITS: 100 INJECTION, SOLUTION INTRAVENOUS; SUBCUTANEOUS at 22:08

## 2024-05-04 RX ADMIN — BUPRENORPHINE AND NALOXONE 1 FILM: 8; 2 FILM, SOLUBLE BUCCAL; SUBLINGUAL at 08:10

## 2024-05-04 ASSESSMENT — ACTIVITIES OF DAILY LIVING (ADL)
ADLS_ACUITY_SCORE: 20

## 2024-05-04 NOTE — PROGRESS NOTES
Diagnosis: Irrigation and Debridement, Right Great Toe  POD#: 1  Mental Status: A&O x4  Activity/dangle: Independent in room  Diet: Mod Carb diet  Pain: Managed with PRN Oxycodone and Toradol given  Kennedy/Voiding: Voiding adequately in the bathroom  Tele/Restraints/Iso: N/A  02/LDA: Room air. IV Saline locked,   D/C Date: Pending  Blood sugar at 0200 was 266

## 2024-05-04 NOTE — PLAN OF CARE
Diagnosis: Irrigation and Debridement, Right Great Toe  POD#: 0  Mental Status: A&O x4  Activity/dangle: Independent in room  Diet: Mod Carb diet  Pain: Managed with PRN Toradol, Tylenol and Oxycodone  Kennedy/Voiding: Voiding adequately in the bathroom  Tele/Restraints/Iso: N/A  02/LDA: Room air. IV Saline locked  D/C Date: Pending  Other Info: BG Check w/ insulin coverage. R Great Toe dressing is CDI.

## 2024-05-04 NOTE — PROVIDER NOTIFICATION
MD Notification    Notified Person: MD    Notified Person Name: Podiatry clinic (on call)    Notification Date/Time: 5/3/2024 at 8:24 pm    Notification Interaction: call at [phone]    Purpose of Notification: Pt is having increasing pain and needs PRN pain medication.    Orders Received: waiting to hear back or waiting for order.    Comments:

## 2024-05-04 NOTE — PLAN OF CARE
Goal Outcome Evaluation:      Plan of Care Reviewed With: patient   Patient independent, on Iv Vanco. Vanco level was within. Will check level in 2-3 days time.

## 2024-05-04 NOTE — PHARMACY-VANCOMYCIN DOSING SERVICE
Pharmacy Vancomycin Note  Date of Service May 4, 2024  Patient's  1980   43 year old, male    Indication: Abscess and Skin and Soft Tissue Infection  Day of Therapy: 4  Current vancomycin regimen:  1000 mg IV q12h  Current vancomycin monitoring method: AUC  Current vancomycin therapeutic monitoring goal: 400-600 mg*h/L    Insight[employer] Prediction of Current Vancomycin Regimen  Loading dose: 1500 mg  Regimen: 1000 mg IV every 12 hours.  Start time: 10:39 on 2024  Exposure target: AUC24 (range)400-600 mg/L.hr   AUC24,ss: 524 mg/L.hr  Probability of AUC24 > 400: 77 %  Ctrough,ss: 16.3 mg/L  Probability of Ctrough,ss > 20: 33 %  Probability of nephrotoxicity (Lodise ADAMARIS ): 12 %    Current estimated CrCl = Estimated Creatinine Clearance: 96.8 mL/min (based on SCr of 0.99 mg/dL).    Creatinine for last 3 days  2024:  4:30 PM Creatinine 0.99 mg/dL  5/3/2024:  6:37 AM Creatinine 0.99 mg/dL    Recent Vancomycin Levels (past 3 days)  5/3/2024: 11:34 PM Vancomycin 13.8 ug/mL    Vancomycin IV Administrations (past 72 hours)                     vancomycin (VANCOCIN) 1,000 mg in 200 mL dextrose intermittent infusion (mg) 1,000 mg New Bag 24 1201     1,000 mg New Bag 24 2245     1,000 mg New Bag  1104    vancomycin (VANCOCIN) 1,500 mg in 0.9% NaCl 250 mL intermittent infusion (mg) 1,500 mg New Bag 24 2239                    Nephrotoxins and other renal medications (From now, onward)      Start     Dose/Rate Route Frequency Ordered Stop    24 1227  ketorolac (TORADOL) injection 15 mg         15 mg Intravenous EVERY 6 HOURS PRN 24 1227 24 1226    24 1030  vancomycin (VANCOCIN) 1,000 mg in 200 mL dextrose intermittent infusion         1,000 mg  200 mL/hr over 1 Hours Intravenous EVERY 12 HOURS 24 0417                 Contrast Orders - past 72 hours (72h ago, onward)      Start     Dose/Rate Route Frequency Stop    24 2100  gadobutrol (GADAVIST) injection 7 mL          7 mL Intravenous ONCE 05/01/24 2136            Interpretation of levels and current regimen:  Vancomycin level is reflective of -600    Has serum creatinine changed greater than 50% in last 72 hours: No    Urine output:  unable to determine    Renal Function: Stable    Ins[employer] Prediction of Planned New Vancomycin Regimen  Loading dose: N/A  Regimen: 1000 mg IV every 12 hours.  Start time: 00:01 on 05/04/2024  Exposure target: AUC24 (range)400-600 mg/L.hr   AUC24,ss: 504 mg/L.hr  Probability of AUC24 > 400: 95 %  Ctrough,ss: 15.4 mg/L  Probability of Ctrough,ss > 20: 8 %  Probability of nephrotoxicity (Lodise ADAMARIS 2009): 11 %    Plan:  Continue Current Dose  Vancomycin monitoring method: AUC  Vancomycin therapeutic monitoring goal: 400-600 mg*h/L  Pharmacy will check vancomycin levels as appropriate in 1-3 Days.  Serum creatinine levels will be ordered daily for the first week of therapy and at least twice weekly for subsequent weeks.    Larisa Gregory, PharmD, BCPS

## 2024-05-04 NOTE — PROGRESS NOTES
[facility]    Medicine Progress Note - Hospitalist Service    Date of Admission:  5/1/2024    Assessment & Plan   Right great toe cellulitis with abscess, associated with diabetic neuropathy  Reactive osteitis, concern for possible osteomyelitis  Hx of osteomyelitis of the right great toe - 12/2021  MRI shows multiple abnormalities including Blisterlike superficial nonenhancing fluid collection upper level of the skin in the medial aspect of the first toe, at the level of the distal phalanx, measuring 3.0 x 1.6 x 0.7 cm. Also noted is moderate bone marrow edema of the distal phalanx of the right 1st toe suggesting reactive osteitis, no overt osteomyelitis.   -podiatry consult ordered  -follow up blood and abscess cultures  -continue cefepime and vanco  -narrow abx based on culture results  Podiatry consult appreciated and  underwent I and D and bone biopsy on 5/3/24'  -Abscess cultures growing Staph Aurues   -Will consult ID for discharge antibiotic recommendation       DM2 with diabetic neuropathy  PTA on metformin and Ozempic. Hgb A1C of 6.2 January 2024.   -hold metformin and Ozempic  -sliding scale  -continue PTA gabapentin     Hypertension  Dyslipidemia  -continue PTA atenolol and lipitor     Depression  Anxiety  -continue PTA Effexor and nortriptyline     Chronic right shoulder pain  -continue PTA suboxone          Diet: Moderate Consistent Carb (60 g CHO per Meal) Diet    DVT Prophylaxis: Low Risk/Ambulatory with no VTE prophylaxis indicated  Kennedy Catheter: Not present  Lines: None     Cardiac Monitoring: None  Code Status: Full Code      Clinically Significant Risk Factors                  # Hypertension: Noted on problem list                   Disposition Plan     Medically Ready for Discharge: 1-2 days             Aubrey Ortiz MD  Hospitalist Service  [facility]  Securely message with [employer] (more info)  Text page via [employer]  Paging/Directory   ______________________________________________________________________    Interval History   Patient seen and examined at bedside   Eager to go home.  Pain well-controlled.   Physical Exam   Vital Signs: Temp: 97.6  F (36.4  C) Temp src: Oral BP: 139/85 Pulse: 64   Resp: 16 SpO2: 98 % O2 Device: None (Room air)    Weight: 160 lbs 0 oz    Physical Exam  Cardiovascular:      Rate and Rhythm: Normal rate and regular rhythm.      Heart sounds: Normal heart sounds.   Pulmonary:      Effort: No respiratory distress.      Breath sounds: Normal breath sounds. No wheezing.   Abdominal:      Palpations: Abdomen is soft.   Musculoskeletal:      Comments: Right foot covered with dressing            Medical Decision Making       42 MINUTES SPENT BY ME on the date of service doing chart review, history, exam, documentation & further activities per the note.      Data     I have personally reviewed the following data over the past 24 hrs:    7.5  \   11.8 (L)   / 231     138 102 12.0 /  182 (H)   4.4 29 0.99 \       Imaging results reviewed over the past 24 hrs:   No results found for this or any previous visit (from the past 24 hour(s)).

## 2024-05-04 NOTE — PROGRESS NOTES
[facility]      ASSESSMENT:    43-year-old male with type 2 diabetes, peripheral neuropathy, hypertension, dyslipidemia, depression/anxiety, chronic right shoulder pain admitted for a right hallux infection and status post right hallux excisional debridement and bone biopsy from the distal phalanx on 5/3/2024.    Surgical site is stable    MEDICAL DECISION MAKING:  Inquired if he needs to stay hospitalized until the bone culture and pathology results are available.  We discussed the risks and benefits of discharging prior to results being available.  Although this could be managed on an outpatient basis, and if needed follow-up surgery might not be scheduled in a timely fashion.  Any delay can certainly result in progressive infection.  Also, if not hospitalized, he would not have the protection of IV antibiotics.  We both agree that the lowest risk comes with remaining hospitalized.  Should the pathology come back positive for osteomyelitis, we can schedule a partial right hallux amputation in a timely fashion.    Will ask RN to change the dressing on Sunday, 5/5/2024.  Podiatry will recheck on Monday.  Will continue to follow the bone biopsy and culture results.    Shelton Smallwood DPM, FACFAS, MS  [facility] Department of Podiatry/Foot & Ankle Surgery      _______________________________________________________________________      UPDATED HISTORY: Status post right hallux excisional debridement and bone biopsy 5/3/2024.  Pain controlled.     EXAM:    VITALS: B/P: 139/85, T: 97.6, P: 64, R: 16    VASCULAR: Right hallux edema    NEURO: Light touch sensation diminished    DERM: The biopsy site, medial right hallux, is coapted and sutures intact.  Soft tissues of the toe are stable without necrosis.  There is ongoing edema and erythema  No purulence    5/1/2024 MRI right foot:  IMPRESSION:  1.  Blisterlike superficial nonenhancing fluid collection upper level of the skin in the medial  "aspect of the first toe, at the level of the distal phalanx, measuring 3.0 x 1.6 x 0.7 cm.     2.  Nonspecific soft tissue edema and postcontrast enhancement in the distal aspect of the toe suggests cellulitis. No phlegmon or abscess.     3.  Moderate bone marrow edema in the distal phalanx of the first toe, suggests reactive osteitis. No overt osteomyelitis. No evidence of septic arthritis or septic tenosynovitis.     4.  Previously seen abnormal bone marrow signal in the distal phalanx of the second toe has resolved. The second toe bones and joints appear normal.     5.  Chronic atrophy of the intrinsic foot musculature, and edema-like T2 signal intensity within the muscles in the superficial soft tissues over the dorsum of the foot, within expected limits for diabetic change.    Labs:     Lab Results   Component Value Date    INR 0.88 11/02/2020     No components found for: \"ESR\"  @BREIFLAB(crp)@    Lab Results   Component Value Date    WBC 7.5 05/04/2024    WBC 8.3 11/02/2020     Lab Results   Component Value Date    RBC 4.03 05/04/2024    RBC 4.18 11/02/2020     Lab Results   Component Value Date    HGB 11.8 05/04/2024    HGB 12.4 11/02/2020     Lab Results   Component Value Date    HCT 35.6 05/04/2024    HCT 38.0 11/02/2020     No components found for: \"MCT\"  Lab Results   Component Value Date    MCV 88 05/04/2024    MCV 91 11/02/2020     Lab Results   Component Value Date    MCH 29.3 05/04/2024    MCH 29.7 11/02/2020     Lab Results   Component Value Date    MCHC 33.1 05/04/2024    MCHC 32.6 11/02/2020     Lab Results   Component Value Date    RDW 12.4 05/04/2024    RDW 12.8 11/02/2020     Lab Results   Component Value Date     05/04/2024     11/02/2020       All cultures:  No results for input(s): \"CULT\" in the last 168 hours.    Hemoglobin   Date Value Ref Range Status   05/04/2024 11.8 (L) 13.3 - 17.7 g/dL Final   11/02/2020 12.4 (L) 13.3 - 17.7 g/dL Final         "

## 2024-05-05 LAB
ANION GAP SERPL CALCULATED.3IONS-SCNC: 10 MMOL/L (ref 7–15)
BACTERIA TISS BX CULT: ABNORMAL
BUN SERPL-MCNC: 13.9 MG/DL (ref 6–20)
CALCIUM SERPL-MCNC: 9.3 MG/DL (ref 8.6–10)
CHLORIDE SERPL-SCNC: 103 MMOL/L (ref 98–107)
CREAT SERPL-MCNC: 1.01 MG/DL (ref 0.67–1.17)
DEPRECATED HCO3 PLAS-SCNC: 28 MMOL/L (ref 22–29)
EGFRCR SERPLBLD CKD-EPI 2021: >90 ML/MIN/1.73M2
ERYTHROCYTE [DISTWIDTH] IN BLOOD BY AUTOMATED COUNT: 12.5 % (ref 10–15)
GLUCOSE BLDC GLUCOMTR-MCNC: 107 MG/DL (ref 70–99)
GLUCOSE BLDC GLUCOMTR-MCNC: 139 MG/DL (ref 70–99)
GLUCOSE BLDC GLUCOMTR-MCNC: 149 MG/DL (ref 70–99)
GLUCOSE BLDC GLUCOMTR-MCNC: 190 MG/DL (ref 70–99)
GLUCOSE BLDC GLUCOMTR-MCNC: 194 MG/DL (ref 70–99)
GLUCOSE SERPL-MCNC: 122 MG/DL (ref 70–99)
HCT VFR BLD AUTO: 35.5 % (ref 40–53)
HGB BLD-MCNC: 11.6 G/DL (ref 13.3–17.7)
MCH RBC QN AUTO: 29.1 PG (ref 26.5–33)
MCHC RBC AUTO-ENTMCNC: 32.7 G/DL (ref 31.5–36.5)
MCV RBC AUTO: 89 FL (ref 78–100)
PLATELET # BLD AUTO: 224 10E3/UL (ref 150–450)
POTASSIUM SERPL-SCNC: 4.2 MMOL/L (ref 3.4–5.3)
RBC # BLD AUTO: 3.99 10E6/UL (ref 4.4–5.9)
SODIUM SERPL-SCNC: 141 MMOL/L (ref 135–145)
WBC # BLD AUTO: 5.7 10E3/UL (ref 4–11)

## 2024-05-05 PROCEDURE — 250N000013 HC RX MED GY IP 250 OP 250 PS 637: Performed by: PODIATRIST

## 2024-05-05 PROCEDURE — 120N000001 HC R&B MED SURG/OB

## 2024-05-05 PROCEDURE — 99232 SBSQ HOSP IP/OBS MODERATE 35: CPT | Performed by: STUDENT IN AN ORGANIZED HEALTH CARE EDUCATION/TRAINING PROGRAM

## 2024-05-05 PROCEDURE — 80048 BASIC METABOLIC PNL TOTAL CA: CPT | Performed by: PODIATRIST

## 2024-05-05 PROCEDURE — 250N000011 HC RX IP 250 OP 636: Performed by: INTERNAL MEDICINE

## 2024-05-05 PROCEDURE — 250N000013 HC RX MED GY IP 250 OP 250 PS 637: Performed by: STUDENT IN AN ORGANIZED HEALTH CARE EDUCATION/TRAINING PROGRAM

## 2024-05-05 PROCEDURE — 85027 COMPLETE CBC AUTOMATED: CPT | Performed by: PODIATRIST

## 2024-05-05 PROCEDURE — 36415 COLL VENOUS BLD VENIPUNCTURE: CPT | Performed by: PODIATRIST

## 2024-05-05 PROCEDURE — 250N000011 HC RX IP 250 OP 636: Performed by: PODIATRIST

## 2024-05-05 PROCEDURE — 99222 1ST HOSP IP/OBS MODERATE 55: CPT | Performed by: INTERNAL MEDICINE

## 2024-05-05 RX ORDER — CEFAZOLIN SODIUM 2 G/100ML
2 INJECTION, SOLUTION INTRAVENOUS EVERY 8 HOURS
Status: DISCONTINUED | OUTPATIENT
Start: 2024-05-05 | End: 2024-05-07 | Stop reason: HOSPADM

## 2024-05-05 RX ADMIN — ATENOLOL 25 MG: 25 TABLET ORAL at 08:28

## 2024-05-05 RX ADMIN — NORTRIPTYLINE HYDROCHLORIDE 25 MG: 25 CAPSULE ORAL at 21:35

## 2024-05-05 RX ADMIN — OXYCODONE HYDROCHLORIDE 5 MG: 5 TABLET ORAL at 18:06

## 2024-05-05 RX ADMIN — OXYCODONE HYDROCHLORIDE 5 MG: 5 TABLET ORAL at 06:54

## 2024-05-05 RX ADMIN — ACETAMINOPHEN 650 MG: 325 TABLET, FILM COATED ORAL at 13:50

## 2024-05-05 RX ADMIN — OXYCODONE HYDROCHLORIDE 5 MG: 5 TABLET ORAL at 22:25

## 2024-05-05 RX ADMIN — ACETAMINOPHEN 650 MG: 325 TABLET, FILM COATED ORAL at 06:54

## 2024-05-05 RX ADMIN — SENNOSIDES AND DOCUSATE SODIUM 2 TABLET: 50; 8.6 TABLET ORAL at 21:35

## 2024-05-05 RX ADMIN — GABAPENTIN 600 MG: 300 CAPSULE ORAL at 21:35

## 2024-05-05 RX ADMIN — CEFAZOLIN SODIUM 2 G: 2 INJECTION, SOLUTION INTRAVENOUS at 09:54

## 2024-05-05 RX ADMIN — VENLAFAXINE HYDROCHLORIDE 150 MG: 150 CAPSULE, EXTENDED RELEASE ORAL at 08:28

## 2024-05-05 RX ADMIN — POLYETHYLENE GLYCOL 3350 17 G: 17 POWDER, FOR SOLUTION ORAL at 21:35

## 2024-05-05 RX ADMIN — ATORVASTATIN CALCIUM 20 MG: 20 TABLET, FILM COATED ORAL at 08:28

## 2024-05-05 RX ADMIN — OXYCODONE HYDROCHLORIDE 5 MG: 5 TABLET ORAL at 13:50

## 2024-05-05 RX ADMIN — ACETAMINOPHEN 650 MG: 325 TABLET, FILM COATED ORAL at 22:25

## 2024-05-05 RX ADMIN — ACETAMINOPHEN 650 MG: 325 TABLET, FILM COATED ORAL at 18:06

## 2024-05-05 RX ADMIN — SENNOSIDES AND DOCUSATE SODIUM 2 TABLET: 50; 8.6 TABLET ORAL at 12:36

## 2024-05-05 RX ADMIN — KETOROLAC TROMETHAMINE 15 MG: 15 INJECTION, SOLUTION INTRAMUSCULAR; INTRAVENOUS at 09:51

## 2024-05-05 RX ADMIN — GABAPENTIN 600 MG: 300 CAPSULE ORAL at 08:28

## 2024-05-05 RX ADMIN — CEFAZOLIN SODIUM 2 G: 2 INJECTION, SOLUTION INTRAVENOUS at 18:06

## 2024-05-05 RX ADMIN — POLYETHYLENE GLYCOL 3350 17 G: 17 POWDER, FOR SOLUTION ORAL at 12:36

## 2024-05-05 RX ADMIN — VANCOMYCIN HYDROCHLORIDE 1000 MG: 1 INJECTION, SOLUTION INTRAVENOUS at 00:41

## 2024-05-05 RX ADMIN — DIAZEPAM 5 MG: 5 TABLET ORAL at 08:34

## 2024-05-05 RX ADMIN — KETOROLAC TROMETHAMINE 15 MG: 15 INJECTION, SOLUTION INTRAMUSCULAR; INTRAVENOUS at 18:46

## 2024-05-05 RX ADMIN — OXYCODONE HYDROCHLORIDE 5 MG: 5 TABLET ORAL at 02:19

## 2024-05-05 RX ADMIN — ACETAMINOPHEN 650 MG: 325 TABLET, FILM COATED ORAL at 02:19

## 2024-05-05 RX ADMIN — BUPRENORPHINE AND NALOXONE 1 FILM: 8; 2 FILM, SOLUBLE BUCCAL; SUBLINGUAL at 08:28

## 2024-05-05 ASSESSMENT — ACTIVITIES OF DAILY LIVING (ADL)
ADLS_ACUITY_SCORE: 20

## 2024-05-05 NOTE — PROGRESS NOTES
[facility]    Medicine Progress Note - Hospitalist Service    Date of Admission:  5/1/2024    Assessment & Plan   Right great toe cellulitis with abscess, associated with diabetic neuropathy  Reactive osteitis, concern for possible osteomyelitis  Hx of osteomyelitis of the right great toe - 12/2021  MRI shows multiple abnormalities including Blisterlike superficial nonenhancing fluid collection upper level of the skin in the medial aspect of the first toe, at the level of the distal phalanx, measuring 3.0 x 1.6 x 0.7 cm. Also noted is moderate bone marrow edema of the distal phalanx of the right 1st toe suggesting reactive osteitis, no overt osteomyelitis.   --follow up blood and abscess cultures  -Podiatry consult appreciated and  underwent I and D and bone biopsy on 5/3/24  -Abscess cultures growing Staph Aurues -MSSA  -ID consult appreciated. Antibiotics changed to cefazolin  -Bone culture and biopsy pending      # Constipation  -Continue bowel regimen      DM2 with diabetic neuropathy  PTA on metformin and Ozempic. Hgb A1C of 6.2 January 2024.   -hold metformin and Ozempic  -sliding scale  -continue PTA gabapentin     Hypertension  Dyslipidemia  -continue PTA atenolol and lipitor     Depression  Anxiety  -continue PTA Effexor and nortriptyline     Chronic right shoulder pain  -continue PTA suboxone          Diet: Moderate Consistent Carb (60 g CHO per Meal) Diet    DVT Prophylaxis: Low Risk/Ambulatory with no VTE prophylaxis indicated  Kennedy Catheter: Not present  Lines: None     Cardiac Monitoring: None  Code Status: Full Code      Clinically Significant Risk Factors                  # Hypertension: Noted on problem list                   Disposition Plan     Medically Ready for Discharge: 1-2 days             Aubrey Ortiz MD  Hospitalist Service  [facility]  Securely message with [employer] (more info)  Text page via [employer] Paging/Directory    ______________________________________________________________________    Interval History   Patient seen and examined at bedside   Eager to go home.  Pain well-controlled.   Physical Exam   Vital Signs: Temp: 98.3  F (36.8  C) Temp src: Oral BP: (!) 141/93 Pulse: 73   Resp: 16 SpO2: 98 % O2 Device: None (Room air)    Weight: 160 lbs 0 oz    Physical Exam  Cardiovascular:      Rate and Rhythm: Normal rate and regular rhythm.      Heart sounds: Normal heart sounds.   Pulmonary:      Effort: No respiratory distress.      Breath sounds: Normal breath sounds. No wheezing.   Abdominal:      Palpations: Abdomen is soft.   Musculoskeletal:      Comments: Right foot covered with dressing            Medical Decision Making       40 MINUTES SPENT BY ME on the date of service doing chart review, history, exam, documentation & further activities per the note.      Data     I have personally reviewed the following data over the past 24 hrs:    5.7  \   11.6 (L)   / 224     141 103 13.9 /  190 (H)   4.2 28 1.01 \       Imaging results reviewed over the past 24 hrs:   No results found for this or any previous visit (from the past 24 hour(s)).

## 2024-05-05 NOTE — PLAN OF CARE
Goal Outcome Evaluation:  Summary:    Diagnosis: Irrigation and Debridement, Right Great Toe  POD#: 2  Mental Status: A&O x4  Activity/dangle: Independent in room  Diet: Mod Carb diet  Pain:Toradol, Tylenol, Oxycodone  Kennedy/Voiding: Voiding adequately in the bathroom  GI: constipation, day 4. Mirilax/senna prn  Tele/Restraints/Iso: N/A  02/LDA: VSS, RA, SL  Lab:   D/C Date: Pending  Other Info: BG Check w/ insulin coverage. R Great Toe dressing change by Podiatry. Scant drainage. Baseline BLE Numbness and tingling.    5/4/2024 [phone]

## 2024-05-05 NOTE — CONSULTS
[facility]    Infectious Disease Consultation     Date of Admission:  5/1/2024  Date of Consult (When I saw the patient): 05/05/24    Assessment & Plan   Phillip Rueda is a 43 year old who was admitted on 5/1/2024.     Impression: 1 43-year-old male with acute right great toe infection, underlying diabetes, primarily abscess probably not osteomyelitis, status post I&D bone biopsy pending initial drain culture and op culture both growing.  Culture Staph aureus  2 mild systemic symptoms and lymphangitis, blood cultures negative  3 penicillin childhood allergy has tolerated cephalosporins  4 diabetes mellitus    REC 1 simplify to Ancef, await final culture and path to direct further, but assuming doing well should be able to treat orally here        Mateus Branch MD    Reason for Consult   Reason for consult: I was asked to evaluate this patient for right great toe infection.    Primary Care Physician   Km Dos Santos MD    Chief Complaint   Right great toe pain    History is obtained from the patient and medical records    History of Present Illness   Phillip Rueda is a 43 year old male who presents with underlying diabetes mellitus, 1 prior diabetic foot infection that was managed with primarily antibiotics, diabetes recently well-controlled, now presents with increasing right great toe pain.  Had some lymphangitis really not major systemic symptoms.  Blood cultures have been negative culture of the toe is growing Staph aureus and pure culture.  He has been on broad antibiotics now being narrowed.  Has had operative intervention not a lot likely osteo with pathology pending    Past Medical History   I have reviewed this patient's medical history and updated it with pertinent information if needed.   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  Past Surgical History:   Procedure Laterality Date    BIOPSY BONE FOOT Right 1/18/2022    Procedure: BIOPSY, BONE, RIGHT GREAT TOE (DISTAL PHALANX);  Surgeon: Shelton Smallwood DPM;  Location: [facility] OR    ORTHOPEDIC SURGERY         Prior to Admission Medications   Prior to Admission Medications   Prescriptions Last Dose Informant Patient Reported? Taking?   Continuous Blood Gluc Sensor (FREESTYLE JHON 14 DAY SENSOR) [facility] [place] No   Sig: Change every 14 days.   OZEMPIC, 0.25 OR 0.5 MG/DOSE, 2 MG/3ML pen 4/30/2024 at TUESDAYS  No Yes   Sig: INJECT 0.5MG UNDER THE SKIN EVERY 7 DAYS   atenolol (TENORMIN) 25 MG tablet 4/30/2024 at PM  No Yes   Sig: Take 1 tablet (25 mg) by mouth daily   atorvastatin (LIPITOR) 20 MG tablet 4/30/2024 at PM  No Yes   Sig: Take 1 tablet (20 mg) by mouth daily   blood glucose (ACCU-CHEK GUIDE) test strip   No No   Sig: USE TO TEST BLOOD SUGAR TWO TIMES A DAY OR AS DIRECTED   blood glucose (NO BRAND SPECIFIED) lancets standard   No No   Sig: Use to test blood sugar 2 times daily or as directed.   blood glucose monitoring (NO BRAND SPECIFIED) meter device kit   No No   Sig: Use to test blood sugar 2 times daily or as directed.   buprenorphine HCl-naloxone HCl (SUBOXONE) 8-2 MG per film 5/1/2024 at AM  Yes Yes   Sig: Place 1 Film under the tongue daily   diazepam (VALIUM) 5 MG tablet  at PRN Self Yes Yes   Sig: Take 1 tablet (5 mg) 3 times daily as needed.   gabapentin (NEURONTIN) 600 MG tablet 5/1/2024 at AM  No Yes   Sig: Take 1 tablet (600 mg) by mouth 2 times daily   metFORMIN (GLUCOPHAGE XR) 500 MG 24 hr tablet 5/1/2024 at AM  No Yes   Sig: Take 2 tablets (1,000 mg) by mouth 2 times daily (with meals)   nortriptyline (PAMELOR) 25 MG capsule  4/30/2024 at PM  No Yes   Sig: Take 1 capsule (25 mg) by mouth At Bedtime   sildenafil (VIAGRA) 100 MG tablet  at PRN  No Yes   Sig: Take 1 tablet (100 mg) by mouth daily as needed   venlafaxine (EFFEXOR-ER) 150 MG TB24 5/1/2024 at AM Self No Yes   Sig: Take 1 tablet by mouth daily (with breakfast).      Facility-Administered Medications: None     Allergies   Allergies   Allergen Reactions    Bees Swelling    Bupropion Hcl Other (See Comments)     seizure    Lisinopril Cough    Penicillins Other (See Comments)     Unable to close mouth  Tolerated cefazolin (12/12/21)       Immunization History   Immunization History   Administered Date(s) Administered    COVID-19 12+ (2023-24) (Pfizer) 01/09/2024    COVID-19 Bivalent 12+ (Pfizer) 11/04/2022    COVID-19 MONOVALENT 12+ (Pfizer) 03/22/2021, 04/12/2021, 12/10/2021    DT (PEDS <7y) 12/12/1979, 07/21/1983    Flu, Unspecified 12/19/2008, 10/04/2010, 10/24/2011, 12/05/2012, 10/30/2013, 12/21/2018, 12/17/2019    Hepatitis B, Peds 12/12/1994, 01/27/1995, 08/17/1995    Influenza Vaccine >6 months,quad, PF 10/22/2014, 09/22/2015, 10/21/2016, 12/21/2018, 12/17/2019, 02/21/2022, 10/26/2022, 01/09/2024    MMR 09/10/1979, 08/20/1991    OPV, trivalent, live 12/12/1979, 07/21/1983    Pneumococcal 20 valent Conjugate (Prevnar 20) 05/31/2023    Pneumococcal 23 valent 03/26/2013, 07/22/2015    TDAP Vaccine (Adacel) 08/20/2008, 08/29/2018    Td (Adult), Adsorbed 08/30/1994       Social History   I have reviewed this patient's social history and updated it with pertinent information if needed. Phillip Rueda  reports that he quit smoking about 10 years ago. His smoking use included cigarettes and dip, chew, snus or snuff. He started smoking about 27 years ago. He has a 4.3 pack-year smoking history. He has quit using smokeless tobacco. He reports that he does not drink alcohol and does not use drugs.    Family History   I have reviewed this patient's family history and updated it with  "pertinent information if needed.   Family History   Problem Relation Age of Onset    Depression Mother     Anxiety Disorder Mother     Substance Abuse Maternal Grandfather     Anxiety Disorder Brother     Glaucoma No family hx of     Macular Degeneration No family hx of        Review of Systems   The 10 point Review of Systems is negative for major systemic symptoms until feels better    Physical Exam   Temp: 97.8  F (36.6  C) Temp src: Oral BP: (!) 143/92 Pulse: 60   Resp: 16 SpO2: 94 % O2 Device: None (Room air)    Vital Signs with Ranges  Temp:  [97.5  F (36.4  C)-97.9  F (36.6  C)] 97.8  F (36.6  C)  Pulse:  [60-68] 60  Resp:  [16] 16  BP: (142-157)/(92-95) 143/92  SpO2:  [94 %-97 %] 94 %  160 lbs 0 oz  Body mass index is 24.33 kg/m .    GENERAL APPEARANCE:  awake  EYES: Eyes grossly normal to inspection  NECK: no adenopathy  RESP: lungs clear   CV: regular rates and rhythm  LYMPHATICS: normal ant/post cervical and supraclavicular nodes  ABDOMEN: soft, nontender  MS: extremities normal  SKIN: no suspicious lesions or rashes            Data   All laboratory and imaging data in the past 24 hours reviewed  No results for input(s): \"CULT\" in the last 168 hours.  Recent Labs   Lab Test 06/24/19  1926   CULT No beta hemolytic Streptococcus Group A isolated          All cultures:  Recent Labs   Lab 05/03/24  1341 05/01/24  2156 05/01/24  2141   CULTURE 1+ Staphylococcus aureus*  No anaerobic organisms isolated after 1 day No anaerobic organisms isolated after 3 days  No growth after 3 days  3+ Staphylococcus aureus* No growth after 3 days      Blood culture:  Results for orders placed or performed during the hospital encounter of 05/01/24   Blood Culture Peripheral Blood    Specimen: Peripheral Blood   Result Value Ref Range    Culture No growth after 3 days    Blood Culture Peripheral Blood    Specimen: Peripheral Blood   Result Value Ref Range    Culture No growth after 3 days    Results for orders placed or " performed during the hospital encounter of 12/12/21   Blood Culture Arm, Left    Specimen: Arm, Left; Blood   Result Value Ref Range    Culture No Growth    Blood Culture Arm, Right    Specimen: Arm, Right; Blood   Result Value Ref Range    Culture No Growth       Urine culture:  No results found for this or any previous visit.

## 2024-05-05 NOTE — PROVIDER NOTIFICATION
MD Notification    Notified Person: MD    Notified Person Name: Haja López    Notification Date/Time: 5/4/2024 8:53 pm    Notification Interaction: Layer web    Purpose of Notification: Pt is requesting Melatonin 10 mg at bedtime.    Orders Received: waiting to hear back or for order.    Comments:

## 2024-05-05 NOTE — PLAN OF CARE
Diagnosis: Irrigation and Debridement, Right Great Toe  POD#: 1  Mental Status: A&O x4  Activity/dangle: Independent in room  Diet: Mod Carb diet  Pain: Managed with PRN Toradol, Tylenol and Oxycodone  Kennedy/Voiding: Voiding adequately in the bathroom  Tele/Restraints/Iso: N/A  02/LDA: Room air. IV Saline locked  D/C Date: Pending  Other Info: BG Check w/ insulin coverage. R Great Toe dressing change by Podiatry. Baseline BLE Numbness and tingling.

## 2024-05-06 LAB
ANION GAP SERPL CALCULATED.3IONS-SCNC: 8 MMOL/L (ref 7–15)
BUN SERPL-MCNC: 15.7 MG/DL (ref 6–20)
CALCIUM SERPL-MCNC: 9.2 MG/DL (ref 8.6–10)
CHLORIDE SERPL-SCNC: 102 MMOL/L (ref 98–107)
CREAT SERPL-MCNC: 1.01 MG/DL (ref 0.67–1.17)
DEPRECATED HCO3 PLAS-SCNC: 31 MMOL/L (ref 22–29)
EGFRCR SERPLBLD CKD-EPI 2021: >90 ML/MIN/1.73M2
ERYTHROCYTE [DISTWIDTH] IN BLOOD BY AUTOMATED COUNT: 12.5 % (ref 10–15)
GLUCOSE BLDC GLUCOMTR-MCNC: 104 MG/DL (ref 70–99)
GLUCOSE BLDC GLUCOMTR-MCNC: 110 MG/DL (ref 70–99)
GLUCOSE BLDC GLUCOMTR-MCNC: 113 MG/DL (ref 70–99)
GLUCOSE BLDC GLUCOMTR-MCNC: 149 MG/DL (ref 70–99)
GLUCOSE BLDC GLUCOMTR-MCNC: 152 MG/DL (ref 70–99)
GLUCOSE SERPL-MCNC: 104 MG/DL (ref 70–99)
HCT VFR BLD AUTO: 35 % (ref 40–53)
HGB BLD-MCNC: 11.6 G/DL (ref 13.3–17.7)
MCH RBC QN AUTO: 29.3 PG (ref 26.5–33)
MCHC RBC AUTO-ENTMCNC: 33.1 G/DL (ref 31.5–36.5)
MCV RBC AUTO: 88 FL (ref 78–100)
PATH REPORT.COMMENTS IMP SPEC: NORMAL
PATH REPORT.FINAL DX SPEC: NORMAL
PATH REPORT.GROSS SPEC: NORMAL
PATH REPORT.MICROSCOPIC SPEC OTHER STN: NORMAL
PATH REPORT.RELEVANT HX SPEC: NORMAL
PHOTO IMAGE: NORMAL
PLATELET # BLD AUTO: 214 10E3/UL (ref 150–450)
POTASSIUM SERPL-SCNC: 4.2 MMOL/L (ref 3.4–5.3)
RBC # BLD AUTO: 3.96 10E6/UL (ref 4.4–5.9)
SODIUM SERPL-SCNC: 141 MMOL/L (ref 135–145)
WBC # BLD AUTO: 5.9 10E3/UL (ref 4–11)

## 2024-05-06 PROCEDURE — 99232 SBSQ HOSP IP/OBS MODERATE 35: CPT | Performed by: INTERNAL MEDICINE

## 2024-05-06 PROCEDURE — 88307 TISSUE EXAM BY PATHOLOGIST: CPT | Mod: 26 | Performed by: PATHOLOGY

## 2024-05-06 PROCEDURE — 99207 PR NO BILLABLE SERVICE THIS VISIT: CPT | Performed by: INTERNAL MEDICINE

## 2024-05-06 PROCEDURE — 85027 COMPLETE CBC AUTOMATED: CPT | Performed by: PODIATRIST

## 2024-05-06 PROCEDURE — 250N000011 HC RX IP 250 OP 636: Performed by: PODIATRIST

## 2024-05-06 PROCEDURE — 250N000013 HC RX MED GY IP 250 OP 250 PS 637: Performed by: PODIATRIST

## 2024-05-06 PROCEDURE — 120N000001 HC R&B MED SURG/OB

## 2024-05-06 PROCEDURE — 99232 SBSQ HOSP IP/OBS MODERATE 35: CPT | Performed by: HOSPITALIST

## 2024-05-06 PROCEDURE — 250N000013 HC RX MED GY IP 250 OP 250 PS 637: Performed by: STUDENT IN AN ORGANIZED HEALTH CARE EDUCATION/TRAINING PROGRAM

## 2024-05-06 PROCEDURE — 88311 DECALCIFY TISSUE: CPT | Mod: 26 | Performed by: PATHOLOGY

## 2024-05-06 PROCEDURE — 80048 BASIC METABOLIC PNL TOTAL CA: CPT | Performed by: PODIATRIST

## 2024-05-06 PROCEDURE — 36415 COLL VENOUS BLD VENIPUNCTURE: CPT | Performed by: PODIATRIST

## 2024-05-06 PROCEDURE — 250N000011 HC RX IP 250 OP 636: Performed by: INTERNAL MEDICINE

## 2024-05-06 RX ADMIN — BUPRENORPHINE AND NALOXONE 1 FILM: 8; 2 FILM, SOLUBLE BUCCAL; SUBLINGUAL at 08:37

## 2024-05-06 RX ADMIN — KETOROLAC TROMETHAMINE 15 MG: 15 INJECTION, SOLUTION INTRAMUSCULAR; INTRAVENOUS at 03:40

## 2024-05-06 RX ADMIN — VENLAFAXINE HYDROCHLORIDE 150 MG: 150 CAPSULE, EXTENDED RELEASE ORAL at 08:37

## 2024-05-06 RX ADMIN — OXYCODONE HYDROCHLORIDE 5 MG: 5 TABLET ORAL at 22:12

## 2024-05-06 RX ADMIN — OXYCODONE HYDROCHLORIDE 5 MG: 5 TABLET ORAL at 13:50

## 2024-05-06 RX ADMIN — CEFAZOLIN SODIUM 2 G: 2 INJECTION, SOLUTION INTRAVENOUS at 09:20

## 2024-05-06 RX ADMIN — GABAPENTIN 600 MG: 300 CAPSULE ORAL at 08:37

## 2024-05-06 RX ADMIN — GABAPENTIN 600 MG: 300 CAPSULE ORAL at 20:36

## 2024-05-06 RX ADMIN — ACETAMINOPHEN 650 MG: 325 TABLET, FILM COATED ORAL at 18:09

## 2024-05-06 RX ADMIN — POLYETHYLENE GLYCOL 3350 17 G: 17 POWDER, FOR SOLUTION ORAL at 08:38

## 2024-05-06 RX ADMIN — ACETAMINOPHEN 650 MG: 325 TABLET, FILM COATED ORAL at 13:50

## 2024-05-06 RX ADMIN — ACETAMINOPHEN 650 MG: 325 TABLET, FILM COATED ORAL at 08:54

## 2024-05-06 RX ADMIN — ATENOLOL 25 MG: 25 TABLET ORAL at 08:37

## 2024-05-06 RX ADMIN — OXYCODONE HYDROCHLORIDE 5 MG: 5 TABLET ORAL at 18:09

## 2024-05-06 RX ADMIN — OXYCODONE HYDROCHLORIDE 5 MG: 5 TABLET ORAL at 08:54

## 2024-05-06 RX ADMIN — KETOROLAC TROMETHAMINE 15 MG: 15 INJECTION, SOLUTION INTRAMUSCULAR; INTRAVENOUS at 18:28

## 2024-05-06 RX ADMIN — CEFAZOLIN SODIUM 2 G: 2 INJECTION, SOLUTION INTRAVENOUS at 03:01

## 2024-05-06 RX ADMIN — SENNOSIDES AND DOCUSATE SODIUM 2 TABLET: 50; 8.6 TABLET ORAL at 22:07

## 2024-05-06 RX ADMIN — SENNOSIDES AND DOCUSATE SODIUM 2 TABLET: 50; 8.6 TABLET ORAL at 08:37

## 2024-05-06 RX ADMIN — OXYCODONE HYDROCHLORIDE 5 MG: 5 TABLET ORAL at 03:07

## 2024-05-06 RX ADMIN — KETOROLAC TROMETHAMINE 15 MG: 15 INJECTION, SOLUTION INTRAMUSCULAR; INTRAVENOUS at 12:18

## 2024-05-06 RX ADMIN — DIAZEPAM 5 MG: 5 TABLET ORAL at 12:18

## 2024-05-06 RX ADMIN — ATORVASTATIN CALCIUM 20 MG: 20 TABLET, FILM COATED ORAL at 08:37

## 2024-05-06 RX ADMIN — CEFAZOLIN SODIUM 2 G: 2 INJECTION, SOLUTION INTRAVENOUS at 18:28

## 2024-05-06 RX ADMIN — ACETAMINOPHEN 650 MG: 325 TABLET, FILM COATED ORAL at 22:12

## 2024-05-06 RX ADMIN — NORTRIPTYLINE HYDROCHLORIDE 25 MG: 25 CAPSULE ORAL at 22:07

## 2024-05-06 ASSESSMENT — ACTIVITIES OF DAILY LIVING (ADL)
ADLS_ACUITY_SCORE: 20

## 2024-05-06 NOTE — PROGRESS NOTES
"Podiatry / Foot and Ankle Surgery Progress Note    May 6, 2024    Subject: Patient was seen at bedside.  He relates that he is overall feeling fine though is having some pain in the surgical site.      Objective:  Vitals: BP (P) 135/87 (BP Location: Right arm)   Pulse (P) 61   Temp (P) 97.7  F (36.5  C) (Oral)   Resp (P) 16   Ht 1.727 m (5' 8\")   Wt 72.6 kg (160 lb)   SpO2 (P) 95%   BMI 24.33 kg/m    BMI= Body mass index is 24.33 kg/m .    General:  Patient is alert and orientated.  NAD.    Neurovascular status is intact  Dressing is clean, dry and intact    Right great toe with skin edges well coapted and sutures intact.  Minimal erythema/edema to the toe.  No continuing cellulitis to the foot.  No malodor, no drainage appreciated.    Cultures: surgical culture-  staph aureus     Pathology : pending    Assessment: 43-year-old male with type 2 diabetes, peripheral neuropathy, hypertension, dyslipidemia, depression/anxiety, chronic right shoulder pain admitted for a right hallux infection and status post right hallux excisional debridement and bone biopsy from the distal phalanx on 5/3/2024.     Surgical site is stable    Plan:  Dressing changed at bedside in a sterile fashion  Continue to follow for results of bone biopsy/culture   Heel weightbearing with surgical shoe  Continue antibiotics per hospital team - Ancef  Will continue to follow          Syeda Rowan DPM  10:49 AM   "

## 2024-05-06 NOTE — PROGRESS NOTES
[facility] PODIATRY/FOOT & ANKLE SURGERY PROGRESS NOTE    Pathology  Final Diagnosis   Right great toe, biopsy:  -Viable appearing bony with fibrous tissue without evidence of active osteomyelitis (no evidence of acute inflammation)     Aerobic Culture: Staph aureus     Assessment: 43-year-old male with type 2 diabetes, peripheral neuropathy, hypertension, dyslipidemia, depression/anxiety, chronic right shoulder pain admitted for a right hallux infection and status post right hallux excisional debridement and bone biopsy from the distal phalanx on 5/3/2024.     Medical Decision Making:   I called and spoke with Phillip and shared the pathology results which are negative for acute osteomyelitis.    However, there is Staph aureus growing in the bone culture.  I explained that I place more weight on the pathology.  Although the bone specimens are handled carefully, there is always risk of cross-contamination when working around a wound.    There is no additional surgery planned from a podiatry standpoint at this time.  Given the negative pathology, I do not think partial toe amputation is needed.  We can take a wait and watch approach with close follow-up.    I explained that he might still be hospitalized for another day or two.  We will want input and antibiotic plan plan from infectious disease.  They might want to wait until the cultures are final.    Phillip is understanding.    Dr. Syeda Kwon and Dr. Ismael Vasquez (on call today) also were made aware of the result, should there be any additional questions.  Dr. Syeda Kwon will follow up tomorrow.     Shelton Smallwood DPM, FACFAS, MS  [facility] Department of Podiatry/Foot & Ankle Surgery

## 2024-05-06 NOTE — PROGRESS NOTES
[facility]    Medicine Progress Note - Hospitalist Service    Date of Admission:  5/1/2024    Assessment & Plan     This is a 43-year-old male with medical history which include diabetes mellitus, depression, anxiety, chronic right shoulder pain who presented to the hospital on 5/1/2024 with chief complaint of right toe pain and diagnosed with right toe cellulitis with abscess with concerns of osteomyelitis      Right great toe cellulitis with abscess status I&D and bone biopsy on 5/3/2024  History of osteomyelitis of the right great toe in 12/2021  -On admission presented after chief complaint of right toe pain  -MRI right toe was consistent with fluid collection measuring 3.0 x 1.6 x 0.7 cm.       , soft tissue edema, reactive osteitis of the distal phalanx of the first toe and chronic atrophy of the intrinsic foot musculature  -Blood cultures from 5/1/2024-negative till date  -Local cultures are growing MSSA  -Bone cultures pending  -ID and podiatry following and dressing was changed by podiatry  -Heel weightbearing with surgical shoe   -await for the final culture data from the bone and based on that treatment plan will be formulated   -If the bone biopsy is positive then will need surgical intervention  -Continue with IV cefazolin for now       DM2 with diabetic neuropathy  PTA on metformin and Ozempic. Hgb A1C of 6.2 January 2024.   -hold metformin and Ozempic  -continue PTA gabapentin   -blood sugars is 104   -Continue with sliding scale insulin    Chronic anemia  -Last hemoglobin on 5/31/2023 was 13.1  -Hemoglobin during the hospital stay have been fluctuating between 11.3-11.6  -Continue to monitor     Hypertension  Dyslipidemia  -continue PTA atenolol and lipitor     Depression  Anxiety  -continue PTA Effexor and nortriptyline     Chronic right shoulder pain  -continue PTA suboxone            Diet: Moderate Consistent Carb (60 g CHO per Meal) Diet    DVT Prophylaxis: Pneumatic  Compression Devices, ambulatory  Kennedy Catheter: Not present  Lines: None     Cardiac Monitoring: None  Code Status: Full Code      Clinically Significant Risk Factors                  # Hypertension: Noted on problem list                   Disposition Plan     Medically Ready for Discharge: Anticipated in 2-4 Days             Tiffanie Colon MD  Hospitalist Service  [facility]  Securely message with [email or web address] (more info)  Text page via [employer] Paging/Directory   ______________________________________________________________________    Interval History     -Reviewed events and no evidence of any fever, chills, nausea, vomiting  -Awaiting bone biopsy results  -Lives in [place] with wife who is a diabetic educator and kids and is musician but not full-time anymore  -No shortness of breath or chest discomfort  -    Physical Exam   Vital Signs: Temp: (P) 97.7  F (36.5  C) Temp src: (P) Oral BP: (P) 135/87 Pulse: (P) 61   Resp: (P) 16 SpO2: (P) 95 % O2 Device: (P) None (Room air)    Weight: 160 lbs 0 oz        General: Patient appears comfortable and in no acute distress.  Respiratory:CTLA  Cardiovascular: Regular rate , S1 and S2 normal with no murmer or rubs or gallops  Abdomen:   soft , non tender , non distended and bowel sound present   Neurologic: No focal deficit  Musculoskeletal: Normal Range of motion over upper and lower extremities bilaterally and the foot has dressing in place  Psychiatric: cooperative      Medical Decision Making       Time spent in care of patient is 45 minutes and I reviewed labs including CBC, basic metabolic panel, reviewed note from the infectious disease team and discussed plan of care in detail with the patient, nursing staff and reviewed notes from the podiatry and ID team and discussed with the case management team     Data     I have personally reviewed the following data over the past 24 hrs:    5.9  \   11.6 (L)   / 214     141 102 15.7 /  104 (H)   4.2 31  (H) 1.01 \       Imaging results reviewed over the past 24 hrs:   No results found for this or any previous visit (from the past 24 hour(s)).

## 2024-05-06 NOTE — PLAN OF CARE
Diagnosis Irrigation and Debridement, Right Great Toe w/ bone biopsy  POD#: 3  Mental Status: A&O x4  Activity/dangle: Independent in room  Diet: Mod Carb diet  Pain: Managed with PRN Toradol, Oxycodone and Tylenol  Kennedy/Voiding: Voiding in the bathroom  Tele/Restraints/Iso: N/A  02/LDA: Room air. IV saline locked  D/C Date: Possible discharge 5/7/24  Other Info: Baseline BLE Neuropathy. Right great toe dressing changed by Podiatry, dressing is CDI. BG Check with insulin coverage.

## 2024-05-06 NOTE — PROGRESS NOTES
[facility]  Infectious Disease Progress Note          Assessment and Plan:   Date of Admission:  5/1/2024  Date of Consult (When I saw the patient): 05/05/24        Assessment & Plan  Phillip Rueda is a 43 year old who was admitted on 5/1/2024.      Impression: 1 43-year-old male with acute right great toe infection, underlying diabetes, primarily abscess probably not osteomyelitis, status post I&D bone biopsy pending initial drain culture and op culture both growing.  Culture Staph aureus  2 mild systemic symptoms and lymphangitis, blood cultures negative  3 penicillin childhood allergy has tolerated cephalosporins  4 diabetes mellitus     REC 1 Continue Ancef, await final culture and path to direct further, but assuming doing well should be able to treat orally here   waiting for pathology, if he does go would do Keflex 500 4 times daily for 10 days, if path positive plan is further surgical intervention           Interval History:     no new complaints and doing well;  path pending, all cultures so far.  Culture Staph aureus likely the only and main pathogen here              Medications:     Current Facility-Administered Medications   Medication Dose Route Frequency Provider Last Rate Last Admin    atenolol (TENORMIN) tablet 25 mg  25 mg Oral Daily Shelton Smallwood DPM   25 mg at 05/06/24 0837    atorvastatin (LIPITOR) tablet 20 mg  20 mg Oral Daily Shelton Smallwood DPM   20 mg at 05/06/24 0837    buprenorphine HCl-naloxone HCl (SUBOXONE) 8-2 MG per film 1 Film  1 Film Sublingual Daily Aubrey Ortiz MD   1 Film at 05/06/24 0837    ceFAZolin (ANCEF) 2 g in 100 mL D5W intermittent infusion  2 g Intravenous Q8H Mateus Branch  mL/hr at 05/06/24 0920 2 g at 05/06/24 0920    gabapentin (NEURONTIN) capsule 600 mg  600 mg Oral BID Shelton Smallwood DPM   600 mg at 05/06/24 0837    insulin aspart (NovoLOG) injection (RAPID ACTING)  1-7 Units Subcutaneous TID AC  "Shelton Smallwood DPM   2 Units at 05/05/24 1350    insulin aspart (NovoLOG) injection (RAPID ACTING)  1-5 Units Subcutaneous At Bedtime Shelton Smallwood DPM   1 Units at 05/04/24 2208    nortriptyline (PAMELOR) capsule 25 mg  25 mg Oral At Bedtime Shelton Smallwood DPM   25 mg at 05/05/24 2135    sodium chloride (PF) 0.9% PF flush 3 mL  3 mL Intracatheter Q8H Shelton Smallwood DPM   3 mL at 05/06/24 0301    venlafaxine (EFFEXOR XR) 24 hr capsule 150 mg  150 mg Oral Daily with breakfast Shelton Smallwood DPM   150 mg at 05/06/24 0837                  Physical Exam:   Blood pressure (P) 135/87, pulse (P) 61, temperature (P) 97.7  F (36.5  C), temperature source (P) Oral, resp. rate (P) 16, height 1.727 m (5' 8\"), weight 72.6 kg (160 lb), SpO2 (P) 95%.  Wt Readings from Last 2 Encounters:   05/01/24 72.6 kg (160 lb)   01/09/24 77.3 kg (170 lb 6.4 oz)     Vital Signs with Ranges  Temp:  [97.7  F (36.5  C)-98.3  F (36.8  C)] (P) 97.7  F (36.5  C)  Pulse:  [61-73] (P) 61  Resp:  [16] (P) 16  BP: (134-141)/(89-93) (P) 135/87  SpO2:  [95 %-98 %] (P) 95 %    Constitutional: Awake, alert, cooperative, no apparent distress     Lungs: Clear to auscultation bilaterally, no crackles or wheezing   Cardiovascular: Regular rate and rhythm, normal S1 and S2, and no murmur noted   Abdomen: Normal bowel sounds, soft, non-distended, non-tender   Skin: No rashes, no cyanosis, no edema   coapted no major proximal cellulitis   Other:           Data:   All microbiology laboratory data reviewed.  Recent Labs   Lab Test 05/06/24  0742 05/05/24  0814 05/04/24  0737   WBC 5.9 5.7 7.5   HGB 11.6* 11.6* 11.8*   HCT 35.0* 35.5* 35.6*   MCV 88 89 88    224 231     Recent Labs   Lab Test 05/06/24  0742 05/05/24  0814 05/04/24  0737   CR 1.01 1.01 0.99     Recent Labs   Lab Test 12/13/21  0026   SED 11     Recent Labs   Lab Test 06/24/19  1926   CULT No beta hemolytic Streptococcus Group A isolated        "

## 2024-05-06 NOTE — UTILIZATION REVIEW
Admission Status; Secondary Review Determination    Under the authority of the Utilization Management Committee, the utilization review process indicated a secondary review on the above patient. The review outcome is based on review of the medical records, discussions with staff, and applying clinical experience noted on the date of the review.    (x) Inpatient Status Appropriate - This patient's medical care is consistent with medical management for inpatient care and reasonable inpatient medical practice.    RATIONALE FOR DETERMINATION: 43-year-old male with significant history of type 2 diabetes complicated with neuropathy who presented to hospital due to right toe pain with associated swelling and erythema along with red streaks ascending from his foot.  Patient has prior history of toe/foot infections in the past with possible osteomyelitis of the right foot in 2021.  MRI revealed reactive osteitis with a fluid filled blister that was excised and drained with purulent material.  With patient's diabetes and history of osteomyelitis in the past patient admitted to the hospital for IV antibiotics with initial wound cultures positive for staph aureus.  On hospital day 3 due to patient's aggressive Staph aureus infection and prior osteomyelitis patient underwent bone biopsy of the distal phalanx of the right hallux along with excisional debridement to the depth of deeper skin of the right hallux.  Due to the risk of progressive infection, patient requiring IV antibiotics per infectious disease and podiatry recommendations until the bone biopsy culture results are available thus requiring an more extensive hospital management of a diabetic foot infection with prior history of osteomyelitis appropriate for inpatient care.    At the time of admission with the information available to the attending physician more than 2 nights Hospital complex care was anticipated, based on patient risk of adverse outcome if treated  as outpatient and complex care required. Inpatient admission is appropriate based on the Medicare guidelines.    This document was produced using voice recognition software    The information on this document is developed by the utilization review team in order for the business office to ensure compliance. This only denotes the appropriateness of proper admission status and does not reflect the quality of care rendered.    The definitions of Inpatient Status and Observation Status used in making the determination above are those provided in the CMS Coverage Manual, Chapter 1 and Chapter 6, section 70.4.    Sincerely,    Andrew San MD  Utilization Review  Physician Advisor  Health system.

## 2024-05-06 NOTE — PLAN OF CARE
Goal Outcome Evaluation:  4220-2100  Patient vital signs are at baseline: Yes  Patient able to ambulate as they were prior to admission or with assist devices provided by therapies during their stay:  Yes ind in room  Patient MUST void prior to discharge:  Yes  Patient able to tolerate oral intake:  Yes  Pain has adequate pain control using Oral analgesics:  Yes, ex PRN Toradol and Oxy  Does patient have an identified :  Yes  Has goal D/C date and time been discussed with patient:  Yes TBD    AO x4. POD 3 of I and D of R great toe. Dressing CDI. BLE numbness. BS checks. PIV SL. Awaiting bone biopsy results.

## 2024-05-06 NOTE — PLAN OF CARE
Diagnosis Irrigation and Debridement, Right Great Toe. W/ bone biopsy  POD#: 2  Mental Status: A&O x4  Activity/dangle: Independent in room  Diet: Mod Carb diet  Pain: Managed with PRN Toradol, Oxycodone and Tylenol  Kennedy/Voiding: Voiding in the bathroom  Tele/Restraints/Iso: N/A  02/LDA: Room air. IV saline locked  D/C Date: Pending cultures  Other Info: Baseline BLE numbness and tingling. Right great toe dressing changed per order by resource nurse. BG Check with insulin coverage.

## 2024-05-07 VITALS
HEIGHT: 68 IN | HEART RATE: 72 BPM | WEIGHT: 160 LBS | OXYGEN SATURATION: 98 % | RESPIRATION RATE: 16 BRPM | SYSTOLIC BLOOD PRESSURE: 131 MMHG | TEMPERATURE: 98 F | BODY MASS INDEX: 24.25 KG/M2 | DIASTOLIC BLOOD PRESSURE: 85 MMHG

## 2024-05-07 LAB
ANION GAP SERPL CALCULATED.3IONS-SCNC: 11 MMOL/L (ref 7–15)
BACTERIA BLD CULT: NO GROWTH
BACTERIA BLD CULT: NO GROWTH
BUN SERPL-MCNC: 18 MG/DL (ref 6–20)
CALCIUM SERPL-MCNC: 9.3 MG/DL (ref 8.6–10)
CHLORIDE SERPL-SCNC: 101 MMOL/L (ref 98–107)
CREAT SERPL-MCNC: 1.01 MG/DL (ref 0.67–1.17)
DEPRECATED HCO3 PLAS-SCNC: 26 MMOL/L (ref 22–29)
EGFRCR SERPLBLD CKD-EPI 2021: >90 ML/MIN/1.73M2
ERYTHROCYTE [DISTWIDTH] IN BLOOD BY AUTOMATED COUNT: 12.4 % (ref 10–15)
GLUCOSE BLDC GLUCOMTR-MCNC: 81 MG/DL (ref 70–99)
GLUCOSE BLDC GLUCOMTR-MCNC: 89 MG/DL (ref 70–99)
GLUCOSE SERPL-MCNC: 89 MG/DL (ref 70–99)
HCT VFR BLD AUTO: 36.4 % (ref 40–53)
HGB BLD-MCNC: 12.1 G/DL (ref 13.3–17.7)
MCH RBC QN AUTO: 29.4 PG (ref 26.5–33)
MCHC RBC AUTO-ENTMCNC: 33.2 G/DL (ref 31.5–36.5)
MCV RBC AUTO: 88 FL (ref 78–100)
PLATELET # BLD AUTO: 247 10E3/UL (ref 150–450)
POTASSIUM SERPL-SCNC: 4.2 MMOL/L (ref 3.4–5.3)
RBC # BLD AUTO: 4.12 10E6/UL (ref 4.4–5.9)
SODIUM SERPL-SCNC: 138 MMOL/L (ref 135–145)
WBC # BLD AUTO: 5.6 10E3/UL (ref 4–11)

## 2024-05-07 PROCEDURE — 250N000011 HC RX IP 250 OP 636: Performed by: INTERNAL MEDICINE

## 2024-05-07 PROCEDURE — 99207 PR NO BILLABLE SERVICE THIS VISIT: CPT | Performed by: HOSPITALIST

## 2024-05-07 PROCEDURE — 80048 BASIC METABOLIC PNL TOTAL CA: CPT | Performed by: PODIATRIST

## 2024-05-07 PROCEDURE — 85027 COMPLETE CBC AUTOMATED: CPT | Performed by: PODIATRIST

## 2024-05-07 PROCEDURE — 36415 COLL VENOUS BLD VENIPUNCTURE: CPT | Performed by: PODIATRIST

## 2024-05-07 PROCEDURE — 99232 SBSQ HOSP IP/OBS MODERATE 35: CPT | Performed by: INTERNAL MEDICINE

## 2024-05-07 PROCEDURE — 250N000013 HC RX MED GY IP 250 OP 250 PS 637: Performed by: PODIATRIST

## 2024-05-07 PROCEDURE — 250N000013 HC RX MED GY IP 250 OP 250 PS 637: Performed by: STUDENT IN AN ORGANIZED HEALTH CARE EDUCATION/TRAINING PROGRAM

## 2024-05-07 PROCEDURE — 99239 HOSP IP/OBS DSCHRG MGMT >30: CPT | Performed by: HOSPITALIST

## 2024-05-07 RX ORDER — CEPHALEXIN 500 MG/1
500 CAPSULE ORAL 4 TIMES DAILY
Qty: 40 CAPSULE | Refills: 0 | Status: SHIPPED | OUTPATIENT
Start: 2024-05-07 | End: 2024-05-17

## 2024-05-07 RX ORDER — ACETAMINOPHEN 325 MG/1
650 TABLET ORAL EVERY 6 HOURS PRN
Qty: 30 TABLET | Refills: 0 | Status: SHIPPED | OUTPATIENT
Start: 2024-05-07

## 2024-05-07 RX ADMIN — ACETAMINOPHEN 650 MG: 325 TABLET, FILM COATED ORAL at 16:25

## 2024-05-07 RX ADMIN — ACETAMINOPHEN 650 MG: 325 TABLET, FILM COATED ORAL at 11:07

## 2024-05-07 RX ADMIN — OXYCODONE HYDROCHLORIDE 5 MG: 5 TABLET ORAL at 11:07

## 2024-05-07 RX ADMIN — ATENOLOL 25 MG: 25 TABLET ORAL at 08:19

## 2024-05-07 RX ADMIN — ATORVASTATIN CALCIUM 20 MG: 20 TABLET, FILM COATED ORAL at 08:19

## 2024-05-07 RX ADMIN — BUPRENORPHINE AND NALOXONE 1 FILM: 8; 2 FILM, SOLUBLE BUCCAL; SUBLINGUAL at 08:19

## 2024-05-07 RX ADMIN — CEFAZOLIN SODIUM 2 G: 2 INJECTION, SOLUTION INTRAVENOUS at 10:59

## 2024-05-07 RX ADMIN — GABAPENTIN 600 MG: 300 CAPSULE ORAL at 08:19

## 2024-05-07 RX ADMIN — VENLAFAXINE HYDROCHLORIDE 150 MG: 150 CAPSULE, EXTENDED RELEASE ORAL at 08:19

## 2024-05-07 RX ADMIN — CEFAZOLIN SODIUM 2 G: 2 INJECTION, SOLUTION INTRAVENOUS at 02:50

## 2024-05-07 ASSESSMENT — ACTIVITIES OF DAILY LIVING (ADL)
ADLS_ACUITY_SCORE: 20

## 2024-05-07 NOTE — PLAN OF CARE
Date/Time: 05/07: 5435-2088    Trauma/Ortho/Medical (Choose one): Ortho    Diagnosis: I and D right great toe with bone bx  POD#:4  Mental Status: Oriented x 4  Activity/dangle: Up independent  Diet: Mod CHO, didn't require insulin coverage during meals  Pain: Managed with tylenol and oxycodone  Kennedy/Voiding: Bathroom  Tele/Restraints/Iso: None  02/LDA: RA. IV D/C'd  D/C Date: 05/07 to home with wife  Other Info: Dressing on (R) foot CDI. Has surgical shoe. Discharge instruction reviewed with pt/wife and all questions answered. Discharging to home. Meds and belongings sent along

## 2024-05-07 NOTE — DISCHARGE SUMMARY
[facility]  Hospitalist Discharge Summary      Date of Admission:  5/1/2024  Date of Discharge:  5/7/2024  Discharging Provider: Tiffanie Colon MD  Discharge Service: Hospitalist Service    Discharge Diagnoses     Right great toe cellulitis with abscess status I&D and bone biopsy on 5/3/2024  History of osteomyelitis of the right great toe in 12/2021  Diabetes mellitus with diabetic neuropathy  Chronic anemia  Hypertension  Dyslipidemia  Depression  Anxiety    Clinically Significant Risk Factors          Follow-ups Needed After Discharge   Follow-up Appointments     Follow-up and recommended labs and tests       *PLEASE CALL [phone] TO SCHEDULE*  Follow up in 1-2 weeks  --------------------------------------------------------------------------  [facility] SPECIAL[facility]  [address]  [place] [address] [place] [address]  [phone]  [phone]     [address] [place] [address] [address]  [place] [address] [place] [address]  [phone]  [phone]    Dr. Brenda Martinez - [place]  Dr. Leah Campuzano - [place]  Dr. Ismael Vasquez - [place]  Dr. Shelton Smallwood - [facility] / [place]        Follow-up and recommended labs and tests       Follow up with primary care provider, Km Dos Santos MD, within   7 days for hospital follow- up.  The following labs/tests are recommended:   CBC and basic metabolic.    Follow-up with podiatry in 1-2        {Additional follow-up instructions/to-do's for PCP        Unresulted Labs Ordered in the Past 30 Days of this Admission       Date and Time Order Name Status Description    5/3/2024  1:42 PM Anaerobic Bacterial Culture Routine Preliminary     5/1/2024  8:19 PM Anaerobic Bacterial Culture Routine Preliminary         These results will be followed up by      Discharge Disposition   Discharged to home  Condition at discharge: Stable    Hospital Course     This is a 43-year-old male with medical history which include diabetes mellitus, depression, anxiety, chronic right shoulder pain who presented to the hospital on 5/1/2024 with chief complaint of right toe pain and diagnosed with right toe cellulitis with abscess with concerns of osteomyelitis status I&D and bone biopsy on 5/3/2024        Right great toe cellulitis with abscess status I&D and bone biopsy on 5/3/2024  History of osteomyelitis of the right great toe in 12/2021  -On admission presented after chief complaint of right toe pain  -MRI right toe was consistent with fluid collection measuring 3.0 x 1.6 x 0.7 cm.  , soft tissue edema, reactive osteitis of the distal phalanx of the first toe and chronic atrophy of the intrinsic foot musculature  -Blood cultures from 5/1/2024-negative till date  -Local cultures are growing MSSA  -Bone cultures pathology showed fibrous tissue without evidence of active osteomyelitis  -Per podiatry there is no further need for partial toe amputation and will need to be followed closely as outpatient and they change dressing in clinic and close follow-up in 1 to 2-week and discharge instruction provided as far as weightbearing, activity is concerned  -Per ID patient will be discharged on Keflex 500 mg four times a day for 10 days  -Discussed with patient and local pain is controlled with current Suboxone and as needed Tylenol he does not want any other pain medications  -On day of discharge he was in agreement with going home with close follow-up with PCP and podiatry        DM2 with diabetic neuropathy  PTA on metformin and Ozempic. Hgb A1C of 6.2 January 2024.   -hold metformin and Ozempic  -continue PTA gabapentin   -blood sugars is 89  -Continue with sliding scale insulin     Chronic anemia  -Last hemoglobin on 5/31/2023 was 13.1  -Hemoglobin during the hospital stay have been  fluctuating between 11.3-11.6  -Hemoglobin is stable at 12.1 on 5/7/2024  -Continue to monitor     Hypertension  Dyslipidemia  -continue PTA atenolol and lipitor     Depression  Anxiety  -continue PTA Effexor and nortriptyline     Chronic right shoulder pain  -continue PTA suboxone      Consultations This Hospital Stay   PHARMACY TO DOSE VANCO  PODIATRY IP CONSULT  PHARMACY TO DOSE VANCO  ORTHOSIS EXTREMITY LOWER REFERRAL IP CONSULT  INFECTIOUS DISEASES IP CONSULT    Code Status   Full Code    Time Spent on this Encounter   I, Tiffanie Colon MD, personally saw the patient today and spent greater than 30 minutes discharging this patient.       Tiffanie Colon MD  [facility] ORTHOPED[facility]  [address] [facility] [address]  Phone: [phone]  Fax: [phone]  ______________________________________________________________________    Physical Exam   Vital Signs: Temp: 97.7  F (36.5  C) Temp src: Oral BP: 129/89 Pulse: 70   Resp: 16 SpO2: 94 % O2 Device: None (Room air)    Weight: 160 lbs 0 oz    General: aox3  Respiratory:CTLA  Cardiovascular: Regular rate , S1 and S2 normal   Abdomen:   soft ,   Neurologic: No focal deficit  Musculoskeletal: Normal Range of motion over upper and lower extremities bilaterally and the foot has dressing in place  Psychiatric: cooperative           Primary Care Physician   Km Dos Santos MD    Discharge Orders      Follow-up and recommended labs and tests     *PLEASE CALL [phone] TO SCHEDULE*  Follow up in 1-2 weeks  --------------------------------------------------------------------------  [facility] [place] [facility]  [address]  [place] [address] [place] [address] [phone]  [phone]     [place] [address]  [place] [address] [place] [address]  [phone]  [phone]  [phone]  [phone]    Dr. Brenda Martinez - [place]  Dr. Leah Campuzano - [place]  Dr. Ismael Vasquez - [place]  Dr. Shelton Smallwood - [facility] / [place]     Activity    Your activity upon discharge: Weightbearing to heel in surgical shoe     Wound care and dressings    Instructions to care for your wound at home:   Keep dressing clean, dry and intact.  It will be changed in clinic.  Contact the office if there are any issues     Reason for your hospital stay    Cellulitis and abscess of the foot     Follow-up and recommended labs and tests     Follow up with primary care provider, Km Dos Santos MD, within 7 days for hospital follow- up.  The following labs/tests are recommended: CBC and basic metabolic.    Follow-up with podiatry in 1-2     Diet    Follow this diet upon discharge: Orders Placed This Encounter      Moderate Consistent Carb (60 g CHO per Meal) Diet       Significant Results and Procedures   Most Recent 3 CBC's:  Recent Labs   Lab Test 05/07/24  0724 05/06/24  0742 05/05/24  0814   WBC 5.6 5.9 5.7   HGB 12.1* 11.6* 11.6*   MCV 88 88 89    214 224     Most Recent 3 BMP's:  Recent Labs   Lab Test 05/07/24  1152 05/07/24  0724 05/07/24  0211 05/06/24  1309 05/06/24  0742 05/05/24  1314 05/05/24  0814   NA  --  138  --   --  141  --  141   POTASSIUM  --  4.2  --   --  4.2  --  4.2   CHLORIDE  --  101  --   --  102  --  103   CO2  --  26  --   --  31*  --  28   BUN  --  18.0  --   --  15.7  --  13.9   CR  --  1.01  --   --  1.01  --  1.01   ANIONGAP  --  11  --   --  8  --  10   ARON  --  9.3  --   --  9.2  --  9.3   GLC 81 89 89   < > 104*   < > 122*    < > = values in this interval not displayed.     Most Recent 2 LFT's:  Recent Labs   Lab Test 05/01/24  1630 05/31/23  1422   AST 20 25   ALT 13 9*   ALKPHOS 148 105   BILITOTAL 0.5 0.5     Most Recent 3 INR's:  Recent Labs   Lab Test 11/02/20  1354   INR 0.88     Most Recent INR's and Anticoagulation Dosing  History:  Anticoagulation Dose History          Latest Ref Rng & Units 11/2/2020   Recent Dosing and Labs   INR 0.86 - 1.14 0.88      Most Recent 3 Creatinines:  Recent Labs   Lab Test 05/07/24  0724 05/06/24  0742 05/05/24  0814   CR 1.01 1.01 1.01     Most Recent 3 Hemoglobins:  Recent Labs   Lab Test 05/07/24  0724 05/06/24  0742 05/05/24  0814   HGB 12.1* 11.6* 11.6*   ,   Results for orders placed or performed during the hospital encounter of 05/01/24   MR Toe Right w/o & w Contrast    Narrative    EXAM: MR TOE RIGHT W/O and W CONTRAST  LOCATION: [facility]  DATE: 5/1/2024    INDICATION: Right first toe pain, redness, and swelling. History of diabetes.  COMPARISON: 1/18/2022.  TECHNIQUE: Routine. Additional postgadolinium T1 sequences were obtained.  IV CONTRAST: 7mL Gadavist    FINDINGS:    Cystic fluid collection in the skin along the medial aspect of the distal first toe, appearance suggestive of a blister, measuring 3 x 1.6 x 0.7 cm.     Additional soft tissue edema and postcontrast enhancement along the dorsal aspect of the first toe; this is nonspecific, could represent cellulitis in the appropriate clinical context. No abscess. No phlegmonous change or sinus tract.    No somewhat heterogeneous thickening and enhancement of the soft tissues in the subungual aspect of the great toe is not significantly changed from December 2021, of doubtful acute clinical significance.    Mild edema-like T2 signal intensity within the distal phalanx of the first toe, nonspecific, most compatible with reactive osteitis in the setting of cellulitis. No overt osteomyelitis.     No evidence of septic arthritis at the PIP joint.     No reactive osteitis or osteomyelitis in the proximal phalanx, or elsewhere first toe. Previously seen abnormal bone marrow signal in the distal phalanx of the second toe now appears normal. No joint effusion or septic arthritis. No tenosynovitis.    Mild nonenhancing  edema in the dorsum of the midfoot and within the musculature of the midfoot, common findings in patients with diabetes, of doubtful acute significance.    Moderate chronic atrophy of the intrinsic foot musculature.    Capsular and collateral ligaments of the MTP joints are intact. Tendons are intact without tearing or tendinopathy. No tenosynovitis.      Impression    IMPRESSION:  1.  Blisterlike superficial nonenhancing fluid collection upper level of the skin in the medial aspect of the first toe, at the level of the distal phalanx, measuring 3.0 x 1.6 x 0.7 cm.    2.  Nonspecific soft tissue edema and postcontrast enhancement in the distal aspect of the toe suggests cellulitis. No phlegmon or abscess.    3.  Moderate bone marrow edema in the distal phalanx of the first toe, suggests reactive osteitis. No overt osteomyelitis. No evidence of septic arthritis or septic tenosynovitis.    4.  Previously seen abnormal bone marrow signal in the distal phalanx of the second toe has resolved. The second toe bones and joints appear normal.    5.  Chronic atrophy of the intrinsic foot musculature, and edema-like T2 signal intensity within the muscles in the superficial soft tissues over the dorsum of the foot, within expected limits for diabetic change.   XR Surgery ELOISA L/T 5 Min Fluoro w Stills    Narrative    SURGERY C-ARM FLUOROSCOPY LESS THAN 5 MINUTES WITH STILLS 5/3/2024  2:25 PM     HISTORY: Cellulitis of great toe right foot mini C-arm 1    NUMBER OF IMAGES ACQUIRED: 2    VIEWS: 2    FLUOROSCOPY TIME: 0.3 minute      Impression    IMPRESSION: Two intraoperative fluoroscopic spot images are provided  for review during the patient's operation located at the great toe  distal phalanx. Total fluoroscopy time 15 seconds. See operative  summary if complete details are needed.     LESA STEEN MD         SYSTEM ID:  [id number]       Discharge Medications   Current Discharge Medication List        START taking these  medications    Details   acetaminophen (TYLENOL) 325 MG tablet Take 2 tablets (650 mg) by mouth every 6 hours as needed for mild pain or other  Qty: 30 tablet, Refills: 0    Associated Diagnoses: Cellulitis of great toe of right foot; Abscess, toe, right      cephALEXin (KEFLEX) 500 MG capsule Take 1 capsule (500 mg) by mouth 4 times daily for 10 days  Qty: 40 capsule, Refills: 0    Associated Diagnoses: Cellulitis of great toe of right foot; Abscess, toe, right           CONTINUE these medications which have NOT CHANGED    Details   atenolol (TENORMIN) 25 MG tablet Take 1 tablet (25 mg) by mouth daily  Qty: 90 tablet, Refills: 1    Associated Diagnoses: Hypertension goal BP (blood pressure) < 140/90      atorvastatin (LIPITOR) 20 MG tablet Take 1 tablet (20 mg) by mouth daily  Qty: 90 tablet, Refills: 1    Associated Diagnoses: Hypercholesteremia      buprenorphine HCl-naloxone HCl (SUBOXONE) 8-2 MG per film Place 1 Film under the tongue daily      diazepam (VALIUM) 5 MG tablet Take 1 tablet (5 mg) 3 times daily as needed.  Qty: 60 tablet      gabapentin (NEURONTIN) 600 MG tablet Take 1 tablet (600 mg) by mouth 2 times daily    Associated Diagnoses: Chronic right shoulder pain; Chronic pain of left ankle      metFORMIN (GLUCOPHAGE XR) 500 MG 24 hr tablet Take 2 tablets (1,000 mg) by mouth 2 times daily (with meals)  Qty: 360 tablet, Refills: 1    Associated Diagnoses: Type 2 diabetes mellitus without complication, without long-term current use of insulin (H)      nortriptyline (PAMELOR) 25 MG capsule Take 1 capsule (25 mg) by mouth At Bedtime    Associated Diagnoses: Neuralgic amyotrophy      OZEMPIC, 0.25 OR 0.5 MG/DOSE, 2 MG/3ML pen INJECT 0.5MG UNDER THE SKIN EVERY 7 DAYS  Qty: 3 mL, Refills: 1    Associated Diagnoses: Type 2 diabetes mellitus without complication, without long-term current use of insulin (H)      sildenafil (VIAGRA) 100 MG tablet Take 1 tablet (100 mg) by mouth daily as needed  Qty: 6 tablet,  Refills: 11    Associated Diagnoses: Erectile dysfunction, unspecified erectile dysfunction type      venlafaxine (EFFEXOR-ER) 150 MG TB24 Take 1 tablet by mouth daily (with breakfast).  Qty: 30 each, Refills: 0    Associated Diagnoses: Anxiety      blood glucose (ACCU-CHEK GUIDE) test strip USE TO TEST BLOOD SUGAR TWO TIMES A DAY OR AS DIRECTED  Qty: 200 strip, Refills: 1    Associated Diagnoses: Type 2 diabetes mellitus without complication, without long-term current use of insulin (H)      blood glucose (NO BRAND SPECIFIED) lancets standard Use to test blood sugar 2 times daily or as directed.  Qty: 200 each, Refills: 3    Associated Diagnoses: Type 2 diabetes mellitus without complication, without long-term current use of insulin (H)      blood glucose monitoring (NO BRAND SPECIFIED) meter device kit Use to test blood sugar 2 times daily or as directed.  Qty: 1 kit, Refills: 0    Associated Diagnoses: Type 2 diabetes mellitus without complication, without long-term current use of insulin (H)      Continuous Blood Gluc Sensor (FREESTYLE JHON 14 DAY SENSOR) MISC Change every 14 days.  Qty: 2 each, Refills: 5    Associated Diagnoses: Type 2 diabetes mellitus without complication, without long-term current use of insulin (H)           Allergies   Allergies   Allergen Reactions    Bees Swelling    Bupropion Hcl Other (See Comments)     seizure    Lisinopril Cough    Penicillins Other (See Comments)     Unable to close mouth  Tolerated cefazolin (12/12/21)

## 2024-05-07 NOTE — PROGRESS NOTES
[facility]  Infectious Disease Progress Note          Assessment and Plan:   Date of Admission:  5/1/2024  Date of Consult (When I saw the patient): 05/05/24        Assessment & Plan  Phillip Rueda is a 43 year old who was admitted on 5/1/2024.      Impression: 1 43-year-old male with acute right great toe infection, underlying diabetes, primarily abscess probably not osteomyelitis, status post I&D bone biopsy pending initial drain culture and op culture both growing.  Culture Staph aureus  2 mild systemic symptoms and lymphangitis, blood cultures negative  3 penicillin childhood allergy has tolerated cephalosporins  4 diabetes mellitus     REC 1  path negative and cultures are the same, okay disposition on Keflex 500 4 times daily for 10 days,            Interval History:     no new complaints and doing well;  path neg all cultures  Staph aureus likely the only and main pathogen here              Medications:     Current Facility-Administered Medications   Medication Dose Route Frequency Provider Last Rate Last Admin    atenolol (TENORMIN) tablet 25 mg  25 mg Oral Daily Shelton Smallwood DPM   25 mg at 05/07/24 0819    atorvastatin (LIPITOR) tablet 20 mg  20 mg Oral Daily Shelton Smallwood DPM   20 mg at 05/07/24 0819    buprenorphine HCl-naloxone HCl (SUBOXONE) 8-2 MG per film 1 Film  1 Film Sublingual Daily Aubrey Ortiz MD   1 Film at 05/07/24 0819    ceFAZolin (ANCEF) 2 g in 100 mL D5W intermittent infusion  2 g Intravenous Q8H Mateus Branch  mL/hr at 05/07/24 0250 2 g at 05/07/24 0250    gabapentin (NEURONTIN) capsule 600 mg  600 mg Oral BID Shelton Smallwood DPM   600 mg at 05/07/24 0819    insulin aspart (NovoLOG) injection (RAPID ACTING)  1-7 Units Subcutaneous TID AC Shelton Smallwood DPM   1 Units at 05/06/24 1345    insulin aspart (NovoLOG) injection (RAPID ACTING)  1-5 Units Subcutaneous At Bedtime Shelton Smallwood DPM   1 Units at 05/04/24 2208  "   nortriptyline (PAMELOR) capsule 25 mg  25 mg Oral At Bedtime Shelton Smallwood DPM   25 mg at 05/06/24 2207    sodium chloride (PF) 0.9% PF flush 3 mL  3 mL Intracatheter Q8H Shelton Smallwood DPM   3 mL at 05/07/24 0250    venlafaxine (EFFEXOR XR) 24 hr capsule 150 mg  150 mg Oral Daily with breakfast Shelton Smallwood DPM   150 mg at 05/07/24 0819                  Physical Exam:   Blood pressure 129/89, pulse 70, temperature 97.7  F (36.5  C), temperature source Oral, resp. rate 16, height 1.727 m (5' 8\"), weight 72.6 kg (160 lb), SpO2 94%.  Wt Readings from Last 2 Encounters:   05/01/24 72.6 kg (160 lb)   01/09/24 77.3 kg (170 lb 6.4 oz)     Vital Signs with Ranges  Temp:  [97.7  F (36.5  C)-98.6  F (37  C)] 97.7  F (36.5  C)  Pulse:  [66-70] 70  Resp:  [16] 16  BP: (129-168)/() 129/89  SpO2:  [94 %-98 %] 94 %    Constitutional: Awake, alert, cooperative, no apparent distress     Lungs: Clear to auscultation bilaterally, no crackles or wheezing   Cardiovascular: Regular rate and rhythm, normal S1 and S2, and no murmur noted   Abdomen: Normal bowel sounds, soft, non-distended, non-tender   Skin: No rashes, no cyanosis, no edema   coapted no major proximal cellulitis   Other:           Data:   All microbiology laboratory data reviewed.  Recent Labs   Lab Test 05/07/24  0724 05/06/24  0742 05/05/24  0814   WBC 5.6 5.9 5.7   HGB 12.1* 11.6* 11.6*   HCT 36.4* 35.0* 35.5*   MCV 88 88 89    214 224     Recent Labs   Lab Test 05/07/24  0724 05/06/24  0742 05/05/24  0814   CR 1.01 1.01 1.01     Recent Labs   Lab Test 12/13/21  0026   SED 11     Recent Labs   Lab Test 06/24/19  1926   CULT No beta hemolytic Streptococcus Group A isolated        "

## 2024-05-07 NOTE — PROGRESS NOTES
[facility]    Medicine Progress Note - Hospitalist Service    Date of Admission:  5/1/2024    Assessment & Plan     This is a 43-year-old male with medical history which include diabetes mellitus, depression, anxiety, chronic right shoulder pain who presented to the hospital on 5/1/2024 with chief complaint of right toe pain and diagnosed with right toe cellulitis with abscess with concerns of osteomyelitis      Right great toe cellulitis with abscess status I&D and bone biopsy on 5/3/2024  History of osteomyelitis of the right great toe in 12/2021  -On admission presented after chief complaint of right toe pain  -MRI right toe was consistent with fluid collection measuring 3.0 x 1.6 x 0.7 cm.  , soft tissue edema, reactive osteitis of the distal phalanx of the first toe and chronic atrophy of the intrinsic foot musculature  -Blood cultures from 5/1/2024-negative till date  -Local cultures are growing MSSA  -Bone cultures pathology showed fibrous tissue without evidence of active osteomyelitis  -Per podiatry there is no further need for partial toe amputation and will need to be followed closely as outpatient and they change dressing in clinic and close follow-up in 1 to 2-week and discharge instruction provided as far as weightbearing, activity is concerned  -Per ID patient will be discharged on Keflex 500 mg 4 times a day for 10 days  -Discussed with patient and local pain is controlled with current Suboxone and as needed Tylenol he does not want any other pain medications        DM2 with diabetic neuropathy  PTA on metformin and Ozempic. Hgb A1C of 6.2 January 2024.   -hold metformin and Ozempic while in the hospital  -continue PTA gabapentin   -blood sugars is 89  -Continue with sliding scale insulin    Chronic anemia  -Last hemoglobin on 5/31/2023 was 13.1  -Hemoglobin during the hospital stay have been fluctuating between 11.3-11.6  -Hemoglobin is stable at 12.1 on 5/7/2024  -Continue  to monitor     Hypertension  Dyslipidemia  -continue PTA atenolol and lipitor     Depression  Anxiety  -continue PTA Effexor and nortriptyline     Chronic right shoulder pain  -continue PTA suboxone            Diet: Moderate Consistent Carb (60 g CHO per Meal) Diet    DVT Prophylaxis: Pneumatic Compression Devices, ambulatory  Kennedy Catheter: Not present  Lines: None     Cardiac Monitoring: None  Code Status: Full Code      Clinically Significant Risk Factors                  # Hypertension: Noted on problem list                   Disposition Plan     Medically Ready for Discharge: Anticipated Today             Tiffanie Colon MD  Hospitalist Service  [facility]  Securely message with [employer] (more info)  Text page via [employer]ga Paging/Directory   ______________________________________________________________________    Interval History     -Reviewed events and no evidence of any fever, chills, nausea, vomiting  -Discussed the results of the bone biopsy with the patient  -Denies any shortness of breath or chest discomfort  -Local pain well-controlled  -Looking forward to go home soon    Physical Exam   Vital Signs: Temp: 97.7  F (36.5  C) Temp src: Oral BP: 129/89 Pulse: 70   Resp: 16 SpO2: 94 % O2 Device: None (Room air)    Weight: 160 lbs 0 oz        General: aox3  Respiratory:CTLA  Cardiovascular: Regular rate , S1 and S2 normal   Abdomen:   soft ,   Neurologic: No focal deficit  Musculoskeletal: Normal Range of motion over upper and lower extremities bilaterally and the foot has dressing in place  Psychiatric: cooperative      Medical Decision Making       Time spent in care of patient is 42 minutes and I reviewed labs including CBC, basic metabolic panel, reviewed note from the infectious disease team and podiatry team and reviewed bone pathology and discussed plan of care in detail with the patient and the nursing staff and his questions were answered to satisfaction     Data     I have  personally reviewed the following data over the past 24 hrs:    5.6  \   12.1 (L)   / 247     138 101 18.0 /  89   4.2 26 1.01 \       Imaging results reviewed over the past 24 hrs:   No results found for this or any previous visit (from the past 24 hour(s)).

## 2024-05-07 NOTE — PROGRESS NOTES
"Podiatry / Foot and Ankle Surgery Progress Note    May 7, 2024    Plan:    Antibiotics upon discharge per ID recommendations, appreciate input, Keflex for 10 days  Keep dressing clean, dry and intact, will be changed in clinic  Heel weightbearing with surgical shoe  Follow up in clinic in 1-2 weeks  Foot related discharge orders are placed  Podiatry to sign off for now.  Please contact if any questions or concerns    Objective:  Vitals: /89   Pulse 70   Temp 97.7  F (36.5  C) (Oral)   Resp 16   Ht 1.727 m (5' 8\")   Wt 72.6 kg (160 lb)   SpO2 94%   BMI 24.33 kg/m    BMI= Body mass index is 24.33 kg/m .        Cultures: surgical culture-  staph aureus      Pathology :   Final Diagnosis   Right great toe, biopsy:  -Viable appearing bony with fibrous tissue without evidence of active osteomyelitis (no evidence of acute inflammation)        Assessment: 43-year-old male with type 2 diabetes, peripheral neuropathy, hypertension, dyslipidemia, depression/anxiety, chronic right shoulder pain admitted for a right hallux infection and status post right hallux excisional debridement and bone biopsy from the distal phalanx on 5/3/2024. Bone pathology negative for osteomyelitis.     Surgical site is stable         Syeda Rowan DPM  10:26 AM   "

## 2024-05-07 NOTE — PLAN OF CARE
Goal Outcome Evaluation:       .Date/Time 5/6/24-5/7/24 [id number]     Trauma/Ortho/Medical (Choose one) Trauma     Diagnosis: I&D , Right Great Toe w/ bone biopsy  POD#: 4  Mental Status: A&O x4  Activity/dangle: Independent in room  Diet: Mod Carb   Pain: Managed with no PRNs  Kennedy/Voiding: Voiding in BR   Tele/Restraints/Iso: N/A  02/LDA: Room air. PIV SL   D/C Date: Possible discharge today   Other Info: Baseline BLE Neuropathy. Right great toe dressing c/d/i. BG Check no coverage.

## 2024-05-08 ENCOUNTER — PATIENT OUTREACH (OUTPATIENT)
Dept: FAMILY MEDICINE | Facility: CLINIC | Age: 44
End: 2024-05-08
Payer: MEDICARE

## 2024-05-09 ENCOUNTER — TELEPHONE (OUTPATIENT)
Dept: PODIATRY | Facility: CLINIC | Age: 44
End: 2024-05-09
Payer: MEDICARE

## 2024-05-09 LAB — BACTERIA ABSC ANAEROBE+AEROBE CULT: NORMAL

## 2024-05-09 NOTE — TELEPHONE ENCOUNTER
"ED/Discharge Protocol    \"Hi, my name is Mai Orozco RN, a registered nurse, and I am calling on behalf of Dr. Dos Santos's office at [place].  I am calling to follow up and see how things are going for you after your recent visit.\"    \"I see that you were in the (ER/UC/IP) on 5/1 - 5/8/2024.    How are you doing now that you are home?\"   \"Patient states that he is doing really good.\"    Is patient experiencing symptoms that may require a hospital visit?  No.     Discharge Instructions    \"Let's review your discharge instructions.  What is/are the follow-up recommendations?  Pt. Response: Patient stated he is to go back into the surgeon's office on Tuesday for dressing change. Patient states that dressing is already coming off and needs it redressed. Instructed patient to call surgeon's office to see how they want to address. Writer contacted Dr. Dos Santos for permission for nurse visit just in case patient will need to be seen on nurse only schedule for dressing change.      \"Were you instructed to make a follow-up appointment?\"  Pt. Response: Yes.  Has appointment been made?   Yes      \"When you see the provider, I would recommend that you bring your discharge instructions with you.    Medications    \"How many new medications are you on since your hospitalization/ED visit?\"    2 or more - Epic MTM referral needed  \"How many of your current medicines changed (dose, timing, name, etc.) while you were in the hospital/ED visit?\"   0-1  \"Do you have questions about your medications?\"   No  \"Were you newly diagnosed with heart failure, COPD, diabetes or did you have a heart attack?\"   No  For patients on insulin: \"Did you start on insulin in the hospital or did you have your insulin dose changed?\"   No  Post Discharge Medication Reconciliation Status: unable to reconcile discharge medications due to Patient did not have time for medication reconciliation due to dropping kids off at school .    Was MTM referral placed " "(*Make sure to put transitions as reason for referral)?   No    Call Summary    \"Do you have any questions or concerns about your condition or care plan at the moment?\"    Yes Dressing change to toe needed.   Triage nurse advice given:   - call surgeon's office first to see if they can see you today. Writer was scheduled for first dressing change on Tuesday, 5/14, but dressing is coming off.   - if surgeon's office cannot see you we may be able to see you on the nurse's schedule. Writer contacted Dr. Dos Santos for permission if needed.   - patient was dropping off kids at school so was not available any longer to discuss other things. Patient does have a hospital f/up scheduled with Dr. Dos Santos on 5/16.   - writer to call patient back to verify he was able to get dressing changed at surgeon's office.     Patient was in zero times in the past year (assess appropriateness of ER visits.)      \"If you have questions or things don't continue to improve, we encourage you contact us through the main clinic number,  [phone].  Even if the clinic is not open, triage nurses are available 24/7 to help you.     We would like you to know that our clinic has extended hours (provide information).  We also have urgent care (provide details on closest location and hours/contact info)\"      \"Thank you for your time and take care!\"     BRENT Sutton RN  [facility]    "

## 2024-05-10 LAB — BACTERIA TISS BX CULT: NORMAL

## 2024-05-10 NOTE — TELEPHONE ENCOUNTER
I am fine with him re-dressing it.   I recommend that he gently dab the area where the incision and sutures are with an alcohol wipe or betadine and then apply a fresh dressing.   Sterile gauze over the incision.  He should try not to touch the side that will touch the wound.  He can gently/ loosely secure this with paper tap or coban.     Dr. Smallwood  
It doesn't have to  be as involved as the surgical dressing.   The surgical site needs to be covered by sterile gauze and any method of securing the dressing is okay.  An ACE wrap is just fine.  All of the cast padding is not needed.    Dr. Smallwood  
Other: Patient is calling because his dressing is falling off. He is wondering if it is okay that he redress it himself before appointment on Tuesday because it will probably be completely off by that time.       Could we send this information to you in "[employer] or would you prefer to receive a phone call?:   Patient would prefer a phone call   Okay to leave a detailed message?: Yes at Cell number on file:    Telephone Information:   Mobile [phone]       
Patient had surgery on 5/3/24, Dr. Smallwood. He is s/p:   1) bone biopsy, distal phalanx, right hallux  2) excisional debridement to the depth of deeper skin, right hallux    See message below regarding dressing concern and advise on recommendations.     He is scheduled for follow up on 5/14/24.     Dunia White ATC  
Phone call to patient and he was informed of Dr. Smallwood's recommendations below. He verbalized understanding.     CHANDRA Muniz RN    
No

## 2024-05-14 ENCOUNTER — OFFICE VISIT (OUTPATIENT)
Dept: PODIATRY | Facility: CLINIC | Age: 44
End: 2024-05-14
Payer: COMMERCIAL

## 2024-05-14 VITALS — BODY MASS INDEX: 24.33 KG/M2 | SYSTOLIC BLOOD PRESSURE: 130 MMHG | HEIGHT: 68 IN | DIASTOLIC BLOOD PRESSURE: 80 MMHG

## 2024-05-14 DIAGNOSIS — Z09 SURGERY FOLLOW-UP EXAMINATION: Primary | ICD-10-CM

## 2024-05-14 PROCEDURE — 99213 OFFICE O/P EST LOW 20 MIN: CPT | Performed by: PODIATRIST

## 2024-05-14 NOTE — LETTER
5/14/2024         RE: Phillip Rueda  [address]  [facility] [place] [address]        Dear Colleague,    Thank you for referring your patient, Phillip Rueda, to the [facility]. Please see a copy of my visit note below.    ASSESSMENT:  Encounter Diagnosis   Name Primary?     Surgery follow-up examination Yes     MEDICAL DECISION MAKING:  The pathology report was negative for osteomyelitis of the right hallux, distal phalanx.  Therefore, no additional surgery planned at this time.  Previous cultures grew MSSA  He was covered for this while hospitalized and discharged on Keflex  Complete the course of Keflex  I recommend he continue the surgical shoe or other stiff soled shoe for another week.  He is to monitor for any increasing redness.  Follow-up for suture removal and recheck next week.    Disclaimer: This note consists of symbols derived from keyboarding, dictation and/or voice recognition software. As a result, there may be errors in the script that have gone undetected. Please consider this when interpreting information found in this chart.    Shelton Smallwood, HUNG, FACFAS, [facility] Department of Podiatry/Foot & Ankle Surgery      ____________________________________________________________________    HPI:       Phillip follows up from a recent hospitalization from 5/1/2024 until 5/7/2024.  He was admitted for right great toe cellulitis, abscess and concern for osteomyelitis.  He received IV antibiotics.  Excisional debridement with bone biopsy on 5/3/2024.  Bone biopsy was negative for osteomyelitis.  Infectious disease was involved in his care.  He was discharged on [id number] mg 4 times daily for 10 days.    For more details, please refer to the discharge summary from 5/7/2024.    Past Medical History:   Diagnosis Date     Anxiety      Depressive disorder      Diabetes mellitus (H)      Hypertension      Liver disease      Neuralgic amyotrophy      Other chronic pain   "    Pain disorder 12/8/2015     Parsonage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      Prostate infection      Seizures (H)      Staph infection    *  *  Past Surgical History:   Procedure Laterality Date     BIOPSY BONE FOOT Right 1/18/2022    Procedure: BIOPSY, BONE, RIGHT GREAT TOE (DISTAL PHALANX);  Surgeon: Shelton Smallwood DPM;  Location: [facility] OR     BIOPSY BONE TOE Right 5/3/2024    Procedure: BIOPSY, BONE, TOE;  Surgeon: Shelton Smallwood DPM;  Location:  OR     IRRIGATION AND DEBRIDEMENT TOE, COMBINED Right 5/3/2024    Procedure: IRRIGATION AND DEBRIDEMENT, TOE FIRST;  Surgeon: Shelton Smallwood DPM;  Location:  OR     ORTHOPEDIC SURGERY         EXAM:    Vitals: /80   Ht 1.727 m (5' 8\")   BMI 24.33 kg/m    BMI: Body mass index is 24.33 kg/m .    Derm: There is some residual hyperkeratotic skin on the right hallux distally.  The biopsy site incision is well coapted, dry and sutures intact.  There is some residual erythema over the eponychial region.  No open wounds        Again, thank you for allowing me to participate in the care of your patient.        Sincerely,        Shelton Smallwood DPM  "

## 2024-05-14 NOTE — PROGRESS NOTES
ASSESSMENT:  Encounter Diagnosis   Name Primary?    Surgery follow-up examination Yes     MEDICAL DECISION MAKING:  The pathology report was negative for osteomyelitis of the right hallux, distal phalanx.  Therefore, no additional surgery planned at this time.  Previous cultures grew MSSA  He was covered for this while hospitalized and discharged on Keflex  Complete the course of Keflex  I recommend he continue the surgical shoe or other stiff soled shoe for another week.  He is to monitor for any increasing redness.  Follow-up for suture removal and recheck next week.    Disclaimer: This note consists of symbols derived from keyboarding, dictation and/or voice recognition software. As a result, there may be errors in the script that have gone undetected. Please consider this when interpreting information found in this chart.    Shelton Smallwood DPM, ARACELI, [facility] Department of Podiatry/Foot & Ankle Surgery      ____________________________________________________________________    HPI:       Phillip follows up from a recent hospitalization from 5/1/2024 until 5/7/2024.  He was admitted for right great toe cellulitis, abscess and concern for osteomyelitis.  He received IV antibiotics.  Excisional debridement with bone biopsy on 5/3/2024.  Bone biopsy was negative for osteomyelitis.  Infectious disease was involved in his care.  He was discharged on [id number] mg 4 times daily for 10 days.    For more details, please refer to the discharge summary from 5/7/2024.    Past Medical History:   Diagnosis Date    Anxiety     Depressive disorder     Diabetes mellitus (H)     Hypertension     Liver disease     Neuralgic amyotrophy     Other chronic pain     Pain disorder 12/8/2015    Parsonage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     Prostate infection     Seizures (H)     Staph infection    *  *  Past Surgical History:   Procedure Laterality Date    BIOPSY BONE FOOT Right 1/18/2022    Procedure: BIOPSY, BONE, RIGHT GREAT TOE (DISTAL  "PHALANX);  Surgeon: Shelton Smallwood DPM;  Location:  OR    BIOPSY BONE TOE Right 5/3/2024    Procedure: BIOPSY, BONE, TOE;  Surgeon: Shelton Smallwood DPM;  Location: [facility] OR    IRRIGATION AND DEBRIDEMENT TOE, COMBINED Right 5/3/2024    Procedure: IRRIGATION AND DEBRIDEMENT, TOE FIRST;  Surgeon: Shelton Smallwood DPM;  Location:  OR    ORTHOPEDIC SURGERY         EXAM:    Vitals: /80   Ht 1.727 m (5' 8\")   BMI 24.33 kg/m    BMI: Body mass index is 24.33 kg/m .    Derm: There is some residual hyperkeratotic skin on the right hallux distally.  The biopsy site incision is well coapted, dry and sutures intact.  There is some residual erythema over the eponychial region.  No open wounds      "

## 2024-05-15 ENCOUNTER — PATIENT OUTREACH (OUTPATIENT)
Dept: CARE COORDINATION | Facility: CLINIC | Age: 44
End: 2024-05-15
Payer: MEDICARE

## 2024-05-16 ENCOUNTER — OFFICE VISIT (OUTPATIENT)
Dept: FAMILY MEDICINE | Facility: CLINIC | Age: 44
End: 2024-05-16
Payer: COMMERCIAL

## 2024-05-16 VITALS
RESPIRATION RATE: 15 BRPM | HEART RATE: 73 BPM | SYSTOLIC BLOOD PRESSURE: 126 MMHG | WEIGHT: 167 LBS | TEMPERATURE: 97 F | OXYGEN SATURATION: 100 % | BODY MASS INDEX: 25.39 KG/M2 | DIASTOLIC BLOOD PRESSURE: 86 MMHG

## 2024-05-16 DIAGNOSIS — L03.031 CELLULITIS OF GREAT TOE OF RIGHT FOOT: Primary | ICD-10-CM

## 2024-05-16 DIAGNOSIS — E11.9 TYPE 2 DIABETES MELLITUS WITHOUT COMPLICATION, WITHOUT LONG-TERM CURRENT USE OF INSULIN (H): ICD-10-CM

## 2024-05-16 LAB
ERYTHROCYTE [DISTWIDTH] IN BLOOD BY AUTOMATED COUNT: 12.7 % (ref 10–15)
HBA1C MFR BLD: 6.3 % (ref 0–5.6)
HCT VFR BLD AUTO: 38.1 % (ref 40–53)
HGB BLD-MCNC: 12.5 G/DL (ref 13.3–17.7)
MCH RBC QN AUTO: 28.9 PG (ref 26.5–33)
MCHC RBC AUTO-ENTMCNC: 32.8 G/DL (ref 31.5–36.5)
MCV RBC AUTO: 88 FL (ref 78–100)
PLATELET # BLD AUTO: 317 10E3/UL (ref 150–450)
RBC # BLD AUTO: 4.33 10E6/UL (ref 4.4–5.9)
WBC # BLD AUTO: 8.8 10E3/UL (ref 4–11)

## 2024-05-16 PROCEDURE — 82570 ASSAY OF URINE CREATININE: CPT | Performed by: FAMILY MEDICINE

## 2024-05-16 PROCEDURE — 82607 VITAMIN B-12: CPT | Performed by: FAMILY MEDICINE

## 2024-05-16 PROCEDURE — 84443 ASSAY THYROID STIM HORMONE: CPT | Performed by: FAMILY MEDICINE

## 2024-05-16 PROCEDURE — 82043 UR ALBUMIN QUANTITATIVE: CPT | Performed by: FAMILY MEDICINE

## 2024-05-16 PROCEDURE — 80061 LIPID PANEL: CPT | Performed by: FAMILY MEDICINE

## 2024-05-16 PROCEDURE — 83036 HEMOGLOBIN GLYCOSYLATED A1C: CPT | Performed by: FAMILY MEDICINE

## 2024-05-16 PROCEDURE — 36415 COLL VENOUS BLD VENIPUNCTURE: CPT | Performed by: FAMILY MEDICINE

## 2024-05-16 PROCEDURE — 99495 TRANSJ CARE MGMT MOD F2F 14D: CPT | Performed by: FAMILY MEDICINE

## 2024-05-16 PROCEDURE — 85027 COMPLETE CBC AUTOMATED: CPT | Performed by: FAMILY MEDICINE

## 2024-05-16 PROCEDURE — 80053 COMPREHEN METABOLIC PANEL: CPT | Performed by: FAMILY MEDICINE

## 2024-05-16 ASSESSMENT — PAIN SCALES - GENERAL: PAINLEVEL: NO PAIN (0)

## 2024-05-16 NOTE — PATIENT INSTRUCTIONS
=======================================  FYI:     System will autorelease results as soon as they are available. I usually wait for all results to be ready before sending you a comment or message.  Be assured I will review and comment on all of your results as soon as I can.     I am at [facility]  ([phone]) usually Monday, Tuesday and Wednesday.   Messages, evisits, phone calls received on Thursday and Friday may not get responded very urgently but I will try to respond as soon as possible.     2) My schedule sometime been booking out far in advance. I apologize for the lack of timely access. If you need to be seen for a chronic condition or preventive (wellness) visit, please be sure to schedule that appointment few months in advance.  If you have a concern that you feel cannot wait until my next available appointment (such as a hospital follow-up or new symptom of concern) please ask to speak to one of the [facility] nurses who may be able to access a sooner appointment.      You can schedule a video visit for follow-up appointments as well as future appointments for certain conditions.  Please see the below link.     Video Visits ([email or web address])     If you have not already done so,  I encourage you to sign up for [email or web address] ([email or web address]).  This will allow you to review your results, securely communicate with a provider, and schedule virtual visits as well.

## 2024-05-16 NOTE — PROGRESS NOTES
Assessment & Plan     Cellulitis of great toe of right foot  Doing well.  Completing Keflex course.  Biopsy was negative for osteomyelitis.  Scheduled to see podiatry for follow-up.  Further treatment as per podiatry.  High risk of complications due to his diabetes.    Type 2 diabetes mellitus without complication, without long-term current use of insulin (H)  Needs BMP and CBC for his a cellulitis.  Will obtain full diabetic labs as he is scheduled to see me for follow-up next month.  - Hemoglobin A1c; Future  - Lipid panel reflex to direct LDL Fasting; Future  - Comprehensive metabolic panel; Future  - Albumin Random Urine Quantitative with Creat Ratio; Future  - CBC with platelets; Future  - Vitamin B12; Future  - TSH with free T4 reflex; Future  - Hemoglobin A1c  - Lipid panel reflex to direct LDL Fasting  - Comprehensive metabolic panel  - Albumin Random Urine Quantitative with Creat Ratio  - CBC with platelets  - Vitamin B12  - TSH with free T4 reflex         MED REC REQUIRED   Post Medication Reconciliation Status:  Discharge medications reconciled, continue medications without change         Venkatesh Fisher is a 43 year old, presenting for the following health issues:  Hospital F/U (Hospital Follow Up )        5/16/2024    11:20 AM   Additional Questions   Roomed by Amelia Camejo     [facility]         Hospital Follow-up Visit:    Hospital/Nursing Home/IP Rehab Facility: [facility]  Date of Admission: 5/1/24  Date of Discharge: 5/7/24  Reason(s) for Admission: Right great toe cellulitis with abscess status I&D and bone biopsy on 5/3/2024  Was the patient in the ICU or did the patient experience delirium during hospitalization?  No  Do you have any other stressors you would like to discuss with your provider? No    Problems taking medications regularly:  None  Medication changes since discharge: None  Problems adhering to non-medication therapy:  None    Summary of hospitalization:  [facility] discharge summary reviewed  Diagnostic Tests/Treatments reviewed.  Follow up needed: labs  Other Healthcare Providers Involved in Patient s Care:          podiatry  Update since discharge: improved.       Plan of care communicated with patient           On keflex. Almost done with antibiotics.     Feeling much better. Has stitches and redressing 2 days ago by podiatry. Evaluated by podiatry and it looked good. No bone infection which is reassuring. Bone biopsy was negative.     Right great toe.           Objective    /86 (BP Location: Right arm, Patient Position: Sitting, Cuff Size: Adult Large)   Pulse 73   Temp 97  F (36.1  C) (Temporal)   Resp 15   Wt 75.8 kg (167 lb)   SpO2 100%   BMI 25.39 kg/m    Body mass index is 25.39 kg/m .  Physical Exam   Right great toe - dressing. Dressing not removed.   Alert, not in acute distress.             Signed Electronically by: Km Dos Santos MD, MD

## 2024-05-17 LAB
ALBUMIN SERPL BCG-MCNC: 4.4 G/DL (ref 3.5–5.2)
ALP SERPL-CCNC: 129 U/L (ref 40–150)
ALT SERPL W P-5'-P-CCNC: 12 U/L (ref 0–70)
ANION GAP SERPL CALCULATED.3IONS-SCNC: 11 MMOL/L (ref 7–15)
AST SERPL W P-5'-P-CCNC: 25 U/L (ref 0–45)
BILIRUB SERPL-MCNC: 0.4 MG/DL
BUN SERPL-MCNC: 9.8 MG/DL (ref 6–20)
CALCIUM SERPL-MCNC: 9.2 MG/DL (ref 8.6–10)
CHLORIDE SERPL-SCNC: 100 MMOL/L (ref 98–107)
CHOLEST SERPL-MCNC: 119 MG/DL
CREAT SERPL-MCNC: 1.08 MG/DL (ref 0.67–1.17)
CREAT UR-MCNC: 106 MG/DL
DEPRECATED HCO3 PLAS-SCNC: 27 MMOL/L (ref 22–29)
EGFRCR SERPLBLD CKD-EPI 2021: 86 ML/MIN/1.73M2
FASTING STATUS PATIENT QL REPORTED: NO
FASTING STATUS PATIENT QL REPORTED: NO
GLUCOSE SERPL-MCNC: 96 MG/DL (ref 70–99)
HDLC SERPL-MCNC: 71 MG/DL
LDLC SERPL CALC-MCNC: 40 MG/DL
MICROALBUMIN UR-MCNC: <12 MG/L
MICROALBUMIN/CREAT UR: NORMAL MG/G{CREAT}
NONHDLC SERPL-MCNC: 48 MG/DL
POTASSIUM SERPL-SCNC: 4.8 MMOL/L (ref 3.4–5.3)
PROT SERPL-MCNC: 7 G/DL (ref 6.4–8.3)
SODIUM SERPL-SCNC: 138 MMOL/L (ref 135–145)
TRIGL SERPL-MCNC: 39 MG/DL
TSH SERPL DL<=0.005 MIU/L-ACNC: 0.97 UIU/ML (ref 0.3–4.2)
VIT B12 SERPL-MCNC: 224 PG/ML (ref 232–1245)

## 2024-05-22 ENCOUNTER — OFFICE VISIT (OUTPATIENT)
Dept: PODIATRY | Facility: CLINIC | Age: 44
End: 2024-05-22
Payer: COMMERCIAL

## 2024-05-22 VITALS — WEIGHT: 167 LBS | DIASTOLIC BLOOD PRESSURE: 75 MMHG | SYSTOLIC BLOOD PRESSURE: 123 MMHG | BODY MASS INDEX: 25.39 KG/M2

## 2024-05-22 DIAGNOSIS — Z09 SURGERY FOLLOW-UP EXAMINATION: Primary | ICD-10-CM

## 2024-05-22 PROCEDURE — 99213 OFFICE O/P EST LOW 20 MIN: CPT | Performed by: PODIATRIST

## 2024-05-22 NOTE — LETTER
May 22, 2024      Phillip Rueda  [address] [place] [id number]        To Whom It May Concern:    Phillip Rueda was seen in our clinic. He may return to work without restrictions on 5/26/2024.      Sincerely,        Shelton Smallwood, HUNG

## 2024-05-22 NOTE — LETTER
5/22/2024         RE: Phillip Rueda  [address]  [facility] [address]        Dear Colleague,    Thank you for referring your patient, Phillip Rueda, to the [facility]. Please see a copy of my visit note below.    ASSESSMENT:  Encounter Diagnosis   Name Primary?     Surgery follow-up examination Yes     MEDICAL DECISION MAKING:  The pathology report was negative for osteomyelitis of the right hallux, distal phalanx.  Therefore, no additional surgery planned at this time.  Previous cultures grew MSSA  He was covered for this while hospitalized and discharged on Keflex  Complete the course of Keflex    Sutures were removed without difficulty.  Okay to continue regular shoes  We discussed using a pumice stone to file down thick callusing that forms on the right great toe.  We also discussed the use of a cream.  Okay to return to work without restrictions on Sunday.  A letter clearing him for work was provided.    Final follow-up in 1 month recommended.    Disclaimer: This note consists of symbols derived from keyboarding, dictation and/or voice recognition software. As a result, there may be errors in the script that have gone undetected. Please consider this when interpreting information found in this chart.    Shelton Smallwood DPM, FACFAS, [facility] Department of Podiatry/Foot & Ankle Surgery      ____________________________________________________________________    HPI:       Phillip follows up from a recent hospitalization from 5/1/2024 until 5/7/2024.  He was admitted for right great toe cellulitis, abscess and concern for osteomyelitis.  He received IV antibiotics.  Excisional debridement with bone biopsy on 5/3/2024.  Bone biopsy was negative for osteomyelitis.  Infectious disease was involved in his care.  He was discharged on [id number] mg 4 times daily for 10 days.    For more details, please refer to the discharge summary from 5/7/2024.     *  Past Medical  History:   Diagnosis Date     Anxiety      Depressive disorder      Diabetes mellitus (H)      Hypertension      Liver disease      Neuralgic amyotrophy      Other chronic pain      Pain disorder 12/8/2015     Parsonage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      Prostate infection      Seizures (H)      Staph infection    *  *  Past Surgical History:   Procedure Laterality Date     BIOPSY BONE FOOT Right 1/18/2022    Procedure: BIOPSY, BONE, RIGHT GREAT TOE (DISTAL PHALANX);  Surgeon: Shelton Smallwood DPM;  Location: [facility] OR     BIOPSY BONE TOE Right 5/3/2024    Procedure: BIOPSY, BONE, TOE;  Surgeon: Shelton Smallwood DPM;  Location: [facility] OR     IRRIGATION AND DEBRIDEMENT TOE, COMBINED Right 5/3/2024    Procedure: IRRIGATION AND DEBRIDEMENT, TOE FIRST;  Surgeon: Shelton Smallwood DPM;  Location: [facility] OR     ORTHOPEDIC SURGERY     *  *  Current Outpatient Medications   Medication Sig Dispense Refill     acetaminophen (TYLENOL) 325 MG tablet Take 2 tablets (650 mg) by mouth every 6 hours as needed for mild pain or other 30 tablet 0     atenolol (TENORMIN) 25 MG tablet Take 1 tablet (25 mg) by mouth daily 90 tablet 1     atorvastatin (LIPITOR) 20 MG tablet Take 1 tablet (20 mg) by mouth daily 90 tablet 1     blood glucose (ACCU-CHEK GUIDE) test strip USE TO TEST BLOOD SUGAR TWO TIMES A DAY OR AS DIRECTED 200 strip 1     blood glucose (NO BRAND SPECIFIED) lancets standard Use to test blood sugar 2 times daily or as directed. 200 each 3     blood glucose monitoring (NO BRAND SPECIFIED) meter device kit Use to test blood sugar 2 times daily or as directed. 1 kit 0     buprenorphine HCl-naloxone HCl (SUBOXONE) 8-2 MG per film Place 1 Film under the tongue daily       Continuous Blood Gluc Sensor (FREESTYLE JHON 14 DAY SENSOR) MISC Change every 14 days. 2 each 5     diazepam (VALIUM) 5 MG tablet Take 1 tablet (5 mg) 3 times daily as needed. 60 tablet      gabapentin (NEURONTIN) 600 MG tablet Take 1 tablet (600 mg) by mouth 2 times daily        metFORMIN (GLUCOPHAGE XR) 500 MG 24 hr tablet Take 2 tablets (1,000 mg) by mouth 2 times daily (with meals) 360 tablet 1     nortriptyline (PAMELOR) 25 MG capsule Take 1 capsule (25 mg) by mouth At Bedtime       OZEMPIC, 0.25 OR 0.5 MG/DOSE, 2 MG/3ML pen INJECT 0.5MG UNDER THE SKIN EVERY 7 DAYS 3 mL 1     sildenafil (VIAGRA) 100 MG tablet Take 1 tablet (100 mg) by mouth daily as needed 6 tablet 11     venlafaxine (EFFEXOR-ER) 150 MG TB24 Take 1 tablet by mouth daily (with breakfast). 30 each 0         EXAM:    Vitals: /75   Wt 75.8 kg (167 lb)   BMI 25.39 kg/m    BMI: Body mass index is 25.39 kg/m .    Derm: There is some residual hyperkeratotic skin on the right hallux distally.  The biopsy site incision is well coapted, dry and sutures intact.  There is some residual erythema over the eponychial region.  No open wounds           Again, thank you for allowing me to participate in the care of your patient.        Sincerely,        Shelton Smallwood DPM

## 2024-05-22 NOTE — PROGRESS NOTES
ASSESSMENT:  Encounter Diagnosis   Name Primary?    Surgery follow-up examination Yes     MEDICAL DECISION MAKING:  The pathology report was negative for osteomyelitis of the right hallux, distal phalanx.  Therefore, no additional surgery planned at this time.  Previous cultures grew MSSA  He was covered for this while hospitalized and discharged on Keflex  Complete the course of Keflex    Sutures were removed without difficulty.  Okay to continue regular shoes  We discussed using a pumice stone to file down thick callusing that forms on the right great toe.  We also discussed the use of a cream.  Okay to return to work without restrictions on Sunday.  A letter clearing him for work was provided.    Final follow-up in 1 month recommended.    Disclaimer: This note consists of symbols derived from keyboarding, dictation and/or voice recognition software. As a result, there may be errors in the script that have gone undetected. Please consider this when interpreting information found in this chart.    Shelton Smallwood DPM, FACBELKYS, MS    [facility]      ____________________________________________________________________    HPI:       Phillip follows up from a recent hospitalization from 5/1/2024 until 5/7/2024.  He was admitted for right great toe cellulitis, abscess and concern for osteomyelitis.  He received IV antibiotics.  Excisional debridement with bone biopsy on 5/3/2024.  Bone biopsy was negative for osteomyelitis.  Infectious disease was involved in his care.  He was discharged on [id number] mg 4 times daily for 10 days.    For more details, please refer to the discharge summary from 5/7/2024.     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     Prostate infection     Seizures (H)      Staph infection    *  *  Past Surgical History:   Procedure Laterality Date    BIOPSY BONE FOOT Right 1/18/2022    Procedure: BIOPSY, BONE, RIGHT GREAT TOE (DISTAL PHALANX);  Surgeon: Shelton Smallwood DPM;  Location: [facility] OR    BIOPSY BONE TOE Right 5/3/2024    Procedure: BIOPSY, BONE, TOE;  Surgeon: Shelton Smallwood DPM;  Location: [facility] OR    IRRIGATION AND DEBRIDEMENT TOE, COMBINED Right 5/3/2024    Procedure: IRRIGATION AND DEBRIDEMENT, TOE FIRST;  Surgeon: Shelton Smallwood DPM;  Location:  OR    ORTHOPEDIC SURGERY     *  *  Current Outpatient Medications   Medication Sig Dispense Refill    acetaminophen (TYLENOL) 325 MG tablet Take 2 tablets (650 mg) by mouth every 6 hours as needed for mild pain or other 30 tablet 0    atenolol (TENORMIN) 25 MG tablet Take 1 tablet (25 mg) by mouth daily 90 tablet 1    atorvastatin (LIPITOR) 20 MG tablet Take 1 tablet (20 mg) by mouth daily 90 tablet 1    blood glucose (ACCU-CHEK GUIDE) test strip USE TO TEST BLOOD SUGAR TWO TIMES A DAY OR AS DIRECTED 200 strip 1    blood glucose (NO BRAND SPECIFIED) lancets standard Use to test blood sugar 2 times daily or as directed. 200 each 3    blood glucose monitoring (NO BRAND SPECIFIED) meter device kit Use to test blood sugar 2 times daily or as directed. 1 kit 0    buprenorphine HCl-naloxone HCl (SUBOXONE) 8-2 MG per film Place 1 Film under the tongue daily      Continuous Blood Gluc Sensor (FREESTYLE JHON 14 DAY SENSOR) MISC Change every 14 days. 2 each 5    diazepam (VALIUM) 5 MG tablet Take 1 tablet (5 mg) 3 times daily as needed. 60 tablet     gabapentin (NEURONTIN) 600 MG tablet Take 1 tablet (600 mg) by mouth 2 times daily      metFORMIN (GLUCOPHAGE XR) 500 MG 24 hr tablet Take 2 tablets (1,000 mg) by mouth 2 times daily (with meals) 360 tablet 1    nortriptyline (PAMELOR) 25 MG capsule Take 1 capsule (25 mg) by mouth At Bedtime      OZEMPIC, 0.25 OR 0.5 MG/DOSE, 2 MG/3ML pen INJECT 0.5MG UNDER THE SKIN EVERY 7 DAYS  3 mL 1    sildenafil (VIAGRA) 100 MG tablet Take 1 tablet (100 mg) by mouth daily as needed 6 tablet 11    venlafaxine (EFFEXOR-ER) 150 MG TB24 Take 1 tablet by mouth daily (with breakfast). 30 each 0         EXAM:    Vitals: /75   Wt 75.8 kg (167 lb)   BMI 25.39 kg/m    BMI: Body mass index is 25.39 kg/m .    Derm: There is some residual hyperkeratotic skin on the right hallux distally.  The biopsy site incision is well coapted, dry and sutures intact.  There is some residual erythema over the eponychial region.  No open wounds

## 2024-05-25 DIAGNOSIS — E11.9 TYPE 2 DIABETES MELLITUS WITHOUT COMPLICATION, WITHOUT LONG-TERM CURRENT USE OF INSULIN (H): ICD-10-CM

## 2024-05-27 RX ORDER — SEMAGLUTIDE 0.68 MG/ML
INJECTION, SOLUTION SUBCUTANEOUS
Qty: 3 ML | Refills: 1 | Status: SHIPPED | OUTPATIENT
Start: 2024-05-27 | End: 2024-08-19

## 2024-05-29 ENCOUNTER — MYC MEDICAL ADVICE (OUTPATIENT)
Dept: PODIATRY | Facility: CLINIC | Age: 44
End: 2024-05-29

## 2024-05-29 ENCOUNTER — OFFICE VISIT (OUTPATIENT)
Dept: URGENT CARE | Facility: URGENT CARE | Age: 44
End: 2024-05-29
Payer: COMMERCIAL

## 2024-05-29 VITALS
SYSTOLIC BLOOD PRESSURE: 130 MMHG | DIASTOLIC BLOOD PRESSURE: 80 MMHG | BODY MASS INDEX: 25.03 KG/M2 | HEART RATE: 73 BPM | OXYGEN SATURATION: 100 % | TEMPERATURE: 97.9 F | WEIGHT: 169 LBS | RESPIRATION RATE: 15 BRPM | HEIGHT: 69 IN

## 2024-05-29 DIAGNOSIS — E11.42 DIABETIC POLYNEUROPATHY ASSOCIATED WITH TYPE 2 DIABETES MELLITUS (H): ICD-10-CM

## 2024-05-29 DIAGNOSIS — L03.031 CELLULITIS OF SECOND TOE OF RIGHT FOOT: Primary | ICD-10-CM

## 2024-05-29 PROCEDURE — 99214 OFFICE O/P EST MOD 30 MIN: CPT | Performed by: INTERNAL MEDICINE

## 2024-05-29 RX ORDER — CEPHALEXIN 500 MG/1
500 CAPSULE ORAL 3 TIMES DAILY
Qty: 21 CAPSULE | Refills: 0 | Status: SHIPPED | OUTPATIENT
Start: 2024-05-29 | End: 2024-06-05

## 2024-05-29 ASSESSMENT — ENCOUNTER SYMPTOMS: FEVER: 0

## 2024-05-29 NOTE — PROGRESS NOTES
"ASSESSMENT AND PLAN:      ICD-10-CM    1. Cellulitis of second toe of right foot  L03.031 cephALEXin (KEFLEX) 500 MG capsule      2. Diabetic polyneuropathy associated with type 2 diabetes mellitus (H)  E11.42         Patient Instructions     Blister fluid is clear    Due to recent foot infection and underlying diabetes mellitus, will treat as skin infection  Will A prescription for antibiotics 3 x day for week  Probiotics  Elevate foot    follow-up with Dr Smallwood.   Return in about 1 week (around 6/5/2024).    Monitor for fever    Naty Garcia MD  [facility]    Subjective     Phillip Rueda is a 44 year old who presents for Patient presents with:  Foot Problems: Right foot second toe infection     an established patient of [facility].      Admitted to hospital 5/1 for abscess drainage of right big toe, required bone biopsy   Complicated with diabetes mellitus   Bandages rubbing up against 2nd toe  Noticed redness, swelling, pain & blister  Concerned for infection, especially as hospitalized for 7 days.    Treatment measures tried include: wearing bandaid      Review of Systems   Constitutional:  Negative for fever.           Objective    /80   Pulse 73   Temp 97.9  F (36.6  C) (Temporal)   Resp 15   Ht 1.74 m (5' 8.5\")   Wt 76.7 kg (169 lb)   SpO2 100%   BMI 25.32 kg/m    Physical Exam  Vitals reviewed.   Constitutional:       Appearance: Normal appearance.   Musculoskeletal:      Comments: right foot   2nd toe - Redness, pain with flattened draining blister - clear fluid     Neurological:      Mental Status: He is alert.                  "

## 2024-05-29 NOTE — PATIENT INSTRUCTIONS
Blister fluid is clear    Due to recent foot infection and underlying diabetes mellitus, will treat as skin infection  Will A prescription for antibiotics 3 x day for week  Probiotics  Elevate foot    follow-up with Dr Smallwood.

## 2024-05-30 NOTE — TELEPHONE ENCOUNTER
Patient last seen 5/22/24. He is s/p 1) bone biopsy, distal phalanx, right hallux  2) excisional debridement to the depth of deeper skin, right hallux on 5/3/24.     Please see patient update via MyChart with attached picture and advise on recommendations. He was seen at  yesterday and prescribed cephalexin.     Dunia White MSA, ATC  Certified Athletic Trainer

## 2024-06-25 ENCOUNTER — OFFICE VISIT (OUTPATIENT)
Dept: FAMILY MEDICINE | Facility: CLINIC | Age: 44
End: 2024-06-25
Payer: COMMERCIAL

## 2024-06-25 VITALS
HEART RATE: 66 BPM | DIASTOLIC BLOOD PRESSURE: 78 MMHG | TEMPERATURE: 97.3 F | BODY MASS INDEX: 24.93 KG/M2 | WEIGHT: 164.5 LBS | SYSTOLIC BLOOD PRESSURE: 123 MMHG | HEIGHT: 68 IN | OXYGEN SATURATION: 99 % | RESPIRATION RATE: 18 BRPM

## 2024-06-25 DIAGNOSIS — F33.40 RECURRENT MAJOR DEPRESSION IN REMISSION (H): ICD-10-CM

## 2024-06-25 DIAGNOSIS — Z82.41 FAMILY HISTORY OF SUDDEN CARDIAC DEATH: ICD-10-CM

## 2024-06-25 DIAGNOSIS — E11.42 DIABETIC POLYNEUROPATHY ASSOCIATED WITH TYPE 2 DIABETES MELLITUS (H): ICD-10-CM

## 2024-06-25 DIAGNOSIS — I10 HYPERTENSION GOAL BP (BLOOD PRESSURE) < 140/90: ICD-10-CM

## 2024-06-25 DIAGNOSIS — Z00.00 ENCOUNTER FOR MEDICARE ANNUAL WELLNESS EXAM: Primary | ICD-10-CM

## 2024-06-25 DIAGNOSIS — E78.00 HYPERCHOLESTEREMIA: ICD-10-CM

## 2024-06-25 PROCEDURE — 96127 BRIEF EMOTIONAL/BEHAV ASSMT: CPT | Performed by: FAMILY MEDICINE

## 2024-06-25 PROCEDURE — 99396 PREV VISIT EST AGE 40-64: CPT | Performed by: FAMILY MEDICINE

## 2024-06-25 PROCEDURE — 99214 OFFICE O/P EST MOD 30 MIN: CPT | Mod: 25 | Performed by: FAMILY MEDICINE

## 2024-06-25 RX ORDER — LANOLIN ALCOHOL/MO/W.PET/CERES
1000 CREAM (GRAM) TOPICAL DAILY
COMMUNITY
Start: 2024-06-25

## 2024-06-25 RX ORDER — ATORVASTATIN CALCIUM 20 MG/1
20 TABLET, FILM COATED ORAL DAILY
Qty: 90 TABLET | Refills: 1 | Status: SHIPPED | OUTPATIENT
Start: 2024-06-25

## 2024-06-25 RX ORDER — METFORMIN HCL 500 MG
1000 TABLET, EXTENDED RELEASE 24 HR ORAL 2 TIMES DAILY WITH MEALS
Qty: 360 TABLET | Refills: 1 | Status: SHIPPED | OUTPATIENT
Start: 2024-06-25

## 2024-06-25 RX ORDER — ATENOLOL 25 MG/1
25 TABLET ORAL DAILY
Qty: 90 TABLET | Refills: 1 | Status: SHIPPED | OUTPATIENT
Start: 2024-06-25

## 2024-06-25 SDOH — HEALTH STABILITY: PHYSICAL HEALTH: ON AVERAGE, HOW MANY MINUTES DO YOU ENGAGE IN EXERCISE AT THIS LEVEL?: 20 MIN

## 2024-06-25 SDOH — HEALTH STABILITY: PHYSICAL HEALTH: ON AVERAGE, HOW MANY DAYS PER WEEK DO YOU ENGAGE IN MODERATE TO STRENUOUS EXERCISE (LIKE A BRISK WALK)?: 3 DAYS

## 2024-06-25 ASSESSMENT — PATIENT HEALTH QUESTIONNAIRE - PHQ9
10. IF YOU CHECKED OFF ANY PROBLEMS, HOW DIFFICULT HAVE THESE PROBLEMS MADE IT FOR YOU TO DO YOUR WORK, TAKE CARE OF THINGS AT HOME, OR GET ALONG WITH OTHER PEOPLE: SOMEWHAT DIFFICULT
SUM OF ALL RESPONSES TO PHQ QUESTIONS 1-9: 4
SUM OF ALL RESPONSES TO PHQ QUESTIONS 1-9: 4

## 2024-06-25 ASSESSMENT — PAIN SCALES - GENERAL: PAINLEVEL: MILD PAIN (2)

## 2024-06-25 ASSESSMENT — SOCIAL DETERMINANTS OF HEALTH (SDOH): HOW OFTEN DO YOU GET TOGETHER WITH FRIENDS OR RELATIVES?: ONCE A WEEK

## 2024-06-25 NOTE — PROGRESS NOTES
"Preventive Care Visit  [facility] Michelle Dos Santos MD, MD, Family Medicine  Jun 25, 2024      Assessment & Plan     Encounter for Medicare annual wellness exam       Diabetic polyneuropathy associated with type 2 diabetes mellitus (H)  Doing well. Recent foot ulceration and cellulitis. Doing well now.   - metFORMIN (GLUCOPHAGE XR) 500 MG 24 hr tablet; Take 2 tablets (1,000 mg) by mouth 2 times daily (with meals)  - Adult Eye  Referral; Future    Hypertension goal BP (blood pressure) < 140/90  Patient is tolerating current medication without any major side effects of concerns and current dose seems reasonable too.  Current medication regime is effective. Continue current treatment without any changes.   - atenolol (TENORMIN) 25 MG tablet; Take 1 tablet (25 mg) by mouth daily    Hypercholesteremia  Lipid panel is in excellent range but due to diabetes and family history of cardiovascular disease we agreed to continue the same dose of atorvastatin.  He is going for coronary calcium scan for further evaluation and due to family history of cardiac disease.  - atorvastatin (LIPITOR) 20 MG tablet; Take 1 tablet (20 mg) by mouth daily  - CT Coronary Calcium Scan; Future    Family history of sudden cardiac death     - CT Coronary Calcium Scan; Future    Recurrent major depression in remission (H24)  Some life stressors but overall doing well.             BMI  Estimated body mass index is 25.37 kg/m  as calculated from the following:    Height as of this encounter: 1.715 m (5' 7.52\").    Weight as of this encounter: 74.6 kg (164 lb 8 oz).       Counseling  Appropriate preventive services were discussed with this patient, including applicable screening as appropriate for fall prevention, nutrition, physical activity, Tobacco-use cessation, weight loss and cognition.  Checklist reviewing preventive services available has been given to the patient.  Reviewed patient's diet, " addressing concerns and/or questions.   He is at risk for lack of exercise and has been provided with information to increase physical activity for the benefit of his well-being.   Discussed possible causes of fatigue.          Venkatesh Fisher is a 44 year old, presenting for the following:  Physical        2024     9:56 AM   Additional Questions   Roomed by Kori BRAY   Accompanied by self         Health Care Directive  Patient does not have a Health Care Directive or Living Will: Discussed advance care planning with patient; information given to patient to review.    HPI    Toe cellulitis and was hospitalized.   Went to urgent care last month for 2nd toe cellulitis. Got keflex.     Started b12 supplement.     Father angioplasty. Grandfather - heart attack in 40s and . He was also a former smoker. No alcohol in 11 yrs.     Wife recent breast cancer diagnosis.         2024   General Health   How would you rate your overall physical health? Good   Feel stress (tense, anxious, or unable to sleep) To some extent      (!) STRESS CONCERN      2024   Nutrition   Diet: Diabetic            2024   Exercise   Days per week of moderate/strenous exercise 3 days   Average minutes spent exercising at this level 20 min          2024   Social Factors   Frequency of gathering with friends or relatives Once a week   Worry food won't last until get money to buy more No   Food not last or not have enough money for food? No   Do you have housing? (Housing is defined as stable permanent housing and does not include staying ouside in a car, in a tent, in an abandoned building, in an overnight shelter, or couch-surfing.) Yes   Are you worried about losing your housing? No   Lack of transportation? No   Unable to get utilities (heat,electricity)? No          2024   Fall Risk   Fallen 2 or more times in the past year? No   Trouble with walking or balance? No             2024   Activities of Daily  Living- Home Safety   Needs help with the following daily activites None of the above   Safety concerns in the home None of the above          6/25/2024   Dental   Dentist two times every year? Yes            6/25/2024   Hearing Screening   Hearing concerns? None of the above          6/25/2024   Driving Risk Screening   Patient/family members have concerns about driving No          6/25/2024   General Alertness/Fatigue Screening   Have you been more tired than usual lately? (!) YES          6/25/2024   Urinary Incontinence Screening   Bothered by leaking urine in past 6 months No            6/25/2024   TB Screening   Were you born outside of the [place]? No          Today's PHQ-9 Score:       6/25/2024     9:44 AM   PHQ-9 SCORE   PHQ-9 Total Score MyChart 4 (Minimal depression)   PHQ-9 Total Score 4         6/25/2024   Substance Use   Alcohol more than 3/day or more than 7/wk No   Do you have a current opioid prescription? No   How severe/bad is pain from 1 to 10? 2/10   Do you use any other substances recreationally? No      Social History     Tobacco Use    Smoking status: Former     Current packs/day: 0.00     Average packs/day: 0.3 packs/day for 17.0 years (4.3 ttl pk-yrs)     Types: Cigarettes, Dip, chew, snus or snuff     Start date: 7/1/1996     Quit date: 7/1/2013     Years since quitting: 10.9    Smokeless tobacco: Former   Vaping Use    Vaping status: Never Used   Substance Use Topics    Alcohol use: No     Alcohol/week: 0.0 standard drinks of alcohol     Comment: 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  Drink 1.75 every 2 days    Drug use: No       ASCVD Risk   The ASCVD Risk score (Abdoul THOMAS, et al., 2019) failed to calculate for the following reasons:    The valid total cholesterol range is 130 to 320 mg/dL        6/25/2024   Contraception/Family Planning   Questions about contraception or family planning No            Reviewed and updated as needed this visit by Provider                      Current  "providers sharing in care for this patient include:  Patient Care Team:  Km Dos Santos MD as PCP - General (Family Practice)  Edouard Edouard as Other (see comments) (Pain Clinic)  Ky Guerrero MD as MD (Neurology)  Tiffany Cardenas RN as Nurse Coordinator  Ulisses Jordan MD as MD (Pain Clinic)  Kelvin Hickman MD as MD (Ophthalmology)  Km Dos Santos MD as MD (Family Practice)  Erna Pineda RN as Registered Nurse  Ky Guerrero MD as MD (Neurology)  Shelton Smallwood DPM as Assigned Surgical Provider  Km Dos Santos MD as Assigned PCP    The following health maintenance items are reviewed in Epic and correct as of today:  Health Maintenance   Topic Date Due    IPV IMMUNIZATION (3 of 3 - 4-dose series) 01/21/1984    HEPATITIS A IMMUNIZATION (1 of 2 - Risk 2-dose series) Never done    EYE EXAM  10/13/2016    DIABETIC FOOT EXAM  03/01/2019    ANNUAL REVIEW OF HM ORDERS  05/31/2024    MEDICARE ANNUAL WELLNESS VISIT  05/31/2024    A1C  11/16/2024    PHQ-9  12/25/2024    BMP  05/16/2025    LIPID  05/16/2025    MICROALBUMIN  05/16/2025    COLORECTAL CANCER SCREENING  10/05/2027    ADVANCE CARE PLANNING  05/31/2028    DTAP/TDAP/TD IMMUNIZATION (4 - Td or Tdap) 08/29/2028    HEPATITIS C SCREENING  Completed    HIV SCREENING  Completed    DEPRESSION ACTION PLAN  Completed    INFLUENZA VACCINE  Completed    Pneumococcal Vaccine: Pediatrics (0 to 5 Years) and At-Risk Patients (6 to 64 Years)  Completed    HEPATITIS B IMMUNIZATION  Completed    COVID-19 Vaccine  Completed    HPV IMMUNIZATION  Aged Out    MENINGITIS IMMUNIZATION  Aged Out    RSV MONOCLONAL ANTIBODY  Aged Out            Objective    Exam  There were no vitals taken for this visit.   Estimated body mass index is 25.32 kg/m  as calculated from the following:    Height as of 5/29/24: 1.74 m (5' 8.5\").    Weight as of 5/29/24: 76.7 kg (169 lb).    Physical " Exam  GENERAL: alert and no distress  EYES: Eyes grossly normal to inspection, PERRL and conjunctivae and sclerae normal  HENT: ear canals and TM's normal, nose and mouth without ulcers or lesions  NECK: no adenopathy, no asymmetry, masses, or scars  RESP: lungs clear to auscultation - no rales, rhonchi or wheezes  CV: regular rate and rhythm, normal S1 S2, no S3 or S4, no murmur, click or rub, no peripheral edema  ABDOMEN: soft, nontender, no hepatosplenomegaly, no masses and bowel sounds normal   (male): normal male genitalia without lesions or urethral discharge, no hernia  MS: no gross musculoskeletal defects noted, no edema  SKIN: no suspicious lesions or rashes  NEURO: Normal strength and tone, mentation intact and speech normal  PSYCH: mentation appears normal, affect normal/bright         No data to display              Signed Electronically by: Km Dos Santos MD    Answers submitted by the patient for this visit:  Patient Health Questionnaire (Submitted on 6/25/2024)  If you checked off any problems, how difficult have these problems made it for you to do your work, take care of things at home, or get along with other people?: Somewhat difficult  PHQ9 TOTAL SCORE: 4

## 2024-06-25 NOTE — PATIENT INSTRUCTIONS
"Patient Education   Preventive Care Advice   This is general advice we often give to help people stay healthy. Your care team may have specific advice just for you. Please talk to your care team about your own preventive care needs.  Lifestyle  Exercise at least 150 minutes each week (30 minutes a day, 5 days a week).  Do muscle strengthening activities 2 days a week. These help control your weight and prevent disease.  No smoking.  Wear sunscreen to prevent skin cancer.  Have your home tested for radon every 2 to 5 years. Radon is a colorless, odorless gas that can harm your lungs. To learn more, go to www.health.[facility].[place].us and search for \"Radon in Homes.\"  Keep guns unloaded and locked up in a safe place like a safe or gun vault, or, use a gun lock and hide the keys. Always lock away bullets separately. To learn more, visit [employer].[place].gov and search for \"safe gun storage.\"  Nutrition  Eat 5 or more servings of fruits and vegetables each day.  Try wheat bread, brown rice and whole grain pasta (instead of white bread, rice, and pasta).  Get enough calcium and vitamin D. Check the label on foods and aim for 100% of the RDA (recommended daily allowance).  Regular exams  Have a dental exam and cleaning every 6 months.  See your health care team every year to talk about:  Any changes in your health.  Any medicines your care team has prescribed.  Preventive care, family planning, and ways to prevent chronic diseases.  Shots (vaccines)   HPV shots (up to age 26), if you've never had them before.  Hepatitis B shots (up to age 59), if you've never had them before.  COVID-19 shot: Get this shot when it's due.  Flu shot: Get a flu shot every year.  Tetanus shot: Get a tetanus shot every 10 years.  Pneumococcal, hepatitis A, and RSV shots: Ask your care team if you need these based on your risk.  Shingles shot (for age 50 and up).  General health tests  Diabetes screening:  Starting at age 35, Get screened for diabetes at least " every 3 years.  If you are younger than age 35, ask your care team if you should be screened for diabetes.  Cholesterol test: At age 39, start having a cholesterol test every 5 years, or more often if advised.  Bone density scan (DEXA): At age 50, ask your care team if you should have this scan for osteoporosis (brittle bones).  Hepatitis C: Get tested at least once in your life.  Abdominal aortic aneurysm screening: Talk to your doctor about having this screening if you:  Have ever smoked; and  Are biologically male; and  Are between the ages of 65 and 75.  STIs (sexually transmitted infections)  Before age 24: Ask your care team if you should be screened for STIs.  After age 24: Get screened for STIs if you're at risk. You are at risk for STIs (including HIV) if:  You are sexually active with more than one person.  You don't use condoms every time.  You or a partner was diagnosed with a sexually transmitted infection.  If you are at risk for HIV, ask about PrEP medicine to prevent HIV.  Get tested for HIV at least once in your life, whether you are at risk for HIV or not.  Cancer screening tests  Cervical cancer screening: If you have a cervix, begin getting regular cervical cancer screening tests at age 21. Most people who have regular screenings with normal results can stop after age 65. Talk about this with your provider.  Breast cancer scan (mammogram): If you've ever had breasts, begin having regular mammograms starting at age 40. This is a scan to check for breast cancer.  Colon cancer screening: It is important to start screening for colon cancer at age 45.  Have a colonoscopy test every 10 years (or more often if you're at risk) Or, ask your provider about stool tests like a FIT test every year or Cologuard test every 3 years.  To learn more about your testing options, visit: www.[employer]/906478.pdf.  For help making a decision, visit: marivel/sc02749.  Prostate cancer screening test: If you have a  prostate and are age 55 to 69, ask your provider if you would benefit from a yearly prostate cancer screening test.  Lung cancer screening: If you are a current or former smoker age 50 to 80, ask your care team if ongoing lung cancer screenings are right for you.  For informational purposes only. Not to replace the advice of your health care provider. Copyright   2023 [facility]. All rights reserved. Clinically reviewed by the  [employer] [facility]. Carnad [id number] - REV 04/24.  Learning About Sleeping Well  What does sleeping well mean?     Sleeping well means getting enough sleep to feel good and stay healthy. How much sleep is enough varies among people.  The number of hours you sleep and how you feel when you wake up are both important. If you do not feel refreshed, you probably need more sleep. Another sign of not getting enough sleep is feeling tired during the day.  Experts recommend that adults get at least 7 or more hours of sleep per day. Children and older adults need more sleep.  Why is getting enough sleep important?  Getting enough quality sleep is a basic part of good health. When your sleep suffers, your physical health, mood, and your thoughts can suffer too. You may find yourself feeling more grumpy or stressed. Not getting enough sleep also can lead to serious problems, including injury, accidents, anxiety, and depression.  What might cause poor sleeping?  Many things can cause sleep problems, including:  Changes to your sleep schedule.  Stress. Stress can be caused by fear about a single event, such as giving a speech. Or you may have ongoing stress, such as worry about work or school.  Depression, anxiety, and other mental or emotional conditions.  Changes in your sleep habits or surroundings. This includes changes that happen where you sleep, such as noise, light, or sleeping in a different bed. It also includes changes in your sleep pattern, such as having jet  "lag or working a late shift.  Health problems, such as pain, breathing problems, and restless legs syndrome.  Lack of regular exercise.  Using alcohol, nicotine, or caffeine before bed.  How can you help yourself?  Here are some tips that may help you sleep more soundly and wake up feeling more refreshed.  Your sleeping area   Use your bedroom only for sleeping and sex. A bit of light reading may help you fall asleep. But if it doesn't, do your reading elsewhere in the house. Try not to use your TV, computer, smartphone, or tablet while you are in bed.  Be sure your bed is big enough to stretch out comfortably, especially if you have a sleep partner.  Keep your bedroom quiet, dark, and cool. Use curtains, blinds, or a sleep mask to block out light. To block out noise, use earplugs, soothing music, or a \"white noise\" machine.  Your evening and bedtime routine   Create a relaxing bedtime routine. You might want to take a warm shower or bath, or listen to soothing music.  Go to bed at the same time every night. And get up at the same time every morning, even if you feel tired.  What to avoid   Limit caffeine (coffee, tea, caffeinated sodas) during the day, and don't have any for at least 6 hours before bedtime.  Avoid drinking alcohol before bedtime. Alcohol can cause you to wake up more often during the night.  Try not to smoke or use tobacco, especially in the evening. Nicotine can keep you awake.  Limit naps during the day, especially close to bedtime.  Avoid lying in bed awake for too long. If you can't fall asleep or if you wake up in the middle of the night and can't get back to sleep within about 20 minutes, get out of bed and go to another room until you feel sleepy.  Avoid taking medicine right before bed that may keep you awake or make you feel hyper or energized. Your doctor can tell you if your medicine may do this and if you can take it earlier in the day.  If you can't sleep   Imagine yourself in a " "peaceful, pleasant scene. Focus on the details and feelings of being in a place that is relaxing.  Get up and do a quiet or boring activity until you feel sleepy.  Avoid drinking any liquids before going to bed to help prevent waking up often to use the bathroom.  Where can you learn more?  Go to https://www.[employer].net/patiented  Enter J942 in the search box to learn more about \"Learning About Sleeping Well.\"  Current as of: July 10, 2023               Content Version: 14.0    7964-5892 [employer].   Care instructions adapted under license by your healthcare professional. If you have questions about a medical condition or this instruction, always ask your healthcare professional. [employer] disclaims any warranty or liability for your use of this information.         "

## 2024-07-09 ENCOUNTER — OFFICE VISIT (OUTPATIENT)
Dept: PODIATRY | Facility: CLINIC | Age: 44
End: 2024-07-09
Payer: COMMERCIAL

## 2024-07-09 VITALS — SYSTOLIC BLOOD PRESSURE: 120 MMHG | DIASTOLIC BLOOD PRESSURE: 72 MMHG

## 2024-07-09 DIAGNOSIS — Z09 SURGERY FOLLOW-UP EXAMINATION: ICD-10-CM

## 2024-07-09 DIAGNOSIS — E11.42 DIABETIC POLYNEUROPATHY ASSOCIATED WITH TYPE 2 DIABETES MELLITUS (H): Primary | ICD-10-CM

## 2024-07-09 DIAGNOSIS — L03.031 CELLULITIS OF TOE, RIGHT: ICD-10-CM

## 2024-07-09 DIAGNOSIS — E11.9 TYPE 2 DIABETES MELLITUS WITHOUT COMPLICATION, WITHOUT LONG-TERM CURRENT USE OF INSULIN (H): ICD-10-CM

## 2024-07-09 PROCEDURE — 99213 OFFICE O/P EST LOW 20 MIN: CPT | Performed by: PODIATRIST

## 2024-07-09 NOTE — PROGRESS NOTES
ASSESSMENT:  Encounter Diagnoses   Name Primary?    Diabetic polyneuropathy associated with type 2 diabetes mellitus (H) Yes    Type 2 diabetes mellitus without complication, without long-term current use of insulin (H)     Surgery follow-up examination     Resolved cellulitis of toe, right        MEDICAL DECISION MAKING:  Taran is currently wearing athletic shoes with Superfeet inserts.  Given his diabetes, I inquired about his use of diabetic shoes and custom orthoses.  He is interested in this.  A prescription was placed.  It is recommended he follow-up at least once yearly for diabetic foot exam.  He is to follow-up anytime he has concerns.    Disclaimer: This note consists of symbols derived from keyboarding, dictation and/or voice recognition software. As a result, there may be errors in the script that have gone undetected. Please consider this when interpreting information found in this chart.    Shelton Smallwood DPM, FACBELKYS, MS    [place] Department of Podiatry/Foot & Ankle Surgery      ____________________________________________________________________    HPI:       Phillip follows up from a recent hospitalization from 5/1/2024 until 5/7/2024.  He was admitted for right great toe cellulitis, abscess and concern for osteomyelitis.  He received IV antibiotics.  Excisional debridement with bone biopsy on 5/3/2024.  Bone biopsy was negative for osteomyelitis.  Infectious disease was involved in his care.  He was discharged on [id number] mg 4 times daily for 10 days.    For more details, please refer to the discharge summary from 5/7/2024.    He reached out via Sympt[employer]t with concerns regarding a blister and erythema involving his right second toe.  He believes the dressing on the great toe cause irritation on the second.  He was evaluated in urgent care on 5/29/2024.  At that time he was placed on cephalexin   He reports significant interval improvement.  *  Past Medical History:   Diagnosis Date    Anxiety      Depressive disorder     Diabetes mellitus (H)     Hypertension     Liver disease     Neuralgic amyotrophy     Other chronic pain     Pain disorder 12/8/2015    Parsonage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     Prostate infection     Seizures (H)     Staph infection    *  *  Past Surgical History:   Procedure Laterality Date    BIOPSY BONE FOOT Right 1/18/2022    Procedure: BIOPSY, BONE, RIGHT GREAT TOE (DISTAL PHALANX);  Surgeon: Shelton Smallwood DPM;  Location: [facility] OR    BIOPSY BONE TOE Right 5/3/2024    Procedure: BIOPSY, BONE, TOE;  Surgeon: Shelton Smallwood DPM;  Location: [facility] OR    IRRIGATION AND DEBRIDEMENT TOE, COMBINED Right 5/3/2024    Procedure: IRRIGATION AND DEBRIDEMENT, TOE FIRST;  Surgeon: Shelton Smallwood DPM;  Location: [facility] OR    ORTHOPEDIC SURGERY     *  *  Current Outpatient Medications   Medication Sig Dispense Refill    acetaminophen (TYLENOL) 325 MG tablet Take 2 tablets (650 mg) by mouth every 6 hours as needed for mild pain or other 30 tablet 0    atenolol (TENORMIN) 25 MG tablet Take 1 tablet (25 mg) by mouth daily 90 tablet 1    atorvastatin (LIPITOR) 20 MG tablet Take 1 tablet (20 mg) by mouth daily 90 tablet 1    blood glucose (ACCU-CHEK GUIDE) test strip USE TO TEST BLOOD SUGAR TWO TIMES A DAY OR AS DIRECTED 200 strip 1    blood glucose (NO BRAND SPECIFIED) lancets standard Use to test blood sugar 2 times daily or as directed. 200 each 3    blood glucose monitoring (NO BRAND SPECIFIED) meter device kit Use to test blood sugar 2 times daily or as directed. 1 kit 0    buprenorphine HCl-naloxone HCl (SUBOXONE) 8-2 MG per film Place 1 Film under the tongue daily      Continuous Blood Gluc Sensor ([employer]zwerkeSTYLE JHON 14 DAY SENSOR) MISC Change every 14 days. 2 each 5    cyanocobalamin (VITAMIN B-12) 1000 MCG tablet Take 1 tablet (1,000 mcg) by mouth daily      diazepam (VALIUM) 5 MG tablet Take 1 tablet (5 mg) 3 times daily as needed. 60 tablet     gabapentin (NEURONTIN) 600 MG tablet Take 1 tablet (600 mg) by  mouth 2 times daily      metFORMIN (GLUCOPHAGE XR) 500 MG 24 hr tablet Take 2 tablets (1,000 mg) by mouth 2 times daily (with meals) 360 tablet 1    nortriptyline (PAMELOR) 25 MG capsule Take 1 capsule (25 mg) by mouth At Bedtime      OZEMPIC, 0.25 OR 0.5 MG/DOSE, 2 MG/3ML pen INJECT 0.5MG UNDER THE SKIN EVERY 7 DAYS 3 mL 1    sildenafil (VIAGRA) 100 MG tablet Take 1 tablet (100 mg) by mouth daily as needed 6 tablet 11    venlafaxine (EFFEXOR-ER) 150 MG TB24 Take 1 tablet by mouth daily (with breakfast). 30 each 0         EXAM:    Vitals: /72   BMI: There is no height or weight on file to calculate BMI.      Vascular:  Pedal pulses are palpable for both the DP and PT arteries.  CFT < 3 sec.  No edema.      Neuro: Light touch sensation is diminished, bilateral foot, to the L4, L5, S1 distributions  No evidence of weakness, spasticity, or contracture in the lower extremities.     Derm: The erythema seen in previous photograph has resolved from the second toe on the right.  No wounds.  The skin of the right hallux is healthy with minimal hyperkeratosis.  The biopsy site has healed.

## 2024-07-09 NOTE — PATIENT INSTRUCTIONS
Thank you for choosing [facility]!    DR. GAVIN'S CLINIC LOCATIONS:     [facility] TRIAGE LINE: [phone]   [address] [place] [address] APPOINTMENTS: [phone]   [place] [address] RADIOLOGY: [phone]   (Every other Tues - Wed - Fri PM) SET UP SURGERY: [phone]    PHYSICAL THERAPY: [phone]   [place] SPECIALTY BILLING QUESTIONS: [phone]   [address] FAX: [phone]   [place] [address]    (Thurs & Fri AM)         [place] [facility] LOCATIONS  [facility]- [address]  [place] [address]  Phone: [phone]  Fax: [phone] [facility]   [address]  [place] [address]  Phone: [phone]  Fax: [phone]   [facility] [place]  [address]  [place] [address]  Phone: [phone]  Fax: [phone] [facility]  [address]  [place] [address]  Phone: [phone]   Fax: [phone]   * Please call any location listed to make an appointment for a casting/fitting. Your referral was sent to their central office and they will all have the order on file.

## 2024-07-09 NOTE — LETTER
7/9/2024      Phillip Rueda  [address]  [facility] [address]      Dear Colleague,    Thank you for referring your patient, Phillip Rueda, to the [facility]. Please see a copy of my visit note below.    ASSESSMENT:  Encounter Diagnoses   Name Primary?     Diabetic polyneuropathy associated with type 2 diabetes mellitus (H) Yes     Type 2 diabetes mellitus without complication, without long-term current use of insulin (H)      Surgery follow-up examination      Resolved cellulitis of toe, right        MEDICAL DECISION MAKING:  Taran is currently wearing athletic shoes with Superfeet inserts.  Given his diabetes, I inquired about his use of diabetic shoes and custom orthoses.  He is interested in this.  A prescription was placed.  It is recommended he follow-up at least once yearly for diabetic foot exam.  He is to follow-up anytime he has concerns.    Disclaimer: This note consists of symbols derived from keyboarding, dictation and/or voice recognition software. As a result, there may be errors in the script that have gone undetected. Please consider this when interpreting information found in this chart.    Shelton Smallwood, HUNG, FACFAS, [facility] Department of Podiatry/Foot & Ankle Surgery      ____________________________________________________________________    HPI:       Phillip follows up from a recent hospitalization from 5/1/2024 until 5/7/2024.  He was admitted for right great toe cellulitis, abscess and concern for osteomyelitis.  He received IV antibiotics.  Excisional debridement with bone biopsy on 5/3/2024.  Bone biopsy was negative for osteomyelitis.  Infectious disease was involved in his care.  He was discharged on [id number] mg 4 times daily for 10 days.    For more details, please refer to the discharge summary from 5/7/2024.    He reached out via [email or web address] with concerns regarding a blister and erythema involving his right second toe.  He believes the  dressing on the great toe cause irritation on the second.  He was evaluated in urgent care on 5/29/2024.  At that time he was placed on cephalexin   He reports significant interval improvement.  *  Past Medical History:   Diagnosis Date     Anxiety      Depressive disorder      Diabetes mellitus (H)      Hypertension      Liver disease      Neuralgic amyotrophy      Other chronic pain      Pain disorder 12/8/2015     Parsonage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      Prostate infection      Seizures (H)      Staph infection    *  *  Past Surgical History:   Procedure Laterality Date     BIOPSY BONE FOOT Right 1/18/2022    Procedure: BIOPSY, BONE, RIGHT GREAT TOE (DISTAL PHALANX);  Surgeon: Shelton Smallwood DPM;  Location: [facility]     BIOPSY BONE TOE Right 5/3/2024    Procedure: BIOPSY, BONE, TOE;  Surgeon: Shelton Smallwood DPM;  Location: [facility] OR     IRRIGATION AND DEBRIDEMENT TOE, COMBINED Right 5/3/2024    Procedure: IRRIGATION AND DEBRIDEMENT, TOE FIRST;  Surgeon: Shelton Smallwood DPM;  Location: [facility] OR     ORTHOPEDIC SURGERY     *  *  Current Outpatient Medications   Medication Sig Dispense Refill     acetaminophen (TYLENOL) 325 MG tablet Take 2 tablets (650 mg) by mouth every 6 hours as needed for mild pain or other 30 tablet 0     atenolol (TENORMIN) 25 MG tablet Take 1 tablet (25 mg) by mouth daily 90 tablet 1     atorvastatin (LIPITOR) 20 MG tablet Take 1 tablet (20 mg) by mouth daily 90 tablet 1     blood glucose (ACCU-CHEK GUIDE) test strip USE TO TEST BLOOD SUGAR TWO TIMES A DAY OR AS DIRECTED 200 strip 1     blood glucose (NO BRAND SPECIFIED) lancets standard Use to test blood sugar 2 times daily or as directed. 200 each 3     blood glucose monitoring (NO BRAND SPECIFIED) meter device kit Use to test blood sugar 2 times daily or as directed. 1 kit 0     buprenorphine HCl-naloxone HCl (SUBOXONE) 8-2 MG per film Place 1 Film under the tongue daily       Continuous Blood Gluc Sensor (FREESTYLE JHON 14 DAY SENSOR) [facility]  Change every 14 days. 2 each 5     cyanocobalamin (VITAMIN B-12) 1000 MCG tablet Take 1 tablet (1,000 mcg) by mouth daily       diazepam (VALIUM) 5 MG tablet Take 1 tablet (5 mg) 3 times daily as needed. 60 tablet      gabapentin (NEURONTIN) 600 MG tablet Take 1 tablet (600 mg) by mouth 2 times daily       metFORMIN (GLUCOPHAGE XR) 500 MG 24 hr tablet Take 2 tablets (1,000 mg) by mouth 2 times daily (with meals) 360 tablet 1     nortriptyline (PAMELOR) 25 MG capsule Take 1 capsule (25 mg) by mouth At Bedtime       OZEMPIC, 0.25 OR 0.5 MG/DOSE, 2 MG/3ML pen INJECT 0.5MG UNDER THE SKIN EVERY 7 DAYS 3 mL 1     sildenafil (VIAGRA) 100 MG tablet Take 1 tablet (100 mg) by mouth daily as needed 6 tablet 11     venlafaxine (EFFEXOR-ER) 150 MG TB24 Take 1 tablet by mouth daily (with breakfast). 30 each 0         EXAM:    Vitals: /72   BMI: There is no height or weight on file to calculate BMI.      Vascular:  Pedal pulses are palpable for both the DP and PT arteries.  CFT < 3 sec.  No edema.      Neuro: Light touch sensation is diminished, bilateral foot, to the L4, L5, S1 distributions  No evidence of weakness, spasticity, or contracture in the lower extremities.     Derm: The erythema seen in previous photograph has resolved from the second toe on the right.  No wounds.  The skin of the right hallux is healthy with minimal hyperkeratosis.  The biopsy site has healed.      Again, thank you for allowing me to participate in the care of your patient.        Sincerely,        Shelton Smallwood DPM

## 2024-08-19 DIAGNOSIS — E11.9 TYPE 2 DIABETES MELLITUS WITHOUT COMPLICATION, WITHOUT LONG-TERM CURRENT USE OF INSULIN (H): ICD-10-CM

## 2024-08-19 RX ORDER — SEMAGLUTIDE 0.68 MG/ML
INJECTION, SOLUTION SUBCUTANEOUS
Qty: 3 ML | Refills: 1 | Status: SHIPPED | OUTPATIENT
Start: 2024-08-19

## 2024-08-20 ENCOUNTER — HOSPITAL ENCOUNTER (OUTPATIENT)
Dept: CT IMAGING | Facility: CLINIC | Age: 44
Discharge: HOME | End: 2024-08-20
Attending: FAMILY MEDICINE | Admitting: FAMILY MEDICINE
Payer: COMMERCIAL

## 2024-08-20 DIAGNOSIS — Z82.41 FAMILY HISTORY OF SUDDEN CARDIAC DEATH: ICD-10-CM

## 2024-08-20 DIAGNOSIS — E78.00 HYPERCHOLESTEREMIA: ICD-10-CM

## 2024-08-20 PROCEDURE — G1010 CDSM STANSON: HCPCS | Performed by: STUDENT IN AN ORGANIZED HEALTH CARE EDUCATION/TRAINING PROGRAM

## 2024-08-20 PROCEDURE — 75571 CT HRT W/O DYE W/CA TEST: CPT | Mod: 26 | Performed by: STUDENT IN AN ORGANIZED HEALTH CARE EDUCATION/TRAINING PROGRAM

## 2024-08-20 PROCEDURE — 75571 CT HRT W/O DYE W/CA TEST: CPT | Mod: MF

## 2024-10-25 DIAGNOSIS — E11.9 TYPE 2 DIABETES MELLITUS WITHOUT COMPLICATION, WITHOUT LONG-TERM CURRENT USE OF INSULIN (H): ICD-10-CM

## 2024-10-25 RX ORDER — SEMAGLUTIDE 0.68 MG/ML
INJECTION, SOLUTION SUBCUTANEOUS
Qty: 3 ML | Refills: 1 | OUTPATIENT
Start: 2024-10-25

## 2024-12-02 DIAGNOSIS — E11.9 TYPE 2 DIABETES MELLITUS WITHOUT COMPLICATION, WITHOUT LONG-TERM CURRENT USE OF INSULIN (H): ICD-10-CM

## 2024-12-03 RX ORDER — SEMAGLUTIDE 0.68 MG/ML
INJECTION, SOLUTION SUBCUTANEOUS
Qty: 3 ML | Refills: 1 | Status: SHIPPED | OUTPATIENT
Start: 2024-12-03

## 2024-12-21 ENCOUNTER — HEALTH MAINTENANCE LETTER (OUTPATIENT)
Age: 44
End: 2024-12-21

## 2025-01-28 ENCOUNTER — OFFICE VISIT (OUTPATIENT)
Dept: PODIATRY | Facility: CLINIC | Age: 45
End: 2025-01-28
Payer: MEDICARE

## 2025-01-28 VITALS — SYSTOLIC BLOOD PRESSURE: 120 MMHG | BODY MASS INDEX: 25.29 KG/M2 | DIASTOLIC BLOOD PRESSURE: 80 MMHG | WEIGHT: 164 LBS

## 2025-01-28 DIAGNOSIS — L84 CALLUS OF FOOT: ICD-10-CM

## 2025-01-28 DIAGNOSIS — E11.42 DIABETIC POLYNEUROPATHY ASSOCIATED WITH TYPE 2 DIABETES MELLITUS (H): ICD-10-CM

## 2025-01-28 DIAGNOSIS — M21.42 PES PLANUS OF BOTH FEET: ICD-10-CM

## 2025-01-28 DIAGNOSIS — E11.9 TYPE 2 DIABETES MELLITUS WITHOUT COMPLICATION, WITHOUT LONG-TERM CURRENT USE OF INSULIN (H): ICD-10-CM

## 2025-01-28 DIAGNOSIS — L97.511 ULCER OF GREAT TOE, RIGHT, LIMITED TO BREAKDOWN OF SKIN (H): Primary | ICD-10-CM

## 2025-01-28 DIAGNOSIS — M21.41 PES PLANUS OF BOTH FEET: ICD-10-CM

## 2025-01-28 DIAGNOSIS — L85.3 DRY SKIN: ICD-10-CM

## 2025-01-28 PROCEDURE — 11042 DBRDMT SUBQ TIS 1ST 20SQCM/<: CPT | Performed by: PODIATRIST

## 2025-01-28 PROCEDURE — 99213 OFFICE O/P EST LOW 20 MIN: CPT | Mod: 25 | Performed by: PODIATRIST

## 2025-01-28 NOTE — PATIENT INSTRUCTIONS
"Thank you for choosing [facility] Podiatry / Foot & Ankle Surgery!    DR. GAVIN'S CLINIC LOCATIONS:     [facility] TRIAGE LINE: [phone]   [address] [place] [address] APPOINTMENTS: [phone]   [place] [address] RADIOLOGY: [phone]   (Every other Tues - Wed - Fri PM) SET UP SURGERY: [phone]    PHYSICAL THERAPY: [phone]   [place] SPECIALTY BILLING QUESTIONS: [phone]   [address] FAX: [phone]   [place] [address]    (Thurs & Fri AM)      For dry skin on feet, including the calluses, consider:  O'Shellie's foot balm  AmLactin cream  Eucerin cream    [facility] LOCATIONS  [facility] [place]  [address]  [place] [address]  Phone: [phone]  Fax: [phone] [facility]   [address]  [place] [address]  Phone: [phone]  Fax: [phone]   [facility] [place]  [address]  [place] [address]  Phone: [phone]  Fax: [phone] [facility]  [address]  [place] [address]  Phone: [phone]   Fax: [phone]   * Please call any location listed to make an appointment for a casting/fitting. Your referral was sent to their central office and they will all have the order on file.       DIABETES AND YOUR FEET  Diabetes can result in several problems in the feet including ulcers (open sores) and amputations. Two of the most important reasons why people develop foot problems when they have diabetes is : 1. Neuropathy (loss of feeling)  2. Vascular disease (loss or decrease of blood flow).    Neuropathy is a term used to describe a loss of nerve function.  Patients with diabetes are at risk of developing neuropathy if their sugars continue to run high and are above the normal value. One theory for neuropathy is that the \"extra\" sugar in the body enters the nerves and is broken down. These by-products build up in " the nerve causing it to swell and impairing nerve function. Often times, this can be prevented by controlling your sugars, dieting and exercise.    When a person develops neuropathy, they usually begin to feel numbness or tingling in their feet and sometime in their legs.  Other symptoms may include painful burning or hot feet, tingling or feeling like insects or ants are crawling on your feet or legs.  If the diabetes is sever and the sugars run high for long periods of time, neuropathy can also occur in the hands.    Vascular disease  is a term used to describe a loss or decrease in circulation (blood flow). There is a problem in getting blood and oxygen to areas that need it. Similar to neuropathy, sugars can build up in the walls of the arteries (blood vessels) and cause them to become swollen, thickened and hardened. This decreases the amount of blood that can go to an area that needs it. Though this is common in the legs of diabetic patients, it can also affect other arteries (blood vessels) in the body such as in the heart and eyes.    In the legs, vascular disease usually results in cramping. Patients who develop leg cramps after walking the same distance every time (i.e. One block, half a mile, ect.) need to let their doctors know so that their circulation may be checked. Cramps causing severe pain in the feet and/or legs while sleeping and the cramps go away when you stand or hang your legs off the side of the bed, may also be a sign of poor blood circulation.  Occasional cramping in cold weather or on rare occasions with activity may not be due to poor circulation, but you should inform your doctor.    PREVENTION OF THESE DISEASES  The key to prevention is good blood sugar control. Poor blood sugar control is a big reason many of these problems start. Physical activity (exercise) is a very good way to help decrease your blood sugars. Exercise can lower your blood sugar, blood pressure, and cholesterol. It  "also reduces your risk for heart disease and stroke, relieves stress, and strengthens your heart, muscles and bones.  In addition, regular activity helps insulin work better, improves your blood circulation, and keeps your joints flexible. If you're trying to lose weight, a combination of exercise and wise food choices can help you reach your target weight and maintain it.      PAIN MANAGEMENT (**Please speak with your primary doctor about any medications**)  1.Blood Sugar Control - Most important  2. Medications such as:  Amytriptylline, duloxetine, gabapentin, lyrica, tramadol (talk with your primary care doctor about this).     NUTRITION:  Nutrition is also important to help with healing. If your body does not have what it needs, it can't heal.   Increasing your protein intake is important.  With wounds you need 60-90gm of protein a day to help with healing. Over the counter protein shakes such as Ian, Glucerna, Ensure, ect... can help to supplement your daily protein intake.   It is also important to take Vitamins to help with healing.  Vitamins such as B12, B6 and Vitamin D3 are important for healing. These can be gotten over the counter at pharmacies or at stores like [employer] or the [employer].    I can also prescribe a dietary supplement called \"Rheumate\" that has a lot of essential vitamins in one capsule.  This may not be covered by insurance though.     FOOT CARE RECOMMENDATIONS   1. Wash your feet with lukewarm water and a mild soap and then dry them thoroughly, especially between the toes.     2. Examine your feet daily looking for cuts, corns, blisters, cracks, ect, especially after wearing new shoes. Make sure to look between your toes. If you cannot see the bottom of your feet, set a mirror on the floor and hold your foot over it, or ask a spouse, friend or family member to examine your feet for you. Contact your doctor immediately if new problems are noted or if sores are not healing.     3. " Immediately apply moisturizer to the tops and bottoms of your feet, avoiding areas between the toes. Hand lotion (Intesive Care, Tiara, Eucerin, Neutrogena, Curel, ect) is sufficient unless your doctor prescribes a medicated lotion. Apply sunscreen to your feet when going swimming outside.     4. Use clean comfortable shoes, wear white socks (if you have any bleeding or drainage, you will see it on white socks). Socks should not have thick seams or cut off the circulation around the leg. Break in new shoes slowly and rotate with older shoes until broken in. Check the inside of your shoes with your hand to look for areas of irritation or objects that may have fallen into your shoes.       5. Keep slippers by the side of your bed for use during the night.     6.  Shoes should be fitted by a professional and should not cause areas of irritation.  Check your feet regularly when wearing a new pair of shoes and replace them as needed.     7.  Talk to your doctor about proper exercise. Exercise and stretching stimulate blood flow to your feet and maintain proper glucose levels.     8.  Monitor your blood glucose level as instructed by your doctor. Notify your doctor immediately if your blood sugar is abnormally high or low.    9. Cut your nails straight across, but then gently round any sharp edges with a cardboard nail file. If you have neuropathy, peripheral vascular disease or cannot see that well to trim your own toenails contact [employer] ([phone]) or [employer] ([phone]).      THINGS TO AVOID DOING   1.  Do not soak your feet if you have an open sore. Use only lukewarm water and always check the temperature with your hand as hot water can easily burn your feet.       2.  Never use a hot water bottle or heating pad on your feet. Also do not apply cold compresses to your feet. With decreased sensation, you could burn or freeze your feet.       3.  Do not apply any of these to your feet:    -  Over the  counter medicine for corns or warts    -  Harsh chemicals like boric acid    -  Do not self-treat corns, cuts, blisters or infections. Always consult your doctor.       4.  Do not wear sandals, slippers or walk barefoot, especially on hot sand or concrete or other harsh surfaces.     5.  If you smoke, stop!!!

## 2025-01-28 NOTE — PROGRESS NOTES
ASSESSMENT:  Encounter Diagnoses   Name Primary?    Ulcer of great toe, right, limited to breakdown of skin (H) Yes    Diabetic polyneuropathy associated with type 2 diabetes mellitus (H)     Type 2 diabetes mellitus without complication, without long-term current use of insulin (H)     Pes planus of both feet     Callus of foot     Dry skin of feet      MEDICAL DECISION MAKING:  Diabetic foot exam was completed.  Notable for loss of protective sensation and superficial ulceration of the right hallux.  Post excisional debridement of the hyperkeratotic eschar, this will likely heal.    Again, the option of diabetic shoes and custom orthoses was discussed.  The referral was placed.    I encouraged Phillip to use a moisturizer on the dry callused areas and discussed a few options.  He is also encouraged to use a pumice stone on the bilateral great toe calluses.    Follow-up on an as-needed basis  Follow-up 1-2 times yearly for diabetic foot check recommended.      Debridement Procedure:   The purpose and goals of excisional debridement were discussed, including removing nonviable tissue, reducing risk of infection, and promoting healing.  Verbal consent was obtained.    Technique: excisional debridement    Depth of debridement: Excisional debridement was carried down to the depth of, and including, the deeper skin.    Location: dorsal medial right great toe    Instrument(s) used: #15 scalpel     Wound Dimensions: 0.5cm diameter x depth to deeper skin    Type of Wound: neuropathic/diabetic      Progress Statement: stable. No clinical signs of infection    The procedure was tolerated well by the patient.  No anesthesia needed, due to peripheral neuropathy.  Bacitracin and a light dressing was applied.          Disclaimer: This note consists of symbols derived from keyboarding, dictation and/or voice recognition software. As a result, there may be errors in the script that have gone undetected. Please consider this when  interpreting information found in this chart.    Shelton Smallwood DPM, FACFAS, MS    [facility] of Podiatry/Foot & Ankle Surgery      ____________________________________________________________________    HPI:     Phillip Rueda presents today for diabetic foot exam.  He noted some discoloration within a callus on his right great toe.  This is a region of previous ulceration.    Type 2 diabetes  Peripheral neuropathy  Last hemoglobin A1c 6.3%  I last evaluated Phillip on 7/9/2024.    Status post left flatfoot reconstructive surgery at [facility] orthopedics  Currently wearing some well-worn athletic shoes with Superfeet inserts.  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     Prostate infection     Seizures (H)     Staph infection    *  *  Past Surgical History:   Procedure Laterality Date    BIOPSY BONE FOOT Right 1/18/2022    Procedure: BIOPSY, BONE, RIGHT GREAT TOE (DISTAL PHALANX);  Surgeon: Shelton Smallwood DPM;  Location:  OR    BIOPSY BONE TOE Right 5/3/2024    Procedure: BIOPSY, BONE, TOE;  Surgeon: Shelton Smallwood DPM;  Location: [facility] OR    IRRIGATION AND DEBRIDEMENT TOE, COMBINED Right 5/3/2024    Procedure: IRRIGATION AND DEBRIDEMENT, TOE FIRST;  Surgeon: Shelton Smallwood DPM;  Location:  OR    ORTHOPEDIC SURGERY     *  *  Current Outpatient Medications   Medication Sig Dispense Refill    acetaminophen (TYLENOL) 325 MG tablet Take 2 tablets (650 mg) by mouth every 6 hours as needed for mild pain or other 30 tablet 0    atenolol (TENORMIN) 25 MG tablet Take 1 tablet (25 mg) by mouth daily 90 tablet 1    atorvastatin (LIPITOR) 20 MG tablet Take 1 tablet (20 mg) by mouth daily 90 tablet 1    blood glucose (ACCU-CHEK GUIDE) test strip USE TO TEST BLOOD SUGAR TWO TIMES A DAY OR AS DIRECTED 200  strip 1    blood glucose (NO BRAND SPECIFIED) lancets standard Use to test blood sugar 2 times daily or as directed. 200 each 3    blood glucose monitoring (NO BRAND SPECIFIED) meter device kit Use to test blood sugar 2 times daily or as directed. 1 kit 0    buprenorphine HCl-naloxone HCl (SUBOXONE) 8-2 MG per film Place 1 Film under the tongue daily      Continuous Blood Gluc Sensor (FREESTYLE JHON 14 DAY SENSOR) MISC Change every 14 days. 2 each 5    cyanocobalamin (VITAMIN B-12) 1000 MCG tablet Take 1 tablet (1,000 mcg) by mouth daily      diazepam (VALIUM) 5 MG tablet Take 1 tablet (5 mg) 3 times daily as needed. 60 tablet     gabapentin (NEURONTIN) 600 MG tablet Take 1 tablet (600 mg) by mouth 2 times daily      metFORMIN (GLUCOPHAGE XR) 500 MG 24 hr tablet Take 2 tablets (1,000 mg) by mouth 2 times daily (with meals) 360 tablet 1    nortriptyline (PAMELOR) 25 MG capsule Take 1 capsule (25 mg) by mouth At Bedtime      semaglutide (OZEMPIC, 0.25 OR 0.5 MG/DOSE,) 2 MG/3ML pen INJECT 0.5MG UNDER THE SKIN EVERY 7 DAYS 3 mL 1    sildenafil (VIAGRA) 100 MG tablet Take 1 tablet (100 mg) by mouth daily as needed 6 tablet 11    venlafaxine (EFFEXOR-ER) 150 MG TB24 Take 1 tablet by mouth daily (with breakfast). 30 each 0         EXAM:    Vitals: There were no vitals taken for this visit.  BMI: There is no height or weight on file to calculate BMI.    Diabetic Foot Exam (details below):  normal DP and PT pulses  Vasc:      Pedal pulses are palpable for the dorsalis pedis posterior tibial artery, bilateral foot.  Capillary fill time </= 3 seconds  Pedal skin appears well-perfused  Neuro:      Light touch sensation is diminished, bilateral foot.  No apparent spastic contractures or other deformity secondary to neurologic compromise.  Derm:      Dry, hyperkeratotic skin on multiple toes.  This largely involves the bilateral hallux, medial aspect.  There are some hyperpigmentation associated with the callus on the dorsal  medial right hallux.  Post paring, 0.5 cm ulceration to the depth of deeper skin.  No clinical signs of infection.  No worrisome lesions    MSK:      Decrease in medial longitudinal arch height with weightbearing.  Bilateral lower extremity muscle strength presents is normal.  Adequate ankle and subtalar joint range of motion  Calf:    Neg for redness, swelling or tenderness

## 2025-01-28 NOTE — LETTER
1/28/2025      Phillip Rueda  [address]  [facility] [address]      Dear Colleague,    Thank you for referring your patient, Phillip Rueda, to the [facility]. Please see a copy of my visit note below.    ASSESSMENT:  Encounter Diagnoses   Name Primary?     Ulcer of great toe, right, limited to breakdown of skin (H) Yes     Diabetic polyneuropathy associated with type 2 diabetes mellitus (H)      Type 2 diabetes mellitus without complication, without long-term current use of insulin (H)      Pes planus of both feet      Callus of foot      Dry skin of feet      MEDICAL DECISION MAKING:  Diabetic foot exam was completed.  Notable for loss of protective sensation and superficial ulceration of the right hallux.  Post excisional debridement of the hyperkeratotic eschar, this will likely heal.    Again, the option of diabetic shoes and custom orthoses was discussed.  The referral was placed.    I encouraged Phillip to use a moisturizer on the dry callused areas and discussed a few options.  He is also encouraged to use a pumice stone on the bilateral great toe calluses.    Follow-up on an as-needed basis  Follow-up 1-2 times yearly for diabetic foot check recommended.      Debridement Procedure:   The purpose and goals of excisional debridement were discussed, including removing nonviable tissue, reducing risk of infection, and promoting healing.  Verbal consent was obtained.    Technique: excisional debridement    Depth of debridement: Excisional debridement was carried down to the depth of, and including, the deeper skin.    Location: dorsal medial right great toe    Instrument(s) used: #15 scalpel     Wound Dimensions: 0.5cm diameter x depth to deeper skin    Type of Wound: neuropathic/diabetic      Progress Statement: stable. No clinical signs of infection    The procedure was tolerated well by the patient.  No anesthesia needed, due to peripheral neuropathy.  Bacitracin and  a light dressing was applied.          Disclaimer: This note consists of symbols derived from keyboarding, dictation and/or voice recognition software. As a result, there may be errors in the script that have gone undetected. Please consider this when interpreting information found in this chart.    Shelton Smallwood DPM, ARACELI, MS    [facility] Department of Podiatry/Foot & Ankle Surgery      ____________________________________________________________________    HPI:     Phillip Rueda presents today for diabetic foot exam.  He noted some discoloration within a callus on his right great toe.  This is a region of previous ulceration.    Type 2 diabetes  Peripheral neuropathy  Last hemoglobin A1c 6.3%  I last evaluated Phillip on 7/9/2024.    Status post left flatfoot reconstructive surgery at [facility] orthopedics  Currently wearing some well-worn athletic shoes with Superfeet inserts.  *  Past Medical History:   Diagnosis Date     Anxiety      Depressive disorder      Diabetes mellitus (H)      Hypertension      Liver disease      Neuralgic amyotrophy      Other chronic pain      Pain disorder 12/8/2015     P[place]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      Prostate infection      Seizures (H)      Staph infection    *  *  Past Surgical History:   Procedure Laterality Date     BIOPSY BONE FOOT Right 1/18/2022    Procedure: BIOPSY, BONE, RIGHT GREAT TOE (DISTAL PHALANX);  Surgeon: Shelton Smallwood DPM;  Location: [facility] OR     BIOPSY BONE TOE Right 5/3/2024    Procedure: BIOPSY, BONE, TOE;  Surgeon: Shelton Smallwood DPM;  Location:  OR     IRRIGATION AND DEBRIDEMENT TOE, COMBINED Right 5/3/2024    Procedure: IRRIGATION AND DEBRIDEMENT, TOE FIRST;  Surgeon: Shelton Smallwood DPM;  Location:  OR     ORTHOPEDIC SURGERY     *  *  Current Outpatient Medications   Medication Sig Dispense Refill     acetaminophen (TYLENOL) 325 MG tablet Take 2 tablets (650 mg) by mouth every 6 hours as needed for mild pain or other 30 tablet 0      atenolol (TENORMIN) 25 MG tablet Take 1 tablet (25 mg) by mouth daily 90 tablet 1     atorvastatin (LIPITOR) 20 MG tablet Take 1 tablet (20 mg) by mouth daily 90 tablet 1     blood glucose (ACCU-CHEK GUIDE) test strip USE TO TEST BLOOD SUGAR TWO TIMES A DAY OR AS DIRECTED 200 strip 1     blood glucose (NO BRAND SPECIFIED) lancets standard Use to test blood sugar 2 times daily or as directed. 200 each 3     blood glucose monitoring (NO BRAND SPECIFIED) meter device kit Use to test blood sugar 2 times daily or as directed. 1 kit 0     buprenorphine HCl-naloxone HCl (SUBOXONE) 8-2 MG per film Place 1 Film under the tongue daily       Continuous Blood Gluc Sensor (FREESTYLE JHON 14 DAY SENSOR) MISC Change every 14 days. 2 each 5     cyanocobalamin (VITAMIN B-12) 1000 MCG tablet Take 1 tablet (1,000 mcg) by mouth daily       diazepam (VALIUM) 5 MG tablet Take 1 tablet (5 mg) 3 times daily as needed. 60 tablet      gabapentin (NEURONTIN) 600 MG tablet Take 1 tablet (600 mg) by mouth 2 times daily       metFORMIN (GLUCOPHAGE XR) 500 MG 24 hr tablet Take 2 tablets (1,000 mg) by mouth 2 times daily (with meals) 360 tablet 1     nortriptyline (PAMELOR) 25 MG capsule Take 1 capsule (25 mg) by mouth At Bedtime       semaglutide (OZEMPIC, 0.25 OR 0.5 MG/DOSE,) 2 MG/3ML pen INJECT 0.5MG UNDER THE SKIN EVERY 7 DAYS 3 mL 1     sildenafil (VIAGRA) 100 MG tablet Take 1 tablet (100 mg) by mouth daily as needed 6 tablet 11     venlafaxine (EFFEXOR-ER) 150 MG TB24 Take 1 tablet by mouth daily (with breakfast). 30 each 0         EXAM:    Vitals: There were no vitals taken for this visit.  BMI: There is no height or weight on file to calculate BMI.    Diabetic Foot Exam (details below):  normal DP and PT pulses  Vasc:      Pedal pulses are palpable for the dorsalis pedis posterior tibial artery, bilateral foot.  Capillary fill time </= 3 seconds  Pedal skin appears well-perfused  Neuro:      Light touch sensation is diminished, bilateral  foot.  No apparent spastic contractures or other deformity secondary to neurologic compromise.  Derm:      Dry, hyperkeratotic skin on multiple toes.  This largely involves the bilateral hallux, medial aspect.  There are some hyperpigmentation associated with the callus on the dorsal medial right hallux.  Post paring, 0.5 cm ulceration to the depth of deeper skin.  No clinical signs of infection.  No worrisome lesions    MSK:      Decrease in medial longitudinal arch height with weightbearing.  Bilateral lower extremity muscle strength presents is normal.  Adequate ankle and subtalar joint range of motion  Calf:    Neg for redness, swelling or tenderness        Again, thank you for allowing me to participate in the care of your patient.        Sincerely,        Shelton Smallwood DPM    Electronically signed

## 2025-02-06 DIAGNOSIS — E11.42 DIABETIC POLYNEUROPATHY ASSOCIATED WITH TYPE 2 DIABETES MELLITUS (H): ICD-10-CM

## 2025-02-06 RX ORDER — METFORMIN HYDROCHLORIDE 500 MG/1
1000 TABLET, EXTENDED RELEASE ORAL 2 TIMES DAILY WITH MEALS
Qty: 360 TABLET | Refills: 0 | Status: SHIPPED | OUTPATIENT
Start: 2025-02-06

## 2025-04-09 DIAGNOSIS — E11.42 DIABETIC POLYNEUROPATHY ASSOCIATED WITH TYPE 2 DIABETES MELLITUS (H): ICD-10-CM

## 2025-04-09 RX ORDER — METFORMIN HYDROCHLORIDE 500 MG/1
1000 TABLET, EXTENDED RELEASE ORAL 2 TIMES DAILY WITH MEALS
Qty: 360 TABLET | Refills: 0 | Status: SHIPPED | OUTPATIENT
Start: 2025-04-09

## 2025-04-21 ENCOUNTER — PATIENT OUTREACH (OUTPATIENT)
Dept: CARE COORDINATION | Facility: CLINIC | Age: 45
End: 2025-04-21
Payer: MEDICARE

## 2025-04-29 ENCOUNTER — OFFICE VISIT (OUTPATIENT)
Dept: FAMILY MEDICINE | Facility: CLINIC | Age: 45
End: 2025-04-29
Payer: COMMERCIAL

## 2025-04-29 VITALS
BODY MASS INDEX: 24.67 KG/M2 | WEIGHT: 162.8 LBS | OXYGEN SATURATION: 99 % | RESPIRATION RATE: 17 BRPM | TEMPERATURE: 97 F | HEART RATE: 78 BPM | DIASTOLIC BLOOD PRESSURE: 84 MMHG | SYSTOLIC BLOOD PRESSURE: 122 MMHG | HEIGHT: 68 IN

## 2025-04-29 DIAGNOSIS — R52 PAIN DISORDER: ICD-10-CM

## 2025-04-29 DIAGNOSIS — E11.42 DIABETIC POLYNEUROPATHY ASSOCIATED WITH TYPE 2 DIABETES MELLITUS (H): ICD-10-CM

## 2025-04-29 DIAGNOSIS — E11.9 TYPE 2 DIABETES MELLITUS WITHOUT COMPLICATION, WITHOUT LONG-TERM CURRENT USE OF INSULIN (H): Primary | ICD-10-CM

## 2025-04-29 DIAGNOSIS — I10 HYPERTENSION GOAL BP (BLOOD PRESSURE) < 140/90: ICD-10-CM

## 2025-04-29 LAB
EST. AVERAGE GLUCOSE BLD GHB EST-MCNC: 137 MG/DL
HBA1C MFR BLD: 6.4 % (ref 0–5.6)

## 2025-04-29 PROCEDURE — 3074F SYST BP LT 130 MM HG: CPT | Performed by: FAMILY MEDICINE

## 2025-04-29 PROCEDURE — G2211 COMPLEX E/M VISIT ADD ON: HCPCS | Performed by: FAMILY MEDICINE

## 2025-04-29 PROCEDURE — 3079F DIAST BP 80-89 MM HG: CPT | Performed by: FAMILY MEDICINE

## 2025-04-29 PROCEDURE — 36415 COLL VENOUS BLD VENIPUNCTURE: CPT | Performed by: FAMILY MEDICINE

## 2025-04-29 PROCEDURE — 83036 HEMOGLOBIN GLYCOSYLATED A1C: CPT | Performed by: FAMILY MEDICINE

## 2025-04-29 PROCEDURE — 1125F AMNT PAIN NOTED PAIN PRSNT: CPT | Performed by: FAMILY MEDICINE

## 2025-04-29 PROCEDURE — 99214 OFFICE O/P EST MOD 30 MIN: CPT | Performed by: FAMILY MEDICINE

## 2025-04-29 RX ORDER — METFORMIN HYDROCHLORIDE 500 MG/1
1000 TABLET, EXTENDED RELEASE ORAL 2 TIMES DAILY WITH MEALS
Qty: 360 TABLET | Refills: 1 | Status: SHIPPED | OUTPATIENT
Start: 2025-04-29

## 2025-04-29 RX ORDER — SEMAGLUTIDE 1.34 MG/ML
1 INJECTION, SOLUTION SUBCUTANEOUS
Qty: 9 ML | Refills: 1 | Status: SHIPPED | OUTPATIENT
Start: 2025-04-29

## 2025-04-29 RX ORDER — HYDROCHLOROTHIAZIDE 12.5 MG/1
CAPSULE ORAL
Qty: 6 EACH | Refills: 3 | Status: SHIPPED | OUTPATIENT
Start: 2025-04-29

## 2025-04-29 ASSESSMENT — PAIN SCALES - GENERAL: PAINLEVEL_OUTOF10: MILD PAIN (3)

## 2025-04-29 NOTE — PROGRESS NOTES
Assessment & Plan     Type 2 diabetes mellitus without complication, without long-term current use of insulin (H)  Nice improvement in A1c.  We agreed to stick with the same Rx.  Ozempic is helping.  - Hemoglobin A1c; Future  - Hemoglobin A1c  - Continuous Glucose Sensor (FREESTYLE JHON 3 PLUS SENSOR) MISC; Use 1 sensor every 15 days. Use to read blood sugars per 's instructions.    Hypertension goal BP (blood pressure) < 140/90  Patient is tolerating current medication without any major side effects of concerns and current dose seems reasonable too.  Current medication regime is effective. Continue current treatment without any changes.     Diabetic polyneuropathy associated with type 2 diabetes mellitus (H)  Patient is tolerating current medication without any major side effects of concerns and current dose seems reasonable too.  Current medication regime is effective. Continue current treatment without any changes.   - Continuous Glucose Sensor (FREESTYLE JHON 3 PLUS SENSOR) MISC; Use 1 sensor every 15 days. Use to read blood sugars per 's instructions.  - Semaglutide, 1 MG/DOSE, (OZEMPIC, 1 MG/DOSE,) 4 MG/3ML pen; Inject 1 mg subcutaneously every 7 days.  - metFORMIN (GLUCOPHAGE XR) 500 MG 24 hr tablet; Take 2 tablets (1,000 mg) by mouth 2 times daily (with meals).    Pain disorder  Needs to establish care with a different pain specialist.  Current pain specialist left.  Needs new referral.  On Suboxone, nortriptyline and gabapentin through pain clinic.  - Pain Management  Referral; Future     The longitudinal plan of care for the diagnosis(es)/condition(s) as documented were addressed during this visit. Due to the added complexity in care, I will continue to support Phillip in the subsequent management and with ongoing continuity of care.    Subjective   Phillip is a 44 year old, presenting for the following health issues:  Referral (For pain management )      4/29/2025     2:24  "PM   Additional Questions   Roomed by Jackie BRAY     History of Present Illness       Reason for visit:  Need a referral to pain mgmt   He is taking medications regularly.        Ozempic increased dose. A1c improved. Eating healthy. Sweet tooth - keeps an eye on it. Wife is diabetic educator and it helps.     Pain specialist - going to [facility] for many years. Got a letter yesterday that provider is leaving. Need to find a new provider and they do not have a space for this patient.     Nortriptyline, gabapentin and suboxone through pain specialist.     Chronic right arm/shoulder pain. Hereditary form.     Darron 3 plus. - current darron reads much less than finger prick when blood sugars are low.           Objective    /84 (BP Location: Right arm, Patient Position: Sitting, Cuff Size: Adult Regular)   Pulse 78   Temp 97  F (36.1  C) (Temporal)   Resp 17   Ht 1.715 m (5' 7.5\")   Wt 73.8 kg (162 lb 12.8 oz)   SpO2 99%   BMI 25.12 kg/m    Body mass index is 25.12 kg/m .  Physical Exam               Signed Electronically by: Km Dos Santos MD, MD    "

## 2025-05-19 DIAGNOSIS — I10 HYPERTENSION GOAL BP (BLOOD PRESSURE) < 140/90: ICD-10-CM

## 2025-05-19 DIAGNOSIS — E78.00 HYPERCHOLESTEREMIA: ICD-10-CM

## 2025-05-19 RX ORDER — ATENOLOL 25 MG/1
25 TABLET ORAL DAILY
Qty: 90 TABLET | Refills: 2 | Status: SHIPPED | OUTPATIENT
Start: 2025-05-19

## 2025-05-19 RX ORDER — ATORVASTATIN CALCIUM 20 MG/1
20 TABLET, FILM COATED ORAL DAILY
Qty: 90 TABLET | Refills: 0 | Status: SHIPPED | OUTPATIENT
Start: 2025-05-19

## 2025-06-04 ASSESSMENT — PAIN SCALES - PAIN ENJOYMENT GENERAL ACTIVITY SCALE (PEG)
PEG_TOTALSCORE: 2
AVG_PAIN_PASTWEEK: 3
PEG_TOTALSCORE: 2
INTERFERED_GENERAL_ACTIVITY: 3
INTERFERED_ENJOYMENT_LIFE: 0 - DOES NOT INTERFERE
INTERFERED_ENJOYMENT_LIFE: 0
INTERFERED_GENERAL_ACTIVITY: 3
AVG_PAIN_PASTWEEK: 3

## 2025-06-04 ASSESSMENT — ANXIETY QUESTIONNAIRES
4. TROUBLE RELAXING: SEVERAL DAYS
7. FEELING AFRAID AS IF SOMETHING AWFUL MIGHT HAPPEN: MORE THAN HALF THE DAYS
8. IF YOU CHECKED OFF ANY PROBLEMS, HOW DIFFICULT HAVE THESE MADE IT FOR YOU TO DO YOUR WORK, TAKE CARE OF THINGS AT HOME, OR GET ALONG WITH OTHER PEOPLE?: SOMEWHAT DIFFICULT
2. NOT BEING ABLE TO STOP OR CONTROL WORRYING: SEVERAL DAYS
GAD7 TOTAL SCORE: 8
3. WORRYING TOO MUCH ABOUT DIFFERENT THINGS: SEVERAL DAYS
7. FEELING AFRAID AS IF SOMETHING AWFUL MIGHT HAPPEN: MORE THAN HALF THE DAYS
GAD7 TOTAL SCORE: 8
6. BECOMING EASILY ANNOYED OR IRRITABLE: SEVERAL DAYS
IF YOU CHECKED OFF ANY PROBLEMS ON THIS QUESTIONNAIRE, HOW DIFFICULT HAVE THESE PROBLEMS MADE IT FOR YOU TO DO YOUR WORK, TAKE CARE OF THINGS AT HOME, OR GET ALONG WITH OTHER PEOPLE: SOMEWHAT DIFFICULT
GAD7 TOTAL SCORE: 8
5. BEING SO RESTLESS THAT IT IS HARD TO SIT STILL: SEVERAL DAYS
1. FEELING NERVOUS, ANXIOUS, OR ON EDGE: SEVERAL DAYS

## 2025-06-05 ENCOUNTER — OFFICE VISIT (OUTPATIENT)
Dept: ANESTHESIOLOGY | Facility: CLINIC | Age: 45
End: 2025-06-05
Attending: FAMILY MEDICINE
Payer: MEDICARE

## 2025-06-05 VITALS — DIASTOLIC BLOOD PRESSURE: 80 MMHG | OXYGEN SATURATION: 99 % | HEART RATE: 78 BPM | SYSTOLIC BLOOD PRESSURE: 134 MMHG

## 2025-06-05 DIAGNOSIS — E11.9 TYPE 2 DIABETES MELLITUS WITHOUT COMPLICATION, WITHOUT LONG-TERM CURRENT USE OF INSULIN (H): ICD-10-CM

## 2025-06-05 DIAGNOSIS — G89.4 CHRONIC PAIN SYNDROME: ICD-10-CM

## 2025-06-05 DIAGNOSIS — F33.40 RECURRENT MAJOR DEPRESSION IN REMISSION: ICD-10-CM

## 2025-06-05 DIAGNOSIS — F19.21 DRUG DEPENDENCE, IN REMISSION (H): ICD-10-CM

## 2025-06-05 DIAGNOSIS — G60.9 HEREDITARY AND IDIOPATHIC PERIPHERAL NEUROPATHY: ICD-10-CM

## 2025-06-05 DIAGNOSIS — G54.5 NEURALGIC AMYOTROPHY: ICD-10-CM

## 2025-06-05 DIAGNOSIS — Z51.81 ENCOUNTER FOR MONITORING SUBOXONE MAINTENANCE THERAPY: Primary | ICD-10-CM

## 2025-06-05 DIAGNOSIS — Z79.891 ENCOUNTER FOR MONITORING SUBOXONE MAINTENANCE THERAPY: Primary | ICD-10-CM

## 2025-06-05 NOTE — PATIENT INSTRUCTIONS
Treatment planning:    [facility]   [address], [address]   [place], [place], [address]   Phone: [phone]   Fax: [phone]     Open Monday-Friday   9:00am-4:00pm   Closed over lunch hour   Walk in hours: 9am-11:30am and 12:30-3pm         Recommended Follow up:      Follow up as needed.     To speak with a nurse, schedule/reschedule/cancel a clinic appointment, or request a medication refill call: [phone]    You can also reach us by [employer]: [email or web address]

## 2025-06-05 NOTE — NURSING NOTE
Patient presents with:  Consult: New patient      Data Unavailable     Pain Medications       Analgesics Other Refills Start End     acetaminophen (TYLENOL) 325 MG tablet 0 5/7/2024 --    Sig - Route: Take 2 tablets (650 mg) by mouth every 6 hours as needed for mild pain or other - Oral    Class: E-Prescribe       Opioid Partial Agonists Refills Start End     buprenorphine HCl-naloxone HCl (SUBOXONE) 8-2 MG per film -- 5/31/2023 --    Sig - Route: Place 1 Film under the tongue daily - Sublingual    Class: Historical            What medications are you using for pain? Tylenol and ibuprofen (as needed), suboxone, gabapentin, nortriptyline    (New patients only) Have you been seen by another pain clinic/ provider? Long time [facility] patient.    (Return Patients only) What refills are you needing today?

## 2025-06-05 NOTE — LETTER
6/5/2025       RE: Phillip Rueda  [address]  [facility] [address]     Dear Colleague,    Thank you for referring your patient, Phillip Rueda, to the [facility] [place] at [facility]. Please see a copy of my visit note below.    [facility] : Consultation Note    Patient: Phillip Rueda Age: 45 year old   MRN: [id number] Referred by:  Raya     Date of Visit: June 5, 2025    Reason for consultation:    Phillip Rueda is a 45 year old male who is seen in consultation today at the request of his provider,Dr. Dos Santos for a comprehensive evaluation and management of pain.  Primary Care Provider is Km Dos Santos.      Chief complaints:    Chief Complaint   Patient presents with     Consult     New patient         History of Present illness:     Phillip Rueda is a 45 year old male with pain history as described below:     Pain Questionnaire    What number best describes your pain right now: (Patient-Rptd) 2  (0 = No pain to 10 = Worst pain imaginable)    How would you describe the pain? (Patient-Rptd) burning, sharp, dull, aching, throbbing    Which of the following worsen your pain? (Patient-Rptd) lying down, nothing worsens the pain    Which of the following improve or reduce your pain? (Patient-Rptd) standing, sitting, medication, relaxation    What number best describes your average pain for the past week: (Patient-Rptd) 4  (0 = No pain to 10 = Worst pain imaginable)    What number best describes your LOWEST pain in past 24 hours: (Patient-Rptd) 0  (0 = No pain to 10 = Worst pain imaginable)    What number best describes your WORST pain in past 24 hours: (Patient-Rptd) 6  (0 = No pain to 10 = Worst pain imaginable)    When is your pain worst? (Patient-Rptd) AM, PM    What non-medicine treatments have you already had for your pain?  (Patient-Rptd) pain clinic, physical therapy, relaxation training, acupuncture, TENS (electrical stimulator), counseling, other    Have you tried treating your pain with medication? (Patient-Rptd) Yes    If yes, please answer the below questions -     What topical medications have you tried in the past but are no longer taking? (Patient-Rptd) Lidoderm patch, Other gels, creams, patches or ointments    What anti-convulsants (seizure medicines) have you tried in the past but are no longer taking?      What anti-depressant medication have you tried in the past but are no longer taking? (Patient-Rptd) Bupropion (Wellbutrin), Citalopram (Celexa, Lexapro), Sertraline (Zoloft), Trazodone (Desyrel)    What sleep aid medications have you tried in the past but are no longer taking? (Patient-Rptd) Clonazepam (Klonopin)    What opioid medications have you tried in the past but are no longer taking? (Patient-Rptd) Codeine (Tylenol #3), Hydrocodone (Lorcet, Lortab, Vicodin), Hydromorphone (Dilaudid, Exalgo), Oxycodone (Percocet, OxyContin), Tramadol (Ultram)    What NSAID medications have you tried in the past but are no longer taking? (Patient-Rptd) Ibuprofen (Advil, Motrin), Ketorolac (Toradol), Naproxen (Aleve)    What muscle relaxer medications have you tried in the past but are no longer taking? (Patient-Rptd) Clonazepam (Klonopin), Cyclobenzaprine (Flexeril), Tizanidine (Zanaflex)    What anti-migraine medications have you tried in the past but are no longer taking? (Patient-Rptd) Prednisone      Are you currently taking medications for your pain? (Patient-Rptd) Yes    If yes, please answer below -     During the past month, list all the medications that you have used for pain. Please list drug name, dose, and frequency taking: (Patient-Rptd) Tylenol 650mg, Ibuprofen 800mg. Suboxone/Naloxone 8-2mg      History:     The patient is a 45-year-old male with a complex medical history that includes chronic pain syndrome, type 2  diabetes mellitus, opioid dependence, Parsonage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 (neuralgic amyotrophy), hereditary and idiopathic peripheral neuropathy, recurrent major depressive disorder, and chronic right shoulder pain. He presented to the clinic today under the assumption that this visit would involve the continuation of his Suboxone (buprenorphine/naloxone) prescription for opioid use disorder.    Historically, the patient has experienced multiple episodes of acute, debilitating pain in the right upper extremity, consistent with attacks of neuralgic amyotrophy. He was previously evaluated by Dr. Guerrero in Neurology and was diagnosed with hereditary neuralgic amyotrophy--a rare neurological disorder characterized by sudden onset of shoulder and arm pain followed by weakness, atrophy, and sensory changes. During those initial episodes, he was started on a regimen of amitriptyline and gabapentin for neuropathic pain management, which he continues to take at present.    Given the severity and chronicity of his pain and the development of opioid dependence, the patient was eventually transitioned to Suboxone therapy, initially prescribed at a dose of 4 mg three times daily. This regimen was first initiated by providers at the [facility], specifically under the care of Dr. Ulisses Jordan. After Dr. Jordan s snf, the responsibility for prescribing Suboxone was assumed by JOSE DANIEL Mosley. However, the patient was recently informed that he would need to establish care elsewhere for continued Suboxone management. However, upon review of the most recent documentation from his previous clinic, it appears that he was last evaluated by Rene Harden, who indicated that there would be no issue continuing his Suboxone therapy from that clinic. At this time,  I strongly advised him to return to the walk-in addiction medicine clinic for evaluation and to secure a refill of his Suboxone through the appropriate certified  provider.    [place] Prescription Monitoring Program:   Reviewed. No concerns    Review of Systems:  ROS    Patient Supplied Answers To the [facility] Pain Questionnaire      6/4/2025     3:42 PM   [facility] Pain -  Patient Entered Questionnaire/Answers   What number best describes your pain right now:  0 = No pain  to  10 = Worst pain imaginable 2   How would you describe the pain burning    sharp    dull, aching    throbbing   Which of the following worsen your pain lying down    nothing worsens the pain   Which of the following improve or reduce your pain standing    sitting    medication    relaxation   What number best describes your average pain for the past week:  0 = No pain  to  10 = Worst pain imaginable 4   What number best describes your LOWEST pain in past 24 hours:  0 = No pain  to  10 = Worst pain imaginable 0   What number best describes your WORST pain in past 24 hours:  0 = No pain  to  10 = Worst pain imaginable 6   When is your pain worst AM    PM   What non-medicine treatments have you already had for your pain pain clinic    physical therapy    relaxation training    acupuncture    TENS (electrical stimulator)    counseling    other                4/12/2021     7:11 AM 3/15/2022     8:03 AM 6/4/2025     3:27 PM   SANDHYA-7 SCORE   Total Score 6 (mild anxiety) 6 (mild anxiety) 8 (mild anxiety)   Total Score 6 6 8        Patient-reported            1/28/2025    10:58 AM 3/15/2022     8:09 AM   PHQ-2 ( 1999 [employer])   Q1: Little interest or pleasure in doing things 0 0    Q2: Feeling down, depressed or hopeless 0 1    PHQ-2 Score 0 1   Q1: Little interest or pleasure in doing things  Not at all   Q2: Feeling down, depressed or hopeless  Several days   PHQ-2 Score  1       Proxy-reported            1/9/2024     2:15 PM 6/25/2024     9:44 AM 2/20/2025     9:40 AM   [facility] Follow-up to PHQ   PHQ-9 9. Suicide Ideation past 2 weeks Not at all Not at all Not at all          Past Medical History:  Past Medical History:    Diagnosis Date     Anxiety      Depressive disorder      Diabetes mellitus (H)      Hypertension      Liver disease      Neuralgic amyotrophy      Other chronic pain      Pain disorder 12/8/2015     Parsonage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      Prostate infection      Seizures (H)      Staph infection        Past Surgical History:  Past Surgical History:   Procedure Laterality Date     BIOPSY BONE FOOT Right 1/18/2022    Procedure: BIOPSY, BONE, RIGHT GREAT TOE (DISTAL PHALANX);  Surgeon: Shelton Smallwood DPM;  Location: [facility] OR     BIOPSY BONE TOE Right 5/3/2024    Procedure: BIOPSY, BONE, TOE;  Surgeon: Shelton Smallwood DPM;  Location: [facility] OR     IRRIGATION AND DEBRIDEMENT TOE, COMBINED Right 5/3/2024    Procedure: IRRIGATION AND DEBRIDEMENT, TOE FIRST;  Surgeon: Shelton Smallwood DPM;  Location: [facility] OR     ORTHOPEDIC SURGERY         Medications:  Current Outpatient Medications   Medication Sig Dispense Refill     acetaminophen (TYLENOL) 325 MG tablet Take 2 tablets (650 mg) by mouth every 6 hours as needed for mild pain or other 30 tablet 0     atenolol (TENORMIN) 25 MG tablet Take 1 tablet (25 mg) by mouth daily. 90 tablet 2     atorvastatin (LIPITOR) 20 MG tablet Take 1 tablet (20 mg) by mouth daily. 90 tablet 0     blood glucose (ACCU-CHEK GUIDE) test strip USE TO TEST BLOOD SUGAR TWO TIMES A DAY OR AS DIRECTED 200 strip 1     blood glucose (NO BRAND SPECIFIED) lancets standard Use to test blood sugar 2 times daily or as directed. 200 each 3     blood glucose monitoring (NO BRAND SPECIFIED) meter device kit Use to test blood sugar 2 times daily or as directed. 1 kit 0     buprenorphine HCl-naloxone HCl (SUBOXONE) 8-2 MG per film Place 1 Film under the tongue daily       Continuous Glucose Sensor (FREESTYLE JHON 3 PLUS SENSOR) MISC Use 1 sensor every 15 days. Use to read blood sugars per 's instructions. 6 each 3     cyanocobalamin (VITAMIN B-12) 1000 MCG tablet Take 1 tablet (1,000 mcg) by mouth daily        diazepam (VALIUM) 5 MG tablet Take 1 tablet (5 mg) 3 times daily as needed. 60 tablet      gabapentin (NEURONTIN) 600 MG tablet Take 1 tablet (600 mg) by mouth 2 times daily       metFORMIN (GLUCOPHAGE XR) 500 MG 24 hr tablet Take 2 tablets (1,000 mg) by mouth 2 times daily (with meals). 360 tablet 1     nortriptyline (PAMELOR) 25 MG capsule Take 1 capsule (25 mg) by mouth At Bedtime       Semaglutide, 1 MG/DOSE, (OZEMPIC, 1 MG/DOSE,) 4 MG/3ML pen Inject 1 mg subcutaneously every 7 days. 9 mL 1     sildenafil (VIAGRA) 100 MG tablet Take 1 tablet (100 mg) by mouth daily as needed 6 tablet 11     venlafaxine (EFFEXOR-ER) 150 MG TB24 Take 1 tablet by mouth daily (with breakfast). 30 each 0         Medications related to Pain Management:   Medications related to Pain Management (From now, onward)      None            Allergies:       Allergies   Allergen Reactions     Bees Swelling     Bupropion Hcl Other (See Comments)     seizure     Lisinopril Cough     Penicillins Other (See Comments)     Unable to close mouth  Tolerated cefazolin (21)       Social History:    Social History     Socioeconomic History     Marital status:      Spouse name: Not on file     Number of children: Not on file     Years of education: Not on file     Highest education level: Not on file   Occupational History     Not on file   Tobacco Use     Smoking status: Former     Current packs/day: 0.00     Average packs/day: 0.3 packs/day for 17.0 years (4.3 ttl pk-yrs)     Types: Cigarettes, Dip, chew, snus or snuff     Start date: 1996     Quit date: 2013     Years since quittin.9     Smokeless tobacco: Former   Vaping Use     Vaping status: Never Used   Substance and Sexual Activity     Alcohol use: No     Alcohol/week: 0.0 standard drinks of alcohol     Comment: 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  Drink 1.75 every 2 days     Drug use: No     Sexual activity: Yes     Partners: Female     Birth control/protection: None      Comment: Trying to get my wife pregnant   Other Topics Concern     Parent/sibling w/ CABG, MI or angioplasty before 65F 55M? Yes     Comment: My dads dad  of heart issues in his 40's   Social History Narrative     Not on file     Social Drivers of Health     Financial Resource Strain: Low Risk  (2024)    Financial Resource Strain      Within the past 12 months, have you or your family members you live with been unable to get utilities (heat, electricity) when it was really needed?: No   Food Insecurity: Low Risk  (2024)    Food Insecurity      Within the past 12 months, did you worry that your food would run out before you got money to buy more?: No      Within the past 12 months, did the food you bought just not last and you didn t have money to get more?: No   Transportation Needs: Low Risk  (2024)    Transportation Needs      Within the past 12 months, has lack of transportation kept you from medical appointments, getting your medicines, non-medical meetings or appointments, work, or from getting things that you need?: No   Physical Activity: Insufficiently Active (2024)    Exercise Vital Sign      Days of Exercise per Week: 3 days      Minutes of Exercise per Session: 20 min   Stress: Stress Concern Present (2024)    [place] [employer] - Occupational Stress Questionnaire      Feeling of Stress : To some extent   Social Connections: Unknown (2024)    Social Connection and Isolation Panel [NHANES]      Frequency of Communication with Friends and Family: Not on file      Frequency of Social Gatherings with Friends and Family: Once a week      Attends Confucianism Services: Not on file      Active Member of Clubs or Organizations: Not on file      Attends Club or Organization Meetings: Not on file      Marital Status: Not on file   Interpersonal Safety: Low Risk  (2025)    Interpersonal Safety      Do you feel physically and emotionally safe where you  currently live?: Yes      Within the past 12 months, have you been hit, slapped, kicked or otherwise physically hurt by someone?: No      Within the past 12 months, have you been humiliated or emotionally abused in other ways by your partner or ex-partner?: No   Housing Stability: Low Risk  (6/25/2024)    Housing Stability      Do you have housing? : Yes      Are you worried about losing your housing?: No     Social History     Social History Narrative     Not on file         Family history:  Family History   Problem Relation Age of Onset     Depression Mother      Anxiety Disorder Mother      Substance Abuse Maternal Grandfather      Anxiety Disorder Brother      Glaucoma No family hx of      Macular Degeneration No family hx of          Physical Exam:  Vitals:    06/05/25 0845   BP: 134/80   BP Location: Right arm   Patient Position: Sitting   Cuff Size: Adult Regular   Pulse: 78   SpO2: 99%       General: Awake in no apparent distress.   Eyes: Sclerae are anicteric. PERRLA, EOMI   Neck: supple, no masses.   Lungs: unlabored.   Heart: regular rate and rhythm   Abdomen: soft non tender.  Extremities: Pulses are well palpable, no peripheral edema.   Musculoskeletal: All muscle groups are normal in bulk and tone. The patient changes position without pain behavior. The patient walks with a normal gait. Posture is normal. Muscle strength was rated at 5/5 in all groups in the extremities. Examination of the joints reveals preserved range of motion.  Tenderness to palpation noted over the right shoulder, left shoulder, base of the neck.  Neurologic exam: Sensation to light touch intact throughout all dermatomes bilateral upper extremities and lower extremities  Psychiatric; Normal affect.   Skin: Warm and Dry.         LABORATORY VALUES:   Recent Labs   Lab Test 05/16/24  1145 05/07/24  1152 05/07/24  0724     --  138   POTASSIUM 4.8  --  4.2   CHLORIDE 100  --  101   CO2 27  --  26   ANIONGAP 11  --  11   GLC 96 81  89   BUN 9.8  --  18.0   CR 1.08  --  1.01   ARON 9.2  --  9.3       CBC RESULTS:   Recent Labs   Lab Test 05/16/24  1145   WBC 8.8   RBC 4.33*   HGB 12.5*   HCT 38.1*   MCV 88   MCH 28.9   MCHC 32.8   RDW 12.7          Most Recent 3 INR's:  Recent Labs   Lab Test 11/02/20  1354   INR 0.88              ASSESSMENT/PLAN:                             ASSESSMENT:    Diagnoses         Codes Comments      Encounter for monitoring Suboxone maintenance therapy    -  Primary Z51.81, Z79.891       Chronic pain syndrome     G89.4       Neuralgic amyotrophy     G54.5       Hereditary and idiopathic peripheral neuropathy     G60.9       Drug dependence, in remission (H)     F19.21       Type 2 diabetes mellitus without complication, without long-term current use of insulin (H)     E11.9       Recurrent major depression in remission     F33.40                PLAN:    - Medications.     We advised him to go to [facility] addiction medicine walk-in clinic to get Suboxone refill.  Continue nortriptyline 25 mg nightly.  Patient reports that no refill needed at this time  Continue gabapentin 600 mg twice daily.  Patient reports that no refill required needed at this time    - Interventional procedures:    None      - Disposition: Follow-up as needed      Assessment will be ongoing with changes in treatment as indicated.  Benefits/risks/alternatives to treatment have been reviewed and the patient has been instructed to contact this office if they have any questions or concerns.  This plan of care has been discussed with the patient and the patient is in agreement.     Sidra Haro MD, PHD    Answers submitted by the patient for this visit:  Patient Health Questionnaire (G7) (Submitted on 6/4/2025)  SANDHYA 7 TOTAL SCORE: 8      Again, thank you for allowing me to participate in the care of your patient.      Sincerely,    Sidra Haro MD

## 2025-06-20 ENCOUNTER — MYC MEDICAL ADVICE (OUTPATIENT)
Dept: FAMILY MEDICINE | Facility: CLINIC | Age: 45
End: 2025-06-20
Payer: MEDICARE

## 2025-06-20 DIAGNOSIS — G54.5 NEURALGIC AMYOTROPHY: ICD-10-CM

## 2025-06-23 RX ORDER — NORTRIPTYLINE HYDROCHLORIDE 25 MG/1
25 CAPSULE ORAL AT BEDTIME
Qty: 30 CAPSULE | Refills: 1 | Status: SHIPPED | OUTPATIENT
Start: 2025-06-23

## 2025-06-23 NOTE — TELEPHONE ENCOUNTER
[facility] Patient asking if you will take over prescribing of nortriptyline. Pended below for review.    PARAG Urrutia, BSN, PHN, AMB-BC (she/her)  [facility] RN  
<<-----Click here for Discharge Medication Review

## 2025-06-30 ENCOUNTER — OFFICE VISIT (OUTPATIENT)
Dept: BEHAVIORAL HEALTH | Facility: CLINIC | Age: 45
End: 2025-06-30
Payer: COMMERCIAL

## 2025-06-30 VITALS
HEART RATE: 94 BPM | DIASTOLIC BLOOD PRESSURE: 88 MMHG | BODY MASS INDEX: 25.46 KG/M2 | SYSTOLIC BLOOD PRESSURE: 135 MMHG | WEIGHT: 165 LBS

## 2025-06-30 DIAGNOSIS — Z51.81 ENCOUNTER FOR MONITORING OPIOID MAINTENANCE THERAPY: ICD-10-CM

## 2025-06-30 DIAGNOSIS — Z79.891 ENCOUNTER FOR MONITORING OPIOID MAINTENANCE THERAPY: ICD-10-CM

## 2025-06-30 DIAGNOSIS — F11.90 OPIOID USE DISORDER: Primary | ICD-10-CM

## 2025-06-30 DIAGNOSIS — Z79.899 CHRONIC PRESCRIPTION BENZODIAZEPINE USE: ICD-10-CM

## 2025-06-30 DIAGNOSIS — F10.91 ALCOHOL USE DISORDER IN REMISSION: ICD-10-CM

## 2025-06-30 DIAGNOSIS — F41.9 ANXIETY: ICD-10-CM

## 2025-06-30 DIAGNOSIS — G89.4 CHRONIC PAIN SYNDROME: ICD-10-CM

## 2025-06-30 LAB
AMPHETAMINE QUAL URINE POCT: NEGATIVE
BARBITURATE QUAL URINE POCT: NEGATIVE
BENZODIAZEPINE QUAL URINE POCT: ABNORMAL
BUPRENORPHINE QUAL URINE POCT: ABNORMAL
COCAINE QUAL URINE POCT: NEGATIVE
CREAT UR-MCNC: 149 MG/DL
CREATININE QUAL URINE POCT: ABNORMAL
INTERNAL QC QUAL URINE POCT: ABNORMAL
MDMA QUAL URINE POCT: NEGATIVE
METHADONE QUAL URINE POCT: NEGATIVE
METHAMPHETAMINE QUAL URINE POCT: NEGATIVE
OPIATE QUAL URINE POCT: NEGATIVE
OXYCODONE QUAL URINE POCT: NEGATIVE
PH QUAL URINE POCT: ABNORMAL
PHENCYCLIDINE QUAL URINE POCT: NEGATIVE
POCT KIT EXPIRATION DATE: ABNORMAL
POCT KIT LOT NUMBER: ABNORMAL
SPECIFIC GRAVITY POCT: 1.02
TEMPERATURE URINE POCT: ABNORMAL
THC QUAL URINE POCT: ABNORMAL

## 2025-06-30 RX ORDER — DEXTROAMPHETAMINE SACCHARATE, AMPHETAMINE ASPARTATE, DEXTROAMPHETAMINE SULFATE AND AMPHETAMINE SULFATE 5; 5; 5; 5 MG/1; MG/1; MG/1; MG/1
20 TABLET ORAL
COMMUNITY
Start: 2025-06-07

## 2025-06-30 RX ORDER — BUPRENORPHINE AND NALOXONE 8; 2 MG/1; MG/1
1 FILM, SOLUBLE BUCCAL; SUBLINGUAL DAILY
Qty: 15 FILM | Refills: 0 | Status: SHIPPED | OUTPATIENT
Start: 2025-06-30

## 2025-06-30 ASSESSMENT — COLUMBIA-SUICIDE SEVERITY RATING SCALE - C-SSRS
6. HAVE YOU EVER DONE ANYTHING, STARTED TO DO ANYTHING, OR PREPARED TO DO ANYTHING TO END YOUR LIFE?: NO
1. IN THE PAST MONTH, HAVE YOU WISHED YOU WERE DEAD OR WISHED YOU COULD GO TO SLEEP AND NOT WAKE UP?: NO
2. HAVE YOU ACTUALLY HAD ANY THOUGHTS OF KILLING YOURSELF?: NO

## 2025-06-30 ASSESSMENT — PATIENT HEALTH QUESTIONNAIRE - PHQ9: SUM OF ALL RESPONSES TO PHQ QUESTIONS 1-9: 10

## 2025-06-30 NOTE — PROGRESS NOTES
[facility] Initial Visit    ASSESSMENT/PLAN                                                      1. Opioid use disorder (Primary)  45 year old male who developed OUD in setting of chronic use of prescription opioids for chronic pain. History of previous use of oxycodone and heroin. Currently on suboxone since 2018 with no use of prescription opioids since starting suboxone, except when hospitalized for flares.. He presents to  to establish care after hi provider at [facility]. He is taking 8 mg of suboxone daily and reports symptoms are well controlled.   Plan to continue suboxone 8 mg daily. May split doses for analgesic benefits.   He is interested in eventually tapering off of buprenorphine. Discussed sublocade and he is considering. Discuss at follow up.    Confirms narcan access  - Follow-up appointment scheduled for 07/15/25 with an MD due o insurance for continued monitoring and potential transition to Sublocade.  - Patient has been attending AA/NA meetings as part of recovery activities.  - buprenorphine HCl-naloxone HCl (SUBOXONE) 8-2 MG per film; Place 1 Film under the tongue daily.  Dispense: 15 Film; Refill: 0    2. Chronic pain syndrome  Chronic pain to right upper extremity related to a rare hereditary neurologic disease, managed with suboxone.  - Continue current pain management with suboxone. Consider more frequent dosing if experiencing pain flares.  - buprenorphine HCl-naloxone HCl (SUBOXONE) 8-2 MG per film; Place 1 Film under the tongue daily.  Dispense: 15 Film; Refill: 0    3. Encounter for monitoring opioid maintenance therapy  - Drugs of Abuse Screen Urine (POC CUPS) POCT; Standing  - Drug Confirmation Panel Urine with Creat - lab collect; Future  - Drugs of Abuse Screen Urine (POC CUPS) POCT    4. Chronic prescription benzodiazepine use  5. Anxiety  Anxiety managed with valium and gabapentin. Is  prescribed diazepam 5 mg QID by psychiatry , reports only  taking daily.     6. Alcohol use disorder in remission  In remission since 2013 after completing treatment at .       Consent was obtained from the patient to use an AI documentation tool in the creation of this note.       No follow-ups on file.      Patient counseling completed today:  Discussed mechanism of action, potential risks/benefits/side effects of medications and other recommendations above.    Discussed risk of precipitated withdrawal with initiation of buprenorphine in the presence of full opioid agonists.    Reviewed directions for initiation of buprenorphine to reduce risk of precipitated withdrawal and maximize efficacy.    Harm reduction counseling including never use alone, availability of naloxone, avoiding combination of opioids with benzodiazepines, alcohol, or other sedatives, safer administration.      Discussed importance of avoiding isolation, building a network of supportive relationships, avoiding people/places/things associated with past use to reduce risk of relapse; including motivational interviewing regarding psychosocial treatment for addiction.     SUBJECTIVE                                                    Rooming information:  Preferred name and pronouns: he   Reason for visit: Patient hd a prescriber at [facility] but left clinic and states provider replacement took over for a bit then received a letter stating thy no longer have room for him and he needs medication refill.   Last use of fentanyl or other full opioid agonist: approx 6mo  Last dose of buprenorphine (if applicable:) 6/30/25 jagdeep  Withdrawal symptoms currently: none  Other substances taken in the last 2 weeks: none  LMP (if applicable:) NA  Food/housing/insurance assistance needed: none  3rd party involvement desired: None  Best phone number for patient: on file    Depression Response    Patient completed the PHQ-9 assessment for depression and scored >9? Yes  Question 9 on the PHQ-9 was positive for suicidality?  No  C-SSRS screener risk level. C-SSRS Risk (Lifetime/Recent)  Calculated C-SSRS Risk Score (Lifetime/Recent): No Risk Indicated   Does patient have current mental health provider? Yes    Is this a virtual visit? No    I personally notified the following: NA        CC/HPI:  Phillip Rueda is a 45 year old male with PMH neuralgic amyotrophy, chronic pain, and opioid use disorder who presents to the Recovery Clinic for initial visit.      Brief History:  Phillip Rueda was first seen in Recovery Clinic on 06/30/25. They were referred by Dr Km Dos Santos MD.   Patient's reasons for seeking treatment include continue suboxone. Patient prescribed suboxone from a provider at [facility] who left clinic. Here to establish care with new provider.     Patient with a rare hereditary neurological disease causing chronic pain, primarily affecting his arms and forearms. His symptoms began in August 2009 at the age of 29. He has experienced severe pain episodes requiring hospitalization, with his blood pressure rising to 230 due to body shock. The last severe episode requiring hospitalization was about a year ago.     He has been prescribed opioids since age 29 in 2009 but has been on suboxone since 2018. He reports sobriety from alcohol since 2013 and has not used prescription opioids since starting suboxone, except during hospitalizations for flares.     Substance Use History :  Opioids:   Age at first use: 29 prescribed opioid. Use escalated and began to get from unprescribed sources. Has used heroin via insufflation, none in last 12 years  Most recent use: substance: oxycodone ; quantity 80 mg; route: oral and nasal ; timing of last use: 12/2024 during hospitalization during flares. Last sustained use of full opioids was 2018 prior to starting suboxone.   Has been on suboxone for pain since 2018     IV drug use: No   History of overdose: No  Previous residential or outpatient treatments for addiction : Yes: Multiple  times for alcohol and opiates  Previous medication treatments for addiction: Yes: Suboxone since 2018  Longest period of sobriety: 12 years from alcohol 2013  Medical complications related to substance use: Fatty Liver disease  Hepatitis C: non-reactive; Date of most recent testin2022  HIV: Non reactive; Date of most recent testin2018        Other Addiction History:  Stimulants (cocaine, methamphetamine, MDMA/ecstasy)   Has used all of them, h/o of heavy cocaine and ecstasy. None since   Sedatives/hypnotics/anxiolytics: (benzodiazepines, GHB, Ambien, phenobarbital)  Prescribed diazepam 5 mg QID, takes 1 daily.   Alcohol:   H/o drinking 1.75L daily last drink 2013  Nicotine: (cigarettes, vaping, chew/snuff)  Quit smoking cigarettes, is vaping  Cannabis:   Denies  Hallucinogens/Dissociatives: (acid, mushrooms, ketamine)  None in years  Eating disorder:  Denies  Gambling:   Denies        [place] Prescription Drug Monitoring Program Reviewed:  Yes                 2025 1 Buprenorphine-Nalox 8-2mg Film 30.00 30 Ju Tj [id number] [facility] ([id number]) 0/0 8.00 mg Comm Ins [place]   2025 1 Diazepam 5 Mg Tablet 120.00 30 Br Gou [id number] [facility] ([id number])  2.00 LME [place]   2025 1 Dextroamp-Amphetamin 20 Mg Tab 60.00 30 Br Gou [id number] [facility] ([id number]) 0/0  [place]   2025 1 Gabapentin 600 Mg Tablet 180.00 30 Te All [id number] [facility] ([id number]) 1/0  [place]   2025 1 Dextroamp-Amphetamin 20 Mg Tab 60.00 30 Br Gou [id number] [facility] ([id number]) 0/0  [place]   2025 1 Buprenorphine-Nalox 8-2mg Film 30.00 30 Ju [place]             PAST PSYCHIATRIC HISTORY:  Diagnoses- Depression, anxiety, ADHD  Suicide Attempts: No  [id number] periods of passive SI during heavy use  Hospitalizations: No         2024     9:44 AM 2025     9:40 AM 2025     2:00 PM   PHQ   PHQ-9 Total Score 4 3  10   Q9: Thoughts of better off dead/self-harm past 2  weeks Not at all Not at all Not at all       Patient-reported         Social History  Housing status: with wife and 2 sons  Education: some college, went to music school after HS  Employment status: Employed part time at  [employer]; 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   Relationship status:   Children: 2 children  Legal: Denies        Medications:  Current Outpatient Medications   Medication Sig Dispense Refill    acetaminophen (TYLENOL) 325 MG tablet Take 2 tablets (650 mg) by mouth every 6 hours as needed for mild pain or other 30 tablet 0    amphetamine-dextroamphetamine (ADDERALL) 20 MG tablet Take 20 mg by mouth.      atenolol (TENORMIN) 25 MG tablet Take 1 tablet (25 mg) by mouth daily. 90 tablet 2    atorvastatin (LIPITOR) 20 MG tablet Take 1 tablet (20 mg) by mouth daily. 90 tablet 0    buprenorphine HCl-naloxone HCl (SUBOXONE) 8-2 MG per film Place 1 Film under the tongue daily      diazepam (VALIUM) 5 MG tablet Take 1 tablet (5 mg) 3 times daily as needed. 60 tablet     gabapentin (NEURONTIN) 600 MG tablet Take 1 tablet (600 mg) by mouth 2 times daily      metFORMIN (GLUCOPHAGE XR) 500 MG 24 hr tablet Take 2 tablets (1,000 mg) by mouth 2 times daily (with meals). 360 tablet 1    Semaglutide, 1 MG/DOSE, (OZEMPIC, 1 MG/DOSE,) 4 MG/3ML pen Inject 1 mg subcutaneously every 7 days. 9 mL 1    sildenafil (VIAGRA) 100 MG tablet Take 1 tablet (100 mg) by mouth daily as needed 6 tablet 11    venlafaxine (EFFEXOR-ER) 150 MG TB24 Take 1 tablet by mouth daily (with breakfast). 30 each 0    blood glucose (ACCU-CHEK GUIDE) test strip USE TO TEST BLOOD SUGAR TWO TIMES A DAY OR AS DIRECTED 200 strip 1    blood glucose (NO BRAND SPECIFIED) lancets standard Use to test blood sugar 2 times daily or as directed. 200 each 3    blood glucose monitoring (NO BRAND SPECIFIED) meter device kit Use to test blood sugar 2 times daily or as directed. 1 kit 0    Continuous Glucose Sensor (FREESTYLE JHON 3 PLUS SENSOR) [facility] [place]  sensor every 15 days. Use to read blood sugars per 's instructions. 6 each 3    cyanocobalamin (VITAMIN B-12) 1000 MCG tablet Take 1 tablet (1,000 mcg) by mouth daily (Patient not taking: Reported on 6/30/2025)      nortriptyline (PAMELOR) 25 MG capsule Take 1 capsule (25 mg) by mouth at bedtime. (Patient not taking: Reported on 6/30/2025) 30 capsule 1     No current facility-administered medications for this visit.       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     Prostate infection     Seizures (H)     Staph infection        Past Surgical History:   Procedure Laterality Date    BIOPSY BONE FOOT Right 1/18/2022    Procedure: BIOPSY, BONE, RIGHT GREAT TOE (DISTAL PHALANX);  Surgeon: Shelton Smallwood DPM;  Location: [facility] OR    BIOPSY BONE TOE Right 5/3/2024    Procedure: BIOPSY, BONE, TOE;  Surgeon: Shelton Smallwood DPM;  Location: [facility] OR    IRRIGATION AND DEBRIDEMENT TOE, COMBINED Right 5/3/2024    Procedure: IRRIGATION AND DEBRIDEMENT, TOE FIRST;  Surgeon: Shelton Smallwood DPM;  Location: [facility] OR    ORTHOPEDIC SURGERY         Family History   Problem Relation Age of Onset    Depression Mother     Anxiety Disorder Mother     Substance Abuse Maternal Grandfather     Anxiety Disorder Brother     Glaucoma No family hx of     Macular Degeneration No family hx of          REVIEW OF SYSTEMS:  General: Withdrawal symptoms as described below.  No recent fevers.   Eyes:  No vision concerns.  No jaundice.    Resp: No coughing, wheezing or shortness of breath  CV: No chest pains or palpitations  GI:  No complaints other than as above  : No urinary frequency or dysuria, no discharge  Musculoskeletal: No significant muscle or joint pains other than as above.  No edema  Neurologic: No numbness, tingling, weakness, problems with balance or coordination  Psychiatric: No acute concerns other than as above.   Skin: No rashes or areas of acute infection    OBJECTIVE                                                        /88   Pulse 94   Wt 74.8 kg (165 lb)   BMI 25.46 kg/m      Physical Exam  Cardiovascular:      Rate and Rhythm: Normal rate.   Pulmonary:      Effort: Pulmonary effort is normal. No respiratory distress.   Neurological:      General: No focal deficit present.      Mental Status: He is alert and oriented to person, place, and time.   Psychiatric:         Attention and Perception: Attention normal.         Mood and Affect: Mood normal.         Speech: Speech normal.         Behavior: Behavior is cooperative.         Thought Content: Thought content normal.         Judgment: Judgment normal.         Labs:    UDS:   Lab Results   Component Value Date    BUP Screen Positive (A) 06/30/2025    BZO Screen Positive (A) 06/30/2025    BAR Negative 06/30/2025    JADE Negative 06/30/2025    MAMP Negative 06/30/2025    AMP Negative 06/30/2025    MDMA Negative 06/30/2025    MTD Negative 06/30/2025    [id number] Negative 06/30/2025    OXY Negative 06/30/2025    PCP Negative 06/30/2025    THC Screen Positive (A) 06/30/2025    TEMP 94 F 06/30/2025    SGPOCT 1.025 06/30/2025       *POC urine drug screen does not screen for Fentanyl    Recent Results (from the past 720 hours)   Drugs of Abuse Screen Urine (POC CUPS) POCT    Collection Time: 06/30/25  2:38 PM   Result Value Ref Range    POCT Kit Lot Number [id number]     POCT Kit Expiration Date 6857883     Temperature Urine POCT 94 F 90 F, 92 F, 94 F, 96 F, 98 F, 100 F    Specific [place] POCT 1.025 1.005, 1.015, 1.025    pH Qual Urine POCT 5 pH 4 pH, 5 pH, 7 pH, 9 pH     Creatinine Qual Urine POCT 50 mg/dL 20 mg/dL, 50 mg/dL, 100 mg/dL, 200 mg/dL    Internal QC Qual Urine POCT Valid Valid    Amphetamine Qual Urine POCT Negative Negative    Barbiturate Qual Urine POCT Negative Negative    Buprenorphine Qual Urine POCT Screen Positive (A) Negative    Benzodiazepine Qual Urine POCT Screen Positive (A) Negative    Cocaine Qual Urine POCT Negative Negative    Methamphetamine Qual Urine POCT Negative Negative    MDMA Qual Urine POCT Negative Negative    Methadone Qual Urine POCT Negative Negative    Opiate Qual Urine POCT Negative Negative    Oxycodone Qual Urine POCT Negative Negative    Phencyclidine Qual Urine POCT Negative Negative    THC Qual Urine POCT Screen Positive (A) Negative                  Continued Complex Management  The longitudinal plan of care for Opioid Use Disorder (OUD) was addressed during this visit. Due to the added complexity in care, I will continue to support Phillip in the subsequent management and with ongoing continuity of care.    Mariposa Murphy, CNP      [facility]  [address], [address]  [place] [address] [phone]

## 2025-07-02 LAB
BUPRENORPHINE UR CFM-MCNC: 267 NG/ML
BUPRENORPHINE/CREAT UR: 179 NG/MG {CREAT}
GABAPENTIN UR QL CFM: PRESENT
NALOXONE UR CFM-MCNC: 513 NG/ML
NALOXONE: 344 NG/MG {CREAT}
NORBUPRENORPHINE UR CFM-MCNC: 1067 NG/ML
NORBUPRENORPHINE/CREAT UR: 716 NG/MG {CREAT}
NORDIAZEPAM UR QL CFM: PRESENT
OXAZEPAM UR QL CFM: PRESENT
TEMAZEPAM UR QL CFM: PRESENT

## 2025-07-03 ENCOUNTER — MYC MEDICAL ADVICE (OUTPATIENT)
Dept: FAMILY MEDICINE | Facility: CLINIC | Age: 45
End: 2025-07-03
Payer: COMMERCIAL

## 2025-07-15 ENCOUNTER — OFFICE VISIT (OUTPATIENT)
Dept: BEHAVIORAL HEALTH | Facility: CLINIC | Age: 45
End: 2025-07-15
Payer: COMMERCIAL

## 2025-07-15 VITALS — SYSTOLIC BLOOD PRESSURE: 125 MMHG | DIASTOLIC BLOOD PRESSURE: 80 MMHG | OXYGEN SATURATION: 100 % | HEART RATE: 63 BPM

## 2025-07-15 DIAGNOSIS — Z79.891 ENCOUNTER FOR MONITORING OPIOID MAINTENANCE THERAPY: ICD-10-CM

## 2025-07-15 DIAGNOSIS — F17.200 NICOTINE USE DISORDER: ICD-10-CM

## 2025-07-15 DIAGNOSIS — F11.20 OPIOID USE DISORDER, SEVERE, DEPENDENCE (H): Primary | ICD-10-CM

## 2025-07-15 DIAGNOSIS — Z51.81 ENCOUNTER FOR MONITORING OPIOID MAINTENANCE THERAPY: ICD-10-CM

## 2025-07-15 LAB
AMPHETAMINE QUAL URINE POCT: ABNORMAL
BARBITURATE QUAL URINE POCT: NEGATIVE
BENZODIAZEPINE QUAL URINE POCT: ABNORMAL
BUPRENORPHINE QUAL URINE POCT: ABNORMAL
COCAINE QUAL URINE POCT: NEGATIVE
CREATININE QUAL URINE POCT: ABNORMAL
INTERNAL QC QUAL URINE POCT: ABNORMAL
MDMA QUAL URINE POCT: NEGATIVE
METHADONE QUAL URINE POCT: NEGATIVE
METHAMPHETAMINE QUAL URINE POCT: NEGATIVE
OPIATE QUAL URINE POCT: NEGATIVE
OXYCODONE QUAL URINE POCT: NEGATIVE
PH QUAL URINE POCT: ABNORMAL
PHENCYCLIDINE QUAL URINE POCT: NEGATIVE
POCT KIT EXPIRATION DATE: ABNORMAL
POCT KIT LOT NUMBER: ABNORMAL
SPECIFIC GRAVITY POCT: 1.01
TEMPERATURE URINE POCT: ABNORMAL
THC QUAL URINE POCT: ABNORMAL

## 2025-07-15 PROCEDURE — G2211 COMPLEX E/M VISIT ADD ON: HCPCS | Performed by: FAMILY MEDICINE

## 2025-07-15 PROCEDURE — 99214 OFFICE O/P EST MOD 30 MIN: CPT | Performed by: FAMILY MEDICINE

## 2025-07-15 PROCEDURE — 3074F SYST BP LT 130 MM HG: CPT | Performed by: FAMILY MEDICINE

## 2025-07-15 PROCEDURE — 80305 DRUG TEST PRSMV DIR OPT OBS: CPT | Performed by: FAMILY MEDICINE

## 2025-07-15 PROCEDURE — 3079F DIAST BP 80-89 MM HG: CPT | Performed by: FAMILY MEDICINE

## 2025-07-15 RX ORDER — ALBUTEROL SULFATE 90 UG/1
1-2 INHALANT RESPIRATORY (INHALATION)
Start: 2025-07-15

## 2025-07-15 RX ORDER — METHYLPREDNISOLONE SODIUM SUCCINATE 40 MG/ML
40 INJECTION INTRAMUSCULAR; INTRAVENOUS
Start: 2025-07-15

## 2025-07-15 RX ORDER — LIDOCAINE HYDROCHLORIDE 10 MG/ML
2 INJECTION, SOLUTION EPIDURAL; INFILTRATION; INTRACAUDAL; PERINEURAL ONCE
OUTPATIENT
Start: 2025-07-15 | End: 2025-07-15

## 2025-07-15 RX ORDER — DIPHENHYDRAMINE HYDROCHLORIDE 50 MG/ML
25 INJECTION, SOLUTION INTRAMUSCULAR; INTRAVENOUS
Start: 2025-07-15

## 2025-07-15 RX ORDER — DIPHENHYDRAMINE HYDROCHLORIDE 50 MG/ML
50 INJECTION, SOLUTION INTRAMUSCULAR; INTRAVENOUS
Start: 2025-07-15

## 2025-07-15 RX ORDER — MEPERIDINE HYDROCHLORIDE 25 MG/ML
25 INJECTION INTRAMUSCULAR; INTRAVENOUS; SUBCUTANEOUS
OUTPATIENT
Start: 2025-07-15

## 2025-07-15 RX ORDER — ALBUTEROL SULFATE 0.83 MG/ML
2.5 SOLUTION RESPIRATORY (INHALATION)
OUTPATIENT
Start: 2025-07-15

## 2025-07-15 RX ORDER — EPINEPHRINE 1 MG/ML
0.3 INJECTION, SOLUTION, CONCENTRATE INTRAVENOUS EVERY 5 MIN PRN
OUTPATIENT
Start: 2025-07-15

## 2025-07-15 RX ORDER — BUPRENORPHINE AND NALOXONE 8; 2 MG/1; MG/1
1 FILM, SOLUBLE BUCCAL; SUBLINGUAL DAILY
Qty: 30 FILM | Refills: 0 | Status: SHIPPED | OUTPATIENT
Start: 2025-07-15

## 2025-07-15 ASSESSMENT — PATIENT HEALTH QUESTIONNAIRE - PHQ9: SUM OF ALL RESPONSES TO PHQ QUESTIONS 1-9: 3

## 2025-07-15 NOTE — NURSING NOTE
Patient completed [employer] application. Faxed and emailed accordingly. Encouraged patient to contact his insurance company to inquire about the out of pocket costs for Sublocade administered at the [facility] and at a clinic. Patient verbalized understanding.    Marylu Arambula RN on 7/15/2025 at 4:48 PM

## 2025-07-15 NOTE — PROGRESS NOTES
[facility] Return Visit    ASSESSMENT/PLAN                                                    1. Opioid use disorder, severe, dependence (H) (Primary)  Controlled.  Interested in transfer to Sublocade.   Continue SL buprenorphine 8mg/day at this time  Pt was given # to infusion center to schedule Sublocade if he decides to do this  Nsg met with pt to complete InSupport application  - buprenorphine HCl-naloxone HCl (SUBOXONE) 8-2 MG per film; Place 1 Film under the tongue daily.  Dispense: 30 Film; Refill: 0    2. Encounter for monitoring opioid maintenance therapy  - Drugs of Abuse Screen Urine (POC CUPS) POCT    3. Nicotine use disorder  Vaping, not ready to quit at this time       Return in about 4 weeks (around 8/12/2025). And sooner if Sublocade is scheduled.       Patient counseling completed today:  Discussed mechanism of action, potential risks/benefits/side effects of medications and other recommendations above.    Discussed risk of precipitated withdrawal with initiation of buprenorphine in the presence of full opioid agonists.    Discussed importance of avoiding isolation, building a network of supportive relationships, avoiding people/places/things associated with past use to reduce risk of relapse; including motivational interviewing regarding psychosocial treatment for addiction.     SUBJECTIVE                                                      CC/HPI:  Phillip Rueda is a 45 year old male with PMH neuralgic amyotrophy and associated intermittent pain, DM2, anxiety, depression, ADHD, alcohol use disorder in sustained remission, nicotine use disorder, and opioid use disorder on buprenorphine who presents to the [facility] for return visit.      Brief History:  Phillip Rueda was first seen in [facility] on 06/30/25. They were referred by Dr Km Dos Santos MD.   Patient's reasons for seeking treatment include continue suboxone. Patient prescribed suboxone  from a provider at [facility] who left clinic. Here to establish care with new provider.   Rx to continue buprenorphine through [facility] after initial visit.       7/15/25 HPI:  Pt returns for 2 week follow-up after his initial visit.  He has continued to take 8mg buprenorphine daily.  Denies opioid cravings or return to use.  No c/o side effects.  He is interested in starting Sublocade.          Substance Use History :  Opioids:   Age at first use: 29 prescribed opioid. Use escalated and began to get from unprescribed sources. Has used heroin via insufflation, none in last 12 years  Most recent use: substance: oxycodone ; quantity 80 mg; route: oral and nasal ; timing of last use: 2024 during hospitalization during flares. Last sustained use of full opioids was 2018 prior to starting suboxone.   Has been on suboxone for pain since 2018     IV drug use: No   History of overdose: No  Previous residential or outpatient treatments for addiction : Yes: Multiple times for alcohol and opiates  Previous medication treatments for addiction: Yes: Suboxone since   Longest period of sobriety: 12 years from alcohol 2013  Medical complications related to substance use: Fatty Liver disease  Hepatitis C: non-reactive; Date of most recent testin2022  HIV: Non reactive; Date of most recent testin2018        Other Addiction History:  Stimulants (cocaine, methamphetamine, MDMA/ecstasy)   Has used all of them, h/o of heavy cocaine and ecstasy. None since   Sedatives/hypnotics/anxiolytics: (benzodiazepines, GHB, Ambien, phenobarbital)  Prescribed diazepam 5 mg QID, takes 1 daily.   Alcohol:   H/o drinking 1.75L daily last drink 2013  Nicotine: (cigarettes, vaping, chew/snuff)  Quit smoking cigarettes, is vaping  Cannabis:   Denies  Hallucinogens/Dissociatives: (acid, mushrooms, ketamine)  None in years  Eating disorder:  Denies  Gambling:   Denies        [place] Prescription Drug Monitoring Program Reviewed:   Yes  Filled  Written  ID  Drug  QTY  Days  Prescriber  RX #  Dispenser  Refill  Daily Dose*  Pymt Type      07/05/2025 06/30/2025 1 Buprenorphine-Nalox 8-2mg Film 15.00 15 He Bat [id number] [facility] ([id number]) 0/0 8.00 mg [place]   07/05/2025 06/05/2025 1 Dextroamp-Amphetamin 20 Mg Tab 60.00 30 Br Gou [id number] [facility] ([id number][place] 0/0  [place]   07/02/2025 07/02/2025 1 Diazepam 5 Mg Tablet 120.00 30 Br Gou [id number] [facility] ([id number]) 0/5 2.00 LME [place]   06/07/2025 06/05/2025 1 Buprenorphine-Nalox 8-2mg Film 30.00 30 Ju Tj [id number] [facility] ([id number]) 0/0 8.00 mg [place]   06/06/2025 01/08/2025 1 Diazepam 5 Mg Tablet 120.00 30 Br Gou [id number] [facility] ([id number]) 5/5 2.00 LME [place]       PAST PSYCHIATRIC HISTORY:  Diagnoses- Depression, anxiety, ADHD  Suicide Attempts: No  [id number] periods of passive SI during heavy use  Hospitalizations: No         2/20/2025     9:40 AM 6/30/2025     2:00 PM 7/15/2025     3:00 PM   PHQ   PHQ-9 Total Score 3  10 3   Q9: Thoughts of better off dead/self-harm past 2 weeks Not at all Not at all Not at all       Patient-reported         Social History  Housing status: with wife and 2 sons  Education: some college, went to music school after HS  Employment status: Employed part time at  [employer]; 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   Relationship status:   Children: 2 children  Legal: Denies        Medications:  Current Outpatient Medications   Medication Sig Dispense Refill    acetaminophen (TYLENOL) 325 MG tablet Take 2 tablets (650 mg) by mouth every 6 hours as needed for mild pain or other 30 tablet 0    amphetamine-dextroamphetamine (ADDERALL) 20 MG tablet Take 20 mg by mouth.      atenolol (TENORMIN) 25 MG tablet Take 1 tablet (25 mg) by mouth daily. 90 tablet 2    atorvastatin (LIPITOR) 20 MG tablet Take 1 tablet (20 mg) by mouth daily. 90 tablet 0    blood glucose (ACCU-CHEK GUIDE) test strip USE TO TEST BLOOD SUGAR TWO TIMES A DAY OR AS DIRECTED 200 strip 1    blood glucose (NO BRAND SPECIFIED)  lancets standard Use to test blood sugar 2 times daily or as directed. 200 each 3    blood glucose monitoring (NO BRAND SPECIFIED) meter device kit Use to test blood sugar 2 times daily or as directed. 1 kit 0    buprenorphine HCl-naloxone HCl (SUBOXONE) 8-2 MG per film Place 1 Film under the tongue daily. 15 Film 0    Continuous Glucose Sensor (FREESTYLE JHON 3 PLUS SENSOR) MISC Use 1 sensor every 15 days. Use to read blood sugars per 's instructions. 6 each 3    cyanocobalamin (VITAMIN B-12) 1000 MCG tablet Take 1 tablet (1,000 mcg) by mouth daily (Patient not taking: Reported on 6/30/2025)      diazepam (VALIUM) 5 MG tablet Take 1 tablet (5 mg) 3 times daily as needed. 60 tablet     gabapentin (NEURONTIN) 600 MG tablet Take 1 tablet (600 mg) by mouth 2 times daily      metFORMIN (GLUCOPHAGE XR) 500 MG 24 hr tablet Take 2 tablets (1,000 mg) by mouth 2 times daily (with meals). 360 tablet 1    nortriptyline (PAMELOR) 25 MG capsule Take 1 capsule (25 mg) by mouth at bedtime. (Patient not taking: Reported on 6/30/2025) 30 capsule 1    Semaglutide, 1 MG/DOSE, (OZEMPIC, 1 MG/DOSE,) 4 MG/3ML pen Inject 1 mg subcutaneously every 7 days. 9 mL 1    sildenafil (VIAGRA) 100 MG tablet Take 1 tablet (100 mg) by mouth daily as needed 6 tablet 11    venlafaxine (EFFEXOR-ER) 150 MG TB24 Take 1 tablet by mouth daily (with breakfast). 30 each 0     No current facility-administered medications for this visit.       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     Prostate infection     Seizures (H)     Staph  infection        Past Surgical History:   Procedure Laterality Date    BIOPSY BONE FOOT Right 1/18/2022    Procedure: BIOPSY, BONE, RIGHT GREAT TOE (DISTAL PHALANX);  Surgeon: Shelton Smallwood DPM;  Location: [facility] OR    BIOPSY BONE TOE Right 5/3/2024    Procedure: BIOPSY, BONE, TOE;  Surgeon: Shelton Smallwood DPM;  Location: [facility] OR    IRRIGATION AND DEBRIDEMENT TOE, COMBINED Right 5/3/2024    Procedure: IRRIGATION AND DEBRIDEMENT, TOE FIRST;  Surgeon: Shelton Smallwood DPM;  Location: [facility] OR    ORTHOPEDIC SURGERY         Family History   Problem Relation Age of Onset    Depression Mother     Anxiety Disorder Mother     Substance Abuse Maternal Grandfather     Anxiety Disorder Brother     Glaucoma No family hx of     Macular Degeneration No family hx of          REVIEW OF SYSTEMS:  See HPI    OBJECTIVE                                                        /80   Pulse 63   SpO2 100%     Physical Exam  Constitutional:       General: He is not in acute distress.  Pulmonary:      Effort: Pulmonary effort is normal.   Neurological:      General: No focal deficit present.      Mental Status: He is alert and oriented to person, place, and time.   Psychiatric:         Attention and Perception: Attention normal.         Mood and Affect: Mood normal.         Speech: Speech normal.         Behavior: Behavior is cooperative.         Thought Content: Thought content normal.         Judgment: Judgment normal.         Labs:  *POC urine drug screen does not screen for Fentanyl    Recent Results (from the past 720 hours)   Drugs of Abuse Screen Urine (POC CUPS) POCT    Collection Time: 06/30/25  2:38 PM   Result Value Ref Range    POCT Kit Lot Number [id number]     POCT Kit Expiration Date 6296260     Temperature Urine POCT 94 F 90 F, 92 F, 94 F, 96 F, 98 F, 100 F    Specific [place] POCT 1.025 1.005, 1.015, 1.025    pH Qual Urine POCT 5 pH 4 pH, 5 pH, 7 pH, 9 pH    Creatinine Qual Urine POCT 50 mg/dL 20 mg/dL, 50 mg/dL,  100 mg/dL, 200 mg/dL    Internal QC Qual Urine POCT Valid Valid    Amphetamine Qual Urine POCT Negative Negative    Barbiturate Qual Urine POCT Negative Negative    Buprenorphine Qual Urine POCT Screen Positive (A) Negative    Benzodiazepine Qual Urine POCT Screen Positive (A) Negative    Cocaine Qual Urine POCT Negative Negative    Methamphetamine Qual Urine POCT Negative Negative    MDMA Qual Urine POCT Negative Negative    Methadone Qual Urine POCT Negative Negative    Opiate Qual Urine POCT Negative Negative    Oxycodone Qual Urine POCT Negative Negative    Phencyclidine Qual Urine POCT Negative Negative    THC Qual Urine POCT Screen Positive (A) Negative   Urine Drug Confirmation Panel    Collection Time: 06/30/25  3:33 PM   Result Value Ref Range    Nordiazepam Present (A) Absent    Oxazepam Present (A) Absent    Temazepam Present (A) Absent    Gabapentin (Neurontin) Present (A) Absent    Buprenorphine ng/mL 267 (H) <5 ng/mL    Buprenorphine 179 Absent ng/mg [creat]    Norbuprenorphine ng/mL 1,067 (H) <5 ng/mL    Norbuprenorphine 716 Absent ng/mg [creat]    Naloxone ng/mL 513 (H) <5 ng/mL    Naloxone 344 Absent ng/mg [creat]   Urine Creatinine for Drug Screen Panel    Collection Time: 06/30/25  3:33 PM   Result Value Ref Range    Creatinine Urine for Drug Screen 149 mg/dL   Drugs of Abuse Screen Urine (POC CUPS) POCT    Collection Time: 07/15/25  3:44 PM   Result Value Ref Range    POCT Kit Lot Number [id number]     POCT Kit Expiration Date 08/05/2026     Temperature Urine POCT 94 F 90 F, 92 F, 94 F, 96 F, 98 F, 100 F    Specific [place] POCT 1.015 1.005, 1.015, 1.025    pH Qual Urine POCT 5 pH 4 pH, 5 pH, 7 pH, 9 pH    Creatinine Qual Urine POCT 50 mg/dL 20 mg/dL, 50 mg/dL, 100 mg/dL, 200 mg/dL    Internal QC Qual Urine POCT Valid Valid    Amphetamine Qual Urine POCT Screen Positive (A) Negative    Barbiturate Qual Urine POCT Negative Negative    Buprenorphine Qual Urine POCT Screen Positive (A) Negative     Benzodiazepine Qual Urine POCT Screen Positive (A) Negative    Cocaine Qual Urine POCT Negative Negative    Methamphetamine Qual Urine POCT Negative Negative    MDMA Qual Urine POCT Negative Negative    Methadone Qual Urine POCT Negative Negative    Opiate Qual Urine POCT Negative Negative    Oxycodone Qual Urine POCT Negative Negative    Phencyclidine Qual Urine POCT Negative Negative    THC Qual Urine POCT Screen Positive (A) Negative                  Continued Complex Management  The longitudinal plan of care for the diagnosis(es)/condition(s) as documented were addressed during this visit. Due to the added complexity in care, I will continue to support Phillip in the subsequent management and with ongoing continuity of care.      Maritza Gao MD  Addiction Medicine  [facility]  [address] [place] [id number] [phone]

## 2025-07-15 NOTE — NURSING NOTE
[facility]      Rooming information:    Approximate last use of full opioid agonist: ~6 months ago  Taking buprenorphine? Yes:   How much per day? 8-2 mg  Number of buprenorphine films/tablets remaining currently: unsure  Side effects related to buprenorphine (constipation, dry mouth, sedation?) Yes: dry mouth-uses biotene which is not very helpful.  Narcan currently available: Yes  Other recent substance use:    Denies  NICOTINE-Yes: Vape  If using nicotine, ready to quit? No    Point of care urine drug screen positive for:  Lab Results   Component Value Date    BUP Screen Positive (A) 07/15/2025    BZO Screen Positive (A) 07/15/2025    BAR Negative 07/15/2025    JADE Negative 07/15/2025    MAMP Negative 07/15/2025    AMP Screen Positive (A) 07/15/2025    MDMA Negative 07/15/2025    MTD Negative 07/15/2025    [id number] Negative 07/15/2025    OXY Negative 07/15/2025    PCP Negative 07/15/2025    THC Screen Positive (A) 07/15/2025    TEMP 94 F 07/15/2025    SGPOCT 1.015 07/15/2025       *POC urine drug screen does not screen for Fentanyl    Depression Response    Patient completed the PHQ-9 assessment for depression and scored >9? No  Question 9 on the PHQ-9 was positive for suicidality? No  Does patient have current mental health provider? Yes  C-SSRS screener risk level. N/A      Is this a virtual visit? No    I personally notified the following: visit provider          7/15/2025     3:00 PM   PHQ Assesment Total Score(s)   PHQ-9 Score 3         Housing needs: Stable    Insurance: Active    Current legal issues: Denies    Contact information up to date? Yes    3rd Party Involvement None today (please obtain TY if pt would like to include)    Prabha Condon RN  July 15, 2025  3:31 PM

## 2025-07-17 ENCOUNTER — TELEPHONE (OUTPATIENT)
Dept: BEHAVIORAL HEALTH | Facility: CLINIC | Age: 45
End: 2025-07-17
Payer: COMMERCIAL

## 2025-07-26 ENCOUNTER — HEALTH MAINTENANCE LETTER (OUTPATIENT)
Age: 45
End: 2025-07-26

## 2025-08-12 ENCOUNTER — TELEPHONE (OUTPATIENT)
Dept: BEHAVIORAL HEALTH | Facility: CLINIC | Age: 45
End: 2025-08-12

## 2025-08-12 ENCOUNTER — OFFICE VISIT (OUTPATIENT)
Dept: BEHAVIORAL HEALTH | Facility: CLINIC | Age: 45
End: 2025-08-12
Payer: COMMERCIAL

## 2025-08-12 VITALS — HEART RATE: 72 BPM | OXYGEN SATURATION: 99 % | DIASTOLIC BLOOD PRESSURE: 88 MMHG | SYSTOLIC BLOOD PRESSURE: 149 MMHG

## 2025-08-12 DIAGNOSIS — F17.200 NICOTINE USE DISORDER: ICD-10-CM

## 2025-08-12 DIAGNOSIS — Z51.81 ENCOUNTER FOR MONITORING OPIOID MAINTENANCE THERAPY: ICD-10-CM

## 2025-08-12 DIAGNOSIS — F11.20 OPIOID USE DISORDER, SEVERE, DEPENDENCE (H): Primary | ICD-10-CM

## 2025-08-12 DIAGNOSIS — Z79.891 ENCOUNTER FOR MONITORING OPIOID MAINTENANCE THERAPY: ICD-10-CM

## 2025-08-12 LAB
AMPHETAMINE QUAL URINE POCT: ABNORMAL
BARBITURATE QUAL URINE POCT: NEGATIVE
BENZODIAZEPINE QUAL URINE POCT: ABNORMAL
BUPRENORPHINE QUAL URINE POCT: ABNORMAL
COCAINE QUAL URINE POCT: NEGATIVE
CREATININE QUAL URINE POCT: ABNORMAL
INTERNAL QC QUAL URINE POCT: ABNORMAL
MDMA QUAL URINE POCT: NEGATIVE
METHADONE QUAL URINE POCT: NEGATIVE
METHAMPHETAMINE QUAL URINE POCT: NEGATIVE
OPIATE QUAL URINE POCT: NEGATIVE
OXYCODONE QUAL URINE POCT: NEGATIVE
PH QUAL URINE POCT: ABNORMAL
PHENCYCLIDINE QUAL URINE POCT: NEGATIVE
POCT KIT EXPIRATION DATE: ABNORMAL
POCT KIT LOT NUMBER: ABNORMAL
SPECIFIC GRAVITY POCT: 1.02
TEMPERATURE URINE POCT: ABNORMAL
THC QUAL URINE POCT: ABNORMAL

## 2025-08-12 RX ORDER — BUPRENORPHINE AND NALOXONE 8; 2 MG/1; MG/1
1 FILM, SOLUBLE BUCCAL; SUBLINGUAL DAILY
Qty: 30 FILM | Refills: 0 | Status: SHIPPED | OUTPATIENT
Start: 2025-08-12

## 2025-08-16 ENCOUNTER — HEALTH MAINTENANCE LETTER (OUTPATIENT)
Age: 45
End: 2025-08-16

## 2025-08-19 ENCOUNTER — OFFICE VISIT (OUTPATIENT)
Dept: FAMILY MEDICINE | Facility: CLINIC | Age: 45
End: 2025-08-19
Payer: COMMERCIAL

## 2025-08-19 VITALS
HEIGHT: 68 IN | HEART RATE: 66 BPM | DIASTOLIC BLOOD PRESSURE: 79 MMHG | SYSTOLIC BLOOD PRESSURE: 134 MMHG | OXYGEN SATURATION: 99 % | BODY MASS INDEX: 25.01 KG/M2 | WEIGHT: 165 LBS | RESPIRATION RATE: 16 BRPM | TEMPERATURE: 97.7 F

## 2025-08-19 DIAGNOSIS — E78.00 HYPERCHOLESTEREMIA: ICD-10-CM

## 2025-08-19 DIAGNOSIS — E11.9 TYPE 2 DIABETES MELLITUS WITHOUT COMPLICATION, WITHOUT LONG-TERM CURRENT USE OF INSULIN (H): Primary | ICD-10-CM

## 2025-08-19 DIAGNOSIS — E11.42 DIABETIC POLYNEUROPATHY ASSOCIATED WITH TYPE 2 DIABETES MELLITUS (H): ICD-10-CM

## 2025-08-19 LAB
ALBUMIN SERPL BCG-MCNC: 4.2 G/DL (ref 3.5–5.2)
ALP SERPL-CCNC: 120 U/L (ref 40–150)
ALT SERPL W P-5'-P-CCNC: 22 U/L (ref 0–70)
ANION GAP SERPL CALCULATED.3IONS-SCNC: 9 MMOL/L (ref 7–15)
AST SERPL W P-5'-P-CCNC: 38 U/L (ref 0–45)
BILIRUB SERPL-MCNC: 0.4 MG/DL
BUN SERPL-MCNC: 7.4 MG/DL (ref 6–20)
CALCIUM SERPL-MCNC: 9.4 MG/DL (ref 8.8–10.4)
CHLORIDE SERPL-SCNC: 99 MMOL/L (ref 98–107)
CHOLEST SERPL-MCNC: 128 MG/DL
CREAT SERPL-MCNC: 1.06 MG/DL (ref 0.67–1.17)
CREAT UR-MCNC: 161 MG/DL
EGFRCR SERPLBLD CKD-EPI 2021: 87 ML/MIN/1.73M2
ERYTHROCYTE [DISTWIDTH] IN BLOOD BY AUTOMATED COUNT: 12.9 % (ref 10–15)
EST. AVERAGE GLUCOSE BLD GHB EST-MCNC: 171 MG/DL
FASTING STATUS PATIENT QL REPORTED: YES
FASTING STATUS PATIENT QL REPORTED: YES
GLUCOSE SERPL-MCNC: 112 MG/DL (ref 70–99)
HBA1C MFR BLD: 7.6 % (ref 0–5.6)
HCO3 SERPL-SCNC: 28 MMOL/L (ref 22–29)
HCT VFR BLD AUTO: 36.6 % (ref 40–53)
HDLC SERPL-MCNC: 68 MG/DL
HGB BLD-MCNC: 11.9 G/DL (ref 13.3–17.7)
LDLC SERPL CALC-MCNC: 46 MG/DL
MCH RBC QN AUTO: 28.8 PG (ref 26.5–33)
MCHC RBC AUTO-ENTMCNC: 32.5 G/DL (ref 31.5–36.5)
MCV RBC AUTO: 88.6 FL (ref 78–100)
MICROALBUMIN UR-MCNC: 12.4 MG/L
MICROALBUMIN/CREAT UR: 7.7 MG/G CR (ref 0–17)
NONHDLC SERPL-MCNC: 60 MG/DL
PLATELET # BLD AUTO: 218 10E3/UL (ref 150–450)
POTASSIUM SERPL-SCNC: 4.1 MMOL/L (ref 3.4–5.3)
PROT SERPL-MCNC: 6.5 G/DL (ref 6.4–8.3)
RBC # BLD AUTO: 4.13 10E6/UL (ref 4.4–5.9)
SODIUM SERPL-SCNC: 136 MMOL/L (ref 135–145)
TRIGL SERPL-MCNC: 69 MG/DL
TSH SERPL DL<=0.005 MIU/L-ACNC: 1.52 UIU/ML (ref 0.3–4.2)
VIT B12 SERPL-MCNC: 482 PG/ML (ref 232–1245)
WBC # BLD AUTO: 6.61 10E3/UL (ref 4–11)

## 2025-08-19 PROCEDURE — 3078F DIAST BP <80 MM HG: CPT | Performed by: FAMILY MEDICINE

## 2025-08-19 PROCEDURE — 36415 COLL VENOUS BLD VENIPUNCTURE: CPT | Performed by: FAMILY MEDICINE

## 2025-08-19 PROCEDURE — 82570 ASSAY OF URINE CREATININE: CPT | Performed by: FAMILY MEDICINE

## 2025-08-19 PROCEDURE — 82043 UR ALBUMIN QUANTITATIVE: CPT | Performed by: FAMILY MEDICINE

## 2025-08-19 PROCEDURE — G2211 COMPLEX E/M VISIT ADD ON: HCPCS | Performed by: FAMILY MEDICINE

## 2025-08-19 PROCEDURE — 83036 HEMOGLOBIN GLYCOSYLATED A1C: CPT | Performed by: FAMILY MEDICINE

## 2025-08-19 PROCEDURE — 80061 LIPID PANEL: CPT | Performed by: FAMILY MEDICINE

## 2025-08-19 PROCEDURE — 84443 ASSAY THYROID STIM HORMONE: CPT | Performed by: FAMILY MEDICINE

## 2025-08-19 PROCEDURE — 85027 COMPLETE CBC AUTOMATED: CPT | Performed by: FAMILY MEDICINE

## 2025-08-19 PROCEDURE — 3048F LDL-C <100 MG/DL: CPT | Performed by: FAMILY MEDICINE

## 2025-08-19 PROCEDURE — 99214 OFFICE O/P EST MOD 30 MIN: CPT | Performed by: FAMILY MEDICINE

## 2025-08-19 PROCEDURE — 3075F SYST BP GE 130 - 139MM HG: CPT | Performed by: FAMILY MEDICINE

## 2025-08-19 PROCEDURE — 3051F HG A1C>EQUAL 7.0%<8.0%: CPT | Performed by: FAMILY MEDICINE

## 2025-08-19 PROCEDURE — 80053 COMPREHEN METABOLIC PANEL: CPT | Performed by: FAMILY MEDICINE

## 2025-08-19 PROCEDURE — 82607 VITAMIN B-12: CPT | Performed by: FAMILY MEDICINE

## 2025-08-19 RX ORDER — SEMAGLUTIDE 1.34 MG/ML
1 INJECTION, SOLUTION SUBCUTANEOUS
Qty: 9 ML | Refills: 1 | Status: SHIPPED | OUTPATIENT
Start: 2025-08-19

## 2025-08-19 RX ORDER — ATORVASTATIN CALCIUM 20 MG/1
20 TABLET, FILM COATED ORAL DAILY
Qty: 90 TABLET | Refills: 1 | Status: SHIPPED | OUTPATIENT
Start: 2025-08-19

## 2025-08-19 RX ORDER — METFORMIN HYDROCHLORIDE 500 MG/1
1000 TABLET, EXTENDED RELEASE ORAL 2 TIMES DAILY WITH MEALS
Qty: 360 TABLET | Refills: 1 | Status: SHIPPED | OUTPATIENT
Start: 2025-08-19

## 2025-09-04 ENCOUNTER — OFFICE VISIT (OUTPATIENT)
Dept: ADDICTION MEDICINE | Facility: CLINIC | Age: 45
End: 2025-09-04
Attending: FAMILY MEDICINE
Payer: COMMERCIAL

## 2025-09-04 VITALS — SYSTOLIC BLOOD PRESSURE: 123 MMHG | HEART RATE: 69 BPM | DIASTOLIC BLOOD PRESSURE: 79 MMHG

## 2025-09-04 DIAGNOSIS — F11.20 OPIOID USE DISORDER, SEVERE, DEPENDENCE (H): ICD-10-CM

## 2025-09-04 DIAGNOSIS — F11.20 SEVERE OPIOID USE DISORDER (H): Primary | ICD-10-CM

## 2025-09-04 DIAGNOSIS — R52 PAIN DISORDER: ICD-10-CM

## 2025-09-04 LAB
AMPHETAMINE QUAL URINE POCT: NEGATIVE
BARBITURATE QUAL URINE POCT: NEGATIVE
BENZODIAZEPINE QUAL URINE POCT: ABNORMAL
BUPRENORPHINE QUAL URINE POCT: ABNORMAL
COCAINE QUAL URINE POCT: NEGATIVE
CREATININE QUAL URINE POCT: ABNORMAL
INTERNAL QC QUAL URINE POCT: ABNORMAL
MDMA QUAL URINE POCT: NEGATIVE
METHADONE QUAL URINE POCT: NEGATIVE
METHAMPHETAMINE QUAL URINE POCT: NEGATIVE
OPIATE QUAL URINE POCT: NEGATIVE
OXYCODONE QUAL URINE POCT: NEGATIVE
PH QUAL URINE POCT: ABNORMAL
PHENCYCLIDINE QUAL URINE POCT: NEGATIVE
POCT KIT EXPIRATION DATE: ABNORMAL
POCT KIT LOT NUMBER: ABNORMAL
SPECIFIC GRAVITY POCT: 1.02
TEMPERATURE URINE POCT: ABNORMAL
THC QUAL URINE POCT: ABNORMAL

## 2025-09-04 RX ORDER — MEPERIDINE HYDROCHLORIDE 25 MG/ML
25 INJECTION INTRAMUSCULAR; INTRAVENOUS; SUBCUTANEOUS
OUTPATIENT
Start: 2025-09-11

## 2025-09-04 RX ORDER — ALBUTEROL SULFATE 90 UG/1
1-2 INHALANT RESPIRATORY (INHALATION)
Start: 2025-09-11

## 2025-09-04 RX ORDER — EPINEPHRINE 1 MG/ML
0.3 INJECTION, SOLUTION, CONCENTRATE INTRAVENOUS EVERY 5 MIN PRN
OUTPATIENT
Start: 2025-09-11

## 2025-09-04 RX ORDER — DIPHENHYDRAMINE HYDROCHLORIDE 50 MG/ML
25 INJECTION INTRAMUSCULAR; INTRAVENOUS
Start: 2025-09-11

## 2025-09-04 RX ORDER — ALBUTEROL SULFATE 0.83 MG/ML
2.5 SOLUTION RESPIRATORY (INHALATION)
OUTPATIENT
Start: 2025-09-11

## 2025-09-04 RX ORDER — LIDOCAINE HYDROCHLORIDE 10 MG/ML
2 INJECTION, SOLUTION EPIDURAL; INFILTRATION; INTRACAUDAL; PERINEURAL ONCE
OUTPATIENT
Start: 2025-09-11 | End: 2025-09-11

## 2025-09-04 RX ORDER — BUPRENORPHINE AND NALOXONE 8; 2 MG/1; MG/1
1 FILM, SOLUBLE BUCCAL; SUBLINGUAL DAILY
Qty: 30 FILM | Refills: 0 | Status: SHIPPED | OUTPATIENT
Start: 2025-09-04

## 2025-09-04 RX ORDER — DIPHENHYDRAMINE HYDROCHLORIDE 50 MG/ML
50 INJECTION INTRAMUSCULAR; INTRAVENOUS
Start: 2025-09-11

## 2025-09-04 ASSESSMENT — PAIN SCALES - GENERAL: PAINLEVEL_OUTOF10: NO PAIN (0)

## (undated) DEVICE — PACK EXTREMITY SOP15EXFSD

## (undated) DEVICE — PREP CHLORAPREP 26ML TINTED ORANGE  260815

## (undated) DEVICE — NDL 19GA 1.5"

## (undated) DEVICE — SU PROLENE 4-0 FS-2 18' 8683G

## (undated) DEVICE — SOL WATER IRRIG 1000ML BOTTLE 2F7114

## (undated) DEVICE — LINEN TOWEL PACK X5 5464

## (undated) DEVICE — GLOVE PROTEXIS MICRO 8.0  2D73PM80

## (undated) DEVICE — DRSG ABDOMINAL 07 1/2X8" 7197D

## (undated) DEVICE — BLADE KNIFE SURG 15 371115

## (undated) DEVICE — DRAPE POUCH IRR 1016

## (undated) DEVICE — DECANTER BAG 2002S

## (undated) DEVICE — SOL NACL 0.9% IRRIG 1000ML BOTTLE 2F7124

## (undated) DEVICE — DRAPE MINI C-ARM 4003

## (undated) DEVICE — PREP CHLORAPREP 26ML TINTED HI-LITE ORANGE 930815

## (undated) DEVICE — SUCTION TIP YANKAUER W/O VENT K86

## (undated) DEVICE — DRAPE STERI TOWEL LG 1010

## (undated) DEVICE — BLADE SAW SAGITTAL 25.5X9.5X.4MM FINE LINVATEC 5023-138

## (undated) DEVICE — GLOVE BIOGEL PI MICRO SZ 8.0 48580

## (undated) DEVICE — SU VICRYL 3-0 PS-1 18" UND J683

## (undated) DEVICE — DRSG ADAPTIC 3X8" 6113

## (undated) DEVICE — PACKING NUGAUZE 1/4" PLAIN 7631

## (undated) DEVICE — BNDG ELASTIC 4" DBL LENGTH UNSTERILE 6611-14

## (undated) DEVICE — IMM LIMB ELEVATOR DC40-0203

## (undated) DEVICE — PIN GUARD 10-1-001 101001PBX

## (undated) DEVICE — SOL NACL 0.9% IRRIG 3000ML BAG 2B7477

## (undated) DEVICE — NDL BONE BIOPSY TRAPLOCK 8GAX4"  DBMNJ0804TL

## (undated) DEVICE — DRSG KERLIX 4 1/2"X4YDS ROLL 6715

## (undated) DEVICE — SPECIMEN CULTURETTE DBL SWAB 220109

## (undated) DEVICE — BNDG KLING 4" 2236

## (undated) DEVICE — SU PROLENE 3-0 PS-2 18" 8687H

## (undated) DEVICE — BLADE SAW OSCILLATING STRYK MED 9.0X25X0.38MM 2296-003-111

## (undated) DEVICE — DRSG GAUZE 4X4" 3033

## (undated) DEVICE — APPLICATOR COTTON TIP 6"X2 STERILE LF 6012

## (undated) DEVICE — SU VICRYL 4-0 PS-2 18" UND J496H

## (undated) DEVICE — BNDG ROLLER GAUZE CONFORM 3"X4YD 41-53

## (undated) DEVICE — ESU GROUND PAD UNIVERSAL W/O CORD

## (undated) DEVICE — DRSG KERLIX FLUFFS X5

## (undated) DEVICE — NDL BX BONE MARROW 11GA 6" JAMSHIDI DJ6011X

## (undated) DEVICE — PREP SKIN SCRUB TRAY 4461A

## (undated) DEVICE — GLOVE BIOGEL PI MICRO INDICATOR UNDERGLOVE SZ 8.0 48980

## (undated) DEVICE — CAST PADDING 4" UNSTERILE 9044

## (undated) DEVICE — BNDG ELASTIC 4"X5YDS STERILE 6611-4S

## (undated) DEVICE — CAST PADDING 4" STERILE 9044S

## (undated) RX ORDER — FENTANYL CITRATE 0.05 MG/ML
INJECTION, SOLUTION INTRAMUSCULAR; INTRAVENOUS
Status: DISPENSED
Start: 2024-05-03

## (undated) RX ORDER — PROPOFOL 10 MG/ML
INJECTION, EMULSION INTRAVENOUS
Status: DISPENSED
Start: 2022-01-18

## (undated) RX ORDER — LIDOCAINE HYDROCHLORIDE 20 MG/ML
INJECTION, SOLUTION INFILTRATION; PERINEURAL
Status: DISPENSED
Start: 2024-05-03

## (undated) RX ORDER — KETAMINE HCL IN NACL, ISO-OSM 100MG/10ML
SYRINGE (ML) INJECTION
Status: DISPENSED
Start: 2022-01-18

## (undated) RX ORDER — FENTANYL CITRATE 50 UG/ML
INJECTION, SOLUTION INTRAMUSCULAR; INTRAVENOUS
Status: DISPENSED
Start: 2024-05-03

## (undated) RX ORDER — LIDOCAINE HYDROCHLORIDE 20 MG/ML
INJECTION, SOLUTION EPIDURAL; INFILTRATION; INTRACAUDAL; PERINEURAL
Status: DISPENSED
Start: 2022-01-18

## (undated) RX ORDER — BUPIVACAINE HYDROCHLORIDE 5 MG/ML
INJECTION, SOLUTION EPIDURAL; INTRACAUDAL
Status: DISPENSED
Start: 2024-05-03

## (undated) RX ORDER — HYDROMORPHONE HCL IN WATER/PF 6 MG/30 ML
PATIENT CONTROLLED ANALGESIA SYRINGE INTRAVENOUS
Status: DISPENSED
Start: 2024-05-03

## (undated) RX ORDER — FENTANYL CITRATE 50 UG/ML
INJECTION, SOLUTION INTRAMUSCULAR; INTRAVENOUS
Status: DISPENSED
Start: 2022-01-18

## (undated) RX ORDER — DEXAMETHASONE SODIUM PHOSPHATE 4 MG/ML
INJECTION, SOLUTION INTRA-ARTICULAR; INTRALESIONAL; INTRAMUSCULAR; INTRAVENOUS; SOFT TISSUE
Status: DISPENSED
Start: 2022-01-18

## (undated) RX ORDER — CLINDAMYCIN PHOSPHATE 900 MG/50ML
INJECTION, SOLUTION INTRAVENOUS
Status: DISPENSED
Start: 2022-01-18

## (undated) RX ORDER — ONDANSETRON 2 MG/ML
INJECTION INTRAMUSCULAR; INTRAVENOUS
Status: DISPENSED
Start: 2022-01-18